# Patient Record
Sex: MALE | Race: WHITE | URBAN - METROPOLITAN AREA
[De-identification: names, ages, dates, MRNs, and addresses within clinical notes are randomized per-mention and may not be internally consistent; named-entity substitution may affect disease eponyms.]

---

## 2021-07-22 ENCOUNTER — HISTORICAL (OUTPATIENT)
Dept: ADMINISTRATIVE | Facility: HOSPITAL | Age: 76
End: 2021-07-22

## 2021-08-13 ENCOUNTER — HISTORICAL (OUTPATIENT)
Dept: ADMINISTRATIVE | Facility: HOSPITAL | Age: 76
End: 2021-08-13

## 2021-09-28 ENCOUNTER — HISTORICAL (OUTPATIENT)
Dept: LAB | Facility: HOSPITAL | Age: 76
End: 2021-09-28

## 2021-09-28 LAB
ABS NEUT (OLG): 3.68 X10(3)/MCL (ref 2.1–9.2)
ALBUMIN SERPL-MCNC: 4 GM/DL (ref 3.4–4.8)
ALBUMIN/GLOB SERPL: 1.1 RATIO (ref 1.1–2)
ALP SERPL-CCNC: 78 UNIT/L (ref 40–150)
ALT SERPL-CCNC: 13 UNIT/L (ref 0–55)
APPEARANCE, UA: ABNORMAL
AST SERPL-CCNC: 15 UNIT/L (ref 5–34)
BACTERIA SPEC CULT: ABNORMAL /HPF
BASOPHILS # BLD AUTO: 0.02 X10(3)/MCL (ref 0–0.2)
BASOPHILS NFR BLD AUTO: 0.3 % (ref 0–0.9)
BILIRUB SERPL-MCNC: 0.5 MG/DL (ref 0.2–1.2)
BILIRUB UR QL STRIP: NEGATIVE
BILIRUBIN DIRECT+TOT PNL SERPL-MCNC: 0.1 MG/DL (ref 0–0.5)
BILIRUBIN DIRECT+TOT PNL SERPL-MCNC: 0.4 MG/DL (ref 0–0.8)
BUN SERPL-MCNC: 9.8 MG/DL (ref 8.4–25.7)
CALCIUM SERPL-MCNC: 10.5 MG/DL (ref 8.8–10)
CHLORIDE SERPL-SCNC: 106 MMOL/L (ref 98–107)
CO2 SERPL-SCNC: 24 MMOL/L (ref 23–31)
COLOR UR: YELLOW
CREAT SERPL-MCNC: 1.06 MG/DL (ref 0.72–1.25)
EOSINOPHIL # BLD AUTO: 0.13 X10(3)/MCL (ref 0–0.9)
EOSINOPHIL NFR BLD AUTO: 1.7 % (ref 0–6.5)
ERYTHROCYTE [DISTWIDTH] IN BLOOD BY AUTOMATED COUNT: 13.6 % (ref 11.5–17)
EST. AVERAGE GLUCOSE BLD GHB EST-MCNC: 108.3 MG/DL
GLOBULIN SER-MCNC: 3.7 GM/DL (ref 2.4–3.5)
GLUCOSE (UA): NEGATIVE
GLUCOSE SERPL-MCNC: 88 MG/DL (ref 82–115)
HBA1C MFR BLD: 5.4 %
HCT VFR BLD AUTO: 48 % (ref 42–52)
HGB BLD-MCNC: 15.9 GM/DL (ref 14–18)
HGB UR QL STRIP: ABNORMAL
IMM GRANULOCYTES # BLD AUTO: 0.02 10*3/UL (ref 0–0.02)
IMM GRANULOCYTES NFR BLD AUTO: 0.3 % (ref 0–0.43)
KETONES UR QL STRIP: NEGATIVE
LEUKOCYTE ESTERASE UR QL STRIP: ABNORMAL
LYMPHOCYTES # BLD AUTO: 3.1 X10(3)/MCL (ref 0.6–4.6)
LYMPHOCYTES NFR BLD AUTO: 40.7 % (ref 16.2–38.3)
MCH RBC QN AUTO: 31.9 PG (ref 27–31)
MCHC RBC AUTO-ENTMCNC: 33.1 GM/DL (ref 33–36)
MCV RBC AUTO: 96.2 FL (ref 80–94)
MONOCYTES # BLD AUTO: 0.66 X10(3)/MCL (ref 0.1–1.3)
MONOCYTES NFR BLD AUTO: 8.7 % (ref 4.7–11.3)
MRSA SCREEN BY PCR: NEGATIVE
MUCOUS THREADS URNS QL MICRO: ABNORMAL /LPF
NEUTROPHILS # BLD AUTO: 3.68 X10(3)/MCL (ref 2.1–9.2)
NEUTROPHILS NFR BLD AUTO: 48.3 % (ref 49.1–73.4)
NITRITE UR QL STRIP: NEGATIVE
NRBC BLD AUTO-RTO: 0 % (ref 0–0.2)
PH UR STRIP: 6 [PH] (ref 5–7)
PLATELET # BLD AUTO: 274 X10(3)/MCL (ref 130–400)
PMV BLD AUTO: 9.8 FL (ref 7.4–10.4)
POTASSIUM SERPL-SCNC: 4.9 MMOL/L (ref 3.5–5.1)
PROT SERPL-MCNC: 7.7 GM/DL (ref 5.8–7.6)
PROT UR QL STRIP: NEGATIVE
RBC # BLD AUTO: 4.99 X10(6)/MCL (ref 4.7–6.1)
RBC #/AREA URNS HPF: ABNORMAL /HPF
SARS-COV-2 AG RESP QL IA.RAPID: NEGATIVE
SODIUM SERPL-SCNC: 139 MMOL/L (ref 136–145)
SP GR UR STRIP: 1.01 (ref 1–1.03)
SQUAMOUS EPITHELIAL, UA: ABNORMAL /LPF
UROBILINOGEN UR STRIP-ACNC: NEGATIVE
WBC # SPEC AUTO: 7.6 X10(3)/MCL (ref 4.5–11.5)
WBC #/AREA URNS HPF: ABNORMAL /HPF

## 2021-10-06 ENCOUNTER — HISTORICAL (OUTPATIENT)
Dept: ADMINISTRATIVE | Facility: HOSPITAL | Age: 76
End: 2021-10-06

## 2021-10-06 LAB
APPEARANCE, UA: CLEAR
BACTERIA SPEC CULT: ABNORMAL
BILIRUB UR QL STRIP: NEGATIVE
COLOR UR: YELLOW
GLUCOSE (UA): NEGATIVE
HGB UR QL STRIP: ABNORMAL
KETONES UR QL STRIP: NEGATIVE
LEUKOCYTE ESTERASE UR QL STRIP: ABNORMAL
NITRITE UR QL STRIP: NEGATIVE
PH UR STRIP: 5.5 [PH] (ref 5–9)
PROT UR QL STRIP: 30
RBC #/AREA URNS HPF: ABNORMAL /HPF
SARS-COV-2 AG RESP QL IA.RAPID: NEGATIVE
SP GR UR STRIP: >=1.03 (ref 1–1.03)
SQUAMOUS EPITHELIAL, UA: ABNORMAL
UROBILINOGEN UR STRIP-ACNC: 0.2
WBC #/AREA URNS HPF: ABNORMAL /HPF

## 2021-10-09 LAB
ABS NEUT (OLG): 3.41 X10(3)/MCL (ref 2.1–9.2)
BASOPHILS NFR BLD MANUAL: 0 % (ref 0–2)
BUN SERPL-MCNC: 16 MG/DL (ref 8.4–25.7)
CALCIUM SERPL-MCNC: 8.9 MG/DL (ref 8.8–10)
CHLORIDE SERPL-SCNC: 108 MMOL/L (ref 98–107)
CO2 SERPL-SCNC: 26 MMOL/L (ref 23–31)
CREAT SERPL-MCNC: 0.83 MG/DL (ref 0.73–1.18)
CREAT/UREA NIT SERPL: 19
EOSINOPHIL NFR BLD MANUAL: 2 % (ref 0–8)
ERYTHROCYTE [DISTWIDTH] IN BLOOD BY AUTOMATED COUNT: 13.6 % (ref 11.5–17)
EST. AVERAGE GLUCOSE BLD GHB EST-MCNC: 99.7 MG/DL
GLUCOSE SERPL-MCNC: 95 MG/DL (ref 82–115)
GRANULOCYTES NFR BLD MANUAL: 57 % (ref 47–80)
HBA1C MFR BLD: 5.1 %
HCT VFR BLD AUTO: 33.1 % (ref 42–52)
HGB BLD-MCNC: 10.4 GM/DL (ref 14–18)
LYMPHOCYTES NFR BLD MANUAL: 31 % (ref 13–40)
LYMPHOCYTES NFR BLD MANUAL: 4 %
MCH RBC QN AUTO: 31.4 PG (ref 27–31)
MCHC RBC AUTO-ENTMCNC: 31.4 GM/DL (ref 33–36)
MCV RBC AUTO: 100 FL (ref 80–94)
MONOCYTES NFR BLD MANUAL: 6 % (ref 2–11)
PLATELET # BLD AUTO: 250 X10(3)/MCL (ref 130–400)
PLATELET # BLD EST: ADEQUATE 10*3/UL
PMV BLD AUTO: 9.4 FL (ref 9.4–12.4)
POTASSIUM SERPL-SCNC: 4.1 MMOL/L (ref 3.5–5.1)
RBC # BLD AUTO: 3.31 X10(6)/MCL (ref 4.7–6.1)
RBC MORPH BLD: NORMAL
SODIUM SERPL-SCNC: 142 MMOL/L (ref 136–145)
WBC # SPEC AUTO: 6.6 X10(3)/MCL (ref 4.5–11.5)

## 2021-10-11 LAB
ABS NEUT (OLG): 10.17 X10(3)/MCL (ref 2.1–9.2)
ALBUMIN SERPL-MCNC: 3 GM/DL (ref 3.4–4.8)
ALBUMIN/GLOB SERPL: 0.7 RATIO (ref 1.1–2)
ALP SERPL-CCNC: 76 UNIT/L (ref 40–150)
ALT SERPL-CCNC: 16 UNIT/L (ref 0–55)
AST SERPL-CCNC: 18 UNIT/L (ref 5–34)
BASOPHILS # BLD AUTO: 0 X10(3)/MCL (ref 0–0.2)
BASOPHILS NFR BLD AUTO: 0 %
BILIRUB SERPL-MCNC: 0.7 MG/DL
BILIRUBIN DIRECT+TOT PNL SERPL-MCNC: 0.3 MG/DL (ref 0–0.5)
BILIRUBIN DIRECT+TOT PNL SERPL-MCNC: 0.4 MG/DL (ref 0–0.8)
BUN SERPL-MCNC: 15.1 MG/DL (ref 8.4–25.7)
CALCIUM SERPL-MCNC: 10.3 MG/DL (ref 8.8–10)
CHLORIDE SERPL-SCNC: 106 MMOL/L (ref 98–107)
CO2 SERPL-SCNC: 20 MMOL/L (ref 23–31)
CREAT SERPL-MCNC: 0.89 MG/DL (ref 0.73–1.18)
EOSINOPHIL # BLD AUTO: 0 X10(3)/MCL (ref 0–0.9)
EOSINOPHIL NFR BLD AUTO: 0 %
ERYTHROCYTE [DISTWIDTH] IN BLOOD BY AUTOMATED COUNT: 13.2 % (ref 11.5–17)
GLOBULIN SER-MCNC: 4.4 GM/DL (ref 2.4–3.5)
GLUCOSE SERPL-MCNC: 139 MG/DL (ref 82–115)
HCT VFR BLD AUTO: 37.2 % (ref 42–52)
HGB BLD-MCNC: 12.2 GM/DL (ref 14–18)
IMM GRANULOCYTES # BLD AUTO: 0.03 % (ref 0–0.02)
IMM GRANULOCYTES NFR BLD AUTO: 0.2 % (ref 0–0.43)
LYMPHOCYTES # BLD AUTO: 1.3 X10(3)/MCL (ref 0.6–4.6)
LYMPHOCYTES NFR BLD AUTO: 11 %
MCH RBC QN AUTO: 31.8 PG (ref 27–31)
MCHC RBC AUTO-ENTMCNC: 32.8 GM/DL (ref 33–36)
MCV RBC AUTO: 96.9 FL (ref 80–94)
MONOCYTES # BLD AUTO: 0.7 X10(3)/MCL (ref 0.1–1.3)
MONOCYTES NFR BLD AUTO: 6 %
NEUTROPHILS # BLD AUTO: 10.17 X10(3)/MCL (ref 1.4–7.9)
NEUTROPHILS NFR BLD AUTO: 83 %
PLATELET # BLD AUTO: 401 X10(3)/MCL (ref 130–400)
PMV BLD AUTO: 9 FL (ref 9.4–12.4)
POTASSIUM SERPL-SCNC: 4.4 MMOL/L (ref 3.5–5.1)
PROT SERPL-MCNC: 7.4 GM/DL (ref 5.8–7.6)
RBC # BLD AUTO: 3.84 X10(6)/MCL (ref 4.7–6.1)
SODIUM SERPL-SCNC: 138 MMOL/L (ref 136–145)
WBC # SPEC AUTO: 12.2 X10(3)/MCL (ref 4.5–11.5)

## 2021-10-12 LAB
ABS NEUT (OLG): 8.59 X10(3)/MCL (ref 2.1–9.2)
ALBUMIN SERPL-MCNC: 2.9 GM/DL (ref 3.4–4.8)
ALBUMIN/GLOB SERPL: 0.7 RATIO (ref 1.1–2)
ALP SERPL-CCNC: 72 UNIT/L (ref 40–150)
ALT SERPL-CCNC: 15 UNIT/L (ref 0–55)
AST SERPL-CCNC: 18 UNIT/L (ref 5–34)
BILIRUB SERPL-MCNC: 0.7 MG/DL
BILIRUBIN DIRECT+TOT PNL SERPL-MCNC: 0.3 MG/DL (ref 0–0.5)
BILIRUBIN DIRECT+TOT PNL SERPL-MCNC: 0.4 MG/DL (ref 0–0.8)
BUN SERPL-MCNC: 22.9 MG/DL (ref 8.4–25.7)
CALCIUM SERPL-MCNC: 9.7 MG/DL (ref 8.8–10)
CHLORIDE SERPL-SCNC: 106 MMOL/L (ref 98–107)
CO2 SERPL-SCNC: 23 MMOL/L (ref 23–31)
CREAT SERPL-MCNC: 0.96 MG/DL (ref 0.73–1.18)
ERYTHROCYTE [DISTWIDTH] IN BLOOD BY AUTOMATED COUNT: 13.7 % (ref 11.5–17)
GLOBULIN SER-MCNC: 4.2 GM/DL (ref 2.4–3.5)
GLUCOSE SERPL-MCNC: 130 MG/DL (ref 82–115)
HCT VFR BLD AUTO: 38.9 % (ref 42–52)
HGB BLD-MCNC: 12.3 GM/DL (ref 14–18)
LACTATE SERPL-SCNC: 1.9 MMOL/L (ref 0.5–2.2)
LYMPHOCYTES NFR BLD MANUAL: 10 % (ref 13–40)
MCH RBC QN AUTO: 31.4 PG (ref 27–31)
MCHC RBC AUTO-ENTMCNC: 31.6 GM/DL (ref 33–36)
MCV RBC AUTO: 99.2 FL (ref 80–94)
MONOCYTES NFR BLD MANUAL: 13 % (ref 2–11)
NEUTROPHILS NFR BLD MANUAL: 70 % (ref 47–80)
NEUTS BAND NFR BLD MANUAL: 7 % (ref 0–11)
PLATELET # BLD AUTO: 476 X10(3)/MCL (ref 130–400)
PLATELET # BLD EST: ABNORMAL 10*3/UL
PMV BLD AUTO: 8.9 FL (ref 9.4–12.4)
POTASSIUM SERPL-SCNC: 4.2 MMOL/L (ref 3.5–5.1)
PROT SERPL-MCNC: 7.1 GM/DL (ref 5.8–7.6)
RBC # BLD AUTO: 3.92 X10(6)/MCL (ref 4.7–6.1)
RBC MORPH BLD: NORMAL
SODIUM SERPL-SCNC: 141 MMOL/L (ref 136–145)
WBC # SPEC AUTO: 11.4 X10(3)/MCL (ref 4.5–11.5)

## 2021-10-14 LAB
BUN SERPL-MCNC: 20 MG/DL (ref 8.4–25.7)
CALCIUM SERPL-MCNC: 8.4 MG/DL (ref 8.8–10)
CHLORIDE SERPL-SCNC: 112 MMOL/L (ref 98–107)
CO2 SERPL-SCNC: 21 MMOL/L (ref 23–31)
CREAT SERPL-MCNC: 0.77 MG/DL (ref 0.73–1.18)
CREAT/UREA NIT SERPL: 26
GLUCOSE SERPL-MCNC: 84 MG/DL (ref 82–115)
MAGNESIUM SERPL-MCNC: 2.3 MG/DL (ref 1.6–2.6)
POTASSIUM SERPL-SCNC: 4.1 MMOL/L (ref 3.5–5.1)
SODIUM SERPL-SCNC: 140 MMOL/L (ref 136–145)

## 2021-10-16 LAB
ABS NEUT (OLG): 5.54 X10(3)/MCL (ref 2.1–9.2)
ALBUMIN SERPL-MCNC: 2.8 GM/DL (ref 3.4–4.8)
ALBUMIN/GLOB SERPL: 1 RATIO (ref 1.1–2)
ALP SERPL-CCNC: 73 UNIT/L (ref 40–150)
ALT SERPL-CCNC: 17 UNIT/L (ref 0–55)
AST SERPL-CCNC: 17 UNIT/L (ref 5–34)
BASOPHILS # BLD AUTO: 0 X10(3)/MCL (ref 0–0.2)
BASOPHILS NFR BLD AUTO: 0 %
BILIRUB SERPL-MCNC: 0.7 MG/DL
BILIRUBIN DIRECT+TOT PNL SERPL-MCNC: 0.3 MG/DL (ref 0–0.5)
BILIRUBIN DIRECT+TOT PNL SERPL-MCNC: 0.4 MG/DL (ref 0–0.8)
BUN SERPL-MCNC: 15.4 MG/DL (ref 8.4–25.7)
CALCIUM SERPL-MCNC: 8.7 MG/DL (ref 8.8–10)
CHLORIDE SERPL-SCNC: 110 MMOL/L (ref 98–107)
CO2 SERPL-SCNC: 22 MMOL/L (ref 23–31)
CREAT SERPL-MCNC: 0.81 MG/DL (ref 0.73–1.18)
EOSINOPHIL # BLD AUTO: 0.3 X10(3)/MCL (ref 0–0.9)
EOSINOPHIL NFR BLD AUTO: 3 %
ERYTHROCYTE [DISTWIDTH] IN BLOOD BY AUTOMATED COUNT: 13.7 % (ref 11.5–17)
GLOBULIN SER-MCNC: 2.8 GM/DL (ref 2.4–3.5)
GLUCOSE SERPL-MCNC: 119 MG/DL (ref 82–115)
HCT VFR BLD AUTO: 34.1 % (ref 42–52)
HGB BLD-MCNC: 10.8 GM/DL (ref 14–18)
IMM GRANULOCYTES # BLD AUTO: 0.05 % (ref 0–0.02)
IMM GRANULOCYTES NFR BLD AUTO: 0.6 % (ref 0–0.43)
LYMPHOCYTES # BLD AUTO: 1.7 X10(3)/MCL (ref 0.6–4.6)
LYMPHOCYTES NFR BLD AUTO: 21 %
MCH RBC QN AUTO: 31.4 PG (ref 27–31)
MCHC RBC AUTO-ENTMCNC: 31.7 GM/DL (ref 33–36)
MCV RBC AUTO: 99.1 FL (ref 80–94)
MONOCYTES # BLD AUTO: 0.5 X10(3)/MCL (ref 0.1–1.3)
MONOCYTES NFR BLD AUTO: 7 %
NEUTROPHILS # BLD AUTO: 5.54 X10(3)/MCL (ref 1.4–7.9)
NEUTROPHILS NFR BLD AUTO: 68 %
PLATELET # BLD AUTO: 489 X10(3)/MCL (ref 130–400)
PMV BLD AUTO: 8.9 FL (ref 9.4–12.4)
POTASSIUM SERPL-SCNC: 4.2 MMOL/L (ref 3.5–5.1)
PROT SERPL-MCNC: 5.6 GM/DL (ref 5.8–7.6)
RBC # BLD AUTO: 3.44 X10(6)/MCL (ref 4.7–6.1)
SODIUM SERPL-SCNC: 141 MMOL/L (ref 136–145)
WBC # SPEC AUTO: 8.2 X10(3)/MCL (ref 4.5–11.5)

## 2021-10-21 ENCOUNTER — HISTORICAL (OUTPATIENT)
Dept: ADMINISTRATIVE | Facility: HOSPITAL | Age: 76
End: 2021-10-21

## 2021-12-02 ENCOUNTER — HISTORICAL (OUTPATIENT)
Dept: ADMINISTRATIVE | Facility: HOSPITAL | Age: 76
End: 2021-12-02

## 2022-04-11 ENCOUNTER — HISTORICAL (OUTPATIENT)
Dept: ADMINISTRATIVE | Facility: HOSPITAL | Age: 77
End: 2022-04-11
Payer: MEDICARE

## 2022-04-28 VITALS
SYSTOLIC BLOOD PRESSURE: 107 MMHG | WEIGHT: 222.69 LBS | HEIGHT: 72 IN | BODY MASS INDEX: 30.16 KG/M2 | DIASTOLIC BLOOD PRESSURE: 71 MMHG

## 2025-01-01 ENCOUNTER — HOSPITAL ENCOUNTER (INPATIENT)
Facility: HOSPITAL | Age: 80
LOS: 38 days | DRG: 215 | End: 2025-05-06
Attending: EMERGENCY MEDICINE | Admitting: INTERNAL MEDICINE
Payer: MEDICARE

## 2025-01-01 VITALS
BODY MASS INDEX: 14.93 KG/M2 | TEMPERATURE: 98 F | OXYGEN SATURATION: 100 % | SYSTOLIC BLOOD PRESSURE: 110 MMHG | HEART RATE: 113 BPM | DIASTOLIC BLOOD PRESSURE: 60 MMHG | RESPIRATION RATE: 32 BRPM | WEIGHT: 106.63 LBS | HEIGHT: 71 IN

## 2025-01-01 DIAGNOSIS — I48.91 ATRIAL FIBRILLATION WITH RAPID VENTRICULAR RESPONSE: ICD-10-CM

## 2025-01-01 DIAGNOSIS — R09.89 ABNORMAL PULSE: ICD-10-CM

## 2025-01-01 DIAGNOSIS — I47.20 V-TACH: ICD-10-CM

## 2025-01-01 DIAGNOSIS — I46.9 CARDIAC ARREST: ICD-10-CM

## 2025-01-01 DIAGNOSIS — I82.409 DVT (DEEP VENOUS THROMBOSIS): ICD-10-CM

## 2025-01-01 DIAGNOSIS — I50.9 HEART FAILURE: ICD-10-CM

## 2025-01-01 DIAGNOSIS — I21.9 ACUTE MYOCARDIAL INFARCTION, UNSPECIFIED MI TYPE, UNSPECIFIED ARTERY: ICD-10-CM

## 2025-01-01 DIAGNOSIS — I48.91 ATRIAL FIBRILLATION WITH RVR: ICD-10-CM

## 2025-01-01 DIAGNOSIS — I21.4 NSTEMI (NON-ST ELEVATED MYOCARDIAL INFARCTION): ICD-10-CM

## 2025-01-01 DIAGNOSIS — I25.10 CAD (CORONARY ARTERY DISEASE): ICD-10-CM

## 2025-01-01 DIAGNOSIS — I21.4 NON-ST ELEVATION (NSTEMI) MYOCARDIAL INFARCTION: Primary | ICD-10-CM

## 2025-01-01 DIAGNOSIS — I21.9 MI (MYOCARDIAL INFARCTION): ICD-10-CM

## 2025-01-01 DIAGNOSIS — I46.9 CARDIAC ARREST, CAUSE UNSPECIFIED: ICD-10-CM

## 2025-01-01 DIAGNOSIS — R09.89 WEAK PULSE: ICD-10-CM

## 2025-01-01 DIAGNOSIS — I46.9 CARDIOPULMONARY ARREST: ICD-10-CM

## 2025-01-01 DIAGNOSIS — R07.9 CHEST PAIN: ICD-10-CM

## 2025-01-01 DIAGNOSIS — R57.0 CARDIOGENIC SHOCK: ICD-10-CM

## 2025-01-01 DIAGNOSIS — I25.5 ISCHEMIC CARDIOMYOPATHY: ICD-10-CM

## 2025-01-01 DIAGNOSIS — I82.409 ACUTE DEEP VEIN THROMBOSIS (DVT) OF OTHER VEIN OF LOWER EXTREMITY, UNSPECIFIED LATERALITY: ICD-10-CM

## 2025-01-01 LAB
ABO + RH BLD: NORMAL
ABORH RETYPE: NORMAL
ABS NEUT (OLG): 3.31 X10(3)/MCL (ref 2.1–9.2)
ABS NEUT (OLG): 4.81 X10(3)/MCL (ref 2.1–9.2)
ALBUMIN SERPL-MCNC: 1.7 G/DL (ref 3.4–4.8)
ALBUMIN SERPL-MCNC: 1.7 G/DL (ref 3.4–4.8)
ALBUMIN SERPL-MCNC: 1.8 G/DL (ref 3.4–4.8)
ALBUMIN SERPL-MCNC: 1.9 G/DL (ref 3.4–4.8)
ALBUMIN SERPL-MCNC: 2 G/DL (ref 3.4–4.8)
ALBUMIN SERPL-MCNC: 2.1 G/DL (ref 3.4–4.8)
ALBUMIN SERPL-MCNC: 2.2 G/DL (ref 3.4–4.8)
ALBUMIN SERPL-MCNC: 2.3 G/DL (ref 3.4–4.8)
ALBUMIN SERPL-MCNC: 2.4 G/DL (ref 3.4–4.8)
ALBUMIN SERPL-MCNC: 2.5 G/DL (ref 3.4–4.8)
ALBUMIN SERPL-MCNC: 2.6 G/DL (ref 3.4–4.8)
ALBUMIN SERPL-MCNC: 2.7 G/DL (ref 3.4–4.8)
ALBUMIN SERPL-MCNC: 2.8 G/DL (ref 3.4–4.8)
ALBUMIN SERPL-MCNC: 3 G/DL (ref 3.4–4.8)
ALBUMIN SERPL-MCNC: 3 G/DL (ref 3.4–4.8)
ALBUMIN/GLOB SERPL: 0.5 RATIO (ref 1.1–2)
ALBUMIN/GLOB SERPL: 0.5 RATIO (ref 1.1–2)
ALBUMIN/GLOB SERPL: 0.6 RATIO (ref 1.1–2)
ALBUMIN/GLOB SERPL: 0.7 RATIO (ref 1.1–2)
ALBUMIN/GLOB SERPL: 0.8 RATIO (ref 1.1–2)
ALBUMIN/GLOB SERPL: 0.9 RATIO (ref 1.1–2)
ALBUMIN/GLOB SERPL: 1 RATIO (ref 1.1–2)
ALBUMIN/GLOB SERPL: 1.1 RATIO (ref 1.1–2)
ALBUMIN/GLOB SERPL: 1.1 RATIO (ref 1.1–2)
ALBUMIN/GLOB SERPL: 1.2 RATIO (ref 1.1–2)
ALBUMIN/GLOB SERPL: 1.2 RATIO (ref 1.1–2)
ALLENS TEST BLOOD GAS (OHS): ABNORMAL
ALLENS TEST BLOOD GAS (OHS): NORMAL
ALLENS TEST BLOOD GAS (OHS): YES
ALP SERPL-CCNC: 58 UNIT/L (ref 40–150)
ALP SERPL-CCNC: 59 UNIT/L (ref 40–150)
ALP SERPL-CCNC: 62 UNIT/L (ref 40–150)
ALP SERPL-CCNC: 63 UNIT/L (ref 40–150)
ALP SERPL-CCNC: 65 UNIT/L (ref 40–150)
ALP SERPL-CCNC: 66 UNIT/L (ref 40–150)
ALP SERPL-CCNC: 66 UNIT/L (ref 40–150)
ALP SERPL-CCNC: 67 UNIT/L (ref 40–150)
ALP SERPL-CCNC: 68 UNIT/L (ref 40–150)
ALP SERPL-CCNC: 69 UNIT/L (ref 40–150)
ALP SERPL-CCNC: 70 UNIT/L (ref 40–150)
ALP SERPL-CCNC: 71 UNIT/L (ref 40–150)
ALP SERPL-CCNC: 73 UNIT/L (ref 40–150)
ALP SERPL-CCNC: 74 UNIT/L (ref 40–150)
ALP SERPL-CCNC: 76 UNIT/L (ref 40–150)
ALP SERPL-CCNC: 77 UNIT/L (ref 40–150)
ALP SERPL-CCNC: 80 UNIT/L (ref 40–150)
ALP SERPL-CCNC: 81 UNIT/L (ref 40–150)
ALP SERPL-CCNC: 82 UNIT/L (ref 40–150)
ALP SERPL-CCNC: 83 UNIT/L (ref 40–150)
ALP SERPL-CCNC: 84 UNIT/L (ref 40–150)
ALP SERPL-CCNC: 84 UNIT/L (ref 40–150)
ALP SERPL-CCNC: 85 UNIT/L (ref 40–150)
ALP SERPL-CCNC: 85 UNIT/L (ref 40–150)
ALP SERPL-CCNC: 86 UNIT/L (ref 40–150)
ALP SERPL-CCNC: 86 UNIT/L (ref 40–150)
ALP SERPL-CCNC: 87 UNIT/L (ref 40–150)
ALP SERPL-CCNC: 87 UNIT/L (ref 40–150)
ALP SERPL-CCNC: 88 UNIT/L (ref 40–150)
ALP SERPL-CCNC: 89 UNIT/L (ref 40–150)
ALP SERPL-CCNC: 89 UNIT/L (ref 40–150)
ALP SERPL-CCNC: 96 UNIT/L (ref 40–150)
ALP SERPL-CCNC: 96 UNIT/L (ref 40–150)
ALT SERPL-CCNC: 10 UNIT/L (ref 0–55)
ALT SERPL-CCNC: 10 UNIT/L (ref 0–55)
ALT SERPL-CCNC: 13 UNIT/L (ref 0–55)
ALT SERPL-CCNC: 14 UNIT/L (ref 0–55)
ALT SERPL-CCNC: 15 UNIT/L (ref 0–55)
ALT SERPL-CCNC: 16 UNIT/L (ref 0–55)
ALT SERPL-CCNC: 16 UNIT/L (ref 0–55)
ALT SERPL-CCNC: 17 UNIT/L (ref 0–55)
ALT SERPL-CCNC: 18 UNIT/L (ref 0–55)
ALT SERPL-CCNC: 19 UNIT/L (ref 0–55)
ALT SERPL-CCNC: 20 UNIT/L (ref 0–55)
ALT SERPL-CCNC: 21 UNIT/L (ref 0–55)
ALT SERPL-CCNC: 22 UNIT/L (ref 0–55)
ALT SERPL-CCNC: 22 UNIT/L (ref 0–55)
ALT SERPL-CCNC: 23 UNIT/L (ref 0–55)
ALT SERPL-CCNC: 24 UNIT/L (ref 0–55)
ALT SERPL-CCNC: 25 UNIT/L (ref 0–55)
ALT SERPL-CCNC: 27 UNIT/L (ref 0–55)
ALT SERPL-CCNC: 31 UNIT/L (ref 0–55)
ALT SERPL-CCNC: 37 UNIT/L (ref 0–55)
ALT SERPL-CCNC: 41 UNIT/L (ref 0–55)
ALT SERPL-CCNC: 42 UNIT/L (ref 0–55)
ALT SERPL-CCNC: 45 UNIT/L (ref 0–55)
ALT SERPL-CCNC: 48 UNIT/L (ref 0–55)
ALT SERPL-CCNC: 5 UNIT/L (ref 0–55)
ALT SERPL-CCNC: 54 UNIT/L (ref 0–55)
ALT SERPL-CCNC: 56 UNIT/L (ref 0–55)
ALT SERPL-CCNC: 6 UNIT/L (ref 0–55)
ALT SERPL-CCNC: 65 UNIT/L (ref 0–55)
ALT SERPL-CCNC: 65 UNIT/L (ref 0–55)
ALT SERPL-CCNC: 7 UNIT/L (ref 0–55)
ALT SERPL-CCNC: 8 UNIT/L (ref 0–55)
ALT SERPL-CCNC: <5 UNIT/L (ref 0–55)
AMMONIA PLAS-MSCNC: 28.1 UMOL/L (ref 18–72)
ANION GAP SERPL CALC-SCNC: 10 MEQ/L
ANION GAP SERPL CALC-SCNC: 11 MEQ/L
ANION GAP SERPL CALC-SCNC: 12 MEQ/L
ANION GAP SERPL CALC-SCNC: 14 MEQ/L
ANION GAP SERPL CALC-SCNC: 18 MMOL/L (ref 8–16)
ANION GAP SERPL CALC-SCNC: 3 MEQ/L
ANION GAP SERPL CALC-SCNC: 3 MEQ/L
ANION GAP SERPL CALC-SCNC: 4 MEQ/L
ANION GAP SERPL CALC-SCNC: 5 MEQ/L
ANION GAP SERPL CALC-SCNC: 6 MEQ/L
ANION GAP SERPL CALC-SCNC: 7 MEQ/L
ANION GAP SERPL CALC-SCNC: 8 MEQ/L
ANION GAP SERPL CALC-SCNC: 9 MEQ/L
ANISOCYTOSIS BLD QL SMEAR: ABNORMAL
ANISOCYTOSIS BLD QL SMEAR: ABNORMAL
APICAL FOUR CHAMBER EJECTION FRACTION: 20 %
APICAL TWO CHAMBER EJECTION FRACTION: 15 %
APTT PPP: 103.5 SECONDS (ref 23.2–33.7)
APTT PPP: 114.7 SECONDS (ref 23.2–33.7)
APTT PPP: 31.9 SECONDS (ref 23.2–33.7)
APTT PPP: 63.5 SECONDS (ref 23.2–33.7)
AST SERPL-CCNC: 103 UNIT/L (ref 11–45)
AST SERPL-CCNC: 11 UNIT/L (ref 11–45)
AST SERPL-CCNC: 12 UNIT/L (ref 11–45)
AST SERPL-CCNC: 126 UNIT/L (ref 11–45)
AST SERPL-CCNC: 13 UNIT/L (ref 11–45)
AST SERPL-CCNC: 14 UNIT/L (ref 11–45)
AST SERPL-CCNC: 15 UNIT/L (ref 11–45)
AST SERPL-CCNC: 15 UNIT/L (ref 11–45)
AST SERPL-CCNC: 16 UNIT/L (ref 11–45)
AST SERPL-CCNC: 169 UNIT/L (ref 11–45)
AST SERPL-CCNC: 17 UNIT/L (ref 11–45)
AST SERPL-CCNC: 18 UNIT/L (ref 11–45)
AST SERPL-CCNC: 19 UNIT/L (ref 11–45)
AST SERPL-CCNC: 20 UNIT/L (ref 11–45)
AST SERPL-CCNC: 21 UNIT/L (ref 11–45)
AST SERPL-CCNC: 22 UNIT/L (ref 11–45)
AST SERPL-CCNC: 222 UNIT/L (ref 11–45)
AST SERPL-CCNC: 23 UNIT/L (ref 11–45)
AST SERPL-CCNC: 24 UNIT/L (ref 11–45)
AST SERPL-CCNC: 24 UNIT/L (ref 11–45)
AST SERPL-CCNC: 245 UNIT/L (ref 11–45)
AST SERPL-CCNC: 25 UNIT/L (ref 11–45)
AST SERPL-CCNC: 26 UNIT/L (ref 11–45)
AST SERPL-CCNC: 27 UNIT/L (ref 11–45)
AST SERPL-CCNC: 27 UNIT/L (ref 11–45)
AST SERPL-CCNC: 28 UNIT/L (ref 11–45)
AST SERPL-CCNC: 31 UNIT/L (ref 11–45)
AST SERPL-CCNC: 31 UNIT/L (ref 11–45)
AST SERPL-CCNC: 34 UNIT/L (ref 11–45)
AST SERPL-CCNC: 34 UNIT/L (ref 11–45)
AST SERPL-CCNC: 37 UNIT/L (ref 11–45)
AST SERPL-CCNC: 38 UNIT/L (ref 11–45)
AST SERPL-CCNC: 39 UNIT/L (ref 11–45)
AST SERPL-CCNC: 49 UNIT/L (ref 11–45)
AST SERPL-CCNC: 49 UNIT/L (ref 11–45)
AST SERPL-CCNC: 56 UNIT/L (ref 11–45)
AST SERPL-CCNC: 65 UNIT/L (ref 11–45)
AST SERPL-CCNC: 79 UNIT/L (ref 11–45)
AST SERPL-CCNC: 8 UNIT/L (ref 11–45)
AST SERPL-CCNC: 82 UNIT/L (ref 11–45)
AST SERPL-CCNC: 9 UNIT/L (ref 11–45)
AV INDEX (PROSTH): 0.5
AV MEAN GRADIENT: 5 MMHG
AV PEAK GRADIENT: 10 MMHG
AV VALVE AREA BY VELOCITY RATIO: 1.8 CM²
AV VALVE AREA: 1.6 CM²
AV VELOCITY RATIO: 0.56
BACTERIA #/AREA URNS AUTO: ABNORMAL /HPF
BACTERIA BLD CULT: NORMAL
BACTERIA BLD CULT: NORMAL
BACTERIA UR CULT: ABNORMAL
BACTERIA UR CULT: ABNORMAL
BASE EXCESS BLD CALC-SCNC: -0.1 MMOL/L
BASE EXCESS BLD CALC-SCNC: -0.1 MMOL/L (ref -2–2)
BASE EXCESS BLD CALC-SCNC: -0.2 MMOL/L
BASE EXCESS BLD CALC-SCNC: -0.3 MMOL/L
BASE EXCESS BLD CALC-SCNC: -0.4 MMOL/L
BASE EXCESS BLD CALC-SCNC: -0.5 MMOL/L
BASE EXCESS BLD CALC-SCNC: -0.5 MMOL/L (ref -2–2)
BASE EXCESS BLD CALC-SCNC: -0.6 MMOL/L
BASE EXCESS BLD CALC-SCNC: -0.6 MMOL/L (ref -2–2)
BASE EXCESS BLD CALC-SCNC: -0.7 MMOL/L
BASE EXCESS BLD CALC-SCNC: -0.7 MMOL/L (ref -2–2)
BASE EXCESS BLD CALC-SCNC: -0.8 MMOL/L (ref -2–2)
BASE EXCESS BLD CALC-SCNC: -0.9 MMOL/L
BASE EXCESS BLD CALC-SCNC: -1 MMOL/L
BASE EXCESS BLD CALC-SCNC: -1.1 MMOL/L (ref -2–2)
BASE EXCESS BLD CALC-SCNC: -1.2 MMOL/L
BASE EXCESS BLD CALC-SCNC: -1.2 MMOL/L
BASE EXCESS BLD CALC-SCNC: -1.3 MMOL/L
BASE EXCESS BLD CALC-SCNC: -1.3 MMOL/L
BASE EXCESS BLD CALC-SCNC: -1.4 MMOL/L (ref -2–2)
BASE EXCESS BLD CALC-SCNC: -1.6 MMOL/L (ref -2–2)
BASE EXCESS BLD CALC-SCNC: -1.7 MMOL/L
BASE EXCESS BLD CALC-SCNC: -19.8 MMOL/L (ref -2–2)
BASE EXCESS BLD CALC-SCNC: -5.3 MMOL/L
BASE EXCESS BLD CALC-SCNC: 0 MMOL/L
BASE EXCESS BLD CALC-SCNC: 0.1 MMOL/L
BASE EXCESS BLD CALC-SCNC: 0.1 MMOL/L
BASE EXCESS BLD CALC-SCNC: 0.1 MMOL/L (ref -2–2)
BASE EXCESS BLD CALC-SCNC: 0.2 MMOL/L
BASE EXCESS BLD CALC-SCNC: 0.3 MMOL/L
BASE EXCESS BLD CALC-SCNC: 0.4 MMOL/L (ref -2–2)
BASE EXCESS BLD CALC-SCNC: 0.5 MMOL/L
BASE EXCESS BLD CALC-SCNC: 0.5 MMOL/L
BASE EXCESS BLD CALC-SCNC: 0.6 MMOL/L
BASE EXCESS BLD CALC-SCNC: 0.7 MMOL/L (ref -2–2)
BASE EXCESS BLD CALC-SCNC: 0.9 MMOL/L
BASE EXCESS BLD CALC-SCNC: 1 MMOL/L
BASE EXCESS BLD CALC-SCNC: 1 MMOL/L
BASE EXCESS BLD CALC-SCNC: 1.1 MMOL/L
BASE EXCESS BLD CALC-SCNC: 1.7 MMOL/L
BASE EXCESS BLD CALC-SCNC: 2 MMOL/L
BASE EXCESS BLD CALC-SCNC: 2 MMOL/L (ref -2–2)
BASE EXCESS BLD CALC-SCNC: 2.5 MMOL/L
BASE EXCESS BLD CALC-SCNC: 2.5 MMOL/L (ref -2–2)
BASE EXCESS BLD CALC-SCNC: 2.6 MMOL/L
BASE EXCESS BLD CALC-SCNC: 2.6 MMOL/L (ref -2–2)
BASE EXCESS BLD CALC-SCNC: 2.9 MMOL/L
BASE EXCESS BLD CALC-SCNC: 2.9 MMOL/L
BASE EXCESS BLD CALC-SCNC: 3.5 MMOL/L
BASE EXCESS BLD CALC-SCNC: 3.5 MMOL/L (ref -2–2)
BASE EXCESS BLD CALC-SCNC: 3.7 MMOL/L
BASE EXCESS BLD CALC-SCNC: 4.2 MMOL/L (ref -2–2)
BASE EXCESS BLD CALC-SCNC: 4.7 MMOL/L
BASE EXCESS BLD CALC-SCNC: 4.7 MMOL/L
BASOPHILS # BLD AUTO: 0.01 X10(3)/MCL
BASOPHILS # BLD AUTO: 0.02 X10(3)/MCL
BASOPHILS # BLD AUTO: 0.03 X10(3)/MCL
BASOPHILS # BLD AUTO: 0.04 X10(3)/MCL
BASOPHILS # BLD AUTO: 0.05 X10(3)/MCL
BASOPHILS # BLD AUTO: 0.06 X10(3)/MCL
BASOPHILS NFR BLD AUTO: 0.1 %
BASOPHILS NFR BLD AUTO: 0.1 %
BASOPHILS NFR BLD AUTO: 0.2 %
BASOPHILS NFR BLD AUTO: 0.3 %
BASOPHILS NFR BLD AUTO: 0.4 %
BASOPHILS NFR BLD AUTO: 0.5 %
BASOPHILS NFR BLD AUTO: 0.6 %
BASOPHILS NFR BLD AUTO: 0.7 %
BASOPHILS NFR BLD AUTO: 0.8 %
BASOPHILS NFR BLD MANUAL: 0.09 X10(3)/MCL (ref 0–0.2)
BASOPHILS NFR BLD MANUAL: 0.1 X10(3)/MCL (ref 0–0.2)
BASOPHILS NFR BLD MANUAL: 1 %
BASOPHILS NFR BLD MANUAL: 2 %
BILIRUB SERPL-MCNC: 0.3 MG/DL
BILIRUB SERPL-MCNC: 0.4 MG/DL
BILIRUB SERPL-MCNC: 0.5 MG/DL
BILIRUB SERPL-MCNC: 0.6 MG/DL
BILIRUB SERPL-MCNC: 0.7 MG/DL
BILIRUB SERPL-MCNC: <0.5 MG/DL
BILIRUB UR QL STRIP.AUTO: NEGATIVE
BLD PROD TYP BPU: NORMAL
BLOOD GAS SAMPLE TYPE (OHS): ABNORMAL
BLOOD GAS SAMPLE TYPE (OHS): NORMAL
BLOOD UNIT EXPIRATION DATE: NORMAL
BLOOD UNIT TYPE CODE: 5100
BLOOD UNIT TYPE CODE: 9500
BLOOD UNIT TYPE CODE: 9500
BNP BLD-MCNC: 379.5 PG/ML
BNP BLD-MCNC: 865 PG/ML
BSA FOR ECHO PROCEDURE: 2.26 M2
BUN SERPL-MCNC: 10 MG/DL (ref 8.4–25.7)
BUN SERPL-MCNC: 10.1 MG/DL (ref 8.4–25.7)
BUN SERPL-MCNC: 10.3 MG/DL (ref 8.4–25.7)
BUN SERPL-MCNC: 10.6 MG/DL (ref 8.4–25.7)
BUN SERPL-MCNC: 10.9 MG/DL (ref 8.4–25.7)
BUN SERPL-MCNC: 11.1 MG/DL (ref 8.4–25.7)
BUN SERPL-MCNC: 11.4 MG/DL (ref 8.4–25.7)
BUN SERPL-MCNC: 11.5 MG/DL (ref 8.4–25.7)
BUN SERPL-MCNC: 11.6 MG/DL (ref 8.4–25.7)
BUN SERPL-MCNC: 11.7 MG/DL (ref 8.4–25.7)
BUN SERPL-MCNC: 11.7 MG/DL (ref 8.4–25.7)
BUN SERPL-MCNC: 11.9 MG/DL (ref 8.4–25.7)
BUN SERPL-MCNC: 12.1 MG/DL (ref 8.4–25.7)
BUN SERPL-MCNC: 12.2 MG/DL (ref 8.4–25.7)
BUN SERPL-MCNC: 12.2 MG/DL (ref 8.4–25.7)
BUN SERPL-MCNC: 12.3 MG/DL (ref 8.4–25.7)
BUN SERPL-MCNC: 12.5 MG/DL (ref 8.4–25.7)
BUN SERPL-MCNC: 12.6 MG/DL (ref 8.4–25.7)
BUN SERPL-MCNC: 12.7 MG/DL (ref 8.4–25.7)
BUN SERPL-MCNC: 12.8 MG/DL (ref 8.4–25.7)
BUN SERPL-MCNC: 12.9 MG/DL (ref 8.4–25.7)
BUN SERPL-MCNC: 13.4 MG/DL (ref 8.4–25.7)
BUN SERPL-MCNC: 13.7 MG/DL (ref 8.4–25.7)
BUN SERPL-MCNC: 13.7 MG/DL (ref 8.4–25.7)
BUN SERPL-MCNC: 13.9 MG/DL (ref 8.4–25.7)
BUN SERPL-MCNC: 14.2 MG/DL (ref 8.4–25.7)
BUN SERPL-MCNC: 14.4 MG/DL (ref 8.4–25.7)
BUN SERPL-MCNC: 14.4 MG/DL (ref 8.4–25.7)
BUN SERPL-MCNC: 14.6 MG/DL (ref 8.4–25.7)
BUN SERPL-MCNC: 14.7 MG/DL (ref 8.4–25.7)
BUN SERPL-MCNC: 14.9 MG/DL (ref 8.4–25.7)
BUN SERPL-MCNC: 15.3 MG/DL (ref 8.4–25.7)
BUN SERPL-MCNC: 15.4 MG/DL (ref 8.4–25.7)
BUN SERPL-MCNC: 15.4 MG/DL (ref 8.4–25.7)
BUN SERPL-MCNC: 15.5 MG/DL (ref 8.4–25.7)
BUN SERPL-MCNC: 15.6 MG/DL (ref 8.4–25.7)
BUN SERPL-MCNC: 15.9 MG/DL (ref 8.4–25.7)
BUN SERPL-MCNC: 16.2 MG/DL (ref 8.4–25.7)
BUN SERPL-MCNC: 16.6 MG/DL (ref 8.4–25.7)
BUN SERPL-MCNC: 16.8 MG/DL (ref 8.4–25.7)
BUN SERPL-MCNC: 16.8 MG/DL (ref 8.4–25.7)
BUN SERPL-MCNC: 17 MG/DL (ref 6–30)
BUN SERPL-MCNC: 17 MG/DL (ref 8.4–25.7)
BUN SERPL-MCNC: 18.7 MG/DL (ref 8.4–25.7)
BUN SERPL-MCNC: 18.7 MG/DL (ref 8.4–25.7)
BUN SERPL-MCNC: 19 MG/DL (ref 8.4–25.7)
BUN SERPL-MCNC: 19.9 MG/DL (ref 8.4–25.7)
BUN SERPL-MCNC: 20.3 MG/DL (ref 8.4–25.7)
BUN SERPL-MCNC: 20.4 MG/DL (ref 8.4–25.7)
BUN SERPL-MCNC: 21.7 MG/DL (ref 8.4–25.7)
BUN SERPL-MCNC: 24.3 MG/DL (ref 8.4–25.7)
BUN SERPL-MCNC: 31.9 MG/DL (ref 8.4–25.7)
BUN SERPL-MCNC: 32 MG/DL (ref 8.4–25.7)
BUN SERPL-MCNC: 32.1 MG/DL (ref 8.4–25.7)
BUN SERPL-MCNC: 8.9 MG/DL (ref 8.4–25.7)
BUN SERPL-MCNC: 9.1 MG/DL (ref 8.4–25.7)
BUN SERPL-MCNC: 9.2 MG/DL (ref 8.4–25.7)
BUN SERPL-MCNC: 9.2 MG/DL (ref 8.4–25.7)
BUN SERPL-MCNC: 9.5 MG/DL (ref 8.4–25.7)
BUN SERPL-MCNC: 9.5 MG/DL (ref 8.4–25.7)
BUN SERPL-MCNC: 9.6 MG/DL (ref 8.4–25.7)
BUN SERPL-MCNC: 9.6 MG/DL (ref 8.4–25.7)
BUN SERPL-MCNC: 9.8 MG/DL (ref 8.4–25.7)
BURR CELLS (OLG): ABNORMAL
CA-I BLD-SCNC: 0.92 MMOL/L (ref 1.12–1.23)
CA-I BLD-SCNC: 1.01 MMOL/L (ref 1.12–1.23)
CA-I BLD-SCNC: 1.01 MMOL/L (ref 1.12–1.32)
CA-I BLD-SCNC: 1.03 MMOL/L (ref 1.12–1.23)
CA-I BLD-SCNC: 1.04 MMOL/L (ref 1.12–1.23)
CA-I BLD-SCNC: 1.04 MMOL/L (ref 1.12–1.32)
CA-I BLD-SCNC: 1.05 MMOL/L (ref 1.12–1.23)
CA-I BLD-SCNC: 1.05 MMOL/L (ref 1.12–1.23)
CA-I BLD-SCNC: 1.06 MMOL/L (ref 1.12–1.23)
CA-I BLD-SCNC: 1.07 MMOL/L (ref 1.12–1.23)
CA-I BLD-SCNC: 1.07 MMOL/L (ref 1.12–1.32)
CA-I BLD-SCNC: 1.07 MMOL/L (ref 1.12–1.32)
CA-I BLD-SCNC: 1.08 MMOL/L (ref 1.12–1.23)
CA-I BLD-SCNC: 1.08 MMOL/L (ref 1.12–1.32)
CA-I BLD-SCNC: 1.08 MMOL/L (ref 1.12–1.32)
CA-I BLD-SCNC: 1.09 MMOL/L (ref 1.12–1.23)
CA-I BLD-SCNC: 1.1 MMOL/L (ref 1.12–1.23)
CA-I BLD-SCNC: 1.1 MMOL/L (ref 1.12–1.32)
CA-I BLD-SCNC: 1.11 MMOL/L (ref 1.12–1.23)
CA-I BLD-SCNC: 1.11 MMOL/L (ref 1.12–1.32)
CA-I BLD-SCNC: 1.12 MMOL/L (ref 1.12–1.23)
CA-I BLD-SCNC: 1.12 MMOL/L (ref 1.12–1.32)
CA-I BLD-SCNC: 1.12 MMOL/L (ref 1.12–1.32)
CA-I BLD-SCNC: 1.13 MMOL/L (ref 1.12–1.23)
CA-I BLD-SCNC: 1.13 MMOL/L (ref 1.12–1.23)
CA-I BLD-SCNC: 1.14 MMOL/L (ref 1.12–1.23)
CA-I BLD-SCNC: 1.14 MMOL/L (ref 1.12–1.32)
CA-I BLD-SCNC: 1.15 MMOL/L (ref 1.12–1.23)
CA-I BLD-SCNC: 1.15 MMOL/L (ref 1.12–1.32)
CA-I BLD-SCNC: 1.15 MMOL/L (ref 1.12–1.32)
CA-I BLD-SCNC: 1.16 MMOL/L (ref 1.12–1.23)
CA-I BLD-SCNC: 1.16 MMOL/L (ref 1.12–1.32)
CA-I BLD-SCNC: 1.16 MMOL/L (ref 1.12–1.32)
CA-I BLD-SCNC: 1.17 MMOL/L (ref 1.12–1.23)
CA-I BLD-SCNC: 1.17 MMOL/L (ref 1.12–1.32)
CA-I BLD-SCNC: 1.18 MMOL/L (ref 1.12–1.23)
CA-I BLD-SCNC: 1.19 MMOL/L (ref 1.12–1.23)
CA-I BLD-SCNC: 1.19 MMOL/L (ref 1.12–1.23)
CA-I BLD-SCNC: 1.19 MMOL/L (ref 1.12–1.32)
CA-I BLD-SCNC: 1.2 MMOL/L (ref 1.12–1.23)
CA-I BLD-SCNC: 1.2 MMOL/L (ref 1.12–1.32)
CA-I BLD-SCNC: 1.21 MMOL/L (ref 1.12–1.32)
CA-I BLD-SCNC: 1.23 MMOL/L (ref 1.12–1.23)
CALCIUM SERPL-MCNC: 7.1 MG/DL (ref 8.8–10)
CALCIUM SERPL-MCNC: 7.2 MG/DL (ref 8.8–10)
CALCIUM SERPL-MCNC: 7.2 MG/DL (ref 8.8–10)
CALCIUM SERPL-MCNC: 7.3 MG/DL (ref 8.8–10)
CALCIUM SERPL-MCNC: 7.3 MG/DL (ref 8.8–10)
CALCIUM SERPL-MCNC: 7.4 MG/DL (ref 8.8–10)
CALCIUM SERPL-MCNC: 7.5 MG/DL (ref 8.8–10)
CALCIUM SERPL-MCNC: 7.6 MG/DL (ref 8.8–10)
CALCIUM SERPL-MCNC: 7.7 MG/DL (ref 8.8–10)
CALCIUM SERPL-MCNC: 7.8 MG/DL (ref 8.8–10)
CALCIUM SERPL-MCNC: 7.9 MG/DL (ref 8.8–10)
CALCIUM SERPL-MCNC: 8 MG/DL (ref 8.8–10)
CALCIUM SERPL-MCNC: 8.1 MG/DL (ref 8.8–10)
CALCIUM SERPL-MCNC: 8.2 MG/DL (ref 8.8–10)
CALCIUM SERPL-MCNC: 8.3 MG/DL (ref 8.8–10)
CALCIUM SERPL-MCNC: 8.3 MG/DL (ref 8.8–10)
CALCIUM SERPL-MCNC: 8.4 MG/DL (ref 8.8–10)
CALCIUM SERPL-MCNC: 8.4 MG/DL (ref 8.8–10)
CALCIUM SERPL-MCNC: 8.6 MG/DL (ref 8.8–10)
CALCIUM SERPL-MCNC: 8.6 MG/DL (ref 8.8–10)
CALCIUM SERPL-MCNC: 8.7 MG/DL (ref 8.8–10)
CALCIUM SERPL-MCNC: 8.7 MG/DL (ref 8.8–10)
CALCIUM SERPL-MCNC: 8.8 MG/DL (ref 8.8–10)
CHLORIDE SERPL-SCNC: 105 MMOL/L (ref 95–110)
CHLORIDE SERPL-SCNC: 105 MMOL/L (ref 98–107)
CHLORIDE SERPL-SCNC: 106 MMOL/L (ref 98–107)
CHLORIDE SERPL-SCNC: 106 MMOL/L (ref 98–107)
CHLORIDE SERPL-SCNC: 107 MMOL/L (ref 98–107)
CHLORIDE SERPL-SCNC: 108 MMOL/L (ref 98–107)
CHLORIDE SERPL-SCNC: 108 MMOL/L (ref 98–107)
CHLORIDE SERPL-SCNC: 109 MMOL/L (ref 98–107)
CHLORIDE SERPL-SCNC: 110 MMOL/L (ref 98–107)
CHLORIDE SERPL-SCNC: 111 MMOL/L (ref 98–107)
CHLORIDE SERPL-SCNC: 112 MMOL/L (ref 98–107)
CHLORIDE SERPL-SCNC: 113 MMOL/L (ref 98–107)
CHLORIDE SERPL-SCNC: 114 MMOL/L (ref 98–107)
CHLORIDE SERPL-SCNC: 115 MMOL/L (ref 98–107)
CHLORIDE SERPL-SCNC: 116 MMOL/L (ref 98–107)
CHLORIDE SERPL-SCNC: 95 MMOL/L (ref 98–107)
CHOLEST SERPL-MCNC: 98 MG/DL
CHOLEST/HDLC SERPL: 3 {RATIO} (ref 0–5)
CLARITY UR: ABNORMAL
CO2 BLDA-SCNC: 10.9 MMOL/L
CO2 BLDA-SCNC: 18.9 MMOL/L
CO2 BLDA-SCNC: 22.7 MMOL/L
CO2 BLDA-SCNC: 23 MMOL/L
CO2 BLDA-SCNC: 23.2 MMOL/L
CO2 BLDA-SCNC: 23.5 MMOL/L
CO2 BLDA-SCNC: 23.6 MMOL/L
CO2 BLDA-SCNC: 23.8 MMOL/L
CO2 BLDA-SCNC: 23.9 MMOL/L
CO2 BLDA-SCNC: 24.1 MMOL/L
CO2 BLDA-SCNC: 24.3 MMOL/L
CO2 BLDA-SCNC: 24.3 MMOL/L
CO2 BLDA-SCNC: 24.5 MMOL/L
CO2 BLDA-SCNC: 24.6 MMOL/L
CO2 BLDA-SCNC: 24.8 MMOL/L
CO2 BLDA-SCNC: 24.8 MMOL/L
CO2 BLDA-SCNC: 24.9 MMOL/L
CO2 BLDA-SCNC: 25 MMOL/L
CO2 BLDA-SCNC: 25.1 MMOL/L
CO2 BLDA-SCNC: 25.1 MMOL/L
CO2 BLDA-SCNC: 25.3 MMOL/L
CO2 BLDA-SCNC: 25.3 MMOL/L
CO2 BLDA-SCNC: 25.4 MMOL/L
CO2 BLDA-SCNC: 25.6 MMOL/L
CO2 BLDA-SCNC: 25.7 MMOL/L
CO2 BLDA-SCNC: 25.8 MMOL/L
CO2 BLDA-SCNC: 25.9 MMOL/L
CO2 BLDA-SCNC: 26 MMOL/L
CO2 BLDA-SCNC: 26 MMOL/L
CO2 BLDA-SCNC: 26.1 MMOL/L
CO2 BLDA-SCNC: 26.2 MMOL/L
CO2 BLDA-SCNC: 26.4 MMOL/L
CO2 BLDA-SCNC: 26.7 MMOL/L
CO2 BLDA-SCNC: 26.8 MMOL/L
CO2 BLDA-SCNC: 26.9 MMOL/L
CO2 BLDA-SCNC: 26.9 MMOL/L
CO2 BLDA-SCNC: 27.3 MMOL/L
CO2 BLDA-SCNC: 27.5 MMOL/L
CO2 BLDA-SCNC: 27.7 MMOL/L
CO2 BLDA-SCNC: 27.8 MMOL/L
CO2 BLDA-SCNC: 28.3 MMOL/L
CO2 BLDA-SCNC: 29.2 MMOL/L
CO2 BLDA-SCNC: 29.3 MMOL/L
CO2 BLDA-SCNC: 29.3 MMOL/L
CO2 BLDA-SCNC: 29.8 MMOL/L
CO2 SERPL-SCNC: 14 MMOL/L (ref 23–31)
CO2 SERPL-SCNC: 17 MMOL/L (ref 23–31)
CO2 SERPL-SCNC: 18 MMOL/L (ref 23–31)
CO2 SERPL-SCNC: 18 MMOL/L (ref 23–31)
CO2 SERPL-SCNC: 19 MMOL/L (ref 23–31)
CO2 SERPL-SCNC: 20 MMOL/L (ref 23–31)
CO2 SERPL-SCNC: 21 MMOL/L (ref 23–31)
CO2 SERPL-SCNC: 22 MMOL/L (ref 23–31)
CO2 SERPL-SCNC: 23 MMOL/L (ref 23–31)
CO2 SERPL-SCNC: 24 MMOL/L (ref 23–31)
COHGB MFR BLDA: 1.3 % (ref 0.5–1.5)
COHGB MFR BLDA: 1.5 % (ref 0.5–1.5)
COHGB MFR BLDA: 1.6 %
COHGB MFR BLDA: 1.6 % (ref 0.5–1.5)
COHGB MFR BLDA: 1.7 %
COHGB MFR BLDA: 1.8 %
COHGB MFR BLDA: 1.8 % (ref 0.5–1.5)
COHGB MFR BLDA: 1.9 %
COHGB MFR BLDA: 1.9 % (ref 0.5–1.5)
COHGB MFR BLDA: 2 %
COHGB MFR BLDA: 2 % (ref 0.5–1.5)
COHGB MFR BLDA: 2.1 %
COHGB MFR BLDA: 2.1 % (ref 0.5–1.5)
COHGB MFR BLDA: 2.1 % (ref 0.5–1.5)
COHGB MFR BLDA: 2.2 %
COHGB MFR BLDA: 2.3 %
COHGB MFR BLDA: 2.3 % (ref 0.5–1.5)
COHGB MFR BLDA: 2.6 % (ref 0.5–1.5)
COHGB MFR BLDA: 2.7 %
COHGB MFR BLDA: 2.9 %
COHGB MFR BLDA: 3.8 % (ref 0.5–1.5)
COHGB MFR BLDA: 4.3 % (ref 0.5–1.5)
COLOR STL: ABNORMAL
COLOR UR AUTO: COLORLESS
CONSISTENCY STL: ABNORMAL
CREAT SERPL-MCNC: 0.6 MG/DL (ref 0.72–1.25)
CREAT SERPL-MCNC: 0.6 MG/DL (ref 0.72–1.25)
CREAT SERPL-MCNC: 0.61 MG/DL (ref 0.72–1.25)
CREAT SERPL-MCNC: 0.62 MG/DL (ref 0.72–1.25)
CREAT SERPL-MCNC: 0.63 MG/DL (ref 0.72–1.25)
CREAT SERPL-MCNC: 0.64 MG/DL (ref 0.72–1.25)
CREAT SERPL-MCNC: 0.66 MG/DL (ref 0.72–1.25)
CREAT SERPL-MCNC: 0.66 MG/DL (ref 0.72–1.25)
CREAT SERPL-MCNC: 0.67 MG/DL (ref 0.72–1.25)
CREAT SERPL-MCNC: 0.68 MG/DL (ref 0.72–1.25)
CREAT SERPL-MCNC: 0.68 MG/DL (ref 0.72–1.25)
CREAT SERPL-MCNC: 0.69 MG/DL (ref 0.72–1.25)
CREAT SERPL-MCNC: 0.69 MG/DL (ref 0.72–1.25)
CREAT SERPL-MCNC: 0.7 MG/DL (ref 0.72–1.25)
CREAT SERPL-MCNC: 0.71 MG/DL (ref 0.72–1.25)
CREAT SERPL-MCNC: 0.72 MG/DL (ref 0.72–1.25)
CREAT SERPL-MCNC: 0.73 MG/DL (ref 0.72–1.25)
CREAT SERPL-MCNC: 0.73 MG/DL (ref 0.72–1.25)
CREAT SERPL-MCNC: 0.75 MG/DL (ref 0.72–1.25)
CREAT SERPL-MCNC: 0.78 MG/DL (ref 0.72–1.25)
CREAT SERPL-MCNC: 0.8 MG/DL (ref 0.72–1.25)
CREAT SERPL-MCNC: 0.81 MG/DL (ref 0.72–1.25)
CREAT SERPL-MCNC: 0.81 MG/DL (ref 0.72–1.25)
CREAT SERPL-MCNC: 0.82 MG/DL (ref 0.72–1.25)
CREAT SERPL-MCNC: 0.82 MG/DL (ref 0.72–1.25)
CREAT SERPL-MCNC: 0.83 MG/DL (ref 0.72–1.25)
CREAT SERPL-MCNC: 0.88 MG/DL (ref 0.72–1.25)
CREAT SERPL-MCNC: 0.88 MG/DL (ref 0.72–1.25)
CREAT SERPL-MCNC: 0.9 MG/DL (ref 0.72–1.25)
CREAT SERPL-MCNC: 0.92 MG/DL (ref 0.72–1.25)
CREAT SERPL-MCNC: 0.94 MG/DL (ref 0.72–1.25)
CREAT SERPL-MCNC: 0.99 MG/DL (ref 0.72–1.25)
CREAT SERPL-MCNC: 1.02 MG/DL (ref 0.72–1.25)
CREAT SERPL-MCNC: 1.06 MG/DL (ref 0.72–1.25)
CREAT SERPL-MCNC: 1.09 MG/DL (ref 0.72–1.25)
CREAT SERPL-MCNC: 1.15 MG/DL (ref 0.72–1.25)
CREAT SERPL-MCNC: 1.19 MG/DL (ref 0.72–1.25)
CREAT SERPL-MCNC: 1.25 MG/DL (ref 0.72–1.25)
CREAT SERPL-MCNC: 1.34 MG/DL (ref 0.72–1.25)
CREAT SERPL-MCNC: 1.6 MG/DL (ref 0.5–1.4)
CREAT/UREA NIT SERPL: 12
CREAT/UREA NIT SERPL: 13
CREAT/UREA NIT SERPL: 14
CREAT/UREA NIT SERPL: 15
CREAT/UREA NIT SERPL: 16
CREAT/UREA NIT SERPL: 17
CREAT/UREA NIT SERPL: 18
CREAT/UREA NIT SERPL: 19
CREAT/UREA NIT SERPL: 20
CREAT/UREA NIT SERPL: 21
CREAT/UREA NIT SERPL: 21
CREAT/UREA NIT SERPL: 22
CREAT/UREA NIT SERPL: 22
CREAT/UREA NIT SERPL: 23
CREAT/UREA NIT SERPL: 26
CREAT/UREA NIT SERPL: 27
CREAT/UREA NIT SERPL: 28
CREAT/UREA NIT SERPL: 30
CREAT/UREA NIT SERPL: 30
CREAT/UREA NIT SERPL: 31
CREAT/UREA NIT SERPL: 31
CREAT/UREA NIT SERPL: 32
CREAT/UREA NIT SERPL: 32
CREAT/UREA NIT SERPL: 39
CREAT/UREA NIT SERPL: 39
CROSSMATCH INTERPRETATION: NORMAL
DISPENSE STATUS: NORMAL
DOP CALC AO PEAK VEL: 1.6 M/S
DOP CALC AO VTI: 19 CM
DOP CALC LVOT AREA: 3.1 CM2
DOP CALC LVOT DIAMETER: 2 CM
DOP CALC LVOT PEAK VEL: 0.9 M/S
DOP CALC LVOT STROKE VOLUME: 29.8 CM3
DOP CALC MV VTI: 16 CM
DOP CALCLVOT PEAK VEL VTI: 9.5 CM
DRAWN BY BLOOD GAS (OHS): ABNORMAL
DRAWN BY BLOOD GAS (OHS): NORMAL
E WAVE DECELERATION TIME: 109 MSEC
E/A RATIO: 0.54
EOSINOPHIL # BLD AUTO: 0 X10(3)/MCL (ref 0–0.9)
EOSINOPHIL # BLD AUTO: 0.01 X10(3)/MCL (ref 0–0.9)
EOSINOPHIL # BLD AUTO: 0.02 X10(3)/MCL (ref 0–0.9)
EOSINOPHIL # BLD AUTO: 0.03 X10(3)/MCL (ref 0–0.9)
EOSINOPHIL # BLD AUTO: 0.05 X10(3)/MCL (ref 0–0.9)
EOSINOPHIL # BLD AUTO: 0.07 X10(3)/MCL (ref 0–0.9)
EOSINOPHIL # BLD AUTO: 0.09 X10(3)/MCL (ref 0–0.9)
EOSINOPHIL # BLD AUTO: 0.09 X10(3)/MCL (ref 0–0.9)
EOSINOPHIL # BLD AUTO: 0.1 X10(3)/MCL (ref 0–0.9)
EOSINOPHIL # BLD AUTO: 0.1 X10(3)/MCL (ref 0–0.9)
EOSINOPHIL # BLD AUTO: 0.11 X10(3)/MCL (ref 0–0.9)
EOSINOPHIL # BLD AUTO: 0.14 X10(3)/MCL (ref 0–0.9)
EOSINOPHIL # BLD AUTO: 0.14 X10(3)/MCL (ref 0–0.9)
EOSINOPHIL # BLD AUTO: 0.16 X10(3)/MCL (ref 0–0.9)
EOSINOPHIL # BLD AUTO: 0.16 X10(3)/MCL (ref 0–0.9)
EOSINOPHIL # BLD AUTO: 0.17 X10(3)/MCL (ref 0–0.9)
EOSINOPHIL # BLD AUTO: 0.18 X10(3)/MCL (ref 0–0.9)
EOSINOPHIL # BLD AUTO: 0.18 X10(3)/MCL (ref 0–0.9)
EOSINOPHIL # BLD AUTO: 0.19 X10(3)/MCL (ref 0–0.9)
EOSINOPHIL # BLD AUTO: 0.2 X10(3)/MCL (ref 0–0.9)
EOSINOPHIL # BLD AUTO: 0.2 X10(3)/MCL (ref 0–0.9)
EOSINOPHIL # BLD AUTO: 0.21 X10(3)/MCL (ref 0–0.9)
EOSINOPHIL # BLD AUTO: 0.21 X10(3)/MCL (ref 0–0.9)
EOSINOPHIL # BLD AUTO: 0.22 X10(3)/MCL (ref 0–0.9)
EOSINOPHIL # BLD AUTO: 0.24 X10(3)/MCL (ref 0–0.9)
EOSINOPHIL # BLD AUTO: 0.24 X10(3)/MCL (ref 0–0.9)
EOSINOPHIL # BLD AUTO: 0.26 X10(3)/MCL (ref 0–0.9)
EOSINOPHIL # BLD AUTO: 0.26 X10(3)/MCL (ref 0–0.9)
EOSINOPHIL # BLD AUTO: 0.27 X10(3)/MCL (ref 0–0.9)
EOSINOPHIL # BLD AUTO: 0.27 X10(3)/MCL (ref 0–0.9)
EOSINOPHIL # BLD AUTO: 0.29 X10(3)/MCL (ref 0–0.9)
EOSINOPHIL # BLD AUTO: 0.31 X10(3)/MCL (ref 0–0.9)
EOSINOPHIL # BLD AUTO: 0.32 X10(3)/MCL (ref 0–0.9)
EOSINOPHIL # BLD AUTO: 0.34 X10(3)/MCL (ref 0–0.9)
EOSINOPHIL # BLD AUTO: 0.36 X10(3)/MCL (ref 0–0.9)
EOSINOPHIL # BLD AUTO: 0.36 X10(3)/MCL (ref 0–0.9)
EOSINOPHIL # BLD AUTO: 0.38 X10(3)/MCL (ref 0–0.9)
EOSINOPHIL # BLD AUTO: 0.38 X10(3)/MCL (ref 0–0.9)
EOSINOPHIL # BLD AUTO: 0.39 X10(3)/MCL (ref 0–0.9)
EOSINOPHIL # BLD AUTO: 0.4 X10(3)/MCL (ref 0–0.9)
EOSINOPHIL # BLD AUTO: 0.46 X10(3)/MCL (ref 0–0.9)
EOSINOPHIL # BLD AUTO: 0.46 X10(3)/MCL (ref 0–0.9)
EOSINOPHIL NFR BLD AUTO: 0 %
EOSINOPHIL NFR BLD AUTO: 0.1 %
EOSINOPHIL NFR BLD AUTO: 0.2 %
EOSINOPHIL NFR BLD AUTO: 0.3 %
EOSINOPHIL NFR BLD AUTO: 0.4 %
EOSINOPHIL NFR BLD AUTO: 0.5 %
EOSINOPHIL NFR BLD AUTO: 0.6 %
EOSINOPHIL NFR BLD AUTO: 0.8 %
EOSINOPHIL NFR BLD AUTO: 1.1 %
EOSINOPHIL NFR BLD AUTO: 1.6 %
EOSINOPHIL NFR BLD AUTO: 1.6 %
EOSINOPHIL NFR BLD AUTO: 1.9 %
EOSINOPHIL NFR BLD AUTO: 2.3 %
EOSINOPHIL NFR BLD AUTO: 2.5 %
EOSINOPHIL NFR BLD AUTO: 2.6 %
EOSINOPHIL NFR BLD AUTO: 3 %
EOSINOPHIL NFR BLD AUTO: 3 %
EOSINOPHIL NFR BLD AUTO: 3.2 %
EOSINOPHIL NFR BLD AUTO: 3.4 %
EOSINOPHIL NFR BLD AUTO: 3.4 %
EOSINOPHIL NFR BLD AUTO: 3.6 %
EOSINOPHIL NFR BLD AUTO: 3.7 %
EOSINOPHIL NFR BLD AUTO: 3.8 %
EOSINOPHIL NFR BLD AUTO: 3.9 %
EOSINOPHIL NFR BLD AUTO: 3.9 %
EOSINOPHIL NFR BLD AUTO: 4.1 %
EOSINOPHIL NFR BLD AUTO: 4.2 %
EOSINOPHIL NFR BLD AUTO: 4.3 %
EOSINOPHIL NFR BLD AUTO: 4.5 %
EOSINOPHIL NFR BLD AUTO: 4.7 %
EOSINOPHIL NFR BLD AUTO: 5.3 %
EOSINOPHIL NFR BLD AUTO: 5.5 %
EOSINOPHIL NFR BLD AUTO: 5.9 %
EOSINOPHIL NFR BLD AUTO: 6 %
EOSINOPHIL NFR BLD AUTO: 6.4 %
EOSINOPHIL NFR BLD AUTO: 7.3 %
EOSINOPHIL NFR BLD AUTO: 7.6 %
EOSINOPHIL NFR BLD MANUAL: 0.32 X10(3)/MCL (ref 0–0.9)
EOSINOPHIL NFR BLD MANUAL: 0.52 X10(3)/MCL (ref 0–0.9)
EOSINOPHIL NFR BLD MANUAL: 5 %
EOSINOPHIL NFR BLD MANUAL: 7 %
ERYTHROCYTE [DISTWIDTH] IN BLOOD BY AUTOMATED COUNT: 15.2 % (ref 11.5–17)
ERYTHROCYTE [DISTWIDTH] IN BLOOD BY AUTOMATED COUNT: 15.3 % (ref 11.5–17)
ERYTHROCYTE [DISTWIDTH] IN BLOOD BY AUTOMATED COUNT: 15.3 % (ref 11.5–17)
ERYTHROCYTE [DISTWIDTH] IN BLOOD BY AUTOMATED COUNT: 15.4 % (ref 11.5–17)
ERYTHROCYTE [DISTWIDTH] IN BLOOD BY AUTOMATED COUNT: 15.5 % (ref 11.5–17)
ERYTHROCYTE [DISTWIDTH] IN BLOOD BY AUTOMATED COUNT: 15.5 % (ref 11.5–17)
ERYTHROCYTE [DISTWIDTH] IN BLOOD BY AUTOMATED COUNT: 15.6 % (ref 11.5–17)
ERYTHROCYTE [DISTWIDTH] IN BLOOD BY AUTOMATED COUNT: 15.7 % (ref 11.5–17)
ERYTHROCYTE [DISTWIDTH] IN BLOOD BY AUTOMATED COUNT: 15.8 % (ref 11.5–17)
ERYTHROCYTE [DISTWIDTH] IN BLOOD BY AUTOMATED COUNT: 15.9 % (ref 11.5–17)
ERYTHROCYTE [DISTWIDTH] IN BLOOD BY AUTOMATED COUNT: 16 % (ref 11.5–17)
ERYTHROCYTE [DISTWIDTH] IN BLOOD BY AUTOMATED COUNT: 16 % (ref 11.5–17)
ERYTHROCYTE [DISTWIDTH] IN BLOOD BY AUTOMATED COUNT: 16.1 % (ref 11.5–17)
ERYTHROCYTE [DISTWIDTH] IN BLOOD BY AUTOMATED COUNT: 16.1 % (ref 11.5–17)
ERYTHROCYTE [DISTWIDTH] IN BLOOD BY AUTOMATED COUNT: 16.2 % (ref 11.5–17)
ERYTHROCYTE [DISTWIDTH] IN BLOOD BY AUTOMATED COUNT: 16.2 % (ref 11.5–17)
ERYTHROCYTE [DISTWIDTH] IN BLOOD BY AUTOMATED COUNT: 16.4 % (ref 11.5–17)
ERYTHROCYTE [DISTWIDTH] IN BLOOD BY AUTOMATED COUNT: 16.5 % (ref 11.5–17)
ERYTHROCYTE [DISTWIDTH] IN BLOOD BY AUTOMATED COUNT: 16.9 % (ref 11.5–17)
ERYTHROCYTE [DISTWIDTH] IN BLOOD BY AUTOMATED COUNT: 17 % (ref 11.5–17)
ERYTHROCYTE [DISTWIDTH] IN BLOOD BY AUTOMATED COUNT: 17.1 % (ref 11.5–17)
ERYTHROCYTE [DISTWIDTH] IN BLOOD BY AUTOMATED COUNT: 17.1 % (ref 11.5–17)
ERYTHROCYTE [DISTWIDTH] IN BLOOD BY AUTOMATED COUNT: 17.2 % (ref 11.5–17)
ERYTHROCYTE [DISTWIDTH] IN BLOOD BY AUTOMATED COUNT: 17.3 % (ref 11.5–17)
ERYTHROCYTE [DISTWIDTH] IN BLOOD BY AUTOMATED COUNT: 17.3 % (ref 11.5–17)
ERYTHROCYTE [DISTWIDTH] IN BLOOD BY AUTOMATED COUNT: 17.4 % (ref 11.5–17)
ERYTHROCYTE [DISTWIDTH] IN BLOOD BY AUTOMATED COUNT: 17.5 % (ref 11.5–17)
ERYTHROCYTE [DISTWIDTH] IN BLOOD BY AUTOMATED COUNT: 17.6 % (ref 11.5–17)
ERYTHROCYTE [DISTWIDTH] IN BLOOD BY AUTOMATED COUNT: 17.7 % (ref 11.5–17)
ERYTHROCYTE [DISTWIDTH] IN BLOOD BY AUTOMATED COUNT: 17.8 % (ref 11.5–17)
ERYTHROCYTE [DISTWIDTH] IN BLOOD BY AUTOMATED COUNT: 17.9 % (ref 11.5–17)
ERYTHROCYTE [DISTWIDTH] IN BLOOD BY AUTOMATED COUNT: 18 % (ref 11.5–17)
ERYTHROCYTE [DISTWIDTH] IN BLOOD BY AUTOMATED COUNT: 18.1 % (ref 11.5–17)
ERYTHROCYTE [DISTWIDTH] IN BLOOD BY AUTOMATED COUNT: 18.1 % (ref 11.5–17)
ERYTHROCYTE [DISTWIDTH] IN BLOOD BY AUTOMATED COUNT: 18.2 % (ref 11.5–17)
ERYTHROCYTE [DISTWIDTH] IN BLOOD BY AUTOMATED COUNT: 18.4 % (ref 11.5–17)
ERYTHROCYTE [DISTWIDTH] IN BLOOD BY AUTOMATED COUNT: 18.5 % (ref 11.5–17)
ERYTHROCYTE [DISTWIDTH] IN BLOOD BY AUTOMATED COUNT: 18.6 % (ref 11.5–17)
ERYTHROCYTE [DISTWIDTH] IN BLOOD BY AUTOMATED COUNT: 18.6 % (ref 11.5–17)
ERYTHROCYTE [DISTWIDTH] IN BLOOD BY AUTOMATED COUNT: 18.7 % (ref 11.5–17)
ERYTHROCYTE [DISTWIDTH] IN BLOOD BY AUTOMATED COUNT: 18.8 % (ref 11.5–17)
ERYTHROCYTE [DISTWIDTH] IN BLOOD BY AUTOMATED COUNT: 18.8 % (ref 11.5–17)
ERYTHROCYTE [DISTWIDTH] IN BLOOD BY AUTOMATED COUNT: 18.9 % (ref 11.5–17)
ERYTHROCYTE [DISTWIDTH] IN BLOOD BY AUTOMATED COUNT: 19 % (ref 11.5–17)
ERYTHROCYTE [DISTWIDTH] IN BLOOD BY AUTOMATED COUNT: 19 % (ref 11.5–17)
ERYTHROCYTE [DISTWIDTH] IN BLOOD BY AUTOMATED COUNT: 19.1 % (ref 11.5–17)
ERYTHROCYTE [DISTWIDTH] IN BLOOD BY AUTOMATED COUNT: 19.1 % (ref 11.5–17)
FERRITIN SERPL-MCNC: 124.88 NG/ML (ref 21.81–274.66)
FERRITIN SERPL-MCNC: 843.03 NG/ML (ref 21.81–274.66)
FOLATE SERPL-MCNC: 7.8 NG/ML (ref 7–31.4)
FOLATE SERPL-MCNC: 8.5 NG/ML (ref 7–31.4)
GFR SERPLBLD CREATININE-BSD FMLA CKD-EPI: 54 ML/MIN/1.73/M2
GFR SERPLBLD CREATININE-BSD FMLA CKD-EPI: 59 ML/MIN/1.73/M2
GFR SERPLBLD CREATININE-BSD FMLA CKD-EPI: >60 ML/MIN/1.73/M2
GLOBULIN SER-MCNC: 2.2 GM/DL (ref 2.4–3.5)
GLOBULIN SER-MCNC: 2.2 GM/DL (ref 2.4–3.5)
GLOBULIN SER-MCNC: 2.3 GM/DL (ref 2.4–3.5)
GLOBULIN SER-MCNC: 2.4 GM/DL (ref 2.4–3.5)
GLOBULIN SER-MCNC: 2.5 GM/DL (ref 2.4–3.5)
GLOBULIN SER-MCNC: 2.5 GM/DL (ref 2.4–3.5)
GLOBULIN SER-MCNC: 2.6 GM/DL (ref 2.4–3.5)
GLOBULIN SER-MCNC: 2.7 GM/DL (ref 2.4–3.5)
GLOBULIN SER-MCNC: 2.8 GM/DL (ref 2.4–3.5)
GLOBULIN SER-MCNC: 2.9 GM/DL (ref 2.4–3.5)
GLOBULIN SER-MCNC: 3 GM/DL (ref 2.4–3.5)
GLOBULIN SER-MCNC: 3.1 GM/DL (ref 2.4–3.5)
GLOBULIN SER-MCNC: 3.2 GM/DL (ref 2.4–3.5)
GLOBULIN SER-MCNC: 3.3 GM/DL (ref 2.4–3.5)
GLOBULIN SER-MCNC: 3.4 GM/DL (ref 2.4–3.5)
GLOBULIN SER-MCNC: 3.5 GM/DL (ref 2.4–3.5)
GLOBULIN SER-MCNC: 3.6 GM/DL (ref 2.4–3.5)
GLOBULIN SER-MCNC: 3.6 GM/DL (ref 2.4–3.5)
GLOBULIN SER-MCNC: 4.3 GM/DL (ref 2.4–3.5)
GLUCOSE SERPL-MCNC: 100 MG/DL (ref 82–115)
GLUCOSE SERPL-MCNC: 102 MG/DL (ref 82–115)
GLUCOSE SERPL-MCNC: 102 MG/DL (ref 82–115)
GLUCOSE SERPL-MCNC: 104 MG/DL (ref 82–115)
GLUCOSE SERPL-MCNC: 108 MG/DL (ref 82–115)
GLUCOSE SERPL-MCNC: 110 MG/DL (ref 82–115)
GLUCOSE SERPL-MCNC: 110 MG/DL (ref 82–115)
GLUCOSE SERPL-MCNC: 112 MG/DL (ref 82–115)
GLUCOSE SERPL-MCNC: 116 MG/DL (ref 82–115)
GLUCOSE SERPL-MCNC: 117 MG/DL (ref 82–115)
GLUCOSE SERPL-MCNC: 118 MG/DL (ref 82–115)
GLUCOSE SERPL-MCNC: 120 MG/DL (ref 82–115)
GLUCOSE SERPL-MCNC: 121 MG/DL (ref 82–115)
GLUCOSE SERPL-MCNC: 123 MG/DL (ref 82–115)
GLUCOSE SERPL-MCNC: 123 MG/DL (ref 82–115)
GLUCOSE SERPL-MCNC: 124 MG/DL (ref 82–115)
GLUCOSE SERPL-MCNC: 125 MG/DL (ref 82–115)
GLUCOSE SERPL-MCNC: 127 MG/DL (ref 82–115)
GLUCOSE SERPL-MCNC: 128 MG/DL (ref 82–115)
GLUCOSE SERPL-MCNC: 129 MG/DL (ref 82–115)
GLUCOSE SERPL-MCNC: 129 MG/DL (ref 82–115)
GLUCOSE SERPL-MCNC: 132 MG/DL (ref 82–115)
GLUCOSE SERPL-MCNC: 134 MG/DL (ref 82–115)
GLUCOSE SERPL-MCNC: 135 MG/DL (ref 82–115)
GLUCOSE SERPL-MCNC: 143 MG/DL (ref 82–115)
GLUCOSE SERPL-MCNC: 144 MG/DL (ref 70–110)
GLUCOSE SERPL-MCNC: 144 MG/DL (ref 82–115)
GLUCOSE SERPL-MCNC: 144 MG/DL (ref 82–115)
GLUCOSE SERPL-MCNC: 146 MG/DL (ref 82–115)
GLUCOSE SERPL-MCNC: 147 MG/DL (ref 82–115)
GLUCOSE SERPL-MCNC: 73 MG/DL (ref 82–115)
GLUCOSE SERPL-MCNC: 77 MG/DL (ref 82–115)
GLUCOSE SERPL-MCNC: 78 MG/DL (ref 82–115)
GLUCOSE SERPL-MCNC: 78 MG/DL (ref 82–115)
GLUCOSE SERPL-MCNC: 79 MG/DL (ref 82–115)
GLUCOSE SERPL-MCNC: 82 MG/DL (ref 82–115)
GLUCOSE SERPL-MCNC: 82 MG/DL (ref 82–115)
GLUCOSE SERPL-MCNC: 83 MG/DL (ref 82–115)
GLUCOSE SERPL-MCNC: 83 MG/DL (ref 82–115)
GLUCOSE SERPL-MCNC: 85 MG/DL (ref 82–115)
GLUCOSE SERPL-MCNC: 86 MG/DL (ref 82–115)
GLUCOSE SERPL-MCNC: 89 MG/DL (ref 82–115)
GLUCOSE SERPL-MCNC: 90 MG/DL (ref 82–115)
GLUCOSE SERPL-MCNC: 90 MG/DL (ref 82–115)
GLUCOSE SERPL-MCNC: 91 MG/DL (ref 82–115)
GLUCOSE SERPL-MCNC: 91 MG/DL (ref 82–115)
GLUCOSE SERPL-MCNC: 92 MG/DL (ref 82–115)
GLUCOSE SERPL-MCNC: 93 MG/DL (ref 82–115)
GLUCOSE SERPL-MCNC: 94 MG/DL (ref 82–115)
GLUCOSE SERPL-MCNC: 95 MG/DL (ref 82–115)
GLUCOSE SERPL-MCNC: 96 MG/DL (ref 82–115)
GLUCOSE SERPL-MCNC: 96 MG/DL (ref 82–115)
GLUCOSE SERPL-MCNC: 97 MG/DL (ref 82–115)
GLUCOSE SERPL-MCNC: 98 MG/DL (ref 82–115)
GLUCOSE SERPL-MCNC: 98 MG/DL (ref 82–115)
GLUCOSE SERPL-MCNC: 99 MG/DL (ref 82–115)
GLUCOSE UR QL STRIP: NORMAL
GROUP & RH: NORMAL
HCO3 BLDA-SCNC: 18.1 MMOL/L
HCO3 BLDA-SCNC: 21.7 MMOL/L (ref 22–26)
HCO3 BLDA-SCNC: 22 MMOL/L (ref 22–26)
HCO3 BLDA-SCNC: 22.2 MMOL/L
HCO3 BLDA-SCNC: 22.2 MMOL/L (ref 22–26)
HCO3 BLDA-SCNC: 22.2 MMOL/L (ref 22–26)
HCO3 BLDA-SCNC: 22.6 MMOL/L (ref 22–26)
HCO3 BLDA-SCNC: 22.6 MMOL/L (ref 22–26)
HCO3 BLDA-SCNC: 22.7 MMOL/L
HCO3 BLDA-SCNC: 22.9 MMOL/L (ref 22–26)
HCO3 BLDA-SCNC: 23.1 MMOL/L
HCO3 BLDA-SCNC: 23.1 MMOL/L (ref 22–26)
HCO3 BLDA-SCNC: 23.2 MMOL/L
HCO3 BLDA-SCNC: 23.2 MMOL/L (ref 22–26)
HCO3 BLDA-SCNC: 23.4 MMOL/L (ref 22–26)
HCO3 BLDA-SCNC: 23.4 MMOL/L (ref 22–26)
HCO3 BLDA-SCNC: 23.5 MMOL/L
HCO3 BLDA-SCNC: 23.5 MMOL/L (ref 22–26)
HCO3 BLDA-SCNC: 23.6 MMOL/L
HCO3 BLDA-SCNC: 23.6 MMOL/L (ref 22–26)
HCO3 BLDA-SCNC: 23.8 MMOL/L (ref 22–26)
HCO3 BLDA-SCNC: 24 MMOL/L
HCO3 BLDA-SCNC: 24 MMOL/L (ref 22–26)
HCO3 BLDA-SCNC: 24 MMOL/L (ref 22–26)
HCO3 BLDA-SCNC: 24.1 MMOL/L
HCO3 BLDA-SCNC: 24.1 MMOL/L (ref 22–26)
HCO3 BLDA-SCNC: 24.2 MMOL/L
HCO3 BLDA-SCNC: 24.3 MMOL/L
HCO3 BLDA-SCNC: 24.3 MMOL/L (ref 22–26)
HCO3 BLDA-SCNC: 24.5 MMOL/L (ref 22–26)
HCO3 BLDA-SCNC: 24.6 MMOL/L
HCO3 BLDA-SCNC: 24.6 MMOL/L (ref 22–26)
HCO3 BLDA-SCNC: 24.6 MMOL/L (ref 22–26)
HCO3 BLDA-SCNC: 24.7 MMOL/L
HCO3 BLDA-SCNC: 24.8 MMOL/L
HCO3 BLDA-SCNC: 25 MMOL/L (ref 22–26)
HCO3 BLDA-SCNC: 25.1 MMOL/L (ref 22–26)
HCO3 BLDA-SCNC: 25.2 MMOL/L
HCO3 BLDA-SCNC: 25.4 MMOL/L
HCO3 BLDA-SCNC: 25.7 MMOL/L
HCO3 BLDA-SCNC: 25.9 MMOL/L
HCO3 BLDA-SCNC: 25.9 MMOL/L (ref 22–26)
HCO3 BLDA-SCNC: 25.9 MMOL/L (ref 22–26)
HCO3 BLDA-SCNC: 26 MMOL/L
HCO3 BLDA-SCNC: 26.2 MMOL/L
HCO3 BLDA-SCNC: 26.2 MMOL/L (ref 22–26)
HCO3 BLDA-SCNC: 26.2 MMOL/L (ref 22–26)
HCO3 BLDA-SCNC: 26.5 MMOL/L
HCO3 BLDA-SCNC: 26.5 MMOL/L (ref 22–26)
HCO3 BLDA-SCNC: 26.7 MMOL/L (ref 22–26)
HCO3 BLDA-SCNC: 27.1 MMOL/L
HCO3 BLDA-SCNC: 27.9 MMOL/L
HCO3 BLDA-SCNC: 28.1 MMOL/L
HCO3 BLDA-SCNC: 28.2 MMOL/L
HCO3 BLDA-SCNC: 28.5 MMOL/L
HCO3 BLDA-SCNC: 9.8 MMOL/L (ref 22–26)
HCT VFR BLD AUTO: 22.7 % (ref 42–52)
HCT VFR BLD AUTO: 23 % (ref 42–52)
HCT VFR BLD AUTO: 23.3 % (ref 42–52)
HCT VFR BLD AUTO: 23.8 % (ref 42–52)
HCT VFR BLD AUTO: 23.8 % (ref 42–52)
HCT VFR BLD AUTO: 24.1 % (ref 42–52)
HCT VFR BLD AUTO: 24.5 % (ref 42–52)
HCT VFR BLD AUTO: 24.7 % (ref 42–52)
HCT VFR BLD AUTO: 24.8 % (ref 42–52)
HCT VFR BLD AUTO: 25.1 % (ref 42–52)
HCT VFR BLD AUTO: 25.1 % (ref 42–52)
HCT VFR BLD AUTO: 25.2 % (ref 42–52)
HCT VFR BLD AUTO: 25.3 % (ref 42–52)
HCT VFR BLD AUTO: 25.3 % (ref 42–52)
HCT VFR BLD AUTO: 25.5 % (ref 42–52)
HCT VFR BLD AUTO: 25.6 % (ref 42–52)
HCT VFR BLD AUTO: 25.8 % (ref 42–52)
HCT VFR BLD AUTO: 25.8 % (ref 42–52)
HCT VFR BLD AUTO: 25.9 % (ref 42–52)
HCT VFR BLD AUTO: 25.9 % (ref 42–52)
HCT VFR BLD AUTO: 26.2 % (ref 42–52)
HCT VFR BLD AUTO: 26.5 % (ref 42–52)
HCT VFR BLD AUTO: 26.6 % (ref 42–52)
HCT VFR BLD AUTO: 26.8 % (ref 42–52)
HCT VFR BLD AUTO: 27.2 % (ref 42–52)
HCT VFR BLD AUTO: 27.3 % (ref 42–52)
HCT VFR BLD AUTO: 27.4 % (ref 42–52)
HCT VFR BLD AUTO: 27.5 % (ref 42–52)
HCT VFR BLD AUTO: 27.6 % (ref 42–52)
HCT VFR BLD AUTO: 27.7 % (ref 42–52)
HCT VFR BLD AUTO: 27.8 % (ref 42–52)
HCT VFR BLD AUTO: 27.8 % (ref 42–52)
HCT VFR BLD AUTO: 27.9 % (ref 42–52)
HCT VFR BLD AUTO: 28 % (ref 42–52)
HCT VFR BLD AUTO: 28.1 % (ref 42–52)
HCT VFR BLD AUTO: 28.4 % (ref 42–52)
HCT VFR BLD AUTO: 28.4 % (ref 42–52)
HCT VFR BLD AUTO: 28.5 % (ref 42–52)
HCT VFR BLD AUTO: 28.6 % (ref 42–52)
HCT VFR BLD AUTO: 28.7 % (ref 42–52)
HCT VFR BLD AUTO: 28.9 % (ref 42–52)
HCT VFR BLD AUTO: 29.1 % (ref 42–52)
HCT VFR BLD AUTO: 29.3 % (ref 42–52)
HCT VFR BLD AUTO: 29.3 % (ref 42–52)
HCT VFR BLD AUTO: 29.4 % (ref 42–52)
HCT VFR BLD AUTO: 29.4 % (ref 42–52)
HCT VFR BLD AUTO: 29.8 % (ref 42–52)
HCT VFR BLD AUTO: 30 % (ref 42–52)
HCT VFR BLD AUTO: 30.1 % (ref 42–52)
HCT VFR BLD AUTO: 30.2 % (ref 42–52)
HCT VFR BLD AUTO: 30.4 % (ref 42–52)
HCT VFR BLD AUTO: 30.5 % (ref 42–52)
HCT VFR BLD AUTO: 30.6 % (ref 42–52)
HCT VFR BLD AUTO: 31.1 % (ref 42–52)
HCT VFR BLD AUTO: 31.4 % (ref 42–52)
HCT VFR BLD AUTO: 31.6 % (ref 42–52)
HCT VFR BLD AUTO: 31.6 % (ref 42–52)
HCT VFR BLD AUTO: 31.8 % (ref 42–52)
HCT VFR BLD AUTO: 32.6 % (ref 42–52)
HCT VFR BLD AUTO: 33.2 % (ref 42–52)
HCT VFR BLD AUTO: 33.2 % (ref 42–52)
HCT VFR BLD CALC: 25 %PCV (ref 36–54)
HDLC SERPL-MCNC: 36 MG/DL (ref 35–60)
HEMOCCULT SP1 STL QL: POSITIVE
HEMOCCULT SP2 STL QL: POSITIVE
HEMOCCULT SP3 STL QL: POSITIVE
HGB BLD-MCNC: 10.2 G/DL (ref 14–18)
HGB BLD-MCNC: 10.3 G/DL (ref 14–18)
HGB BLD-MCNC: 7.2 G/DL (ref 14–18)
HGB BLD-MCNC: 7.3 G/DL (ref 14–18)
HGB BLD-MCNC: 7.4 G/DL (ref 14–18)
HGB BLD-MCNC: 7.6 G/DL (ref 14–18)
HGB BLD-MCNC: 7.8 G/DL (ref 14–18)
HGB BLD-MCNC: 7.8 G/DL (ref 14–18)
HGB BLD-MCNC: 7.9 G/DL (ref 14–18)
HGB BLD-MCNC: 8 G/DL (ref 14–18)
HGB BLD-MCNC: 8.1 G/DL (ref 14–18)
HGB BLD-MCNC: 8.2 G/DL (ref 14–18)
HGB BLD-MCNC: 8.5 G/DL (ref 14–18)
HGB BLD-MCNC: 8.6 G/DL (ref 14–18)
HGB BLD-MCNC: 8.7 G/DL (ref 14–18)
HGB BLD-MCNC: 8.7 G/DL (ref 14–18)
HGB BLD-MCNC: 8.8 G/DL (ref 14–18)
HGB BLD-MCNC: 8.9 G/DL (ref 14–18)
HGB BLD-MCNC: 9 G/DL
HGB BLD-MCNC: 9 G/DL (ref 14–18)
HGB BLD-MCNC: 9.1 G/DL (ref 14–18)
HGB BLD-MCNC: 9.2 G/DL (ref 14–18)
HGB BLD-MCNC: 9.3 G/DL (ref 14–18)
HGB BLD-MCNC: 9.4 G/DL (ref 14–18)
HGB BLD-MCNC: 9.6 G/DL (ref 14–18)
HGB BLD-MCNC: 9.6 G/DL (ref 14–18)
HGB BLD-MCNC: 9.8 G/DL (ref 14–18)
HGB BLD-MCNC: 9.8 G/DL (ref 14–18)
HGB UR QL STRIP: ABNORMAL
HR MV ECHO: 134 BPM
HYPOCHROMIA BLD QL SMEAR: ABNORMAL
IMM GRANULOCYTES # BLD AUTO: 0.02 X10(3)/MCL (ref 0–0.04)
IMM GRANULOCYTES # BLD AUTO: 0.03 X10(3)/MCL (ref 0–0.04)
IMM GRANULOCYTES # BLD AUTO: 0.04 X10(3)/MCL (ref 0–0.04)
IMM GRANULOCYTES # BLD AUTO: 0.05 X10(3)/MCL (ref 0–0.04)
IMM GRANULOCYTES # BLD AUTO: 0.06 X10(3)/MCL (ref 0–0.04)
IMM GRANULOCYTES # BLD AUTO: 0.07 X10(3)/MCL (ref 0–0.04)
IMM GRANULOCYTES # BLD AUTO: 0.09 X10(3)/MCL (ref 0–0.04)
IMM GRANULOCYTES # BLD AUTO: 0.1 X10(3)/MCL (ref 0–0.04)
IMM GRANULOCYTES # BLD AUTO: 0.11 X10(3)/MCL (ref 0–0.04)
IMM GRANULOCYTES NFR BLD AUTO: 0.3 %
IMM GRANULOCYTES NFR BLD AUTO: 0.4 %
IMM GRANULOCYTES NFR BLD AUTO: 0.5 %
IMM GRANULOCYTES NFR BLD AUTO: 0.6 %
IMM GRANULOCYTES NFR BLD AUTO: 0.7 %
IMM GRANULOCYTES NFR BLD AUTO: 0.8 %
IMM GRANULOCYTES NFR BLD AUTO: 0.9 %
IMM GRANULOCYTES NFR BLD AUTO: 1 %
IMM GRANULOCYTES NFR BLD AUTO: 1.1 %
IMM GRANULOCYTES NFR BLD AUTO: 1.2 %
IMM GRANULOCYTES NFR BLD AUTO: 1.2 %
IMM GRANULOCYTES NFR BLD AUTO: 1.3 %
INDIRECT COOMBS: NORMAL
INHALED O2 CONCENTRATION: 100 %
INHALED O2 CONCENTRATION: 30 %
INHALED O2 CONCENTRATION: 40 %
INHALED O2 CONCENTRATION: 45 %
INHALED O2 CONCENTRATION: 50 %
INR PPP: 1.2
INR PPP: 1.2
INR PPP: 1.4
INSTRUMENT WBC (OLG): 10.46 X10(3)/MCL
INSTRUMENT WBC (OLG): 4.54 X10(3)/MCL
IRON SATN MFR SERPL: 10 % (ref 20–50)
IRON SATN MFR SERPL: 35 % (ref 20–50)
IRON SERPL-MCNC: 17 UG/DL (ref 65–175)
IRON SERPL-MCNC: 59 UG/DL (ref 65–175)
ITIME (SEC) (OHS): 0.6 SEC
KETONES UR QL STRIP: NEGATIVE
LACTATE SERPL-SCNC: 0.6 MMOL/L (ref 0.5–2.2)
LACTATE SERPL-SCNC: 0.7 MMOL/L (ref 0.5–2.2)
LACTATE SERPL-SCNC: 0.8 MMOL/L (ref 0.5–2.2)
LACTATE SERPL-SCNC: 0.8 MMOL/L (ref 0.5–2.2)
LACTATE SERPL-SCNC: 0.9 MMOL/L (ref 0.5–2.2)
LACTATE SERPL-SCNC: 1 MMOL/L (ref 0.5–2.2)
LACTATE SERPL-SCNC: 1.4 MMOL/L (ref 0.5–2.2)
LACTATE SERPL-SCNC: 1.5 MMOL/L (ref 0.5–2.2)
LACTATE SERPL-SCNC: 1.5 MMOL/L (ref 0.5–2.2)
LACTATE SERPL-SCNC: 1.6 MMOL/L (ref 0.5–2.2)
LACTATE SERPL-SCNC: 1.7 MMOL/L (ref 0.5–2.2)
LACTATE SERPL-SCNC: 2 MMOL/L (ref 0.5–2.2)
LACTATE SERPL-SCNC: 3.8 MMOL/L (ref 0.5–2.2)
LACTATE SERPL-SCNC: 4.3 MMOL/L (ref 0.5–2.2)
LACTATE SERPL-SCNC: 8.2 MMOL/L (ref 0.5–2.2)
LDH SERPL-CCNC: 939 U/L (ref 125–220)
LDLC SERPL CALC-MCNC: 40 MG/DL (ref 50–140)
LEFT ATRIUM SIZE: 4.3 CM
LEFT CFA PSV: 24 CM/S
LEFT CFA PSV: 32 CM/S
LEFT DORSALIS PEDIS PSV: 3 CM/S
LEFT DORSALIS PEDIS PSV: 8 CM/S
LEFT POPLITEAL PSV: 10 CM/S
LEFT POST TIBIAL SYS PSV: 32 CM/S
LEFT POST TIBIAL SYS PSV: 9 CM/S
LEFT SUPER FEMORAL DIST SYS PSV: 20 CM/S
LEFT SUPER FEMORAL MID SYS PSV: 20 CM/S
LEFT SUPER FEMORAL MID SYS PSV: 60 CM/S
LEFT SUPER FEMORAL PROX SYS PSV: 13 CM/S
LEFT VENTRICLE DIASTOLIC VOLUME INDEX: 102.67 ML/M2
LEFT VENTRICLE DIASTOLIC VOLUME: 231 ML
LEFT VENTRICLE END DIASTOLIC VOLUME APICAL 2 CHAMBER: 219 ML
LEFT VENTRICLE END DIASTOLIC VOLUME APICAL 4 CHAMBER: 239 ML
LEFT VENTRICLE SYSTOLIC VOLUME INDEX: 84 ML/M2
LEFT VENTRICLE SYSTOLIC VOLUME: 189 ML
LEUKOCYTE ESTERASE UR QL STRIP: 250
LVOT MG: 2 MMHG
LVOT MV: 0.56 CM/S
LYMPHOCYTES # BLD AUTO: 0.36 X10(3)/MCL (ref 0.6–4.6)
LYMPHOCYTES # BLD AUTO: 0.66 X10(3)/MCL (ref 0.6–4.6)
LYMPHOCYTES # BLD AUTO: 0.66 X10(3)/MCL (ref 0.6–4.6)
LYMPHOCYTES # BLD AUTO: 0.67 X10(3)/MCL (ref 0.6–4.6)
LYMPHOCYTES # BLD AUTO: 0.74 X10(3)/MCL (ref 0.6–4.6)
LYMPHOCYTES # BLD AUTO: 0.79 X10(3)/MCL (ref 0.6–4.6)
LYMPHOCYTES # BLD AUTO: 0.95 X10(3)/MCL (ref 0.6–4.6)
LYMPHOCYTES # BLD AUTO: 0.95 X10(3)/MCL (ref 0.6–4.6)
LYMPHOCYTES # BLD AUTO: 0.98 X10(3)/MCL (ref 0.6–4.6)
LYMPHOCYTES # BLD AUTO: 1 X10(3)/MCL (ref 0.6–4.6)
LYMPHOCYTES # BLD AUTO: 1.01 X10(3)/MCL (ref 0.6–4.6)
LYMPHOCYTES # BLD AUTO: 1.02 X10(3)/MCL (ref 0.6–4.6)
LYMPHOCYTES # BLD AUTO: 1.03 X10(3)/MCL (ref 0.6–4.6)
LYMPHOCYTES # BLD AUTO: 1.04 X10(3)/MCL (ref 0.6–4.6)
LYMPHOCYTES # BLD AUTO: 1.08 X10(3)/MCL (ref 0.6–4.6)
LYMPHOCYTES # BLD AUTO: 1.08 X10(3)/MCL (ref 0.6–4.6)
LYMPHOCYTES # BLD AUTO: 1.1 X10(3)/MCL (ref 0.6–4.6)
LYMPHOCYTES # BLD AUTO: 1.14 X10(3)/MCL (ref 0.6–4.6)
LYMPHOCYTES # BLD AUTO: 1.15 X10(3)/MCL (ref 0.6–4.6)
LYMPHOCYTES # BLD AUTO: 1.16 X10(3)/MCL (ref 0.6–4.6)
LYMPHOCYTES # BLD AUTO: 1.16 X10(3)/MCL (ref 0.6–4.6)
LYMPHOCYTES # BLD AUTO: 1.18 X10(3)/MCL (ref 0.6–4.6)
LYMPHOCYTES # BLD AUTO: 1.22 X10(3)/MCL (ref 0.6–4.6)
LYMPHOCYTES # BLD AUTO: 1.31 X10(3)/MCL (ref 0.6–4.6)
LYMPHOCYTES # BLD AUTO: 1.31 X10(3)/MCL (ref 0.6–4.6)
LYMPHOCYTES # BLD AUTO: 1.34 X10(3)/MCL (ref 0.6–4.6)
LYMPHOCYTES # BLD AUTO: 1.36 X10(3)/MCL (ref 0.6–4.6)
LYMPHOCYTES # BLD AUTO: 1.37 X10(3)/MCL (ref 0.6–4.6)
LYMPHOCYTES # BLD AUTO: 1.42 X10(3)/MCL (ref 0.6–4.6)
LYMPHOCYTES # BLD AUTO: 1.47 X10(3)/MCL (ref 0.6–4.6)
LYMPHOCYTES # BLD AUTO: 1.5 X10(3)/MCL (ref 0.6–4.6)
LYMPHOCYTES # BLD AUTO: 1.52 X10(3)/MCL (ref 0.6–4.6)
LYMPHOCYTES # BLD AUTO: 1.55 X10(3)/MCL (ref 0.6–4.6)
LYMPHOCYTES # BLD AUTO: 1.62 X10(3)/MCL (ref 0.6–4.6)
LYMPHOCYTES # BLD AUTO: 1.63 X10(3)/MCL (ref 0.6–4.6)
LYMPHOCYTES # BLD AUTO: 1.65 X10(3)/MCL (ref 0.6–4.6)
LYMPHOCYTES # BLD AUTO: 1.83 X10(3)/MCL (ref 0.6–4.6)
LYMPHOCYTES # BLD AUTO: 1.88 X10(3)/MCL (ref 0.6–4.6)
LYMPHOCYTES # BLD AUTO: 1.9 X10(3)/MCL (ref 0.6–4.6)
LYMPHOCYTES # BLD AUTO: 1.92 X10(3)/MCL (ref 0.6–4.6)
LYMPHOCYTES # BLD AUTO: 1.94 X10(3)/MCL (ref 0.6–4.6)
LYMPHOCYTES # BLD AUTO: 2.23 X10(3)/MCL (ref 0.6–4.6)
LYMPHOCYTES # BLD AUTO: 2.37 X10(3)/MCL (ref 0.6–4.6)
LYMPHOCYTES # BLD AUTO: 2.66 X10(3)/MCL (ref 0.6–4.6)
LYMPHOCYTES # BLD AUTO: 2.73 X10(3)/MCL (ref 0.6–4.6)
LYMPHOCYTES # BLD AUTO: 2.81 X10(3)/MCL (ref 0.6–4.6)
LYMPHOCYTES # BLD AUTO: 3.04 X10(3)/MCL (ref 0.6–4.6)
LYMPHOCYTES # BLD AUTO: 3.35 X10(3)/MCL (ref 0.6–4.6)
LYMPHOCYTES # BLD AUTO: 3.45 X10(3)/MCL (ref 0.6–4.6)
LYMPHOCYTES # BLD AUTO: 3.55 X10(3)/MCL (ref 0.6–4.6)
LYMPHOCYTES # BLD AUTO: 3.57 X10(3)/MCL (ref 0.6–4.6)
LYMPHOCYTES # BLD AUTO: 4.14 X10(3)/MCL (ref 0.6–4.6)
LYMPHOCYTES NFR BLD AUTO: 10 %
LYMPHOCYTES NFR BLD AUTO: 12 %
LYMPHOCYTES NFR BLD AUTO: 12.9 %
LYMPHOCYTES NFR BLD AUTO: 13.7 %
LYMPHOCYTES NFR BLD AUTO: 15.5 %
LYMPHOCYTES NFR BLD AUTO: 16.2 %
LYMPHOCYTES NFR BLD AUTO: 17.1 %
LYMPHOCYTES NFR BLD AUTO: 17.2 %
LYMPHOCYTES NFR BLD AUTO: 17.5 %
LYMPHOCYTES NFR BLD AUTO: 17.6 %
LYMPHOCYTES NFR BLD AUTO: 17.6 %
LYMPHOCYTES NFR BLD AUTO: 18.1 %
LYMPHOCYTES NFR BLD AUTO: 18.7 %
LYMPHOCYTES NFR BLD AUTO: 19 %
LYMPHOCYTES NFR BLD AUTO: 19.1 %
LYMPHOCYTES NFR BLD AUTO: 19.5 %
LYMPHOCYTES NFR BLD AUTO: 19.8 %
LYMPHOCYTES NFR BLD AUTO: 20.1 %
LYMPHOCYTES NFR BLD AUTO: 20.3 %
LYMPHOCYTES NFR BLD AUTO: 20.4 %
LYMPHOCYTES NFR BLD AUTO: 20.5 %
LYMPHOCYTES NFR BLD AUTO: 20.5 %
LYMPHOCYTES NFR BLD AUTO: 20.7 %
LYMPHOCYTES NFR BLD AUTO: 20.9 %
LYMPHOCYTES NFR BLD AUTO: 21.4 %
LYMPHOCYTES NFR BLD AUTO: 21.8 %
LYMPHOCYTES NFR BLD AUTO: 22.1 %
LYMPHOCYTES NFR BLD AUTO: 22.3 %
LYMPHOCYTES NFR BLD AUTO: 22.5 %
LYMPHOCYTES NFR BLD AUTO: 22.7 %
LYMPHOCYTES NFR BLD AUTO: 22.7 %
LYMPHOCYTES NFR BLD AUTO: 23.4 %
LYMPHOCYTES NFR BLD AUTO: 24 %
LYMPHOCYTES NFR BLD AUTO: 24 %
LYMPHOCYTES NFR BLD AUTO: 24.1 %
LYMPHOCYTES NFR BLD AUTO: 24.3 %
LYMPHOCYTES NFR BLD AUTO: 24.3 %
LYMPHOCYTES NFR BLD AUTO: 24.8 %
LYMPHOCYTES NFR BLD AUTO: 24.9 %
LYMPHOCYTES NFR BLD AUTO: 25.2 %
LYMPHOCYTES NFR BLD AUTO: 25.2 %
LYMPHOCYTES NFR BLD AUTO: 25.7 %
LYMPHOCYTES NFR BLD AUTO: 25.8 %
LYMPHOCYTES NFR BLD AUTO: 26.4 %
LYMPHOCYTES NFR BLD AUTO: 26.6 %
LYMPHOCYTES NFR BLD AUTO: 26.8 %
LYMPHOCYTES NFR BLD AUTO: 26.8 %
LYMPHOCYTES NFR BLD AUTO: 3 %
LYMPHOCYTES NFR BLD AUTO: 3.5 %
LYMPHOCYTES NFR BLD AUTO: 30.3 %
LYMPHOCYTES NFR BLD AUTO: 6.7 %
LYMPHOCYTES NFR BLD AUTO: 9.5 %
LYMPHOCYTES NFR BLD MANUAL: 0.5 X10(3)/MCL (ref 0.6–4.6)
LYMPHOCYTES NFR BLD MANUAL: 11 %
LYMPHOCYTES NFR BLD MANUAL: 4.39 X10(3)/MCL (ref 0.6–4.6)
LYMPHOCYTES NFR BLD MANUAL: 42 %
MAGNESIUM SERPL-MCNC: 1.9 MG/DL (ref 1.6–2.6)
MAGNESIUM SERPL-MCNC: 2 MG/DL (ref 1.6–2.6)
MAGNESIUM SERPL-MCNC: 2.1 MG/DL (ref 1.6–2.6)
MAGNESIUM SERPL-MCNC: 2.12 MG/DL (ref 1.6–2.6)
MAGNESIUM SERPL-MCNC: 2.2 MG/DL (ref 1.6–2.6)
MAGNESIUM SERPL-MCNC: 2.24 MG/DL (ref 1.6–2.6)
MAGNESIUM SERPL-MCNC: 2.3 MG/DL (ref 1.6–2.6)
MAGNESIUM SERPL-MCNC: 2.4 MG/DL (ref 1.6–2.6)
MAGNESIUM SERPL-MCNC: 2.6 MG/DL (ref 1.6–2.6)
MAGNESIUM SERPL-MCNC: 2.7 MG/DL (ref 1.6–2.6)
MCH RBC QN AUTO: 27.8 PG (ref 27–31)
MCH RBC QN AUTO: 28 PG (ref 27–31)
MCH RBC QN AUTO: 28.1 PG (ref 27–31)
MCH RBC QN AUTO: 28.1 PG (ref 27–31)
MCH RBC QN AUTO: 28.2 PG (ref 27–31)
MCH RBC QN AUTO: 28.3 PG (ref 27–31)
MCH RBC QN AUTO: 28.4 PG (ref 27–31)
MCH RBC QN AUTO: 28.4 PG (ref 27–31)
MCH RBC QN AUTO: 28.5 PG (ref 27–31)
MCH RBC QN AUTO: 28.5 PG (ref 27–31)
MCH RBC QN AUTO: 28.6 PG (ref 27–31)
MCH RBC QN AUTO: 28.6 PG (ref 27–31)
MCH RBC QN AUTO: 28.7 PG (ref 27–31)
MCH RBC QN AUTO: 28.8 PG (ref 27–31)
MCH RBC QN AUTO: 28.9 PG (ref 27–31)
MCH RBC QN AUTO: 29 PG (ref 27–31)
MCH RBC QN AUTO: 29.1 PG (ref 27–31)
MCH RBC QN AUTO: 29.1 PG (ref 27–31)
MCH RBC QN AUTO: 29.2 PG (ref 27–31)
MCH RBC QN AUTO: 29.4 PG (ref 27–31)
MCH RBC QN AUTO: 29.5 PG (ref 27–31)
MCH RBC QN AUTO: 29.8 PG (ref 27–31)
MCH RBC QN AUTO: 29.9 PG (ref 27–31)
MCH RBC QN AUTO: 30 PG (ref 27–31)
MCH RBC QN AUTO: 30.1 PG (ref 27–31)
MCH RBC QN AUTO: 30.3 PG (ref 27–31)
MCH RBC QN AUTO: 30.4 PG (ref 27–31)
MCH RBC QN AUTO: 30.5 PG (ref 27–31)
MCH RBC QN AUTO: 30.6 PG (ref 27–31)
MCH RBC QN AUTO: 30.7 PG (ref 27–31)
MCH RBC QN AUTO: 30.7 PG (ref 27–31)
MCH RBC QN AUTO: 30.8 PG (ref 27–31)
MCH RBC QN AUTO: 30.9 PG (ref 27–31)
MCH RBC QN AUTO: 31 PG (ref 27–31)
MCH RBC QN AUTO: 31 PG (ref 27–31)
MCH RBC QN AUTO: 31.1 PG (ref 27–31)
MCH RBC QN AUTO: 31.1 PG (ref 27–31)
MCH RBC QN AUTO: 31.2 PG (ref 27–31)
MCH RBC QN AUTO: 31.3 PG (ref 27–31)
MCH RBC QN AUTO: 31.3 PG (ref 27–31)
MCH RBC QN AUTO: 31.4 PG (ref 27–31)
MCH RBC QN AUTO: 31.5 PG (ref 27–31)
MCH RBC QN AUTO: 31.9 PG (ref 27–31)
MCH RBC QN AUTO: 32.4 PG (ref 27–31)
MCHC RBC AUTO-ENTMCNC: 29.9 G/DL (ref 33–36)
MCHC RBC AUTO-ENTMCNC: 29.9 G/DL (ref 33–36)
MCHC RBC AUTO-ENTMCNC: 30 G/DL (ref 33–36)
MCHC RBC AUTO-ENTMCNC: 30.1 G/DL (ref 33–36)
MCHC RBC AUTO-ENTMCNC: 30.2 G/DL (ref 33–36)
MCHC RBC AUTO-ENTMCNC: 30.3 G/DL (ref 33–36)
MCHC RBC AUTO-ENTMCNC: 30.3 G/DL (ref 33–36)
MCHC RBC AUTO-ENTMCNC: 30.4 G/DL (ref 33–36)
MCHC RBC AUTO-ENTMCNC: 30.6 G/DL (ref 33–36)
MCHC RBC AUTO-ENTMCNC: 30.7 G/DL (ref 33–36)
MCHC RBC AUTO-ENTMCNC: 30.8 G/DL (ref 33–36)
MCHC RBC AUTO-ENTMCNC: 30.9 G/DL (ref 33–36)
MCHC RBC AUTO-ENTMCNC: 31 G/DL (ref 33–36)
MCHC RBC AUTO-ENTMCNC: 31.1 G/DL (ref 33–36)
MCHC RBC AUTO-ENTMCNC: 31.2 G/DL (ref 33–36)
MCHC RBC AUTO-ENTMCNC: 31.3 G/DL (ref 33–36)
MCHC RBC AUTO-ENTMCNC: 31.4 G/DL (ref 33–36)
MCHC RBC AUTO-ENTMCNC: 31.6 G/DL (ref 33–36)
MCHC RBC AUTO-ENTMCNC: 31.6 G/DL (ref 33–36)
MCHC RBC AUTO-ENTMCNC: 31.7 G/DL (ref 33–36)
MCHC RBC AUTO-ENTMCNC: 31.8 G/DL (ref 33–36)
MCHC RBC AUTO-ENTMCNC: 31.8 G/DL (ref 33–36)
MCHC RBC AUTO-ENTMCNC: 31.9 G/DL (ref 33–36)
MCHC RBC AUTO-ENTMCNC: 32 G/DL (ref 33–36)
MCHC RBC AUTO-ENTMCNC: 32.1 G/DL (ref 33–36)
MCHC RBC AUTO-ENTMCNC: 32.4 G/DL (ref 33–36)
MCHC RBC AUTO-ENTMCNC: 32.5 G/DL (ref 33–36)
MCHC RBC AUTO-ENTMCNC: 32.5 G/DL (ref 33–36)
MCHC RBC AUTO-ENTMCNC: 32.6 G/DL (ref 33–36)
MCHC RBC AUTO-ENTMCNC: 32.7 G/DL (ref 33–36)
MCHC RBC AUTO-ENTMCNC: 32.8 G/DL (ref 33–36)
MCHC RBC AUTO-ENTMCNC: 32.8 G/DL (ref 33–36)
MCHC RBC AUTO-ENTMCNC: 32.9 G/DL (ref 33–36)
MCHC RBC AUTO-ENTMCNC: 33.1 G/DL (ref 33–36)
MCHC RBC AUTO-ENTMCNC: 33.2 G/DL (ref 33–36)
MCHC RBC AUTO-ENTMCNC: 33.5 G/DL (ref 33–36)
MCHC RBC AUTO-ENTMCNC: 33.6 G/DL (ref 33–36)
MCHC RBC AUTO-ENTMCNC: 33.8 G/DL (ref 33–36)
MCHC RBC AUTO-ENTMCNC: 34 G/DL (ref 33–36)
MCHC RBC AUTO-ENTMCNC: 34.1 G/DL (ref 33–36)
MCHC RBC AUTO-ENTMCNC: 34.9 G/DL (ref 33–36)
MCHC RBC AUTO-ENTMCNC: 35.3 G/DL (ref 33–36)
MCV RBC AUTO: 88.1 FL (ref 80–94)
MCV RBC AUTO: 88.9 FL (ref 80–94)
MCV RBC AUTO: 89.2 FL (ref 80–94)
MCV RBC AUTO: 89.7 FL (ref 80–94)
MCV RBC AUTO: 89.9 FL (ref 80–94)
MCV RBC AUTO: 89.9 FL (ref 80–94)
MCV RBC AUTO: 90 FL (ref 80–94)
MCV RBC AUTO: 90.1 FL (ref 80–94)
MCV RBC AUTO: 90.4 FL (ref 80–94)
MCV RBC AUTO: 90.8 FL (ref 80–94)
MCV RBC AUTO: 90.9 FL (ref 80–94)
MCV RBC AUTO: 91.2 FL (ref 80–94)
MCV RBC AUTO: 91.3 FL (ref 80–94)
MCV RBC AUTO: 91.4 FL (ref 80–94)
MCV RBC AUTO: 91.6 FL (ref 80–94)
MCV RBC AUTO: 91.6 FL (ref 80–94)
MCV RBC AUTO: 91.7 FL (ref 80–94)
MCV RBC AUTO: 91.7 FL (ref 80–94)
MCV RBC AUTO: 91.9 FL (ref 80–94)
MCV RBC AUTO: 91.9 FL (ref 80–94)
MCV RBC AUTO: 92 FL (ref 80–94)
MCV RBC AUTO: 92.2 FL (ref 80–94)
MCV RBC AUTO: 92.5 FL (ref 80–94)
MCV RBC AUTO: 92.5 FL (ref 80–94)
MCV RBC AUTO: 92.6 FL (ref 80–94)
MCV RBC AUTO: 92.7 FL (ref 80–94)
MCV RBC AUTO: 92.7 FL (ref 80–94)
MCV RBC AUTO: 93 FL (ref 80–94)
MCV RBC AUTO: 93 FL (ref 80–94)
MCV RBC AUTO: 93.1 FL (ref 80–94)
MCV RBC AUTO: 93.1 FL (ref 80–94)
MCV RBC AUTO: 93.3 FL (ref 80–94)
MCV RBC AUTO: 93.5 FL (ref 80–94)
MCV RBC AUTO: 93.5 FL (ref 80–94)
MCV RBC AUTO: 93.6 FL (ref 80–94)
MCV RBC AUTO: 93.6 FL (ref 80–94)
MCV RBC AUTO: 94.1 FL (ref 80–94)
MCV RBC AUTO: 94.2 FL (ref 80–94)
MCV RBC AUTO: 94.3 FL (ref 80–94)
MCV RBC AUTO: 94.3 FL (ref 80–94)
MCV RBC AUTO: 94.6 FL (ref 80–94)
MCV RBC AUTO: 94.7 FL (ref 80–94)
MCV RBC AUTO: 94.7 FL (ref 80–94)
MCV RBC AUTO: 94.8 FL (ref 80–94)
MCV RBC AUTO: 94.9 FL (ref 80–94)
MCV RBC AUTO: 94.9 FL (ref 80–94)
MCV RBC AUTO: 95 FL (ref 80–94)
MCV RBC AUTO: 95.3 FL (ref 80–94)
MCV RBC AUTO: 95.4 FL (ref 80–94)
MCV RBC AUTO: 95.6 FL (ref 80–94)
MCV RBC AUTO: 95.8 FL (ref 80–94)
MCV RBC AUTO: 96 FL (ref 80–94)
MCV RBC AUTO: 96.1 FL (ref 80–94)
MCV RBC AUTO: 96.3 FL (ref 80–94)
MCV RBC AUTO: 98.7 FL (ref 80–94)
MECH RR (OHS): 14 B/MIN
MECH RR (OHS): 16 B/MIN
MECH RR (OHS): 20 B/MIN
METAMYELOCYTES NFR BLD MANUAL: 2 %
METHGB MFR BLDA: 0 %
METHGB MFR BLDA: 0 %
METHGB MFR BLDA: 0 % (ref 0.4–1.5)
METHGB MFR BLDA: 0 % (ref 0.4–1.5)
METHGB MFR BLDA: 0.2 %
METHGB MFR BLDA: 0.2 %
METHGB MFR BLDA: 0.2 % (ref 0.4–1.5)
METHGB MFR BLDA: 0.3 %
METHGB MFR BLDA: 0.4 %
METHGB MFR BLDA: 0.5 % (ref 0.4–1.5)
METHGB MFR BLDA: 0.6 %
METHGB MFR BLDA: 0.6 % (ref 0.4–1.5)
METHGB MFR BLDA: 0.7 %
METHGB MFR BLDA: 0.7 % (ref 0.4–1.5)
METHGB MFR BLDA: 0.8 %
METHGB MFR BLDA: 0.8 %
METHGB MFR BLDA: 0.8 % (ref 0.4–1.5)
METHGB MFR BLDA: 0.9 % (ref 0.4–1.5)
METHGB MFR BLDA: 1 %
METHGB MFR BLDA: 1 % (ref 0.4–1.5)
METHGB MFR BLDA: 1.1 %
METHGB MFR BLDA: 1.1 % (ref 0.4–1.5)
METHGB MFR BLDA: 1.2 %
METHGB MFR BLDA: 1.2 % (ref 0.4–1.5)
METHGB MFR BLDA: 1.2 % (ref 0.4–1.5)
MICROCYTES BLD QL SMEAR: ABNORMAL
MODE (OHS): ABNORMAL
MODE (OHS): AC
MONOCYTES # BLD AUTO: 0.09 X10(3)/MCL (ref 0.1–1.3)
MONOCYTES # BLD AUTO: 0.2 X10(3)/MCL (ref 0.1–1.3)
MONOCYTES # BLD AUTO: 0.34 X10(3)/MCL (ref 0.1–1.3)
MONOCYTES # BLD AUTO: 0.36 X10(3)/MCL (ref 0.1–1.3)
MONOCYTES # BLD AUTO: 0.39 X10(3)/MCL (ref 0.1–1.3)
MONOCYTES # BLD AUTO: 0.4 X10(3)/MCL (ref 0.1–1.3)
MONOCYTES # BLD AUTO: 0.4 X10(3)/MCL (ref 0.1–1.3)
MONOCYTES # BLD AUTO: 0.41 X10(3)/MCL (ref 0.1–1.3)
MONOCYTES # BLD AUTO: 0.41 X10(3)/MCL (ref 0.1–1.3)
MONOCYTES # BLD AUTO: 0.42 X10(3)/MCL (ref 0.1–1.3)
MONOCYTES # BLD AUTO: 0.42 X10(3)/MCL (ref 0.1–1.3)
MONOCYTES # BLD AUTO: 0.44 X10(3)/MCL (ref 0.1–1.3)
MONOCYTES # BLD AUTO: 0.44 X10(3)/MCL (ref 0.1–1.3)
MONOCYTES # BLD AUTO: 0.45 X10(3)/MCL (ref 0.1–1.3)
MONOCYTES # BLD AUTO: 0.45 X10(3)/MCL (ref 0.1–1.3)
MONOCYTES # BLD AUTO: 0.46 X10(3)/MCL (ref 0.1–1.3)
MONOCYTES # BLD AUTO: 0.47 X10(3)/MCL (ref 0.1–1.3)
MONOCYTES # BLD AUTO: 0.47 X10(3)/MCL (ref 0.1–1.3)
MONOCYTES # BLD AUTO: 0.48 X10(3)/MCL (ref 0.1–1.3)
MONOCYTES # BLD AUTO: 0.48 X10(3)/MCL (ref 0.1–1.3)
MONOCYTES # BLD AUTO: 0.5 X10(3)/MCL (ref 0.1–1.3)
MONOCYTES # BLD AUTO: 0.51 X10(3)/MCL (ref 0.1–1.3)
MONOCYTES # BLD AUTO: 0.51 X10(3)/MCL (ref 0.1–1.3)
MONOCYTES # BLD AUTO: 0.52 X10(3)/MCL (ref 0.1–1.3)
MONOCYTES # BLD AUTO: 0.53 X10(3)/MCL (ref 0.1–1.3)
MONOCYTES # BLD AUTO: 0.59 X10(3)/MCL (ref 0.1–1.3)
MONOCYTES # BLD AUTO: 0.59 X10(3)/MCL (ref 0.1–1.3)
MONOCYTES # BLD AUTO: 0.6 X10(3)/MCL (ref 0.1–1.3)
MONOCYTES # BLD AUTO: 0.6 X10(3)/MCL (ref 0.1–1.3)
MONOCYTES # BLD AUTO: 0.61 X10(3)/MCL (ref 0.1–1.3)
MONOCYTES # BLD AUTO: 0.65 X10(3)/MCL (ref 0.1–1.3)
MONOCYTES # BLD AUTO: 0.67 X10(3)/MCL (ref 0.1–1.3)
MONOCYTES # BLD AUTO: 0.68 X10(3)/MCL (ref 0.1–1.3)
MONOCYTES # BLD AUTO: 0.68 X10(3)/MCL (ref 0.1–1.3)
MONOCYTES # BLD AUTO: 0.7 X10(3)/MCL (ref 0.1–1.3)
MONOCYTES # BLD AUTO: 0.7 X10(3)/MCL (ref 0.1–1.3)
MONOCYTES # BLD AUTO: 0.74 X10(3)/MCL (ref 0.1–1.3)
MONOCYTES # BLD AUTO: 0.77 X10(3)/MCL (ref 0.1–1.3)
MONOCYTES # BLD AUTO: 0.8 X10(3)/MCL (ref 0.1–1.3)
MONOCYTES # BLD AUTO: 0.83 X10(3)/MCL (ref 0.1–1.3)
MONOCYTES # BLD AUTO: 0.84 X10(3)/MCL (ref 0.1–1.3)
MONOCYTES # BLD AUTO: 0.87 X10(3)/MCL (ref 0.1–1.3)
MONOCYTES # BLD AUTO: 0.89 X10(3)/MCL (ref 0.1–1.3)
MONOCYTES # BLD AUTO: 0.9 X10(3)/MCL (ref 0.1–1.3)
MONOCYTES # BLD AUTO: 0.93 X10(3)/MCL (ref 0.1–1.3)
MONOCYTES # BLD AUTO: 1.05 X10(3)/MCL (ref 0.1–1.3)
MONOCYTES # BLD AUTO: 1.13 X10(3)/MCL (ref 0.1–1.3)
MONOCYTES # BLD AUTO: 1.17 X10(3)/MCL (ref 0.1–1.3)
MONOCYTES # BLD AUTO: 1.29 X10(3)/MCL (ref 0.1–1.3)
MONOCYTES # BLD AUTO: 1.42 X10(3)/MCL (ref 0.1–1.3)
MONOCYTES # BLD AUTO: 1.7 X10(3)/MCL (ref 0.1–1.3)
MONOCYTES # BLD AUTO: 2 X10(3)/MCL (ref 0.1–1.3)
MONOCYTES NFR BLD AUTO: 0.7 %
MONOCYTES NFR BLD AUTO: 10.1 %
MONOCYTES NFR BLD AUTO: 10.3 %
MONOCYTES NFR BLD AUTO: 10.8 %
MONOCYTES NFR BLD AUTO: 11.1 %
MONOCYTES NFR BLD AUTO: 11.3 %
MONOCYTES NFR BLD AUTO: 11.6 %
MONOCYTES NFR BLD AUTO: 12.2 %
MONOCYTES NFR BLD AUTO: 2 %
MONOCYTES NFR BLD AUTO: 2.5 %
MONOCYTES NFR BLD AUTO: 5.5 %
MONOCYTES NFR BLD AUTO: 5.6 %
MONOCYTES NFR BLD AUTO: 5.7 %
MONOCYTES NFR BLD AUTO: 6.5 %
MONOCYTES NFR BLD AUTO: 6.5 %
MONOCYTES NFR BLD AUTO: 7 %
MONOCYTES NFR BLD AUTO: 7.3 %
MONOCYTES NFR BLD AUTO: 7.5 %
MONOCYTES NFR BLD AUTO: 7.5 %
MONOCYTES NFR BLD AUTO: 7.6 %
MONOCYTES NFR BLD AUTO: 7.8 %
MONOCYTES NFR BLD AUTO: 7.8 %
MONOCYTES NFR BLD AUTO: 8 %
MONOCYTES NFR BLD AUTO: 8 %
MONOCYTES NFR BLD AUTO: 8.1 %
MONOCYTES NFR BLD AUTO: 8.2 %
MONOCYTES NFR BLD AUTO: 8.4 %
MONOCYTES NFR BLD AUTO: 8.5 %
MONOCYTES NFR BLD AUTO: 8.6 %
MONOCYTES NFR BLD AUTO: 8.8 %
MONOCYTES NFR BLD AUTO: 9 %
MONOCYTES NFR BLD AUTO: 9.1 %
MONOCYTES NFR BLD AUTO: 9.2 %
MONOCYTES NFR BLD AUTO: 9.3 %
MONOCYTES NFR BLD AUTO: 9.4 %
MONOCYTES NFR BLD AUTO: 9.6 %
MONOCYTES NFR BLD AUTO: 9.7 %
MONOCYTES NFR BLD AUTO: 9.8 %
MONOCYTES NFR BLD AUTO: 9.9 %
MONOCYTES NFR BLD MANUAL: 0.23 X10(3)/MCL (ref 0.1–1.3)
MONOCYTES NFR BLD MANUAL: 0.63 X10(3)/MCL (ref 0.1–1.3)
MONOCYTES NFR BLD MANUAL: 5 %
MONOCYTES NFR BLD MANUAL: 6 %
MUCOUS THREADS URNS QL MICRO: ABNORMAL /LPF
MV MEAN GRADIENT: 9 MMHG
MV PEAK A VEL: 1.74 M/S
MV PEAK E VEL: 0.94 M/S
MV PEAK GRADIENT: 13 MMHG
MV STENOSIS PRESSURE HALF TIME: 63 MS
MV VALVE AREA BY CONTINUITY EQUATION: 1.86 CM2
MV VALVE AREA P 1/2 METHOD: 3.49 CM2
NEUTROPHILS # BLD AUTO: 10.04 X10(3)/MCL (ref 2.1–9.2)
NEUTROPHILS # BLD AUTO: 10.19 X10(3)/MCL (ref 2.1–9.2)
NEUTROPHILS # BLD AUTO: 10.95 X10(3)/MCL (ref 2.1–9.2)
NEUTROPHILS # BLD AUTO: 11.47 X10(3)/MCL (ref 2.1–9.2)
NEUTROPHILS # BLD AUTO: 12.44 X10(3)/MCL (ref 2.1–9.2)
NEUTROPHILS # BLD AUTO: 15.97 X10(3)/MCL (ref 2.1–9.2)
NEUTROPHILS # BLD AUTO: 17.68 X10(3)/MCL (ref 2.1–9.2)
NEUTROPHILS # BLD AUTO: 2.82 X10(3)/MCL (ref 2.1–9.2)
NEUTROPHILS # BLD AUTO: 2.85 X10(3)/MCL (ref 2.1–9.2)
NEUTROPHILS # BLD AUTO: 3.05 X10(3)/MCL (ref 2.1–9.2)
NEUTROPHILS # BLD AUTO: 3.08 X10(3)/MCL (ref 2.1–9.2)
NEUTROPHILS # BLD AUTO: 3.17 X10(3)/MCL (ref 2.1–9.2)
NEUTROPHILS # BLD AUTO: 3.21 X10(3)/MCL (ref 2.1–9.2)
NEUTROPHILS # BLD AUTO: 3.32 X10(3)/MCL (ref 2.1–9.2)
NEUTROPHILS # BLD AUTO: 3.39 X10(3)/MCL (ref 2.1–9.2)
NEUTROPHILS # BLD AUTO: 3.42 X10(3)/MCL (ref 2.1–9.2)
NEUTROPHILS # BLD AUTO: 3.43 X10(3)/MCL (ref 2.1–9.2)
NEUTROPHILS # BLD AUTO: 3.48 X10(3)/MCL (ref 2.1–9.2)
NEUTROPHILS # BLD AUTO: 3.5 X10(3)/MCL (ref 2.1–9.2)
NEUTROPHILS # BLD AUTO: 3.54 X10(3)/MCL (ref 2.1–9.2)
NEUTROPHILS # BLD AUTO: 3.55 X10(3)/MCL (ref 2.1–9.2)
NEUTROPHILS # BLD AUTO: 3.66 X10(3)/MCL (ref 2.1–9.2)
NEUTROPHILS # BLD AUTO: 3.69 X10(3)/MCL (ref 2.1–9.2)
NEUTROPHILS # BLD AUTO: 3.81 X10(3)/MCL (ref 2.1–9.2)
NEUTROPHILS # BLD AUTO: 3.84 X10(3)/MCL (ref 2.1–9.2)
NEUTROPHILS # BLD AUTO: 3.94 X10(3)/MCL (ref 2.1–9.2)
NEUTROPHILS # BLD AUTO: 4.01 X10(3)/MCL (ref 2.1–9.2)
NEUTROPHILS # BLD AUTO: 4.05 X10(3)/MCL (ref 2.1–9.2)
NEUTROPHILS # BLD AUTO: 4.08 X10(3)/MCL (ref 2.1–9.2)
NEUTROPHILS # BLD AUTO: 4.22 X10(3)/MCL (ref 2.1–9.2)
NEUTROPHILS # BLD AUTO: 4.26 X10(3)/MCL (ref 2.1–9.2)
NEUTROPHILS # BLD AUTO: 4.3 X10(3)/MCL (ref 2.1–9.2)
NEUTROPHILS # BLD AUTO: 4.46 X10(3)/MCL (ref 2.1–9.2)
NEUTROPHILS # BLD AUTO: 4.5 X10(3)/MCL (ref 2.1–9.2)
NEUTROPHILS # BLD AUTO: 4.74 X10(3)/MCL (ref 2.1–9.2)
NEUTROPHILS # BLD AUTO: 5.01 X10(3)/MCL (ref 2.1–9.2)
NEUTROPHILS # BLD AUTO: 5.05 X10(3)/MCL (ref 2.1–9.2)
NEUTROPHILS # BLD AUTO: 5.26 X10(3)/MCL (ref 2.1–9.2)
NEUTROPHILS # BLD AUTO: 5.35 X10(3)/MCL (ref 2.1–9.2)
NEUTROPHILS # BLD AUTO: 5.48 X10(3)/MCL (ref 2.1–9.2)
NEUTROPHILS # BLD AUTO: 5.78 X10(3)/MCL (ref 2.1–9.2)
NEUTROPHILS # BLD AUTO: 6 X10(3)/MCL (ref 2.1–9.2)
NEUTROPHILS # BLD AUTO: 6.45 X10(3)/MCL (ref 2.1–9.2)
NEUTROPHILS # BLD AUTO: 6.47 X10(3)/MCL (ref 2.1–9.2)
NEUTROPHILS # BLD AUTO: 6.72 X10(3)/MCL (ref 2.1–9.2)
NEUTROPHILS # BLD AUTO: 6.74 X10(3)/MCL (ref 2.1–9.2)
NEUTROPHILS # BLD AUTO: 6.79 X10(3)/MCL (ref 2.1–9.2)
NEUTROPHILS # BLD AUTO: 7.49 X10(3)/MCL (ref 2.1–9.2)
NEUTROPHILS # BLD AUTO: 8.04 X10(3)/MCL (ref 2.1–9.2)
NEUTROPHILS # BLD AUTO: 8.43 X10(3)/MCL (ref 2.1–9.2)
NEUTROPHILS # BLD AUTO: 8.88 X10(3)/MCL (ref 2.1–9.2)
NEUTROPHILS # BLD AUTO: 8.96 X10(3)/MCL (ref 2.1–9.2)
NEUTROPHILS NFR BLD AUTO: 53.5 %
NEUTROPHILS NFR BLD AUTO: 56.4 %
NEUTROPHILS NFR BLD AUTO: 57.7 %
NEUTROPHILS NFR BLD AUTO: 58 %
NEUTROPHILS NFR BLD AUTO: 60.6 %
NEUTROPHILS NFR BLD AUTO: 61.1 %
NEUTROPHILS NFR BLD AUTO: 61.6 %
NEUTROPHILS NFR BLD AUTO: 61.9 %
NEUTROPHILS NFR BLD AUTO: 62.2 %
NEUTROPHILS NFR BLD AUTO: 62.3 %
NEUTROPHILS NFR BLD AUTO: 62.3 %
NEUTROPHILS NFR BLD AUTO: 62.4 %
NEUTROPHILS NFR BLD AUTO: 62.5 %
NEUTROPHILS NFR BLD AUTO: 62.9 %
NEUTROPHILS NFR BLD AUTO: 62.9 %
NEUTROPHILS NFR BLD AUTO: 63.4 %
NEUTROPHILS NFR BLD AUTO: 63.5 %
NEUTROPHILS NFR BLD AUTO: 63.6 %
NEUTROPHILS NFR BLD AUTO: 63.6 %
NEUTROPHILS NFR BLD AUTO: 64.7 %
NEUTROPHILS NFR BLD AUTO: 64.8 %
NEUTROPHILS NFR BLD AUTO: 64.9 %
NEUTROPHILS NFR BLD AUTO: 65 %
NEUTROPHILS NFR BLD AUTO: 65.2 %
NEUTROPHILS NFR BLD AUTO: 65.8 %
NEUTROPHILS NFR BLD AUTO: 66.2 %
NEUTROPHILS NFR BLD AUTO: 66.3 %
NEUTROPHILS NFR BLD AUTO: 66.9 %
NEUTROPHILS NFR BLD AUTO: 67.2 %
NEUTROPHILS NFR BLD AUTO: 67.9 %
NEUTROPHILS NFR BLD AUTO: 68 %
NEUTROPHILS NFR BLD AUTO: 68.1 %
NEUTROPHILS NFR BLD AUTO: 68.4 %
NEUTROPHILS NFR BLD AUTO: 68.4 %
NEUTROPHILS NFR BLD AUTO: 68.9 %
NEUTROPHILS NFR BLD AUTO: 69.7 %
NEUTROPHILS NFR BLD AUTO: 69.8 %
NEUTROPHILS NFR BLD AUTO: 70.6 %
NEUTROPHILS NFR BLD AUTO: 71.5 %
NEUTROPHILS NFR BLD AUTO: 71.5 %
NEUTROPHILS NFR BLD AUTO: 71.6 %
NEUTROPHILS NFR BLD AUTO: 72.7 %
NEUTROPHILS NFR BLD AUTO: 76.7 %
NEUTROPHILS NFR BLD AUTO: 77.5 %
NEUTROPHILS NFR BLD AUTO: 78.4 %
NEUTROPHILS NFR BLD AUTO: 79 %
NEUTROPHILS NFR BLD AUTO: 83.8 %
NEUTROPHILS NFR BLD AUTO: 90.3 %
NEUTROPHILS NFR BLD AUTO: 93 %
NEUTROPHILS NFR BLD AUTO: 94.7 %
NEUTROPHILS NFR BLD MANUAL: 46 %
NEUTROPHILS NFR BLD MANUAL: 73 %
NITRITE UR QL STRIP: NEGATIVE
NRBC BLD AUTO-RTO: 0 %
NRBC BLD AUTO-RTO: 0.1 %
NRBC BLD AUTO-RTO: 0.1 %
NRBC BLD AUTO-RTO: 0.2 %
NRBC BLD AUTO-RTO: 0.2 %
NRBC BLD AUTO-RTO: 0.3 %
O2 HB BLOOD GAS (OHS): 47.8 %
O2 HB BLOOD GAS (OHS): 48.3 %
O2 HB BLOOD GAS (OHS): 48.4 %
O2 HB BLOOD GAS (OHS): 48.9 %
O2 HB BLOOD GAS (OHS): 49.9 %
O2 HB BLOOD GAS (OHS): 51.1 %
O2 HB BLOOD GAS (OHS): 54.7 %
O2 HB BLOOD GAS (OHS): 55.6 %
O2 HB BLOOD GAS (OHS): 56.7 %
O2 HB BLOOD GAS (OHS): 57.8 %
O2 HB BLOOD GAS (OHS): 58.5 %
O2 HB BLOOD GAS (OHS): 59.9 %
O2 HB BLOOD GAS (OHS): 60.3 %
O2 HB BLOOD GAS (OHS): 61.4 %
O2 HB BLOOD GAS (OHS): 61.5 %
O2 HB BLOOD GAS (OHS): 65.7 %
O2 HB BLOOD GAS (OHS): 71.6 %
O2 HB BLOOD GAS (OHS): 87.8 %
O2 HB BLOOD GAS (OHS): 93.8 % (ref 94–97)
O2 HB BLOOD GAS (OHS): 94.9 % (ref 94–97)
O2 HB BLOOD GAS (OHS): 95.3 % (ref 94–97)
O2 HB BLOOD GAS (OHS): 95.4 % (ref 94–97)
O2 HB BLOOD GAS (OHS): 95.5 % (ref 94–97)
O2 HB BLOOD GAS (OHS): 95.7 % (ref 94–97)
O2 HB BLOOD GAS (OHS): 96 % (ref 94–97)
O2 HB BLOOD GAS (OHS): 96 % (ref 94–97)
O2 HB BLOOD GAS (OHS): 96.1 % (ref 94–97)
O2 HB BLOOD GAS (OHS): 96.1 % (ref 94–97)
O2 HB BLOOD GAS (OHS): 96.2 % (ref 94–97)
O2 HB BLOOD GAS (OHS): 96.3 % (ref 94–97)
O2 HB BLOOD GAS (OHS): 96.4 % (ref 94–97)
O2 HB BLOOD GAS (OHS): 96.4 % (ref 94–97)
O2 HB BLOOD GAS (OHS): 96.8 % (ref 94–97)
O2 HB BLOOD GAS (OHS): 96.8 % (ref 94–97)
O2 HB BLOOD GAS (OHS): 97.1 % (ref 94–97)
O2 HB BLOOD GAS (OHS): 97.7 % (ref 94–97)
O2 HB BLOOD GAS (OHS): 98.3 % (ref 94–97)
OHS LV EJECTION FRACTION SIMPSONS BIPLANE MOD: 18 %
OHS QRS DURATION: 124 MS
OHS QRS DURATION: 128 MS
OHS QRS DURATION: 130 MS
OHS QRS DURATION: 134 MS
OHS QRS DURATION: 136 MS
OHS QRS DURATION: 138 MS
OHS QRS DURATION: 140 MS
OHS QRS DURATION: 140 MS
OHS QRS DURATION: 142 MS
OHS QRS DURATION: 160 MS
OHS QTC CALCULATION: 428 MS
OHS QTC CALCULATION: 441 MS
OHS QTC CALCULATION: 442 MS
OHS QTC CALCULATION: 454 MS
OHS QTC CALCULATION: 464 MS
OHS QTC CALCULATION: 467 MS
OHS QTC CALCULATION: 470 MS
OHS QTC CALCULATION: 476 MS
OHS QTC CALCULATION: 477 MS
OHS QTC CALCULATION: 480 MS
OHS QTC CALCULATION: 482 MS
OHS QTC CALCULATION: 485 MS
OHS QTC CALCULATION: 486 MS
OHS QTC CALCULATION: 486 MS
OHS QTC CALCULATION: 488 MS
OHS QTC CALCULATION: 492 MS
OHS QTC CALCULATION: 499 MS
OHS QTC CALCULATION: 500 MS
OHS QTC CALCULATION: 502 MS
OHS QTC CALCULATION: 541 MS
OVALOCYTES (OLG): ABNORMAL
OXYGEN DEVICE BLOOD GAS (OHS): ABNORMAL
OXYGEN DEVICE BLOOD GAS (OHS): NORMAL
OXYHGB MFR BLDA: 10 G/DL
OXYHGB MFR BLDA: 10.5 G/DL
OXYHGB MFR BLDA: 10.6 G/DL (ref 12–16)
OXYHGB MFR BLDA: 10.8 G/DL (ref 12–16)
OXYHGB MFR BLDA: 10.8 G/DL (ref 12–16)
OXYHGB MFR BLDA: 11.1 G/DL
OXYHGB MFR BLDA: 11.1 G/DL (ref 12–16)
OXYHGB MFR BLDA: 11.2 G/DL (ref 12–16)
OXYHGB MFR BLDA: 11.5 G/DL (ref 12–16)
OXYHGB MFR BLDA: 12 G/DL (ref 12–16)
OXYHGB MFR BLDA: 12.6 G/DL (ref 12–16)
OXYHGB MFR BLDA: 13.7 G/DL (ref 12–16)
OXYHGB MFR BLDA: 17.6 G/DL (ref 12–16)
OXYHGB MFR BLDA: 7.7 G/DL (ref 12–16)
OXYHGB MFR BLDA: 8.2 G/DL
OXYHGB MFR BLDA: 8.7 G/DL
OXYHGB MFR BLDA: 8.8 G/DL
OXYHGB MFR BLDA: 8.8 G/DL (ref 12–16)
OXYHGB MFR BLDA: 9 G/DL (ref 12–16)
OXYHGB MFR BLDA: 9.2 G/DL
OXYHGB MFR BLDA: 9.2 G/DL
OXYHGB MFR BLDA: 9.2 G/DL (ref 12–16)
OXYHGB MFR BLDA: 9.3 G/DL
OXYHGB MFR BLDA: 9.3 G/DL (ref 12–16)
OXYHGB MFR BLDA: 9.3 G/DL (ref 12–16)
OXYHGB MFR BLDA: 9.4 G/DL
OXYHGB MFR BLDA: 9.4 G/DL
OXYHGB MFR BLDA: 9.4 G/DL (ref 12–16)
OXYHGB MFR BLDA: 9.5 G/DL (ref 12–16)
OXYHGB MFR BLDA: 9.5 G/DL (ref 12–16)
OXYHGB MFR BLDA: 9.6 G/DL
OXYHGB MFR BLDA: 9.7 G/DL
OXYHGB MFR BLDA: 9.9 G/DL
OXYHGB MFR BLDA: 9.9 G/DL
OXYHGB MFR BLDA: <6.5 G/DL
PAW @ PEAK INSP FLOW SETTING VENT: 5 CMH20
PCO2 BLDA: 26 MMHG (ref 20–50)
PCO2 BLDA: 29 MMHG (ref 35–45)
PCO2 BLDA: 31 MMHG (ref 35–45)
PCO2 BLDA: 32 MMHG (ref 20–50)
PCO2 BLDA: 32 MMHG (ref 35–45)
PCO2 BLDA: 33 MMHG (ref 35–45)
PCO2 BLDA: 34 MMHG
PCO2 BLDA: 34 MMHG (ref 35–45)
PCO2 BLDA: 35 MMHG
PCO2 BLDA: 35 MMHG (ref 35–45)
PCO2 BLDA: 36 MMHG (ref 20–50)
PCO2 BLDA: 36 MMHG (ref 20–50)
PCO2 BLDA: 36 MMHG (ref 35–45)
PCO2 BLDA: 37 MMHG
PCO2 BLDA: 37 MMHG
PCO2 BLDA: 37 MMHG (ref 20–50)
PCO2 BLDA: 37 MMHG (ref 20–50)
PCO2 BLDA: 37 MMHG (ref 35–45)
PCO2 BLDA: 38 MMHG
PCO2 BLDA: 38 MMHG (ref 20–50)
PCO2 BLDA: 38 MMHG (ref 20–50)
PCO2 BLDA: 38 MMHG (ref 35–45)
PCO2 BLDA: 39 MMHG
PCO2 BLDA: 39 MMHG (ref 20–50)
PCO2 BLDA: 39 MMHG (ref 20–50)
PCO2 BLDA: 40 MMHG
PCO2 BLDA: 42 MMHG
PCO2 BLDA: 42 MMHG (ref 20–50)
PCO2 BLDA: 43 MMHG
PCO2 BLDA: 44 MMHG
PCO2 BLDA: 45 MMHG (ref 35–45)
PCO2 BLDA: 47 MMHG
PEEP RESPIRATORY: 10 CMH2O
PEEP RESPIRATORY: 5 CMH2O
PH BLDA: 7.03 [PH] (ref 7.35–7.45)
PH BLDA: 7.34 [PH] (ref 7.35–7.45)
PH BLDA: 7.35 [PH]
PH BLDA: 7.38 [PH]
PH BLDA: 7.38 [PH]
PH BLDA: 7.38 [PH] (ref 7.3–7.6)
PH BLDA: 7.39 [PH]
PH BLDA: 7.4 [PH]
PH BLDA: 7.4 [PH] (ref 7.3–7.6)
PH BLDA: 7.41 [PH]
PH BLDA: 7.41 [PH] (ref 7.35–7.45)
PH BLDA: 7.41 [PH] (ref 7.35–7.45)
PH BLDA: 7.41 [PH] (ref 7.3–7.6)
PH BLDA: 7.42 [PH]
PH BLDA: 7.42 [PH] (ref 7.35–7.45)
PH BLDA: 7.42 [PH] (ref 7.3–7.6)
PH BLDA: 7.42 [PH] (ref 7.3–7.6)
PH BLDA: 7.43 [PH]
PH BLDA: 7.43 [PH] (ref 7.35–7.45)
PH BLDA: 7.44 [PH]
PH BLDA: 7.44 [PH] (ref 7.35–7.45)
PH BLDA: 7.45 [PH]
PH BLDA: 7.45 [PH]
PH BLDA: 7.45 [PH] (ref 7.35–7.45)
PH BLDA: 7.45 [PH] (ref 7.3–7.6)
PH BLDA: 7.46 [PH] (ref 7.35–7.45)
PH BLDA: 7.47 [PH] (ref 7.35–7.45)
PH BLDA: 7.47 [PH] (ref 7.3–7.6)
PH BLDA: 7.48 [PH] (ref 7.35–7.45)
PH BLDA: 7.49 [PH] (ref 7.35–7.45)
PH BLDA: 7.49 [PH] (ref 7.35–7.45)
PH BLDA: 7.49 [PH] (ref 7.3–7.6)
PH BLDA: 7.5 [PH] (ref 7.35–7.45)
PH BLDA: 7.5 [PH] (ref 7.35–7.45)
PH BLDA: 7.5 [PH] (ref 7.3–7.6)
PH BLDA: 7.53 [PH] (ref 7.35–7.45)
PH BLDA: 7.54 [PH] (ref 7.35–7.45)
PH UR STRIP: 5 [PH]
PHOSPHATE SERPL-MCNC: 1.4 MG/DL (ref 2.3–4.7)
PHOSPHATE SERPL-MCNC: 1.6 MG/DL (ref 2.3–4.7)
PHOSPHATE SERPL-MCNC: 2.1 MG/DL (ref 2.3–4.7)
PHOSPHATE SERPL-MCNC: 2.2 MG/DL (ref 2.3–4.7)
PHOSPHATE SERPL-MCNC: 2.3 MG/DL (ref 2.3–4.7)
PHOSPHATE SERPL-MCNC: 2.5 MG/DL (ref 2.3–4.7)
PHOSPHATE SERPL-MCNC: 2.5 MG/DL (ref 2.3–4.7)
PHOSPHATE SERPL-MCNC: 2.6 MG/DL (ref 2.3–4.7)
PHOSPHATE SERPL-MCNC: 2.7 MG/DL (ref 2.3–4.7)
PHOSPHATE SERPL-MCNC: 2.8 MG/DL (ref 2.3–4.7)
PHOSPHATE SERPL-MCNC: 2.9 MG/DL (ref 2.3–4.7)
PHOSPHATE SERPL-MCNC: 3 MG/DL (ref 2.3–4.7)
PHOSPHATE SERPL-MCNC: 3.1 MG/DL (ref 2.3–4.7)
PHOSPHATE SERPL-MCNC: 3.2 MG/DL (ref 2.3–4.7)
PHOSPHATE SERPL-MCNC: 3.2 MG/DL (ref 2.3–4.7)
PHOSPHATE SERPL-MCNC: 3.3 MG/DL (ref 2.3–4.7)
PHOSPHATE SERPL-MCNC: 3.4 MG/DL (ref 2.3–4.7)
PHOSPHATE SERPL-MCNC: 3.8 MG/DL (ref 2.3–4.7)
PISA TR MAX VEL: 3.8 M/S
PLATELET # BLD AUTO: 101 X10(3)/MCL (ref 130–400)
PLATELET # BLD AUTO: 106 X10(3)/MCL (ref 130–400)
PLATELET # BLD AUTO: 108 X10(3)/MCL (ref 130–400)
PLATELET # BLD AUTO: 108 X10(3)/MCL (ref 130–400)
PLATELET # BLD AUTO: 111 X10(3)/MCL (ref 130–400)
PLATELET # BLD AUTO: 112 X10(3)/MCL (ref 130–400)
PLATELET # BLD AUTO: 112 X10(3)/MCL (ref 130–400)
PLATELET # BLD AUTO: 116 X10(3)/MCL (ref 130–400)
PLATELET # BLD AUTO: 118 X10(3)/MCL (ref 130–400)
PLATELET # BLD AUTO: 129 X10(3)/MCL (ref 130–400)
PLATELET # BLD AUTO: 133 X10(3)/MCL (ref 130–400)
PLATELET # BLD AUTO: 135 X10(3)/MCL (ref 130–400)
PLATELET # BLD AUTO: 143 X10(3)/MCL (ref 130–400)
PLATELET # BLD AUTO: 145 X10(3)/MCL (ref 130–400)
PLATELET # BLD AUTO: 145 X10(3)/MCL (ref 130–400)
PLATELET # BLD AUTO: 151 X10(3)/MCL (ref 130–400)
PLATELET # BLD AUTO: 152 X10(3)/MCL (ref 130–400)
PLATELET # BLD AUTO: 155 X10(3)/MCL (ref 130–400)
PLATELET # BLD AUTO: 159 X10(3)/MCL (ref 130–400)
PLATELET # BLD AUTO: 162 X10(3)/MCL (ref 130–400)
PLATELET # BLD AUTO: 162 X10(3)/MCL (ref 130–400)
PLATELET # BLD AUTO: 165 X10(3)/MCL (ref 130–400)
PLATELET # BLD AUTO: 179 X10(3)/MCL (ref 130–400)
PLATELET # BLD AUTO: 182 X10(3)/MCL (ref 130–400)
PLATELET # BLD AUTO: 185 X10(3)/MCL (ref 130–400)
PLATELET # BLD AUTO: 189 X10(3)/MCL (ref 130–400)
PLATELET # BLD AUTO: 190 X10(3)/MCL (ref 130–400)
PLATELET # BLD AUTO: 191 X10(3)/MCL (ref 130–400)
PLATELET # BLD AUTO: 192 X10(3)/MCL (ref 130–400)
PLATELET # BLD AUTO: 196 X10(3)/MCL (ref 130–400)
PLATELET # BLD AUTO: 196 X10(3)/MCL (ref 130–400)
PLATELET # BLD AUTO: 198 X10(3)/MCL (ref 130–400)
PLATELET # BLD AUTO: 198 X10(3)/MCL (ref 130–400)
PLATELET # BLD AUTO: 199 X10(3)/MCL (ref 130–400)
PLATELET # BLD AUTO: 201 X10(3)/MCL (ref 130–400)
PLATELET # BLD AUTO: 203 X10(3)/MCL (ref 130–400)
PLATELET # BLD AUTO: 203 X10(3)/MCL (ref 130–400)
PLATELET # BLD AUTO: 204 X10(3)/MCL (ref 130–400)
PLATELET # BLD AUTO: 205 X10(3)/MCL (ref 130–400)
PLATELET # BLD AUTO: 206 X10(3)/MCL (ref 130–400)
PLATELET # BLD AUTO: 206 X10(3)/MCL (ref 130–400)
PLATELET # BLD AUTO: 216 X10(3)/MCL (ref 130–400)
PLATELET # BLD AUTO: 221 X10(3)/MCL (ref 130–400)
PLATELET # BLD AUTO: 232 X10(3)/MCL (ref 130–400)
PLATELET # BLD AUTO: 240 X10(3)/MCL (ref 130–400)
PLATELET # BLD AUTO: 243 X10(3)/MCL (ref 130–400)
PLATELET # BLD AUTO: 244 X10(3)/MCL (ref 130–400)
PLATELET # BLD AUTO: 256 X10(3)/MCL (ref 130–400)
PLATELET # BLD AUTO: 274 X10(3)/MCL (ref 130–400)
PLATELET # BLD AUTO: 274 X10(3)/MCL (ref 130–400)
PLATELET # BLD AUTO: 290 X10(3)/MCL (ref 130–400)
PLATELET # BLD AUTO: 299 X10(3)/MCL (ref 130–400)
PLATELET # BLD AUTO: 327 X10(3)/MCL (ref 130–400)
PLATELET # BLD AUTO: 339 X10(3)/MCL (ref 130–400)
PLATELET # BLD AUTO: 359 X10(3)/MCL (ref 130–400)
PLATELET # BLD AUTO: 365 X10(3)/MCL (ref 130–400)
PLATELET # BLD AUTO: 85 X10(3)/MCL (ref 130–400)
PLATELET # BLD AUTO: 90 X10(3)/MCL (ref 130–400)
PLATELET # BLD AUTO: 92 X10(3)/MCL (ref 130–400)
PLATELET # BLD AUTO: 96 X10(3)/MCL (ref 130–400)
PLATELET # BLD AUTO: 97 X10(3)/MCL (ref 130–400)
PLATELET # BLD AUTO: 97 X10(3)/MCL (ref 130–400)
PLATELET # BLD AUTO: 99 X10(3)/MCL (ref 130–400)
PLATELET # BLD EST: NORMAL 10*3/UL
PLATELET # BLD EST: NORMAL 10*3/UL
PLATELETS.RETICULATED NFR BLD AUTO: 2.6 % (ref 0.9–11.2)
PLATELETS.RETICULATED NFR BLD AUTO: 2.9 % (ref 0.9–11.2)
PLATELETS.RETICULATED NFR BLD AUTO: 3.1 % (ref 0.9–11.2)
PLATELETS.RETICULATED NFR BLD AUTO: 3.4 % (ref 0.9–11.2)
PLATELETS.RETICULATED NFR BLD AUTO: 3.5 % (ref 0.9–11.2)
PMV BLD AUTO: 10 FL (ref 7.4–10.4)
PMV BLD AUTO: 10.1 FL (ref 7.4–10.4)
PMV BLD AUTO: 10.2 FL (ref 7.4–10.4)
PMV BLD AUTO: 10.3 FL (ref 7.4–10.4)
PMV BLD AUTO: 10.4 FL (ref 7.4–10.4)
PMV BLD AUTO: 10.4 FL (ref 7.4–10.4)
PMV BLD AUTO: 10.6 FL (ref 7.4–10.4)
PMV BLD AUTO: 10.7 FL (ref 7.4–10.4)
PMV BLD AUTO: 10.8 FL (ref 7.4–10.4)
PMV BLD AUTO: 10.8 FL (ref 7.4–10.4)
PMV BLD AUTO: 11 FL (ref 7.4–10.4)
PMV BLD AUTO: 9.1 FL (ref 7.4–10.4)
PMV BLD AUTO: 9.5 FL (ref 7.4–10.4)
PMV BLD AUTO: 9.7 FL (ref 7.4–10.4)
PMV BLD AUTO: 9.8 FL (ref 7.4–10.4)
PMV BLD AUTO: 9.9 FL (ref 7.4–10.4)
PO2 BLDA: 100 MMHG (ref 80–100)
PO2 BLDA: 102 MMHG (ref 80–100)
PO2 BLDA: 103 MMHG (ref 80–100)
PO2 BLDA: 109 MMHG (ref 80–100)
PO2 BLDA: 110 MMHG (ref 80–100)
PO2 BLDA: 111 MMHG (ref 80–100)
PO2 BLDA: 113 MMHG (ref 80–100)
PO2 BLDA: 116 MMHG (ref 80–100)
PO2 BLDA: 117 MMHG (ref 80–100)
PO2 BLDA: 118 MMHG (ref 80–100)
PO2 BLDA: 124 MMHG (ref 80–100)
PO2 BLDA: 125 MMHG (ref 80–100)
PO2 BLDA: 150 MMHG (ref 80–100)
PO2 BLDA: 242 MMHG (ref 80–100)
PO2 BLDA: 38 MMHG
PO2 BLDA: 39 MMHG
PO2 BLDA: 406 MMHG (ref 80–100)
PO2 BLDA: 57 MMHG
PO2 BLDA: 62 MMHG (ref 80–100)
PO2 BLDA: 68 MMHG (ref 80–100)
PO2 BLDA: 69 MMHG (ref 80–100)
PO2 BLDA: 69 MMHG (ref 80–100)
PO2 BLDA: 71 MMHG (ref 80–100)
PO2 BLDA: 72 MMHG (ref 80–100)
PO2 BLDA: 75 MMHG (ref 80–100)
PO2 BLDA: 76 MMHG (ref 80–100)
PO2 BLDA: 77 MMHG (ref 80–100)
PO2 BLDA: 79 MMHG (ref 80–100)
PO2 BLDA: 80 MMHG (ref 80–100)
PO2 BLDA: 82 MMHG (ref 80–100)
PO2 BLDA: 83 MMHG (ref 80–100)
PO2 BLDA: 85 MMHG (ref 80–100)
PO2 BLDA: 87 MMHG (ref 80–100)
PO2 BLDA: 90 MMHG (ref 80–100)
PO2 BLDA: 92 MMHG (ref 80–100)
PO2 BLDA: 93 MMHG (ref 80–100)
PO2 BLDA: 94 MMHG (ref 80–100)
PO2 BLDA: 95 MMHG (ref 80–100)
PO2 BLDA: 98 MMHG (ref 80–100)
PO2 BLDA: 98 MMHG (ref 80–100)
PO2 BLDA: <38 MMHG
POC ACTIVATED CLOTTING TIME K: 135 SEC (ref 74–137)
POC ACTIVATED CLOTTING TIME K: 152 SEC (ref 74–137)
POC ACTIVATED CLOTTING TIME K: 152 SEC (ref 74–137)
POC ACTIVATED CLOTTING TIME K: 153 SEC (ref 74–137)
POC ACTIVATED CLOTTING TIME K: 158 SEC (ref 74–137)
POC ACTIVATED CLOTTING TIME K: 164 SEC (ref 74–137)
POC ACTIVATED CLOTTING TIME K: 170 SEC (ref 74–137)
POC ACTIVATED CLOTTING TIME K: 175 SEC (ref 74–137)
POC ACTIVATED CLOTTING TIME K: 176 SEC (ref 74–137)
POC ACTIVATED CLOTTING TIME K: 181 SEC (ref 74–137)
POC ACTIVATED CLOTTING TIME K: 182 SEC (ref 74–137)
POC CARDIAC TROPONIN I: 7.18 NG/ML (ref 0–0.08)
POC IONIZED CALCIUM: 1.1 MMOL/L (ref 1.06–1.42)
POC TCO2 (MEASURED): 19 MMOL/L (ref 23–29)
POCT GLUCOSE: 104 MG/DL (ref 70–110)
POCT GLUCOSE: 104 MG/DL (ref 70–110)
POCT GLUCOSE: 107 MG/DL (ref 70–110)
POCT GLUCOSE: 108 MG/DL (ref 70–110)
POCT GLUCOSE: 109 MG/DL (ref 70–110)
POCT GLUCOSE: 110 MG/DL (ref 70–110)
POCT GLUCOSE: 114 MG/DL (ref 70–110)
POCT GLUCOSE: 116 MG/DL (ref 70–110)
POCT GLUCOSE: 119 MG/DL (ref 70–110)
POCT GLUCOSE: 122 MG/DL (ref 70–110)
POCT GLUCOSE: 123 MG/DL (ref 70–110)
POCT GLUCOSE: 124 MG/DL (ref 70–110)
POCT GLUCOSE: 125 MG/DL (ref 70–110)
POCT GLUCOSE: 127 MG/DL (ref 70–110)
POCT GLUCOSE: 129 MG/DL (ref 70–110)
POCT GLUCOSE: 130 MG/DL (ref 70–110)
POCT GLUCOSE: 135 MG/DL (ref 70–110)
POCT GLUCOSE: 136 MG/DL (ref 70–110)
POCT GLUCOSE: 138 MG/DL (ref 70–110)
POCT GLUCOSE: 169 MG/DL (ref 70–110)
POCT GLUCOSE: 170 MG/DL (ref 70–110)
POCT GLUCOSE: 89 MG/DL (ref 70–110)
POCT GLUCOSE: 90 MG/DL (ref 70–110)
POCT GLUCOSE: 91 MG/DL (ref 70–110)
POCT GLUCOSE: 92 MG/DL (ref 70–110)
POCT GLUCOSE: 94 MG/DL (ref 70–110)
POCT GLUCOSE: 96 MG/DL (ref 70–110)
POCT GLUCOSE: 97 MG/DL (ref 70–110)
POCT GLUCOSE: 99 MG/DL (ref 70–110)
POIKILOCYTOSIS BLD QL SMEAR: ABNORMAL
POIKILOCYTOSIS BLD QL SMEAR: ABNORMAL
POTASSIUM BLD-SCNC: 4.2 MMOL/L (ref 3.5–5.1)
POTASSIUM BLOOD GAS (OHS): 3.5 MMOL/L (ref 3.5–5)
POTASSIUM BLOOD GAS (OHS): 3.6 MMOL/L
POTASSIUM BLOOD GAS (OHS): 3.6 MMOL/L (ref 3.5–5)
POTASSIUM BLOOD GAS (OHS): 3.6 MMOL/L (ref 3.5–5)
POTASSIUM BLOOD GAS (OHS): 3.7 MMOL/L
POTASSIUM BLOOD GAS (OHS): 3.7 MMOL/L (ref 3.5–5)
POTASSIUM BLOOD GAS (OHS): 3.8 MMOL/L
POTASSIUM BLOOD GAS (OHS): 3.8 MMOL/L (ref 3.5–5)
POTASSIUM BLOOD GAS (OHS): 3.9 MMOL/L
POTASSIUM BLOOD GAS (OHS): 3.9 MMOL/L
POTASSIUM BLOOD GAS (OHS): 3.9 MMOL/L (ref 3.5–5)
POTASSIUM BLOOD GAS (OHS): 4 MMOL/L (ref 3.5–5)
POTASSIUM BLOOD GAS (OHS): 4.1 MMOL/L
POTASSIUM BLOOD GAS (OHS): 4.1 MMOL/L (ref 3.5–5)
POTASSIUM BLOOD GAS (OHS): 4.2 MMOL/L (ref 3.5–5)
POTASSIUM BLOOD GAS (OHS): 4.3 MMOL/L
POTASSIUM BLOOD GAS (OHS): 4.3 MMOL/L (ref 3.5–5)
POTASSIUM SERPL-SCNC: 3.4 MMOL/L (ref 3.5–5.1)
POTASSIUM SERPL-SCNC: 3.5 MMOL/L (ref 3.5–5.1)
POTASSIUM SERPL-SCNC: 3.6 MMOL/L (ref 3.5–5.1)
POTASSIUM SERPL-SCNC: 3.7 MMOL/L (ref 3.5–5.1)
POTASSIUM SERPL-SCNC: 3.8 MMOL/L (ref 3.5–5.1)
POTASSIUM SERPL-SCNC: 3.9 MMOL/L (ref 3.5–5.1)
POTASSIUM SERPL-SCNC: 4 MMOL/L (ref 3.5–5.1)
POTASSIUM SERPL-SCNC: 4.1 MMOL/L (ref 3.5–5.1)
POTASSIUM SERPL-SCNC: 4.2 MMOL/L (ref 3.5–5.1)
POTASSIUM SERPL-SCNC: 4.2 MMOL/L (ref 3.5–5.1)
POTASSIUM SERPL-SCNC: 4.3 MMOL/L (ref 3.5–5.1)
POTASSIUM SERPL-SCNC: 4.4 MMOL/L (ref 3.5–5.1)
POTASSIUM SERPL-SCNC: 4.4 MMOL/L (ref 3.5–5.1)
POTASSIUM SERPL-SCNC: 4.5 MMOL/L (ref 3.5–5.1)
POTASSIUM SERPL-SCNC: 4.7 MMOL/L (ref 3.5–5.1)
POTASSIUM SERPL-SCNC: 4.8 MMOL/L (ref 3.5–5.1)
PROT SERPL-MCNC: 4.4 GM/DL (ref 5.8–7.6)
PROT SERPL-MCNC: 4.5 GM/DL (ref 5.8–7.6)
PROT SERPL-MCNC: 4.6 GM/DL (ref 5.8–7.6)
PROT SERPL-MCNC: 4.7 GM/DL (ref 5.8–7.6)
PROT SERPL-MCNC: 4.8 GM/DL (ref 5.8–7.6)
PROT SERPL-MCNC: 4.8 GM/DL (ref 5.8–7.6)
PROT SERPL-MCNC: 4.9 GM/DL (ref 5.8–7.6)
PROT SERPL-MCNC: 5 GM/DL (ref 5.8–7.6)
PROT SERPL-MCNC: 5.1 GM/DL (ref 5.8–7.6)
PROT SERPL-MCNC: 5.2 GM/DL (ref 5.8–7.6)
PROT SERPL-MCNC: 5.2 GM/DL (ref 5.8–7.6)
PROT SERPL-MCNC: 5.4 GM/DL (ref 5.8–7.6)
PROT SERPL-MCNC: 5.4 GM/DL (ref 5.8–7.6)
PROT SERPL-MCNC: 5.5 GM/DL (ref 5.8–7.6)
PROT SERPL-MCNC: 5.6 GM/DL (ref 5.8–7.6)
PROT SERPL-MCNC: 5.7 GM/DL (ref 5.8–7.6)
PROT SERPL-MCNC: 5.8 GM/DL (ref 5.8–7.6)
PROT SERPL-MCNC: 5.9 GM/DL (ref 5.8–7.6)
PROT SERPL-MCNC: 6 GM/DL (ref 5.8–7.6)
PROT SERPL-MCNC: 6 GM/DL (ref 5.8–7.6)
PROT SERPL-MCNC: 6.1 GM/DL (ref 5.8–7.6)
PROT SERPL-MCNC: 6.1 GM/DL (ref 5.8–7.6)
PROT SERPL-MCNC: 6.2 GM/DL (ref 5.8–7.6)
PROT SERPL-MCNC: 7.3 GM/DL (ref 5.8–7.6)
PROT UR QL STRIP: NEGATIVE
PROTHROMBIN TIME: 14.6 SECONDS (ref 12.5–14.5)
PROTHROMBIN TIME: 14.9 SECONDS (ref 12.5–14.5)
PROTHROMBIN TIME: 16.9 SECONDS (ref 12.5–14.5)
PS (OHS): 10 CMH2O
RBC # BLD AUTO: 2.33 X10(6)/MCL (ref 4.7–6.1)
RBC # BLD AUTO: 2.39 X10(6)/MCL (ref 4.7–6.1)
RBC # BLD AUTO: 2.47 X10(6)/MCL (ref 4.7–6.1)
RBC # BLD AUTO: 2.54 X10(6)/MCL (ref 4.7–6.1)
RBC # BLD AUTO: 2.55 X10(6)/MCL (ref 4.7–6.1)
RBC # BLD AUTO: 2.57 X10(6)/MCL (ref 4.7–6.1)
RBC # BLD AUTO: 2.57 X10(6)/MCL (ref 4.7–6.1)
RBC # BLD AUTO: 2.59 X10(6)/MCL (ref 4.7–6.1)
RBC # BLD AUTO: 2.6 X10(6)/MCL (ref 4.7–6.1)
RBC # BLD AUTO: 2.6 X10(6)/MCL (ref 4.7–6.1)
RBC # BLD AUTO: 2.62 X10(6)/MCL (ref 4.7–6.1)
RBC # BLD AUTO: 2.65 X10(6)/MCL (ref 4.7–6.1)
RBC # BLD AUTO: 2.73 X10(6)/MCL (ref 4.7–6.1)
RBC # BLD AUTO: 2.74 X10(6)/MCL (ref 4.7–6.1)
RBC # BLD AUTO: 2.75 X10(6)/MCL (ref 4.7–6.1)
RBC # BLD AUTO: 2.76 X10(6)/MCL (ref 4.7–6.1)
RBC # BLD AUTO: 2.77 X10(6)/MCL (ref 4.7–6.1)
RBC # BLD AUTO: 2.79 X10(6)/MCL (ref 4.7–6.1)
RBC # BLD AUTO: 2.79 X10(6)/MCL (ref 4.7–6.1)
RBC # BLD AUTO: 2.8 X10(6)/MCL (ref 4.7–6.1)
RBC # BLD AUTO: 2.8 X10(6)/MCL (ref 4.7–6.1)
RBC # BLD AUTO: 2.81 X10(6)/MCL (ref 4.7–6.1)
RBC # BLD AUTO: 2.82 X10(6)/MCL (ref 4.7–6.1)
RBC # BLD AUTO: 2.85 X10(6)/MCL (ref 4.7–6.1)
RBC # BLD AUTO: 2.87 X10(6)/MCL (ref 4.7–6.1)
RBC # BLD AUTO: 2.88 X10(6)/MCL (ref 4.7–6.1)
RBC # BLD AUTO: 2.89 X10(6)/MCL (ref 4.7–6.1)
RBC # BLD AUTO: 2.93 X10(6)/MCL (ref 4.7–6.1)
RBC # BLD AUTO: 2.94 X10(6)/MCL (ref 4.7–6.1)
RBC # BLD AUTO: 2.94 X10(6)/MCL (ref 4.7–6.1)
RBC # BLD AUTO: 2.98 X10(6)/MCL (ref 4.7–6.1)
RBC # BLD AUTO: 3 X10(6)/MCL (ref 4.7–6.1)
RBC # BLD AUTO: 3 X10(6)/MCL (ref 4.7–6.1)
RBC # BLD AUTO: 3.02 X10(6)/MCL (ref 4.7–6.1)
RBC # BLD AUTO: 3.04 X10(6)/MCL (ref 4.7–6.1)
RBC # BLD AUTO: 3.06 X10(6)/MCL (ref 4.7–6.1)
RBC # BLD AUTO: 3.06 X10(6)/MCL (ref 4.7–6.1)
RBC # BLD AUTO: 3.07 X10(6)/MCL (ref 4.7–6.1)
RBC # BLD AUTO: 3.08 X10(6)/MCL (ref 4.7–6.1)
RBC # BLD AUTO: 3.09 X10(6)/MCL (ref 4.7–6.1)
RBC # BLD AUTO: 3.11 X10(6)/MCL (ref 4.7–6.1)
RBC # BLD AUTO: 3.12 X10(6)/MCL (ref 4.7–6.1)
RBC # BLD AUTO: 3.14 X10(6)/MCL (ref 4.7–6.1)
RBC # BLD AUTO: 3.15 X10(6)/MCL (ref 4.7–6.1)
RBC # BLD AUTO: 3.2 X10(6)/MCL (ref 4.7–6.1)
RBC # BLD AUTO: 3.21 X10(6)/MCL (ref 4.7–6.1)
RBC # BLD AUTO: 3.23 X10(6)/MCL (ref 4.7–6.1)
RBC # BLD AUTO: 3.24 X10(6)/MCL (ref 4.7–6.1)
RBC # BLD AUTO: 3.27 X10(6)/MCL (ref 4.7–6.1)
RBC # BLD AUTO: 3.3 X10(6)/MCL (ref 4.7–6.1)
RBC # BLD AUTO: 3.31 X10(6)/MCL (ref 4.7–6.1)
RBC # BLD AUTO: 3.33 X10(6)/MCL (ref 4.7–6.1)
RBC # BLD AUTO: 3.35 X10(6)/MCL (ref 4.7–6.1)
RBC # BLD AUTO: 3.38 X10(6)/MCL (ref 4.7–6.1)
RBC # BLD AUTO: 3.41 X10(6)/MCL (ref 4.7–6.1)
RBC # BLD AUTO: 3.5 X10(6)/MCL (ref 4.7–6.1)
RBC # BLD AUTO: 3.53 X10(6)/MCL (ref 4.7–6.1)
RBC # BLD AUTO: 3.56 X10(6)/MCL (ref 4.7–6.1)
RBC # BLD AUTO: 3.57 X10(6)/MCL (ref 4.7–6.1)
RBC #/AREA URNS AUTO: >100 /HPF
RBC MORPH BLD: ABNORMAL
RBC MORPH BLD: ABNORMAL
RIGHT CFA PSV: 56 CM/S
RIGHT DORSALIS PEDIS PSV: 28 CM/S
RIGHT DORSALIS PEDIS PSV: 3 CM/S
RIGHT POST TIBIAL SYS PSV: 52 CM/S
RIGHT POST TIBIAL SYS PSV: 9 CM/S
RIGHT SUPER FEMORAL DIST SYS PSV: 20 CM/S
RIGHT SUPER FEMORAL MID SYS PSV: 20 CM/S
RIGHT SUPER FEMORAL MID SYS PSV: 47 CM/S
RIGHT SUPER FEMORAL PROX SYS PSV: 13 CM/S
RIGHT TIB/PER TRUNK SYS PSV: 10 CM/S
SAMPLE SITE BLOOD GAS (OHS): ABNORMAL
SAMPLE SITE BLOOD GAS (OHS): NORMAL
SAMPLE: ABNORMAL
SAMPLE: NORMAL
SAO2 % BLDA: 100 %
SAO2 % BLDA: 39 %
SAO2 % BLDA: 40 %
SAO2 % BLDA: 43 %
SAO2 % BLDA: 46.9 %
SAO2 % BLDA: 47 %
SAO2 % BLDA: 49.2 %
SAO2 % BLDA: 51.1 %
SAO2 % BLDA: 51.9 %
SAO2 % BLDA: 52.7 %
SAO2 % BLDA: 53.2 %
SAO2 % BLDA: 54 %
SAO2 % BLDA: 54.3 %
SAO2 % BLDA: 55 %
SAO2 % BLDA: 55.9 %
SAO2 % BLDA: 57.4 %
SAO2 % BLDA: 59.5 %
SAO2 % BLDA: 60 %
SAO2 % BLDA: 60 %
SAO2 % BLDA: 61.1 %
SAO2 % BLDA: 62 %
SAO2 % BLDA: 62.1 %
SAO2 % BLDA: 63 %
SAO2 % BLDA: 63.6 %
SAO2 % BLDA: 64.3 %
SAO2 % BLDA: 66 %
SAO2 % BLDA: 67 %
SAO2 % BLDA: 67 %
SAO2 % BLDA: 68 %
SAO2 % BLDA: 71.3 %
SAO2 % BLDA: 74.8 %
SAO2 % BLDA: 75 %
SAO2 % BLDA: 90.6 %
SAO2 % BLDA: 93 %
SAO2 % BLDA: 94.2 %
SAO2 % BLDA: 95 %
SAO2 % BLDA: 95.7 %
SAO2 % BLDA: 95.8 %
SAO2 % BLDA: 96 %
SAO2 % BLDA: 96.3 %
SAO2 % BLDA: 96.8 %
SAO2 % BLDA: 96.8 %
SAO2 % BLDA: 97 %
SAO2 % BLDA: 97 %
SAO2 % BLDA: 97.5 %
SAO2 % BLDA: 97.7 %
SAO2 % BLDA: 97.7 %
SAO2 % BLDA: 97.9 %
SAO2 % BLDA: 98 %
SAO2 % BLDA: 98.1 %
SAO2 % BLDA: 98.2 %
SAO2 % BLDA: 98.8 %
SAO2 % BLDA: 98.9 %
SAO2 % BLDA: 99 %
SAO2 % BLDA: 99.3 %
SAO2 % BLDA: 99.9 %
SET MINUTE VOL (OHS): 14 L
SODIUM BLD-SCNC: 137 MMOL/L (ref 136–145)
SODIUM BLOOD GAS (OHS): 131 MMOL/L (ref 137–145)
SODIUM BLOOD GAS (OHS): 131 MMOL/L (ref 137–145)
SODIUM BLOOD GAS (OHS): 132 MMOL/L
SODIUM BLOOD GAS (OHS): 132 MMOL/L (ref 137–145)
SODIUM BLOOD GAS (OHS): 133 MMOL/L (ref 137–145)
SODIUM BLOOD GAS (OHS): 134 MMOL/L
SODIUM BLOOD GAS (OHS): 134 MMOL/L (ref 137–145)
SODIUM BLOOD GAS (OHS): 135 MMOL/L
SODIUM BLOOD GAS (OHS): 135 MMOL/L (ref 137–145)
SODIUM BLOOD GAS (OHS): 136 MMOL/L
SODIUM BLOOD GAS (OHS): 136 MMOL/L (ref 137–145)
SODIUM BLOOD GAS (OHS): 137 MMOL/L
SODIUM BLOOD GAS (OHS): 137 MMOL/L (ref 137–145)
SODIUM BLOOD GAS (OHS): 138 MMOL/L
SODIUM BLOOD GAS (OHS): 138 MMOL/L (ref 137–145)
SODIUM BLOOD GAS (OHS): 138 MMOL/L (ref 137–145)
SODIUM BLOOD GAS (OHS): 139 MMOL/L
SODIUM BLOOD GAS (OHS): 139 MMOL/L
SODIUM BLOOD GAS (OHS): 139 MMOL/L (ref 137–145)
SODIUM BLOOD GAS (OHS): 140 MMOL/L
SODIUM BLOOD GAS (OHS): 140 MMOL/L
SODIUM BLOOD GAS (OHS): 140 MMOL/L (ref 137–145)
SODIUM BLOOD GAS (OHS): 140 MMOL/L (ref 137–145)
SODIUM BLOOD GAS (OHS): 141 MMOL/L
SODIUM BLOOD GAS (OHS): 141 MMOL/L (ref 137–145)
SODIUM BLOOD GAS (OHS): 142 MMOL/L
SODIUM SERPL-SCNC: 123 MMOL/L (ref 136–145)
SODIUM SERPL-SCNC: 133 MMOL/L (ref 136–145)
SODIUM SERPL-SCNC: 134 MMOL/L (ref 136–145)
SODIUM SERPL-SCNC: 135 MMOL/L (ref 136–145)
SODIUM SERPL-SCNC: 135 MMOL/L (ref 136–145)
SODIUM SERPL-SCNC: 136 MMOL/L (ref 136–145)
SODIUM SERPL-SCNC: 137 MMOL/L (ref 136–145)
SODIUM SERPL-SCNC: 138 MMOL/L (ref 136–145)
SODIUM SERPL-SCNC: 139 MMOL/L (ref 136–145)
SODIUM SERPL-SCNC: 140 MMOL/L (ref 136–145)
SODIUM SERPL-SCNC: 141 MMOL/L (ref 136–145)
SODIUM SERPL-SCNC: 142 MMOL/L (ref 136–145)
SODIUM SERPL-SCNC: 143 MMOL/L (ref 136–145)
SODIUM SERPL-SCNC: 144 MMOL/L (ref 136–145)
SP GR UR STRIP.AUTO: 1 (ref 1–1.03)
SPECIMEN OUTDATE: NORMAL
SPONT VT ON VENT: 500 ML
SPONT+MECH VT ON VENT: 500 ML
SQUAMOUS #/AREA URNS LPF: ABNORMAL /HPF
STOMATOCYTES (OLG): ABNORMAL
TIBC SERPL-MCNC: 110 UG/DL (ref 60–240)
TIBC SERPL-MCNC: 148 UG/DL (ref 60–240)
TIBC SERPL-MCNC: 165 UG/DL (ref 250–450)
TIBC SERPL-MCNC: 169 UG/DL (ref 250–450)
TR MAX PG: 58 MMHG
TRANSFERRIN SERPL-MCNC: 136 MG/DL (ref 163–344)
TRANSFERRIN SERPL-MCNC: 152 MG/DL (ref 163–344)
TRICUSPID ANNULAR PLANE SYSTOLIC EXCURSION: 1.43 CM
TRICUSPID ANNULAR PLANE SYSTOLIC EXCURSION: 1.46 CM
TRICUSPID ANNULAR PLANE SYSTOLIC EXCURSION: 1.8 CM
TRICUSPID ANNULAR PLANE SYSTOLIC EXCURSION: 1.83 CM
TRIGL SERPL-MCNC: 108 MG/DL (ref 34–140)
TRIGL SERPL-MCNC: 123 MG/DL (ref 34–140)
TRIGL SERPL-MCNC: 126 MG/DL (ref 34–140)
TROPONIN I SERPL-MCNC: 0.06 NG/ML (ref 0–0.04)
TROPONIN I SERPL-MCNC: 0.14 NG/ML (ref 0–0.04)
TROPONIN I SERPL-MCNC: 0.17 NG/ML (ref 0–0.04)
TROPONIN I SERPL-MCNC: 9.44 NG/ML (ref 0–0.04)
TSH SERPL-ACNC: 1.01 UIU/ML (ref 0.35–4.94)
UNIT NUMBER: NORMAL
UROBILINOGEN UR STRIP-ACNC: NORMAL
VIT B12 SERPL-MCNC: 1066 PG/ML (ref 213–816)
VIT B12 SERPL-MCNC: 1256 PG/ML (ref 213–816)
VLDLC SERPL CALC-MCNC: 22 MG/DL
WBC # BLD AUTO: 10.25 X10(3)/MCL (ref 4.5–11.5)
WBC # BLD AUTO: 10.46 X10(3)/MCL (ref 4.5–11.5)
WBC # BLD AUTO: 10.47 X10(3)/MCL (ref 4.5–11.5)
WBC # BLD AUTO: 10.57 X10(3)/MCL (ref 4.5–11.5)
WBC # BLD AUTO: 10.73 X10(3)/MCL (ref 4.5–11.5)
WBC # BLD AUTO: 11.29 X10(3)/MCL (ref 4.5–11.5)
WBC # BLD AUTO: 12.12 X10(3)/MCL (ref 4.5–11.5)
WBC # BLD AUTO: 12.22 X10(3)/MCL (ref 4.5–11.5)
WBC # BLD AUTO: 13.43 X10(3)/MCL (ref 4.5–11.5)
WBC # BLD AUTO: 13.88 X10(3)/MCL (ref 4.5–11.5)
WBC # BLD AUTO: 14.03 X10(3)/MCL (ref 4.5–11.5)
WBC # BLD AUTO: 15.17 X10(3)/MCL (ref 4.5–11.5)
WBC # BLD AUTO: 17.25 X10(3)/MCL (ref 4.5–11.5)
WBC # BLD AUTO: 17.61 X10(3)/MCL (ref 4.5–11.5)
WBC # BLD AUTO: 19.02 X10(3)/MCL (ref 4.5–11.5)
WBC # BLD AUTO: 19.05 X10(3)/MCL (ref 4.5–11.5)
WBC # BLD AUTO: 4.54 X10(3)/MCL (ref 4.5–11.5)
WBC # BLD AUTO: 4.58 X10(3)/MCL (ref 4.5–11.5)
WBC # BLD AUTO: 4.87 X10(3)/MCL (ref 4.5–11.5)
WBC # BLD AUTO: 4.89 X10(3)/MCL (ref 4.5–11.5)
WBC # BLD AUTO: 4.89 X10(3)/MCL (ref 4.5–11.5)
WBC # BLD AUTO: 5.07 X10(3)/MCL (ref 4.5–11.5)
WBC # BLD AUTO: 5.15 X10(3)/MCL (ref 4.5–11.5)
WBC # BLD AUTO: 5.22 X10(3)/MCL (ref 4.5–11.5)
WBC # BLD AUTO: 5.31 X10(3)/MCL (ref 4.5–11.5)
WBC # BLD AUTO: 5.31 X10(3)/MCL (ref 4.5–11.5)
WBC # BLD AUTO: 5.39 X10(3)/MCL (ref 4.5–11.5)
WBC # BLD AUTO: 5.43 X10(3)/MCL (ref 4.5–11.5)
WBC # BLD AUTO: 5.45 X10(3)/MCL (ref 4.5–11.5)
WBC # BLD AUTO: 5.51 X10(3)/MCL (ref 4.5–11.5)
WBC # BLD AUTO: 5.52 X10(3)/MCL (ref 4.5–11.5)
WBC # BLD AUTO: 5.64 X10(3)/MCL (ref 4.5–11.5)
WBC # BLD AUTO: 5.7 X10(3)/MCL (ref 4.5–11.5)
WBC # BLD AUTO: 5.77 X10(3)/MCL (ref 4.5–11.5)
WBC # BLD AUTO: 5.92 X10(3)/MCL (ref 4.5–11.5)
WBC # BLD AUTO: 6.04 X10(3)/MCL (ref 4.5–11.5)
WBC # BLD AUTO: 6.06 X10(3)/MCL (ref 4.5–11.5)
WBC # BLD AUTO: 6.08 X10(3)/MCL (ref 4.5–11.5)
WBC # BLD AUTO: 6.09 X10(3)/MCL (ref 4.5–11.5)
WBC # BLD AUTO: 6.13 X10(3)/MCL (ref 4.5–11.5)
WBC # BLD AUTO: 6.16 X10(3)/MCL (ref 4.5–11.5)
WBC # BLD AUTO: 6.17 X10(3)/MCL (ref 4.5–11.5)
WBC # BLD AUTO: 6.24 X10(3)/MCL (ref 4.5–11.5)
WBC # BLD AUTO: 6.34 X10(3)/MCL (ref 4.5–11.5)
WBC # BLD AUTO: 6.46 X10(3)/MCL (ref 4.5–11.5)
WBC # BLD AUTO: 6.63 X10(3)/MCL (ref 4.5–11.5)
WBC # BLD AUTO: 6.71 X10(3)/MCL (ref 4.5–11.5)
WBC # BLD AUTO: 6.88 X10(3)/MCL (ref 4.5–11.5)
WBC # BLD AUTO: 6.92 X10(3)/MCL (ref 4.5–11.5)
WBC # BLD AUTO: 6.93 X10(3)/MCL (ref 4.5–11.5)
WBC # BLD AUTO: 7.18 X10(3)/MCL (ref 4.5–11.5)
WBC # BLD AUTO: 7.37 X10(3)/MCL (ref 4.5–11.5)
WBC # BLD AUTO: 7.37 X10(3)/MCL (ref 4.5–11.5)
WBC # BLD AUTO: 7.39 X10(3)/MCL (ref 4.5–11.5)
WBC # BLD AUTO: 7.71 X10(3)/MCL (ref 4.5–11.5)
WBC # BLD AUTO: 7.73 X10(3)/MCL (ref 4.5–11.5)
WBC # BLD AUTO: 8.28 X10(3)/MCL (ref 4.5–11.5)
WBC # BLD AUTO: 8.9 X10(3)/MCL (ref 4.5–11.5)
WBC # BLD AUTO: 9.07 X10(3)/MCL (ref 4.5–11.5)
WBC # BLD AUTO: 9.5 X10(3)/MCL (ref 4.5–11.5)
WBC # BLD AUTO: 9.55 X10(3)/MCL (ref 4.5–11.5)
WBC # BLD AUTO: 9.67 X10(3)/MCL (ref 4.5–11.5)
WBC # BLD AUTO: 9.84 X10(3)/MCL (ref 4.5–11.5)
WBC #/AREA URNS AUTO: ABNORMAL /HPF

## 2025-01-01 PROCEDURE — 94761 N-INVAS EAR/PLS OXIMETRY MLT: CPT | Mod: XB

## 2025-01-01 PROCEDURE — C1874 STENT, COATED/COV W/DEL SYS: HCPCS | Performed by: STUDENT IN AN ORGANIZED HEALTH CARE EDUCATION/TRAINING PROGRAM

## 2025-01-01 PROCEDURE — 80053 COMPREHEN METABOLIC PANEL: CPT | Performed by: INTERNAL MEDICINE

## 2025-01-01 PROCEDURE — 99285 EMERGENCY DEPT VISIT HI MDM: CPT | Mod: 25

## 2025-01-01 PROCEDURE — 84100 ASSAY OF PHOSPHORUS: CPT | Performed by: INTERNAL MEDICINE

## 2025-01-01 PROCEDURE — 83880 ASSAY OF NATRIURETIC PEPTIDE: CPT | Performed by: EMERGENCY MEDICINE

## 2025-01-01 PROCEDURE — 99024 POSTOP FOLLOW-UP VISIT: CPT | Mod: ,,, | Performed by: PHYSICIAN ASSISTANT

## 2025-01-01 PROCEDURE — 97530 THERAPEUTIC ACTIVITIES: CPT | Mod: CQ

## 2025-01-01 PROCEDURE — 63600175 PHARM REV CODE 636 W HCPCS: Performed by: NURSE PRACTITIONER

## 2025-01-01 PROCEDURE — 36415 COLL VENOUS BLD VENIPUNCTURE: CPT | Performed by: INTERNAL MEDICINE

## 2025-01-01 PROCEDURE — 25000003 PHARM REV CODE 250: Performed by: INTERNAL MEDICINE

## 2025-01-01 PROCEDURE — 36415 COLL VENOUS BLD VENIPUNCTURE: CPT

## 2025-01-01 PROCEDURE — 20000000 HC ICU ROOM

## 2025-01-01 PROCEDURE — 80053 COMPREHEN METABOLIC PANEL: CPT

## 2025-01-01 PROCEDURE — 85025 COMPLETE CBC W/AUTO DIFF WBC: CPT | Performed by: STUDENT IN AN ORGANIZED HEALTH CARE EDUCATION/TRAINING PROGRAM

## 2025-01-01 PROCEDURE — 027036Z DILATION OF CORONARY ARTERY, ONE ARTERY WITH THREE DRUG-ELUTING INTRALUMINAL DEVICES, PERCUTANEOUS APPROACH: ICD-10-PCS | Performed by: STUDENT IN AN ORGANIZED HEALTH CARE EDUCATION/TRAINING PROGRAM

## 2025-01-01 PROCEDURE — 82803 BLOOD GASES ANY COMBINATION: CPT

## 2025-01-01 PROCEDURE — 36415 COLL VENOUS BLD VENIPUNCTURE: CPT | Performed by: NURSE PRACTITIONER

## 2025-01-01 PROCEDURE — 63600175 PHARM REV CODE 636 W HCPCS

## 2025-01-01 PROCEDURE — C1753 CATH, INTRAVAS ULTRASOUND: HCPCS | Performed by: STUDENT IN AN ORGANIZED HEALTH CARE EDUCATION/TRAINING PROGRAM

## 2025-01-01 PROCEDURE — 80048 BASIC METABOLIC PNL TOTAL CA: CPT | Performed by: INTERNAL MEDICINE

## 2025-01-01 PROCEDURE — 85025 COMPLETE CBC W/AUTO DIFF WBC: CPT | Performed by: INTERNAL MEDICINE

## 2025-01-01 PROCEDURE — 99900026 HC AIRWAY MAINTENANCE (STAT)

## 2025-01-01 PROCEDURE — 85610 PROTHROMBIN TIME: CPT | Performed by: STUDENT IN AN ORGANIZED HEALTH CARE EDUCATION/TRAINING PROGRAM

## 2025-01-01 PROCEDURE — 94761 N-INVAS EAR/PLS OXIMETRY MLT: CPT

## 2025-01-01 PROCEDURE — 86920 COMPATIBILITY TEST SPIN: CPT | Performed by: EMERGENCY MEDICINE

## 2025-01-01 PROCEDURE — 63600175 PHARM REV CODE 636 W HCPCS: Performed by: STUDENT IN AN ORGANIZED HEALTH CARE EDUCATION/TRAINING PROGRAM

## 2025-01-01 PROCEDURE — 31500 INSERT EMERGENCY AIRWAY: CPT

## 2025-01-01 PROCEDURE — 99900031 HC PATIENT EDUCATION (STAT)

## 2025-01-01 PROCEDURE — 99152 MOD SED SAME PHYS/QHP 5/>YRS: CPT | Performed by: INTERNAL MEDICINE

## 2025-01-01 PROCEDURE — 25000003 PHARM REV CODE 250: Performed by: STUDENT IN AN ORGANIZED HEALTH CARE EDUCATION/TRAINING PROGRAM

## 2025-01-01 PROCEDURE — 94003 VENT MGMT INPAT SUBQ DAY: CPT

## 2025-01-01 PROCEDURE — 92978 ENDOLUMINL IVUS OCT C 1ST: CPT | Mod: RC | Performed by: STUDENT IN AN ORGANIZED HEALTH CARE EDUCATION/TRAINING PROGRAM

## 2025-01-01 PROCEDURE — 25000003 PHARM REV CODE 250

## 2025-01-01 PROCEDURE — P9016 RBC LEUKOCYTES REDUCED: HCPCS | Performed by: INTERNAL MEDICINE

## 2025-01-01 PROCEDURE — 84100 ASSAY OF PHOSPHORUS: CPT

## 2025-01-01 PROCEDURE — 21400001 HC TELEMETRY ROOM

## 2025-01-01 PROCEDURE — 85025 COMPLETE CBC W/AUTO DIFF WBC: CPT

## 2025-01-01 PROCEDURE — 02HV33Z INSERTION OF INFUSION DEVICE INTO SUPERIOR VENA CAVA, PERCUTANEOUS APPROACH: ICD-10-PCS | Performed by: EMERGENCY MEDICINE

## 2025-01-01 PROCEDURE — 63600175 PHARM REV CODE 636 W HCPCS: Performed by: INTERNAL MEDICINE

## 2025-01-01 PROCEDURE — C9600 PERC DRUG-EL COR STENT SING: HCPCS | Mod: RC | Performed by: STUDENT IN AN ORGANIZED HEALTH CARE EDUCATION/TRAINING PROGRAM

## 2025-01-01 PROCEDURE — 25000003 PHARM REV CODE 250: Performed by: NURSE PRACTITIONER

## 2025-01-01 PROCEDURE — 80053 COMPREHEN METABOLIC PANEL: CPT | Performed by: NURSE PRACTITIONER

## 2025-01-01 PROCEDURE — 27000221 HC OXYGEN, UP TO 24 HOURS

## 2025-01-01 PROCEDURE — 83735 ASSAY OF MAGNESIUM: CPT

## 2025-01-01 PROCEDURE — 94760 N-INVAS EAR/PLS OXIMETRY 1: CPT | Mod: XB

## 2025-01-01 PROCEDURE — 97530 THERAPEUTIC ACTIVITIES: CPT

## 2025-01-01 PROCEDURE — 11000001 HC ACUTE MED/SURG PRIVATE ROOM

## 2025-01-01 PROCEDURE — 99900035 HC TECH TIME PER 15 MIN (STAT)

## 2025-01-01 PROCEDURE — 82607 VITAMIN B-12: CPT | Performed by: INTERNAL MEDICINE

## 2025-01-01 PROCEDURE — 93005 ELECTROCARDIOGRAM TRACING: CPT

## 2025-01-01 PROCEDURE — 83735 ASSAY OF MAGNESIUM: CPT | Performed by: INTERNAL MEDICINE

## 2025-01-01 PROCEDURE — 27200966 HC CLOSED SUCTION SYSTEM

## 2025-01-01 PROCEDURE — 85027 COMPLETE CBC AUTOMATED: CPT | Performed by: INTERNAL MEDICINE

## 2025-01-01 PROCEDURE — 92526 ORAL FUNCTION THERAPY: CPT

## 2025-01-01 PROCEDURE — 83605 ASSAY OF LACTIC ACID: CPT | Performed by: NURSE PRACTITIONER

## 2025-01-01 PROCEDURE — 27100171 HC OXYGEN HIGH FLOW UP TO 24 HOURS

## 2025-01-01 PROCEDURE — 37799 UNLISTED PX VASCULAR SURGERY: CPT

## 2025-01-01 PROCEDURE — 82728 ASSAY OF FERRITIN: CPT | Performed by: INTERNAL MEDICINE

## 2025-01-01 PROCEDURE — 86901 BLOOD TYPING SEROLOGIC RH(D): CPT | Performed by: INTERNAL MEDICINE

## 2025-01-01 PROCEDURE — 97535 SELF CARE MNGMENT TRAINING: CPT

## 2025-01-01 PROCEDURE — 96375 TX/PRO/DX INJ NEW DRUG ADDON: CPT

## 2025-01-01 PROCEDURE — 93010 ELECTROCARDIOGRAM REPORT: CPT | Mod: ,,, | Performed by: INTERNAL MEDICINE

## 2025-01-01 PROCEDURE — 83605 ASSAY OF LACTIC ACID: CPT | Performed by: STUDENT IN AN ORGANIZED HEALTH CARE EDUCATION/TRAINING PROGRAM

## 2025-01-01 PROCEDURE — 83735 ASSAY OF MAGNESIUM: CPT | Performed by: STUDENT IN AN ORGANIZED HEALTH CARE EDUCATION/TRAINING PROGRAM

## 2025-01-01 PROCEDURE — 25500020 PHARM REV CODE 255: Performed by: INTERNAL MEDICINE

## 2025-01-01 PROCEDURE — C1887 CATHETER, GUIDING: HCPCS | Performed by: STUDENT IN AN ORGANIZED HEALTH CARE EDUCATION/TRAINING PROGRAM

## 2025-01-01 PROCEDURE — 94760 N-INVAS EAR/PLS OXIMETRY 1: CPT

## 2025-01-01 PROCEDURE — 84443 ASSAY THYROID STIM HORMONE: CPT | Performed by: INTERNAL MEDICINE

## 2025-01-01 PROCEDURE — 84100 ASSAY OF PHOSPHORUS: CPT | Performed by: STUDENT IN AN ORGANIZED HEALTH CARE EDUCATION/TRAINING PROGRAM

## 2025-01-01 PROCEDURE — 82746 ASSAY OF FOLIC ACID SERUM: CPT | Performed by: STUDENT IN AN ORGANIZED HEALTH CARE EDUCATION/TRAINING PROGRAM

## 2025-01-01 PROCEDURE — 93010 ELECTROCARDIOGRAM REPORT: CPT | Mod: ,,, | Performed by: STUDENT IN AN ORGANIZED HEALTH CARE EDUCATION/TRAINING PROGRAM

## 2025-01-01 PROCEDURE — 4A023N8 MEASUREMENT OF CARDIAC SAMPLING AND PRESSURE, BILATERAL, PERCUTANEOUS APPROACH: ICD-10-PCS | Performed by: STUDENT IN AN ORGANIZED HEALTH CARE EDUCATION/TRAINING PROGRAM

## 2025-01-01 PROCEDURE — 84484 ASSAY OF TROPONIN QUANT: CPT | Performed by: INTERNAL MEDICINE

## 2025-01-01 PROCEDURE — 83550 IRON BINDING TEST: CPT | Performed by: INTERNAL MEDICINE

## 2025-01-01 PROCEDURE — P9016 RBC LEUKOCYTES REDUCED: HCPCS | Performed by: NURSE PRACTITIONER

## 2025-01-01 PROCEDURE — 51700 IRRIGATION OF BLADDER: CPT

## 2025-01-01 PROCEDURE — 85347 COAGULATION TIME ACTIVATED: CPT | Performed by: STUDENT IN AN ORGANIZED HEALTH CARE EDUCATION/TRAINING PROGRAM

## 2025-01-01 PROCEDURE — 33990 INSJ PERQ VAD L HRT ARTERIAL: CPT | Performed by: STUDENT IN AN ORGANIZED HEALTH CARE EDUCATION/TRAINING PROGRAM

## 2025-01-01 PROCEDURE — 27801859 OPTIME CATHETER, IMPELLA: Performed by: STUDENT IN AN ORGANIZED HEALTH CARE EDUCATION/TRAINING PROGRAM

## 2025-01-01 PROCEDURE — B240ZZ3 ULTRASONOGRAPHY OF SINGLE CORONARY ARTERY, INTRAVASCULAR: ICD-10-PCS | Performed by: STUDENT IN AN ORGANIZED HEALTH CARE EDUCATION/TRAINING PROGRAM

## 2025-01-01 PROCEDURE — 25500020 PHARM REV CODE 255: Performed by: THORACIC SURGERY (CARDIOTHORACIC VASCULAR SURGERY)

## 2025-01-01 PROCEDURE — 93010 ELECTROCARDIOGRAM REPORT: CPT | Mod: 76,,, | Performed by: INTERNAL MEDICINE

## 2025-01-01 PROCEDURE — 75630 X-RAY AORTA LEG ARTERIES: CPT | Mod: 59 | Performed by: STUDENT IN AN ORGANIZED HEALTH CARE EDUCATION/TRAINING PROGRAM

## 2025-01-01 PROCEDURE — 97535 SELF CARE MNGMENT TRAINING: CPT | Mod: CO

## 2025-01-01 PROCEDURE — P9016 RBC LEUKOCYTES REDUCED: HCPCS | Performed by: EMERGENCY MEDICINE

## 2025-01-01 PROCEDURE — 85025 COMPLETE CBC W/AUTO DIFF WBC: CPT | Performed by: EMERGENCY MEDICINE

## 2025-01-01 PROCEDURE — 63600175 PHARM REV CODE 636 W HCPCS: Performed by: EMERGENCY MEDICINE

## 2025-01-01 PROCEDURE — 97168 OT RE-EVAL EST PLAN CARE: CPT

## 2025-01-01 PROCEDURE — 25000003 PHARM REV CODE 250: Performed by: EMERGENCY MEDICINE

## 2025-01-01 PROCEDURE — 85014 HEMATOCRIT: CPT | Performed by: INTERNAL MEDICINE

## 2025-01-01 PROCEDURE — 87040 BLOOD CULTURE FOR BACTERIA: CPT | Performed by: EMERGENCY MEDICINE

## 2025-01-01 PROCEDURE — C1894 INTRO/SHEATH, NON-LASER: HCPCS | Performed by: STUDENT IN AN ORGANIZED HEALTH CARE EDUCATION/TRAINING PROGRAM

## 2025-01-01 PROCEDURE — 94799 UNLISTED PULMONARY SVC/PX: CPT

## 2025-01-01 PROCEDURE — C1880 VENA CAVA FILTER: HCPCS | Performed by: INTERNAL MEDICINE

## 2025-01-01 PROCEDURE — C1725 CATH, TRANSLUMIN NON-LASER: HCPCS | Performed by: STUDENT IN AN ORGANIZED HEALTH CARE EDUCATION/TRAINING PROGRAM

## 2025-01-01 PROCEDURE — 85730 THROMBOPLASTIN TIME PARTIAL: CPT | Performed by: EMERGENCY MEDICINE

## 2025-01-01 PROCEDURE — C1769 GUIDE WIRE: HCPCS | Performed by: INTERNAL MEDICINE

## 2025-01-01 PROCEDURE — 83605 ASSAY OF LACTIC ACID: CPT

## 2025-01-01 PROCEDURE — 99232 SBSQ HOSP IP/OBS MODERATE 35: CPT | Mod: ,,,

## 2025-01-01 PROCEDURE — 93460 R&L HRT ART/VENTRICLE ANGIO: CPT | Mod: 59 | Performed by: STUDENT IN AN ORGANIZED HEALTH CARE EDUCATION/TRAINING PROGRAM

## 2025-01-01 PROCEDURE — 85025 COMPLETE CBC W/AUTO DIFF WBC: CPT | Performed by: NURSE PRACTITIONER

## 2025-01-01 PROCEDURE — 02PW3RZ REMOVAL OF SHORT-TERM EXTERNAL HEART ASSIST SYSTEM FROM THORACIC AORTA, DESCENDING, PERCUTANEOUS APPROACH: ICD-10-PCS | Performed by: STUDENT IN AN ORGANIZED HEALTH CARE EDUCATION/TRAINING PROGRAM

## 2025-01-01 PROCEDURE — 94799 UNLISTED PULMONARY SVC/PX: CPT | Mod: XB

## 2025-01-01 PROCEDURE — 83605 ASSAY OF LACTIC ACID: CPT | Performed by: EMERGENCY MEDICINE

## 2025-01-01 PROCEDURE — 84484 ASSAY OF TROPONIN QUANT: CPT

## 2025-01-01 PROCEDURE — B2111ZZ FLUOROSCOPY OF MULTIPLE CORONARY ARTERIES USING LOW OSMOLAR CONTRAST: ICD-10-PCS | Performed by: STUDENT IN AN ORGANIZED HEALTH CARE EDUCATION/TRAINING PROGRAM

## 2025-01-01 PROCEDURE — 63600175 PHARM REV CODE 636 W HCPCS: Mod: JZ,TB | Performed by: STUDENT IN AN ORGANIZED HEALTH CARE EDUCATION/TRAINING PROGRAM

## 2025-01-01 PROCEDURE — 27201423 OPTIME MED/SURG SUP & DEVICES STERILE SUPPLY: Performed by: STUDENT IN AN ORGANIZED HEALTH CARE EDUCATION/TRAINING PROGRAM

## 2025-01-01 PROCEDURE — 82272 OCCULT BLD FECES 1-3 TESTS: CPT | Performed by: INTERNAL MEDICINE

## 2025-01-01 PROCEDURE — C1769 GUIDE WIRE: HCPCS | Performed by: STUDENT IN AN ORGANIZED HEALTH CARE EDUCATION/TRAINING PROGRAM

## 2025-01-01 PROCEDURE — 36415 COLL VENOUS BLD VENIPUNCTURE: CPT | Performed by: STUDENT IN AN ORGANIZED HEALTH CARE EDUCATION/TRAINING PROGRAM

## 2025-01-01 PROCEDURE — 99222 1ST HOSP IP/OBS MODERATE 55: CPT | Mod: ,,,

## 2025-01-01 PROCEDURE — 83550 IRON BINDING TEST: CPT | Performed by: STUDENT IN AN ORGANIZED HEALTH CARE EDUCATION/TRAINING PROGRAM

## 2025-01-01 PROCEDURE — 5A1955Z RESPIRATORY VENTILATION, GREATER THAN 96 CONSECUTIVE HOURS: ICD-10-PCS | Performed by: EMERGENCY MEDICINE

## 2025-01-01 PROCEDURE — 84132 ASSAY OF SERUM POTASSIUM: CPT | Performed by: INTERNAL MEDICINE

## 2025-01-01 PROCEDURE — 99233 SBSQ HOSP IP/OBS HIGH 50: CPT | Mod: ,,,

## 2025-01-01 PROCEDURE — 02HW3RZ INSERTION OF SHORT-TERM EXTERNAL HEART ASSIST SYSTEM INTO THORACIC AORTA, DESCENDING, PERCUTANEOUS APPROACH: ICD-10-PCS | Performed by: STUDENT IN AN ORGANIZED HEALTH CARE EDUCATION/TRAINING PROGRAM

## 2025-01-01 PROCEDURE — 83880 ASSAY OF NATRIURETIC PEPTIDE: CPT | Performed by: INTERNAL MEDICINE

## 2025-01-01 PROCEDURE — 80061 LIPID PANEL: CPT | Performed by: STUDENT IN AN ORGANIZED HEALTH CARE EDUCATION/TRAINING PROGRAM

## 2025-01-01 PROCEDURE — 86920 COMPATIBILITY TEST SPIN: CPT | Performed by: INTERNAL MEDICINE

## 2025-01-01 PROCEDURE — B241ZZ3 ULTRASONOGRAPHY OF MULTIPLE CORONARY ARTERIES, INTRAVASCULAR: ICD-10-PCS | Performed by: STUDENT IN AN ORGANIZED HEALTH CARE EDUCATION/TRAINING PROGRAM

## 2025-01-01 PROCEDURE — 85610 PROTHROMBIN TIME: CPT | Performed by: EMERGENCY MEDICINE

## 2025-01-01 PROCEDURE — 37191 INS ENDOVAS VENA CAVA FILTR: CPT | Performed by: INTERNAL MEDICINE

## 2025-01-01 PROCEDURE — 86923 COMPATIBILITY TEST ELECTRIC: CPT | Performed by: NURSE PRACTITIONER

## 2025-01-01 PROCEDURE — 36600 WITHDRAWAL OF ARTERIAL BLOOD: CPT

## 2025-01-01 PROCEDURE — 33992 RMVL PERQ LEFT HEART VAD: CPT | Performed by: STUDENT IN AN ORGANIZED HEALTH CARE EDUCATION/TRAINING PROGRAM

## 2025-01-01 PROCEDURE — 83735 ASSAY OF MAGNESIUM: CPT | Performed by: NURSE PRACTITIONER

## 2025-01-01 PROCEDURE — 85018 HEMOGLOBIN: CPT

## 2025-01-01 PROCEDURE — 92610 EVALUATE SWALLOWING FUNCTION: CPT

## 2025-01-01 PROCEDURE — 83605 ASSAY OF LACTIC ACID: CPT | Performed by: INTERNAL MEDICINE

## 2025-01-01 PROCEDURE — 82728 ASSAY OF FERRITIN: CPT | Performed by: STUDENT IN AN ORGANIZED HEALTH CARE EDUCATION/TRAINING PROGRAM

## 2025-01-01 PROCEDURE — 82607 VITAMIN B-12: CPT | Performed by: STUDENT IN AN ORGANIZED HEALTH CARE EDUCATION/TRAINING PROGRAM

## 2025-01-01 PROCEDURE — 86850 RBC ANTIBODY SCREEN: CPT | Performed by: INTERNAL MEDICINE

## 2025-01-01 PROCEDURE — 85730 THROMBOPLASTIN TIME PARTIAL: CPT | Performed by: INTERNAL MEDICINE

## 2025-01-01 PROCEDURE — 92611 MOTION FLUOROSCOPY/SWALLOW: CPT

## 2025-01-01 PROCEDURE — 85018 HEMOGLOBIN: CPT | Performed by: NURSE PRACTITIONER

## 2025-01-01 PROCEDURE — 80053 COMPREHEN METABOLIC PANEL: CPT | Performed by: EMERGENCY MEDICINE

## 2025-01-01 PROCEDURE — 86923 COMPATIBILITY TEST ELECTRIC: CPT | Mod: 91 | Performed by: INTERNAL MEDICINE

## 2025-01-01 PROCEDURE — 80053 COMPREHEN METABOLIC PANEL: CPT | Performed by: STUDENT IN AN ORGANIZED HEALTH CARE EDUCATION/TRAINING PROGRAM

## 2025-01-01 PROCEDURE — 25500020 PHARM REV CODE 255: Performed by: EMERGENCY MEDICINE

## 2025-01-01 PROCEDURE — 0BH17EZ INSERTION OF ENDOTRACHEAL AIRWAY INTO TRACHEA, VIA NATURAL OR ARTIFICIAL OPENING: ICD-10-PCS | Performed by: EMERGENCY MEDICINE

## 2025-01-01 PROCEDURE — 5A0955A ASSISTANCE WITH RESPIRATORY VENTILATION, GREATER THAN 96 CONSECUTIVE HOURS, HIGH NASAL FLOW/VELOCITY: ICD-10-PCS | Performed by: EMERGENCY MEDICINE

## 2025-01-01 PROCEDURE — 87086 URINE CULTURE/COLONY COUNT: CPT | Performed by: STUDENT IN AN ORGANIZED HEALTH CARE EDUCATION/TRAINING PROGRAM

## 2025-01-01 PROCEDURE — 87086 URINE CULTURE/COLONY COUNT: CPT | Performed by: INTERNAL MEDICINE

## 2025-01-01 PROCEDURE — 86900 BLOOD TYPING SEROLOGIC ABO: CPT | Performed by: EMERGENCY MEDICINE

## 2025-01-01 PROCEDURE — 51798 US URINE CAPACITY MEASURE: CPT

## 2025-01-01 PROCEDURE — 30233N1 TRANSFUSION OF NONAUTOLOGOUS RED BLOOD CELLS INTO PERIPHERAL VEIN, PERCUTANEOUS APPROACH: ICD-10-PCS | Performed by: EMERGENCY MEDICINE

## 2025-01-01 PROCEDURE — 4A023N7 MEASUREMENT OF CARDIAC SAMPLING AND PRESSURE, LEFT HEART, PERCUTANEOUS APPROACH: ICD-10-PCS | Performed by: STUDENT IN AN ORGANIZED HEALTH CARE EDUCATION/TRAINING PROGRAM

## 2025-01-01 PROCEDURE — 96365 THER/PROPH/DIAG IV INF INIT: CPT

## 2025-01-01 PROCEDURE — 97116 GAIT TRAINING THERAPY: CPT | Mod: CQ

## 2025-01-01 PROCEDURE — 84478 ASSAY OF TRIGLYCERIDES: CPT | Performed by: INTERNAL MEDICINE

## 2025-01-01 PROCEDURE — 85610 PROTHROMBIN TIME: CPT

## 2025-01-01 PROCEDURE — B41D1ZZ FLUOROSCOPY OF AORTA AND BILATERAL LOWER EXTREMITY ARTERIES USING LOW OSMOLAR CONTRAST: ICD-10-PCS | Performed by: STUDENT IN AN ORGANIZED HEALTH CARE EDUCATION/TRAINING PROGRAM

## 2025-01-01 PROCEDURE — 82746 ASSAY OF FOLIC ACID SERUM: CPT | Performed by: INTERNAL MEDICINE

## 2025-01-01 PROCEDURE — 5A0221D ASSISTANCE WITH CARDIAC OUTPUT USING IMPELLER PUMP, CONTINUOUS: ICD-10-PCS | Performed by: STUDENT IN AN ORGANIZED HEALTH CARE EDUCATION/TRAINING PROGRAM

## 2025-01-01 PROCEDURE — 027037Z DILATION OF CORONARY ARTERY, ONE ARTERY WITH FOUR OR MORE DRUG-ELUTING INTRALUMINAL DEVICES, PERCUTANEOUS APPROACH: ICD-10-PCS | Performed by: STUDENT IN AN ORGANIZED HEALTH CARE EDUCATION/TRAINING PROGRAM

## 2025-01-01 PROCEDURE — 25500020 PHARM REV CODE 255: Performed by: STUDENT IN AN ORGANIZED HEALTH CARE EDUCATION/TRAINING PROGRAM

## 2025-01-01 PROCEDURE — C1760 CLOSURE DEV, VASC: HCPCS | Performed by: STUDENT IN AN ORGANIZED HEALTH CARE EDUCATION/TRAINING PROGRAM

## 2025-01-01 PROCEDURE — 96361 HYDRATE IV INFUSION ADD-ON: CPT

## 2025-01-01 PROCEDURE — 06H03DZ INSERTION OF INTRALUMINAL DEVICE INTO INFERIOR VENA CAVA, PERCUTANEOUS APPROACH: ICD-10-PCS | Performed by: INTERNAL MEDICINE

## 2025-01-01 PROCEDURE — 85730 THROMBOPLASTIN TIME PARTIAL: CPT

## 2025-01-01 PROCEDURE — 81001 URINALYSIS AUTO W/SCOPE: CPT | Performed by: INTERNAL MEDICINE

## 2025-01-01 PROCEDURE — 94002 VENT MGMT INPAT INIT DAY: CPT

## 2025-01-01 PROCEDURE — 83615 LACTATE (LD) (LDH) ENZYME: CPT

## 2025-01-01 PROCEDURE — C9600 PERC DRUG-EL COR STENT SING: HCPCS | Mod: LD | Performed by: STUDENT IN AN ORGANIZED HEALTH CARE EDUCATION/TRAINING PROGRAM

## 2025-01-01 PROCEDURE — 97162 PT EVAL MOD COMPLEX 30 MIN: CPT

## 2025-01-01 PROCEDURE — 97164 PT RE-EVAL EST PLAN CARE: CPT

## 2025-01-01 PROCEDURE — 97110 THERAPEUTIC EXERCISES: CPT | Mod: CQ

## 2025-01-01 PROCEDURE — 84484 ASSAY OF TROPONIN QUANT: CPT | Performed by: EMERGENCY MEDICINE

## 2025-01-01 PROCEDURE — C1751 CATH, INF, PER/CENT/MIDLINE: HCPCS | Performed by: STUDENT IN AN ORGANIZED HEALTH CARE EDUCATION/TRAINING PROGRAM

## 2025-01-01 PROCEDURE — 82140 ASSAY OF AMMONIA: CPT | Performed by: INTERNAL MEDICINE

## 2025-01-01 PROCEDURE — 96367 TX/PROPH/DG ADDL SEQ IV INF: CPT

## 2025-01-01 PROCEDURE — 97166 OT EVAL MOD COMPLEX 45 MIN: CPT

## 2025-01-01 PROCEDURE — 5A2204Z RESTORATION OF CARDIAC RHYTHM, SINGLE: ICD-10-PCS | Performed by: EMERGENCY MEDICINE

## 2025-01-01 PROCEDURE — 86923 COMPATIBILITY TEST ELECTRIC: CPT | Performed by: INTERNAL MEDICINE

## 2025-01-01 PROCEDURE — C1894 INTRO/SHEATH, NON-LASER: HCPCS | Performed by: INTERNAL MEDICINE

## 2025-01-01 DEVICE — EVEROLIMUS-ELUTING PLATINUM CHROMIUM CORONARY STENT SYSTEM
Type: IMPLANTABLE DEVICE | Site: CORONARY | Status: FUNCTIONAL
Brand: SYNERGY™ XD

## 2025-01-01 DEVICE — STENT ONYXNG30022UX ONYX 3.00X22RX
Type: IMPLANTABLE DEVICE | Site: HEART | Status: FUNCTIONAL
Brand: ONYX FRONTIER™

## 2025-01-01 DEVICE — DEVICE MANTA CLOSURE 14FR: Type: IMPLANTABLE DEVICE | Site: GROIN | Status: FUNCTIONAL

## 2025-01-01 DEVICE — DENALI®  VENA CAVA FILTER JUGULAR/SUBCLAVIAN
Type: IMPLANTABLE DEVICE | Site: ABDOMEN | Status: FUNCTIONAL
Brand: DENALI® VENA CAVA FILTER

## 2025-01-01 DEVICE — STENT ONYXNG35022UX ONYX 3.50X22RX
Type: IMPLANTABLE DEVICE | Site: HEART | Status: FUNCTIONAL
Brand: ONYX FRONTIER™

## 2025-01-01 DEVICE — STENT ONYXNG25030UX ONYX 2.50X30RX
Type: IMPLANTABLE DEVICE | Site: HEART | Status: FUNCTIONAL
Brand: ONYX FRONTIER™

## 2025-01-01 DEVICE — EVEROLIMUS-ELUTING PLATINUM CHROMIUM CORONARY STENT SYSTEM
Type: IMPLANTABLE DEVICE | Site: HEART | Status: FUNCTIONAL
Brand: SYNERGY™ XD

## 2025-01-01 RX ORDER — AMOXICILLIN 250 MG
1 CAPSULE ORAL DAILY
Status: DISCONTINUED | OUTPATIENT
Start: 2025-01-01 | End: 2025-01-01 | Stop reason: HOSPADM

## 2025-01-01 RX ORDER — NITROFURANTOIN 25; 75 MG/1; MG/1
100 CAPSULE ORAL NIGHTLY
Qty: 14 CAPSULE | Refills: 0 | OUTPATIENT
Start: 2025-01-01 | End: 2025-05-13

## 2025-01-01 RX ORDER — CLOPIDOGREL BISULFATE 300 MG/1
TABLET, FILM COATED ORAL
Status: DISCONTINUED | OUTPATIENT
Start: 2025-01-01 | End: 2025-01-01 | Stop reason: HOSPADM

## 2025-01-01 RX ORDER — ASPIRIN 81 MG/1
81 TABLET ORAL DAILY
Status: DISCONTINUED | OUTPATIENT
Start: 2025-01-01 | End: 2025-01-01

## 2025-01-01 RX ORDER — LANOLIN ALCOHOL/MO/W.PET/CERES
1 CREAM (GRAM) TOPICAL DAILY
Status: DISCONTINUED | OUTPATIENT
Start: 2025-01-01 | End: 2025-01-01 | Stop reason: HOSPADM

## 2025-01-01 RX ORDER — SUCRALFATE 1 G/1
1 TABLET ORAL
Qty: 120 TABLET | Refills: 0 | OUTPATIENT
Start: 2025-01-01 | End: 2025-05-29

## 2025-01-01 RX ORDER — MORPHINE SULFATE 4 MG/ML
2 INJECTION, SOLUTION INTRAMUSCULAR; INTRAVENOUS ONCE
Status: DISCONTINUED | OUTPATIENT
Start: 2025-01-01 | End: 2025-01-01 | Stop reason: HOSPADM

## 2025-01-01 RX ORDER — LIDOCAINE HYDROCHLORIDE 10 MG/ML
INJECTION, SOLUTION EPIDURAL; INFILTRATION; INTRACAUDAL; PERINEURAL
Status: DISCONTINUED | OUTPATIENT
Start: 2025-01-01 | End: 2025-01-01 | Stop reason: HOSPADM

## 2025-01-01 RX ORDER — HYOSCYAMINE SULFATE 0.12 MG/1
0.25 TABLET SUBLINGUAL EVERY 4 HOURS PRN
Status: DISCONTINUED | OUTPATIENT
Start: 2025-01-01 | End: 2025-01-01 | Stop reason: HOSPADM

## 2025-01-01 RX ORDER — HEPARIN SODIUM,PORCINE/D5W 25000/250
0-40 INTRAVENOUS SOLUTION INTRAVENOUS CONTINUOUS
Status: DISCONTINUED | OUTPATIENT
Start: 2025-01-01 | End: 2025-01-01

## 2025-01-01 RX ORDER — LIDOCAINE 50 MG/G
1 PATCH TOPICAL DAILY
Status: DISCONTINUED | OUTPATIENT
Start: 2025-01-01 | End: 2025-01-01 | Stop reason: HOSPADM

## 2025-01-01 RX ORDER — TIROFIBAN HYDROCHLORIDE 50 UG/ML
0.07 INJECTION INTRAVENOUS CONTINUOUS
Status: DISPENSED | OUTPATIENT
Start: 2025-01-01 | End: 2025-01-01

## 2025-01-01 RX ORDER — GLUCAGON 1 MG
1 KIT INJECTION
Status: DISCONTINUED | OUTPATIENT
Start: 2025-01-01 | End: 2025-01-01 | Stop reason: HOSPADM

## 2025-01-01 RX ORDER — SODIUM FERRIC GLUCONATE COMPLEX IN SUCROSE 12.5 MG/ML
250 INJECTION INTRAVENOUS ONCE
Status: COMPLETED | OUTPATIENT
Start: 2025-01-01 | End: 2025-01-01

## 2025-01-01 RX ORDER — CLOPIDOGREL BISULFATE 75 MG/1
300 TABLET ORAL ONCE
Status: COMPLETED | OUTPATIENT
Start: 2025-01-01 | End: 2025-01-01

## 2025-01-01 RX ORDER — POLYETHYLENE GLYCOL 3350 17 G/17G
17 POWDER, FOR SOLUTION ORAL 2 TIMES DAILY PRN
Status: DISCONTINUED | OUTPATIENT
Start: 2025-01-01 | End: 2025-01-01

## 2025-01-01 RX ORDER — CALCIUM GLUCONATE 20 MG/ML
1 INJECTION, SOLUTION INTRAVENOUS
Status: COMPLETED | OUTPATIENT
Start: 2025-01-01 | End: 2025-01-01

## 2025-01-01 RX ORDER — MORPHINE SULFATE 4 MG/ML
2 INJECTION, SOLUTION INTRAMUSCULAR; INTRAVENOUS ONCE
Status: COMPLETED | OUTPATIENT
Start: 2025-01-01 | End: 2025-01-01

## 2025-01-01 RX ORDER — CLOPIDOGREL BISULFATE 75 MG/1
75 TABLET ORAL DAILY
Status: DISCONTINUED | OUTPATIENT
Start: 2025-01-01 | End: 2025-01-01

## 2025-01-01 RX ORDER — INDOMETHACIN 25 MG/1
50 CAPSULE ORAL
Status: COMPLETED | OUTPATIENT
Start: 2025-01-01 | End: 2025-01-01

## 2025-01-01 RX ORDER — POTASSIUM CHLORIDE 29.8 MG/ML
20 INJECTION INTRAVENOUS
Status: DISCONTINUED | OUTPATIENT
Start: 2025-01-01 | End: 2025-01-01

## 2025-01-01 RX ORDER — BUMETANIDE 1 MG/1
2 TABLET ORAL DAILY
Status: DISCONTINUED | OUTPATIENT
Start: 2025-01-01 | End: 2025-01-01

## 2025-01-01 RX ORDER — METOPROLOL SUCCINATE 25 MG/1
25 TABLET, EXTENDED RELEASE ORAL DAILY
Qty: 30 TABLET | Refills: 11 | OUTPATIENT
Start: 2025-01-01

## 2025-01-01 RX ORDER — ONDANSETRON 4 MG/1
8 TABLET, ORALLY DISINTEGRATING ORAL EVERY 8 HOURS PRN
Status: DISCONTINUED | OUTPATIENT
Start: 2025-01-01 | End: 2025-01-01 | Stop reason: HOSPADM

## 2025-01-01 RX ORDER — SILODOSIN 8 MG/1
8 CAPSULE ORAL DAILY
COMMUNITY
Start: 2025-01-01

## 2025-01-01 RX ORDER — MORPHINE SULFATE 4 MG/ML
2 INJECTION, SOLUTION INTRAMUSCULAR; INTRAVENOUS
Refills: 0 | Status: DISCONTINUED | OUTPATIENT
Start: 2025-01-01 | End: 2025-01-01 | Stop reason: HOSPADM

## 2025-01-01 RX ORDER — HYDRALAZINE HYDROCHLORIDE 20 MG/ML
10 INJECTION INTRAMUSCULAR; INTRAVENOUS EVERY 4 HOURS PRN
Status: DISCONTINUED | OUTPATIENT
Start: 2025-01-01 | End: 2025-01-01 | Stop reason: HOSPADM

## 2025-01-01 RX ORDER — FAMOTIDINE 20 MG/1
20 TABLET, FILM COATED ORAL 2 TIMES DAILY
Status: DISCONTINUED | OUTPATIENT
Start: 2025-01-01 | End: 2025-01-01

## 2025-01-01 RX ORDER — PANTOPRAZOLE SODIUM 40 MG/1
40 TABLET, DELAYED RELEASE ORAL 2 TIMES DAILY
Status: DISCONTINUED | OUTPATIENT
Start: 2025-01-01 | End: 2025-01-01 | Stop reason: HOSPADM

## 2025-01-01 RX ORDER — IBUPROFEN 200 MG
24 TABLET ORAL
Status: DISCONTINUED | OUTPATIENT
Start: 2025-01-01 | End: 2025-01-01 | Stop reason: HOSPADM

## 2025-01-01 RX ORDER — DAPAGLIFLOZIN 10 MG/1
10 TABLET, FILM COATED ORAL DAILY
Qty: 30 TABLET | Refills: 11 | OUTPATIENT
Start: 2025-01-01

## 2025-01-01 RX ORDER — MAGNESIUM SULFATE HEPTAHYDRATE 40 MG/ML
2 INJECTION, SOLUTION INTRAVENOUS
Status: DISCONTINUED | OUTPATIENT
Start: 2025-01-01 | End: 2025-01-01

## 2025-01-01 RX ORDER — ASPIRIN 300 MG/1
300 SUPPOSITORY RECTAL EVERY 6 HOURS PRN
Status: DISCONTINUED | OUTPATIENT
Start: 2025-01-01 | End: 2025-01-01

## 2025-01-01 RX ORDER — FUROSEMIDE 10 MG/ML
20 INJECTION INTRAMUSCULAR; INTRAVENOUS ONCE
Status: COMPLETED | OUTPATIENT
Start: 2025-01-01 | End: 2025-01-01

## 2025-01-01 RX ORDER — FUROSEMIDE 10 MG/ML
40 INJECTION INTRAMUSCULAR; INTRAVENOUS ONCE
Status: COMPLETED | OUTPATIENT
Start: 2025-01-01 | End: 2025-01-01

## 2025-01-01 RX ORDER — SODIUM CHLORIDE 9 MG/ML
INJECTION, SOLUTION INTRAVENOUS CONTINUOUS
Status: DISCONTINUED | OUTPATIENT
Start: 2025-01-01 | End: 2025-01-01

## 2025-01-01 RX ORDER — HEPARIN SODIUM 1000 [USP'U]/ML
INJECTION, SOLUTION INTRAVENOUS; SUBCUTANEOUS
Status: DISCONTINUED | OUTPATIENT
Start: 2025-01-01 | End: 2025-01-01 | Stop reason: HOSPADM

## 2025-01-01 RX ORDER — POTASSIUM CHLORIDE 14.9 MG/ML
20 INJECTION INTRAVENOUS
Status: DISCONTINUED | OUTPATIENT
Start: 2025-01-01 | End: 2025-01-01

## 2025-01-01 RX ORDER — HYDROCODONE BITARTRATE AND ACETAMINOPHEN 500; 5 MG/1; MG/1
TABLET ORAL
Status: DISCONTINUED | OUTPATIENT
Start: 2025-01-01 | End: 2025-01-01

## 2025-01-01 RX ORDER — NOREPINEPHRINE BITARTRATE/D5W 8 MG/250ML
0-3 PLASTIC BAG, INJECTION (ML) INTRAVENOUS CONTINUOUS
Status: DISCONTINUED | OUTPATIENT
Start: 2025-01-01 | End: 2025-01-01

## 2025-01-01 RX ORDER — METOPROLOL SUCCINATE 25 MG/1
25 TABLET, EXTENDED RELEASE ORAL ONCE
Status: COMPLETED | OUTPATIENT
Start: 2025-01-01 | End: 2025-01-01

## 2025-01-01 RX ORDER — ENOXAPARIN SODIUM 100 MG/ML
30 INJECTION SUBCUTANEOUS EVERY 24 HOURS
Status: DISCONTINUED | OUTPATIENT
Start: 2025-01-01 | End: 2025-01-01

## 2025-01-01 RX ORDER — CLOPIDOGREL BISULFATE 75 MG/1
75 TABLET ORAL DAILY
COMMUNITY
Start: 2025-01-01

## 2025-01-01 RX ORDER — ATORVASTATIN CALCIUM 40 MG/1
40 TABLET, FILM COATED ORAL DAILY
Status: DISCONTINUED | OUTPATIENT
Start: 2025-01-01 | End: 2025-01-01 | Stop reason: HOSPADM

## 2025-01-01 RX ORDER — FENTANYL CITRATE 50 UG/ML
INJECTION, SOLUTION INTRAMUSCULAR; INTRAVENOUS
Status: DISPENSED
Start: 2025-01-01 | End: 2025-01-01

## 2025-01-01 RX ORDER — MAGNESIUM SULFATE HEPTAHYDRATE 40 MG/ML
2 INJECTION, SOLUTION INTRAVENOUS ONCE
Status: COMPLETED | OUTPATIENT
Start: 2025-01-01 | End: 2025-01-01

## 2025-01-01 RX ORDER — NITROFURANTOIN 25; 75 MG/1; MG/1
100 CAPSULE ORAL NIGHTLY
Status: DISCONTINUED | OUTPATIENT
Start: 2025-01-01 | End: 2025-01-01 | Stop reason: HOSPADM

## 2025-01-01 RX ORDER — BUMETANIDE 1 MG/1
1 TABLET ORAL DAILY
Status: DISCONTINUED | OUTPATIENT
Start: 2025-01-01 | End: 2025-01-01

## 2025-01-01 RX ORDER — PANTOPRAZOLE SODIUM 40 MG/10ML
40 INJECTION, POWDER, LYOPHILIZED, FOR SOLUTION INTRAVENOUS 2 TIMES DAILY
Status: DISCONTINUED | OUTPATIENT
Start: 2025-01-01 | End: 2025-01-01

## 2025-01-01 RX ORDER — PANTOPRAZOLE SODIUM 40 MG/1
40 TABLET, DELAYED RELEASE ORAL 2 TIMES DAILY
Qty: 60 TABLET | Refills: 11 | OUTPATIENT
Start: 2025-01-01

## 2025-01-01 RX ORDER — ROSUVASTATIN CALCIUM 20 MG/1
20 TABLET, COATED ORAL DAILY
COMMUNITY
Start: 2025-01-01

## 2025-01-01 RX ORDER — TIROFIBAN HYDROCHLORIDE 50 UG/ML
INJECTION INTRAVENOUS
Status: DISCONTINUED | OUTPATIENT
Start: 2025-01-01 | End: 2025-01-01

## 2025-01-01 RX ORDER — FINASTERIDE 5 MG/1
5 TABLET, FILM COATED ORAL DAILY
COMMUNITY
Start: 2025-01-01

## 2025-01-01 RX ORDER — SODIUM CHLORIDE, SODIUM LACTATE, POTASSIUM CHLORIDE, CALCIUM CHLORIDE 600; 310; 30; 20 MG/100ML; MG/100ML; MG/100ML; MG/100ML
INJECTION, SOLUTION INTRAVENOUS CONTINUOUS
Status: DISCONTINUED | OUTPATIENT
Start: 2025-01-01 | End: 2025-01-01

## 2025-01-01 RX ORDER — SODIUM CHLORIDE 0.9 % (FLUSH) 0.9 %
10 SYRINGE (ML) INJECTION
Status: DISCONTINUED | OUTPATIENT
Start: 2025-01-01 | End: 2025-01-01 | Stop reason: HOSPADM

## 2025-01-01 RX ORDER — FUROSEMIDE 10 MG/ML
20 INJECTION INTRAMUSCULAR; INTRAVENOUS ONCE
Status: DISCONTINUED | OUTPATIENT
Start: 2025-01-01 | End: 2025-01-01

## 2025-01-01 RX ORDER — SILODOSIN 4 MG/1
4 CAPSULE ORAL DAILY
Qty: 30 CAPSULE | Refills: 11 | OUTPATIENT
Start: 2025-01-01

## 2025-01-01 RX ORDER — POTASSIUM CHLORIDE 14.9 MG/ML
40 INJECTION INTRAVENOUS
Status: DISCONTINUED | OUTPATIENT
Start: 2025-01-01 | End: 2025-01-01

## 2025-01-01 RX ORDER — SODIUM BICARBONATE 650 MG/1
1300 TABLET ORAL 2 TIMES DAILY
Status: COMPLETED | OUTPATIENT
Start: 2025-01-01 | End: 2025-01-01

## 2025-01-01 RX ORDER — CEFTRIAXONE 1 G/1
1 INJECTION, POWDER, FOR SOLUTION INTRAMUSCULAR; INTRAVENOUS
Status: DISCONTINUED | OUTPATIENT
Start: 2025-01-01 | End: 2025-01-01

## 2025-01-01 RX ORDER — CEFDINIR 300 MG/1
300 CAPSULE ORAL EVERY 12 HOURS
Status: DISCONTINUED | OUTPATIENT
Start: 2025-01-01 | End: 2025-01-01

## 2025-01-01 RX ORDER — FINASTERIDE 5 MG/1
5 TABLET, FILM COATED ORAL DAILY
Qty: 30 TABLET | Refills: 0 | OUTPATIENT
Start: 2025-01-01

## 2025-01-01 RX ORDER — ASPIRIN 300 MG/1
300 SUPPOSITORY RECTAL DAILY
Status: DISCONTINUED | OUTPATIENT
Start: 2025-01-01 | End: 2025-01-01

## 2025-01-01 RX ORDER — ATROPINE SULFATE 0.1 MG/ML
INJECTION INTRAVENOUS
Status: DISPENSED
Start: 2025-01-01 | End: 2025-01-01

## 2025-01-01 RX ORDER — POTASSIUM CHLORIDE 14.9 MG/ML
60 INJECTION INTRAVENOUS
Status: DISCONTINUED | OUTPATIENT
Start: 2025-01-01 | End: 2025-01-01

## 2025-01-01 RX ORDER — ALUMINUM HYDROXIDE, MAGNESIUM HYDROXIDE, AND SIMETHICONE 1200; 120; 1200 MG/30ML; MG/30ML; MG/30ML
SUSPENSION ORAL
Status: DISCONTINUED | OUTPATIENT
Start: 2025-01-01 | End: 2025-01-01 | Stop reason: HOSPADM

## 2025-01-01 RX ORDER — HYDROCODONE BITARTRATE AND ACETAMINOPHEN 500; 5 MG/1; MG/1
TABLET ORAL
Status: DISCONTINUED | OUTPATIENT
Start: 2025-01-01 | End: 2025-01-01 | Stop reason: HOSPADM

## 2025-01-01 RX ORDER — ASPIRIN 81 MG/1
81 TABLET ORAL DAILY
Qty: 30 TABLET | Refills: 11 | OUTPATIENT
Start: 2025-01-01

## 2025-01-01 RX ORDER — BISACODYL 10 MG/1
10 SUPPOSITORY RECTAL DAILY PRN
Status: DISCONTINUED | OUTPATIENT
Start: 2025-01-01 | End: 2025-01-01 | Stop reason: HOSPADM

## 2025-01-01 RX ORDER — ENOXAPARIN SODIUM 100 MG/ML
40 INJECTION SUBCUTANEOUS EVERY 24 HOURS
Status: DISCONTINUED | OUTPATIENT
Start: 2025-01-01 | End: 2025-01-01

## 2025-01-01 RX ORDER — AMOXICILLIN 250 MG
1 CAPSULE ORAL 2 TIMES DAILY
Qty: 60 TABLET | Refills: 0 | OUTPATIENT
Start: 2025-01-01

## 2025-01-01 RX ORDER — FENTANYL CITRATE 50 UG/ML
50 INJECTION, SOLUTION INTRAMUSCULAR; INTRAVENOUS
Refills: 0 | Status: DISCONTINUED | OUTPATIENT
Start: 2025-01-01 | End: 2025-01-01 | Stop reason: HOSPADM

## 2025-01-01 RX ORDER — ROSUVASTATIN CALCIUM 20 MG/1
20 TABLET, COATED ORAL DAILY
Qty: 30 TABLET | Refills: 11 | OUTPATIENT
Start: 2025-01-01

## 2025-01-01 RX ORDER — SODIUM BICARBONATE 1 MEQ/ML
VIAL (ML) INTRAVENOUS
Status: COMPLETED | OUTPATIENT
Start: 2025-01-01 | End: 2025-01-01

## 2025-01-01 RX ORDER — SODIUM CHLORIDE 9 MG/ML
INJECTION, SOLUTION INTRAVENOUS CONTINUOUS
Status: DISCONTINUED | OUTPATIENT
Start: 2025-01-01 | End: 2025-01-01 | Stop reason: HOSPADM

## 2025-01-01 RX ORDER — PROPOFOL 10 MG/ML
INJECTION, EMULSION INTRAVENOUS
Status: COMPLETED
Start: 2025-01-01 | End: 2025-01-01

## 2025-01-01 RX ORDER — PROPOFOL 10 MG/ML
0-50 INJECTION, EMULSION INTRAVENOUS CONTINUOUS
Status: DISCONTINUED | OUTPATIENT
Start: 2025-01-01 | End: 2025-01-01

## 2025-01-01 RX ORDER — FENTANYL CITRATE 50 UG/ML
50 INJECTION, SOLUTION INTRAMUSCULAR; INTRAVENOUS
Refills: 0 | Status: DISPENSED | OUTPATIENT
Start: 2025-01-01 | End: 2025-01-01

## 2025-01-01 RX ORDER — CALCIUM GLUCONATE 20 MG/ML
3 INJECTION, SOLUTION INTRAVENOUS
Status: DISCONTINUED | OUTPATIENT
Start: 2025-01-01 | End: 2025-01-01

## 2025-01-01 RX ORDER — ENOXAPARIN SODIUM 100 MG/ML
1 INJECTION SUBCUTANEOUS EVERY 12 HOURS
Status: DISCONTINUED | OUTPATIENT
Start: 2025-01-01 | End: 2025-01-01

## 2025-01-01 RX ORDER — DAPAGLIFLOZIN 10 MG/1
10 TABLET, FILM COATED ORAL DAILY
Status: DISCONTINUED | OUTPATIENT
Start: 2025-01-01 | End: 2025-01-01 | Stop reason: HOSPADM

## 2025-01-01 RX ORDER — ASPIRIN 325 MG
TABLET ORAL
Status: DISCONTINUED | OUTPATIENT
Start: 2025-01-01 | End: 2025-01-01 | Stop reason: HOSPADM

## 2025-01-01 RX ORDER — POLYETHYLENE GLYCOL 3350 17 G/17G
17 POWDER, FOR SOLUTION ORAL 2 TIMES DAILY PRN
Status: DISCONTINUED | OUTPATIENT
Start: 2025-01-01 | End: 2025-01-01 | Stop reason: HOSPADM

## 2025-01-01 RX ORDER — FENTANYL CITRATE 50 UG/ML
INJECTION, SOLUTION INTRAMUSCULAR; INTRAVENOUS CODE/TRAUMA/SEDATION MEDICATION
Status: COMPLETED | OUTPATIENT
Start: 2025-01-01 | End: 2025-01-01

## 2025-01-01 RX ORDER — FENTANYL CITRATE-0.9 % NACL/PF 10 MCG/ML
0-250 PLASTIC BAG, INJECTION (ML) INTRAVENOUS CONTINUOUS
Refills: 0 | Status: DISCONTINUED | OUTPATIENT
Start: 2025-01-01 | End: 2025-01-01

## 2025-01-01 RX ORDER — SODIUM BICARBONATE 1 MEQ/ML
SYRINGE (ML) INTRAVENOUS CODE/TRAUMA/SEDATION MEDICATION
Status: COMPLETED | OUTPATIENT
Start: 2025-01-01 | End: 2025-01-01

## 2025-01-01 RX ORDER — BUMETANIDE 0.25 MG/ML
1 INJECTION, SOLUTION INTRAMUSCULAR; INTRAVENOUS ONCE
Status: COMPLETED | OUTPATIENT
Start: 2025-01-01 | End: 2025-01-01

## 2025-01-01 RX ORDER — CALCIUM GLUCONATE 20 MG/ML
1 INJECTION, SOLUTION INTRAVENOUS
Status: DISCONTINUED | OUTPATIENT
Start: 2025-01-01 | End: 2025-01-01

## 2025-01-01 RX ORDER — CLOPIDOGREL BISULFATE 75 MG/1
75 TABLET ORAL DAILY
Qty: 30 TABLET | Refills: 11 | OUTPATIENT
Start: 2025-01-01

## 2025-01-01 RX ORDER — MORPHINE SULFATE 4 MG/ML
2 INJECTION, SOLUTION INTRAMUSCULAR; INTRAVENOUS ONCE
Status: DISCONTINUED | OUTPATIENT
Start: 2025-01-01 | End: 2025-01-01

## 2025-01-01 RX ORDER — HYDROCODONE BITARTRATE AND ACETAMINOPHEN 5; 325 MG/1; MG/1
1 TABLET ORAL EVERY 6 HOURS PRN
Refills: 0 | Status: DISCONTINUED | OUTPATIENT
Start: 2025-01-01 | End: 2025-01-01 | Stop reason: HOSPADM

## 2025-01-01 RX ORDER — DEXMEDETOMIDINE HYDROCHLORIDE 4 UG/ML
0-1.4 INJECTION, SOLUTION INTRAVENOUS CONTINUOUS
Status: DISCONTINUED | OUTPATIENT
Start: 2025-01-01 | End: 2025-01-01

## 2025-01-01 RX ORDER — SODIUM CHLORIDE 9 MG/ML
INJECTION, SOLUTION INTRAVENOUS CONTINUOUS
Status: ACTIVE | OUTPATIENT
Start: 2025-01-01 | End: 2025-01-01

## 2025-01-01 RX ORDER — METOPROLOL SUCCINATE 25 MG/1
25 TABLET, EXTENDED RELEASE ORAL DAILY
Status: DISCONTINUED | OUTPATIENT
Start: 2025-01-01 | End: 2025-01-01 | Stop reason: HOSPADM

## 2025-01-01 RX ORDER — PROPOFOL 10 MG/ML
0-50 INJECTION, EMULSION INTRAVENOUS
Status: COMPLETED | OUTPATIENT
Start: 2025-01-01 | End: 2025-01-01

## 2025-01-01 RX ORDER — FENTANYL CITRATE 50 UG/ML
INJECTION, SOLUTION INTRAMUSCULAR; INTRAVENOUS
Status: COMPLETED
Start: 2025-01-01 | End: 2025-01-01

## 2025-01-01 RX ORDER — AMIODARONE HYDROCHLORIDE 150 MG/3ML
150 INJECTION, SOLUTION INTRAVENOUS
Status: DISCONTINUED | OUTPATIENT
Start: 2025-01-01 | End: 2025-01-01

## 2025-01-01 RX ORDER — IOPAMIDOL 755 MG/ML
INJECTION, SOLUTION INTRAVASCULAR
Status: DISCONTINUED | OUTPATIENT
Start: 2025-01-01 | End: 2025-01-01 | Stop reason: HOSPADM

## 2025-01-01 RX ORDER — ACETAMINOPHEN 325 MG/1
650 TABLET ORAL EVERY 4 HOURS PRN
Status: DISCONTINUED | OUTPATIENT
Start: 2025-01-01 | End: 2025-01-01 | Stop reason: HOSPADM

## 2025-01-01 RX ORDER — METOPROLOL SUCCINATE 50 MG/1
50 TABLET, EXTENDED RELEASE ORAL DAILY
Status: DISCONTINUED | OUTPATIENT
Start: 2025-01-01 | End: 2025-01-01

## 2025-01-01 RX ORDER — POLYETHYLENE GLYCOL 3350 17 G/17G
17 POWDER, FOR SOLUTION ORAL 2 TIMES DAILY
Status: DISCONTINUED | OUTPATIENT
Start: 2025-01-01 | End: 2025-01-01

## 2025-01-01 RX ORDER — METOPROLOL SUCCINATE 25 MG/1
25 TABLET, EXTENDED RELEASE ORAL DAILY
Status: DISCONTINUED | OUTPATIENT
Start: 2025-01-01 | End: 2025-01-01

## 2025-01-01 RX ORDER — NOREPINEPHRINE BITARTRATE/D5W 8 MG/250ML
PLASTIC BAG, INJECTION (ML) INTRAVENOUS
Status: DISPENSED
Start: 2025-01-01 | End: 2025-01-01

## 2025-01-01 RX ORDER — CALCIUM GLUCONATE 20 MG/ML
2 INJECTION, SOLUTION INTRAVENOUS
Status: DISCONTINUED | OUTPATIENT
Start: 2025-01-01 | End: 2025-01-01

## 2025-01-01 RX ORDER — NOREPINEPHRINE BITARTRATE/D5W 8 MG/250ML
0-3 PLASTIC BAG, INJECTION (ML) INTRAVENOUS CONTINUOUS
Status: DISCONTINUED | OUTPATIENT
Start: 2025-01-01 | End: 2025-01-01 | Stop reason: HOSPADM

## 2025-01-01 RX ORDER — IBUPROFEN 200 MG
16 TABLET ORAL
Status: DISCONTINUED | OUTPATIENT
Start: 2025-01-01 | End: 2025-01-01 | Stop reason: HOSPADM

## 2025-01-01 RX ORDER — EPINEPHRINE 0.1 MG/ML
INJECTION INTRAVENOUS CODE/TRAUMA/SEDATION MEDICATION
Status: COMPLETED | OUTPATIENT
Start: 2025-01-01 | End: 2025-01-01

## 2025-01-01 RX ORDER — SUCRALFATE 1 G/1
1 TABLET ORAL
Status: DISCONTINUED | OUTPATIENT
Start: 2025-01-01 | End: 2025-01-01 | Stop reason: HOSPADM

## 2025-01-01 RX ORDER — FENTANYL CITRATE 50 UG/ML
INJECTION, SOLUTION INTRAMUSCULAR; INTRAVENOUS
Status: DISCONTINUED | OUTPATIENT
Start: 2025-01-01 | End: 2025-01-01 | Stop reason: HOSPADM

## 2025-01-01 RX ORDER — PROPOFOL 10 MG/ML
INJECTION, EMULSION INTRAVENOUS
Status: DISCONTINUED | OUTPATIENT
Start: 2025-01-01 | End: 2025-01-01

## 2025-01-01 RX ORDER — FENTANYL CITRATE 50 UG/ML
50 INJECTION, SOLUTION INTRAMUSCULAR; INTRAVENOUS ONCE
Refills: 0 | Status: COMPLETED | OUTPATIENT
Start: 2025-01-01 | End: 2025-01-01

## 2025-01-01 RX ORDER — CALCIUM GLUCONATE 20 MG/ML
1 INJECTION, SOLUTION INTRAVENOUS ONCE
Status: COMPLETED | OUTPATIENT
Start: 2025-01-01 | End: 2025-01-01

## 2025-01-01 RX ORDER — MUPIROCIN 20 MG/G
OINTMENT TOPICAL 2 TIMES DAILY
Status: COMPLETED | OUTPATIENT
Start: 2025-01-01 | End: 2025-01-01

## 2025-01-01 RX ORDER — LIDOCAINE HYDROCHLORIDE 10 MG/ML
INJECTION, SOLUTION INFILTRATION; PERINEURAL
Status: DISCONTINUED | OUTPATIENT
Start: 2025-01-01 | End: 2025-01-01 | Stop reason: HOSPADM

## 2025-01-01 RX ORDER — SODIUM FERRIC GLUCONATE COMPLEX IN SUCROSE 12.5 MG/ML
250 INJECTION INTRAVENOUS DAILY
Status: COMPLETED | OUTPATIENT
Start: 2025-01-01 | End: 2025-01-01

## 2025-01-01 RX ADMIN — NOREPINEPHRINE BITARTRATE 0.05 MCG/KG/MIN: 8 INJECTION, SOLUTION INTRAVENOUS at 04:03

## 2025-01-01 RX ADMIN — SENNOSIDES AND DOCUSATE SODIUM 1 TABLET: 50; 8.6 TABLET ORAL at 09:05

## 2025-01-01 RX ADMIN — Medication: at 09:04

## 2025-01-01 RX ADMIN — FENTANYL CITRATE 50 MCG: 50 INJECTION, SOLUTION INTRAMUSCULAR; INTRAVENOUS at 07:04

## 2025-01-01 RX ADMIN — POTASSIUM CHLORIDE 20 MEQ: 14.9 INJECTION, SOLUTION INTRAVENOUS at 10:03

## 2025-01-01 RX ADMIN — SODIUM CHLORIDE 250 MG: 9 INJECTION, SOLUTION INTRAVENOUS at 07:04

## 2025-01-01 RX ADMIN — METOPROLOL SUCCINATE 25 MG: 25 TABLET, EXTENDED RELEASE ORAL at 07:04

## 2025-01-01 RX ADMIN — FAMOTIDINE 20 MG: 20 TABLET, FILM COATED ORAL at 07:04

## 2025-01-01 RX ADMIN — SUCRALFATE 1 G: 1 TABLET ORAL at 09:04

## 2025-01-01 RX ADMIN — ENOXAPARIN SODIUM 40 MG: 40 INJECTION SUBCUTANEOUS at 04:04

## 2025-01-01 RX ADMIN — FAMOTIDINE 20 MG: 20 TABLET, FILM COATED ORAL at 08:04

## 2025-01-01 RX ADMIN — NOREPINEPHRINE BITARTRATE 0.08 MCG/KG/MIN: 8 INJECTION, SOLUTION INTRAVENOUS at 05:04

## 2025-01-01 RX ADMIN — METOPROLOL SUCCINATE 50 MG: 50 TABLET, EXTENDED RELEASE ORAL at 10:04

## 2025-01-01 RX ADMIN — PANTOPRAZOLE SODIUM 40 MG: 40 INJECTION, POWDER, FOR SOLUTION INTRAVENOUS at 08:04

## 2025-01-01 RX ADMIN — PROPOFOL 30 MCG/KG/MIN: 10 INJECTION, EMULSION INTRAVENOUS at 07:03

## 2025-01-01 RX ADMIN — PROPOFOL 40 MCG/KG/MIN: 10 INJECTION, EMULSION INTRAVENOUS at 03:04

## 2025-01-01 RX ADMIN — ASPIRIN 81 MG: 81 TABLET, COATED ORAL at 08:04

## 2025-01-01 RX ADMIN — PIPERACILLIN AND TAZOBACTAM 4.5 G: 4; .5 INJECTION, POWDER, LYOPHILIZED, FOR SOLUTION INTRAVENOUS; PARENTERAL at 07:03

## 2025-01-01 RX ADMIN — CLOPIDOGREL 75 MG: 75 TABLET ORAL at 07:04

## 2025-01-01 RX ADMIN — SODIUM BICARBONATE: 84 INJECTION, SOLUTION INTRAVENOUS at 02:03

## 2025-01-01 RX ADMIN — SENNOSIDES AND DOCUSATE SODIUM 1 TABLET: 50; 8.6 TABLET ORAL at 04:04

## 2025-01-01 RX ADMIN — CLOPIDOGREL 75 MG: 75 TABLET ORAL at 08:04

## 2025-01-01 RX ADMIN — SACUBITRIL AND VALSARTAN 1 TABLET: 24; 26 TABLET, FILM COATED ORAL at 10:04

## 2025-01-01 RX ADMIN — FAMOTIDINE 20 MG: 20 TABLET, FILM COATED ORAL at 09:04

## 2025-01-01 RX ADMIN — PANTOPRAZOLE SODIUM 40 MG: 40 TABLET, DELAYED RELEASE ORAL at 09:05

## 2025-01-01 RX ADMIN — ASPIRIN 81 MG: 81 TABLET, COATED ORAL at 09:04

## 2025-01-01 RX ADMIN — SODIUM PHOSPHATE, MONOBASIC, MONOHYDRATE AND SODIUM PHOSPHATE, DIBASIC, ANHYDROUS 15 MMOL: 142; 276 INJECTION, SOLUTION INTRAVENOUS at 01:03

## 2025-01-01 RX ADMIN — Medication: at 08:04

## 2025-01-01 RX ADMIN — Medication: at 08:05

## 2025-01-01 RX ADMIN — PROPOFOL 35 MCG/KG/MIN: 10 INJECTION, EMULSION INTRAVENOUS at 04:04

## 2025-01-01 RX ADMIN — CLOPIDOGREL BISULFATE 75 MG: 75 TABLET, FILM COATED ORAL at 08:04

## 2025-01-01 RX ADMIN — HEPARIN SODIUM 18.41 UNITS/KG/HR: 10000 INJECTION, SOLUTION INTRAVENOUS at 03:04

## 2025-01-01 RX ADMIN — HEPARIN SODIUM 12 UNITS/KG/HR: 10000 INJECTION, SOLUTION INTRAVENOUS at 03:03

## 2025-01-01 RX ADMIN — HEPARIN SODIUM 18.41 UNITS/KG/HR: 10000 INJECTION, SOLUTION INTRAVENOUS at 10:04

## 2025-01-01 RX ADMIN — SUCRALFATE 1 G: 1 TABLET ORAL at 06:04

## 2025-01-01 RX ADMIN — PROPOFOL 40 MCG/KG/MIN: 10 INJECTION, EMULSION INTRAVENOUS at 12:04

## 2025-01-01 RX ADMIN — NITROFURANTOIN MONOHYDRATE/MACROCRYSTALS 100 MG: 25; 75 CAPSULE ORAL at 09:05

## 2025-01-01 RX ADMIN — NITROFURANTOIN MONOHYDRATE/MACROCRYSTALS 100 MG: 25; 75 CAPSULE ORAL at 09:04

## 2025-01-01 RX ADMIN — PROPOFOL 35 MCG/KG/MIN: 10 INJECTION, EMULSION INTRAVENOUS at 08:04

## 2025-01-01 RX ADMIN — ASPIRIN 81 MG: 81 TABLET, COATED ORAL at 10:04

## 2025-01-01 RX ADMIN — FENTANYL CITRATE 50 MCG: 50 INJECTION, SOLUTION INTRAMUSCULAR; INTRAVENOUS at 05:04

## 2025-01-01 RX ADMIN — Medication: at 10:04

## 2025-01-01 RX ADMIN — DAPAGLIFLOZIN 10 MG: 10 TABLET, FILM COATED ORAL at 10:04

## 2025-01-01 RX ADMIN — Medication 1 EACH: at 09:05

## 2025-01-01 RX ADMIN — NOREPINEPHRINE BITARTRATE 0.16 MCG/KG/MIN: 1 INJECTION, SOLUTION, CONCENTRATE INTRAVENOUS at 02:04

## 2025-01-01 RX ADMIN — BUMETANIDE 1 MG: 1 TABLET ORAL at 10:04

## 2025-01-01 RX ADMIN — LIDOCAINE 5% 1 PATCH: 700 PATCH TOPICAL at 08:04

## 2025-01-01 RX ADMIN — PROPOFOL 30 MCG/KG/MIN: 10 INJECTION, EMULSION INTRAVENOUS at 03:03

## 2025-01-01 RX ADMIN — FENTANYL CITRATE 50 MCG: 50 INJECTION, SOLUTION INTRAMUSCULAR; INTRAVENOUS at 10:04

## 2025-01-01 RX ADMIN — Medication: at 09:05

## 2025-01-01 RX ADMIN — CEFTRIAXONE SODIUM 1 G: 1 INJECTION, POWDER, FOR SOLUTION INTRAMUSCULAR; INTRAVENOUS at 02:04

## 2025-01-01 RX ADMIN — BUMETANIDE 1 MG: 0.25 INJECTION INTRAMUSCULAR; INTRAVENOUS at 09:04

## 2025-01-01 RX ADMIN — CALCIUM GLUCONATE 1 G: 20 INJECTION, SOLUTION INTRAVENOUS at 10:03

## 2025-01-01 RX ADMIN — HEPARIN SODIUM 18.8 UNITS/KG/HR: 10000 INJECTION, SOLUTION INTRAVENOUS at 09:04

## 2025-01-01 RX ADMIN — HEPARIN SODIUM 19.3 UNITS/KG/HR: 10000 INJECTION, SOLUTION INTRAVENOUS at 10:04

## 2025-01-01 RX ADMIN — BUMETANIDE 1 MG: 1 TABLET ORAL at 09:04

## 2025-01-01 RX ADMIN — LIDOCAINE 5% 1 PATCH: 700 PATCH TOPICAL at 10:05

## 2025-01-01 RX ADMIN — ENOXAPARIN SODIUM 50 MG: 100 INJECTION SUBCUTANEOUS at 09:04

## 2025-01-01 RX ADMIN — CLOPIDOGREL BISULFATE 75 MG: 75 TABLET, FILM COATED ORAL at 10:04

## 2025-01-01 RX ADMIN — POTASSIUM CHLORIDE 20 MEQ: 14.9 INJECTION, SOLUTION INTRAVENOUS at 04:04

## 2025-01-01 RX ADMIN — SENNOSIDES AND DOCUSATE SODIUM 1 TABLET: 50; 8.6 TABLET ORAL at 09:04

## 2025-01-01 RX ADMIN — POTASSIUM CHLORIDE 20 MEQ: 14.9 INJECTION, SOLUTION INTRAVENOUS at 05:03

## 2025-01-01 RX ADMIN — METOPROLOL SUCCINATE 25 MG: 25 TABLET, EXTENDED RELEASE ORAL at 09:05

## 2025-01-01 RX ADMIN — SUCRALFATE 1 G: 1 TABLET ORAL at 11:05

## 2025-01-01 RX ADMIN — HEPARIN SODIUM 18.8 UNITS/KG/HR: 10000 INJECTION, SOLUTION INTRAVENOUS at 05:04

## 2025-01-01 RX ADMIN — SODIUM CHLORIDE 250 MG: 9 INJECTION, SOLUTION INTRAVENOUS at 09:04

## 2025-01-01 RX ADMIN — PANTOPRAZOLE SODIUM 40 MG: 40 TABLET, DELAYED RELEASE ORAL at 08:05

## 2025-01-01 RX ADMIN — SUCRALFATE 1 G: 1 TABLET ORAL at 04:05

## 2025-01-01 RX ADMIN — PANTOPRAZOLE SODIUM 40 MG: 40 INJECTION, POWDER, FOR SOLUTION INTRAVENOUS at 09:04

## 2025-01-01 RX ADMIN — ATORVASTATIN CALCIUM 40 MG: 40 TABLET, FILM COATED ORAL at 07:04

## 2025-01-01 RX ADMIN — PROPOFOL 40 MCG/KG/MIN: 10 INJECTION, EMULSION INTRAVENOUS at 02:04

## 2025-01-01 RX ADMIN — Medication: at 06:04

## 2025-01-01 RX ADMIN — CALCIUM GLUCONATE 1 G: 20 INJECTION, SOLUTION INTRAVENOUS at 06:04

## 2025-01-01 RX ADMIN — LIDOCAINE 5% 1 PATCH: 700 PATCH TOPICAL at 10:04

## 2025-01-01 RX ADMIN — MUPIROCIN: 20 OINTMENT TOPICAL at 08:04

## 2025-01-01 RX ADMIN — SODIUM CHLORIDE, POTASSIUM CHLORIDE, SODIUM LACTATE AND CALCIUM CHLORIDE 250 ML: 600; 310; 30; 20 INJECTION, SOLUTION INTRAVENOUS at 12:04

## 2025-01-01 RX ADMIN — PROPOFOL 30 MCG/KG/MIN: 10 INJECTION, EMULSION INTRAVENOUS at 08:04

## 2025-01-01 RX ADMIN — MORPHINE SULFATE 2 MG: 4 INJECTION INTRAVENOUS at 04:04

## 2025-01-01 RX ADMIN — DEXMEDETOMIDINE HYDROCHLORIDE 0.8 MCG/KG/HR: 400 INJECTION INTRAVENOUS at 09:04

## 2025-01-01 RX ADMIN — MORPHINE SULFATE 2 MG: 4 INJECTION INTRAVENOUS at 03:05

## 2025-01-01 RX ADMIN — CLOPIDOGREL 75 MG: 75 TABLET ORAL at 09:03

## 2025-01-01 RX ADMIN — POTASSIUM CHLORIDE 20 MEQ: 14.9 INJECTION, SOLUTION INTRAVENOUS at 11:03

## 2025-01-01 RX ADMIN — CEFTRIAXONE SODIUM 1 G: 1 INJECTION, POWDER, FOR SOLUTION INTRAMUSCULAR; INTRAVENOUS at 01:04

## 2025-01-01 RX ADMIN — SUCRALFATE 1 G: 1 TABLET ORAL at 08:04

## 2025-01-01 RX ADMIN — METOPROLOL SUCCINATE 50 MG: 50 TABLET, EXTENDED RELEASE ORAL at 08:04

## 2025-01-01 RX ADMIN — PROPOFOL 20 MCG/KG/MIN: 10 INJECTION, EMULSION INTRAVENOUS at 01:04

## 2025-01-01 RX ADMIN — ACETAMINOPHEN 650 MG: 325 TABLET ORAL at 09:05

## 2025-01-01 RX ADMIN — MUPIROCIN: 20 OINTMENT TOPICAL at 08:03

## 2025-01-01 RX ADMIN — DEXMEDETOMIDINE HYDROCHLORIDE 0.8 MCG/KG/HR: 400 INJECTION INTRAVENOUS at 11:04

## 2025-01-01 RX ADMIN — ASPIRIN 81 MG: 81 TABLET, COATED ORAL at 07:04

## 2025-01-01 RX ADMIN — SUCRALFATE 1 G: 1 TABLET ORAL at 05:04

## 2025-01-01 RX ADMIN — PROPOFOL 35 MCG/KG/MIN: 10 INJECTION, EMULSION INTRAVENOUS at 12:04

## 2025-01-01 RX ADMIN — Medication 100 MCG/HR: at 06:03

## 2025-01-01 RX ADMIN — AMIODARONE HYDROCHLORIDE 0.5 MG/MIN: 1.8 INJECTION, SOLUTION INTRAVENOUS at 12:04

## 2025-01-01 RX ADMIN — DAPAGLIFLOZIN 10 MG: 10 TABLET, FILM COATED ORAL at 08:04

## 2025-01-01 RX ADMIN — PANTOPRAZOLE SODIUM 40 MG: 40 TABLET, DELAYED RELEASE ORAL at 09:04

## 2025-01-01 RX ADMIN — PROPOFOL 40 MCG/KG/MIN: 10 INJECTION, EMULSION INTRAVENOUS at 05:04

## 2025-01-01 RX ADMIN — SUCRALFATE 1 G: 1 TABLET ORAL at 10:05

## 2025-01-01 RX ADMIN — Medication 81 MG: at 01:05

## 2025-01-01 RX ADMIN — ATORVASTATIN CALCIUM 40 MG: 40 TABLET, FILM COATED ORAL at 10:04

## 2025-01-01 RX ADMIN — SUCRALFATE 1 G: 1 TABLET ORAL at 06:05

## 2025-01-01 RX ADMIN — SODIUM BICARBONATE 50 MEQ: 84 INJECTION, SOLUTION INTRAVENOUS at 07:03

## 2025-01-01 RX ADMIN — CLOPIDOGREL BISULFATE 75 MG: 75 TABLET, FILM COATED ORAL at 09:04

## 2025-01-01 RX ADMIN — PANTOPRAZOLE SODIUM 40 MG: 40 TABLET, DELAYED RELEASE ORAL at 08:04

## 2025-01-01 RX ADMIN — PROPOFOL 40 MCG/KG/MIN: 10 INJECTION, EMULSION INTRAVENOUS at 06:04

## 2025-01-01 RX ADMIN — Medication: at 06:05

## 2025-01-01 RX ADMIN — MORPHINE SULFATE 2 MG: 4 INJECTION INTRAVENOUS at 05:05

## 2025-01-01 RX ADMIN — CLOPIDOGREL 75 MG: 75 TABLET ORAL at 10:04

## 2025-01-01 RX ADMIN — PROPOFOL 20 MCG/KG/MIN: 10 INJECTION, EMULSION INTRAVENOUS at 06:04

## 2025-01-01 RX ADMIN — FUROSEMIDE 20 MG: 10 INJECTION, SOLUTION INTRAMUSCULAR; INTRAVENOUS at 09:04

## 2025-01-01 RX ADMIN — SUCRALFATE 1 G: 1 TABLET ORAL at 11:04

## 2025-01-01 RX ADMIN — ATORVASTATIN CALCIUM 40 MG: 40 TABLET, FILM COATED ORAL at 08:04

## 2025-01-01 RX ADMIN — PROPOFOL 20 MCG/KG/MIN: 10 INJECTION, EMULSION INTRAVENOUS at 11:04

## 2025-01-01 RX ADMIN — DAPAGLIFLOZIN 10 MG: 10 TABLET, FILM COATED ORAL at 07:04

## 2025-01-01 RX ADMIN — PROPOFOL 50 MCG/KG/MIN: 10 INJECTION, EMULSION INTRAVENOUS at 08:04

## 2025-01-01 RX ADMIN — HEPARIN SODIUM 18.41 UNITS/KG/HR: 10000 INJECTION, SOLUTION INTRAVENOUS at 12:04

## 2025-01-01 RX ADMIN — ENOXAPARIN SODIUM 30 MG: 30 INJECTION SUBCUTANEOUS at 03:04

## 2025-01-01 RX ADMIN — SODIUM CHLORIDE 1000 ML: 9 INJECTION, SOLUTION INTRAVENOUS at 04:03

## 2025-01-01 RX ADMIN — NOREPINEPHRINE BITARTRATE 0.06 MCG/KG/MIN: 8 INJECTION, SOLUTION INTRAVENOUS at 08:04

## 2025-01-01 RX ADMIN — SODIUM CHLORIDE, POTASSIUM CHLORIDE, SODIUM LACTATE AND CALCIUM CHLORIDE: 600; 310; 30; 20 INJECTION, SOLUTION INTRAVENOUS at 08:04

## 2025-01-01 RX ADMIN — CEFTRIAXONE SODIUM 1 G: 1 INJECTION, POWDER, FOR SOLUTION INTRAMUSCULAR; INTRAVENOUS at 03:04

## 2025-01-01 RX ADMIN — CLOPIDOGREL 75 MG: 75 TABLET ORAL at 06:04

## 2025-01-01 RX ADMIN — AMIODARONE HYDROCHLORIDE 0.5 MG/MIN: 1.8 INJECTION, SOLUTION INTRAVENOUS at 04:03

## 2025-01-01 RX ADMIN — HEPARIN SODIUM 18.8 UNITS/KG/HR: 10000 INJECTION, SOLUTION INTRAVENOUS at 07:04

## 2025-01-01 RX ADMIN — FAMOTIDINE 20 MG: 20 TABLET, FILM COATED ORAL at 10:04

## 2025-01-01 RX ADMIN — SODIUM BICARBONATE 650 MG TABLET 1300 MG: at 03:05

## 2025-01-01 RX ADMIN — DAPAGLIFLOZIN 10 MG: 10 TABLET, FILM COATED ORAL at 09:05

## 2025-01-01 RX ADMIN — SENNOSIDES AND DOCUSATE SODIUM 1 TABLET: 50; 8.6 TABLET ORAL at 08:04

## 2025-01-01 RX ADMIN — ATORVASTATIN CALCIUM 40 MG: 40 TABLET, FILM COATED ORAL at 09:05

## 2025-01-01 RX ADMIN — SODIUM CHLORIDE: 9 INJECTION, SOLUTION INTRAVENOUS at 07:04

## 2025-01-01 RX ADMIN — PROPOFOL 30 MCG/KG/MIN: 10 INJECTION, EMULSION INTRAVENOUS at 04:03

## 2025-01-01 RX ADMIN — HEPARIN SODIUM 12 UNITS/KG/HR: 10000 INJECTION, SOLUTION INTRAVENOUS at 06:03

## 2025-01-01 RX ADMIN — MORPHINE SULFATE 2 MG: 4 INJECTION INTRAVENOUS at 09:04

## 2025-01-01 RX ADMIN — Medication 1 EACH: at 08:05

## 2025-01-01 RX ADMIN — SODIUM CHLORIDE: 9 INJECTION, SOLUTION INTRAVENOUS at 11:03

## 2025-01-01 RX ADMIN — CALCIUM GLUCONATE 1 G: 20 INJECTION, SOLUTION INTRAVENOUS at 08:03

## 2025-01-01 RX ADMIN — METOPROLOL SUCCINATE 50 MG: 50 TABLET, EXTENDED RELEASE ORAL at 09:04

## 2025-01-01 RX ADMIN — CLOPIDOGREL 300 MG: 75 TABLET ORAL at 12:04

## 2025-01-01 RX ADMIN — PROPOFOL 30 MCG/KG/MIN: 10 INJECTION, EMULSION INTRAVENOUS at 12:04

## 2025-01-01 RX ADMIN — CALCIUM GLUCONATE 1 G: 20 INJECTION, SOLUTION INTRAVENOUS at 07:03

## 2025-01-01 RX ADMIN — PROPOFOL 30 MCG/KG/MIN: 10 INJECTION, EMULSION INTRAVENOUS at 11:04

## 2025-01-01 RX ADMIN — PERFLUTREN 1 ML: 6.52 INJECTION, SUSPENSION INTRAVENOUS at 12:04

## 2025-01-01 RX ADMIN — ENOXAPARIN SODIUM 50 MG: 100 INJECTION SUBCUTANEOUS at 08:04

## 2025-01-01 RX ADMIN — DAPAGLIFLOZIN 10 MG: 10 TABLET, FILM COATED ORAL at 09:04

## 2025-01-01 RX ADMIN — PROPOFOL 30 MCG/KG/MIN: 10 INJECTION, EMULSION INTRAVENOUS at 06:03

## 2025-01-01 RX ADMIN — IOHEXOL 100 ML: 350 INJECTION, SOLUTION INTRAVENOUS at 04:03

## 2025-01-01 RX ADMIN — ATORVASTATIN CALCIUM 40 MG: 40 TABLET, FILM COATED ORAL at 09:04

## 2025-01-01 RX ADMIN — LIDOCAINE 5% 1 PATCH: 700 PATCH TOPICAL at 08:05

## 2025-01-01 RX ADMIN — POTASSIUM CHLORIDE 20 MEQ: 14.9 INJECTION, SOLUTION INTRAVENOUS at 06:04

## 2025-01-01 RX ADMIN — SUCRALFATE 1 G: 1 TABLET ORAL at 10:04

## 2025-01-01 RX ADMIN — ACETAMINOPHEN 650 MG: 325 TABLET ORAL at 01:04

## 2025-01-01 RX ADMIN — MAGNESIUM SULFATE HEPTAHYDRATE 2 G: 40 INJECTION, SOLUTION INTRAVENOUS at 07:03

## 2025-01-01 RX ADMIN — HYOSCYAMINE SULFATE 0.25 MG: 0.12 TABLET SUBLINGUAL at 03:05

## 2025-01-01 RX ADMIN — DEXMEDETOMIDINE HYDROCHLORIDE 1 MCG/KG/HR: 400 INJECTION INTRAVENOUS at 09:04

## 2025-01-01 RX ADMIN — AMIODARONE HYDROCHLORIDE 1 MG/MIN: 1.8 INJECTION, SOLUTION INTRAVENOUS at 10:03

## 2025-01-01 RX ADMIN — MUPIROCIN: 20 OINTMENT TOPICAL at 07:03

## 2025-01-01 RX ADMIN — POLYETHYLENE GLYCOL 3350 17 G: 17 POWDER, FOR SOLUTION ORAL at 04:04

## 2025-01-01 RX ADMIN — SENNOSIDES AND DOCUSATE SODIUM 1 TABLET: 50; 8.6 TABLET ORAL at 10:04

## 2025-01-01 RX ADMIN — MORPHINE SULFATE 2 MG: 4 INJECTION INTRAVENOUS at 06:05

## 2025-01-01 RX ADMIN — CALCIUM GLUCONATE 1 G: 20 INJECTION, SOLUTION INTRAVENOUS at 05:03

## 2025-01-01 RX ADMIN — POTASSIUM CHLORIDE 20 MEQ: 14.9 INJECTION, SOLUTION INTRAVENOUS at 07:03

## 2025-01-01 RX ADMIN — ATORVASTATIN CALCIUM 40 MG: 40 TABLET, FILM COATED ORAL at 08:05

## 2025-01-01 RX ADMIN — SODIUM PHOSPHATE, MONOBASIC, MONOHYDRATE AND SODIUM PHOSPHATE, DIBASIC, ANHYDROUS 15 MMOL: 142; 276 INJECTION, SOLUTION INTRAVENOUS at 08:04

## 2025-01-01 RX ADMIN — SODIUM BICARBONATE: 84 INJECTION, SOLUTION INTRAVENOUS at 07:04

## 2025-01-01 RX ADMIN — CALCIUM GLUCONATE 1 G: 20 INJECTION, SOLUTION INTRAVENOUS at 12:04

## 2025-01-01 RX ADMIN — METOPROLOL SUCCINATE 12.5 MG: 25 TABLET, EXTENDED RELEASE ORAL at 10:04

## 2025-01-01 RX ADMIN — PROPOFOL 25 MCG/KG/MIN: 10 INJECTION, EMULSION INTRAVENOUS at 06:03

## 2025-01-01 RX ADMIN — CLOPIDOGREL BISULFATE 75 MG: 75 TABLET, FILM COATED ORAL at 07:04

## 2025-01-01 RX ADMIN — PROPOFOL 45 MCG/KG/MIN: 10 INJECTION, EMULSION INTRAVENOUS at 06:04

## 2025-01-01 RX ADMIN — FENTANYL CITRATE 100 MCG: 50 INJECTION, SOLUTION INTRAMUSCULAR; INTRAVENOUS at 05:03

## 2025-01-01 RX ADMIN — MORPHINE SULFATE 2 MG: 4 INJECTION INTRAVENOUS at 04:05

## 2025-01-01 RX ADMIN — LIDOCAINE 5% 1 PATCH: 700 PATCH TOPICAL at 09:05

## 2025-01-01 RX ADMIN — SODIUM CHLORIDE 250 MG: 0.9 INJECTION, SOLUTION INTRAVENOUS at 08:04

## 2025-01-01 RX ADMIN — PANTOPRAZOLE SODIUM 40 MG: 40 INJECTION, POWDER, FOR SOLUTION INTRAVENOUS at 07:04

## 2025-01-01 RX ADMIN — MUPIROCIN: 20 OINTMENT TOPICAL at 09:04

## 2025-01-01 RX ADMIN — HEPARIN SODIUM 18.8 UNITS/KG/HR: 10000 INJECTION, SOLUTION INTRAVENOUS at 06:04

## 2025-01-01 RX ADMIN — ACETAMINOPHEN 650 MG: 325 TABLET ORAL at 05:04

## 2025-01-01 RX ADMIN — SUCRALFATE 1 G: 1 TABLET ORAL at 04:04

## 2025-01-01 RX ADMIN — HEPARIN SODIUM 18.41 UNITS/KG/HR: 10000 INJECTION, SOLUTION INTRAVENOUS at 05:04

## 2025-01-01 RX ADMIN — PROPOFOL 35 MCG/KG/MIN: 10 INJECTION, EMULSION INTRAVENOUS at 02:04

## 2025-01-01 RX ADMIN — PROPOFOL 1000 MG: 10 INJECTION, EMULSION INTRAVENOUS at 06:03

## 2025-01-01 RX ADMIN — SODIUM CHLORIDE, POTASSIUM CHLORIDE, SODIUM LACTATE AND CALCIUM CHLORIDE: 600; 310; 30; 20 INJECTION, SOLUTION INTRAVENOUS at 07:04

## 2025-01-01 RX ADMIN — NITROFURANTOIN MONOHYDRATE/MACROCRYSTALS 100 MG: 25; 75 CAPSULE ORAL at 08:05

## 2025-01-01 RX ADMIN — PROPOFOL 30 MCG/KG/MIN: 10 INJECTION, EMULSION INTRAVENOUS at 04:04

## 2025-01-01 RX ADMIN — SUCRALFATE 1 G: 1 TABLET ORAL at 03:04

## 2025-01-01 RX ADMIN — Medication: at 01:04

## 2025-01-01 RX ADMIN — BUMETANIDE 1 MG: 1 TABLET ORAL at 08:04

## 2025-01-01 RX ADMIN — AMIODARONE HYDROCHLORIDE 0.5 MG/MIN: 1.8 INJECTION, SOLUTION INTRAVENOUS at 08:03

## 2025-01-01 RX ADMIN — SACUBITRIL AND VALSARTAN 1 TABLET: 24; 26 TABLET, FILM COATED ORAL at 09:04

## 2025-01-01 RX ADMIN — HEPARIN SODIUM 16.45 UNITS/KG/HR: 10000 INJECTION, SOLUTION INTRAVENOUS at 02:03

## 2025-01-01 RX ADMIN — HYOSCYAMINE SULFATE 0.25 MG: 0.12 TABLET SUBLINGUAL at 12:04

## 2025-01-01 RX ADMIN — LIDOCAINE 5% 1 PATCH: 700 PATCH TOPICAL at 07:04

## 2025-01-01 RX ADMIN — NOREPINEPHRINE BITARTRATE 0.7 MCG/KG/MIN: 1 INJECTION, SOLUTION, CONCENTRATE INTRAVENOUS at 09:03

## 2025-01-01 RX ADMIN — METOPROLOL SUCCINATE 25 MG: 25 TABLET, EXTENDED RELEASE ORAL at 10:04

## 2025-01-01 RX ADMIN — HEPARIN SODIUM 18.41 UNITS/KG/HR: 10000 INJECTION, SOLUTION INTRAVENOUS at 01:04

## 2025-01-01 RX ADMIN — SODIUM CHLORIDE, POTASSIUM CHLORIDE, SODIUM LACTATE AND CALCIUM CHLORIDE 250 ML: 600; 310; 30; 20 INJECTION, SOLUTION INTRAVENOUS at 11:04

## 2025-01-01 RX ADMIN — SUCRALFATE 1 G: 1 TABLET ORAL at 03:05

## 2025-01-01 RX ADMIN — Medication 81 MG: at 09:05

## 2025-01-01 RX ADMIN — HEPARIN SODIUM 19.3 UNITS/KG/HR: 10000 INJECTION, SOLUTION INTRAVENOUS at 12:04

## 2025-01-01 RX ADMIN — SACUBITRIL AND VALSARTAN 1 TABLET: 24; 26 TABLET, FILM COATED ORAL at 08:04

## 2025-01-01 RX ADMIN — PANTOPRAZOLE SODIUM 40 MG: 40 INJECTION, POWDER, FOR SOLUTION INTRAVENOUS at 10:04

## 2025-01-01 RX ADMIN — PROPOFOL 20 MCG/KG/MIN: 10 INJECTION, EMULSION INTRAVENOUS at 10:04

## 2025-01-01 RX ADMIN — SODIUM CHLORIDE, POTASSIUM CHLORIDE, SODIUM LACTATE AND CALCIUM CHLORIDE: 600; 310; 30; 20 INJECTION, SOLUTION INTRAVENOUS at 11:04

## 2025-01-01 RX ADMIN — SODIUM CHLORIDE, POTASSIUM CHLORIDE, SODIUM LACTATE AND CALCIUM CHLORIDE: 600; 310; 30; 20 INJECTION, SOLUTION INTRAVENOUS at 12:04

## 2025-01-01 RX ADMIN — PANTOPRAZOLE SODIUM 40 MG: 40 INJECTION, POWDER, FOR SOLUTION INTRAVENOUS at 05:04

## 2025-01-01 RX ADMIN — PROPOFOL 45 MCG/KG/MIN: 10 INJECTION, EMULSION INTRAVENOUS at 11:04

## 2025-01-01 RX ADMIN — PROPOFOL 40 MCG/KG/MIN: 10 INJECTION, EMULSION INTRAVENOUS at 07:04

## 2025-01-01 RX ADMIN — NOREPINEPHRINE BITARTRATE 0.08 MCG/KG/MIN: 8 INJECTION, SOLUTION INTRAVENOUS at 01:04

## 2025-01-01 RX ADMIN — SUCRALFATE 1 G: 1 TABLET ORAL at 09:05

## 2025-01-01 RX ADMIN — NOREPINEPHRINE BITARTRATE 0.46 MCG/KG/MIN: 1 INJECTION, SOLUTION, CONCENTRATE INTRAVENOUS at 08:03

## 2025-01-01 RX ADMIN — SODIUM CHLORIDE: 9 INJECTION, SOLUTION INTRAVENOUS at 05:03

## 2025-01-01 RX ADMIN — PROPOFOL 45 MCG/KG/MIN: 10 INJECTION, EMULSION INTRAVENOUS at 10:04

## 2025-01-01 RX ADMIN — ASPIRIN 81 MG: 81 TABLET, COATED ORAL at 09:03

## 2025-01-01 RX ADMIN — SODIUM BICARBONATE: 84 INJECTION, SOLUTION INTRAVENOUS at 07:03

## 2025-01-01 RX ADMIN — CLOPIDOGREL 75 MG: 75 TABLET ORAL at 07:03

## 2025-01-01 RX ADMIN — SODIUM BICARBONATE 650 MG TABLET 1300 MG: at 09:05

## 2025-01-01 RX ADMIN — SUCRALFATE 1 G: 1 TABLET ORAL at 05:05

## 2025-01-01 RX ADMIN — FENTANYL CITRATE 100 MCG: 50 INJECTION, SOLUTION INTRAMUSCULAR; INTRAVENOUS at 06:03

## 2025-01-01 RX ADMIN — ACETAMINOPHEN 650 MG: 325 TABLET ORAL at 07:04

## 2025-01-01 RX ADMIN — CLOPIDOGREL 75 MG: 75 TABLET ORAL at 09:05

## 2025-01-01 RX ADMIN — SODIUM PHOSPHATE, MONOBASIC, MONOHYDRATE AND SODIUM PHOSPHATE, DIBASIC, ANHYDROUS 30 MMOL: 142; 276 INJECTION, SOLUTION INTRAVENOUS at 05:04

## 2025-01-01 RX ADMIN — IOHEXOL 100 ML: 350 INJECTION, SOLUTION INTRAVENOUS at 10:04

## 2025-01-01 RX ADMIN — PROPOFOL 45 MCG/KG/MIN: 10 INJECTION, EMULSION INTRAVENOUS at 03:04

## 2025-01-01 RX ADMIN — NITROFURANTOIN MONOHYDRATE/MACROCRYSTALS 100 MG: 25; 75 CAPSULE ORAL at 01:04

## 2025-01-01 RX ADMIN — NOREPINEPHRINE BITARTRATE 0.04 MCG/KG/MIN: 8 INJECTION, SOLUTION INTRAVENOUS at 12:04

## 2025-01-01 RX ADMIN — PROPOFOL 35 MCG/KG/MIN: 10 INJECTION, EMULSION INTRAVENOUS at 05:04

## 2025-01-01 RX ADMIN — PROPOFOL 40 MCG/KG/MIN: 10 INJECTION, EMULSION INTRAVENOUS at 10:04

## 2025-01-01 RX ADMIN — HEPARIN SODIUM 16.45 UNITS/KG/HR: 10000 INJECTION, SOLUTION INTRAVENOUS at 07:03

## 2025-01-01 RX ADMIN — SUCRALFATE 1 G: 1 TABLET ORAL at 12:04

## 2025-01-01 RX ADMIN — CEFTRIAXONE SODIUM 1 G: 1 INJECTION, POWDER, FOR SOLUTION INTRAMUSCULAR; INTRAVENOUS at 05:04

## 2025-01-01 RX ADMIN — NOREPINEPHRINE BITARTRATE 0.52 MCG/KG/MIN: 1 INJECTION, SOLUTION, CONCENTRATE INTRAVENOUS at 06:03

## 2025-01-01 RX ADMIN — AMIODARONE HYDROCHLORIDE 1 MG/MIN: 1.8 INJECTION, SOLUTION INTRAVENOUS at 06:03

## 2025-01-01 RX ADMIN — NOREPINEPHRINE BITARTRATE 0.5 MCG/KG/MIN: 8 INJECTION, SOLUTION INTRAVENOUS at 07:03

## 2025-01-01 RX ADMIN — ACETAMINOPHEN 650 MG: 325 TABLET ORAL at 03:04

## 2025-01-01 RX ADMIN — SENNOSIDES AND DOCUSATE SODIUM 1 TABLET: 50; 8.6 TABLET ORAL at 10:05

## 2025-01-01 RX ADMIN — NOREPINEPHRINE BITARTRATE 0.1 MCG/KG/MIN: 8 INJECTION, SOLUTION INTRAVENOUS at 10:04

## 2025-01-01 RX ADMIN — SODIUM BICARBONATE 50 MEQ: 84 INJECTION INTRAVENOUS at 05:03

## 2025-01-01 RX ADMIN — DEXMEDETOMIDINE HYDROCHLORIDE 0.8 MCG/KG/HR: 400 INJECTION INTRAVENOUS at 05:04

## 2025-01-01 RX ADMIN — EPINEPHRINE 1 MG: 0.1 INJECTION INTRAVENOUS at 05:03

## 2025-01-01 RX ADMIN — LIDOCAINE 5% 1 PATCH: 700 PATCH TOPICAL at 05:04

## 2025-01-01 RX ADMIN — POTASSIUM CHLORIDE 20 MEQ: 400 INJECTION, SOLUTION INTRAVENOUS at 12:03

## 2025-01-01 RX ADMIN — SODIUM CHLORIDE, POTASSIUM CHLORIDE, SODIUM LACTATE AND CALCIUM CHLORIDE: 600; 310; 30; 20 INJECTION, SOLUTION INTRAVENOUS at 05:04

## 2025-01-01 RX ADMIN — PROPOFOL 35 MCG/KG/MIN: 10 INJECTION, EMULSION INTRAVENOUS at 10:04

## 2025-01-01 RX ADMIN — DAPAGLIFLOZIN 10 MG: 10 TABLET, FILM COATED ORAL at 08:05

## 2025-01-01 RX ADMIN — ASPIRIN 81 MG: 81 TABLET, COATED ORAL at 07:03

## 2025-01-01 RX ADMIN — HYDROCODONE BITARTRATE AND ACETAMINOPHEN 1 TABLET: 5; 325 TABLET ORAL at 12:04

## 2025-01-01 RX ADMIN — HYDROCODONE BITARTRATE AND ACETAMINOPHEN 1 TABLET: 5; 325 TABLET ORAL at 10:04

## 2025-01-01 RX ADMIN — METOPROLOL SUCCINATE 25 MG: 25 TABLET, EXTENDED RELEASE ORAL at 08:04

## 2025-01-01 RX ADMIN — BUMETANIDE 1 MG: 0.25 INJECTION INTRAMUSCULAR; INTRAVENOUS at 08:04

## 2025-01-01 RX ADMIN — NOREPINEPHRINE BITARTRATE 0.12 MCG/KG/MIN: 8 INJECTION, SOLUTION INTRAVENOUS at 01:04

## 2025-01-01 RX ADMIN — ENOXAPARIN SODIUM 40 MG: 40 INJECTION SUBCUTANEOUS at 08:04

## 2025-01-01 RX ADMIN — PROPOFOL 25 MCG/KG/MIN: 10 INJECTION, EMULSION INTRAVENOUS at 08:03

## 2025-01-01 RX ADMIN — ASPIRIN 81 MG: 81 TABLET, COATED ORAL at 06:04

## 2025-01-01 RX ADMIN — PROPOFOL 30 MCG/KG/MIN: 10 INJECTION, EMULSION INTRAVENOUS at 08:03

## 2025-01-01 RX ADMIN — NOREPINEPHRINE BITARTRATE 0.02 MCG/KG/MIN: 8 INJECTION, SOLUTION INTRAVENOUS at 03:04

## 2025-01-01 RX ADMIN — Medication 81 MG: at 08:05

## 2025-01-01 RX ADMIN — CALCIUM GLUCONATE 1 G: 20 INJECTION, SOLUTION INTRAVENOUS at 09:03

## 2025-01-01 RX ADMIN — DEXMEDETOMIDINE HYDROCHLORIDE 0.02 MCG/KG/HR: 400 INJECTION INTRAVENOUS at 10:04

## 2025-01-01 RX ADMIN — ENOXAPARIN SODIUM 50 MG: 100 INJECTION SUBCUTANEOUS at 11:04

## 2025-01-01 RX ADMIN — PANTOPRAZOLE SODIUM 40 MG: 40 TABLET, DELAYED RELEASE ORAL at 07:04

## 2025-01-01 RX ADMIN — METOPROLOL SUCCINATE 12.5 MG: 25 TABLET, EXTENDED RELEASE ORAL at 08:04

## 2025-01-01 RX ADMIN — AMIODARONE HYDROCHLORIDE 0.5 MG/MIN: 1.8 INJECTION, SOLUTION INTRAVENOUS at 10:03

## 2025-01-01 RX ADMIN — IOHEXOL 100 ML: 350 INJECTION, SOLUTION INTRAVENOUS at 08:04

## 2025-01-01 RX ADMIN — SODIUM CHLORIDE, POTASSIUM CHLORIDE, SODIUM LACTATE AND CALCIUM CHLORIDE: 600; 310; 30; 20 INJECTION, SOLUTION INTRAVENOUS at 03:04

## 2025-01-01 RX ADMIN — ATORVASTATIN CALCIUM 40 MG: 40 TABLET, FILM COATED ORAL at 03:04

## 2025-01-01 RX ADMIN — SODIUM CHLORIDE, POTASSIUM CHLORIDE, SODIUM LACTATE AND CALCIUM CHLORIDE: 600; 310; 30; 20 INJECTION, SOLUTION INTRAVENOUS at 04:04

## 2025-01-01 RX ADMIN — PERFLUTREN 1 ML: 6.52 INJECTION, SUSPENSION INTRAVENOUS at 01:04

## 2025-01-01 RX ADMIN — HEPARIN SODIUM 16.45 UNITS/KG/HR: 10000 INJECTION, SOLUTION INTRAVENOUS at 11:03

## 2025-01-01 RX ADMIN — HEPARIN SODIUM 18.41 UNITS/KG/HR: 10000 INJECTION, SOLUTION INTRAVENOUS at 02:04

## 2025-01-01 RX ADMIN — AMIODARONE HYDROCHLORIDE 150 MG: 1.5 INJECTION, SOLUTION INTRAVENOUS at 06:03

## 2025-01-01 RX ADMIN — PROPOFOL 40 MCG/KG/MIN: 10 INJECTION, EMULSION INTRAVENOUS at 08:04

## 2025-01-01 RX ADMIN — SODIUM CHLORIDE 500 ML: 9 INJECTION, SOLUTION INTRAVENOUS at 04:03

## 2025-01-01 RX ADMIN — POTASSIUM CHLORIDE 20 MEQ: 14.9 INJECTION, SOLUTION INTRAVENOUS at 06:03

## 2025-01-01 RX ADMIN — Medication 1 EACH: at 08:04

## 2025-01-01 RX ADMIN — PROPOFOL 30 MCG/KG/MIN: 10 INJECTION, EMULSION INTRAVENOUS at 12:03

## 2025-01-01 RX ADMIN — PROPOFOL 50 MCG/KG/MIN: 10 INJECTION, EMULSION INTRAVENOUS at 04:04

## 2025-01-01 RX ADMIN — NOREPINEPHRINE BITARTRATE 0.02 MCG/KG/MIN: 8 INJECTION, SOLUTION INTRAVENOUS at 04:04

## 2025-01-01 RX ADMIN — NITROFURANTOIN MONOHYDRATE/MACROCRYSTALS 100 MG: 25; 75 CAPSULE ORAL at 08:04

## 2025-01-01 RX ADMIN — CLOPIDOGREL 75 MG: 75 TABLET ORAL at 08:05

## 2025-01-01 RX ADMIN — METOPROLOL SUCCINATE 25 MG: 25 TABLET, EXTENDED RELEASE ORAL at 01:04

## 2025-01-01 RX ADMIN — PROPOFOL 30 MCG/KG/MIN: 10 INJECTION, EMULSION INTRAVENOUS at 02:03

## 2025-01-01 RX ADMIN — ACETAMINOPHEN 650 MG: 325 TABLET ORAL at 08:04

## 2025-01-01 RX ADMIN — POTASSIUM BICARBONATE 25 MEQ: 978 TABLET, EFFERVESCENT ORAL at 08:04

## 2025-01-01 RX ADMIN — NOREPINEPHRINE BITARTRATE 0.26 MCG/KG/MIN: 1 INJECTION, SOLUTION, CONCENTRATE INTRAVENOUS at 11:03

## 2025-01-01 RX ADMIN — FUROSEMIDE 40 MG: 10 INJECTION, SOLUTION INTRAVENOUS at 12:04

## 2025-01-01 RX ADMIN — SENNOSIDES AND DOCUSATE SODIUM 1 TABLET: 50; 8.6 TABLET ORAL at 07:04

## 2025-01-01 RX ADMIN — PROPOFOL 50 MCG/KG/MIN: 10 INJECTION, EMULSION INTRAVENOUS at 11:04

## 2025-01-01 RX ADMIN — ENOXAPARIN SODIUM 40 MG: 40 INJECTION SUBCUTANEOUS at 03:04

## 2025-01-01 RX ADMIN — HYDROCODONE BITARTRATE AND ACETAMINOPHEN 1 TABLET: 5; 325 TABLET ORAL at 10:05

## 2025-01-01 RX ADMIN — SODIUM BICARBONATE: 84 INJECTION, SOLUTION INTRAVENOUS at 08:04

## 2025-01-01 RX ADMIN — PROPOFOL 30 MCG/KG/MIN: 10 INJECTION, EMULSION INTRAVENOUS at 05:03

## 2025-01-01 RX ADMIN — SODIUM CHLORIDE: 9 INJECTION, SOLUTION INTRAVENOUS at 03:04

## 2025-01-01 RX ADMIN — PROPOFOL 25 MCG/KG/MIN: 10 INJECTION, EMULSION INTRAVENOUS at 05:04

## 2025-03-29 NOTE — Clinical Note
The catheter was inserted into the right common femoral artery. Angio performed of LFA post Manta deployment.

## 2025-03-29 NOTE — Clinical Note
The catheter was inserted into the and was inserted over the wire into the proximal   right coronary artery. IVUS performed then catheter removed

## 2025-03-29 NOTE — Clinical Note
The sheath was exchanged in the right femoral artery. Current 6F sheath changed out for new 6F sheath

## 2025-03-29 NOTE — CONSULTS
Inpatient consult to Cardiology  Consult performed by: Hasmukh Sandoval ANP  Consult ordered by: Lizz Stubbs MD  Reason for consult: Cardiac Arrest        [facility]    Cardiology  Consult Note    Patient Name: Jon Conley  MRN: [id number]  Admission Date: 3/29/2025  Hospital Length of Stay: 0 days  Code Status: No Order   Attending Provider: Davi Harp Jr., MD,*   Consulting Provider: NORM Sierra  Primary Care Physician: No primary care provider on file.  Principal Problem:<principal problem not specified>    Patient information was obtained from past medical records and ER records.     Subjective:     Chief Complaint/Reason for Consult: Cardiac Arrest     HPI: Mr. Conley is a 80 y/o male who is known to [facility], Dr. Wang. The patient presented to [facility] on 3.29.25 with c/o SOB. He initially presented to [facility] in [place] with SOB. He was found to have Tachycardia, Hypotension, Anemia (7.4/24.6), Troponin 9.443, .0, BUN/Crea 16.2/1.34, Lactic Acid Level 4.3, Na 133, WBC 14.03, H&H 7.3/23.0, WBC 14.03, , PT/INR 14.9/1.2. He was admitted to , however, he sustained a witnessed PEA Arrest in the ER and has ROSC per ER MD. [facility] was consulted for NSETMI and Arrest.     PMH: BPH/Urinary Obstruction, CAD/Nonobstructive, PAD, R JACY/Mild, HTN, Obesity, GERD, Tobacco Use   PSH: TURP, Angiogram, Bilateral Knee Replacements   Family History: Denies Family History of Heart Disease  Social History: Denies Illicit Drug, ETOH and Tobacco Use     Previous Cardiac Diagnostics:   Carotid US 11.4.24:  The study quality is average.   1-39% stenosis in the proximal right internal carotid artery based on Bluth Criteria.   Antegrade right vertebral artery flow.   Antegrade left vertebral artery flow    ECHO 1.27.22:  The study quality is average.   The left ventricle is normal in size. Global left ventricular systolic function is moderately  decreased. The left ventricular ejection fraction is 40-45%. Mild concentric left ventricular hypertrophy is present.  The left ventricle diastolic function is impaired (Grade I) with normal left atrial pressure.  Mild (1+) mitral regurgitation.  The pulmonary artery appears normal.     [facility] 9.26.17:  LM: Normal  LAD: Prox 30% Stenosis  RI: Normal  LCX: Normal  RCA: Distal RCA 50%    Review of patient's allergies indicates:  No Known Allergies  No current facility-administered medications on file prior to encounter.     No current outpatient medications on file prior to encounter.     Review of Systems   Unable to perform ROS: Intubated     Objective:     Vital Signs (Most Recent):  Temp: 97.2 °F (36.2 °C) (03/29/25 0404)  Pulse: 108 (03/29/25 0804)  Resp: 17 (03/29/25 0804)  BP: 107/69 (03/29/25 0804)  SpO2: (!) 90 % (03/29/25 0804) Vital Signs (24h Range):  Temp:  [97.2 °F (36.2 °C)] 97.2 °F (36.2 °C)  Pulse:  [108-170] 108  Resp:  [6-83] 17  SpO2:  [66 %-100 %] 90 %  BP: ()/(44-95) 107/69  Arterial Line BP: (100-144)/(53-73) 121/64   Weight: 102.1 kg (225 lb)  Body mass index is 31.38 kg/m².  SpO2: (!) 90 %       Intake/Output Summary (Last 24 hours) at 3/29/2025 0838  Last data filed at 3/29/2025 0640  Gross per 24 hour   Intake 30.79 ml   Output --   Net 30.79 ml     Lines/Drains/Airways       Central Venous Catheter Line  Duration             Tunneled Central Line - Triple Lumen 03/29/25 0555 Internal Jugular Right <1 day              Drain  Duration                  Urethral Catheter -- days              Airway  Duration                  Airway - Non-Surgical 03/29/25 0541 <1 day              Arterial Line  Duration             Arterial Line 03/29/25 0620 Right Radial <1 day              Peripheral Intravenous Line  Duration                  Peripheral IV - Single Lumen 03/29/25 0455 20 G Right Forearm <1 day         Peripheral IV - Single Lumen 03/29/25 0530 18 G Right Antecubital <1 day          Peripheral IV - Single Lumen 03/29/25 0600 20 G Left Hand <1 day         Peripheral IV - Single Lumen 03/29/25 20 G Left Forearm <1 day                  Significant Labs:   Chemistries:   Recent Labs   Lab 03/29/25 0446   *   K 4.1      CO2 19*   BUN 16.2   CREATININE 1.34*   CALCIUM 8.0*   BILITOT 0.3   ALKPHOS 58   ALT 15   AST 82*   GLUCOSE 144*   TROPONINI 9.443*        CBC/Anemia Labs: Coags:    Recent Labs   Lab 03/29/25 0446 03/29/25 0456   WBC 14.03*  --    HGB 7.3*  --    HCT 23.0* 25*     --    MCV 98.7*  --    RDW 15.2  --     Recent Labs   Lab 03/29/25 0446   INR 1.2   APTT 63.5*        Significant Imaging:  Imaging Results              X-Ray Chest AP Portable (Final result)  Result time 03/29/25 08:20:10      Final result by Melecio Cerna MD (03/29/25 08:20:10)                   Impression:      Lungs vascular congestive changes.      Electronically signed by: Melecio Cerna  Date:    03/29/2025  Time:    08:20               Narrative:    EXAMINATION:  XR CHEST AP PORTABLE    CLINICAL HISTORY:  Cardiac arrest, cause unspecified    TECHNIQUE:  One view    COMPARISON:  September 28, 2021.    FINDINGS:  Cardiopericardial silhouette enlarged appearance is similar.  Lungs are remarkable for mild to moderate vascular congestive changes.  Left basilar atelectasis.  No consolidation or pneumothorax.  Optimal intubation and the nasogastric tube traverses into the stomach.  Right IJ central line terminates about the cavoatrial junction.                                       CTA Chest Non-Coronary (PE Studies) (Final result)  Result time 03/29/25 08:27:40      Final result by Juany Thompson MD (03/29/25 08:27:40)                   Impression:      1. Cardiomegaly with septal thickening, bronchial wall thickening and ground-glass densities suggesting interstitial edema  2. No evidence of pulmonary embolus  3. The preliminary and final reports are concordant.      Electronically signed  by: Juany Thompson  Date:    03/29/2025  Time:    08:27               Narrative:    EXAMINATION:  CTA CHEST NON CORONARY (PE STUDIES)    CLINICAL HISTORY:  Pulmonary embolism (PE) suspected, unknown D-dimer;   shortness of breath, chest pain    TECHNIQUE:  Helically acquired images with axial, sagittal and coronal reformations were obtained from the thoracic inlet to the lung bases followingthe IV administration of contrast.  CTA timed for evaluation of the pulmonary arteries.  MIP images were performed.    Automated tube current modulation, weight-based exposure dosing, and/or iterative reconstruction technique utilized to reach lowest reasonably achievable exposure rate.    DLP: 708 mGy*cm    COMPARISON:  Chest radiograph 09/28/2021    FINDINGS:  BASE OF NECK: No significant abnormality.    AORTA: Aortic atherosclerosis.    PULMONARY VASCULATURE: Pulmonary arteries enhance normally.  No evidence of pulmonary embolus.    HEART: Left ventricle appears dilated.  There are coronary artery calcifications.    ISIDRO/MEDIASTINUM: No enlarged lymph nodes by size criteria.    AIRWAYS: Expiratory phase imaging with buckling of the posterior wall of the trachea and mainstem bronchi.  Bibasilar bronchial wall thickening.    LUNGS/PLEURA: Bilateral septal thickening and ground-glass opacities.  Trace pleural fluid.    UPPER ABDOMEN: No abnormality of the partially imaged upper abdomen.    THORACIC SOFT TISSUES: Unremarkable.    BONES: No acute fracture. No suspicious lytic or sclerotic lesions.                        Preliminary result by Bridger Marcum MD (03/29/25 04:59:57)                   Impression:    1. No filling defects are seen in the pulmonary arteries to suggest pulmonary embolus.  2. There is scattered hazy opacity in the mid and upper lung as well as the bilateral lung bases with suggestion of some interlobular septal thickening. Some of this may reflect motion artifact however possibility of interstitial  edema versus a possibly atypical infectious process are also considerations. Correlate with clinical and laboratory findings as regards additional evaluation and follow-up.  3. Details and other findings as discussed above.               Narrative:    START OF REPORT:  Technique: CT Scan of the chest was performed with intravenous contrast with direct axial images as well as sagittal and coronal reconstruction images pulmonary embolus protocol.    Dosage Information: Automated Exposure Control was utilized 707.62 mGy.cm.    Comparison: None.    Clinical History: Sob.chest pain.    Findings:  Soft Tissues: Unremarkable.  Neck: The visualized soft tissues of the neck appear unremarkable.  Mediastinum: The mediastinal structures are within normal limits.  Heart: The heart size is within normal limits. Mild coronary artery calcification is seen.  Aorta: No aortic dissection or aneurysm is seen. Mild aortic calcification is seen in the thoracic aorta.  Pulmonary Arteries: Unremarkable. No filling defects are seen in the pulmonary arteries to suggest pulmonary embolus.  Lungs: There is scattered hazy opacity in the mid and upper lung as well as the bilateral lung bases with suggestion of some interlobular septal thickening.  Pleura: No effusions or pneumothorax are identified. Bilateral posterior costal pleural thickening is noted.  Bony Structures:  Spine: Mild spondylolytic changes are seen in the thoracic spine.  Ribs: The bilateral ribs appear unremarkable.  Abdomen: The visualized upper abdominal organs appear unremarkable.                                      EKG:       Telemetry: ST    Physical Exam  Constitutional:       General: He is not in acute distress.     Appearance: Normal appearance. He is obese. He is ill-appearing.      Comments: Vented/Sedated   HENT:      Head: Normocephalic.      Mouth/Throat:      Mouth: Mucous membranes are dry.   Cardiovascular:      Rate and Rhythm: Regular rhythm. Tachycardia  present.      Pulses: Normal pulses.      Heart sounds: Murmur heard.   Pulmonary:      Effort: Pulmonary effort is normal. No respiratory distress.      Comments: Ventilator Associated Breath Sounds  Vent Mode: A/C  Oxygen Concentration (%):  [100] 100  Resp Rate Total:  [20 br/min] 20 br/min  Vt Set:  [500 mL] 500 mL  PEEP/CPAP:  [10 cmH20] 10 cmH20  Mean Airway Pressure:  [16 cmH20] 16 cmH20  Abdominal:      Palpations: Abdomen is soft.   Skin:     General: Skin is warm.   Neurological:      Comments: Vented/Sedated       Home Medications:   Medications Ordered Prior to Encounter[1]  Current Schedule Inpatient Medications:   fentaNYL        magnesium sulfate 2 g IVPB  2 g Intravenous Once    [START ON 3/30/2025] mupirocin   Nasal BID     Continuous Infusions:   fentanyl  0-250 mcg/hr Intravenous Continuous 20 mL/hr at 03/29/25 0640 200 mcg/hr at 03/29/25 0640    heparin (porcine) in D5W  0-40 Units/kg/hr Intravenous Continuous 12.3 mL/hr at 03/29/25 0640 12 Units/kg/hr at 03/29/25 0640    NORepinephrine bitartrate-D5W  0-3 mcg/kg/min Intravenous Continuous 95.7 mL/hr at 03/29/25 0720 0.5 mcg/kg/min at 03/29/25 0720     Assessment:   Undifferentiated Shock: Hypovolemic vs Septic vs Cardiogenic Shock  NSTEMI    - Trponin > 9  Cardiopulmonary Arrest    - Initial Rhythm PEA - Witnessed in ER Arrest/ROSC  Acute Hypoxemic Respiratory Failure requiring Intubation/Ventilation   ST  Hypotension requiring Pressors    - Hx of HTN  BPH s/p TURP   Lactic Acidosis   Leukocytosis   Anemia   Recent BPH Diagnosis s/p TURP  CAD/Nonobstructive (2017)  JACY/R Sided   Obesity  GERD  Tobacco Use  No Hx of GIB     Plan:   ECHO Now   Heparin Drip per Protocol for Tx of NSTEMI   Monitor H&H given Severe Anemia (No Overt signs of Bleeding)  Keep NPO  Schedule/Consent for LHC/RHC with Possible PTCA +/- Stenting Today  Risk, Benefits and Alternatives Reviewed and Discussed with the PT and their Family and they wish to proceed with above  Procedure.   Trend Lactic Acid Levels  Labs and EKG in AM: CBC, CMP and Mg    Thank you for your consult.     Hasmukh Sandoval, NORM  Cardiology  [facility]        [1]   No current facility-administered medications on file prior to encounter.     No current outpatient medications on file prior to encounter.

## 2025-03-29 NOTE — Clinical Note
The catheter was inserted into the and was inserted over the wire into the left ventricle. Hemodynamics were performed.  Pigtail

## 2025-03-29 NOTE — Clinical Note
The catheter insertion attempt was made into the and was inserted over the wire into the ostium   left main. Hemodynamics were performed.  The catheter was unable to engage the area.. JL4

## 2025-03-29 NOTE — Clinical Note
The catheter was inserted into the and was inserted over the wire into the proximal   right coronary artery.  IVUS performed

## 2025-03-29 NOTE — Clinical Note
The catheter was inserted into the and was inserted over the wire into the ostium   right coronary artery. Hemodynamics were performed.  An angiography was performed of the right coronary arteries. Multiple views were taken. The angiography was performed via power injection.   HENNY

## 2025-03-29 NOTE — Clinical Note
Diagnosis: Cardiogenic shock [785.51.ICD-9-CM]   Future Attending Provider: NIKHIL RIVERO JR. [[id number]]   Admit to which facility:: [facility] [[id number]]   Reason for IP Medical Treatment  (Clinical interventions that can only be accomplished in the IP setting? ) :: ICU   Plans for Post-Acute care--if anticipated (pick the single best option):: A. No post acute care anticipated at this time

## 2025-03-29 NOTE — H&P
[facility] - [facility] North ICU  Pulmonary Critical Care Note    Patient Name: Jon Conley  MRN: [id number]  Admission Date: 3/29/2025  Hospital Length of Stay: 0 days  Code Status: No Order  Attending Provider: Davi Harp Jr., MD,*  Primary Care Provider: No primary care provider on file.     Subjective:     HPI:   79-year-old male with a past medical history of CAD, hypertension, hyperlipidemia, peripheral artery disease, GERD who presents to the ICU following left heart catheterization with Impella placement for NSTEMI.  Patient was transferred from an outside facility for Cardiology Services.  Noted to have reported to the ED at outside facility short of breath, at about 1:00 a.m..  Labs were significant for anemia 7.4-24.6, elevated troponin initially at 9, BNP of 865, and an ANDI.  Also noted to have a lactic acidosis initially 4.3.  Had witnessed PEA arrest in the ED with ROSC.  [facility] was consulted for NSTEMI and cardiac arrest, patient was taken to the cath lab shortly after.    Per op report, noted to have 100% occlusion of the proximal RCA and the left circumflex.  The left circumflex noted to be , could not be transversed.  RCA, also possibly , 3 stents placed with PATTY grade 3 flow 0% residual stenosis.  Impella CP device placed left femoral.  LAD mid noted to be 70-80% stenosed, possible high-risk PCI in the near future.  Left main noted to have mild diffuse disease.  Patient arrived in the ICU, vascular study being performed to left lower extremity showed very dampened perfusion.  Right sheath in place.  Right IJ central line in place.  Patient was intubated, on propofol and fentanyl.  Comfortable appearing.  Noted to be on 0.06 of Levophed.  Amiodarone drip was running, given infusion during catheterization.  Aggrastat drip hung, to be hung for 4-6 hours following catheterization.  Recommendation was to also initiate a heparin drip.    Hospital Course/Significant  events:  Admitted to the ICU on 03/29/2025  Left heart catheterization with Impella placement on 03/29/2025    24 Hour Interval History:  N/a    No past medical history on file.    No past surgical history on file.    Social History[1]        No current outpatient medications    Review of patient's allergies indicates:  No Known Allergies     Current Inpatient Medications   [START ON 3/30/2025] aspirin  81 mg Oral Daily    [START ON 3/30/2025] clopidogreL  75 mg Oral Daily    fentaNYL        [START ON 3/30/2025] mupirocin   Nasal BID       Current Intravenous Infusions   amiodarone    Continuous PRN 33.3 mL/hr at 03/29/25 1109 1 mg/min at 03/29/25 1109    fentanyl  0-250 mcg/hr Intravenous Continuous 10 mL/hr at 03/29/25 1021 100 mcg/hr at 03/29/25 1021    NORepinephrine bitartrate-D5W  0-3 mcg/kg/min Intravenous Continuous        NORepinephrine bitartrate-D5W  0-3 mcg/kg/min Intravenous Continuous 114.9 mL/hr at 03/29/25 1136 0.6 mcg/kg/min at 03/29/25 1136    propofoL    Continuous PRN 18.4 mL/hr at 03/29/25 1026 30 mcg/kg/min at 03/29/25 1026    sodium bicarbonate 150 mEq in D5W 1,000 mL infusion   Intravenous Continuous        sodium bicarbonate 25 mEq in D5W 1,000 mL Purge Solution for Impella   Impella Device Continuous        tirofiban-0.9% sodium chloride 12.5 mg/250ml    Continuous PRN 18.4 mL/hr at 03/29/25 1002 0.15 mcg/kg/min at 03/29/25 1002    tirofiban-0.9% sodium chloride 12.5 mg/250ml  0.075 mcg/kg/min Intravenous Continuous             Review of Systems   Unable to perform ROS: Intubated          Objective:       Intake/Output Summary (Last 24 hours) at 3/29/2025 1331  Last data filed at 3/29/2025 0640  Gross per 24 hour   Intake 30.79 ml   Output --   Net 30.79 ml         Vital Signs (Most Recent):  Temp: 97.2 °F (36.2 °C) (03/29/25 0404)  Pulse: 98 (03/29/25 0844)  Resp: 20 (03/29/25 0844)  BP: 102/72 (03/29/25 0844)  SpO2: 99 % (03/29/25 1210)  Body mass index is 31.38 kg/m².  Weight: 102.1 kg  (225 lb) Vital Signs (24h Range):  Temp:  [97.2 °F (36.2 °C)] 97.2 °F (36.2 °C)  Pulse:  [] 98  Resp:  [6-83] 20  SpO2:  [66 %-100 %] 99 %  BP: ()/(44-95) 102/72  Arterial Line BP: (100-144)/(53-73) 114/64     Physical Exam  Constitutional:       Appearance: He is obese.      Interventions: He is sedated and intubated.      Comments: Intubated and sedated   HENT:      Head: Normocephalic and atraumatic.      Nose: Nose normal.      Mouth/Throat:      Mouth: Mucous membranes are moist.      Pharynx: Oropharynx is clear.   Eyes:      Comments: Pinpoint pupils bilaterally, unreactive   Cardiovascular:      Rate and Rhythm: Normal rate. Rhythm irregular.      Pulses: Decreased pulses.      Arteriovenous access: Right arteriovenous access is present.     Comments: Left extremity is cold, DP and popliteal and femoral pulses not palpable  Right extremity is cool to touch, DP, popliteal, and femoral 1+  Impella in place to the left lower extremity  Sheath in place to right lower extremity  Pulmonary:      Effort: Pulmonary effort is normal. No respiratory distress. He is intubated.      Breath sounds: Normal breath sounds. No wheezing.   Abdominal:      General: Bowel sounds are normal. There is no distension.      Palpations: Abdomen is soft.      Tenderness: There is no abdominal tenderness. There is no guarding.   Musculoskeletal:      Right lower le+ Pitting Edema present.      Left lower le+ Pitting Edema present.   Skin:     General: Skin is cool and dry.      Capillary Refill: Capillary refill takes 2 to 3 seconds.      Coloration: Skin is pale.      Comments: Bruising to the bilateral upper extremities  Cool and dry   Neurological:      Mental Status: He is unresponsive.      Comments: Neurologic exam is limited secondary to intubated and sedated status           Lines/Drains/Airways       Central Venous Catheter Line  Duration             Tunneled Central Line - Triple Lumen 25 6194  Internal Jugular Right <1 day              Drain  Duration                  Urethral Catheter -- days              Airway  Duration                  Airway - Non-Surgical 03/29/25 0541 <1 day              Arterial Line  Duration             Arterial Line 03/29/25 0620 Right Radial <1 day              Peripheral Intravenous Line  Duration                  Peripheral IV - Single Lumen 03/29/25 0455 20 G Right Forearm <1 day         Peripheral IV - Single Lumen 03/29/25 0530 18 G Right Antecubital <1 day         Peripheral IV - Single Lumen 03/29/25 0600 20 G Left Hand <1 day         Peripheral IV - Single Lumen 03/29/25 20 G Left Forearm <1 day         Sheath 03/29/25 0913 Right <1 day         Sheath 03/29/25 0927 Left <1 day         Sheath 03/29/25 1120 Right <1 day                    Significant Labs:    Lab Results   Component Value Date    WBC 14.03 (H) 03/29/2025    HGB 7.3 (L) 03/29/2025    HCT 25 (L) 03/29/2025    MCV 98.7 (H) 03/29/2025     03/29/2025           BMP  Lab Results   Component Value Date     (L) 03/29/2025    K 4.1 03/29/2025    CO2 19 (L) 03/29/2025    BUN 16.2 03/29/2025    CREATININE 1.34 (H) 03/29/2025    CALCIUM 8.0 (L) 03/29/2025    AGAP 7.0 03/29/2025    EGFRNONAA >60 10/16/2021         ABG  Recent Labs   Lab 03/29/25  0703   PH 7.030*   PO2 406.0*   PCO2 37.0   HCO3 9.8*   POCBASEDEF -19.80*       Mechanical Ventilation Support:  Vent Mode: A/C (03/29/25 1210)  Ventilator Initiated: Yes (03/29/25 0603)  Set Rate: 20 BPM (03/29/25 1210)  Vt Set: 500 mL (03/29/25 1210)  PEEP/CPAP: 10 cmH20 (03/29/25 1210)  Oxygen Concentration (%): 100 (03/29/25 1210)  Peak Airway Pressure: 29 cmH20 (03/29/25 1210)  Total Ve: 10.2 L/m (03/29/25 1210)      Significant Imaging:  I have reviewed the pertinent imaging within the past 24 hours.        Assessment/Plan:     Assessment  NSTEMI with PEA arrest status post high-risk PCI with Impella in place to LLE  HFrEF, EF 15-20%  Cardiogenic shock,  lactic acidosis  Severe anemia  Left lower limb hypoperfusion  Severe mitral and tricuspid regurgitation  Intubated and on mechanical ventilation  Urinary obstruction status post TURP with triple-lumen contreras in place  PAD      Plan  -continue close monitoring in the ICU, wean vent per ARDS net protocol keeping SpO2 above 92%  -extubate when appropriate, continue sedation for RASS of 0--1  -continue Levophed to keep MAP above 65  -heparin drip low intensity initiated per Interventional Cardiology recommendations  -repeat H&H pending, will transfuse PRBC to keep hemoglobin above 7  -cardiology is following, appreciate assistance--will likely need GDMT and close follow up  -continuing Aggrastat drip 4-6 hours post catheterization  -will complete amiodarone infusion protocol  -aspirin and clopidogrel initiated by Interventional Radiology, we will continue  -bicarb drip hung due to severe metabolic acidosis with pH of 7.03, we will repeat every 8 hours  -Impella is in place, we will closely monitor  -due to severity of cardiogenic shock, possibility of starting dobutamine?    DVT Prophylaxis:  Heparin drip, low intensity  GI Prophylaxis:     32 minutes of critical care was time spent personally by me on the following activities: development of treatment plan with patient or surrogate and bedside caregivers, discussions with consultants, evaluation of patient's response to treatment, examination of patient, ordering and performing treatments and interventions, ordering and review of laboratory studies, ordering and review of radiographic studies, pulse oximetry, re-evaluation of patient's condition.  This critical care time did not overlap with that of any other provider or involve time for any procedures.     Rey Almodovar MD  Pulmonary Critical Care Medicine  [facility] - [facility] ICU  DOS: 03/29/2025           [1]   Social History  Socioeconomic History    Marital status: Single     Social Drivers of  Health     Financial Resource Strain: High Risk (6/26/2024)    Received from [facility] SDOH Screening     In the past year, have you been unable to get any of the following when you really needed them? choose all that apply.: Clothing     In the past year, have you been unable to get any of the following when you really needed them? choose all that apply.: Internet

## 2025-03-29 NOTE — Clinical Note
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 If he is really concerned about these lesions-even though they will eventually go away on their own, he can see a dermatologist to see if they would try treating with cryotherapy. Referral can be placed if he desires. The sheath was inserted into the right femoral artery.

## 2025-03-29 NOTE — PLAN OF CARE
Problem: Infection  Goal: Absence of Infection Signs and Symptoms  Outcome: Progressing     Problem: Adult Inpatient Plan of Care  Goal: Plan of Care Review  Outcome: Progressing  Goal: Patient-Specific Goal (Individualized)  Outcome: Progressing  Goal: Absence of Hospital-Acquired Illness or Injury  Outcome: Progressing  Goal: Optimal Comfort and Wellbeing  Outcome: Progressing  Goal: Readiness for Transition of Care  Outcome: Progressing     Problem: Mechanical Ventilation Invasive  Goal: Effective Communication  Outcome: Progressing  Goal: Optimal Device Function  Outcome: Progressing  Goal: Mechanical Ventilation Liberation  Outcome: Progressing  Goal: Optimal Nutrition Delivery  Outcome: Progressing  Goal: Absence of Device-Related Skin and Tissue Injury  Outcome: Progressing  Goal: Absence of Ventilator-Induced Lung Injury  Outcome: Progressing     Problem: Artificial Airway  Goal: Effective Communication  Outcome: Progressing  Goal: Optimal Device Function  Outcome: Progressing  Goal: Absence of Device-Related Skin or Tissue Injury  Outcome: Progressing     Problem: Delirium  Goal: Optimal Coping  Outcome: Progressing  Goal: Improved Behavioral Control  Outcome: Progressing  Goal: Improved Attention and Thought Clarity  Outcome: Progressing  Goal: Improved Sleep  Outcome: Progressing     Problem: Skin Injury Risk Increased  Goal: Skin Health and Integrity  Outcome: Progressing     Problem: Fall Injury Risk  Goal: Absence of Fall and Fall-Related Injury  Outcome: Progressing

## 2025-03-29 NOTE — ED NOTES
Initial [facility] referral placed due to clinical triggers, referral number: [id number].

## 2025-03-29 NOTE — ED PROVIDER NOTES
Encounter Date: 3/29/2025       History     Chief Complaint   Patient presents with    transfer     Pt transfer from [facility] with report of SOB and NSTEMI.     79-year-old male presents to the emergency department as [place] with complaint of chest pain overnight with acute onset shortness of breath at 1:30 a.m. this morning.  Underwent TURP in February of this year, postoperative course complicated by bleeding.  Subsequently underwent cystoscopy with irrigation, Ortiz catheter in place, no further hematuria noted.  Recent admitted at another facility for chest pain, no interventions performed during the hospitalization.    Presented tonight and outside ED with tachycardia, hypotension, significant anemia with hemoglobin 7.5, hematocrit 24.6, elevated high sensitivity troponin of 13,821, leukocytosis 13.4, .    Patient given 500 mL normal saline bolus, heparin 5000 units IV, transferred here for higher level of care.  Reports still with severe shortness of breath on arrival.  Denies active chest pain.    The history is provided by the patient, medical records and the EMS personnel.     Review of patient's allergies indicates:  No Known Allergies  No past medical history on file.  No past surgical history on file.  No family history on file.  Social History[1]  Review of Systems   Constitutional:  Positive for diaphoresis. Negative for fever.   Respiratory:  Positive for shortness of breath.    Cardiovascular:  Positive for chest pain.   Gastrointestinal:  Negative for abdominal pain and vomiting.       Physical Exam     Initial Vitals [03/29/25 0404]   BP Pulse Resp Temp SpO2   (!) 96/50 (!) 120 20 97.2 °F (36.2 °C) 97 %      MAP       --         Physical Exam    Nursing note and vitals reviewed.  Constitutional: He appears well-developed and well-nourished. He appears distressed.   HENT:   Head: Normocephalic and atraumatic. Mouth/Throat: Oropharynx  is clear and moist.   Eyes: No scleral icterus.   Neck: Neck supple. No tracheal deviation present. JVD present.   Normal range of motion.  Cardiovascular:  An irregularly irregular rhythm present.   Tachycardia present.         Pulmonary/Chest: No stridor. He is in respiratory distress.   Abdominal: Abdomen is soft. He exhibits no distension. There is no abdominal tenderness.   Musculoskeletal:      Cervical back: Normal range of motion and neck supple.     Neurological: He is alert and oriented to person, place, and time. GCS score is 15.   Skin: Skin is warm. There is pallor.         ED Course   Critical Care    Date/Time: 3/29/2025 4:23 AM    Performed by: Lizz Stubbs MD  Authorized by: Lizz Stubbs MD  Direct patient critical care time: 65 minutes  Additional history critical care time: 5 minutes  Ordering / reviewing critical care time: 6 minutes  Documentation critical care time: 4 minutes  Consulting other physicians critical care time: 7 minutes  Consult with family critical care time: 10 minutes  Total critical care time (exclusive of procedural time) : 97 minutes  Critical care time was exclusive of separately billable procedures and treating other patients.  Critical care was necessary to treat or prevent imminent or life-threatening deterioration of the following conditions: circulatory failure and respiratory failure.  Critical care was time spent personally by me on the following activities: development of treatment plan with patient or surrogate, discussions with consultants, gastric intubation, interpretation of cardiac output measurements, examination of patient, evaluation of patient's response to treatment, obtaining history from patient or surrogate, ordering and performing treatments and interventions, ordering and review of laboratory studies, ordering and review of radiographic studies, pulse oximetry, re-evaluation of patient's condition, review of old charts, vascular access  procedures and ventilator management.      Intubation    Date/Time: 3/29/2025 5:41 AM  Location procedure was performed: [facility] EMERGENCY DEPARTMENT    Performed by: Lizz Stubbs MD  Authorized by: Lizz Stubbs MD  Consent Done: Emergent Situation  Indications: respiratory failure  Intubation method: direct  Patient status: unconscious  Preoxygenation: BVM  Paralytic: none  Laryngoscope size: Mac 4  Tube size: 8.0 mm  Tube type: cuffed  Number of attempts: 1  Cricoid pressure: no  Cords visualized: yes  Post-procedure assessment: chest rise  Breath sounds: rales/crackles  Cuff inflated: no  ETT to lip: 24 cm  Tube secured with: ETT mccray  Chest x-ray interpreted by me.  Chest x-ray findings: endotracheal tube in appropriate position  Patient tolerance: Patient tolerated the procedure well with no immediate complications  Complications: No  Specimens: No  Implants: No      Central Line    Date/Time: 3/29/2025 5:55 AM    Performed by: Lizz Stubbs MD  Authorized by: Lizz Stubbs MD    Location procedure was performed:  [facility] EMERGENCY DEPARTMENT  Consent Done ?:  Emergent Situation  Time out complete?: Verified correct patient, procedure, equipment, staff, and site/side    Indications:  Med administration and vascular access  Preparation:  Skin prepped with ChloraPrep  Skin prep agent dried: Skin prep agent completely dried prior to procedure    Sterile barriers: All five maximal sterile barriers used - gloves, gown, cap, mask and large sterile sheet    Hand hygiene: Hand hygiene performed immediately prior to central venous catheter insertion    Location:  Right internal jugular  Catheter type:  Triple lumen  Catheter size:  7 Fr  Inserted Catheter Length (cm):  15  Ultrasound guidance: Yes    Vessel Caliber:  Large   patent  Comprressibility:  Normal  Needle advanced into vessel with real time ultrasound guidance.    Guidewire confirmed in vessel.    Steril sheath on probe.    Sterile gel  used.  Manometry: No    Number of attempts:  1  Securement:  Line sutured, sterile dressing applied and blood return through all ports  Complications: No    Specimens: No    Implants: No    Guidewire: guidewire removed intact, verified with nurse    XRay:  Placement verified by x-ray, no pneumothorax on x-ray, tip termination and successful placement  Adverse Events:  NoneTermination Site: right atrium  Cardioversion    Date/Time: 3/29/2025 6:04 AM  Location procedure was performed: [facility] EMERGENCY DEPARTMENT    Performed by: Lizz Stubbs MD  Authorized by: Lizz Stubbs MD    Patient sedated: no  Cardioversion basis: emergent  Pre-procedure rhythm: atrial fibrillation  Patient position: patient was placed in a supine position  Chest area: chest area exposed  Electrodes: pads  Electrodes placed: anterior-posterior  Number of attempts: 2  Attempt 1 mode: synchronous  Attempt 1 shock (in Joules): 100  Attempt 1 outcome: no change in rhythm  Attempt 2 mode: synchronous  Attempt 2 shock (in Joules): 150  Attempt 2 outcome: no change in rhythm  Post-procedure rhythm: atrial fibrillation  Complications: no complications  Patient tolerance: Patient tolerated the procedure well with no immediate complications  Complications: No  Specimens: No  Implants: No      Arterial Line    Date/Time: 3/29/2025 6:20 AM  Location procedure was performed: [facility] EMERGENCY DEPARTMENT    Performed by: Lizz Stubbs MD  Authorized by: Lizz Stubbs MD  Consent Done: Emergent Situation  Preparation: Patient was prepped and draped in the usual sterile fashion.  Indications: multiple ABGs and hemodynamic monitoring  Location: right radial  Arik's test normal: yes  Needle gauge: 20  Seldinger technique: Seldinger technique used  Number of attempts: 1  Complications: No  Specimens: No  Implants: No  Post-procedure: line sutured and dressing applied  Post-procedure CMS: normal  Patient tolerance: Patient tolerated the procedure well  with no immediate complications      ED US Guided Misc Procedure    Date/Time: 3/30/2025 6:24 AM    Performed by: Lizz Stubbs MD  Authorized by: Lizz Stubbs MD    Procedure:  Ultrasound-guided peripheral venous cannulation  Indication:  Failed or difficult IV access   Right   Antecubital  Procedure:  Dynamic ultrasound guidance used, Candidate vein examined with linear probe - confirmed collapsibility, lack of pulsatility, and proper anatomic location. and Using aseptic technique, IV catheter inserted with flash of blood noted, flow of venous blood confirmed.  Catheter gauge:  18   Flushes easily and without pain. Patient tolerated procedure well.  Complications:  None  Charge?:  Yes  ED US Echo    Date/Time: 3/29/2025 5:35 AM    Performed by: Lizz Stubbs MD  Authorized by: Lizz Stubbs MD    Indication:  Chest pain, Hypotension, Dyspnea and Shock  Identified Structures:     The pericardial sac, myocardium, and 4 chambers were identified with the following echocardiographic windows:  Subxiphoid and IVC  Findings:     Pericardial Effusion:  Absent    Left Ventricle Ejection Fraction:  Severely reduced (<35%)  IVC:     Diameter:  > 2cm    Collapsibility:  < 50%    Impression:  Diminished LV function    Charge?:  Yes    Labs Reviewed   COMPREHENSIVE METABOLIC PANEL - Abnormal       Result Value    Sodium 133 (*)     Potassium 4.1      Chloride 107      CO2 19 (*)     Glucose 144 (*)     Blood Urea Nitrogen 16.2      Creatinine 1.34 (*)     Calcium 8.0 (*)     Protein Total 5.7 (*)     Albumin 3.0 (*)     Globulin 2.7      Albumin/Globulin Ratio 1.1      Bilirubin Total 0.3      ALP 58      ALT 15      AST 82 (*)     eGFR 54      Anion Gap 7.0      BUN/Creatinine Ratio 12     PROTIME-INR - Abnormal    PT 14.9 (*)     INR 1.2      Narrative:     Protimes are used to monitor anticoagulant agents such as warfarin. PT INR values are based on the current patient normal mean and the LARRY value for the  specific instrument reagent used.  **Routine theraputic target values for the INR are 2.0-3.0**   APTT - Abnormal    PTT 63.5 (*)    LACTIC ACID, PLASMA - Abnormal    Lactic Acid Level 4.3 (*)    CBC WITH DIFFERENTIAL - Abnormal    WBC 14.03 (*)     RBC 2.33 (*)     Hgb 7.3 (*)     Hct 23.0 (*)     MCV 98.7 (*)     MCH 31.3 (*)     MCHC 31.7 (*)     RDW 15.2      Platelet 203      MPV 10.3      Neut % 71.5      Lymph % 19.5      Mono % 8.1      Eos % 0.4      Basophil % 0.1      Imm Grans % 0.4      Neut # 10.04 (*)     Lymph # 2.73      Mono # 1.13      Eos # 0.05      Baso # 0.02      Imm Gran # 0.06 (*)     NRBC% 0.0     B-TYPE NATRIURETIC PEPTIDE - Abnormal    Natriuretic Peptide 865.0 (*)    TROPONIN ISTAT - Abnormal    POC Cardiac Troponin I 7.18 (*)     Sample VENOUS     TROPONIN I - Abnormal    Troponin-I 9.443 (*)    BLOOD GAS - Abnormal    Sample Type Arterial Blood      Sample site Arterial Line      Drawn by KW RRT      pH, Blood gas 7.030 (*)     pCO2, Blood gas 37.0      pO2, Blood gas 406.0 (*)     Sodium, Blood Gas 134 (*)     Potassium, Blood Gas 4.2      Calcium Level Ionized 1.04 (*)     TOC2, Blood gas 10.9      Base Excess, Blood gas -19.80 (*)     sO2, Blood gas 99.9      HCO3, Blood gas 9.8 (*)     THb, Blood gas 9.0 (*)     O2 Hb, Blood Gas 98.3 (*)     CO Hgb 1.5      Met Hgb 0.6      Allens Test N/A      MODE AC      Oxygen Device, Blood gas Ventilator      FIO2, Blood gas 100      Mech Vt 500      Mech RR 20      PEEP 10.0     ISTAT CHEM8 - Abnormal    POC Glucose 144 (*)     POC BUN 17      POC Creatinine 1.6 (*)     POC Sodium 137      POC Potassium 4.2      POC Chloride 105      POC TCO2 (MEASURED) 19 (*)     POC Anion Gap 18 (*)     POC Ionized Calcium 1.10      POC Hematocrit 25 (*)     POC HEMOGLOBIN 9      Sample VENOUS     BLOOD CULTURE OLG   BLOOD CULTURE OLG   CBC W/ AUTO DIFFERENTIAL    Narrative:     The following orders were created for panel order CBC auto  differential.  Procedure                               Abnormality         Status                     ---------                               -----------         ------                     CBC with Differential[[id number]]       Abnormal            Final result                 Please view results for these tests on the individual orders.   LACTIC ACID, PLASMA   TYPE & SCREEN    Group & Rh O POS      Indirect Koko GEL NEG      Specimen Outdate 04/01/2025 23:59     ABORH RETYPE    ABORH Retype O POS     PREPARE RBC SOFT    UNIT NUMBER [id number]      UNIT ABO/RH O POS      DISPENSE STATUS Issued      Unit Expiration [id number]      Product Code F3484M01      Unit Blood Type Code 5100      CROSSMATCH INTERPRETATION Compatible      UNIT NUMBER [id number]      UNIT ABO/RH O POS      DISPENSE STATUS Issued      Unit Expiration [id number]      Product Code L3879T91      Unit Blood Type Code 5100      CROSSMATCH INTERPRETATION Compatible       EKG Readings: (Independently Interpreted)   Rhythm: Atrial Fibrillation. Heart Rate: 121. Other Findings: Prolonged QT Interval. Clinical Impression: Sinus Tachycardia, Atrial Fibrillation and Atrial Fibrillation with RVR   03/29/2025 @ 0412       Imaging Results              X-Ray Chest AP Portable (In process)                      CTA Chest Non-Coronary (PE Studies) (Preliminary result)  Result time 03/29/25 04:59:57      Preliminary result by Bridger Marcum MD (03/29/25 04:59:57)                   Narrative:    START OF REPORT:  Technique: CT Scan of the chest was performed with intravenous contrast with direct axial images as well as sagittal and coronal reconstruction images pulmonary embolus protocol.    Dosage Information: Automated Exposure Control was utilized 707.62 mGy.cm.    Comparison: None.    Clinical History: Sob.chest pain.    Findings:  Soft Tissues: Unremarkable.  Neck: The visualized soft tissues of the neck appear unremarkable.  Mediastinum: The  mediastinal structures are within normal limits.  Heart: The heart size is within normal limits. Mild coronary artery calcification is seen.  Aorta: No aortic dissection or aneurysm is seen. Mild aortic calcification is seen in the thoracic aorta.  Pulmonary Arteries: Unremarkable. No filling defects are seen in the pulmonary arteries to suggest pulmonary embolus.  Lungs: There is scattered hazy opacity in the mid and upper lung as well as the bilateral lung bases with suggestion of some interlobular septal thickening.  Pleura: No effusions or pneumothorax are identified. Bilateral posterior costal pleural thickening is noted.  Bony Structures:  Spine: Mild spondylolytic changes are seen in the thoracic spine.  Ribs: The bilateral ribs appear unremarkable.  Abdomen: The visualized upper abdominal organs appear unremarkable.      Impression:  1. No filling defects are seen in the pulmonary arteries to suggest pulmonary embolus.  2. There is scattered hazy opacity in the mid and upper lung as well as the bilateral lung bases with suggestion of some interlobular septal thickening. Some of this may reflect motion artifact however possibility of interstitial edema versus a possibly atypical infectious process are also considerations. Correlate with clinical and laboratory findings as regards additional evaluation and follow-up.  3. Details and other findings as discussed above.                                         Medications   0.9%  NaCl infusion (for blood administration) (has no administration in time range)   NORepinephrine 8 mg in dextrose 5% 250 mL infusion (0.8 mcg/kg/min × 102.1 kg Intravenous Rate/Dose Change 3/29/25 0650)   heparin 25,000 units in dextrose 5% 250 mL (100 units/mL) infusion LOW INTENSITY nomogram - LAF (12 Units/kg/hr × 102.1 kg Intravenous Verify Only 3/29/25 0640)   heparin 25,000 units in dextrose 5% (100 units/ml) IV bolus from bag LOW INTENSITY nomogram - LAF (has no administration in  time range)   heparin 25,000 units in dextrose 5% (100 units/ml) IV bolus from bag LOW INTENSITY nomogram - LAF (has no administration in time range)   fentaNYL (SUBLIMAZE) 50 mcg/mL injection (  Canceled Entry 3/29/25 0600)   fentaNYL 2500 mcg in 0.9% sodium chloride 250 mL infusion premix (200 mcg/hr Intravenous Rate/Dose Change 3/29/25 0640)   piperacillin-tazobactam (ZOSYN) 4.5 g in D5W 100 mL IVPB (MB+) (has no administration in time range)   propofol (DIPRIVAN) 10 mg/mL infusion (has no administration in time range)   mupirocin 2 % ointment (has no administration in time range)   sodium chloride 0.9% bolus 1,000 mL 1,000 mL (0 mLs Intravenous Stopped 3/29/25 0530)   iohexoL (OMNIPAQUE 350) injection 100 mL (100 mLs Intravenous Given 3/29/25 0437)   amiodarone in dextrose 150 mg/100 mL (1.5 mg/mL) loading dose (150 mg  New Bag 3/29/25 0609)   EPINEPHrine 0.1 mg/mL injection (1 mg Intravenous Given 3/29/25 0542)   sodium bicarbonate 8.4 % (1 mEq/mL) injection (50 mEq Intravenous Given 3/29/25 0544)   fentaNYL injection (100 mcg Intravenous Given 3/29/25 0613)   amiodarone 360 mg/200 mL (1.8 mg/mL) infusion (0 mg/min Intravenous Stopped 3/29/25 0638)   propofoL (DIPRIVAN) 10 mg/mL infusion (1,000 mg  New Bag 3/29/25 0626)     Medical Decision Making  Problems Addressed:  Acute myocardial infarction, unspecified MI type, unspecified artery: acute illness or injury that poses a threat to life or bodily functions  Atrial fibrillation with rapid ventricular response: acute illness or injury that poses a threat to life or bodily functions  Cardiogenic shock: acute illness or injury that poses a threat to life or bodily functions  Cardiopulmonary arrest: acute illness or injury that poses a threat to life or bodily functions    Amount and/or Complexity of Data Reviewed  Labs: ordered.  Radiology: ordered.    Risk  Prescription drug management.  Decision regarding hospitalization.      ED assessment:    Mr. Conley  presented from an outside hospital w/ CP, SOB, dx NSTEMI in setting of recent uroolgic procedure complicated by post procedural bleeding. Arrives tachycardic, tachypneic, hypoxic.   Severe anemia noted on outpatient labs though stable from recent admission, no active bleeding at this time. Transfusion ordered.   Given concern for hemodynamica instability, NSTEMI w/ hypoxia and recent surgical procedure, consideration given to possible PE. CTA w/o acute findings.   Concern initially that tachycardia was compensatory so hesitant to attempt aggressive rate/rhythm control. Attempted volume resuscitation/transfusion w/ levophed support w/ some improvement.   Discussed with cardiology who requested further hemodynamic stabilization/resuscitation ahead of attempting cath lab evaluation.   Bedside echo performed demonstrated severely depressed EF w/ plethoric IVC w/o respiratory variability. Shortly thereafter, the patient experienced increased respiratory distress then became bradycardic and developed PEA arrest. CPR initiated along with additional ACLS measures. Intubated successfully. ROSC achieved after approximately 6 minutes. At this point, decision made to attempt cardioversion to see if this would improve hemodynamic state - sync cardioversion x 2 w/o improvement, started on amiodarone bolus and gtt.   ABG w/ severe acidosis. Additional bicarb given and started on bicarb gtt.   Case discussed with ICU attending who accepted for admission.   Discussed again with cardiology who agreed to proceed to cath lab for angiography +/- PCI +/- impella.   Clinical course and tenuous/critical state discussed with patient's daughters at bedside.     Differential diagnosis (including but not limited to):   Cardiogenic shock, NSTEMI, AF RVR, shock, symptomatic anemia, ANDI, electrolyte derangements, metabolic acidosis, pulmonary embolus, pericardial effusion, valvular heart disease    My independent radiology interpretation:   CXR:  ETT in adequate position, cardiac decompensation    Point of care US (independently performed and interpreted):   Echo: severely depressed EF, volume overload      Amount and/or Complexity of Data Reviewed  Independent historian: EMS   Summary of history: transfer from outside hospital for NSTEMI, anemia, tachycardic, hypotensive in transport  External data reviewed: transfer records, prior labs, prior EKGs, and prior imaging  Summary of data reviewed:   Transfer from outside hospital with Diagnosis NSTEMI.  Prior to transfer, received 5000 units heparin, 500 mL bolus of normal saline   Sodium 141, potassium 4.3, chloride 105, CO2 22, BUN 16, creatinine 1.36, glucose 118   Calcium 8.7   CPK 7 O2   CK-   High sensitivity troponin 13,821   D-dimer 0.52   WBC 13.4, hemoglobin 7.5, hematocrit 24.6, platelets 219     Risk and benefits of testing: discussed   Labs: ordered and reviewed  Radiology: ordered and independent interpretation performed (see above or ED course)  ECG/medicine tests: ordered and independent interpretation performed (see above or ED course)  Discussion of management or test interpretation with external provider(s): discussed with Interventional Cardiology, critical care medicine consultant   Summary of discussion: as above    Risk  Prescription drug management   Parenteral controlled substances   Decision regarding hospitalization  Emergency major surgery   Shared decision making     Critical Care   minutes     I, Lizz Stubbs MD personally performed the history, PE, MDM, and procedures as documented above and agree with the scribe's documentation.              ED Course as of 03/30/25 1047   Sat Mar 29, 2025   9909 Paged interventional cardiology.  [KL]   9751 Discussed with interventional cardiologist, Dr. Lorne Coon, advised appears more c/w undifferentiated shock at this time and recommends more aggressive medical management of shock, once hemodynamically stable, will consider  cath lab investigation [KS]   0657 Discussed with cardiology NP, they will evaluate as soon as MD use in the facility. [KS]   0735 Discussed with cardiology again, will plan to go to cath lab now. [KS]      ED Course User Index  [KL] Alicia Khan  [KS] Lizz Stubbs MD                                 Clinical Impression:  Final diagnoses:  [R57.0] Cardiogenic shock  [I46.9] Cardiopulmonary arrest (Primary)  [I48.91] Atrial fibrillation with rapid ventricular response  [I21.9] Acute myocardial infarction, unspecified MI type, unspecified artery          ED Disposition Condition    Admit Critical                    [1]         Lizz Stubbs MD  03/31/25 1142

## 2025-03-29 NOTE — Clinical Note
The catheter was inserted into the and was inserted over the wire into the ostium   right coronary artery. Hemodynamics were performed.

## 2025-03-29 NOTE — Clinical Note
The groin was prepped. The site was prepped with ChloraPrep and Betadine. The site was clipped. The patient was draped.

## 2025-03-29 NOTE — BRIEF OP NOTE
[facility] - Cath Lab Services  Brief Operative Note    SUMMARY     Surgery Date: 3/29/2025     Surgeons and Role:     * Lonre Coon Jr., MD - Primary    Assisting Surgeon: None    Pre-op Diagnosis:  NSTEMI (non-ST elevated myocardial infarction) [I21.4]    Post-op Diagnosis:  Post-Op Diagnosis Codes:     * NSTEMI (non-ST elevated myocardial infarction) [I21.4]    Procedure(s) (LRB):  CATHETERIZATION, HEART, BOTH LEFT AND RIGHT (N/A)    Anesthesia: RN IV Sedation    Implants:  Implant Name Type Inv. Item Serial No.  Lot No. LRB No. Used Action   STENT SYNERGY XD [id number] - [id number] stent coronary TAD - Balloon Exp STENT SYNERGY XD 2.09N91BN  [employer] [id number] Right 1 Implanted   STENT SYNERGY XD 3.0X48MM - [id number] stent coronary TAD - Balloon Exp STENT SYNERGY XD 3.0X48MM  [employer] [id number] Right 1 Implanted   STENT SYNERGY XD 4.0X20MM - [id number] stent coronary TAD - Balloon Exp STENT SYNERGY XD 4.0X20MM  [employer] [id number] Right 1 Implanted       Operative Findings: cardiogenic shock and Impella CP insertion; moderate LAD disease; 100% prox LCX occlusion, possibly , unable to cross; 100% prox RCA occlusion, possibly , able to cross and perform PCI      Estimated Blood Loss: * No values recorded between 3/29/2025 12:00 AM and 3/29/2025 12:04 PM *    Estimated Blood Loss has been documented.         Specimens:   Specimen (24h ago, onward)      None          * No specimens in log *    [id number]

## 2025-03-29 NOTE — Clinical Note
A dressing was applied to the right internal jugular vein puncture site. Covered with gauze and tegaderm

## 2025-03-29 NOTE — Clinical Note
The catheter was inserted into the and was inserted over the wire into the aorta. Hemodynamics were performed.  An angiography was performed of the aorta. The angiography was performed via power injection.   Pigtail. Abd AO with bilat iliacs

## 2025-03-29 NOTE — Clinical Note
Pt arrived to cath lab on propofol and fentanyl drips, will titrate sedation as needed per MD order.

## 2025-03-29 NOTE — Clinical Note
The catheter was inserted into the right atrium. Hemodynamics were performed.  [place] advanced for RHC

## 2025-03-29 NOTE — Clinical Note
The catheter was inserted into the left femoral Artery. An angiography was performed of the left femoral Artery. Post Manta closure device deployment

## 2025-03-30 NOTE — PROGRESS NOTES
[facility] [address]  Pulmonary/Critical Care  Progress Note  3/30/2025    Patient Name: Jon Conley  MRN: [id number]  Admission Date: 3/29/2025  Code Status: No Order      Subjective:     HPI:  The patient is a 79-year-old male who has a history of coronary artery disease, hypertension, hyperlipidemia, peripheral arterial disease, and GE reflux disease.  He also has a history of recent TURP in [place], complicated by postoperative by gross hematuria requiring placement of a three-way irrigation bladder catheter last week. This was left in place at time of discharge.  He presented to [facility] ER early a.m. of 03/29/2025 with shortness of breath, noted tachycardia/hypotensive, and marked elevation of high sensitivity troponin at 13,821. He was transferred to [facility] ER, suffering PEA arrest requiring intubation and short CPR with ROSC.  LHC was performed, noting multivessel coronary artery disease with 100% occluded proximal left circumflex, unable to cross with wire.  Mid RCA also noted 100% occluded, treated with PTCA/TAD.  He was noted to have elevated right-sided filling pressures with PCW 25 mm Hg.  Impella catheter was placed, and was left in place postprocedure.  He was admitted to ICU for ongoing medical care.    Hospital Course:  Intubation/mechanical ventilation 03/29/2025  Impella placement 03/29/2025    24hr Interval History:  T-max 100.1° oral over the previous 24 hours.  Intake 4005 cc, output 490 cc over the previous 24 hours.  Urine output blood-tinged with no clots, averaging around 25 cc/hour.  Blood cultures no growth at 12:00 p.m..  He remains sinus rhythm, amiodarone 0.5 milligram/minute infusion.  Levophed currently 0.5 mcg/kg per minute.  He had problems with frequent PVCs that were symptomatic yesterday on attempts at discontinuation.  He is fully anticoagulated on unfractionated heparin infusion.  Impella currently P-7.  He is sedated on propofol  20 mcg/kg per minute and fentanyl infusion was discontinued last p.m..    AC 20/500 cc/peep +10/40%    Scheduled Medications:   aspirin  81 mg Oral Daily    clopidogreL  75 mg Oral Daily    heparin (PORCINE)  39.2 Units/kg Intravenous Once    mupirocin   Nasal BID     PRN Medications:    Current Facility-Administered Medications:     0.9%  NaCl infusion (for blood administration), , Intravenous, Q24H PRN    acetaminophen, 650 mg, Oral, Q4H PRN    calcium gluconate IVPB, 1 g, Intravenous, PRN    calcium gluconate IVPB, 2 g, Intravenous, PRN    calcium gluconate IVPB, 3 g, Intravenous, PRN    heparin (PORCINE), 39.2 Units/kg, Intravenous, PRN    heparin (PORCINE), 30 Units/kg, Intravenous, PRN    magnesium sulfate 2 g IVPB, 2 g, Intravenous, PRN    magnesium sulfate 2 g IVPB, 2 g, Intravenous, PRN    ondansetron, 8 mg, Oral, Q8H PRN    potassium chloride in water, 20 mEq, Intravenous, PRN **AND** potassium chloride in water, 40 mEq, Intravenous, PRN **AND** potassium chloride in water, 60 mEq, Intravenous, PRN    sodium phosphate 15 mmol in D5W 250 mL IVPB, 15 mmol, Intravenous, PRN    sodium phosphate 20.1 mmol in D5W 250 mL IVPB, 20.1 mmol, Intravenous, PRN    sodium phosphate 30 mmol in D5W 250 mL IVPB, 30 mmol, Intravenous, PRN    tirofiban-0.9% sodium chloride 12.5 mg/250ml, , , Continuous PRN  Continuous Infusions:   amiodarone in dextrose 5%  0.5 mg/min Intravenous Continuous 16.7 mL/hr at 03/30/25 0605 0.5 mg/min at 03/30/25 0605    fentanyl  0-250 mcg/hr Intravenous Continuous   Stopped at 03/29/25 2030    heparin (porcine) in D5W  0-40 Units/kg/hr Intravenous Continuous 16.8 mL/hr at 03/30/25 0605 16.4545 Units/kg/hr at 03/30/25 0605    NORepinephrine bitartrate-D5W  0-3 mcg/kg/min Intravenous Continuous 24.9 mL/hr at 03/30/25 0626 0.52 mcg/kg/min at 03/30/25 0626    propofoL  0-50 mcg/kg/min Intravenous Continuous 18.4 mL/hr at 03/30/25 0605 30 mcg/kg/min at 03/30/25 0605    sodium bicarbonate 25 mEq in D5W  1,000 mL Purge Solution for Impella   Impella Device Continuous   New Bag at 03/29/25 1400    tirofiban-0.9% sodium chloride 12.5 mg/250ml    Continuous PRN   Stopped at 03/29/25 1845       No past medical history on file.    No past surgical history on file.    Objective:     Input/output:    Intake/Output Summary (Last 24 hours) at 3/30/2025 0714  Last data filed at 3/30/2025 0605  Gross per 24 hour   Intake 4704.67 ml   Output 490 ml   Net 4214.67 ml       Vital Signs (Most Recent):  Temp: 100.1 °F (37.8 °C) (03/30/25 0400)  Pulse: 81 (03/30/25 0710)  Resp: 20 (03/30/25 0710)  BP: 103/81 (03/30/25 0702)  SpO2: 100 % (03/30/25 0710)  Body mass index is 32.62 kg/m².  Weight: 106.1 kg (233 lb 14.5 oz) Vital Signs (24h Range):  Temp:  [98.7 °F (37.1 °C)-100.1 °F (37.8 °C)] 100.1 °F (37.8 °C)  Pulse:  [] 81  Resp:  [14-23] 20  SpO2:  [90 %-100 %] 100 %  BP: (100-135)/(68-94) 103/81  Arterial Line BP: ()/() 110/74     Physical Exam  Constitutional:       Comments: Somnolent and arouses, appears comfortable on mechanical ventilatory support.   Eyes:      Conjunctiva/sclera: Conjunctivae normal.      Pupils: Pupils are equal, round, and reactive to light.   Cardiovascular:      Rate and Rhythm: Normal rate and regular rhythm.      Heart sounds: No murmur heard.  Pulmonary:      Breath sounds: Rhonchi (Few bilateral coarse scattered) present. No wheezing or rales.   Abdominal:      General: Bowel sounds are normal. There is no distension.      Palpations: Abdomen is soft.      Tenderness: There is no abdominal tenderness.   Musculoskeletal:      Right lower leg: No edema.      Left lower leg: No edema.   Skin:     Comments: Left foot cool, right foot warm, Doppler pulses obtained   Neurological:      Comments: He is somnolent and arouses, follows commands.  Pupils equal round at 3 mm and reactive bilateral.  He opposes gravity with upper extremities, moves both lower extremities to command.          Lines/Drains/Airways       Central Venous Catheter Line  Duration             Tunneled Central Line - Triple Lumen 03/29/25 0555 Internal Jugular Right 1 day    Pulmonary Artery Catheter Assessment  03/29/25 1300 Femoral Vein Right <1 day              Drain  Duration                  Urethral Catheter -- days         NG/OG Tube 03/29/25 1200 18 Fr. Center mouth <1 day              Airway  Duration                  Airway - Non-Surgical 03/29/25 0541 1 day              Line  Duration                  VAD 03/29/25 1 day              Peripheral Intravenous Line  Duration                  Peripheral IV - Single Lumen 03/29/25 0530 18 G Right Antecubital 1 day         Peripheral IV - Single Lumen 03/29/25 0600 20 G Left Hand 1 day         Peripheral IV - Single Lumen 03/29/25 20 G Left Forearm 1 day         Sheath 03/29/25 0913 Right <1 day         Sheath 03/29/25 0927 Left <1 day                    Vent:  Vent Mode: A/C (03/30/25 0509)  Ventilator Initiated: Yes (03/29/25 0603)  Set Rate: 20 BPM (03/30/25 0509)  Vt Set: 500 mL (03/30/25 0509)  PEEP/CPAP: 10 cmH20 (03/30/25 0509)  Oxygen Concentration (%): 40 (03/30/25 0509)  Peak Airway Pressure: 23 cmH20 (03/30/25 0509)  Total Ve: 10.4 L/m (03/30/25 0509)  F/VT Ratio<105 (RSBI): (!) 38.83 (03/30/25 0509)    ABGs:  Lab Results   Component Value Date    PH 7.470 (H) 03/30/2025    PO2 113.0 (H) 03/30/2025    PCO2 36.0 03/30/2025         Significant Labs:    Lab Results   Component Value Date    WBC 17.61 (H) 03/30/2025    HGB 9.1 (L) 03/30/2025    HCT 27.4 (L) 03/30/2025    MCV 91.3 03/30/2025     03/30/2025         Recent Labs   Lab 03/29/25  1415 03/29/25  1743 03/29/25  2051 03/30/25  0056 03/30/25  0345   NA  --    < > 137  --  123*   K  --    < > 4.2  --  3.4*   CL  --    < > 105  --  95*   CO2  --    < > 22*  --  23   BUN  --    < > 15.9  --  14.9   CREATININE  --    < > 1.25  --  1.15   CALCIUM  --    < > 7.7*  --  7.5*   MG  --    < > 2.60  --   --     PHOS  --    < > 3.8  --  3.3   TRIG  --   --   --  108  --    AST  --    < > 245*  --  222*   ALT  --    < > 65*  --  65*   ALKPHOS  --    < > 68  --  69   ALBUMIN  --    < > 2.8*  --  2.8*   INR 1.2  --   --   --   --     < > = values in this interval not displayed.     Imaging:   Chest x-ray (03/30/2025, my reading of images): Endotracheal tube tip is located mid trachea.  There is mild prominence of bilateral pulmonary vascular markings, right greater than left, significantly improved in comparison to prior day's film.    Assessment:     NSTEMI with cardiogenic shock  LHC with PTCA/TAD x3 RCA (03/29/2025), PCWP 25 mm Hg  EF 15% by TTE  Impella placement (03/29/2025), currently P-7  Ischemic cardiomyopathy with severe decreased LVSF, EF 15%  Cardiogenic pulmonary edema with acute respiratory failure, intubated 03/29/2025.  This appears markedly improved over the past 24 hours.  Severe wide anion gap metabolic acidosis of primary lactic acid etiology, resolved   Elevated serum transaminases, most consistent with shock/hypoperfusion liver secondary to cardiogenic shock  Anemia, 2 units PRBCs given 03/29/2025    Plan:     Repeat BMP now in face of significant drop in serum sodium (question possibility of lab draw error)  Decrease peep and mandatory ventilatory settings as tolerated  Wean Levophed as tolerated by MAP >65mmHg  Impella management as per cardiology service  Continue full anticoagulation with unfractionated heparin infusion  Decrease sedation as tolerated       35 minutes of critical care was time spent personally by me on the following activities: development of treatment plan with patient or surrogate and bedside caregivers, discussions with consultants, evaluation of patient's response to treatment, examination of patient, ordering and performing treatments and interventions, ordering and review of laboratory studies, ordering and review of radiographic studies, pulse oximetry, re-evaluation of  patient's condition.  This patient demonstrates a high probability for further clinical decompensation due to ongoing critical illness.  Critical care time did not overlap with that of any other provider or involve time for any procedures.       Maty Harp MD, [facility]  Pulmonary/Critical Care

## 2025-03-30 NOTE — PROGRESS NOTES
[facility]    Cardiology  Progress Note    Patient Name: Jon Conley  MRN: [id number]  Admission Date: 3/29/2025  Hospital Length of Stay: 1 days  Code Status: No Order   Attending Provider: Davi Harp Jr., MD,*   Consulting Provider: NORM Sierra  Primary Care Physician: No primary care provider on file.  Principal Problem:<principal problem not specified>    Patient information was obtained from past medical records and ER records.     Subjective:     Chief Complaint/Reason for Consult: Cardiac Arrest     HPI: Mr. Conley is a 78 y/o male who is known to [facility], Dr. Wang. The patient presented to [facility] on 3.29.25 with c/o SOB. He initially presented to [facility] in [place] with SOB. He was found to have Tachycardia, Hypotension, Anemia (7.4/24.6), Troponin 9.443, .0, BUN/Crea 16.2/1.34, Lactic Acid Level 4.3, Na 133, WBC 14.03, H&H 7.3/23.0, WBC 14.03, , PT/INR 14.9/1.2. He was admitted to , however, he sustained a witnessed PEA Arrest in the ER and has ROSC per ER MD. [facility] was consulted for NSETMI and Arrest.     3.30.25: NAD. Impella R Groin. CVP 7, P7. Levophed 0.32mcg/kg/min, Heparin Drip per Protocol, Amiodarone 0.5mg/min    PMH: BPH/Urinary Obstruction, CAD/Nonobstructive, PAD, R JACY/Mild, HTN, Obesity, GERD, Tobacco Use   PSH: TURP, Angiogram, Bilateral Knee Replacements   Family History: Denies Family History of Heart Disease  Social History: Denies Illicit Drug, ETOH and Tobacco Use     Previous Cardiac Diagnostics:   LHC/RHC 3.29.25:  Findings:  - Successful placement of Impella CP device from left femoral approach.  - There is severe multi-vessel coronary artery disease.  - Left Main has mild diffuse disease.  - Mid Left Anterior Descending has serial lesions, up to 70-80% in severity.   - Prox Left Circumflex is 100% occluded. Unable to cross lesion despite wire escalation and microcatheter support.  - Mid Right  Coronary Artery is 100% occluded. The lesion was successfully treated with overlapping SYNERGY XD 4.0X20MM, SYNERGY XD 3.0X48MM, and SYNERGY XD 2.38H47UP drug-eluting stents. Post-dilatation was performed using a SYNERGY XD 4.0X20MM angioplasty balloon inflated to 12 gretel. Following intervention there was 0% residual stenosis and PATTY grade 3 flow in the distal vessel.  - Intravascular Ultrasound (IVUS) was performed prior to intervention to further characterize the lesion, as well as post-stent deployment to ensure optimal stent apposition and expansion.  - Aorto-iliac angiogram revealed moderate disease of R ileofemoral system.  - At the conclusion of the case, contrast injection was performed through the Impella sheath side port, showing adequate antegrade flow down the ipsilateral common femoral artery/SFA/profunda artery.  - RHC with elevated R sided filling pressures. RA 17 mmHg, RV 45/15 mmHg, PA 42/29 mmHg (mean 34 mmHg), PCWP 25 mmHg.  - Transpulmonary gradient is 9 mmHg.  - Cardiac Output/Index at 6.6/3.0, as calculated by Gregory equation on Levophed infusion and Impella CP support.  - PVR is 1.4 Woods units  - SVR is 550 dyne-sec*cm^-5  - Pulmonary Artery Pulsatility Index (Rafa) is 0.8  - Cardiac Power Output () is 0.91   Assessment/Plan:  - Patient is a 79 y.o. male with a history of recent TURP, complicated by significant post-op bleeding, presents to OSH ED with dyspnea. Transferred to [facility] with worsening shock despite vasopressors. TTE showing EF 25%, severe MR, severe TR. Troponin elevated. Brought to cath lab. Underwent Impella placement/LHC/RHC as outlined above. RCA lesion treated. Unable to cross LCx lesion. 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 At this point, would continue Impella support and aggressive medical mgmt. Has non-culprit lesions that should be treated at a later date as well. Can consider CTS consult, especially given severe valvular disease.  - Patient was given a loading dose  of clopidogrel 600 mg PO in the cath lab  - Continue DAPT (aspirin + clopidogrel) for a minimum of 12 months and aspirin indefinitely thereafter  - High intensity statin  - Continue Aggrastat for 4 hours post-procedure  - Arterial sheath remains place; plan is to remove when activated clotting time (ACT) less than 180 seconds. Bedrest for 4 hours after hemostasis is achieved.  - Obtain transthoracic echocardiogram  - Continuous bedrest while Impella in place  - Therapeutic heparin administration per protocol  - Antibiotics per protocol  - Remaining mgmt per primary team/cardiology    ECHO 3.29.25:  Left Ventricle: The left ventricle is dilated. Normal wall thickness. Severe global hypokinesis present. There is severely reduced systolic function with a visually estimated ejection fraction of 15 - 20%. Grade I diastolic dysfunction.  Right Ventricle: Right ventricular enlargement. Systolic function is normal. TAPSE is 1.83 cm.  Left Atrium: dilated  Mitral Valve: Mildly thickened leaflets. There is moderate regurgitation.  Tricuspid Valve: There is moderate regurgitation.  Pulmonary Artery: There is moderate pulmonary hypertension.  IVC/SVC: Patient is ventilated, cannot use IVC diameter to estimate right atrial pressure.    Carotid US 11.4.24:  The study quality is average.   1-39% stenosis in the proximal right internal carotid artery based on Bluth Criteria.   Antegrade right vertebral artery flow.   Antegrade left vertebral artery flow    ECHO 1.27.22:  The study quality is average.   The left ventricle is normal in size. Global left ventricular systolic function is moderately decreased. The left ventricular ejection fraction is 40-45%. Mild concentric left ventricular hypertrophy is present.  The left ventricle diastolic function is impaired (Grade I) with normal left atrial pressure.  Mild (1+) mitral regurgitation.  The pulmonary artery appears normal.     [facility] 9.26.17:  LM: Normal  LAD: Prox 30% Stenosis  RI:  Normal  LCX: Normal  RCA: Distal RCA 50%    Review of patient's allergies indicates:  No Known Allergies  No current facility-administered medications on file prior to encounter.     No current outpatient medications on file prior to encounter.     Review of Systems   Unable to perform ROS: Intubated     Objective:     Vital Signs (Most Recent):  Temp: 99.9 °F (37.7 °C) (03/30/25 1130)  Pulse: 74 (03/30/25 1200)  Resp: 20 (03/30/25 1200)  BP: 107/79 (03/30/25 1200)  SpO2: 100 % (03/30/25 1200) Vital Signs (24h Range):  Temp:  [98.7 °F (37.1 °C)-100.1 °F (37.8 °C)] 99.9 °F (37.7 °C)  Pulse:  [64-96] 74  Resp:  [20-23] 20  SpO2:  [96 %-100 %] 100 %  BP: (100-135)/(77-94) 107/79  Arterial Line BP: ()/() 103/70   Weight: 106.1 kg (233 lb 14.5 oz)  Body mass index is 32.62 kg/m².  SpO2: 100 %       Intake/Output Summary (Last 24 hours) at 3/30/2025 1238  Last data filed at 3/30/2025 1204  Gross per 24 hour   Intake 5439.5 ml   Output 720 ml   Net 4719.5 ml     Lines/Drains/Airways       Central Venous Catheter Line  Duration             Tunneled Central Line - Triple Lumen 03/29/25 0555 Internal Jugular Right 1 day    Pulmonary Artery Catheter Assessment  03/29/25 1300 Femoral Vein Right <1 day              Drain  Duration                  Urethral Catheter -- days         NG/OG Tube 03/29/25 1200 18 Fr. Center mouth 1 day              Airway  Duration                  Airway - Non-Surgical 03/29/25 0541 1 day              Line  Duration                  VAD 03/29/25 1 day              Peripheral Intravenous Line  Duration                  Peripheral IV - Single Lumen 03/29/25 0530 18 G Right Antecubital 1 day         Peripheral IV - Single Lumen 03/29/25 0600 20 G Left Hand 1 day         Peripheral IV - Single Lumen 03/29/25 20 G Left Forearm 1 day         Sheath 03/29/25 0913 Right 1 day         Sheath 03/29/25 0927 Left 1 day                  Significant Labs:   Chemistries:   Recent Labs   Lab  03/29/25 0446 03/29/25  1743 03/29/25 2051 03/30/25  0345 03/30/25  0737 03/30/25  1141   *  --  137 123* 137 135*   K 4.1 3.5 4.2 3.4* 4.3 3.8     --  105 95* 105 105   CO2 19*  --  22* 23 24 24   BUN 16.2  --  15.9 14.9 15.5 14.7   CREATININE 1.34*  --  1.25 1.15 1.19 1.02   CALCIUM 8.0*  --  7.7* 7.5* 8.1* 7.8*   BILITOT 0.3  --  0.3 0.4  --  0.4   ALKPHOS 58  --  68 69  --  68   ALT 15  --  65* 65*  --  56*   AST 82*  --  245* 222*  --  169*   GLUCOSE 144*  --  144* 147* 143* 135*   MG  --  2.70* 2.60  --   --  2.30   PHOS  --   --  3.8 3.3  --  2.7   TROPONINI 9.443*  --   --   --   --   --         CBC/Anemia Labs: Coags:    Recent Labs   Lab 03/29/25 2051 03/30/25 0345 03/30/25  1141   WBC 17.25* 17.61* 15.17*   HGB 8.9* 9.1* 8.6*   HCT 25.5* 27.4* 25.3*    198 162   MCV 91.4 91.3 91.7   RDW 15.3 15.3 15.5    Recent Labs   Lab 03/29/25 0446 03/29/25  1415   INR 1.2 1.2   APTT 63.5* 103.5*        EKG:       Telemetry: SR    Physical Exam  Constitutional:       General: He is not in acute distress.     Appearance: Normal appearance. He is obese. He is ill-appearing.      Comments: Vented/Sedated   HENT:      Head: Normocephalic.      Mouth/Throat:      Mouth: Mucous membranes are dry.   Cardiovascular:      Rate and Rhythm: Regular rhythm. Tachycardia present.      Pulses: Normal pulses.      Heart sounds: Murmur heard.   Pulmonary:      Effort: Pulmonary effort is normal. No respiratory distress.      Comments: Ventilator Associated Breath Sounds  Vent Mode: A/C  Oxygen Concentration (%):  [100] 100  Resp Rate Total:  [20 br/min] 20 br/min  Vt Set:  [500 mL] 500 mL  PEEP/CPAP:  [10 cmH20] 10 cmH20  Mean Airway Pressure:  [16 cmH20] 16 cmH20  Abdominal:      Palpations: Abdomen is soft.   Skin:     General: Skin is warm.      Comments: R Groin Impella CP, Soft/Flat, Non-Tender, No Sign of Bleed/Infection. + Monophasic Dopplerable Pedal Pulses    Neurological:      Comments: Vented/Sedated        Home Medications:   Medications Ordered Prior to Encounter[1]  Current Schedule Inpatient Medications:   aspirin  81 mg Oral Daily    clopidogreL  75 mg Oral Daily    heparin (PORCINE)  39.2 Units/kg Intravenous Once    mupirocin   Nasal BID     Continuous Infusions:   0.9% NaCl   Intravenous Continuous 100 mL/hr at 03/30/25 1156 Rate Change at 03/30/25 1156    amiodarone in dextrose 5%  0.5 mg/min Intravenous Continuous 16.7 mL/hr at 03/30/25 1204 0.5 mg/min at 03/30/25 1204    fentanyl  0-250 mcg/hr Intravenous Continuous   Stopped at 03/29/25 2030    heparin (porcine) in D5W  0-40 Units/kg/hr Intravenous Continuous 16.8 mL/hr at 03/30/25 1204 16.45 Units/kg/hr at 03/30/25 1204    NORepinephrine bitartrate-D5W  0-3 mcg/kg/min Intravenous Continuous 15.3 mL/hr at 03/30/25 1206 0.32 mcg/kg/min at 03/30/25 1206    propofoL  0-50 mcg/kg/min Intravenous Continuous 18.4 mL/hr at 03/30/25 1204 30 mcg/kg/min at 03/30/25 1204    sodium bicarbonate 25 mEq in D5W 1,000 mL Purge Solution for Impella   Impella Device Continuous   New Bag at 03/29/25 1400    tirofiban-0.9% sodium chloride 12.5 mg/250ml    Continuous PRN   Stopped at 03/29/25 1845     Assessment:   Cardiogenic Shock  MVCAD    - s/p [facility] (3.29.25) -  Left Main has mild diffuse disease. Mid Left Anterior Descending has serial lesions, up to 70-80% in severity. Prox Left Circumflex is 100% occluded. Unable to cross lesion despite wire escalation and microcatheter support. Mid Right Coronary Artery is 100% occluded. The lesion was successfully treated with overlapping SYNERGY XD 4.0X20MM, SYNERGY XD 3.0X48MM, and SYNERGY XD 2.40W13EZ drug-eluting stents. Post-dilatation was performed using a SYNERGY XD 4.0X20MM angioplasty balloon inflated to 12 gretel. Following intervention there was 0% residual stenosis and PATTY grade 3 flow in the distal vessel.  NSTEMI Type I (Non-Anterior Wall MI)    - Trponin > 9  Cardiopulmonary Arrest    - Initial Rhythm PEA - Witnessed  in ER Arrest/ROSC  ICMO/EF 15-20%     - ECHO (3.29.25) - LVEF 15-20%, Grade I DD   Acute Hypoxemic Respiratory Failure requiring Intubation/Ventilation   ST- Now SR   Hypotension requiring Pressors    - Hx of HTN  BPH s/p TURP   Lactic Acidosis   Leukocytosis - Improving   Anemia   Recent BPH Diagnosis s/p TURP  CAD/Nonobstructive (2017)  JACY/R Sided   PAD/Nonobstructive   Transaminitis   Obesity  GERD  Tobacco Use  No Hx of GIB     Plan:   ECHO Reviewed   Heparin Drip per Protocol for Tx of NSTEMI   Ok for Gentle Hydration  Wean Pressors for MAP > 65mmHg   Consider Dobutamine once Intravascular Volume Improved   Monitor H&H given Severe Anemia (No Overt signs of Bleeding)  Trend Lactic Acid Levels  AM VBG/ABG  Will Continue to Follow   Labs and EKG in AM: CBC, CMP and Mg    Hasmukh Sandoval, ANP  Cardiology  [facility]        [1]   No current facility-administered medications on file prior to encounter.     No current outpatient medications on file prior to encounter.

## 2025-03-31 NOTE — PLAN OF CARE
03/31/25 1031   Discharge Assessment   Assessment Type Discharge Planning Assessment   Confirmed/corrected address, phone number and insurance Yes   Confirmed Demographics Correct on Facesheet   Source of Information family   When was your last doctors appointment? 12/17/24   Communicated RENE with patient/caregiver Date not available/Unable to determine   Reason For Admission Pneumocephalus, ICH   People in Home child(devyn), adult;spouse   Facility Arrived From: [facility]   Do you expect to return to your current living situation? Yes   Do you have help at home or someone to help you manage your care at home? Yes   Who are your caregiver(s) and their phone number(s)? wifeAmee   Prior to hospitilization cognitive status: Alert/Oriented   Current cognitive status: Not Oriented to Place;Not Oriented to Time;Not Oriented to Person   Walking or Climbing Stairs Difficulty no   Dressing/Bathing Difficulty no   Home Accessibility wheelchair accessible   Equipment Currently Used at Home none   Patient currently being followed by outpatient case management? No   Do you currently have service(s) that help you manage your care at home? No   Do you have prescription coverage? No   Who is going to help you get home at discharge? wife   How do you get to doctors appointments? car, drives self   Are you on dialysis? No   Do you take coumadin? No   Discharge Plan A Home with family;Home   Discharge Plan B Other  (TBd)   DME Needed Upon Discharge  other (see comments)  (TBD)   Discharge Plan discussed with: Spouse/sig other   Name(s) and Number(s) Amee mejia   Transition of Care Barriers Unisured   OTHER   Name(s) of People in Home wifeAmee and 2 adult children     Spoke to wife at bedside. Patient not appropriate for questions.  He works on golf carts. No insurance.  Was independent.  Wife able to work from home to help him when discharge.  Wife asking about medicaid application. Notified Argelia Bryan via  voicemail that wife is in room and needs medicaid application.

## 2025-03-31 NOTE — PROGRESS NOTES
[facility] [facility]  Pulmonary/Critical Care  Progress Note  3/31/2025    Patient Name: Jon Conley  MRN: [id number]  Admission Date: 3/29/2025  Code Status: No Order      Subjective:     HPI:  The patient is a 79-year-old male who has a history of coronary artery disease, hypertension, hyperlipidemia, peripheral arterial disease, and GE reflux disease.  He also has a history of recent TURP in [place], complicated by postoperative by gross hematuria requiring placement of a three-way irrigation bladder catheter last week. This was left in place at time of discharge.  He presented to [facility] ER early a.m. of 03/29/2025 with shortness of breath, noted tachycardia/hypotensive, and marked elevation of high sensitivity troponin at 13,821. He was transferred to [facility] ER, suffering PEA arrest requiring intubation and short CPR with ROSC.  LHC was performed, noting multivessel coronary artery disease with 100% occluded proximal left circumflex, unable to cross with wire.  Mid RCA also noted 100% occluded, treated with PTCA/TAD.  He was noted to have elevated right-sided filling pressures with PCW 25 mm Hg.  Impella catheter was placed, and was left in place postprocedure.  He was admitted to ICU for ongoing medical care.    Hospital Course:  Intubation/mechanical ventilation 03/29/2025  Impella placement 03/29/2025    24hr Interval History:  T-max 100.3° oral over the previous 24 hours.  Intake 4355 cc, output 1680 cc over the previous 24 hours.  Blood cultures 03/29/2025 no growth.  He remains on Impella support, currently P-7.  Levophed 0.34 mcg/kg per minute.  Amiodarone 0.5 mcg per minute infusion.  He remains sinus rhythm.  Significant amount of bleeding noted over the past 24 hours around the Impella groin site requiring multiple dressing changes.  He is sedated on propofol at 30 mcg/kg per minute, no additional PRN sedation.  He remains on unfractionated heparin  infusion.    AC 16/500 cc/peep +5/40%    Scheduled Medications:   aspirin  81 mg Oral Daily    clopidogreL  75 mg Oral Daily    heparin (PORCINE)  39.2 Units/kg Intravenous Once    mupirocin   Nasal BID     PRN Medications:    Current Facility-Administered Medications:     0.9%  NaCl infusion (for blood administration), , Intravenous, Q24H PRN    0.9%  NaCl infusion (for blood administration), , Intravenous, Q24H PRN    acetaminophen, 650 mg, Oral, Q4H PRN    calcium gluconate IVPB, 1 g, Intravenous, PRN    calcium gluconate IVPB, 2 g, Intravenous, PRN    calcium gluconate IVPB, 3 g, Intravenous, PRN    heparin (PORCINE), 39.2 Units/kg, Intravenous, PRN    heparin (PORCINE), 30 Units/kg, Intravenous, PRN    magnesium sulfate 2 g IVPB, 2 g, Intravenous, PRN    magnesium sulfate 2 g IVPB, 2 g, Intravenous, PRN    ondansetron, 8 mg, Oral, Q8H PRN    potassium chloride in water, 20 mEq, Intravenous, PRN **AND** potassium chloride in water, 40 mEq, Intravenous, PRN **AND** potassium chloride in water, 60 mEq, Intravenous, PRN    sodium phosphate 15 mmol in D5W 250 mL IVPB, 15 mmol, Intravenous, PRN    sodium phosphate 20.1 mmol in D5W 250 mL IVPB, 20.1 mmol, Intravenous, PRN    sodium phosphate 30 mmol in D5W 250 mL IVPB, 30 mmol, Intravenous, PRN    tirofiban-0.9% sodium chloride 12.5 mg/250ml, , , Continuous PRN  Continuous Infusions:   0.9% NaCl   Intravenous Continuous 100 mL/hr at 03/31/25 0507 New Bag at 03/31/25 0507    amiodarone in dextrose 5%  0.5 mg/min Intravenous Continuous 16.7 mL/hr at 03/31/25 0613 0.5 mg/min at 03/31/25 0613    fentanyl  0-250 mcg/hr Intravenous Continuous   Stopped at 03/29/25 2030    heparin (porcine) in D5W  0-40 Units/kg/hr Intravenous Continuous 16.8 mL/hr at 03/31/25 0613 16.45 Units/kg/hr at 03/31/25 0613    NORepinephrine bitartrate-D5W  0-3 mcg/kg/min Intravenous Continuous 16.3 mL/hr at 03/31/25 0613 0.34 mcg/kg/min at 03/31/25 0613    propofoL  0-50 mcg/kg/min Intravenous  Continuous 18.4 mL/hr at 03/31/25 0613 30 mcg/kg/min at 03/31/25 0613    sodium bicarbonate 25 mEq in D5W 1,000 mL Purge Solution for Impella   Impella Device Continuous   New Bag at 03/29/25 1400    tirofiban-0.9% sodium chloride 12.5 mg/250ml    Continuous PRN   Stopped at 03/29/25 1845       No past medical history on file.    No past surgical history on file.    Objective:     Input/output:    Intake/Output Summary (Last 24 hours) at 3/31/2025 0649  Last data filed at 3/31/2025 0613  Gross per 24 hour   Intake 4580.36 ml   Output 1705 ml   Net 2875.36 ml       Vital Signs (Most Recent):  Temp: 98.1 °F (36.7 °C) (03/31/25 0400)  Pulse: 82 (03/31/25 0552)  Resp: 20 (03/31/25 0552)  BP: 103/76 (03/31/25 0546)  SpO2: 96 % (03/31/25 0552)  Body mass index is 32.62 kg/m².  Weight: 106.1 kg (233 lb 14.5 oz) Vital Signs (24h Range):  Temp:  [98.1 °F (36.7 °C)-100.3 °F (37.9 °C)] 98.1 °F (36.7 °C)  Pulse:  [64-89] 82  Resp:  [16-26] 20  SpO2:  [92 %-100 %] 96 %  BP: ()/(69-87) 103/76  Arterial Line BP: ()/() 99/66     Physical Exam  Constitutional:       Comments: Somnolent and arouses, appears comfortable on mechanical ventilatory support.   Eyes:      Conjunctiva/sclera: Conjunctivae normal.      Pupils: Pupils are equal, round, and reactive to light.   Cardiovascular:      Rate and Rhythm: Normal rate and regular rhythm.      Heart sounds: No murmur heard.  Pulmonary:      Breath sounds: Rhonchi (Few bilateral coarse scattered) present. No wheezing or rales.   Abdominal:      General: Bowel sounds are normal. There is no distension.      Palpations: Abdomen is soft.      Tenderness: There is no abdominal tenderness.   Musculoskeletal:      Right lower leg: No edema.      Left lower leg: No edema.   Skin:     Comments: Left foot cool, right foot warm, Doppler pulses obtained   Neurological:      Comments: He is somnolent and arouses, follows commands.  Pupils equal round at 3 mm and reactive  bilateral.  He opposes gravity with upper extremities, moves both lower extremities to command.         Lines/Drains/Airways       Central Venous Catheter Line  Duration             Tunneled Central Line - Triple Lumen 03/29/25 0555 Internal Jugular Right 2 days    Pulmonary Artery Catheter Assessment  03/29/25 1300 Femoral Vein Right 1 day              Drain  Duration                  Urethral Catheter -- days         NG/OG Tube 03/29/25 1200 18 Fr. Center mouth 1 day              Airway  Duration                  Airway - Non-Surgical 03/29/25 0541 2 days              Line  Duration                  VAD 03/29/25 2 days              Peripheral Intravenous Line  Duration                  Peripheral IV - Single Lumen 03/29/25 0530 18 G Right Antecubital 2 days         Peripheral IV - Single Lumen 03/29/25 0600 20 G Left Hand 2 days         Peripheral IV - Single Lumen 03/29/25 20 G Left Forearm 2 days         Sheath 03/29/25 0913 Right 1 day         Sheath 03/29/25 0927 Left 1 day                    Vent:  Vent Mode: A/C (03/31/25 0449)  Ventilator Initiated: Yes (03/29/25 0603)  Set Rate: 16 BPM (03/31/25 0449)  Vt Set: 500 mL (03/31/25 0449)  PEEP/CPAP: 5 cmH20 (03/31/25 0449)  Oxygen Concentration (%): 30 (03/31/25 0449)  Peak Airway Pressure: 30 cmH20 (03/31/25 0449)  Total Ve: 8.5 L/m (03/31/25 0449)  F/VT Ratio<105 (RSBI): (!) 38.83 (03/30/25 0509)    ABGs:  Lab Results   Component Value Date    PH 7.490 (H) 03/31/2025    PO2 69.0 (L) 03/31/2025    PCO2 34.0 (L) 03/31/2025         Significant Labs:    Lab Results   Component Value Date    WBC 13.88 (H) 03/31/2025    HGB 8.5 (L) 03/31/2025    HCT 25.9 (L) 03/31/2025    MCV 93.5 03/31/2025     03/31/2025         Recent Labs   Lab 03/31/25  0313   *   K 4.0      CO2 22*   BUN 12.7   CREATININE 0.94   CALCIUM 7.2*   MG 2.30   PHOS 2.6   *   ALT 48   ALKPHOS 69   ALBUMIN 2.3*     Imaging:   Chest x-ray (03/31/2025, my reading of images):   Endotracheal tube is located mid tracheal.  Some elevation of the left hemidiaphragm with borderline overall inspiratory effort.  Some ground-glass attenuation right hemithorax with some accentuation of the vascular markings, possibly representing a posterior layering pleural effusion.    Assessment:     NSTEMI with cardiogenic shock, ongoing  LHC with PTCA/TAD x3 RCA (03/29/2025), PCWP 25 mm Hg  EF 15% by TTE  Impella placement (03/29/2025), currently P-7  Ischemic cardiomyopathy with severe decreased LVSF, EF 15%  Cardiogenic pulmonary edema with acute respiratory failure, intubated 03/29/2025.  This appears markedly improved over the past 24 hours.  Severe wide anion gap metabolic acidosis of primary lactic acid etiology, resolved   Elevated serum transaminases, most consistent with shock/hypoperfusion liver secondary to cardiogenic shock.  Improving daily      Plan:     Impella and cardiogenic shock management as per cardiology service.  Continue to decrease mechanical ventilatory support as tolerated.  Continue full anticoagulation with unfractionated heparin infusion  Limit sedation as tolerated       35 minutes of critical care was time spent personally by me on the following activities: development of treatment plan with patient or surrogate and bedside caregivers, discussions with consultants, evaluation of patient's response to treatment, examination of patient, ordering and performing treatments and interventions, ordering and review of laboratory studies, ordering and review of radiographic studies, pulse oximetry, re-evaluation of patient's condition.  This patient demonstrates a high probability for further clinical decompensation due to ongoing critical illness.  Critical care time did not overlap with that of any other provider or involve time for any procedures.       Maty Harp MD, [facility]  Pulmonary/Critical Care

## 2025-03-31 NOTE — PROGRESS NOTES
Inpatient Nutrition Assessment    Admit Date: 3/29/2025   Total duration of encounter: 2 days   Patient Age: 79 y.o.    Nutrition Recommendation/Prescription     Start tube feeding when appropriate.  Tube feeding recommendation:     Peptamen Intense VHP goal rate 40 ml/hr + 4 packets ProSource TF20 daily to provide  1120 kcal/d  (75% est needs, 107% with meds)  154 g protein/d (99% est needs)  672 ml free water/d (32% est needs)  (calculations based on estimated 20 hr/d run time)     Start @ 20ml/hr, increase as tolerated 20ml/hr q4hr until goal rate reached.      If no IV fluids running, can give 125ml q 2hr water flushes. Total water provided: 1922ml (91% est needs.)     Communication of Recommendations: reviewed with nurse    Nutrition Assessment     Malnutrition Assessment/Nutrition-Focused Physical Exam       Malnutrition Level: other (see comments) (Does not meet criteria) (03/31/25 1259)  Energy Intake (Malnutrition): other (see comments) (Unable to assess) (03/31/25 1259)  Weight Loss (Malnutrition): other (see comments) (Unable to assess) (03/31/25 1259)                                                  A minimum of two characteristics is recommended for diagnosis of either severe or non-severe malnutrition.    Chart Review    Reason Seen: continuous nutrition monitoring    Malnutrition Screening Tool Results   Have you recently lost weight without trying?: No  Have you been eating poorly because of a decreased appetite?: No   MST Score: 0   Diagnosis:  NSTEMI with cardiogenic shock, ongoing  Ischemic cardiomyopathy with severe decreased LVSF, EF 15%  Cardiogenic pulmonary edema with acute respiratory failure  Severe wide anion gap metabolic acidosis of primary lactic acid etiology, resolved   Elevated serum transaminases    Relevant Medical History: CAD, HTN, HLD, PAD, GERD    Scheduled Medications:  aspirin, 81 mg, Daily  clopidogreL, 75 mg, Daily  heparin (PORCINE), 39.2 Units/kg, Once  mupirocin, ,  BID    Continuous Infusions:  0.9% NaCl, Last Rate: 100 mL/hr at 03/31/25 1005  amiodarone in dextrose 5%, Last Rate: 0.5 mg/min (03/31/25 1105)  fentanyl, Last Rate: Stopped (03/29/25 2030)  heparin (porcine) in D5W, Last Rate: 16.45 Units/kg/hr (03/31/25 1105)  NORepinephrine bitartrate-D5W, Last Rate: 0.2 mcg/kg/min (03/31/25 1105)  propofoL, Last Rate: 30 mcg/kg/min (03/31/25 1202)  sodium bicarbonate 25 mEq in D5W 1,000 mL Purge Solution for Impella  tirofiban-0.9% sodium chloride 12.5 mg/250ml, Last Rate: Stopped (03/29/25 1845)    PRN Medications:  0.9%  NaCl infusion (for blood administration), , Q24H PRN  0.9%  NaCl infusion (for blood administration), , Q24H PRN  0.9%  NaCl infusion (for blood administration), , Q24H PRN  0.9%  NaCl infusion (for blood administration), , Q24H PRN  acetaminophen, 650 mg, Q4H PRN  calcium gluconate IVPB, 1 g, PRN  calcium gluconate IVPB, 2 g, PRN  calcium gluconate IVPB, 3 g, PRN  heparin (PORCINE), 39.2 Units/kg, PRN  heparin (PORCINE), 30 Units/kg, PRN  magnesium sulfate 2 g IVPB, 2 g, PRN  magnesium sulfate 2 g IVPB, 2 g, PRN  ondansetron, 8 mg, Q8H PRN  potassium chloride in water, 20 mEq, PRN   And  potassium chloride in water, 40 mEq, PRN   And  potassium chloride in water, 60 mEq, PRN  sodium phosphate 15 mmol in D5W 250 mL IVPB, 15 mmol, PRN  sodium phosphate 20.1 mmol in D5W 250 mL IVPB, 20.1 mmol, PRN  sodium phosphate 30 mmol in D5W 250 mL IVPB, 30 mmol, PRN  tirofiban-0.9% sodium chloride 12.5 mg/250ml, , Continuous PRN    Calorie Containing IV Medications: Diprivan @ 18 ml/hr (provides 475 kcal/d)    Recent Labs   Lab 03/29/25  0446 03/29/25  0456 03/29/25  1415 03/29/25  1743 03/29/25  2051 03/30/25  0056 03/30/25  0345 03/30/25  0737 03/30/25  1141 03/30/25  1639 03/30/25  2108 03/31/25  0313 03/31/25  1117   *  --   --   --  137  --  123* 137 135*  --  136 134* 137   K 4.1  --   --  3.5 4.2  --  3.4* 4.3 3.8  --  3.9 4.0 3.9   CALCIUM 8.0*  --   --   --   7.7*  --  7.5* 8.1* 7.8*  --  7.4* 7.2* 7.8*   PHOS  --   --   --   --  3.8  --  3.3  --  2.7  --  2.7 2.6 2.6   MG  --   --   --  2.70* 2.60  --   --   --  2.30  --  2.20 2.30 2.10     --   --   --  105  --  95* 105 105  --  106 106 110*   CO2 19*  --   --   --  22*  --  23 24 24  --  22* 22* 23   BUN 16.2  --   --   --  15.9  --  14.9 15.5 14.7  --  14.6 12.7 12.7   CREATININE 1.34*  --   --   --  1.25  --  1.15 1.19 1.02  --  0.99 0.94 0.88   EGFRNORACEVR 54  --   --   --  59  --  >60 >60 >60  --  >60 >60 >60   GLUCOSE 144*  --   --   --  144*  --  147* 143* 135*  --  127* 123* 124*   BILITOT 0.3  --   --   --  0.3  --  0.4  --  0.4  --  0.4 0.5 0.4   ALKPHOS 58  --   --   --  68  --  69  --  68  --  69 69 65   ALT 15  --   --   --  65*  --  65*  --  56*  --  54 48 45   AST 82*  --   --   --  245*  --  222*  --  169*  --  126* 103* 79*   ALBUMIN 3.0*  --   --   --  2.8*  --  2.8*  --  2.6*  --  2.4* 2.3* 2.2*   TRIG  --   --   --   --   --  108  --   --   --   --   --   --   --    WBC 14.03*  --  19.05*  --  17.25*  --  17.61*  --  15.17*  --  13.43* 13.88* 12.22*   HGB 7.3*  --  8.9*  --  8.9*  --  9.1*  --  8.6* 8.2* 9.0* 8.5* 8.8*   HCT 23.0*   < > 25.2*  --  25.5*  --  27.4*  --  25.3* 24.7* 26.6* 25.9* 25.8*    < > = values in this interval not displayed.     Nutrition Orders:  No diet orders on file      Appetite/Oral Intake: not applicable/not applicable  Factors Affecting Nutritional Intake: on mechanical ventilation  Social Needs Impacting Access to Food: unable to assess at this time; will attempt on follow-up  Food/[employer]/Cultural Preferences: unable to obtain  Food Allergies: no known food allergies  Last Bowel Movement: 03/28/25  Wound(s):  Incision documentation noted     Comments    3/31/25: Discussed with RN. Will provide tube feeding recommendations for when appropriate to start tube feeding. Receiving kcal from meds.  Currently with Impella in place, HOB flat. May need to place in  "reverse Trendelenburg to start TF.     Anthropometrics    Height: 5' 10.98" (180.3 cm), Height Method: Stated  Last Weight: 106.1 kg (233 lb 14.5 oz) (03/29/25 1738), Weight Method: Bed Scale  BMI (Calculated): 32.6  BMI Classification: obese grade I (BMI 30-34.9)        Ideal Body Weight (IBW), Male: 171.88 lb     % Ideal Body Weight, Male (lb): 130.81 %                          Usual Weight Provided By: unable to obtain usual weight    Wt Readings from Last 5 Encounters:   03/29/25 106.1 kg (233 lb 14.5 oz)   12/02/21 101 kg (222 lb 10.6 oz)     Weight Change(s) Since Admission:   Wt Readings from Last 1 Encounters:   03/29/25 1738 106.1 kg (233 lb 14.5 oz)   03/29/25 0404 102.1 kg (225 lb)   Admit Weight: 102.1 kg (225 lb) (03/29/25 0404), Weight Method: Stated    Estimated Needs    Weight Used For Calorie Calculations: 106.1 kg (233 lb 14.5 oz)  Energy Calorie Requirements (kcal): 1167-1485kcal (11-14kcal/kg IBW)  Energy Need Method: Kcal/kg  Weight Used For Protein Calculations: 78.2 kg (172 lb 5.7 oz) (IBW)  Protein Requirements: 156gm (2g/kg IBW)  Fluid Requirements (mL): 2122ml (20ml/kg)  CHO Requirement: 165gm (45% est kcal needs)     Enteral Nutrition     Patient not receiving enteral nutrition at this time.    Parenteral Nutrition     Patient not receiving parenteral nutrition support at this time.    Evaluation of Received Nutrient Intake    Calories: not meeting estimated needs  Protein: not meeting estimated needs    Patient Education     Not applicable.    Nutrition Diagnosis     PES: Inadequate oral intake related to acute illness as evidenced by intubation since admit. (new)     PES:            Nutrition Interventions     Intervention(s): modified composition of enteral nutrition, modified rate of enteral nutrition, and collaboration with other providers  Intervention(s):      Goal: Meet greater than 80% of nutritional needs by follow-up. (new)  Goal: Tolerate enteral feeding at goal rate by " follow-up. (new)    Nutrition Goals & Monitoring     Dietitian will monitor: energy intake and enteral nutrition intake  Discharge planning: too early to determine; pending clinical course  Nutrition Risk/Follow-Up: high (follow-up in 1-4 days)   Please consult if re-assessment needed sooner.

## 2025-03-31 NOTE — PROGRESS NOTES
[facility]    Cardiology  Progress Note    Patient Name: Jon Conley  MRN: [id number]  Admission Date: 3/29/2025  Hospital Length of Stay: 2 days  Code Status: No Order   Attending Provider: Davi Harp Jr., MD,*   Consulting Provider: NORM Sierra  Primary Care Physician: Patrick Reece Jr., MD  Principal Problem:<principal problem not specified>    Patient information was obtained from past medical records and ER records.     Subjective:     Chief Complaint/Reason for Consult: Cardiac Arrest     HPI: Mr. Conley is a 78 y/o male who is known to [facility], Dr. Wang. The patient presented to [facility] on 3.29.25 with c/o SOB. He initially presented to [facility] in [place] with SOB. He was found to have Tachycardia, Hypotension, Anemia (7.4/24.6), Troponin 9.443, .0, BUN/Crea 16.2/1.34, Lactic Acid Level 4.3, Na 133, WBC 14.03, H&H 7.3/23.0, WBC 14.03, , PT/INR 14.9/1.2. He was admitted to , however, he sustained a witnessed PEA Arrest in the ER and has ROSC per ER MD. [facility] was consulted for NSETMI and Arrest.     3.30.25: NAD. Impella L Groin. CVP 7, P7. Levophed 0.32mcg/kg/min, Heparin Drip per Protocol, Amiodarone 0.5mg/min  3.31.25: NAD. L Groin Impella P7, Amiodarone 0.5mg/min, Heparin Drip per Protocol, Levophed 0.18mcg/kg/min, H&H 8.8/25.8, AST/ALT 79/45    PMH: BPH/Urinary Obstruction, CAD/Nonobstructive, PAD, R JACY/Mild, HTN, Obesity, GERD, Tobacco Use   PSH: TURP, Angiogram, Bilateral Knee Replacements   Family History: Denies Family History of Heart Disease  Social History: Denies Illicit Drug, ETOH and Tobacco Use     Previous Cardiac Diagnostics:   LHC/RHC 3.29.25:  Findings:  - Successful placement of Impella CP device from left femoral approach.  - There is severe multi-vessel coronary artery disease.  - Left Main has mild diffuse disease.  - Mid Left Anterior Descending has serial lesions, up to 70-80% in severity.   -  Prox Left Circumflex is 100% occluded. Unable to cross lesion despite wire escalation and microcatheter support.  - Mid Right Coronary Artery is 100% occluded. The lesion was successfully treated with overlapping SYNERGY XD 4.0X20MM, SYNERGY XD 3.0X48MM, and SYNERGY XD 2.80W79WP drug-eluting stents. Post-dilatation was performed using a SYNERGY XD 4.0X20MM angioplasty balloon inflated to 12 gretel. Following intervention there was 0% residual stenosis and PATTY grade 3 flow in the distal vessel.  - Intravascular Ultrasound (IVUS) was performed prior to intervention to further characterize the lesion, as well as post-stent deployment to ensure optimal stent apposition and expansion.  - Aorto-iliac angiogram revealed moderate disease of R ileofemoral system.  - At the conclusion of the case, contrast injection was performed through the Impella sheath side port, showing adequate antegrade flow down the ipsilateral common femoral artery/SFA/profunda artery.  - RHC with elevated R sided filling pressures. RA 17 mmHg, RV 45/15 mmHg, PA 42/29 mmHg (mean 34 mmHg), PCWP 25 mmHg.  - Transpulmonary gradient is 9 mmHg.  - Cardiac Output/Index at 6.6/3.0, as calculated by Gregory equation on Levophed infusion and Impella CP support.  - PVR is 1.4 Woods units  - SVR is 550 dyne-sec*cm^-5  - Pulmonary Artery Pulsatility Index (Rafa) is 0.8  - Cardiac Power Output () is 0.91   Assessment/Plan:  - Patient is a 79 y.o. male with a history of recent TURP, complicated by significant post-op bleeding, presents to [facility] ED with dyspnea. Transferred to [facility] with worsening shock despite vasopressors. TTE showing EF 25%, severe MR, severe TR. Troponin elevated. Brought to cath lab. Underwent Impella placement/LHC/RHC as outlined above. RCA lesion treated. Unable to cross LCx lesion. 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 At this point, would continue Impella support and aggressive medical mgmt. Has non-culprit lesions that should be treated at  a later date as well. Can consider CTS consult, especially given severe valvular disease.  - Patient was given a loading dose of clopidogrel 600 mg PO in the cath lab  - Continue DAPT (aspirin + clopidogrel) for a minimum of 12 months and aspirin indefinitely thereafter  - High intensity statin  - Continue Aggrastat for 4 hours post-procedure  - Arterial sheath remains place; plan is to remove when activated clotting time (ACT) less than 180 seconds. Bedrest for 4 hours after hemostasis is achieved.  - Obtain transthoracic echocardiogram  - Continuous bedrest while Impella in place  - Therapeutic heparin administration per protocol  - Antibiotics per protocol  - Remaining mgmt per primary team/cardiology    ECHO 3.29.25:  Left Ventricle: The left ventricle is dilated. Normal wall thickness. Severe global hypokinesis present. There is severely reduced systolic function with a visually estimated ejection fraction of 15 - 20%. Grade I diastolic dysfunction.  Right Ventricle: Right ventricular enlargement. Systolic function is normal. TAPSE is 1.83 cm.  Left Atrium: dilated  Mitral Valve: Mildly thickened leaflets. There is moderate regurgitation.  Tricuspid Valve: There is moderate regurgitation.  Pulmonary Artery: There is moderate pulmonary hypertension.  IVC/SVC: Patient is ventilated, cannot use IVC diameter to estimate right atrial pressure.    Carotid US 11.4.24:  The study quality is average.   1-39% stenosis in the proximal right internal carotid artery based on Bluth Criteria.   Antegrade right vertebral artery flow.   Antegrade left vertebral artery flow    ECHO 1.27.22:  The study quality is average.   The left ventricle is normal in size. Global left ventricular systolic function is moderately decreased. The left ventricular ejection fraction is 40-45%. Mild concentric left ventricular hypertrophy is present.  The left ventricle diastolic function is impaired (Grade I) with normal left atrial  pressure.  Mild (1+) mitral regurgitation.  The pulmonary artery appears normal.     [facility] 9.26.17:  LM: Normal  LAD: Prox 30% Stenosis  RI: Normal  LCX: Normal  RCA: Distal RCA 50%    Review of patient's allergies indicates:  No Known Allergies  No current facility-administered medications on file prior to encounter.     No current outpatient medications on file prior to encounter.     Review of Systems   Unable to perform ROS: Intubated     Objective:     Vital Signs (Most Recent):  Temp: 99.5 °F (37.5 °C) (03/31/25 1340)  Pulse: 80 (03/31/25 1500)  Resp: (!) 25 (03/31/25 1500)  BP: 96/68 (03/31/25 1500)  SpO2: 98 % (03/31/25 1500) Vital Signs (24h Range):  Temp:  [98.1 °F (36.7 °C)-100.3 °F (37.9 °C)] 99.5 °F (37.5 °C)  Pulse:  [63-88] 80  Resp:  [16-26] 25  SpO2:  [92 %-100 %] 98 %  BP: ()/(68-81) 96/68  Arterial Line BP: ()/() 98/66   Weight: 106.1 kg (233 lb 14.5 oz)  Body mass index is 32.64 kg/m².  SpO2: 98 %       Intake/Output Summary (Last 24 hours) at 3/31/2025 1530  Last data filed at 3/31/2025 1500  Gross per 24 hour   Intake 5921.34 ml   Output 2475 ml   Net 3446.34 ml     Lines/Drains/Airways       Central Venous Catheter Line  Duration             Pulmonary Artery Catheter Assessment  03/29/25 1300 Femoral Vein Right 2 days    Tunneled Central Line - Triple Lumen 03/29/25 0555 Internal Jugular Right 2 days              Drain  Duration                  Urethral Catheter -- days         NG/OG Tube 03/29/25 1200 18 Fr. Center mouth 2 days              Airway  Duration                  Airway - Non-Surgical 03/29/25 0541 2 days              Line  Duration                  VAD 03/29/25 2 days              Peripheral Intravenous Line  Duration                  Peripheral IV - Single Lumen 03/29/25 0530 18 G Right Antecubital 2 days         Peripheral IV - Single Lumen 03/29/25 0600 20 G Left Hand 2 days         Peripheral IV - Single Lumen 03/29/25 20 G Left Forearm 2 days         Sheath  03/29/25 0913 Right 2 days         Sheath 03/29/25 0927 Left 2 days                  Significant Labs:   Chemistries:   Recent Labs   Lab 03/29/25  0446 03/29/25  1743 03/30/25  0737 03/30/25  1141 03/30/25 2108 03/31/25 0313 03/31/25  1117   *   < > 137 135* 136 134* 137   K 4.1   < > 4.3 3.8 3.9 4.0 3.9      < > 105 105 106 106 110*   CO2 19*   < > 24 24 22* 22* 23   BUN 16.2   < > 15.5 14.7 14.6 12.7 12.7   CREATININE 1.34*   < > 1.19 1.02 0.99 0.94 0.88   CALCIUM 8.0*   < > 8.1* 7.8* 7.4* 7.2* 7.8*   BILITOT 0.3   < >  --  0.4 0.4 0.5 0.4   ALKPHOS 58   < >  --  68 69 69 65   ALT 15   < >  --  56* 54 48 45   AST 82*   < >  --  169* 126* 103* 79*   GLUCOSE 144*   < > 143* 135* 127* 123* 124*   MG  --    < >  --  2.30 2.20 2.30 2.10   PHOS  --    < >  --  2.7 2.7 2.6 2.6   TROPONINI 9.443*  --   --   --   --   --   --     < > = values in this interval not displayed.        CBC/Anemia Labs: Coags:    Recent Labs   Lab 03/30/25 2108 03/31/25 0313 03/31/25  1117   WBC 13.43* 13.88* 12.22*   HGB 9.0* 8.5* 8.8*   HCT 26.6* 25.9* 25.8*    133 111*   MCV 92.7 93.5 92.5   RDW 15.4 15.5 15.2    Recent Labs   Lab 03/29/25  0446 03/29/25  1415   INR 1.2 1.2   APTT 63.5* 103.5*        Telemetry: SR    Physical Exam  Constitutional:       General: He is not in acute distress.     Appearance: Normal appearance. He is obese. He is ill-appearing.      Comments: Vented/Sedated   HENT:      Head: Normocephalic.      Mouth/Throat:      Mouth: Mucous membranes are dry.   Cardiovascular:      Rate and Rhythm: Regular rhythm. Tachycardia present.      Pulses: Normal pulses.      Heart sounds: Murmur heard.   Pulmonary:      Effort: Pulmonary effort is normal. No respiratory distress.      Comments: Ventilator Associated Breath Sounds  Vent Mode: A/C  Oxygen Concentration (%):  [30-40] 30  Resp Rate Total:  [16 br/min-20 br/min] 18 br/min  Vt Set:  [500 mL] 500 mL  PEEP/CPAP:  [5 cmH20] 5 cmH20  Mean Airway  Pressure:  [9 guM10-97 cmH20] 9 cmH20  Abdominal:      Palpations: Abdomen is soft.   Skin:     General: Skin is warm.      Comments: L Groin Impella CP, Soft/Flat, Non-Tender, No Sign of Bleed/Infection. + Monophasic Dopplerable Pedal Pulses    Neurological:      Comments: Vented/Sedated       Home Medications:   Medications Ordered Prior to Encounter[1]  Current Schedule Inpatient Medications:   aspirin  81 mg Oral Daily    clopidogreL  75 mg Oral Daily    heparin (PORCINE)  39.2 Units/kg Intravenous Once    mupirocin   Nasal BID     Continuous Infusions:   0.9% NaCl   Intravenous Continuous 100 mL/hr at 03/31/25 1005 Restarted at 03/31/25 1005    amiodarone in dextrose 5%  0.5 mg/min Intravenous Continuous 16.7 mL/hr at 03/31/25 1443 0.5 mg/min at 03/31/25 1443    fentanyl  0-250 mcg/hr Intravenous Continuous   Stopped at 03/29/25 2030    heparin (porcine) in D5W  0-40 Units/kg/hr Intravenous Continuous 17.3 mL/hr at 03/31/25 1502 16.944 Units/kg/hr at 03/31/25 1502    NORepinephrine bitartrate-D5W  0-3 mcg/kg/min Intravenous Continuous 8.6 mL/hr at 03/31/25 1443 0.18 mcg/kg/min at 03/31/25 1443    propofoL  0-50 mcg/kg/min Intravenous Continuous 18.4 mL/hr at 03/31/25 1443 30 mcg/kg/min at 03/31/25 1443    sodium bicarbonate 25 mEq in D5W 1,000 mL Purge Solution for Impella   Impella Device Continuous   New Bag at 03/29/25 1400    tirofiban-0.9% sodium chloride 12.5 mg/250ml    Continuous PRN   Stopped at 03/29/25 1845     Mechanical Circulatory Support (MCS) Flow Sheet  Cardiogenic Shock/HF Management  03/31/2025    PUMP METRICS  Value/Parameter TIME: 0900   Site Observation/Distal Pulses See Physical    cm See Nursing Documentation    Impella [x]CP w/Smart Assist   []5.5 w/Smart Assist   []RP w/Smart Assist   P-Level P-7   Ao (MAP) 82/60; MAP 68   LV 77/-12   Motor Current (Mean) 770/640   Purge Pressures/Flow 359/14.6   Average Flows 3.2 - 3.8/2.7     HEMODYNAMICS  Value/Parameter TIME: 0900   AO (120/80)  88/60   MAP (> 60) 68   CO (PA Catheter)/GARRY 4.9   Native CO ([place] CO-Impella)    CI (> 2.2) 2.1   Cardiac Power Output (Greater than or Equal to 0.8) 0.73   SVR (800-1200) 995   HR () 79   LVEF   See ECHO     RV ASSESSMENT  Value/Parameter TIME: 0900   Rafa (> 1.5) 3   PA (25/10) 36/15   CVP (8-12) 7   PVR (120-250)    TAPSI (cm) (1.8)      LABS  Value/Parameter TIME: 0900   Lactate Level (< 2.0) 1    SvO2 (65-75%) 49.2%   Hgb/Hct (12-17/36-50) 8.8/25.8      SpO2 (%) 95%   pH (7.35-7.45) 7.49   Creatinine (0.6-1.2) 0.88   ALT (7-56) 45   ACT (160-180) 164   LDH    Urine Output (> 30mL/HR) > 30mL/Clear Yellow     DRIPS (GTTS)  Value/Parameter TIME: 0900   Vasopressors  []Vasopressin  []Epinephrine  [x]Levophed  []NeoSynephrine   Inotropes  []Dobutamine  []Primacor/Milrinone   []Dopamine   Anticoagulants [x]Heparin  []Argatroban   Sedation []Fentanyl  []Precedex  [x]Propofol  []Nimbex     Assessment:   Cardiogenic Shock requiring Mechanical and Chemical Support    - Impella/Pressors   MVCAD    - s/p LHC (3.29.25) -  Left Main has mild diffuse disease. Mid Left Anterior Descending has serial lesions, up to 70-80% in severity. Prox Left Circumflex is 100% occluded. Unable to cross lesion despite wire escalation and microcatheter support. Mid Right Coronary Artery is 100% occluded. The lesion was successfully treated with overlapping SYNERGY XD 4.0X20MM, SYNERGY XD 3.0X48MM, and SYNERGY XD 2.23V58AH drug-eluting stents. Post-dilatation was performed using a SYNERGY XD 4.0X20MM angioplasty balloon inflated to 12 gretel. Following intervention there was 0% residual stenosis and PATTY grade 3 flow in the distal vessel.  NSTEMI Type I (Non-Anterior Wall MI)    - Trponin > 9  Cardiopulmonary Arrest    - Initial Rhythm PEA - Witnessed in ER Arrest/ROSC  ICMO/EF 15-20%     - ECHO (3.29.25) - LVEF 15-20%, Grade I DD   Acute Hypoxemic Respiratory Failure requiring Intubation/Ventilation   ST- Now SR   Hypotension  requiring Pressors    - Hx of HTN  BPH s/p TURP   Lactic Acidosis   Leukocytosis - Improving   Anemia requiring Transfusions  Recent BPH Diagnosis s/p TURP  CAD/Nonobstructive (2017)  JACY/R Sided   PAD/Nonobstructive   Transaminitis   Obesity  GERD  Tobacco Use  No Hx of GIB     Plan:   See Mechanical Flow Sheet  Heparin Drip per Protocol for Tx of NSTEMI   Type and Screen and Transfuse 1 Unit PRBCS  Wean Pressors for MAP > 65mmHg   Trend Lactic Acid Levels  AM VBG/ABG  Will Continue to Follow   Labs and EKG in AM: CBC, CMP and Mg    Hasmukh Sandoval, ANP  Cardiology  [facility]        [1]   No current facility-administered medications on file prior to encounter.     No current outpatient medications on file prior to encounter.

## 2025-03-31 NOTE — PLAN OF CARE
25 1217   Discharge Assessment   Assessment Type Discharge Planning Assessment   Confirmed/corrected address, phone number and insurance Yes   Confirmed Demographics Correct on Facesheet   Source of Information family   Communicated RENE with patient/caregiver Date not available/Unable to determine   Reason For Admission Cardiac Arrest   People in Home alone   Facility Arrived From: [facility]   Do you expect to return to your current living situation? Other (see comments)  (TBD)   Do you have help at home or someone to help you manage your care at home? Yes   Who are your caregiver(s) and their phone number(s)? daughter   Prior to hospitilization cognitive status: Alert/Oriented   Current cognitive status: Coma/Sedated/Intubated   Walking or Climbing Stairs Difficulty no   Dressing/Bathing Difficulty no   Equipment Currently Used at Home none   Patient currently being followed by outpatient case management? No   Do you currently have service(s) that help you manage your care at home? No   Do you take prescription medications? Yes   Do you have prescription coverage? Yes   Coverage [employer]   Do you have any problems affording any of your prescribed medications? No   Is the patient taking medications as prescribed? yes   Who is going to help you get home at discharge? family   How do you get to doctors appointments? car, drives self   Are you on dialysis? No   Do you take coumadin? No   Discharge Plan A Other  (TBD, intubated)   Discharge Plan B Other  (TBD, intubated)   DME Needed Upon Discharge  other (see comments)  (TBD)   Discharge Plan discussed with: Adult children   Name(s) and Number(s) Kimmy blair   Transition of Care Barriers None   OTHER   Name(s) of People in Home alone     Spoke to johnnie Oneal at bedside. Patient intubated.  Daughter states he lives alone and was independent prior to hospitalization.  Wife . 2 living children.  No HCPOA or Living Will.

## 2025-04-01 NOTE — PROGRESS NOTES
[facility] [address] ICU  Pulmonary/Critical Care  Progress Note  4/1/2025    Patient Name: Jon Conley  MRN: [id number]  Admission Date: 3/29/2025  Code Status: No Order      Subjective:     HPI:  The patient is a 79-year-old male who has a history of coronary artery disease, hypertension, hyperlipidemia, peripheral arterial disease, and GE reflux disease.  He also has a history of recent TURP in [place], complicated by postoperative by gross hematuria requiring placement of a three-way irrigation bladder catheter last week. This was left in place at time of discharge.  He presented to [facility] ER early a.m. of 03/29/2025 with shortness of breath, noted tachycardia/hypotensive, and marked elevation of high sensitivity troponin at 13,821. He was transferred to [facility] ER, suffering PEA arrest requiring intubation and short CPR with ROSC.  LHC was performed, noting multivessel coronary artery disease with 100% occluded proximal left circumflex, unable to cross with wire.  Mid RCA also noted 100% occluded, treated with PTCA/TAD.  He was noted to have elevated right-sided filling pressures with PCW 25 mm Hg.  Impella catheter was placed, and was left in place postprocedure.  He was admitted to ICU for ongoing medical care.    Hospital Course:  Intubation/mechanical ventilation 03/29/2025  Impella placement 03/29/2025    24hr Interval History:  T-max 100.0° oral over the previous 24 hours.  Intake 5295 cc, output 2690 cc over the previous 24 hours.  Blood cultures 03/29/2025 no growth.  He remains on Impella support, continues P-7.  He remains sinus rhythm/sinus bradycardia.  Amiodarone 0.5 microgram/minute infusion.  Levophed 0.16 mcg/kg per minute.  He remains sedated on propofol, currently 30 mcg/kg per minute, necessary due to ventilatory discordance and agitation on decreasing.  He continues on full anticoagulation with unfractionated heparin infusion.  Scant nonpurulent  secretions per endotracheal tube.  [facility], cardiology transfused 2 units of PRBCs yesterday.    AC 16/500 cc/peep +5/40%    Scheduled Medications:   aspirin  81 mg Oral Daily    clopidogreL  75 mg Oral Daily    heparin (PORCINE)  39.2 Units/kg Intravenous Once    mupirocin   Nasal BID     PRN Medications:    Current Facility-Administered Medications:     0.9%  NaCl infusion (for blood administration), , Intravenous, Q24H PRN    0.9%  NaCl infusion (for blood administration), , Intravenous, Q24H PRN    0.9%  NaCl infusion (for blood administration), , Intravenous, Q24H PRN    0.9%  NaCl infusion (for blood administration), , Intravenous, Q24H PRN    acetaminophen, 650 mg, Oral, Q4H PRN    calcium gluconate IVPB, 1 g, Intravenous, PRN    calcium gluconate IVPB, 2 g, Intravenous, PRN    calcium gluconate IVPB, 3 g, Intravenous, PRN    heparin (PORCINE), 39.2 Units/kg, Intravenous, PRN    heparin (PORCINE), 30 Units/kg, Intravenous, PRN    magnesium sulfate 2 g IVPB, 2 g, Intravenous, PRN    magnesium sulfate 2 g IVPB, 2 g, Intravenous, PRN    ondansetron, 8 mg, Oral, Q8H PRN    potassium chloride in water, 20 mEq, Intravenous, PRN **AND** potassium chloride in water, 40 mEq, Intravenous, PRN **AND** potassium chloride in water, 60 mEq, Intravenous, PRN    sodium phosphate 15 mmol in D5W 250 mL IVPB, 15 mmol, Intravenous, PRN    sodium phosphate 20.1 mmol in D5W 250 mL IVPB, 20.1 mmol, Intravenous, PRN    sodium phosphate 30 mmol in D5W 250 mL IVPB, 30 mmol, Intravenous, PRN    tirofiban-0.9% sodium chloride 12.5 mg/250ml, , , Continuous PRN  Continuous Infusions:   0.9% NaCl   Intravenous Continuous 100 mL/hr at 04/01/25 0306 New Bag at 04/01/25 0306    amiodarone in dextrose 5%  0.5 mg/min Intravenous Continuous 16.7 mL/hr at 04/01/25 0548 0.5 mg/min at 04/01/25 0548    fentanyl  0-250 mcg/hr Intravenous Continuous   Stopped at 03/29/25 2030    heparin (porcine) in  D5W  0-40 Units/kg/hr Intravenous Continuous 17.8 mL/hr at 04/01/25 0548 17.4339 Units/kg/hr at 04/01/25 0548    NORepinephrine bitartrate-D5W  0-3 mcg/kg/min Intravenous Continuous 7.7 mL/hr at 04/01/25 0548 0.16 mcg/kg/min at 04/01/25 0548    propofoL  0-50 mcg/kg/min Intravenous Continuous 18.4 mL/hr at 04/01/25 0548 30 mcg/kg/min at 04/01/25 0548    sodium bicarbonate 25 mEq in D5W 1,000 mL Purge Solution for Impella   Impella Device Continuous   New Bag at 03/29/25 1400    tirofiban-0.9% sodium chloride 12.5 mg/250ml    Continuous PRN   Stopped at 03/29/25 1845       No past medical history on file.    Past Surgical History:   Procedure Laterality Date    CATHETERIZATION OF BOTH LEFT AND RIGHT HEART N/A 3/29/2025    Procedure: CATHETERIZATION, HEART, BOTH LEFT AND RIGHT;  Surgeon: Lorne Coon Jr., MD;  Location: [facility] CATH LAB;  Service: Cardiology;  Laterality: N/A;       Objective:     Input/output:    Intake/Output Summary (Last 24 hours) at 4/1/2025 0641  Last data filed at 4/1/2025 0600  Gross per 24 hour   Intake 5293.99 ml   Output 2690 ml   Net 2603.99 ml       Vital Signs (Most Recent):  Temp: 99.3 °F (37.4 °C) (04/01/25 0400)  Pulse: 70 (04/01/25 0617)  Resp: 18 (04/01/25 0617)  BP: 106/78 (04/01/25 0615)  SpO2: 99 % (04/01/25 0617)  Body mass index is 32.64 kg/m².  Weight: 106.1 kg (233 lb 14.5 oz) Vital Signs (24h Range):  Temp:  [99.1 °F (37.3 °C)-100 °F (37.8 °C)] 99.3 °F (37.4 °C)  Pulse:  [63-84] 70  Resp:  [16-28] 18  SpO2:  [94 %-100 %] 99 %  BP: ()/(68-83) 106/78  Arterial Line BP: ()/() 104/70     Physical Exam  Constitutional:       Comments: Sedated on propofol infusion, intubated on mechanical ventilation.   Eyes:      Conjunctiva/sclera: Conjunctivae normal.      Pupils: Pupils are equal, round, and reactive to light.   Cardiovascular:      Rate and Rhythm: Normal rate and regular rhythm.      Heart sounds: No murmur heard.  Pulmonary:      Breath sounds: Rhonchi  (Few bilateral coarse scattered) present. No wheezing or rales.   Abdominal:      General: Bowel sounds are normal. There is no distension.      Palpations: Abdomen is soft.      Tenderness: There is no abdominal tenderness.   Musculoskeletal:      Right lower leg: No edema.      Left lower leg: No edema.   Skin:     Comments: Left foot cool, right foot warm, Doppler pulses obtained   Neurological:      Comments: Neurologic exam clouded secondary to sedation with propofol.  He is very lethargic, arouses and follows a few simple commands.  Pupils equal round at 3 mm and reactive bilateral.           Lines/Drains/Airways       Central Venous Catheter Line  Duration             Tunneled Central Line - Triple Lumen 03/29/25 0555 Internal Jugular Right 3 days    Pulmonary Artery Catheter Assessment  03/29/25 1300 Femoral Vein Right 2 days              Drain  Duration                  Urethral Catheter -- days         NG/OG Tube 03/29/25 1200 18 Fr. Center mouth 2 days              Airway  Duration                  Airway - Non-Surgical 03/29/25 0541 3 days              Line  Duration                  VAD 03/29/25 3 days              Peripheral Intravenous Line  Duration                  Peripheral IV - Single Lumen 03/29/25 0530 18 G Right Antecubital 3 days         Peripheral IV - Single Lumen 03/29/25 0600 20 G Left Hand 3 days         Peripheral IV - Single Lumen 03/29/25 20 G Left Forearm 3 days         Sheath 03/29/25 0913 Right 2 days         Sheath 03/29/25 0927 Left 2 days                    Vent:  Vent Mode: A/C (04/01/25 0614)  Ventilator Initiated: Yes (03/29/25 0603)  Set Rate: 16 BPM (04/01/25 0614)  Vt Set: 500 mL (04/01/25 0614)  PEEP/CPAP: 5 cmH20 (04/01/25 0614)  Oxygen Concentration (%): 30 (04/01/25 0614)  Peak Airway Pressure: 29 cmH20 (04/01/25 0614)  Total Ve: 8.7 L/m (04/01/25 0614)  F/VT Ratio<105 (RSBI): (!) 38.02 (03/31/25 1259)    ABGs:  Lab Results   Component Value Date    PH 7.460 (H)  04/01/2025    PO2 76.0 (L) 04/01/2025    PCO2 33.0 (L) 04/01/2025         Significant Labs:    Lab Results   Component Value Date    WBC 10.47 04/01/2025    HGB 9.4 (L) 04/01/2025    HCT 28.6 (L) 04/01/2025    MCV 92.6 04/01/2025     (L) 04/01/2025         Recent Labs   Lab 04/01/25  0417      K 4.1   *   CO2 22*   BUN 12.7   CREATININE 0.81   CALCIUM 7.3*   MG 2.10   PHOS 2.9   TRIG 123   AST 56*   ALT 41   ALKPHOS 67   ALBUMIN 2.2*     Imaging:   Chest x-ray (04/01/2025, my reading of images):  ET tip good placement.  Borderline inspiratory effort with prominent bilateral vascular markings and some vascular crowding of the bases.  Unable to interpret left base due to prominent LV structures with silhouetting.    Assessment:     NSTEMI with cardiogenic shock, ongoing  LHC with PTCA/TAD x3 RCA (03/29/2025), PCWP 25 mm Hg  EF 15% by TTE  Impella placement (03/29/2025), currently P-7  Ischemic cardiomyopathy with severe decreased LVSF, EF 15%  Cardiogenic pulmonary edema with acute respiratory failure, intubated 03/29/2025.  Good oxygenation/ventilation on current ventilatory settings.  Severe wide anion gap metabolic acidosis of primary lactic acid etiology, resolved   Elevated serum transaminases, most consistent with shock/hypoperfusion liver secondary to cardiogenic shock, continues with daily improvement.  Mild developing thrombocytopenia, likely related to Impella      Plan:     Impella and cardiogenic shock management as per cardiology service.  Awaiting Cardiology decision concerning management of remaining coronary stenoses.    Continue attempts at decreasing sedation as tolerated.  Continue full anticoagulation with unfractionated heparin infusion  Discontinue amiodarone infusion         35 minutes of critical care was time spent personally by me on the following activities: development of treatment plan with patient or surrogate and bedside caregivers, discussions with consultants,  evaluation of patient's response to treatment, examination of patient, ordering and performing treatments and interventions, ordering and review of laboratory studies, ordering and review of radiographic studies, pulse oximetry, re-evaluation of patient's condition.  This patient demonstrates a high probability for further clinical decompensation due to ongoing critical illness.  Critical care time did not overlap with that of any other provider or involve time for any procedures.       Maty Harp MD, [facility]  Pulmonary/Critical Care

## 2025-04-01 NOTE — PROGRESS NOTES
[facility]    Cardiology  Progress Note    Patient Name: Jon Conley  MRN: [id number]  Admission Date: 3/29/2025  Hospital Length of Stay: 3 days  Code Status: No Order   Attending Provider: Davi Harp Jr., MD,*   Consulting Provider: Juliocesar Hale [facility]  Primary Care Physician: Patrick Reece Jr., MD  Principal Problem:<principal problem not specified>    Patient information was obtained from past medical records and ER records.     Subjective:     Chief Complaint/Reason for Consult: Cardiac Arrest     HPI: Mr. Conley is a 80 y/o male who is known to [facility], Dr. Wang. The patient presented to [facility] on 3.29.25 with c/o SOB. He initially presented to [facility] in [place] with SOB. He was found to have Tachycardia, Hypotension, Anemia (7.4/24.6), Troponin 9.443, .0, BUN/Crea 16.2/1.34, Lactic Acid Level 4.3, Na 133, WBC 14.03, H&H 7.3/23.0, WBC 14.03, , PT/INR 14.9/1.2. He was admitted to , however, he sustained a witnessed PEA Arrest in the ER and has ROSC per ER MD. [facility] was consulted for NSETMI and Arrest.     3.30.25: NAD. Impella L Groin. CVP 7, P7. Levophed 0.32mcg/kg/min, Heparin Drip per Protocol, Amiodarone 0.5mg/min  3.31.25: NAD. L Groin Impella P7, Amiodarone 0.5mg/min, Heparin Drip per Protocol, Levophed 0.18mcg/kg/min, H&H 8.8/25.8, AST/ALT 79/45  4.1.25: NAD noted. L groin Impella in place, remains at P7. Remains on levophed. LFTs continue to improve.    PMH: BPH/Urinary Obstruction, CAD/Nonobstructive, PAD, R JACY/Mild, HTN, Obesity, GERD, Tobacco Use   PSH: TURP, Angiogram, Bilateral Knee Replacements   Family History: Denies Family History of Heart Disease  Social History: Denies Illicit Drug, ETOH and Tobacco Use     Previous Cardiac Diagnostics:   [facility] 3.29.25:  Findings:  - Successful placement of Impella CP device from left femoral approach.  - There is severe multi-vessel coronary artery disease.  - Left  Main has mild diffuse disease.  - Mid Left Anterior Descending has serial lesions, up to 70-80% in severity.   - Prox Left Circumflex is 100% occluded. Unable to cross lesion despite wire escalation and microcatheter support.  - Mid Right Coronary Artery is 100% occluded. The lesion was successfully treated with overlapping SYNERGY XD 4.0X20MM, SYNERGY XD 3.0X48MM, and SYNERGY XD 2.49T26EI drug-eluting stents. Post-dilatation was performed using a SYNERGY XD 4.0X20MM angioplasty balloon inflated to 12 gretel. Following intervention there was 0% residual stenosis and PATTY grade 3 flow in the distal vessel.  - Intravascular Ultrasound (IVUS) was performed prior to intervention to further characterize the lesion, as well as post-stent deployment to ensure optimal stent apposition and expansion.  - Aorto-iliac angiogram revealed moderate disease of R ileofemoral system.  - At the conclusion of the case, contrast injection was performed through the Impella sheath side port, showing adequate antegrade flow down the ipsilateral common femoral artery/SFA/profunda artery.  - RHC with elevated R sided filling pressures. RA 17 mmHg, RV 45/15 mmHg, PA 42/29 mmHg (mean 34 mmHg), PCWP 25 mmHg.  - Transpulmonary gradient is 9 mmHg.  - Cardiac Output/Index at 6.6/3.0, as calculated by Gregory equation on Levophed infusion and Impella CP support.  - PVR is 1.4 Woods units  - SVR is 550 dyne-sec*cm^-5  - Pulmonary Artery Pulsatility Index (Rafa) is 0.8  - Cardiac Power Output () is 0.91   Assessment/Plan:  - Patient is a 79 y.o. male with a history of recent TURP, complicated by significant post-op bleeding, presents to OSH ED with dyspnea. Transferred to [facility] with worsening shock despite vasopressors. TTE showing EF 25%, severe MR, severe TR. Troponin elevated. Brought to cath lab. Underwent Impella placement/LHC/RHC as outlined above. RCA lesion treated. Unable to cross LCx lesion. 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 At this  point, would continue Impella support and aggressive medical mgmt. Has non-culprit lesions that should be treated at a later date as well. Can consider CTS consult, especially given severe valvular disease.  - Patient was given a loading dose of clopidogrel 600 mg PO in the cath lab  - Continue DAPT (aspirin + clopidogrel) for a minimum of 12 months and aspirin indefinitely thereafter  - High intensity statin  - Continue Aggrastat for 4 hours post-procedure  - Arterial sheath remains place; plan is to remove when activated clotting time (ACT) less than 180 seconds. Bedrest for 4 hours after hemostasis is achieved.  - Obtain transthoracic echocardiogram  - Continuous bedrest while Impella in place  - Therapeutic heparin administration per protocol  - Antibiotics per protocol  - Remaining mgmt per primary team/cardiology    ECHO 3.29.25:  Left Ventricle: The left ventricle is dilated. Normal wall thickness. Severe global hypokinesis present. There is severely reduced systolic function with a visually estimated ejection fraction of 15 - 20%. Grade I diastolic dysfunction.  Right Ventricle: Right ventricular enlargement. Systolic function is normal. TAPSE is 1.83 cm.  Left Atrium: dilated  Mitral Valve: Mildly thickened leaflets. There is moderate regurgitation.  Tricuspid Valve: There is moderate regurgitation.  Pulmonary Artery: There is moderate pulmonary hypertension.  IVC/SVC: Patient is ventilated, cannot use IVC diameter to estimate right atrial pressure.    Carotid US 11.4.24:  The study quality is average.   1-39% stenosis in the proximal right internal carotid artery based on Bluth Criteria.   Antegrade right vertebral artery flow.   Antegrade left vertebral artery flow    ECHO 1.27.22:  The study quality is average.   The left ventricle is normal in size. Global left ventricular systolic function is moderately decreased. The left ventricular ejection fraction is 40-45%. Mild concentric left ventricular  hypertrophy is present.  The left ventricle diastolic function is impaired (Grade I) with normal left atrial pressure.  Mild (1+) mitral regurgitation.  The pulmonary artery appears normal.     [facility] 9.26.17:  LM: Normal  LAD: Prox 30% Stenosis  RI: Normal  LCX: Normal  RCA: Distal RCA 50%    Review of patient's allergies indicates:  No Known Allergies  No current facility-administered medications on file prior to encounter.     No current outpatient medications on file prior to encounter.     Review of Systems   Unable to perform ROS: Intubated     Objective:     Vital Signs (Most Recent):  Temp: 98.7 °F (37.1 °C) (04/01/25 0800)  Pulse: 71 (04/01/25 0900)  Resp: 18 (04/01/25 0900)  BP: 113/79 (04/01/25 0900)  SpO2: 100 % (04/01/25 0900) Vital Signs (24h Range):  Temp:  [98.7 °F (37.1 °C)-99.5 °F (37.5 °C)] 98.7 °F (37.1 °C)  Pulse:  [64-83] 71  Resp:  [16-28] 18  SpO2:  [94 %-100 %] 100 %  BP: ()/(68-85) 113/79  Arterial Line BP: ()/() 111/74   Weight: 106.1 kg (233 lb 14.5 oz)  Body mass index is 32.64 kg/m².  SpO2: 100 %       Intake/Output Summary (Last 24 hours) at 4/1/2025 0915  Last data filed at 4/1/2025 0900  Gross per 24 hour   Intake 4318.26 ml   Output 2760 ml   Net 1558.26 ml     Lines/Drains/Airways       Central Venous Catheter Line  Duration             Tunneled Central Line - Triple Lumen 03/29/25 0555 Internal Jugular Right 3 days    Pulmonary Artery Catheter Assessment  03/29/25 1300 Femoral Vein Right 2 days              Drain  Duration                  Urethral Catheter -- days         NG/OG Tube 03/29/25 1200 18 Fr. Center mouth 2 days              Airway  Duration                  Airway - Non-Surgical 03/29/25 0541 3 days              Line  Duration                  VAD 03/29/25 3 days              Peripheral Intravenous Line  Duration                  Peripheral IV - Single Lumen 03/29/25 0530 18 G Right Antecubital 3 days         Peripheral IV - Single Lumen 03/29/25 0600  20 G Left Hand 3 days         Peripheral IV - Single Lumen 03/29/25 20 G Left Forearm 3 days         Sheath 03/29/25 0913 Right 3 days         Sheath 03/29/25 0927 Left 2 days                  Significant Labs:   Chemistries:   Recent Labs   Lab 03/29/25  0446 03/29/25  1743 03/30/25  2108 03/31/25  0313 03/31/25  1117 03/31/25 1957 04/01/25 0417   *   < > 136 134* 137 136 138   K 4.1   < > 3.9 4.0 3.9 4.0 4.1      < > 106 106 110* 109* 111*   CO2 19*   < > 22* 22* 23 22* 22*   BUN 16.2   < > 14.6 12.7 12.7 11.7 12.7   CREATININE 1.34*   < > 0.99 0.94 0.88 0.90 0.81   CALCIUM 8.0*   < > 7.4* 7.2* 7.8* 7.4* 7.3*   BILITOT 0.3   < > 0.4 0.5 0.4 0.4 0.4   ALKPHOS 58   < > 69 69 65 65 67   ALT 15   < > 54 48 45 42 41   AST 82*   < > 126* 103* 79* 65* 56*   GLUCOSE 144*   < > 127* 123* 124* 110 116*   MG  --    < > 2.20 2.30 2.10 2.10 2.10   PHOS  --    < > 2.7 2.6 2.6 2.9 2.9   TROPONINI 9.443*  --   --   --   --   --   --     < > = values in this interval not displayed.        CBC/Anemia Labs: Coags:    Recent Labs   Lab 03/31/25  1117 03/31/25  1658 03/31/25 1957 04/01/25  0417   WBC 12.22*  --  10.57 10.47   HGB 8.8* 9.6* 9.3* 9.4*   HCT 25.8* 27.6* 28.4* 28.6*   *  --  112* 106*   MCV 92.5  --  91.9 92.6   RDW 15.2  --  16.2 16.4    Recent Labs   Lab 03/29/25 0446 03/29/25  1415   INR 1.2 1.2   APTT 63.5* 103.5*        Telemetry: SR    Physical Exam  Constitutional:       General: He is not in acute distress.     Appearance: Normal appearance. He is obese. He is ill-appearing.      Comments: Vented/Sedated   HENT:      Head: Normocephalic.      Mouth/Throat:      Mouth: Mucous membranes are dry.   Cardiovascular:      Rate and Rhythm: Regular rhythm. Tachycardia present.      Pulses: Normal pulses.      Heart sounds: Murmur heard.   Pulmonary:      Effort: Pulmonary effort is normal. No respiratory distress.      Comments: Ventilator Associated Breath Sounds  Vent Mode: A/C  Oxygen  Concentration (%):  [30-40] 30  Resp Rate Total:  [16 br/min-20 br/min] 18 br/min  Vt Set:  [500 mL] 500 mL  PEEP/CPAP:  [5 cmH20] 5 cmH20  Mean Airway Pressure:  [9 zwZ40-85 cmH20] 9 cmH20  Abdominal:      Palpations: Abdomen is soft.   Skin:     General: Skin is warm.      Comments: L Groin Impella CP, Soft/Flat, Non-Tender, No Sign of Bleed/Infection. + Monophasic Dopplerable Pedal Pulses    Neurological:      Comments: Vented/Sedated       Home Medications:   Medications Ordered Prior to Encounter[1]  Current Schedule Inpatient Medications:   aspirin  81 mg Oral Daily    clopidogreL  75 mg Oral Daily    heparin (PORCINE)  39.2 Units/kg Intravenous Once    mupirocin   Nasal BID     Continuous Infusions:   fentanyl  0-250 mcg/hr Intravenous Continuous   Stopped at 03/29/25 2030    heparin (porcine) in D5W  0-40 Units/kg/hr Intravenous Continuous 17.8 mL/hr at 04/01/25 0800 17.4339 Units/kg/hr at 04/01/25 0800    NORepinephrine bitartrate-D5W  0-3 mcg/kg/min Intravenous Continuous 7.7 mL/hr at 04/01/25 0800 0.16 mcg/kg/min at 04/01/25 0800    propofoL  0-50 mcg/kg/min Intravenous Continuous 12.3 mL/hr at 04/01/25 0800 20 mcg/kg/min at 04/01/25 0800    sodium bicarbonate 25 mEq in D5W 1,000 mL Purge Solution for Impella   Impella Device Continuous   New Bag at 04/01/25 0824    tirofiban-0.9% sodium chloride 12.5 mg/250ml    Continuous PRN   Stopped at 03/29/25 1845     Mechanical Circulatory Support (MCS) Flow Sheet  Cardiogenic Shock/HF Management  04/01/2025    PUMP METRICS  Value/Parameter TIME: 0900   Site Observation/Distal Pulses See Physical    cm See Nursing Documentation    Impella [x]CP w/Smart Assist   []5.5 w/Smart Assist   []RP w/Smart Assist   P-Level P-7   Ao (MAP) 82/60; MAP 68   LV 77/-12   Motor Current (Mean) 770/640   Purge Pressures/Flow 359/14.6   Average Flows 3.2 - 3.8/2.7     HEMODYNAMICS  Value/Parameter TIME: 0900   AO (120/80) 88/60   MAP (> 60) 68   CO (PA Catheter)/GARRY 4.9   Native CO  (Eva CO-Impella)    CI (> 2.2) 2.1   Cardiac Power Output (Greater than or Equal to 0.8) 0.73   SVR (800-1200) 995   HR () 79   LVEF   See ECHO     RV ASSESSMENT  Value/Parameter TIME: 0900   Rafa (> 1.5) 3   PA (25/10) 36/15   CVP (8-12) 7   PVR (120-250)    TAPSI (cm) (1.8)      LABS  Value/Parameter TIME: 0900   Lactate Level (< 2.0) 1    SvO2 (65-75%) 49.2%   Hgb/Hct (12-17/36-50) 8.8/25.8      SpO2 (%) 95%   pH (7.35-7.45) 7.49   Creatinine (0.6-1.2) 0.88   ALT (7-56) 45   ACT (160-180) 164   LDH    Urine Output (> 30mL/HR) > 30mL/Clear Yellow     DRIPS (GTTS)  Value/Parameter TIME: 0900   Vasopressors  []Vasopressin  []Epinephrine  [x]Levophed  []NeoSynephrine   Inotropes  []Dobutamine  []Primacor/Milrinone   []Dopamine   Anticoagulants [x]Heparin  []Argatroban   Sedation []Fentanyl  []Precedex  [x]Propofol  []Nimbex     Assessment:   Cardiogenic Shock requiring Mechanical and Chemical Support    - Impella/Pressors   MVCAD    - s/p LHC (3.29.25) -  Left Main has mild diffuse disease. Mid Left Anterior Descending has serial lesions, up to 70-80% in severity. Prox Left Circumflex is 100% occluded. Unable to cross lesion despite wire escalation and microcatheter support. Mid Right Coronary Artery is 100% occluded. The lesion was successfully treated with overlapping SYNERGY XD 4.0X20MM, SYNERGY XD 3.0X48MM, and SYNERGY XD 2.65O67BP drug-eluting stents. Post-dilatation was performed using a SYNERGY XD 4.0X20MM angioplasty balloon inflated to 12 gretel. Following intervention there was 0% residual stenosis and PATTY grade 3 flow in the distal vessel.  NSTEMI Type I (Non-Anterior Wall MI)    - Trponin > 9  Cardiopulmonary Arrest    - Initial Rhythm PEA - Witnessed in ER Arrest/ROSC  ICMO/EF 15-20%     - ECHO (3.29.25) - LVEF 15-20%, Grade I DD   Acute Hypoxemic Respiratory Failure requiring Intubation/Ventilation   ST- Now SR   Hypotension requiring Pressors    - Hx of HTN  BPH s/p TURP   Lactic  Acidosis   Leukocytosis - Improving   Anemia requiring Transfusions  Recent BPH Diagnosis s/p TURP  CAD/Nonobstructive (2017)  JACY/R Sided   PAD/Nonobstructive   Transaminitis   Obesity  GERD  Tobacco Use  No Hx of GIB     Plan:   See Mechanical Flow Sheet  Heparin Drip per Protocol for Tx of NSTEMI   CT surgery consult to evaluate patient for CABG and valve  Wean Pressors for MAP > 65mmHg   Trend Lactic Acid Levels  AM VBG/ABG  Will Continue to Follow   Labs and EKG in AM: CBC, CMP and Mg    DEANDRA Ralph-BC  Cardiology  [facility]     I agree with the findings of the complexity of problems addressed and take responsibility for the plan's risks and complications. I approved the plan documented by Juliocesar Hale NP.            [1]   No current facility-administered medications on file prior to encounter.     No current outpatient medications on file prior to encounter.

## 2025-04-01 NOTE — CONSULTS
[facility]                       [address] [place] [address]    PATIENT NAME:       YRN COOK  YOB: 1945  CSN:                [id number]   MRN:                [id number]  ADMIT DATE:         03/29/2025 04:08:00  PHYSICIAN:          Trenton Gilmore MD                            CONSULTATION    DATE OF CONSULT:  04/01/2025 00:00:00    REASON FOR CONSULTATION:  Evaluate for coronary artery disease.    HISTORY OF PRESENT ILLNESS:  The patient is a 79-year-old male with history of   coronary artery disease, hypertension, and hyperlipidemia.  He presented to the   hospital with cardiogenic shock.  Impella was placed and intervention on the   right coronary artery was performed.  He still has LAD and circumflex disease.    His EF was severely depressed with ejection fraction of less than 30%.  I was   consulted for possible intervention.    PAST MEDICAL HISTORY:  Very positive for coronary artery disease, hypertension,   hyperlipidemia, GERD, peripheral vascular disease.    MEDICATIONS:  Per MAR, he received 600 mg of Plavix.  He is currently on daily   Plavix.    ALLERGIES:  NKDA.     FAMILY HISTORY:  Negative for heart disease.    SOCIAL HISTORY:  He does not smoke.  No illicit drug use.    REVIEW OF SYSTEMS:  Unable to obtain.    PHYSICAL EXAMINATION:  GENERAL:  He is intubated, sedated.  He is on epinephrine and he has an Impella   at P7.  NECK:  Trachea midline.  No carotid bruits.  LUNGS:  Good air entry bilaterally.  HEART:  Normal S1, S2.    ABDOMEN:  Soft.  EXTREMITY:  With an Impella.  NEUROLOGIC:  Unable to assess.    Previous cardiac diagnostics; carotids with 139% stenosis on the right side.    Cardiac cath reviewed with LAD, circumflex, and right coronary artery disease.    EF is less than 30%.    ASSESSMENT AND PLAN:  Severe triple-vessel coronary artery disease, status post   intervention  for right coronary artery and placement of an Impella.  The patient   received a dose of Plavix 600 mg.  Recommend stopping the Plavix today if okay   with the primary Cardiology team.  Recommend weaning off the Impella to P2.    Extubation if possible.  Plan to do surgery towards the end of the week.  I will   discuss the surgery with the family once I see them.    Thank you for the consult.        ______________________________  MD LYNN Alberts/ANDRÉS  DD:  04/01/2025  Time:  10:31AM  DT:  04/01/2025  Time:  10:52AM  Job #:  [id number]      CONSULTATION

## 2025-04-02 NOTE — PROGRESS NOTES
CT SURGERY PROGRESS NOTE  Jon Conley  79 y.o.  1945    Patients Procedure: Procedure(s) (LRB):  CATHETERIZATION, HEART, BOTH LEFT AND RIGHT (N/A)    Subjective  Interval History:   HISTORY OF PRESENT ILLNESS:  The patient is a 79-year-old male with history of   coronary artery disease, hypertension, and hyperlipidemia.  He presented to the   hospital with cardiogenic shock.  Impella was placed and intervention on the   right coronary artery was performed.  He still has LAD and circumflex disease.    His EF was severely depressed with ejection fraction of less than 30%.  I was   consulted for possible intervention.     PAST MEDICAL HISTORY:  Very positive for coronary artery disease, hypertension,   hyperlipidemia, GERD, peripheral vascular disease.     MEDICATIONS:  Per MAR, he received 600 mg of Plavix.  He is currently on daily   Plavix.     ALLERGIES:  NKDA.      FAMILY HISTORY:  Negative for heart disease.     SOCIAL HISTORY:  He does not smoke.  No illicit drug use.    ROS    Medication List  Infusions   fentanyl  0-250 mcg/hr Intravenous Continuous   Stopped at 03/29/25 2030    heparin (porcine) in D5W  0-40 Units/kg/hr Intravenous Continuous 18.8 mL/hr at 04/02/25 0629 18.413 Units/kg/hr at 04/02/25 0629    lactated ringers   Intravenous Continuous 75 mL/hr at 04/02/25 0629 Rate Verify at 04/02/25 0629    NORepinephrine bitartrate-D5W  0-3 mcg/kg/min Intravenous Continuous 2.9 mL/hr at 04/02/25 0629 0.06 mcg/kg/min at 04/02/25 0629    propofoL  0-50 mcg/kg/min Intravenous Continuous 12.3 mL/hr at 04/02/25 0629 20 mcg/kg/min at 04/02/25 0629    sodium bicarbonate 25 mEq in D5W 1,000 mL Purge Solution for Impella   Impella Device Continuous   New Bag at 04/01/25 0824    tirofiban-0.9% sodium chloride 12.5 mg/250ml    Continuous PRN   Stopped at 03/29/25 1845     Scheduled   aspirin  81 mg Oral Daily    famotidine  20 mg Per NG tube BID    heparin (PORCINE)  39.2 Units/kg Intravenous Once     mupirocin   Nasal BID       Objective:  Recent Vitals:  Temp:  [98.3 °F (36.8 °C)-99.5 °F (37.5 °C)] 99.2 °F (37.3 °C)  Pulse:  [67-84] 79  Resp:  [16-23] 18  SpO2:  [97 %-100 %] 99 %  BP: ()/(64-87) 125/83  Arterial Line BP: ()/() 122/66    Physical Exam     I/O last 24 hrs:  Intake/Output - Last 3 Shifts         03/31 0700  04/01 0659 04/01 0700  04/02 0659 04/02 0700  04/03 0659    P.O.  60     I.V. (mL/kg) 3885.4 (36.6) 1143.7 (10.8)     Blood 770      NG/GT 0      IV Piggyback 638.6 249.2     Total Intake(mL/kg) 5294 (49.9) 1453 (13.7)     Urine (mL/kg/hr) 2590 (1) 2275 (0.9)     Drains 100      Stool 0      Total Output 2690 2275     Net +2604 -822            Stool Occurrence 0 x              Labs  BMP:   Recent Labs   Lab 04/02/25  0434      K 4.0   *   CO2 22*   BUN 13.9   CREATININE 0.71*   CALCIUM 7.4*   MG 2.10     CBC:   Recent Labs   Lab 04/02/25  1155   WBC 9.67   RBC 2.88*   HGB 9.0*   HCT 26.8*   PLT 85*   MCV 93.1   MCH 31.3*   MCHC 33.6     CMP:   Recent Labs   Lab 04/02/25  0434   CALCIUM 7.4*   ALBUMIN 2.1*      K 4.0   CO2 22*   *   BUN 13.9   CREATININE 0.71*   ALKPHOS 66   ALT 27   AST 31   BILITOT 0.4         Imaging:   CXR: X-Ray Chest 1 View  Result Date: 4/2/2025  Some improvement of the pulmonary vascular congestion and cardiac decompensation. Support catheters remain in place no other interval change Electronically signed by: Mt Beal Date:    04/02/2025 Time:    04:54    ECHO: I have reviewed all results within the past 24 hours and my personal findings are:            ASSESSMENT/PLAN:    Eval for CABG in progress  Hopefully can DC Impella next 24 or so and eval the MV  Possible sx on Friday Dr Gilmore     Case and plan of care discussed with MD Lorne [place] PA-C

## 2025-04-02 NOTE — PROGRESS NOTES
Inpatient Nutrition Assessment    Admit Date: 3/29/2025   Total duration of encounter: 4 days   Patient Age: 79 y.o.    Nutrition Recommendation/Prescription     Start tube feeding when appropriate.  Tube feeding recommendation:     Peptamen Intense VHP goal rate 40 ml/hr + 4 packets ProSource TF20 daily to provide  1120 kcal/d  (75% est needs, 100% with meds)  154 g protein/d (99% est needs)  672 ml free water/d (32% est needs)  (calculations based on estimated 20 hr/d run time)     Start @ 20ml/hr, increase as tolerated 20ml/hr q4hr until goal rate reached.      If no IV fluids running, can give 125ml q 2hr water flushes. Total water provided: 1922ml (91% est needs.)     Communication of Recommendations: reviewed with nurse    Nutrition Assessment     Malnutrition Assessment/Nutrition-Focused Physical Exam       Malnutrition Level: other (see comments) (Does not meet criteria) (03/31/25 1259)  Energy Intake (Malnutrition): other (see comments) (Unable to assess) (03/31/25 1259)  Weight Loss (Malnutrition): other (see comments) (Unable to assess) (03/31/25 1259)                                                  A minimum of two characteristics is recommended for diagnosis of either severe or non-severe malnutrition.    Chart Review    Reason Seen: continuous nutrition monitoring    Malnutrition Screening Tool Results   Have you recently lost weight without trying?: No  Have you been eating poorly because of a decreased appetite?: No   MST Score: 0   Diagnosis:  NSTEMI with cardiogenic shock, ongoing  Ischemic cardiomyopathy with severe decreased LVSF, EF 15%  Cardiogenic pulmonary edema with acute respiratory failure  Severe wide anion gap metabolic acidosis of primary lactic acid etiology, resolved   Elevated serum transaminases    Relevant Medical History: CAD, HTN, HLD, PAD, GERD    Scheduled Medications:  aspirin, 81 mg, Daily  famotidine, 20 mg, BID  heparin (PORCINE), 39.2 Units/kg, Once  mupirocin, ,  BID    Continuous Infusions:  fentanyl, Last Rate: Stopped (03/29/25 2030)  heparin (porcine) in D5W, Last Rate: 18.413 Units/kg/hr (04/02/25 0629)  lactated ringers, Last Rate: 75 mL/hr at 04/02/25 0629  NORepinephrine bitartrate-D5W, Last Rate: 0.06 mcg/kg/min (04/02/25 0629)  propofoL, Last Rate: 20 mcg/kg/min (04/02/25 0629)  sodium bicarbonate 25 mEq in D5W 1,000 mL Purge Solution for Impella  tirofiban-0.9% sodium chloride 12.5 mg/250ml, Last Rate: Stopped (03/29/25 1845)    PRN Medications:  0.9%  NaCl infusion (for blood administration), , Q24H PRN  0.9%  NaCl infusion (for blood administration), , Q24H PRN  0.9%  NaCl infusion (for blood administration), , Q24H PRN  0.9%  NaCl infusion (for blood administration), , Q24H PRN  acetaminophen, 650 mg, Q4H PRN  calcium gluconate IVPB, 1 g, PRN  calcium gluconate IVPB, 2 g, PRN  calcium gluconate IVPB, 3 g, PRN  heparin (PORCINE), 39.2 Units/kg, PRN  heparin (PORCINE), 30 Units/kg, PRN  magnesium sulfate 2 g IVPB, 2 g, PRN  magnesium sulfate 2 g IVPB, 2 g, PRN  ondansetron, 8 mg, Q8H PRN  potassium chloride in water, 20 mEq, PRN   And  potassium chloride in water, 40 mEq, PRN   And  potassium chloride in water, 60 mEq, PRN  sodium phosphate 15 mmol in D5W 250 mL IVPB, 15 mmol, PRN  sodium phosphate 20.1 mmol in D5W 250 mL IVPB, 20.1 mmol, PRN  sodium phosphate 30 mmol in D5W 250 mL IVPB, 30 mmol, PRN  tirofiban-0.9% sodium chloride 12.5 mg/250ml, , Continuous PRN    Calorie Containing IV Medications: Diprivan @ 12 ml/hr (provides 315 kcal/d)    Recent Labs   Lab 03/30/25  0056 03/30/25  0345 03/31/25  0313 03/31/25  1117 03/31/25  1658 03/31/25 1957 04/01/25  0417 04/01/25  1211 04/01/25  1950 04/02/25  0434   NA  --    < > 134* 137  --  136 138 139 139 137   K  --    < > 4.0 3.9  --  4.0 4.1 4.4 4.1 4.0   CALCIUM  --    < > 7.2* 7.8*  --  7.4* 7.3* 7.2* 7.5* 7.4*   PHOS  --    < > 2.6 2.6  --  2.9 2.9 3.2 2.8 2.7   MG  --    < > 2.30 2.10  --  2.10 2.10 2.20  "2.20 2.10   CL  --    < > 106 110*  --  109* 111* 114* 112* 112*   CO2  --    < > 22* 23  --  22* 22* 20* 21* 22*   BUN  --    < > 12.7 12.7  --  11.7 12.7 11.9 12.6 13.9   CREATININE  --    < > 0.94 0.88  --  0.90 0.81 0.78 0.78 0.71*   EGFRNORACEVR  --    < > >60 >60  --  >60 >60 >60 >60 >60   GLUCOSE  --    < > 123* 124*  --  110 116* 121* 104 104   BILITOT  --    < > 0.5 0.4  --  0.4 0.4 0.5 0.4 0.4   ALKPHOS  --    < > 69 65  --  65 67 70 65 66   ALT  --    < > 48 45  --  42 41 37 31 27   AST  --    < > 103* 79*  --  65* 56* 49* 38 31   ALBUMIN  --    < > 2.3* 2.2*  --  2.1* 2.2* 2.2* 2.1* 2.1*   TRIG 108  --   --   --   --   --  123  --   --   --    WBC  --    < > 13.88* 12.22*  --  10.57 10.47 10.25 9.55 10.73   HGB  --    < > 8.5* 8.8* 9.6* 9.3* 9.4* 9.2* 9.0* 8.9*   HCT  --    < > 25.9* 25.8* 27.6* 28.4* 28.6* 29.3* 27.2* 26.6*    < > = values in this interval not displayed.     Nutrition Orders:  No diet orders on file      Appetite/Oral Intake: not applicable/not applicable  Factors Affecting Nutritional Intake: on mechanical ventilation  Social Needs Impacting Access to Food: unable to assess at this time; will attempt on follow-up  Food/Lutheran/Cultural Preferences: unable to obtain  Food Allergies: no known food allergies  Last Bowel Movement: 03/28/25  Wound(s):  Incision documentation noted     Comments    3/31/25: Discussed with RN. Will provide tube feeding recommendations for when appropriate to start tube feeding. Receiving kcal from meds.  Currently with Impella in place, HOB flat. May need to place in reverse Trendelenburg to start TF.     4/1/25: Possible plans for starting trickle feeds today. Still receiving kcal from meds.     4/2/25: No TF yet. Still receiving kcal from meds.     Anthropometrics    Height: 5' 10.98" (180.3 cm), Height Method: Stated  Last Weight: 106.1 kg (233 lb 14.5 oz) (03/29/25 1738), Weight Method: Bed Scale  BMI (Calculated): 32.6  BMI Classification: obese grade I " (BMI 30-34.9)        Ideal Body Weight (IBW), Male: 171.88 lb     % Ideal Body Weight, Male (lb): 130.81 %                          Usual Weight Provided By: unable to obtain usual weight    Wt Readings from Last 5 Encounters:   03/29/25 106.1 kg (233 lb 14.5 oz)   12/02/21 101 kg (222 lb 10.6 oz)     Weight Change(s) Since Admission:   Wt Readings from Last 1 Encounters:   03/29/25 1738 106.1 kg (233 lb 14.5 oz)   03/29/25 0404 102.1 kg (225 lb)   Admit Weight: 102.1 kg (225 lb) (03/29/25 0404), Weight Method: Stated    Estimated Needs    Weight Used For Calorie Calculations: 106.1 kg (233 lb 14.5 oz)  Energy Calorie Requirements (kcal): 1167-1485kcal (11-14kcal/kg IBW)  Energy Need Method: Kcal/kg  Weight Used For Protein Calculations: 78.2 kg (172 lb 5.7 oz) (IBW)  Protein Requirements: 156gm (2g/kg IBW)  Fluid Requirements (mL): 2122ml (20ml/kg)  CHO Requirement: 165gm (45% est kcal needs)     Enteral Nutrition     Patient not receiving enteral nutrition at this time.    Parenteral Nutrition     Patient not receiving parenteral nutrition support at this time.    Evaluation of Received Nutrient Intake    Calories: not meeting estimated needs  Protein: not meeting estimated needs    Patient Education     Not applicable.    Nutrition Diagnosis     PES: Inadequate oral intake related to acute illness as evidenced by intubation since admit. (active)     PES:            Nutrition Interventions     Intervention(s): modified composition of enteral nutrition, modified rate of enteral nutrition, and collaboration with other providers  Intervention(s):      Goal: Meet greater than 80% of nutritional needs by follow-up. (goal progressing)  Goal: Tolerate enteral feeding at goal rate by follow-up. (goal progressing)    Nutrition Goals & Monitoring     Dietitian will monitor: energy intake and enteral nutrition intake  Discharge planning: too early to determine; pending clinical course  Nutrition Risk/Follow-Up: high  (follow-up in 1-4 days)   Please consult if re-assessment needed sooner.

## 2025-04-02 NOTE — PROGRESS NOTES
[facility]  Pulmonary/Critical Care  Progress Note  4/2/2025    Patient Name: Jon Conley  MRN: [id number]  Admission Date: 3/29/2025  Code Status: No Order      Subjective:     HPI:  The patient is a 79-year-old male who has a history of coronary artery disease, hypertension, hyperlipidemia, peripheral arterial disease, and GE reflux disease.  He also has a history of recent TURP in [place], complicated by postoperative by gross hematuria requiring placement of a three-way irrigation bladder catheter last week. This was left in place at time of discharge.  He presented to [facility] ER early a.m. of 03/29/2025 with shortness of breath, noted tachycardia/hypotensive, and marked elevation of high sensitivity troponin at 13,821. He was transferred to [facility] ER, suffering PEA arrest requiring intubation and short CPR with ROSC.  LHC was performed, noting multivessel coronary artery disease with 100% occluded proximal left circumflex, unable to cross with wire.  Mid RCA also noted 100% occluded, treated with PTCA/TAD.  He was noted to have elevated right-sided filling pressures with PCW 25 mm Hg.  Impella catheter was placed, and was left in place postprocedure.  He was admitted to ICU for ongoing medical care.    Hospital Course:  Intubation/mechanical ventilation 03/29/2025  Impella placement 03/29/2025    24hr Interval History:  He is afebrile.  Intake 1455 cc, output 2275 cc over the previous 24 hours.  Blood cultures 03/29/2025 no growth.  He remains on Impella support, continues P-6.  He remains sinus rhythm. Levophed 0.04 mcg/kg per minute.  He remains sedated on propofol, currently 25 mcg/kg per minute, necessary due to ventilatory discordance and agitation on decreasing.  He continues on full anticoagulation with unfractionated heparin infusion.  Scant nonpurulent secretions per endotracheal tube.  No further blood product transfusions past 24 hours.  CV  surgery has evaluated patient, and plans proceeding to CABG possibly later this week if able to wean Impella.  He currently denies pain or shortness of breath.    AC 16/500 cc/peep +5/40%    Scheduled Medications:   aspirin  81 mg Oral Daily    heparin (PORCINE)  39.2 Units/kg Intravenous Once    mupirocin   Nasal BID     PRN Medications:    Current Facility-Administered Medications:     0.9%  NaCl infusion (for blood administration), , Intravenous, Q24H PRN    0.9%  NaCl infusion (for blood administration), , Intravenous, Q24H PRN    0.9%  NaCl infusion (for blood administration), , Intravenous, Q24H PRN    0.9%  NaCl infusion (for blood administration), , Intravenous, Q24H PRN    acetaminophen, 650 mg, Oral, Q4H PRN    calcium gluconate IVPB, 1 g, Intravenous, PRN    calcium gluconate IVPB, 2 g, Intravenous, PRN    calcium gluconate IVPB, 3 g, Intravenous, PRN    heparin (PORCINE), 39.2 Units/kg, Intravenous, PRN    heparin (PORCINE), 30 Units/kg, Intravenous, PRN    magnesium sulfate 2 g IVPB, 2 g, Intravenous, PRN    magnesium sulfate 2 g IVPB, 2 g, Intravenous, PRN    ondansetron, 8 mg, Oral, Q8H PRN    potassium chloride in water, 20 mEq, Intravenous, PRN **AND** potassium chloride in water, 40 mEq, Intravenous, PRN **AND** potassium chloride in water, 60 mEq, Intravenous, PRN    sodium phosphate 15 mmol in D5W 250 mL IVPB, 15 mmol, Intravenous, PRN    sodium phosphate 20.1 mmol in D5W 250 mL IVPB, 20.1 mmol, Intravenous, PRN    sodium phosphate 30 mmol in D5W 250 mL IVPB, 30 mmol, Intravenous, PRN    tirofiban-0.9% sodium chloride 12.5 mg/250ml, , , Continuous PRN  Continuous Infusions:   fentanyl  0-250 mcg/hr Intravenous Continuous   Stopped at 03/29/25 2030    heparin (porcine) in D5W  0-40 Units/kg/hr Intravenous Continuous 18.8 mL/hr at 04/02/25 0629 18.413 Units/kg/hr at 04/02/25 0629    lactated ringers   Intravenous Continuous 75 mL/hr at 04/02/25 0629 Rate Verify at 04/02/25 0629    NORepinephrine  bitartrate-D5W  0-3 mcg/kg/min Intravenous Continuous 2.9 mL/hr at 04/02/25 0629 0.06 mcg/kg/min at 04/02/25 0629    propofoL  0-50 mcg/kg/min Intravenous Continuous 12.3 mL/hr at 04/02/25 0629 20 mcg/kg/min at 04/02/25 0629    sodium bicarbonate 25 mEq in D5W 1,000 mL Purge Solution for Impella   Impella Device Continuous   New Bag at 04/01/25 0824    tirofiban-0.9% sodium chloride 12.5 mg/250ml    Continuous PRN   Stopped at 03/29/25 1845       No past medical history on file.    Past Surgical History:   Procedure Laterality Date    CATHETERIZATION OF BOTH LEFT AND RIGHT HEART N/A 3/29/2025    Procedure: CATHETERIZATION, HEART, BOTH LEFT AND RIGHT;  Surgeon: Lorne Coon Jr., MD;  Location: [facility] CATH LAB;  Service: Cardiology;  Laterality: N/A;       Objective:     Input/output:    Intake/Output Summary (Last 24 hours) at 4/2/2025 0706  Last data filed at 4/2/2025 0629  Gross per 24 hour   Intake 1452.97 ml   Output 2275 ml   Net -822.03 ml       Vital Signs (Most Recent):  Temp: 98.3 °F (36.8 °C) (04/02/25 0400)  Pulse: 75 (04/02/25 0630)  Resp: 17 (04/02/25 0630)  BP: 111/68 (04/02/25 0615)  SpO2: 99 % (04/02/25 0630)  Body mass index is 32.64 kg/m².  Weight: 106.1 kg (233 lb 14.5 oz) Vital Signs (24h Range):  Temp:  [98.3 °F (36.8 °C)-99.5 °F (37.5 °C)] 98.3 °F (36.8 °C)  Pulse:  [67-79] 75  Resp:  [16-22] 17  SpO2:  [97 %-100 %] 99 %  BP: ()/(66-85) 111/68  Arterial Line BP: ()/() 115/62     Physical Exam  Constitutional:       Comments: Sedated on propofol infusion, intubated on mechanical ventilation.   Eyes:      Conjunctiva/sclera: Conjunctivae normal.      Pupils: Pupils are equal, round, and reactive to light.   Cardiovascular:      Rate and Rhythm: Normal rate and regular rhythm.      Heart sounds: No murmur heard.  Pulmonary:      Breath sounds: Rhonchi (Few bilateral coarse scattered) present. No wheezing or rales.   Abdominal:      General: Bowel sounds are normal. There is no  distension.      Palpations: Abdomen is soft.      Tenderness: There is no abdominal tenderness.   Musculoskeletal:      Right lower leg: No edema.      Left lower leg: No edema.   Skin:     Comments: Left foot mildly cool, right foot warm, Doppler pulses obtained   Neurological:      Comments: Neurologic exam clouded secondary to sedation with propofol.  He is somnolent and arouses.  He nods yes/no to questions asked.  Pupils equal round at 3 mm and reactive bilateral.  He minimally moves upper and lower extremities bilateral to verbal command.           Lines/Drains/Airways       Central Venous Catheter Line  Duration             Tunneled Central Line - Triple Lumen 03/29/25 0555 Internal Jugular Right 4 days    Pulmonary Artery Catheter Assessment  03/29/25 1300 Femoral Vein Right 3 days              Drain  Duration                  Urethral Catheter -- days         NG/OG Tube 03/29/25 1200 18 Fr. Center mouth 3 days              Airway  Duration                  Airway - Non-Surgical 03/29/25 0541 4 days              Line  Duration                  VAD 03/29/25 4 days              Peripheral Intravenous Line  Duration                  Peripheral IV - Single Lumen 03/29/25 0530 18 G Right Antecubital 4 days         Peripheral IV - Single Lumen 03/29/25 0600 20 G Left Hand 4 days         Peripheral IV - Single Lumen 03/29/25 20 G Left Forearm 4 days         Sheath 03/29/25 0913 Right 3 days         Sheath 03/29/25 0927 Left 3 days                    Vent:  Vent Mode: A/C (04/02/25 0615)  Ventilator Initiated: Yes (03/29/25 0603)  Set Rate: 16 BPM (04/02/25 0615)  Vt Set: 500 mL (04/02/25 0615)  PEEP/CPAP: 5 cmH20 (04/02/25 0615)  Oxygen Concentration (%): 40 (04/02/25 0615)  Peak Airway Pressure: 30 cmH20 (04/02/25 0615)  Total Ve: 8.9 L/m (04/02/25 0615)  F/VT Ratio<105 (RSBI): (!) 43.82 (04/02/25 0615)    ABGs:  Lab Results   Component Value Date    PH 7.450 04/02/2025    PH 7.420 04/02/2025    PO2 118.0 (H)  04/02/2025    PO2 39.0 04/02/2025    PCO2 33.0 (L) 04/02/2025    PCO2 37.0 04/02/2025         Significant Labs:    Lab Results   Component Value Date    WBC 10.73 04/02/2025    HGB 8.9 (L) 04/02/2025    HCT 26.6 (L) 04/02/2025    MCV 93.3 04/02/2025    PLT 97 (L) 04/02/2025         Recent Labs   Lab 04/02/25  0434      K 4.0   *   CO2 22*   BUN 13.9   CREATININE 0.71*   CALCIUM 7.4*   MG 2.10   PHOS 2.7   AST 31   ALT 27   ALKPHOS 66   ALBUMIN 2.1*     Imaging:   Chest x-ray (04/02/2025, my reading of images):  Endotracheal tube tip located mid tracheal.  Prominent bilateral pulmonary vascular markings, decreased penetration of left base due to LV structures.    Assessment:     NSTEMI with cardiogenic shock, ongoing  LHC with PTCA/TAD x3 RCA (03/29/2025), PCWP 25 mm Hg  EF 15% by TTE  Impella placement (03/29/2025), currently P-6  Ischemic cardiomyopathy with severe decreased LVSF, EF 15%, moderate MR  Acute respiratory failure secondary to above, initially with cardiogenic pulmonary edema, currently with good oxygenation and ventilation.  Severe wide anion gap metabolic acidosis of primary lactic acid etiology, resolved   Mild/mod thrombocytopenia, likely related to Impella      Plan:     Impella and cardiogenic shock management as per cardiology service.  They have begin to wean Impella as tolerated.  CV surgery plans proceeding to CABG later this week if able to adequately wean Impella.    Continue attempts at decreasing sedation as tolerated.  Continue full anticoagulation with unfractionated heparin infusion.         35 minutes of critical care was time spent personally by me on the following activities: development of treatment plan with patient or surrogate and bedside caregivers, discussions with consultants, evaluation of patient's response to treatment, examination of patient, ordering and performing treatments and interventions, ordering and review of laboratory studies, ordering and review of  radiographic studies, pulse oximetry, re-evaluation of patient's condition.  This patient demonstrates a high probability for further clinical decompensation due to ongoing critical illness.  Critical care time did not overlap with that of any other provider or involve time for any procedures.       Maty Harp MD, [facility]  Pulmonary/Critical Care

## 2025-04-02 NOTE — PROGRESS NOTES
[facility]    Cardiology  Progress Note    Patient Name: Jon Conley  MRN: [id number]  Admission Date: 3/29/2025  Hospital Length of Stay: 4 days  Code Status: No Order   Attending Provider: Davi Harp Jr., MD,*   Consulting Provider: NORM Sierra  Primary Care Physician: Patrick Reece Jr., MD  Principal Problem:<principal problem not specified>    Patient information was obtained from past medical records and ER records.     Subjective:     Chief Complaint/Reason for Consult: Cardiac Arrest     HPI: Mr. Conley is a 78 y/o male who is known to [facility], Dr. Wang. The patient presented to [facility] on 3.29.25 with c/o SOB. He initially presented to [facility] in [place] with SOB. He was found to have Tachycardia, Hypotension, Anemia (7.4/24.6), Troponin 9.443, .0, BUN/Crea 16.2/1.34, Lactic Acid Level 4.3, Na 133, WBC 14.03, H&H 7.3/23.0, WBC 14.03, , PT/INR 14.9/1.2. He was admitted to , however, he sustained a witnessed PEA Arrest in the ER and has ROSC per ER MD. [facility] was consulted for NSETMI and Arrest.     3.30.25: NAD. Impella L Groin. CVP 7, P7. Levophed 0.32mcg/kg/min, Heparin Drip per Protocol, Amiodarone 0.5mg/min  3.31.25: NAD. L Groin Impella P7, Amiodarone 0.5mg/min, Heparin Drip per Protocol, Levophed 0.18mcg/kg/min, H&H 8.8/25.8, AST/ALT 79/45  4.1.25: NAD noted. L groin Impella in place, remains at P7. Remains on levophed. LFTs continue to improve.  4.2.25: NAD. L Groin Impella Device P6. Heparin Infusion per Protocol. Remains on 0.06mcg/kg/min.     PMH: BPH/Urinary Obstruction, CAD/Nonobstructive, PAD, R JACY/Mild, HTN, Obesity, GERD, Tobacco Use   PSH: TURP, Angiogram, Bilateral Knee Replacements   Family History: Denies Family History of Heart Disease  Social History: Denies Illicit Drug, ETOH and Tobacco Use     Previous Cardiac Diagnostics:   C/RHC 3.29.25:  Findings:  - Successful placement of Impella CP device  from left femoral approach.  - There is severe multi-vessel coronary artery disease.  - Left Main has mild diffuse disease.  - Mid Left Anterior Descending has serial lesions, up to 70-80% in severity.   - Prox Left Circumflex is 100% occluded. Unable to cross lesion despite wire escalation and microcatheter support.  - Mid Right Coronary Artery is 100% occluded. The lesion was successfully treated with overlapping SYNERGY XD 4.0X20MM, SYNERGY XD 3.0X48MM, and SYNERGY XD 2.64O90DM drug-eluting stents. Post-dilatation was performed using a SYNERGY XD 4.0X20MM angioplasty balloon inflated to 12 gretel. Following intervention there was 0% residual stenosis and PATTY grade 3 flow in the distal vessel.  - Intravascular Ultrasound (IVUS) was performed prior to intervention to further characterize the lesion, as well as post-stent deployment to ensure optimal stent apposition and expansion.  - Aorto-iliac angiogram revealed moderate disease of R ileofemoral system.  - At the conclusion of the case, contrast injection was performed through the Impella sheath side port, showing adequate antegrade flow down the ipsilateral common femoral artery/SFA/profunda artery.  - RHC with elevated R sided filling pressures. RA 17 mmHg, RV 45/15 mmHg, PA 42/29 mmHg (mean 34 mmHg), PCWP 25 mmHg.  - Transpulmonary gradient is 9 mmHg.  - Cardiac Output/Index at 6.6/3.0, as calculated by Gregory equation on Levophed infusion and Impella CP support.  - PVR is 1.4 Woods units  - SVR is 550 dyne-sec*cm^-5  - Pulmonary Artery Pulsatility Index (Rafa) is 0.8  - Cardiac Power Output () is 0.91   Assessment/Plan:  - Patient is a 79 y.o. male with a history of recent TURP, complicated by significant post-op bleeding, presents to OSH ED with dyspnea. Transferred to [facility] with worsening shock despite vasopressors. TTE showing EF 25%, severe MR, severe TR. Troponin elevated. Brought to cath lab. Underwent Impella placement/LHC/RHC as outlined above. RCA  lesion treated. Unable to cross LCx lesion. 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 At this point, would continue Impella support and aggressive medical mgmt. Has non-culprit lesions that should be treated at a later date as well. Can consider CTS consult, especially given severe valvular disease.  - Patient was given a loading dose of clopidogrel 600 mg PO in the cath lab  - Continue DAPT (aspirin + clopidogrel) for a minimum of 12 months and aspirin indefinitely thereafter  - High intensity statin  - Continue Aggrastat for 4 hours post-procedure  - Arterial sheath remains place; plan is to remove when activated clotting time (ACT) less than 180 seconds. Bedrest for 4 hours after hemostasis is achieved.  - Obtain transthoracic echocardiogram  - Continuous bedrest while Impella in place  - Therapeutic heparin administration per protocol  - Antibiotics per protocol  - Remaining mgmt per primary team/cardiology    ECHO 3.29.25:  Left Ventricle: The left ventricle is dilated. Normal wall thickness. Severe global hypokinesis present. There is severely reduced systolic function with a visually estimated ejection fraction of 15 - 20%. Grade I diastolic dysfunction.  Right Ventricle: Right ventricular enlargement. Systolic function is normal. TAPSE is 1.83 cm.  Left Atrium: dilated  Mitral Valve: Mildly thickened leaflets. There is moderate regurgitation.  Tricuspid Valve: There is moderate regurgitation.  Pulmonary Artery: There is moderate pulmonary hypertension.  IVC/SVC: Patient is ventilated, cannot use IVC diameter to estimate right atrial pressure.    Carotid US 11.4.24:  The study quality is average.   1-39% stenosis in the proximal right internal carotid artery based on Bluth Criteria.   Antegrade right vertebral artery flow.   Antegrade left vertebral artery flow    ECHO 1.27.22:  The study quality is average.   The left ventricle is normal in size. Global left ventricular systolic function is moderately  decreased. The left ventricular ejection fraction is 40-45%. Mild concentric left ventricular hypertrophy is present.  The left ventricle diastolic function is impaired (Grade I) with normal left atrial pressure.  Mild (1+) mitral regurgitation.  The pulmonary artery appears normal.     [facility] 9.26.17:  LM: Normal  LAD: Prox 30% Stenosis  RI: Normal  LCX: Normal  RCA: Distal RCA 50%    Review of patient's allergies indicates:  No Known Allergies  No current facility-administered medications on file prior to encounter.     No current outpatient medications on file prior to encounter.     Review of Systems   Unable to perform ROS: Intubated     Objective:     Vital Signs (Most Recent):  Temp: 99.2 °F (37.3 °C) (04/02/25 1200)  Pulse: 84 (04/02/25 1300)  Resp: 20 (04/02/25 1300)  BP: 128/79 (04/02/25 1300)  SpO2: 98 % (04/02/25 1300) Vital Signs (24h Range):  Temp:  [98.3 °F (36.8 °C)-99.5 °F (37.5 °C)] 99.2 °F (37.3 °C)  Pulse:  [67-84] 84  Resp:  [16-23] 20  SpO2:  [97 %-100 %] 98 %  BP: ()/(64-87) 128/79  Arterial Line BP: ()/() 122/63   Weight: 106.1 kg (233 lb 14.5 oz)  Body mass index is 32.64 kg/m².  SpO2: 98 %       Intake/Output Summary (Last 24 hours) at 4/2/2025 1422  Last data filed at 4/2/2025 0629  Gross per 24 hour   Intake 951.03 ml   Output 1425 ml   Net -473.97 ml     Lines/Drains/Airways       Central Venous Catheter Line  Duration             Pulmonary Artery Catheter Assessment  03/29/25 1300 Femoral Vein Right 4 days    Tunneled Central Line - Triple Lumen 03/29/25 0555 Internal Jugular Right 4 days              Drain  Duration                  Urethral Catheter -- days         NG/OG Tube 03/29/25 1200 18 Fr. Center mouth 4 days              Airway  Duration                  Airway - Non-Surgical 03/29/25 0541 4 days              Line  Duration                  VAD 03/29/25 4 days              Peripheral Intravenous Line  Duration                  Peripheral IV - Single Lumen  03/29/25 0530 18 G Right Antecubital 4 days         Peripheral IV - Single Lumen 03/29/25 0600 20 G Left Hand 4 days         Peripheral IV - Single Lumen 03/29/25 20 G Left Forearm 4 days         Sheath 03/29/25 0913 Right 4 days         Sheath 03/29/25 0927 Left 4 days                  Significant Labs:   Chemistries:   Recent Labs   Lab 03/29/25  0446 03/29/25  1743 04/01/25  0417 04/01/25  1211 04/01/25  1950 04/02/25  0434 04/02/25  1155   *   < > 138 139 139 137 137   K 4.1   < > 4.1 4.4 4.1 4.0 4.3      < > 111* 114* 112* 112* 113*   CO2 19*   < > 22* 20* 21* 22* 21*   BUN 16.2   < > 12.7 11.9 12.6 13.9 13.7   CREATININE 1.34*   < > 0.81 0.78 0.78 0.71* 0.71*   CALCIUM 8.0*   < > 7.3* 7.2* 7.5* 7.4* 7.3*   BILITOT 0.3   < > 0.4 0.5 0.4 0.4 0.4   ALKPHOS 58   < > 67 70 65 66 67   ALT 15   < > 41 37 31 27 25   AST 82*   < > 56* 49* 38 31 28   GLUCOSE 144*   < > 116* 121* 104 104 104   MG  --    < > 2.10 2.20 2.20 2.10 2.10   PHOS  --    < > 2.9 3.2 2.8 2.7 2.9   TROPONINI 9.443*  --   --   --   --   --   --     < > = values in this interval not displayed.        CBC/Anemia Labs: Coags:    Recent Labs   Lab 04/01/25  1950 04/02/25  0434 04/02/25  1155   WBC 9.55 10.73 9.67   HGB 9.0* 8.9* 9.0*   HCT 27.2* 26.6* 26.8*   PLT 92* 97* 85*   MCV 92.5 93.3 93.1   RDW 16.4 16.4 16.2    Recent Labs   Lab 03/29/25  0446 03/29/25  1415   INR 1.2 1.2   APTT 63.5* 103.5*        Telemetry: SR    Physical Exam  Constitutional:       General: He is not in acute distress.     Appearance: Normal appearance. He is obese. He is ill-appearing.      Comments: Vented/Sedated   HENT:      Head: Normocephalic.      Mouth/Throat:      Mouth: Mucous membranes are dry.   Cardiovascular:      Rate and Rhythm: Normal rate and regular rhythm.      Pulses: Normal pulses.      Heart sounds: Murmur heard.   Pulmonary:      Effort: Pulmonary effort is normal. No respiratory distress.      Comments: Ventilator Associated Breath  Sounds  Vent Mode: A/C  Oxygen Concentration (%):  [40] 40  Resp Rate Total:  [16 br/min-22 br/min] 20 br/min  Vt Set:  [500 mL] 500 mL  PEEP/CPAP:  [5 cmH20] 5 cmH20  Mean Airway Pressure:  [9 vmW31-06 cmH20] 9 cmH20  Abdominal:      Palpations: Abdomen is soft.   Skin:     General: Skin is warm.      Comments: L Groin Impella CP, Soft/Flat, Non-Tender, No Sign of Bleed/Infection. + Monophasic Dopplerable Pedal Pulses    Neurological:      Comments: Vented/Sedated       Home Medications:   Medications Ordered Prior to Encounter[1]  Current Schedule Inpatient Medications:   aspirin  81 mg Oral Daily    famotidine  20 mg Per NG tube BID    heparin (PORCINE)  39.2 Units/kg Intravenous Once    mupirocin   Nasal BID     Continuous Infusions:   fentanyl  0-250 mcg/hr Intravenous Continuous   Stopped at 03/29/25 2030    heparin (porcine) in D5W  0-40 Units/kg/hr Intravenous Continuous 18.8 mL/hr at 04/02/25 1050 18.413 Units/kg/hr at 04/02/25 1050    lactated ringers   Intravenous Continuous 75 mL/hr at 04/02/25 0629 Rate Verify at 04/02/25 0629    NORepinephrine bitartrate-D5W  0-3 mcg/kg/min Intravenous Continuous 2.9 mL/hr at 04/02/25 0629 0.06 mcg/kg/min at 04/02/25 0629    propofoL  0-50 mcg/kg/min Intravenous Continuous 12.3 mL/hr at 04/02/25 0629 20 mcg/kg/min at 04/02/25 0629    sodium bicarbonate 25 mEq in D5W 1,000 mL Purge Solution for Impella   Impella Device Continuous   New Bag at 04/01/25 0824    tirofiban-0.9% sodium chloride 12.5 mg/250ml    Continuous PRN   Stopped at 03/29/25 1845     Mechanical Circulatory Support (MCS) Flow Sheet  Cardiogenic Shock/HF Management  04/02/2025    PUMP METRICS  Value/Parameter TIME: 0900   Site Observation/Distal Pulses See Physical    cm See Nursing Documentation    Impella [x]CP w/Smart Assist   []5.5 w/Smart Assist   []RP w/Smart Assist   P-Level P-6   Ao (MAP) 115/65;    /-11   Motor Current (Mean) 657/502   Purge Pressures/Flow 430/-14.3   Average Flows  32.9     HEMODYNAMICS  Value/Parameter TIME: 0900   AO (120/80) 118/65   MAP (> 60) 82   CO (PA Catheter)/GARRY 8.6   Native CO ([place] CO-Impella)    CI (> 2.2) 3.8   Cardiac Power Output (Greater than or Equal to 0.8)    SVR (800-1200)    HR () 75   LVEF   See ECHO     RV ASSESSMENT  Value/Parameter TIME: 0900   Rafa (> 1.5) 3.8   PA (25/10) 38/15   CVP (8-12) 6   PVR (120-250)    TAPSI (cm) (1.8)      LABS  Value/Parameter TIME: 0900   Lactate Level (< 2.0) 0.7    SvO2 (65-75%) 74.8%   Hgb/Hct (12-17/36-50) 8.9/26.6   PLT 97   SpO2 (%) 98.8%   pH (7.35-7.45) 7.45   Creatinine (0.6-1.2) 0.71   ALT (7-56) 27   ACT (160-180) 164   LDH    Urine Output (> 30mL/HR) > 30mL/Clear Yellow     DRIPS (GTTS)  Value/Parameter TIME: 0900   Vasopressors  []Vasopressin  []Epinephrine  [x]Levophed  []NeoSynephrine   Inotropes  []Dobutamine  []Primacor/Milrinone   []Dopamine   Anticoagulants [x]Heparin  []Argatroban   Sedation []Fentanyl  []Precedex  [x]Propofol  []Nimbex     Assessment:   Cardiogenic Shock requiring Mechanical and Chemical Support    - Impella/Pressors   MVCAD    - s/p [facility] (3.29.25) -  Left Main has mild diffuse disease. Mid Left Anterior Descending has serial lesions, up to 70-80% in severity. Prox Left Circumflex is 100% occluded. Unable to cross lesion despite wire escalation and microcatheter support. Mid Right Coronary Artery is 100% occluded. The lesion was successfully treated with overlapping SYNERGY XD 4.0X20MM, SYNERGY XD 3.0X48MM, and SYNERGY XD 2.90S74JI drug-eluting stents. Post-dilatation was performed using a SYNERGY XD 4.0X20MM angioplasty balloon inflated to 12 gretel. Following intervention there was 0% residual stenosis and PATTY grade 3 flow in the distal vessel.  NSTEMI Type I (Non-Anterior Wall MI)    - Trponin > 9  Cardiopulmonary Arrest    - Initial Rhythm PEA - Witnessed in ER Arrest/ROSC  ICMO/EF 15-20%     - ECHO (3.29.25) - LVEF 15-20%, Grade I DD   Acute Hypoxemic Respiratory  Failure requiring Intubation/Ventilation   ST- Now SR   Hypotension requiring Pressors    - Hx of HTN  BPH s/p TURP   Lactic Acidosis   Leukocytosis - Resolved   Anemia requiring Transfusions - Stable   Recent BPH Diagnosis s/p TURP  CAD/Nonobstructive (2017)  JACY/R Sided   PAD/Nonobstructive   Transaminitis - Resolved   Obesity  GERD  Tobacco Use  No Hx of GIB     Plan:   See Mechanical Flow Sheet  Heparin Drip per Protocol for Tx of NSTEMI   CT surgery Consult to Evaluate patient for CABG and Valve - Pending Possibly Friday (Would Like Impella Explant Prior to Surgical Intervention)  Wean Pressors for MAP > 65mmHg   Trend Lactic Acid Levels - Normal   AM VBG/ABG  Will Continue to Follow   Labs in AM: CBC, CMP and Mg    Hasmukh Sandoval, NORM  Cardiology  [facility]     Physician addendum:   Attempt to wean impella  Patient's cardiac care is performed as a split-shared visit with KAUSHIK d/t complicated medical management as detailed in A/P and associated high acuity requiring physician expertise. I obtained and performed relevant components of history/exam. Medical decision-making is formulated by me. It is a pleasure to care for the patient.    Álvaro Herring MD  Cardiology          [1]   No current facility-administered medications on file prior to encounter.     No current outpatient medications on file prior to encounter.

## 2025-04-03 NOTE — PROGRESS NOTES
[address]  Pulmonary/Critical Care  Progress Note  4/3/2025    Patient Name: Jon Conley  MRN: [id number]  Admission Date: 3/29/2025  Code Status: No Order      Subjective:     HPI:  The patient is a 79-year-old male who has a history of coronary artery disease, hypertension, hyperlipidemia, peripheral arterial disease, and GE reflux disease.  He also has a history of recent TURP in [place], complicated by postoperative by gross hematuria requiring placement of a three-way irrigation bladder catheter last week. This was left in place at time of discharge.  He presented to [facility] ER early a.m. of 03/29/2025 with shortness of breath, noted tachycardia/hypotensive, and marked elevation of high sensitivity troponin at 13,821. He was transferred to [facility] ER, suffering PEA arrest requiring intubation and short CPR with ROSC.  LHC was performed, noting multivessel coronary artery disease with 100% occluded proximal left circumflex, unable to cross with wire.  Mid RCA also noted 100% occluded, treated with PTCA/TAD.  He was noted to have elevated right-sided filling pressures with PCW 25 mm Hg.  Impella catheter was placed, and was left in place postprocedure.  He was admitted to ICU for ongoing medical care.    Hospital Course:  Intubation/mechanical ventilation 03/29/2025  Impella placement 03/29/2025    24hr Interval History:  He is afebrile.  Intake 2091 cc, output 2460 cc over the previous 24 hours.  Blood cultures 03/29/2025 no growth.  He remains on Impella support, continues P-4.  Patient is off of Levophed at this time.  He remains sedated on propofol, currently 20 mcg/kg per minute.  He continues on full anticoagulation with unfractionated heparin infusion.   No further blood product transfusions past 24 hours.  Planning for CABG possibly Friday if able to wean off of Impella.        Scheduled Medications:   aspirin  81 mg Oral Daily    famotidine  20 mg  Per NG tube BID    heparin (PORCINE)  39.2 Units/kg Intravenous Once    mupirocin   Nasal BID     PRN Medications:    Current Facility-Administered Medications:     0.9%  NaCl infusion (for blood administration), , Intravenous, Q24H PRN    0.9%  NaCl infusion (for blood administration), , Intravenous, Q24H PRN    0.9%  NaCl infusion (for blood administration), , Intravenous, Q24H PRN    0.9%  NaCl infusion (for blood administration), , Intravenous, Q24H PRN    acetaminophen, 650 mg, Oral, Q4H PRN    calcium gluconate IVPB, 1 g, Intravenous, PRN    calcium gluconate IVPB, 2 g, Intravenous, PRN    calcium gluconate IVPB, 3 g, Intravenous, PRN    heparin (PORCINE), 39.2 Units/kg, Intravenous, PRN    heparin (PORCINE), 30 Units/kg, Intravenous, PRN    magnesium sulfate 2 g IVPB, 2 g, Intravenous, PRN    magnesium sulfate 2 g IVPB, 2 g, Intravenous, PRN    ondansetron, 8 mg, Oral, Q8H PRN    potassium chloride in water, 20 mEq, Intravenous, PRN **AND** potassium chloride in water, 40 mEq, Intravenous, PRN **AND** potassium chloride in water, 60 mEq, Intravenous, PRN    sodium phosphate 15 mmol in D5W 250 mL IVPB, 15 mmol, Intravenous, PRN    sodium phosphate 20.1 mmol in D5W 250 mL IVPB, 20.1 mmol, Intravenous, PRN    sodium phosphate 30 mmol in D5W 250 mL IVPB, 30 mmol, Intravenous, PRN    tirofiban-0.9% sodium chloride 12.5 mg/250ml, , , Continuous PRN  Continuous Infusions:   fentanyl  0-250 mcg/hr Intravenous Continuous   Stopped at 03/29/25 2030    heparin (porcine) in D5W  0-40 Units/kg/hr Intravenous Continuous 18.8 mL/hr at 04/03/25 0636 18.413 Units/kg/hr at 04/03/25 0636    lactated ringers   Intravenous Continuous 75 mL/hr at 04/03/25 0636 Rate Verify at 04/03/25 0636    NORepinephrine bitartrate-D5W  0-3 mcg/kg/min Intravenous Continuous   Stopped at 04/03/25 0116    propofoL  0-50 mcg/kg/min Intravenous Continuous 12.3 mL/hr at 04/03/25 0636 20 mcg/kg/min at 04/03/25 0636    sodium bicarbonate 25 mEq in D5W  1,000 mL Purge Solution for Impella   Impella Device Continuous   New Bag at 04/01/25 0824    tirofiban-0.9% sodium chloride 12.5 mg/250ml    Continuous PRN   Stopped at 03/29/25 1845       No past medical history on file.    Past Surgical History:   Procedure Laterality Date    CATHETERIZATION OF BOTH LEFT AND RIGHT HEART N/A 3/29/2025    Procedure: CATHETERIZATION, HEART, BOTH LEFT AND RIGHT;  Surgeon: Lorne Coon Jr., MD;  Location: [facility] CATH LAB;  Service: Cardiology;  Laterality: N/A;       Objective:     Input/output:    Intake/Output Summary (Last 24 hours) at 4/3/2025 0720  Last data filed at 4/3/2025 0636  Gross per 24 hour   Intake 2091.14 ml   Output 2460 ml   Net -368.86 ml       Vital Signs (Most Recent):  Temp: 99.1 °F (37.3 °C) (04/03/25 0400)  Pulse: 69 (04/03/25 0630)  Resp: 16 (04/03/25 0630)  BP: 97/71 (04/03/25 0630)  SpO2: 96 % (04/03/25 0630)  Body mass index is 32.64 kg/m².  Weight: 106.1 kg (233 lb 14.5 oz) Vital Signs (24h Range):  Temp:  [98.7 °F (37.1 °C)-99.2 °F (37.3 °C)] 99.1 °F (37.3 °C)  Pulse:  [69-98] 69  Resp:  [10-26] 16  SpO2:  [94 %-100 %] 96 %  BP: ()/(55-87) 97/71  Arterial Line BP: ()/() 102/51     Physical Exam  Constitutional:       Comments: Sedated on propofol infusion, intubated on mechanical ventilation.   Eyes:      Conjunctiva/sclera: Conjunctivae normal.      Pupils: Pupils are equal, round, and reactive to light.   Cardiovascular:      Rate and Rhythm: Normal rate and regular rhythm.      Heart sounds: No murmur heard.  Pulmonary:      Breath sounds: Rhonchi (Few bilateral coarse scattered) present. No wheezing or rales.   Abdominal:      General: Bowel sounds are normal. There is no distension.      Palpations: Abdomen is soft.      Tenderness: There is no abdominal tenderness.   Musculoskeletal:      Right lower leg: No edema.      Left lower leg: No edema.   Skin:     Comments: Left foot mildly cool, right foot warm, Doppler pulses  obtained   Neurological:      Comments: Neurologic exam clouded secondary to sedation with propofol.  He is somnolent and arouses.  He nods yes/no to questions asked.  Pupils equal round at 3 mm and reactive bilateral.  He is able to follow commands when asking him to wiggle his toes on exam.           Lines/Drains/Airways       Central Venous Catheter Line  Duration             Tunneled Central Line - Triple Lumen 03/29/25 0555 Internal Jugular Right 5 days    Pulmonary Artery Catheter Assessment  03/29/25 1300 Femoral Vein Right 4 days              Drain  Duration                  Urethral Catheter -- days         NG/OG Tube 03/29/25 1200 18 Fr. Center mouth 4 days              Airway  Duration                  Airway - Non-Surgical 03/29/25 0541 5 days              Line  Duration                  VAD 03/29/25 5 days              Peripheral Intravenous Line  Duration                  Peripheral IV - Single Lumen 03/29/25 0530 18 G Right Antecubital 5 days         Peripheral IV - Single Lumen 03/29/25 0600 20 G Left Hand 5 days         Peripheral IV - Single Lumen 03/29/25 20 G Left Forearm 5 days         Sheath 03/29/25 0913 Right 4 days         Sheath 03/29/25 0927 Left 4 days                    Vent:  Vent Mode: A/C (04/03/25 0506)  Ventilator Initiated: Yes (03/29/25 0603)  Set Rate: 16 BPM (04/03/25 0506)  Vt Set: 500 mL (04/03/25 0506)  PEEP/CPAP: 5 cmH20 (04/03/25 0506)  Oxygen Concentration (%): 40 (04/03/25 0630)  Peak Airway Pressure: 26 cmH20 (04/03/25 0506)  Total Ve: 8.5 L/m (04/03/25 0506)  F/VT Ratio<105 (RSBI): (!) 26.82 (04/03/25 0506)    ABGs:  Lab Results   Component Value Date    PH 7.450 04/03/2025    PH 7.410 04/03/2025    PO2 95.0 04/03/2025    PO2 <38.0 04/03/2025    PCO2 32.0 (L) 04/03/2025    PCO2 37.0 04/03/2025         Significant Labs:    Lab Results   Component Value Date    WBC 7.73 04/03/2025    HGB 8.2 (L) 04/03/2025    HCT 26.2 (L) 04/03/2025    MCV 96.0 (H) 04/03/2025      (L) 04/03/2025         Recent Labs   Lab 04/03/25  0346      K 4.1   *   CO2 21*   BUN 13.4   CREATININE 0.71*   CALCIUM 7.4*   MG 2.20   PHOS 2.5   AST 18   ALT 18   ALKPHOS 65   ALBUMIN 2.0*     Imaging:   Chest x-ray (04/02/2025, my reading of images):  Endotracheal tube tip located mid tracheal.  Prominent bilateral pulmonary vascular markings, decreased penetration of left base due to LV structures.    Assessment:     NSTEMI with cardiogenic shock, ongoing  LHC with PTCA/TAD x3 RCA (03/29/2025), PCWP 25 mm Hg  EF 15% by TTE  Impella placement (03/29/2025), currently P-6  Ischemic cardiomyopathy with severe decreased LVSF, EF 15%, moderate MR  Acute respiratory failure secondary to above, initially with cardiogenic pulmonary edema, currently with good oxygenation and ventilation.  Severe wide anion gap metabolic acidosis of primary lactic acid etiology, resolved   Mild/mod thrombocytopenia, likely related to Impella      Plan:     Impella and cardiogenic shock management as per cardiology service.  Currently at P4.  Attempting to wean Impella with the plans for CABG procedure Friday    Continue attempts at decreasing sedation as tolerated.  Currently at 20 of propofol  Continue full anticoagulation with unfractionated heparin infusion.         35 minutes of critical care was time spent personally by me on the following activities: development of treatment plan with patient or surrogate and bedside caregivers, discussions with consultants, evaluation of patient's response to treatment, examination of patient, ordering and performing treatments and interventions, ordering and review of laboratory studies, ordering and review of radiographic studies, pulse oximetry, re-evaluation of patient's condition.  This patient demonstrates a high probability for further clinical decompensation due to ongoing critical illness.  Critical care time did not overlap with that of any other provider or involve time for  any procedures.       Kristian Grimm, DO  Pulmonary/Critical Care

## 2025-04-03 NOTE — PROGRESS NOTES
Inpatient Nutrition Assessment    Admit Date: 3/29/2025   Total duration of encounter: 5 days   Patient Age: 79 y.o.    Nutrition Recommendation/Prescription     If unable to extubate, start tube feeding when appropriate.  Tube feeding recommendation:     Peptamen Intense VHP goal rate 40 ml/hr + 4 packets ProSource TF20 daily to provide  1120 kcal/d  (75% est needs, 100% with meds)  154 g protein/d (99% est needs)  672 ml free water/d (32% est needs)  (calculations based on estimated 20 hr/d run time)     Start @ 20ml/hr, increase as tolerated 20ml/hr q4hr until goal rate reached.      If no IV fluids running, can give 125ml q 2hr water flushes. Total water provided: 1922ml (91% est needs.)     Communication of Recommendations: reviewed with nurse    Nutrition Assessment     Malnutrition Assessment/Nutrition-Focused Physical Exam       Malnutrition Level: other (see comments) (Does not meet criteria) (03/31/25 1259)  Energy Intake (Malnutrition): other (see comments) (Unable to assess) (03/31/25 1259)  Weight Loss (Malnutrition): other (see comments) (Unable to assess) (03/31/25 1259)                                                  A minimum of two characteristics is recommended for diagnosis of either severe or non-severe malnutrition.    Chart Review    Reason Seen: continuous nutrition monitoring    Malnutrition Screening Tool Results   Have you recently lost weight without trying?: No  Have you been eating poorly because of a decreased appetite?: No   MST Score: 0   Diagnosis:  NSTEMI with cardiogenic shock, ongoing  Ischemic cardiomyopathy with severe decreased LVSF, EF 15%  Cardiogenic pulmonary edema with acute respiratory failure  Severe wide anion gap metabolic acidosis of primary lactic acid etiology, resolved   Elevated serum transaminases    Relevant Medical History: CAD, HTN, HLD, PAD, GERD    Scheduled Medications:  aspirin, 81 mg, Daily  famotidine, 20 mg, BID  heparin (PORCINE), 39.2 Units/kg,  Once  mupirocin, , BID    Continuous Infusions:  fentanyl, Last Rate: Stopped (03/29/25 2030)  heparin (porcine) in D5W, Last Rate: 18.413 Units/kg/hr (04/03/25 0636)  lactated ringers, Last Rate: 75 mL/hr at 04/03/25 0743  NORepinephrine bitartrate-D5W, Last Rate: Stopped (04/03/25 0116)  propofoL, Last Rate: 30 mcg/kg/min (04/03/25 0756)  sodium bicarbonate 25 mEq in D5W 1,000 mL Purge Solution for Impella  tirofiban-0.9% sodium chloride 12.5 mg/250ml, Last Rate: Stopped (03/29/25 1845)    PRN Medications:  0.9%  NaCl infusion (for blood administration), , Q24H PRN  0.9%  NaCl infusion (for blood administration), , Q24H PRN  0.9%  NaCl infusion (for blood administration), , Q24H PRN  0.9%  NaCl infusion (for blood administration), , Q24H PRN  acetaminophen, 650 mg, Q4H PRN  calcium gluconate IVPB, 1 g, PRN  calcium gluconate IVPB, 2 g, PRN  calcium gluconate IVPB, 3 g, PRN  heparin (PORCINE), 39.2 Units/kg, PRN  heparin (PORCINE), 30 Units/kg, PRN  magnesium sulfate 2 g IVPB, 2 g, PRN  magnesium sulfate 2 g IVPB, 2 g, PRN  ondansetron, 8 mg, Q8H PRN  potassium chloride in water, 20 mEq, PRN   And  potassium chloride in water, 40 mEq, PRN   And  potassium chloride in water, 60 mEq, PRN  sodium phosphate 15 mmol in D5W 250 mL IVPB, 15 mmol, PRN  sodium phosphate 20.1 mmol in D5W 250 mL IVPB, 20.1 mmol, PRN  sodium phosphate 30 mmol in D5W 250 mL IVPB, 30 mmol, PRN  tirofiban-0.9% sodium chloride 12.5 mg/250ml, , Continuous PRN    Calorie Containing IV Medications: Diprivan @ 18 ml/hr (provides 475 kcal/d)    Recent Labs   Lab 03/30/25  0056 03/30/25  0345 04/01/25  0417 04/01/25  1211 04/01/25  1950 04/02/25  0434 04/02/25  1155 04/02/25 2127 04/02/25 2128 04/03/25  0346   NA  --    < > 138 139 139 137 137 139  --  138   K  --    < > 4.1 4.4 4.1 4.0 4.3 4.2  --  4.1   CALCIUM  --    < > 7.3* 7.2* 7.5* 7.4* 7.3* 7.6*  --  7.4*   PHOS  --    < > 2.9 3.2 2.8 2.7 2.9 2.8  --  2.5   MG  --    < > 2.10 2.20 2.20 2.10  "2.10 2.20  --  2.20   CL  --    < > 111* 114* 112* 112* 113* 113*  --  113*   CO2  --    < > 22* 20* 21* 22* 21* 21*  --  21*   BUN  --    < > 12.7 11.9 12.6 13.9 13.7 13.7  --  13.4   CREATININE  --    < > 0.81 0.78 0.78 0.71* 0.71* 0.75  --  0.71*   EGFRNORACEVR  --    < > >60 >60 >60 >60 >60 >60  --  >60   GLUCOSE  --    < > 116* 121* 104 104 104 94  --  91   BILITOT  --    < > 0.4 0.5 0.4 0.4 0.4 0.4  --  0.3   ALKPHOS  --    < > 67 70 65 66 67 67  --  65   ALT  --    < > 41 37 31 27 25 22  --  18   AST  --    < > 56* 49* 38 31 28 20  --  18   ALBUMIN  --    < > 2.2* 2.2* 2.1* 2.1* 2.2* 2.1*  --  2.0*   TRIG 108  --  123  --   --   --   --   --   --   --    WBC  --    < > 10.47 10.25 9.55 10.73 9.67  --  9.50 7.73   HGB  --    < > 9.4* 9.2* 9.0* 8.9* 9.0*  --  8.7* 8.2*   HCT  --    < > 28.6* 29.3* 27.2* 26.6* 26.8*  --  26.6* 26.2*    < > = values in this interval not displayed.     Nutrition Orders:  No diet orders on file      Appetite/Oral Intake: not applicable/not applicable  Factors Affecting Nutritional Intake: on mechanical ventilation  Social Needs Impacting Access to Food: unable to assess at this time; will attempt on follow-up  Food/Confucianism/Cultural Preferences: unable to obtain  Food Allergies: no known food allergies  Last Bowel Movement: 03/28/25  Wound(s):  Incision documentation noted     Comments    3/31/25: Discussed with RN. Will provide tube feeding recommendations for when appropriate to start tube feeding. Receiving kcal from meds.  Currently with Impella in place, HOB flat. May need to place in reverse Trendelenburg to start TF.     4/1/25: Possible plans for starting trickle feeds today. Still receiving kcal from meds.     4/2/25: No TF yet. Still receiving kcal from meds.     4/3/25: No TF plans at this time. Plans for CABG tomorrow with plans for extubation per protocol post-op.     Anthropometrics    Height: 5' 10.98" (180.3 cm), Height Method: Stated  Last Weight: 106.1 kg (233 lb " 14.5 oz) (03/29/25 1738), Weight Method: Bed Scale  BMI (Calculated): 32.6  BMI Classification: obese grade I (BMI 30-34.9)        Ideal Body Weight (IBW), Male: 171.88 lb     % Ideal Body Weight, Male (lb): 130.81 %                          Usual Weight Provided By: unable to obtain usual weight    Wt Readings from Last 5 Encounters:   03/29/25 106.1 kg (233 lb 14.5 oz)   12/02/21 101 kg (222 lb 10.6 oz)     Weight Change(s) Since Admission:   Wt Readings from Last 1 Encounters:   03/29/25 1738 106.1 kg (233 lb 14.5 oz)   03/29/25 0404 102.1 kg (225 lb)   Admit Weight: 102.1 kg (225 lb) (03/29/25 0404), Weight Method: Stated    Estimated Needs    Weight Used For Calorie Calculations: 106.1 kg (233 lb 14.5 oz)  Energy Calorie Requirements (kcal): 1167-1485kcal (11-14kcal/kg IBW)  Energy Need Method: Kcal/kg  Weight Used For Protein Calculations: 78.2 kg (172 lb 5.7 oz) (IBW)  Protein Requirements: 156gm (2g/kg IBW)  Fluid Requirements (mL): 2122ml (20ml/kg)  CHO Requirement: 165gm (45% est kcal needs)     Enteral Nutrition     Patient not receiving enteral nutrition at this time.    Parenteral Nutrition     Patient not receiving parenteral nutrition support at this time.    Evaluation of Received Nutrient Intake    Calories: not meeting estimated needs  Protein: not meeting estimated needs    Patient Education     Not applicable.    Nutrition Diagnosis     PES: Inadequate oral intake related to acute illness as evidenced by intubation since admit. (active)     PES:            Nutrition Interventions     Intervention(s): modified composition of enteral nutrition, modified rate of enteral nutrition, and collaboration with other providers  Intervention(s):      Goal: Meet greater than 80% of nutritional needs by follow-up. (goal progressing)  Goal: Tolerate enteral feeding at goal rate by follow-up. (goal progressing)    Nutrition Goals & Monitoring     Dietitian will monitor: energy intake and enteral nutrition  intake  Discharge planning: too early to determine; pending clinical course  Nutrition Risk/Follow-Up: high (follow-up in 1-4 days)   Please consult if re-assessment needed sooner.

## 2025-04-03 NOTE — PROGRESS NOTES
[facility]    Cardiology  Progress Note    Patient Name: Jon Conley  MRN: [id number]  Admission Date: 3/29/2025  Hospital Length of Stay: 5 days  Code Status: No Order   Attending Provider: Davi Harp Jr., MD,*   Consulting Provider: NORM Sierra  Primary Care Physician: Patrick Reece Jr., MD  Principal Problem:<principal problem not specified>    Patient information was obtained from past medical records and ER records.     Subjective:     Chief Complaint/Reason for Consult: Cardiac Arrest     HPI: Mr. Conley is a 80 y/o male who is known to [facility], Dr. Wang. The patient presented to [facility] on 3.29.25 with c/o SOB. He initially presented to [facility] in [place] with SOB. He was found to have Tachycardia, Hypotension, Anemia (7.4/24.6), Troponin 9.443, .0, BUN/Crea 16.2/1.34, Lactic Acid Level 4.3, Na 133, WBC 14.03, H&H 7.3/23.0, WBC 14.03, , PT/INR 14.9/1.2. He was admitted to , however, he sustained a witnessed PEA Arrest in the ER and has ROSC per ER MD. [facility] was consulted for NSETMI and Arrest.     3.30.25: NAD. Impella L Groin. CVP 7, P7. Levophed 0.32mcg/kg/min, Heparin Drip per Protocol, Amiodarone 0.5mg/min  3.31.25: NAD. L Groin Impella P7, Amiodarone 0.5mg/min, Heparin Drip per Protocol, Levophed 0.18mcg/kg/min, H&H 8.8/25.8, AST/ALT 79/45  4.1.25: NAD noted. L groin Impella in place, remains at P7. Remains on levophed. LFTs continue to improve.  4.2.25: NAD. L Groin Impella Device P6. Heparin Infusion per Protocol. Remains on 0.06mcg/kg/min.   4.3.25: NAD. Vented/Sedated. L Groin Impella Device. P3, Heparin Infusion per Protocol. Off Pressors. Attempting Wean of Impella. H&H 8.2/26.2,     PMH: BPH/Urinary Obstruction, CAD/Nonobstructive, PAD, R JACY/Mild, HTN, Obesity, GERD, Tobacco Use   PSH: TURP, Angiogram, Bilateral Knee Replacements   Family History: Denies Family History of Heart Disease  Social History:  Denies Illicit Drug, ETOH and Tobacco Use     Previous Cardiac Diagnostics:   C/RHC 3.29.25:  Findings:  - Successful placement of Impella CP device from left femoral approach.  - There is severe multi-vessel coronary artery disease.  - Left Main has mild diffuse disease.  - Mid Left Anterior Descending has serial lesions, up to 70-80% in severity.   - Prox Left Circumflex is 100% occluded. Unable to cross lesion despite wire escalation and microcatheter support.  - Mid Right Coronary Artery is 100% occluded. The lesion was successfully treated with overlapping SYNERGY XD 4.0X20MM, SYNERGY XD 3.0X48MM, and SYNERGY XD 2.59X72SM drug-eluting stents. Post-dilatation was performed using a SYNERGY XD 4.0X20MM angioplasty balloon inflated to 12 gretel. Following intervention there was 0% residual stenosis and PATTY grade 3 flow in the distal vessel.  - Intravascular Ultrasound (IVUS) was performed prior to intervention to further characterize the lesion, as well as post-stent deployment to ensure optimal stent apposition and expansion.  - Aorto-iliac angiogram revealed moderate disease of R ileofemoral system.  - At the conclusion of the case, contrast injection was performed through the Impella sheath side port, showing adequate antegrade flow down the ipsilateral common femoral artery/SFA/profunda artery.  - RHC with elevated R sided filling pressures. RA 17 mmHg, RV 45/15 mmHg, PA 42/29 mmHg (mean 34 mmHg), PCWP 25 mmHg.  - Transpulmonary gradient is 9 mmHg.  - Cardiac Output/Index at 6.6/3.0, as calculated by Gregory equation on Levophed infusion and Impella CP support.  - PVR is 1.4 Woods units  - SVR is 550 dyne-sec*cm^-5  - Pulmonary Artery Pulsatility Index (Rafa) is 0.8  - Cardiac Power Output () is 0.91   Assessment/Plan:  - Patient is a 79 y.o. male with a history of recent TURP, complicated by significant post-op bleeding, presents to [facility] ED with dyspnea. Transferred to [facility] with worsening shock despite  vasopressors. TTE showing EF 25%, severe MR, severe TR. Troponin elevated. Brought to cath lab. Underwent Impella placement/LHC/RHC as outlined above. RCA lesion treated. Unable to cross LCx lesion. 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 At this point, would continue Impella support and aggressive medical mgmt. Has non-culprit lesions that should be treated at a later date as well. Can consider CTS consult, especially given severe valvular disease.  - Patient was given a loading dose of clopidogrel 600 mg PO in the cath lab  - Continue DAPT (aspirin + clopidogrel) for a minimum of 12 months and aspirin indefinitely thereafter  - High intensity statin  - Continue Aggrastat for 4 hours post-procedure  - Arterial sheath remains place; plan is to remove when activated clotting time (ACT) less than 180 seconds. Bedrest for 4 hours after hemostasis is achieved.  - Obtain transthoracic echocardiogram  - Continuous bedrest while Impella in place  - Therapeutic heparin administration per protocol  - Antibiotics per protocol  - Remaining mgmt per primary team/cardiology    ECHO 3.29.25:  Left Ventricle: The left ventricle is dilated. Normal wall thickness. Severe global hypokinesis present. There is severely reduced systolic function with a visually estimated ejection fraction of 15 - 20%. Grade I diastolic dysfunction.  Right Ventricle: Right ventricular enlargement. Systolic function is normal. TAPSE is 1.83 cm.  Left Atrium: dilated  Mitral Valve: Mildly thickened leaflets. There is moderate regurgitation.  Tricuspid Valve: There is moderate regurgitation.  Pulmonary Artery: There is moderate pulmonary hypertension.  IVC/SVC: Patient is ventilated, cannot use IVC diameter to estimate right atrial pressure.    Carotid US 11.4.24:  The study quality is average.   1-39% stenosis in the proximal right internal carotid artery based on Bluth Criteria.   Antegrade right vertebral artery flow.   Antegrade left vertebral artery  flow    ECHO 1.27.22:  The study quality is average.   The left ventricle is normal in size. Global left ventricular systolic function is moderately decreased. The left ventricular ejection fraction is 40-45%. Mild concentric left ventricular hypertrophy is present.  The left ventricle diastolic function is impaired (Grade I) with normal left atrial pressure.  Mild (1+) mitral regurgitation.  The pulmonary artery appears normal.     [facility] 9.26.17:  LM: Normal  LAD: Prox 30% Stenosis  RI: Normal  LCX: Normal  RCA: Distal RCA 50%    Review of patient's allergies indicates:  No Known Allergies  No current facility-administered medications on file prior to encounter.     No current outpatient medications on file prior to encounter.     Review of Systems   Unable to perform ROS: Intubated     Objective:     Vital Signs (Most Recent):  Temp: 98.7 °F (37.1 °C) (04/03/25 0800)  Pulse: 72 (04/03/25 0915)  Resp: 18 (04/03/25 0915)  BP: 108/68 (04/03/25 0915)  SpO2: 98 % (04/03/25 0915) Vital Signs (24h Range):  Temp:  [98.7 °F (37.1 °C)-99.2 °F (37.3 °C)] 98.7 °F (37.1 °C)  Pulse:  [65-98] 72  Resp:  [10-26] 18  SpO2:  [94 %-99 %] 98 %  BP: ()/(55-87) 108/68  Arterial Line BP: ()/() 102/51   Weight: 106.1 kg (233 lb 14.5 oz)  Body mass index is 32.64 kg/m².  SpO2: 98 %       Intake/Output Summary (Last 24 hours) at 4/3/2025 1057  Last data filed at 4/3/2025 0900  Gross per 24 hour   Intake 2191.14 ml   Output 2440 ml   Net -248.86 ml     Lines/Drains/Airways       Central Venous Catheter Line  Duration             Tunneled Central Line - Triple Lumen 03/29/25 0555 Internal Jugular Right 5 days    Pulmonary Artery Catheter Assessment  03/29/25 1300 Femoral Vein Right 4 days              Drain  Duration                  Urethral Catheter -- days         NG/OG Tube 03/29/25 1200 18 Fr. Center mouth 4 days              Airway  Duration                  Airway - Non-Surgical 03/29/25 0541 5 days              Line   Duration                  VAD 03/29/25 5 days              Peripheral Intravenous Line  Duration                  Peripheral IV - Single Lumen 03/29/25 0530 18 G Right Antecubital 5 days         Peripheral IV - Single Lumen 03/29/25 0600 20 G Left Hand 5 days         Peripheral IV - Single Lumen 03/29/25 20 G Left Forearm 5 days         Sheath 03/29/25 0913 Right 5 days         Sheath 03/29/25 0927 Left 5 days                  Significant Labs:   Chemistries:   Recent Labs   Lab 03/29/25  0446 03/29/25  1743 04/01/25  1950 04/02/25  0434 04/02/25  1155 04/02/25 2127 04/03/25  0346   *   < > 139 137 137 139 138   K 4.1   < > 4.1 4.0 4.3 4.2 4.1      < > 112* 112* 113* 113* 113*   CO2 19*   < > 21* 22* 21* 21* 21*   BUN 16.2   < > 12.6 13.9 13.7 13.7 13.4   CREATININE 1.34*   < > 0.78 0.71* 0.71* 0.75 0.71*   CALCIUM 8.0*   < > 7.5* 7.4* 7.3* 7.6* 7.4*   BILITOT 0.3   < > 0.4 0.4 0.4 0.4 0.3   ALKPHOS 58   < > 65 66 67 67 65   ALT 15   < > 31 27 25 22 18   AST 82*   < > 38 31 28 20 18   GLUCOSE 144*   < > 104 104 104 94 91   MG  --    < > 2.20 2.10 2.10 2.20 2.20   PHOS  --    < > 2.8 2.7 2.9 2.8 2.5   TROPONINI 9.443*  --   --   --   --   --   --     < > = values in this interval not displayed.        CBC/Anemia Labs: Coags:    Recent Labs   Lab 04/02/25  1155 04/02/25 2128 04/03/25  0346   WBC 9.67 9.50 7.73   HGB 9.0* 8.7* 8.2*   HCT 26.8* 26.6* 26.2*   PLT 85* 97* 101*   MCV 93.1 95.0* 96.0*   RDW 16.2 16.1 16.1    Recent Labs   Lab 03/29/25  0446 03/29/25  1415   INR 1.2 1.2   APTT 63.5* 103.5*        Telemetry: SR    Physical Exam  Constitutional:       General: He is not in acute distress.     Appearance: Normal appearance. He is obese. He is ill-appearing.      Comments: Vented/Sedated   HENT:      Head: Normocephalic.      Mouth/Throat:      Mouth: Mucous membranes are moist.   Cardiovascular:      Rate and Rhythm: Normal rate and regular rhythm.      Pulses: Normal pulses.      Heart sounds:  Murmur heard.   Pulmonary:      Effort: Pulmonary effort is normal. No respiratory distress.      Comments: Ventilator Associated Breath Sounds  Vent Mode: A/C  Oxygen Concentration (%):  [40] 40  Resp Rate Total:  [16 br/min-20 br/min] 17 br/min  Vt Set:  [500 mL] 500 mL  PEEP/CPAP:  [5 cmH20] 5 cmH20  Mean Airway Pressure:  [8 yiC70-47 cmH20] 8 cmH20  Abdominal:      Palpations: Abdomen is soft.   Skin:     General: Skin is warm.      Comments: L Groin Impella CP, Soft/Flat, Non-Tender, No Sign of Bleed/Infection. + Monophasic Dopplerable Pedal Pulses    Neurological:      Comments: Vented/Sedated       Home Medications:   Medications Ordered Prior to Encounter[1]  Current Schedule Inpatient Medications:   aspirin  81 mg Oral Daily    famotidine  20 mg Per NG tube BID    heparin (PORCINE)  39.2 Units/kg Intravenous Once    mupirocin   Nasal BID     Continuous Infusions:   fentanyl  0-250 mcg/hr Intravenous Continuous   Stopped at 03/29/25 2030    heparin (porcine) in D5W  0-40 Units/kg/hr Intravenous Continuous 18.8 mL/hr at 04/03/25 0636 18.413 Units/kg/hr at 04/03/25 0636    lactated ringers   Intravenous Continuous 75 mL/hr at 04/03/25 0743 New Bag at 04/03/25 0743    NORepinephrine bitartrate-D5W  0-3 mcg/kg/min Intravenous Continuous   Stopped at 04/03/25 0116    propofoL  0-50 mcg/kg/min Intravenous Continuous 12.3 mL/hr at 04/03/25 0636 20 mcg/kg/min at 04/03/25 0636    sodium bicarbonate 25 mEq in D5W 1,000 mL Purge Solution for Impella   Impella Device Continuous   New Bag at 04/01/25 0824    tirofiban-0.9% sodium chloride 12.5 mg/250ml    Continuous PRN   Stopped at 03/29/25 1845     Mechanical Circulatory Support (MCS) Flow Sheet  Cardiogenic Shock/HF Management  04/03/2025    PUMP METRICS  Value/Parameter TIME: 0900   Site Observation/Distal Pulses See Physical    cm See Nursing Documentation    Impella [x]CP w/Smart Assist   []5.5 w/Smart Assist   []RP w/Smart Assist   P-Level P-3   Ao (MAP) MAP 65    LV    Motor Current (Mean) 390   Purge Pressures/Flow 408/14.2   Average Flows 2.2     HEMODYNAMICS  Value/Parameter TIME: 0900   AO (120/80) 97/49   MAP (> 60) 64   CO (PA Catheter)/GARRY 5.5   Native CO (Mead CO-Impella) 3.3   CI (> 2.2) 3.0   Cardiac Power Output (Greater than or Equal to 0.8) 0.8   SVR (800-1200) 603   HR () 70   LVEF   See ECHO      RV ASSESSMENT  Value/Parameter TIME: 0900   Rafa (> 1.5) 2   PA (25/10) 29/12   CVP (8-12) 8   PVR (120-250)    TAPSI (cm) (1.8)      LABS  Value/Parameter TIME: 0900   Lactate Level (< 2.0) 0.7    SvO2 (65-75%) 64.3%   Hgb/Hct (12-17/36-50) 8.2/26.2      SpO2 (%) 98%   pH (7.35-7.45) 7.41   Creatinine (0.6-1.2) 0.71   ALT (7-56) 18   ACT (160-180) 165   LDH    Urine Output (> 30mL/HR) > 60mL/Pink     DRIPS (GTTS)  Value/Parameter TIME: 0900   Vasopressors  []Vasopressin  []Epinephrine  [x]Levophed  []NeoSynephrine   Inotropes  []Dobutamine  []Primacor/Milrinone   []Dopamine   Anticoagulants [x]Heparin  []Argatroban   Sedation []Fentanyl  []Precedex  [x]Propofol  []Nimbex     Assessment:   Cardiogenic Shock requiring Mechanical and Chemical Support    - Impella/Pressors   MVCAD    - s/p [facility] (3.29.25) -  Left Main has mild diffuse disease. Mid Left Anterior Descending has serial lesions, up to 70-80% in severity. Prox Left Circumflex is 100% occluded. Unable to cross lesion despite wire escalation and microcatheter support. Mid Right Coronary Artery is 100% occluded. The lesion was successfully treated with overlapping SYNERGY XD 4.0X20MM, SYNERGY XD 3.0X48MM, and SYNERGY XD 2.01K69YO drug-eluting stents. Post-dilatation was performed using a SYNERGY XD 4.0X20MM angioplasty balloon inflated to 12 gretel. Following intervention there was 0% residual stenosis and PATTY grade 3 flow in the distal vessel.  NSTEMI Type I (Non-Anterior Wall MI)    - Trponin > 9  Cardiopulmonary Arrest    - Initial Rhythm PEA - Witnessed in ER Arrest/ROSC  ICMO/EF 15-20%      - ECHO (3.29.25) - LVEF 15-20%, Grade I DD   Acute Hypoxemic Respiratory Failure requiring Intubation/Ventilation   ST- Now SR   Hypotension requiring Pressors    - Hx of HTN  BPH s/p TURP   Lactic Acidosis   Leukocytosis - Resolved   Anemia requiring Transfusions - Stable   Recent BPH Diagnosis s/p TURP  CAD/Nonobstructive (2017)  JACY/R Sided   PAD/Nonobstructive   Transaminitis - Resolved   Obesity  GERD  Tobacco Use  No Hx of GIB     Plan:   See Mechanical Flow Sheet  Heparin Drip per Protocol for Tx of NSTEMI   CT surgery Consult to Evaluate patient for CABG and Valve - Pending Possibly Friday (Would Like Impella Explant Prior to Surgical Intervention) - Attempting to Wean  Wean Pressors for MAP > 65mmHg   Trend Lactic Acid Levels - Normal   AM VBG/ABG  Will Continue to Follow   Labs in AM: CBC, CMP and Mg    NORM Sierra  Cardiology  [facility]     I agree with the findings of the complexity of problems addressed and take responsibility for the plan's risks and complications. I approved the plan documented by Hasmukh Sandoval NP.            [1]   No current facility-administered medications on file prior to encounter.     No current outpatient medications on file prior to encounter.

## 2025-04-03 NOTE — PLAN OF CARE
Problem: Infection  Goal: Absence of Infection Signs and Symptoms  Outcome: Progressing     Problem: Adult Inpatient Plan of Care  Goal: Plan of Care Review  Outcome: Progressing  Goal: Absence of Hospital-Acquired Illness or Injury  Outcome: Progressing  Goal: Optimal Comfort and Wellbeing  Outcome: Progressing  Goal: Readiness for Transition of Care  Outcome: Progressing     Problem: Mechanical Ventilation Invasive  Goal: Effective Communication  Outcome: Progressing  Goal: Optimal Device Function  Outcome: Progressing  Goal: Mechanical Ventilation Liberation  Outcome: Progressing  Goal: Optimal Nutrition Delivery  Outcome: Progressing  Goal: Absence of Device-Related Skin and Tissue Injury  Outcome: Progressing  Goal: Absence of Ventilator-Induced Lung Injury  Outcome: Progressing     Problem: Artificial Airway  Goal: Effective Communication  Outcome: Progressing  Goal: Optimal Device Function  Outcome: Progressing  Goal: Absence of Device-Related Skin or Tissue Injury  Outcome: Progressing     Problem: Delirium  Goal: Optimal Coping  Outcome: Progressing  Goal: Improved Behavioral Control  Outcome: Progressing  Goal: Improved Attention and Thought Clarity  Outcome: Progressing  Goal: Improved Sleep  Outcome: Progressing     Problem: Skin Injury Risk Increased  Goal: Skin Health and Integrity  Outcome: Progressing     Problem: Fall Injury Risk  Goal: Absence of Fall and Fall-Related Injury  Outcome: Progressing

## 2025-04-04 NOTE — PROGRESS NOTES
Pulmonary & Critical Care Medicine   Progress Note      Presenting History/HPI:    The patient is a 79-year-old male who has a history of coronary artery disease, hypertension, hyperlipidemia, peripheral arterial disease, and GE reflux disease.  He also has a history of recent TURP in [place], complicated by postoperative by gross hematuria requiring placement of a three-way irrigation bladder catheter last week. This was left in place at time of discharge.  He presented to [facility] ER early a.m. of 03/29/2025 with shortness of breath, noted tachycardia/hypotensive, and marked elevation of high sensitivity troponin at 13,821. He was transferred to [facility] ER, suffering PEA arrest requiring intubation and short CPR with ROSC.  LHC was performed, noting multivessel coronary artery disease with 100% occluded proximal left circumflex, unable to cross with wire.  Mid RCA also noted 100% occluded, treated with PTCA/TAD.  He was noted to have elevated right-sided filling pressures with PCW 25 mm Hg.  Impella catheter was placed, and was left in place postprocedure.  He was admitted to ICU for ongoing medical care.     Intubation/mechanical ventilation 03/29/2025  Impella placement 03/29/2025      Interval History:  -no major issues or changes overnight   -urine output of 3.3 L over the past 24 hours   -patient remains on mechanical ventilatory support still on Impella set to P-6   -sedated on propofol currently on heparin drip   -I discussed the case with Cardiology and they have discussed with CV surgery that the patient is not an adequate surgical candidate for CABG due to multiple comorbidities      Scheduled Medications:    aspirin  81 mg Oral Daily    famotidine  20 mg Per NG tube BID    heparin (PORCINE)  39.2 Units/kg Intravenous Once       PRN Medications:     Current Facility-Administered Medications:     0.9%  NaCl infusion (for blood administration), , Intravenous, Q24H PRN    0.9%  NaCl  infusion (for blood administration), , Intravenous, Q24H PRN    0.9%  NaCl infusion (for blood administration), , Intravenous, Q24H PRN    0.9%  NaCl infusion (for blood administration), , Intravenous, Q24H PRN    acetaminophen, 650 mg, Oral, Q4H PRN    calcium gluconate IVPB, 1 g, Intravenous, PRN    calcium gluconate IVPB, 2 g, Intravenous, PRN    calcium gluconate IVPB, 3 g, Intravenous, PRN    dextrose 50%, 12.5 g, Intravenous, PRN    dextrose 50%, 25 g, Intravenous, PRN    glucagon (human recombinant), 1 mg, Intramuscular, PRN    glucose, 16 g, Oral, PRN    glucose, 24 g, Oral, PRN    heparin (PORCINE), 39.2 Units/kg, Intravenous, PRN    heparin (PORCINE), 30 Units/kg, Intravenous, PRN    magnesium sulfate 2 g IVPB, 2 g, Intravenous, PRN    magnesium sulfate 2 g IVPB, 2 g, Intravenous, PRN    ondansetron, 8 mg, Oral, Q8H PRN    potassium chloride in water, 20 mEq, Intravenous, PRN **AND** potassium chloride in water, 40 mEq, Intravenous, PRN **AND** potassium chloride in water, 60 mEq, Intravenous, PRN    sodium phosphate 15 mmol in D5W 250 mL IVPB, 15 mmol, Intravenous, PRN    sodium phosphate 20.1 mmol in D5W 250 mL IVPB, 20.1 mmol, Intravenous, PRN    sodium phosphate 30 mmol in D5W 250 mL IVPB, 30 mmol, Intravenous, PRN    tirofiban-0.9% sodium chloride 12.5 mg/250ml, , , Continuous PRN      Infusions:     fentanyl  0-250 mcg/hr Intravenous Continuous   Stopped at 03/29/25 2030    heparin (porcine) in D5W  0-40 Units/kg/hr Intravenous Continuous 18.8 mL/hr at 04/04/25 0620 18.413 Units/kg/hr at 04/04/25 0620    lactated ringers   Intravenous Continuous 75 mL/hr at 04/04/25 1213 Rate Change at 04/04/25 1213    NORepinephrine bitartrate-D5W  0-3 mcg/kg/min Intravenous Continuous 3.8 mL/hr at 04/04/25 1601 0.02 mcg/kg/min at 04/04/25 1601    propofoL  0-50 mcg/kg/min Intravenous Continuous 24.5 mL/hr at 04/04/25 1556 40 mcg/kg/min at 04/04/25 1556    sodium bicarbonate 25 mEq in D5W 1,000 mL Purge Solution for  Impella   Impella Device Continuous   New Bag at 04/01/25 0824    tirofiban-0.9% sodium chloride 12.5 mg/250ml    Continuous PRN   Stopped at 03/29/25 1845         Fluid Balance:     Intake/Output Summary (Last 24 hours) at 4/4/2025 1633  Last data filed at 4/4/2025 1600  Gross per 24 hour   Intake 2642.8 ml   Output 2825 ml   Net -182.2 ml         Vital Signs:   Vitals:    04/04/25 1445   BP: 94/62   Pulse: 74   Resp: 18   Temp:          Physical Exam  Vitals and nursing note reviewed.   Constitutional:       General: He is not in acute distress.     Appearance: Normal appearance. He is toxic-appearing.   HENT:      Head: Normocephalic and atraumatic.      Right Ear: External ear normal.      Left Ear: External ear normal.      Nose: Nose normal.      Mouth/Throat:      Pharynx: No posterior oropharyngeal erythema.      Comments: Unable to visualize posterior oropharynx secondary to endotracheal tube  Eyes:      General: No scleral icterus.     Conjunctiva/sclera: Conjunctivae normal.      Pupils: Pupils are equal, round, and reactive to light.   Neck:      Vascular: No carotid bruit.   Cardiovascular:      Rate and Rhythm: Normal rate and regular rhythm.      Pulses: Normal pulses.      Heart sounds: Normal heart sounds. No murmur heard.     No friction rub. No gallop.   Pulmonary:      Effort: Pulmonary effort is normal. No respiratory distress.      Breath sounds: Rhonchi present. No wheezing or rales.      Comments: Intubated, on mechanical ventilation  Abdominal:      General: Abdomen is flat. Bowel sounds are normal. There is no distension.      Palpations: Abdomen is soft.      Tenderness: There is no abdominal tenderness. There is no guarding or rebound.   Musculoskeletal:         General: No swelling or deformity.      Cervical back: Neck supple. No rigidity or tenderness.   Skin:     General: Skin is warm and dry.      Capillary Refill: Capillary refill takes less than 2 seconds.      Findings: No  erythema or rash.   Neurological:      Comments: Unable to fully assess neurologic status and orientation secondary to patient being intubated, sedated and nonverbal   Psychiatric:      Comments: Unable to fully assess as patient is intubated and nonverbal           Ventilator Settings  Vent Mode: A/C (04/04/25 1315)  Ventilator Initiated: Yes (03/29/25 0603)  Set Rate: 16 BPM (04/04/25 1315)  Vt Set: 500 mL (04/04/25 1315)  PEEP/CPAP: 5 cmH20 (04/04/25 1315)  Oxygen Concentration (%): 40 (04/04/25 1400)  Peak Airway Pressure: 33 cmH20 (04/04/25 1315)  Total Ve: 8.2 L/m (04/04/25 1315)  F/VT Ratio<105 (RSBI): (!) 31.01 (04/04/25 1315)      Laboratory Studies:   Recent Labs   Lab 04/04/25  0854   PH 7.390  7.430   PCO2 39.0  34.0*   PO2 <38.0  94.0   HCO3 23.6  22.6     Recent Labs   Lab 04/04/25  1144   WBC 6.93   RBC 2.60*   HGB 8.1*   HCT 24.8*   *   MCV 95.4*   MCH 31.2*   MCHC 32.7*     Recent Labs   Lab 04/04/25  1144   GLUCOSE 92      K 4.0   *   CO2 19*   BUN 12.8   CREATININE 0.70*   CALCIUM 7.1*   MG 2.30         Microbiology Data:   Microbiology Results (last 7 days)       Procedure Component Value Units Date/Time    Blood Culture #1 **CANNOT BE ORDERED STAT** [[id number]]  (Normal) Collected: 03/29/25 0446    Order Status: Completed Specimen: Blood from Arm, Left Updated: 04/03/25 1101     Blood Culture No Growth at 5 days    Blood Culture #2 **CANNOT BE ORDERED STAT** [[id number]]  (Normal) Collected: 03/29/25 0454    Order Status: Completed Specimen: Blood from Arm, Right Updated: 04/03/25 1101     Blood Culture No Growth at 5 days              Imaging:   X-Ray Chest 1 View  Narrative: EXAMINATION:  XR CHEST 1 VIEW    CLINICAL HISTORY:  respiratory failure;    TECHNIQUE:  Frontal view(s) of the chest.    COMPARISON:  Radiography 04/03/2025    FINDINGS:  Similar positioning of the endotracheal tube, right IJ catheter, pulmonary arterial catheter and LVAD.  Enteric tube extends  below the diaphragm.  Small bilateral pleural effusions with bilateral lower lung atelectasis.  Possible mild pulmonary edema.  No pneumothorax appreciated.  Stable cardiac silhouette.  Impression: Small pleural effusions with possible mild pulmonary edema.    Electronically signed by: Martín Gan  Date:    04/04/2025  Time:    06:02          Assessment and Plan    Assessment:  NSTEMI with cardiogenic shock, ongoing  LHC with PTCA/TAD x3 RCA (03/29/2025), PCWP 25 mm Hg  EF 15% by TTE  Impella placement (03/29/2025), currently P-6  Ischemic cardiomyopathy with severe decreased LVSF, EF 15%, moderate MR  Acute respiratory failure secondary to above, initially with cardiogenic pulmonary edema, currently with good oxygenation and ventilation.  Severe wide anion gap metabolic acidosis of primary lactic acid etiology, resolved   Mild/mod thrombocytopenia, likely related to Impella        Plan:  -titrate mechanical ventilation for ARDS net protocol   -supplement oxygen to maintain saturation 94-96%   -currently off all vasopressors and only requiring mechanical support via Impella   -Impella set to P-6 with no plans to wean at this time as Cardiology and CV surgery have evaluated the patient and determine that he is a poor surgical candidate for CABG  -the patient's daughters were at bedside and we had a lengthy discussion about overall goals of care they decided to transition to a DNR status and wish to discuss goals of care further they understand that there is no surgical option at this point in time and the patient is dependent on mechanical ventilation and an Impella to maintain his current status  -we discussed goals specifically focusing on comfort care and withdrawal of care and I explained this in depth and they understand and they wish to discuss further with more family members, will change to DNR status and will continue current level of care with no plans to escalate at this time      Overall prognosis is  very poor    DVT prophylaxis with heparin drip   GI prophylaxis with Protonix   Keep head of bed elevated greater than 30° if possible for VAP prophylaxis      The patient remains at high risk of decompensation and death and will remain in ICU level care    44 min of critical care time was spent reviewing the patient's chart including medications, radiographs, labs, pertinent cultures and pathology data, other consultant notes/recomendations as well as titration of vasopressors, adjustment of mechanical ventilatory or NIPPV support, as well as discussion of goals of care with nursing staff, respiratory therapy at the bedside and with family at the bedside/via phone.      Bharat Pena MD  4/4/2025  Pulmonology/Critical Care

## 2025-04-04 NOTE — PROGRESS NOTES
[facility]    Cardiology  Progress Note    Patient Name: Jon Conley  MRN: [id number]  Admission Date: 3/29/2025  Hospital Length of Stay: 6 days  Code Status: DNR   Attending Provider: Davi Harp Jr., MD,*   Consulting Provider: NORM Sierra  Primary Care Physician: Patrick Reece Jr., MD  Principal Problem:<principal problem not specified>    Patient information was obtained from past medical records and ER records.     Subjective:     Chief Complaint/Reason for Consult: Cardiac Arrest     HPI: Mr. Conley is a 80 y/o male who is known to [facility], Dr. Wang. The patient presented to [facility] on 3.29.25 with c/o SOB. He initially presented to [facility] in [place] with SOB. He was found to have Tachycardia, Hypotension, Anemia (7.4/24.6), Troponin 9.443, .0, BUN/Crea 16.2/1.34, Lactic Acid Level 4.3, Na 133, WBC 14.03, H&H 7.3/23.0, WBC 14.03, , PT/INR 14.9/1.2. He was admitted to , however, he sustained a witnessed PEA Arrest in the ER and has ROSC per ER MD. [facility] was consulted for NSETMI and Arrest.     3.30.25: NAD. Impella L Groin. CVP 7, P7. Levophed 0.32mcg/kg/min, Heparin Drip per Protocol, Amiodarone 0.5mg/min  3.31.25: NAD. L Groin Impella P7, Amiodarone 0.5mg/min, Heparin Drip per Protocol, Levophed 0.18mcg/kg/min, H&H 8.8/25.8, AST/ALT 79/45  4.1.25: NAD noted. L groin Impella in place, remains at P7. Remains on levophed. LFTs continue to improve.  4.2.25: NAD. L Groin Impella Device P6. Heparin Infusion per Protocol. Remains on 0.06mcg/kg/min.   4.3.25: NAD. Vented/Sedated. L Groin Impella Device. P3, Heparin Infusion per Protocol. Off Pressors. Attempting Wean of Impella. H&H 8.2/26.2,   4.4.25: NAD. Vented/Sedated. Heparin Drip per Protocol. H&H 8.1/95.4,     PMH: BPH/Urinary Obstruction, CAD/Nonobstructive, PAD, R JACY/Mild, HTN, Obesity, GERD, Tobacco Use   PSH: TURP, Angiogram, Bilateral Knee Replacements    Family History: Denies Family History of Heart Disease  Social History: Denies Illicit Drug, ETOH and Tobacco Use     Previous Cardiac Diagnostics:   ECHO 4.3.25:  Limited study to check impella placement.  Left Ventricle: There is severely reduced systolic function with a visually estimated ejection fraction of 15 - 20%.  Impella distance from aortic valve annulus appears appropriate.    ECHO 4.1.25:  Limited study to check impella placement.  Left Ventricle: There is severely reduced systolic function with a visually estimated ejection fraction of 15 - 20%.  Impella distance from aortic valve annulus appears appropriate.    ECHO 3.30.25:  Left Ventricle: There is severely reduced systolic function with a visually estimated ejection fraction of less than 30%.  There appears to be at least moderate to severe mitral regurgitation.   Impella is positioned with inflow cannula 3.8 cm from aortic valve annulus.    [facility] 3.29.25:  Findings:  - Successful placement of Impella CP device from left femoral approach.  - There is severe multi-vessel coronary artery disease.  - Left Main has mild diffuse disease.  - Mid Left Anterior Descending has serial lesions, up to 70-80% in severity.   - Prox Left Circumflex is 100% occluded. Unable to cross lesion despite wire escalation and microcatheter support.  - Mid Right Coronary Artery is 100% occluded. The lesion was successfully treated with overlapping SYNERGY XD 4.0X20MM, SYNERGY XD 3.0X48MM, and SYNERGY XD 2.17V77UM drug-eluting stents. Post-dilatation was performed using a SYNERGY XD 4.0X20MM angioplasty balloon inflated to 12 gretel. Following intervention there was 0% residual stenosis and PATTY grade 3 flow in the distal vessel.  - Intravascular Ultrasound (IVUS) was performed prior to intervention to further characterize the lesion, as well as post-stent deployment to ensure optimal stent apposition and expansion.  - Aorto-iliac angiogram revealed moderate disease of R  ileofemoral system.  - At the conclusion of the case, contrast injection was performed through the Impella sheath side port, showing adequate antegrade flow down the ipsilateral common femoral artery/SFA/profunda artery.  - RHC with elevated R sided filling pressures. RA 17 mmHg, RV 45/15 mmHg, PA 42/29 mmHg (mean 34 mmHg), PCWP 25 mmHg.  - Transpulmonary gradient is 9 mmHg.  - Cardiac Output/Index at 6.6/3.0, as calculated by Gregory equation on Levophed infusion and Impella CP support.  - PVR is 1.4 Woods units  - SVR is 550 dyne-sec*cm^-5  - Pulmonary Artery Pulsatility Index (Rafa) is 0.8  - Cardiac Power Output () is 0.91   Assessment/Plan:  - Patient is a 79 y.o. male with a history of recent TURP, complicated by significant post-op bleeding, presents to OSH ED with dyspnea. Transferred to [facility] with worsening shock despite vasopressors. TTE showing EF 25%, severe MR, severe TR. Troponin elevated. Brought to cath lab. Underwent Impella placement/LHC/RHC as outlined above. RCA lesion treated. Unable to cross LCx lesion. 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 At this point, would continue Impella support and aggressive medical mgmt. Has non-culprit lesions that should be treated at a later date as well. Can consider CTS consult, especially given severe valvular disease.  - Patient was given a loading dose of clopidogrel 600 mg PO in the cath lab  - Continue DAPT (aspirin + clopidogrel) for a minimum of 12 months and aspirin indefinitely thereafter  - High intensity statin  - Continue Aggrastat for 4 hours post-procedure  - Arterial sheath remains place; plan is to remove when activated clotting time (ACT) less than 180 seconds. Bedrest for 4 hours after hemostasis is achieved.  - Obtain transthoracic echocardiogram  - Continuous bedrest while Impella in place  - Therapeutic heparin administration per protocol  - Antibiotics per protocol  - Remaining mgmt per primary team/cardiology    ECHO 3.29.25:  Left  Ventricle: The left ventricle is dilated. Normal wall thickness. Severe global hypokinesis present. There is severely reduced systolic function with a visually estimated ejection fraction of 15 - 20%. Grade I diastolic dysfunction.  Right Ventricle: Right ventricular enlargement. Systolic function is normal. TAPSE is 1.83 cm.  Left Atrium: dilated  Mitral Valve: Mildly thickened leaflets. There is moderate regurgitation.  Tricuspid Valve: There is moderate regurgitation.  Pulmonary Artery: There is moderate pulmonary hypertension.  IVC/SVC: Patient is ventilated, cannot use IVC diameter to estimate right atrial pressure.    Carotid US 11.4.24:  The study quality is average.   1-39% stenosis in the proximal right internal carotid artery based on Bluth Criteria.   Antegrade right vertebral artery flow.   Antegrade left vertebral artery flow    ECHO 1.27.22:  The study quality is average.   The left ventricle is normal in size. Global left ventricular systolic function is moderately decreased. The left ventricular ejection fraction is 40-45%. Mild concentric left ventricular hypertrophy is present.  The left ventricle diastolic function is impaired (Grade I) with normal left atrial pressure.  Mild (1+) mitral regurgitation.  The pulmonary artery appears normal.     [facility] 9.26.17:  LM: Normal  LAD: Prox 30% Stenosis  RI: Normal  LCX: Normal  RCA: Distal RCA 50%    Review of patient's allergies indicates:  No Known Allergies  No current facility-administered medications on file prior to encounter.     No current outpatient medications on file prior to encounter.     Review of Systems   Unable to perform ROS: Intubated     Objective:     Vital Signs (Most Recent):  Temp: 99.9 °F (37.7 °C) (04/04/25 1200)  Pulse: 75 (04/04/25 1345)  Resp: 18 (04/04/25 1345)  BP: (!) 89/61 (04/04/25 1345)  SpO2: 98 % (04/04/25 1345) Vital Signs (24h Range):  Temp:  [98.2 °F (36.8 °C)-99.9 °F (37.7 °C)] 99.9 °F (37.7 °C)  Pulse:  [56-83]  75  Resp:  [0-27] 18  SpO2:  [95 %-100 %] 98 %  BP: ()/(55-78) 89/61  Arterial Line BP: ()/(50-73) 119/58   Weight: 106.1 kg (233 lb 14.5 oz)  Body mass index is 32.64 kg/m².  SpO2: 98 %       Intake/Output Summary (Last 24 hours) at 4/4/2025 1429  Last data filed at 4/4/2025 1200  Gross per 24 hour   Intake 2323.91 ml   Output 2725 ml   Net -401.09 ml     Lines/Drains/Airways       Central Venous Catheter Line  Duration             Pulmonary Artery Catheter Assessment  03/29/25 1300 Femoral Vein Right 6 days    Tunneled Central Line - Triple Lumen 03/29/25 0555 Internal Jugular Right 6 days              Drain  Duration                  Urethral Catheter -- days         NG/OG Tube 03/29/25 1200 18 Fr. Center mouth 6 days              Airway  Duration                  Airway - Non-Surgical 03/29/25 0541 6 days              Line  Duration                  VAD 03/29/25 6 days              Peripheral Intravenous Line  Duration                  Peripheral IV - Single Lumen 03/29/25 0530 18 G Right Antecubital 6 days         Peripheral IV - Single Lumen 03/29/25 0600 20 G Left Hand 6 days         Peripheral IV - Single Lumen 03/29/25 20 G Left Forearm 6 days         Sheath 03/29/25 0913 Right 6 days         Sheath 03/29/25 0927 Left 6 days                  Significant Labs:   Chemistries:   Recent Labs   Lab 03/29/25  0446 03/29/25  1743 04/03/25  0346 04/03/25  1200 04/03/25  2021 04/04/25  0251 04/04/25  1144   *   < > 138 138 139 139 139   K 4.1   < > 4.1 4.0 3.9 3.9 4.0      < > 113* 112* 112* 112* 112*   CO2 19*   < > 21* 21* 22* 22* 19*   BUN 16.2   < > 13.4 14.2 12.2 12.2 12.8   CREATININE 1.34*   < > 0.71* 0.73 0.70* 0.68* 0.70*   CALCIUM 8.0*   < > 7.4* 7.6* 7.6* 7.7* 7.1*   BILITOT 0.3   < > 0.3 0.3 0.3 0.3 0.4   ALKPHOS 58   < > 65 62 62 63 67   ALT 15   < > 18 17 17 17 17   AST 82*   < > 18 16 20 24 27   GLUCOSE 144*   < > 91 93 94 90 92   MG  --    < > 2.20 2.20 2.30 2.20 2.30   PHOS   --    < > 2.5 2.8 2.5 2.6 2.7   TROPONINI 9.443*  --   --   --   --   --   --     < > = values in this interval not displayed.        CBC/Anemia Labs: Coags:    Recent Labs   Lab 04/03/25 2021 04/04/25  0251 04/04/25  1144   WBC 6.88 7.37 6.93   HGB 7.9* 8.2* 8.1*   HCT 24.7* 25.1* 24.8*   PLT 96* 108* 108*   MCV 95.0* 94.7* 95.4*   RDW 16.0 15.8 15.9    Recent Labs   Lab 03/29/25  0446 03/29/25  1415   INR 1.2 1.2   APTT 63.5* 103.5*        Telemetry: SR    Physical Exam  Constitutional:       General: He is not in acute distress.     Appearance: Normal appearance. He is obese. He is ill-appearing.      Comments: Vented/Sedated   HENT:      Head: Normocephalic.      Mouth/Throat:      Mouth: Mucous membranes are moist.   Cardiovascular:      Rate and Rhythm: Normal rate and regular rhythm.      Pulses: Normal pulses.      Heart sounds: Murmur heard.   Pulmonary:      Effort: Pulmonary effort is normal. No respiratory distress.      Comments: Ventilator Associated Breath Sounds  Vent Mode: A/C  Oxygen Concentration (%):  [40] 40  Resp Rate Total:  [16 br/min-18 br/min] 16 br/min  Vt Set:  [500 mL] 500 mL  PEEP/CPAP:  [5 cmH20] 5 cmH20  Mean Airway Pressure:  [9 cvX74-89 cmH20] 10 cmH20  Abdominal:      Palpations: Abdomen is soft.   Skin:     General: Skin is warm.      Comments: L Groin Impella CP, Soft/Flat, No Sign of Bleed/Infection. + Monophasic Dopplerable Pedal Pulses    Neurological:      Comments: Vented/Sedated       Home Medications:   Medications Ordered Prior to Encounter[1]  Current Schedule Inpatient Medications:   aspirin  81 mg Oral Daily    famotidine  20 mg Per NG tube BID    heparin (PORCINE)  39.2 Units/kg Intravenous Once     Continuous Infusions:   fentanyl  0-250 mcg/hr Intravenous Continuous   Stopped at 03/29/25 2030    heparin (porcine) in D5W  0-40 Units/kg/hr Intravenous Continuous 18.8 mL/hr at 04/04/25 0620 18.413 Units/kg/hr at 04/04/25 0620    lactated ringers   Intravenous  Continuous 75 mL/hr at 04/04/25 1132 New Bag at 04/04/25 1132    NORepinephrine bitartrate-D5W  0-3 mcg/kg/min Intravenous Continuous        propofoL  0-50 mcg/kg/min Intravenous Continuous 30.6 mL/hr at 04/04/25 1130 50 mcg/kg/min at 04/04/25 1130    sodium bicarbonate 25 mEq in D5W 1,000 mL Purge Solution for Impella   Impella Device Continuous   New Bag at 04/01/25 0824    tirofiban-0.9% sodium chloride 12.5 mg/250ml    Continuous PRN   Stopped at 03/29/25 1845     Assessment:   Cardiogenic Shock requiring Mechanical and Chemical Support    - PT has NOT Tolerated Impella Wean x 2 in the Last few Days (4.4.25)    - Impella/Pressors   MVCAD    - s/p LHC (3.29.25) -  Left Main has mild diffuse disease. Mid Left Anterior Descending has serial lesions, up to 70-80% in severity. Prox Left Circumflex is 100% occluded. Unable to cross lesion despite wire escalation and microcatheter support. Mid Right Coronary Artery is 100% occluded. The lesion was successfully treated with overlapping SYNERGY XD 4.0X20MM, SYNERGY XD 3.0X48MM, and SYNERGY XD 2.22H48OE drug-eluting stents. Post-dilatation was performed using a SYNERGY XD 4.0X20MM angioplasty balloon inflated to 12 gretel. Following intervention there was 0% residual stenosis and PATTY grade 3 flow in the distal vessel.  NSTEMI Type I (Non-Anterior Wall MI)    - Trponin > 9  Cardiopulmonary Arrest    - Initial Rhythm PEA - Witnessed in ER Arrest/ROSC  ICMO/EF 15-20%     - ECHO (3.29.25) - LVEF 15-20%, Grade I DD   Acute Hypoxemic Respiratory Failure requiring Intubation/Ventilation   ST- Now SR   Hypotension requiring Pressors    - Hx of HTN  BPH s/p TURP   Lactic Acidosis - Resolved   Leukocytosis - Resolved   Anemia requiring Transfusions - Stable   Recent BPH Diagnosis s/p TURP  CAD/Nonobstructive (2017)  JACY/R Sided   PAD/Nonobstructive   Transaminitis - Resolved   Obesity  GERD  Tobacco Use  No Hx of GIB     Plan:   Heparin Drip per Protocol for Tx of NSTEMI/Impella  Thrombosis Prophylaxis   Long Discussion with Family/CV Surgery - No Plans for CABG  Recommend Palliative Care Consult for Possible Withdrawal of Care vs Hospice   Wean Pressors for MAP > 65mmHg   AM VBG/ABG  Prognosis Poor/GRIM  Will Continue to Follow   Labs in AM: CBC, CMP and Mg    Hasmukh Sandoval, ANP  Cardiology  [facility]     I agree with the findings of the complexity of problems addressed and take responsibility for the plan's risks and complications. I approved the plan documented by Hasmukh Sandoval NP.            [1]   No current facility-administered medications on file prior to encounter.     No current outpatient medications on file prior to encounter.

## 2025-04-04 NOTE — PROGRESS NOTES
CT SURGERY PROGRESS NOTE  Jon Conley  79 y.o.  1945    Patients Procedure: Procedure(s) (LRB):  CATHETERIZATION, HEART, BOTH LEFT AND RIGHT (N/A)    Subjective  Interval History:   HISTORY OF PRESENT ILLNESS:  The patient is a 79-year-old male with history of   coronary artery disease, hypertension, and hyperlipidemia.  He presented to the   hospital with cardiogenic shock.  Impella was placed and intervention on the   right coronary artery was performed.  He still has LAD and circumflex disease.    His EF was severely depressed with ejection fraction of less than 30%.  I was   consulted for possible intervention.     PAST MEDICAL HISTORY:  Very positive for coronary artery disease, hypertension,   hyperlipidemia, GERD, peripheral vascular disease.     MEDICATIONS:  Per MAR, he received 600 mg of Plavix.  He is currently on daily   Plavix.     ALLERGIES:  NKDA.      FAMILY HISTORY:  Negative for heart disease.     SOCIAL HISTORY:  He does not smoke.  No illicit drug use.    ROS    Medication List  Infusions   fentanyl  0-250 mcg/hr Intravenous Continuous   Stopped at 03/29/25 2030    heparin (porcine) in D5W  0-40 Units/kg/hr Intravenous Continuous 18.8 mL/hr at 04/04/25 0620 18.413 Units/kg/hr at 04/04/25 0620    lactated ringers   Intravenous Continuous 75 mL/hr at 04/04/25 0620 Rate Verify at 04/04/25 0620    NORepinephrine bitartrate-D5W  0-3 mcg/kg/min Intravenous Continuous   Stopped at 04/03/25 0116    propofoL  0-50 mcg/kg/min Intravenous Continuous 12.7 mL/hr at 04/04/25 0620 20 mcg/kg/min at 04/04/25 0620    sodium bicarbonate 25 mEq in D5W 1,000 mL Purge Solution for Impella   Impella Device Continuous   New Bag at 04/01/25 0824    tirofiban-0.9% sodium chloride 12.5 mg/250ml    Continuous PRN   Stopped at 03/29/25 1845     Scheduled   aspirin  81 mg Oral Daily    famotidine  20 mg Per NG tube BID    heparin (PORCINE)  39.2 Units/kg Intravenous Once       Objective:  Recent Vitals:  Temp:   [98.2 °F (36.8 °C)-98.7 °F (37.1 °C)] 98.7 °F (37.1 °C)  Pulse:  [56-83] 77  Resp:  [0-23] 16  SpO2:  [95 %-100 %] 99 %  BP: ()/(51-78) 98/57  Arterial Line BP: ()/(34-73) 119/58    Physical Exam     I/O last 24 hrs:  Intake/Output - Last 3 Shifts         04/02 0700  04/03 0659 04/03 0700  04/04 0659 04/04 0700  04/05 0659    P.O.       I.V. (mL/kg) 2041.1 (19.2) 2606.4 (24.6)     NG/GT  160     IV Piggyback 50 1900     Total Intake(mL/kg) 2091.1 (19.7) 4666.4 (44)     Urine (mL/kg/hr) 2535 (1) 3290 (1.3)     Stool  0     Total Output 2535 3290     Net -443.9 +1376.4            Stool Occurrence  0 x             Labs  BMP:   Recent Labs   Lab 04/04/25  0251      K 3.9   *   CO2 22*   BUN 12.2   CREATININE 0.68*   CALCIUM 7.7*   MG 2.20     CBC:   Recent Labs   Lab 04/04/25  0251   WBC 7.37   RBC 2.65*   HGB 8.2*   HCT 25.1*   *   MCV 94.7*   MCH 30.9   MCHC 32.7*     CMP:   Recent Labs   Lab 04/04/25  0251   CALCIUM 7.7*   ALBUMIN 2.0*      K 3.9   CO2 22*   *   BUN 12.2   CREATININE 0.68*   ALKPHOS 63   ALT 17   AST 24   BILITOT 0.3         Imaging:   CXR: X-Ray Chest 1 View  Result Date: 4/2/2025  Some improvement of the pulmonary vascular congestion and cardiac decompensation. Support catheters remain in place no other interval change Electronically signed by: Mt Beal Date:    04/02/2025 Time:    04:54    ECHO: I have reviewed all results within the past 24 hours and my personal findings are:            ASSESSMENT/PLAN:    Increased pressors and overall worsening situation yesterday   Situation discussed with family per Dr Gilmore   CABG for today delayed  Ongoing eval in progress    Case and plan of care discussed with MD Lorne Walker, MARIO

## 2025-04-05 NOTE — PROGRESS NOTES
CT SURGERY PROGRESS NOTE  Jon Conley  79 y.o.  1945    Patients Procedure: Procedure(s) (LRB):  CATHETERIZATION, HEART, BOTH LEFT AND RIGHT (N/A)    Subjective  Interval History:   HISTORY OF PRESENT ILLNESS:  The patient is a 79-year-old male with history of   coronary artery disease, hypertension, and hyperlipidemia.  He presented to the   hospital with cardiogenic shock.  Impella was placed and intervention on the   right coronary artery was performed.  He still has LAD and circumflex disease.    His EF was severely depressed with ejection fraction of less than 30%.  I was   consulted for possible intervention.     PAST MEDICAL HISTORY:  Very positive for coronary artery disease, hypertension,   hyperlipidemia, GERD, peripheral vascular disease.     MEDICATIONS:  Per MAR, he received 600 mg of Plavix.  He is currently on daily   Plavix.     ALLERGIES:  NKDA.      FAMILY HISTORY:  Negative for heart disease.     SOCIAL HISTORY:  He does not smoke.  No illicit drug use.    ROS    Medication List  Infusions   fentanyl  0-250 mcg/hr Intravenous Continuous   Stopped at 03/29/25 2030    heparin (porcine) in D5W  0-40 Units/kg/hr Intravenous Continuous 18.8 mL/hr at 04/04/25 1738 18.41 Units/kg/hr at 04/04/25 1738    lactated ringers   Intravenous Continuous 75 mL/hr at 04/04/25 1213 Rate Change at 04/04/25 1213    NORepinephrine bitartrate-D5W  0-3 mcg/kg/min Intravenous Continuous 3.8 mL/hr at 04/04/25 1601 0.02 mcg/kg/min at 04/04/25 1601    propofoL  0-50 mcg/kg/min Intravenous Continuous 24.5 mL/hr at 04/05/25 0639 40 mcg/kg/min at 04/05/25 0639    sodium bicarbonate 25 mEq in D5W 1,000 mL Purge Solution for Impella   Impella Device Continuous   New Bag at 04/01/25 0824    tirofiban-0.9% sodium chloride 12.5 mg/250ml    Continuous PRN   Stopped at 03/29/25 1845     Scheduled   aspirin  81 mg Oral Daily    famotidine  20 mg Per NG tube BID    heparin (PORCINE)  39.2 Units/kg Intravenous Once        Objective:  Recent Vitals:  Temp:  [98.6 °F (37 °C)-99.9 °F (37.7 °C)] 98.6 °F (37 °C)  Pulse:  [69-83] 73  Resp:  [9-29] 23  SpO2:  [96 %-100 %] 99 %  BP: ()/(52-74) 100/66  Arterial Line BP: ()/() 123/63    Physical Exam     I/O last 24 hrs:  Intake/Output - Last 3 Shifts         04/03 0700  04/04 0659 04/04 0700 04/05 0659 04/05 0700  04/06 0659    I.V. (mL/kg) 2606.4 (24.6) 1230 (11.6)     NG/ 100     IV Piggyback 1900      Total Intake(mL/kg) 4666.4 (44) 1330 (12.5)     Urine (mL/kg/hr) 3290 (1.3) 1075 (0.4)     Stool 0      Total Output 3290 1075     Net +1376.4 +255            Stool Occurrence 0 x              Labs  BMP:   Recent Labs   Lab 04/05/25  0320      K 3.8   *   CO2 19*   BUN 11.6   CREATININE 0.72   CALCIUM 7.7*   MG 2.20     CBC:   Recent Labs   Lab 04/05/25  0320   WBC 4.87   RBC 2.57*   HGB 7.8*   HCT 24.5*   *   MCV 95.3*   MCH 30.4   MCHC 31.8*     CMP:   Recent Labs   Lab 04/05/25  0320   CALCIUM 7.7*   ALBUMIN 2.0*      K 3.8   CO2 19*   *   BUN 11.6   CREATININE 0.72   ALKPHOS 65   ALT 15   AST 23   BILITOT 0.3         Imaging:   CXR: X-Ray Chest 1 View  Result Date: 4/2/2025  Some improvement of the pulmonary vascular congestion and cardiac decompensation. Support catheters remain in place no other interval change Electronically signed by: Mt Beal Date:    04/02/2025 Time:    04:54    ECHO: I have reviewed all results within the past 24 hours and my personal findings are:            ASSESSMENT/PLAN:    Increased pressors and overall worsening situation yesterday   Situation discussed with family and Dr Gurdin/ [facility] per Dr Gilmore   CABG yesterday cancelled and no plans for surgery at this time   If situation improves will re-eval     Case and plan of care discussed with MD Lorne Walker PA-C

## 2025-04-05 NOTE — PROGRESS NOTES
[facility]    Cardiology  Progress Note    Patient Name: Jon Conley  MRN: [id number]  Admission Date: 3/29/2025  Hospital Length of Stay: 7 days  Code Status: DNR   Attending Provider: Davi Harp Jr., MD,*   Consulting Provider: NORM Sierra  Primary Care Physician: Patrick Reece Jr., MD  Principal Problem:<principal problem not specified>    Patient information was obtained from past medical records and ER records.     Subjective:     Chief Complaint/Reason for Consult: Cardiac Arrest     HPI: Mr. Conley is a 80 y/o male who is known to [facility], Dr. Wang. The patient presented to [facility] on 3.29.25 with c/o SOB. He initially presented to [facility] in [place] with SOB. He was found to have Tachycardia, Hypotension, Anemia (7.4/24.6), Troponin 9.443, .0, BUN/Crea 16.2/1.34, Lactic Acid Level 4.3, Na 133, WBC 14.03, H&H 7.3/23.0, WBC 14.03, , PT/INR 14.9/1.2. He was admitted to , however, he sustained a witnessed PEA Arrest in the ER and has ROSC per ER MD. [facility] was consulted for NSETMI and Arrest.     3.30.25: NAD. Impella L Groin. CVP 7, P7. Levophed 0.32mcg/kg/min, Heparin Drip per Protocol, Amiodarone 0.5mg/min  3.31.25: NAD. L Groin Impella P7, Amiodarone 0.5mg/min, Heparin Drip per Protocol, Levophed 0.18mcg/kg/min, H&H 8.8/25.8, AST/ALT 79/45  4.1.25: NAD noted. L groin Impella in place, remains at P7. Remains on levophed. LFTs continue to improve.  4.2.25: NAD. L Groin Impella Device P6. Heparin Infusion per Protocol. Remains on 0.06mcg/kg/min.   4.3.25: NAD. Vented/Sedated. L Groin Impella Device. P3, Heparin Infusion per Protocol. Off Pressors. Attempting Wean of Impella. H&H 8.2/26.2,   4.4.25: NAD. Vented/Sedated. Heparin Drip per Protocol. H&H 8.1/95.4,   4.5.25: NAD. Vented/Sedated. Heparin Drip per Protocol. Levophed 0.04mcg/kg/min. H&H 7.9/23.8,  Impella P6    PMH: BPH/Urinary Obstruction,  CAD/Nonobstructive, PAD, R JACY/Mild, HTN, Obesity, GERD, Tobacco Use   PSH: TURP, Angiogram, Bilateral Knee Replacements   Family History: Denies Family History of Heart Disease  Social History: Denies Illicit Drug, ETOH and Tobacco Use     Previous Cardiac Diagnostics:   ECHO 4.3.25:  Limited study to check impella placement.  Left Ventricle: There is severely reduced systolic function with a visually estimated ejection fraction of 15 - 20%.  Impella distance from aortic valve annulus appears appropriate.    ECHO 4.1.25:  Limited study to check impella placement.  Left Ventricle: There is severely reduced systolic function with a visually estimated ejection fraction of 15 - 20%.  Impella distance from aortic valve annulus appears appropriate.    ECHO 3.30.25:  Left Ventricle: There is severely reduced systolic function with a visually estimated ejection fraction of less than 30%.  There appears to be at least moderate to severe mitral regurgitation.   Impella is positioned with inflow cannula 3.8 cm from aortic valve annulus.    [facility] 3.29.25:  Findings:  - Successful placement of Impella CP device from left femoral approach.  - There is severe multi-vessel coronary artery disease.  - Left Main has mild diffuse disease.  - Mid Left Anterior Descending has serial lesions, up to 70-80% in severity.   - Prox Left Circumflex is 100% occluded. Unable to cross lesion despite wire escalation and microcatheter support.  - Mid Right Coronary Artery is 100% occluded. The lesion was successfully treated with overlapping SYNERGY XD 4.0X20MM, SYNERGY XD 3.0X48MM, and SYNERGY XD 2.34H46XO drug-eluting stents. Post-dilatation was performed using a SYNERGY XD 4.0X20MM angioplasty balloon inflated to 12 gretel. Following intervention there was 0% residual stenosis and PATTY grade 3 flow in the distal vessel.  - Intravascular Ultrasound (IVUS) was performed prior to intervention to further characterize the lesion, as well as  post-stent deployment to ensure optimal stent apposition and expansion.  - Aorto-iliac angiogram revealed moderate disease of R ileofemoral system.  - At the conclusion of the case, contrast injection was performed through the Impella sheath side port, showing adequate antegrade flow down the ipsilateral common femoral artery/SFA/profunda artery.  - RHC with elevated R sided filling pressures. RA 17 mmHg, RV 45/15 mmHg, PA 42/29 mmHg (mean 34 mmHg), PCWP 25 mmHg.  - Transpulmonary gradient is 9 mmHg.  - Cardiac Output/Index at 6.6/3.0, as calculated by Gregory equation on Levophed infusion and Impella CP support.  - PVR is 1.4 Woods units  - SVR is 550 dyne-sec*cm^-5  - Pulmonary Artery Pulsatility Index (Rafa) is 0.8  - Cardiac Power Output () is 0.91   Assessment/Plan:  - Patient is a 79 y.o. male with a history of recent TURP, complicated by significant post-op bleeding, presents to OSH ED with dyspnea. Transferred to [facility] with worsening shock despite vasopressors. TTE showing EF 25%, severe MR, severe TR. Troponin elevated. Brought to cath lab. Underwent Impella placement/LHC/RHC as outlined above. RCA lesion treated. Unable to cross LCx lesion. 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 At this point, would continue Impella support and aggressive medical mgmt. Has non-culprit lesions that should be treated at a later date as well. Can consider CTS consult, especially given severe valvular disease.  - Patient was given a loading dose of clopidogrel 600 mg PO in the cath lab  - Continue DAPT (aspirin + clopidogrel) for a minimum of 12 months and aspirin indefinitely thereafter  - High intensity statin  - Continue Aggrastat for 4 hours post-procedure  - Arterial sheath remains place; plan is to remove when activated clotting time (ACT) less than 180 seconds. Bedrest for 4 hours after hemostasis is achieved.  - Obtain transthoracic echocardiogram  - Continuous bedrest while Impella in place  - Therapeutic heparin  administration per protocol  - Antibiotics per protocol  - Remaining mgmt per primary team/cardiology    ECHO 3.29.25:  Left Ventricle: The left ventricle is dilated. Normal wall thickness. Severe global hypokinesis present. There is severely reduced systolic function with a visually estimated ejection fraction of 15 - 20%. Grade I diastolic dysfunction.  Right Ventricle: Right ventricular enlargement. Systolic function is normal. TAPSE is 1.83 cm.  Left Atrium: dilated  Mitral Valve: Mildly thickened leaflets. There is moderate regurgitation.  Tricuspid Valve: There is moderate regurgitation.  Pulmonary Artery: There is moderate pulmonary hypertension.  IVC/SVC: Patient is ventilated, cannot use IVC diameter to estimate right atrial pressure.    Carotid US 11.4.24:  The study quality is average.   1-39% stenosis in the proximal right internal carotid artery based on Bluth Criteria.   Antegrade right vertebral artery flow.   Antegrade left vertebral artery flow    ECHO 1.27.22:  The study quality is average.   The left ventricle is normal in size. Global left ventricular systolic function is moderately decreased. The left ventricular ejection fraction is 40-45%. Mild concentric left ventricular hypertrophy is present.  The left ventricle diastolic function is impaired (Grade I) with normal left atrial pressure.  Mild (1+) mitral regurgitation.  The pulmonary artery appears normal.     [facility] 9.26.17:  LM: Normal  LAD: Prox 30% Stenosis  RI: Normal  LCX: Normal  RCA: Distal RCA 50%    Review of patient's allergies indicates:  No Known Allergies  No current facility-administered medications on file prior to encounter.     No current outpatient medications on file prior to encounter.     Review of Systems   Unable to perform ROS: Intubated     Objective:     Vital Signs (Most Recent):  Temp: 98.8 °F (37.1 °C) (04/05/25 1200)  Pulse: 66 (04/05/25 1400)  Resp: 16 (04/05/25 1400)  BP: 111/69 (04/05/25 1346)  SpO2: 100 %  (04/05/25 1400) Vital Signs (24h Range):  Temp:  [98.6 °F (37 °C)-99.3 °F (37.4 °C)] 98.8 °F (37.1 °C)  Pulse:  [63-80] 66  Resp:  [9-29] 16  SpO2:  [98 %-100 %] 100 %  BP: ()/(52-73) 111/69  Arterial Line BP: ()/() 146/68   Weight: 106.1 kg (233 lb 14.5 oz)  Body mass index is 32.64 kg/m².  SpO2: 100 %       Intake/Output Summary (Last 24 hours) at 4/5/2025 1443  Last data filed at 4/5/2025 1414  Gross per 24 hour   Intake 716.03 ml   Output 1570 ml   Net -853.97 ml     Lines/Drains/Airways       Central Venous Catheter Line  Duration             Pulmonary Artery Catheter Assessment  03/29/25 1300 Femoral Vein Right 7 days    Tunneled Central Line - Triple Lumen 03/29/25 0555 Internal Jugular Right 7 days              Drain  Duration                  Urethral Catheter -- days         NG/OG Tube 03/29/25 1200 18 Fr. Center mouth 7 days              Airway  Duration                  Airway - Non-Surgical 03/29/25 0541 7 days              Line  Duration                  VAD 03/29/25 7 days              Peripheral Intravenous Line  Duration                  Peripheral IV - Single Lumen 03/29/25 0530 18 G Right Antecubital 7 days         Peripheral IV - Single Lumen 03/29/25 0600 20 G Left Hand 7 days         Peripheral IV - Single Lumen 03/29/25 20 G Left Forearm 7 days         Sheath 03/29/25 0913 Right 7 days         Sheath 03/29/25 0927 Left 7 days                  Significant Labs:   Chemistries:   Recent Labs   Lab 04/04/25  0251 04/04/25  1144 04/04/25  2034 04/05/25  0320 04/05/25  1222    139 137 137 138   K 3.9 4.0 3.9 3.8 3.9   * 112* 112* 110* 113*   CO2 22* 19* 19* 19* 20*   BUN 12.2 12.8 12.1 11.6 10.6   CREATININE 0.68* 0.70* 0.75 0.72 0.70*   CALCIUM 7.7* 7.1* 7.7* 7.7* 7.8*   BILITOT 0.3 0.4 0.3 0.3 0.3   ALKPHOS 63 67 65 65 68   ALT 17 17 15 15 19   AST 24 27 24 23 28   GLUCOSE 90 92 94 97 118*   MG 2.20 2.30 2.20 2.20 2.10   PHOS 2.6 2.7 2.8 2.8 3.0        CBC/Anemia  "Labs: Coags:    Recent Labs   Lab 04/04/25 2034 04/05/25  0320 04/05/25  1223   WBC 6.17 4.87 5.43   HGB 8.0* 7.8* 7.9*   HCT 24.7* 24.5* 23.8*   * 112* 118*   MCV 96.1* 95.3* 93.3   RDW 15.9 15.9 15.8    No results for input(s): "PT", "INR", "APTT" in the last 168 hours.       Telemetry: SR    Physical Exam  Constitutional:       General: He is not in acute distress.     Appearance: Normal appearance. He is obese. He is ill-appearing.      Comments: Vented/Sedated   HENT:      Head: Normocephalic.      Mouth/Throat:      Mouth: Mucous membranes are moist.   Cardiovascular:      Rate and Rhythm: Normal rate and regular rhythm.      Pulses: Normal pulses.      Heart sounds: Murmur heard.   Pulmonary:      Effort: Pulmonary effort is normal. No respiratory distress.      Comments: Ventilator Associated Breath Sounds  Vent Mode: VOLUME A/C  Oxygen Concentration (%):  [40] 40  Resp Rate Total:  [16 br/min-17 br/min] 16 br/min  Vt Set:  [500 mL] 500 mL  PEEP/CPAP:  [5 cmH20] 5 cmH20  Mean Airway Pressure:  [9 llW22-53 cmH20] 19 cmH20  Abdominal:      Palpations: Abdomen is soft.   Skin:     General: Skin is warm and dry.      Comments: L Groin Impella CP, Soft/Flat, No Sign of Bleed/Infection. + Monophasic Dopplerable Pedal Pulses    Neurological:      Comments: Vented/Sedated       Home Medications:   Medications Ordered Prior to Encounter[1]  Current Schedule Inpatient Medications:   aspirin  81 mg Oral Daily    famotidine  20 mg Per NG tube BID    heparin (PORCINE)  39.2 Units/kg Intravenous Once     Continuous Infusions:   fentanyl  0-250 mcg/hr Intravenous Continuous   Stopped at 03/29/25 2030    heparin (porcine) in D5W  0-40 Units/kg/hr Intravenous Continuous 18.8 mL/hr at 04/05/25 1413 18.41 Units/kg/hr at 04/05/25 1413    lactated ringers   Intravenous Continuous 75 mL/hr at 04/04/25 1213 Rate Change at 04/04/25 1213    NORepinephrine bitartrate-D5W  0-3 mcg/kg/min Intravenous Continuous 3.8 mL/hr at " 04/04/25 1601 0.02 mcg/kg/min at 04/04/25 1601    propofoL  0-50 mcg/kg/min Intravenous Continuous 24.5 mL/hr at 04/05/25 1414 40 mcg/kg/min at 04/05/25 1414    sodium bicarbonate 25 mEq in D5W 1,000 mL Purge Solution for Impella   Impella Device Continuous   New Bag at 04/01/25 0824    tirofiban-0.9% sodium chloride 12.5 mg/250ml    Continuous PRN   Stopped at 03/29/25 1845     Assessment:   Cardiogenic Shock requiring Mechanical and Chemical Support    - PT has NOT Tolerated Impella Wean x 2 in the Last few Days (4.4.25)    - Impella/Pressors   MVCAD    - s/p LHC (3.29.25) -  Left Main has mild diffuse disease. Mid Left Anterior Descending has serial lesions, up to 70-80% in severity. Prox Left Circumflex is 100% occluded. Unable to cross lesion despite wire escalation and microcatheter support. Mid Right Coronary Artery is 100% occluded. The lesion was successfully treated with overlapping SYNERGY XD 4.0X20MM, SYNERGY XD 3.0X48MM, and SYNERGY XD 2.16Z66NO drug-eluting stents. Post-dilatation was performed using a SYNERGY XD 4.0X20MM angioplasty balloon inflated to 12 gretel. Following intervention there was 0% residual stenosis and PATTY grade 3 flow in the distal vessel.  NSTEMI Type I (Non-Anterior Wall MI)    - Trponin > 9  Cardiopulmonary Arrest    - Initial Rhythm PEA - Witnessed in ER Arrest/ROSC  ICMO/EF 15-20%     - ECHO (3.29.25) - LVEF 15-20%, Grade I DD   Acute Hypoxemic Respiratory Failure requiring Intubation/Ventilation   ST- Now SR   Hypotension requiring Pressors    - Hx of HTN  BPH s/p TURP   Lactic Acidosis - Resolved   Leukocytosis - Resolved   Anemia requiring Transfusions - Stable   Recent BPH Diagnosis s/p TURP  CAD/Nonobstructive (2017)  JACY/R Sided   PAD/Nonobstructive   Transaminitis - Resolved   Obesity  GERD  Tobacco Use  No Hx of GIB     Plan:   Heparin Drip per Protocol for Tx of NSTEMI/Impella Thrombosis Prophylaxis   Wean Pressors for MAP > 65mmHg   Prognosis Poor/GRIM  Plans for  Withdrawal of Care Today per Family Timing  Agree with Hospice Care and we will keep the PT in our Thoughts and Prayers.  Will Continue to Follow     NORM Sierra  Cardiology  [facility]     I agree with the findings of the complexity of problems addressed and take responsibility for the plan's risks and complications. I approved the plan documented by Hasmukh Sandoval NP.            [1]   No current facility-administered medications on file prior to encounter.     No current outpatient medications on file prior to encounter.

## 2025-04-05 NOTE — PROGRESS NOTES
Pulmonary & Critical Care Medicine   Progress Note      Presenting History/HPI:    The patient is a 79-year-old male who has a history of coronary artery disease, hypertension, hyperlipidemia, peripheral arterial disease, and GE reflux disease.  He also has a history of recent TURP in [place], complicated by postoperative by gross hematuria requiring placement of a three-way irrigation bladder catheter last week. This was left in place at time of discharge.  He presented to [facility] ER early a.m. of 03/29/2025 with shortness of breath, noted tachycardia/hypotensive, and marked elevation of high sensitivity troponin at 13,821. He was transferred to [facility] ER, suffering PEA arrest requiring intubation and short CPR with ROSC.  LHC was performed, noting multivessel coronary artery disease with 100% occluded proximal left circumflex, unable to cross with wire.  Mid RCA also noted 100% occluded, treated with PTCA/TAD.  He was noted to have elevated right-sided filling pressures with PCW 25 mm Hg.  Impella catheter was placed, and was left in place postprocedure.  He was admitted to ICU for ongoing medical care.     Intubation/mechanical ventilation 03/29/2025  Impella placement 03/29/2025      Interval History:  -no major issues or changes overnight   -urine output of 1 L over the last 24 hours  -patient remains on mechanical ventilatory support still on Impella set to P-6   -sedated on propofol currently on heparin drip ; on 0.04 of Levophed today  -Discussion with Cardiology and CV surgery yesterday who stated that the patient is not an adequate surgical candidate for CABG due to multiple comorbidities      Scheduled Medications:    aspirin  81 mg Oral Daily    famotidine  20 mg Per NG tube BID    heparin (PORCINE)  39.2 Units/kg Intravenous Once       PRN Medications:     Current Facility-Administered Medications:     0.9%  NaCl infusion (for blood administration), , Intravenous, Q24H PRN     0.9%  NaCl infusion (for blood administration), , Intravenous, Q24H PRN    0.9%  NaCl infusion (for blood administration), , Intravenous, Q24H PRN    0.9%  NaCl infusion (for blood administration), , Intravenous, Q24H PRN    acetaminophen, 650 mg, Oral, Q4H PRN    calcium gluconate IVPB, 1 g, Intravenous, PRN    calcium gluconate IVPB, 2 g, Intravenous, PRN    calcium gluconate IVPB, 3 g, Intravenous, PRN    dextrose 50%, 12.5 g, Intravenous, PRN    dextrose 50%, 25 g, Intravenous, PRN    glucagon (human recombinant), 1 mg, Intramuscular, PRN    glucose, 16 g, Oral, PRN    glucose, 24 g, Oral, PRN    heparin (PORCINE), 39.2 Units/kg, Intravenous, PRN    heparin (PORCINE), 30 Units/kg, Intravenous, PRN    magnesium sulfate 2 g IVPB, 2 g, Intravenous, PRN    magnesium sulfate 2 g IVPB, 2 g, Intravenous, PRN    ondansetron, 8 mg, Oral, Q8H PRN    potassium chloride in water, 20 mEq, Intravenous, PRN **AND** potassium chloride in water, 40 mEq, Intravenous, PRN **AND** potassium chloride in water, 60 mEq, Intravenous, PRN    sodium phosphate 15 mmol in D5W 250 mL IVPB, 15 mmol, Intravenous, PRN    sodium phosphate 20.1 mmol in D5W 250 mL IVPB, 20.1 mmol, Intravenous, PRN    sodium phosphate 30 mmol in D5W 250 mL IVPB, 30 mmol, Intravenous, PRN    tirofiban-0.9% sodium chloride 12.5 mg/250ml, , , Continuous PRN      Infusions:     fentanyl  0-250 mcg/hr Intravenous Continuous   Stopped at 03/29/25 2030    heparin (porcine) in D5W  0-40 Units/kg/hr Intravenous Continuous 18.8 mL/hr at 04/04/25 1738 18.41 Units/kg/hr at 04/04/25 1738    lactated ringers   Intravenous Continuous 75 mL/hr at 04/04/25 1213 Rate Change at 04/04/25 1213    NORepinephrine bitartrate-D5W  0-3 mcg/kg/min Intravenous Continuous 3.8 mL/hr at 04/04/25 1601 0.02 mcg/kg/min at 04/04/25 1601    propofoL  0-50 mcg/kg/min Intravenous Continuous 24.5 mL/hr at 04/05/25 0639 40 mcg/kg/min at 04/05/25 0639    sodium bicarbonate 25 mEq in D5W 1,000 mL Purge  Solution for Impella   Impella Device Continuous   New Bag at 04/01/25 0824    tirofiban-0.9% sodium chloride 12.5 mg/250ml    Continuous PRN   Stopped at 03/29/25 1845         Fluid Balance:     Intake/Output Summary (Last 24 hours) at 4/5/2025 0657  Last data filed at 4/4/2025 1900  Gross per 24 hour   Intake 1330.03 ml   Output 1075 ml   Net 255.03 ml         Vital Signs:   Vitals:    04/05/25 0600   BP: 100/66   Pulse: 73   Resp: (!) 23   Temp:          Physical Exam  Vitals and nursing note reviewed.   Constitutional:       General: He is not in acute distress.     Appearance: Normal appearance. He is toxic-appearing.   HENT:      Head: Normocephalic and atraumatic.      Right Ear: External ear normal.      Left Ear: External ear normal.      Nose: Nose normal.      Mouth/Throat:      Pharynx: No posterior oropharyngeal erythema.      Comments: Unable to visualize posterior oropharynx secondary to endotracheal tube  Eyes:      General: No scleral icterus.     Conjunctiva/sclera: Conjunctivae normal.      Pupils: Pupils are equal, round, and reactive to light.   Neck:      Vascular: No carotid bruit.   Cardiovascular:      Rate and Rhythm: Normal rate and regular rhythm.      Pulses: Normal pulses.      Heart sounds: Normal heart sounds. No murmur heard.     No friction rub. No gallop.   Pulmonary:      Effort: Pulmonary effort is normal. No respiratory distress.      Breath sounds: Rhonchi present. No wheezing or rales.      Comments: Intubated, on mechanical ventilation  Abdominal:      General: Abdomen is flat. Bowel sounds are normal. There is no distension.      Palpations: Abdomen is soft.      Tenderness: There is no abdominal tenderness. There is no guarding or rebound.   Musculoskeletal:         General: No swelling or deformity.      Cervical back: Neck supple. No rigidity or tenderness.   Skin:     General: Skin is warm and dry.      Capillary Refill: Capillary refill takes less than 2 seconds.       Findings: No erythema or rash.   Neurological:      Comments: Unable to fully assess neurologic status and orientation secondary to patient being intubated, sedated and nonverbal   Psychiatric:      Comments: Unable to fully assess as patient is intubated and nonverbal           Ventilator Settings  Vent Mode: A/C (04/05/25 0535)  Ventilator Initiated: Yes (03/29/25 0603)  Set Rate: 16 BPM (04/05/25 0535)  Vt Set: 500 mL (04/05/25 0535)  PEEP/CPAP: 5 cmH20 (04/05/25 0535)  Oxygen Concentration (%): 40 (04/05/25 0535)  Peak Airway Pressure: 27 cmH20 (04/05/25 0535)  Total Ve: 9 L/m (04/05/25 0535)  F/VT Ratio<105 (RSBI): (!) 47.88 (04/04/25 1645)      Laboratory Studies:   Recent Labs   Lab 04/05/25  0112   PH 7.440   PCO2 34.0   PO2 <38.0   HCO3 23.1     Recent Labs   Lab 04/05/25  0320   WBC 4.87   RBC 2.57*   HGB 7.8*   HCT 24.5*   *   MCV 95.3*   MCH 30.4   MCHC 31.8*     Recent Labs   Lab 04/05/25  0320   GLUCOSE 97      K 3.8   *   CO2 19*   BUN 11.6   CREATININE 0.72   CALCIUM 7.7*   MG 2.20         Microbiology Data:   Microbiology Results (last 7 days)       Procedure Component Value Units Date/Time    Blood Culture #1 **CANNOT BE ORDERED STAT** [[id number]]  (Normal) Collected: 03/29/25 0446    Order Status: Completed Specimen: Blood from Arm, Left Updated: 04/03/25 1101     Blood Culture No Growth at 5 days    Blood Culture #2 **CANNOT BE ORDERED STAT** [[id number]]  (Normal) Collected: 03/29/25 0454    Order Status: Completed Specimen: Blood from Arm, Right Updated: 04/03/25 1101     Blood Culture No Growth at 5 days              Imaging:   X-Ray Chest 1 View  Narrative: EXAMINATION:  XR CHEST 1 VIEW    CLINICAL HISTORY:  respiratory failure;    TECHNIQUE:  Frontal view(s) of the chest.    COMPARISON:  Radiography 04/03/2025    FINDINGS:  Similar positioning of the endotracheal tube, right IJ catheter, pulmonary arterial catheter and LVAD.  Enteric tube extends below the diaphragm.   Small bilateral pleural effusions with bilateral lower lung atelectasis.  Possible mild pulmonary edema.  No pneumothorax appreciated.  Stable cardiac silhouette.  Impression: Small pleural effusions with possible mild pulmonary edema.    Electronically signed by: Martín Gan  Date:    04/04/2025  Time:    06:02          Assessment and Plan    Assessment:  NSTEMI with cardiogenic shock, ongoing  LHC with PTCA/TAD x3 RCA (03/29/2025), PCWP 25 mm Hg  EF 15% by TTE  Impella placement (03/29/2025), currently P-6  Ischemic cardiomyopathy with severe decreased LVSF, EF 15%, moderate MR  Acute respiratory failure secondary to above, initially with cardiogenic pulmonary edema, currently with good oxygenation and ventilation.  Severe wide anion gap metabolic acidosis of primary lactic acid etiology, resolved   Mild/mod thrombocytopenia, likely related to Impella        Plan:  -titrate mechanical ventilation for ARDS net protocol   -supplement oxygen to maintain saturation 94-96%   -currently on 0.04 of Levophed to keep map greater than 65  -Impella set to P-6 with no plans to wean at this time as Cardiology and CV surgery have evaluated the patient and determine that he is a poor surgical candidate for CABG  -the patient's daughters were at bedside and had a lengthy discussion about overall goals of care they decided to transition to a DNR status and wish to discuss goals of care further they understand that there is no surgical option at this point in time and the patient is dependent on mechanical ventilation and an Impella to maintain his current status  -discussed goals specifically focusing on comfort care and withdrawal of care, explained this in depth and they understand and they wish to discuss further with more family members.  -patient is now DNR status per family's wishes  and will continue current level of care with no plans to escalate at this time      Overall prognosis is very poor    DVT prophylaxis with  heparin drip   GI prophylaxis with Protonix   Keep head of bed elevated greater than 30° if possible for VAP prophylaxis      The patient remains at high risk of decompensation and death and will remain in ICU level care    44 min of critical care time was spent reviewing the patient's chart including medications, radiographs, labs, pertinent cultures and pathology data, other consultant notes/recomendations as well as titration of vasopressors, adjustment of mechanical ventilatory or NIPPV support, as well as discussion of goals of care with nursing staff, respiratory therapy at the bedside and with family at the bedside/via phone.      Kristian Grimm DO  4/5/2025  [employer] IM PGY 3

## 2025-04-05 NOTE — CONSULTS
RN Consult  04\04 I anointed Pt and spent time supporting the family.  I will visit daily.  Fr. MIRNA Whitley

## 2025-04-06 NOTE — PROGRESS NOTES
Pulmonary & Critical Care Medicine   Progress Note      Presenting History/HPI:    The patient is a 79-year-old male who has a history of coronary artery disease, hypertension, hyperlipidemia, peripheral arterial disease, and GE reflux disease.  He also has a history of recent TURP in [place], complicated by postoperative by gross hematuria requiring placement of a three-way irrigation bladder catheter last week. This was left in place at time of discharge.  He presented to [facility] ER early a.m. of 03/29/2025 with shortness of breath, noted tachycardia/hypotensive, and marked elevation of high sensitivity troponin at 13,821. He was transferred to [facility] ER, suffering PEA arrest requiring intubation and short CPR with ROSC.  LHC was performed, noting multivessel coronary artery disease with 100% occluded proximal left circumflex, unable to cross with wire.  Mid RCA also noted 100% occluded, treated with PTCA/TAD.  He was noted to have elevated right-sided filling pressures with PCW 25 mm Hg.  Impella catheter was placed, and was left in place postprocedure.  He was admitted to ICU for ongoing medical care.     Intubation/mechanical ventilation 03/29/2025  Impella placement 03/29/2025      Interval History:  -no major issues or changes overnight   -urine output of 1.6 L over the past 24 hours   -patient remains on mechanical ventilatory support still on Impella set to P-4 since 4/5  -sedated on propofol currently on heparin drip   -I discussed the case with Cardiology and they have discussed with CV surgery that the patient is not an adequate surgical candidate for CABG due to multiple comorbidities  -I discussed again this morning with Dr. Herring and he planned on contacting [facility] to evaluate the patient for possible transfer for evaluation for high-risk cardiovascular surgery  -hemoglobin down to 7.4 this morning, platelets down to 129      Scheduled Medications:    aspirin   81 mg Oral Daily    famotidine  20 mg Per NG tube BID    heparin (PORCINE)  39.2 Units/kg Intravenous Once       PRN Medications:     Current Facility-Administered Medications:     0.9%  NaCl infusion (for blood administration), , Intravenous, Q24H PRN    0.9%  NaCl infusion (for blood administration), , Intravenous, Q24H PRN    0.9%  NaCl infusion (for blood administration), , Intravenous, Q24H PRN    0.9%  NaCl infusion (for blood administration), , Intravenous, Q24H PRN    0.9%  NaCl infusion (for blood administration), , Intravenous, Q24H PRN    acetaminophen, 650 mg, Oral, Q4H PRN    calcium gluconate IVPB, 1 g, Intravenous, PRN    calcium gluconate IVPB, 2 g, Intravenous, PRN    calcium gluconate IVPB, 3 g, Intravenous, PRN    dextrose 50%, 12.5 g, Intravenous, PRN    dextrose 50%, 25 g, Intravenous, PRN    glucagon (human recombinant), 1 mg, Intramuscular, PRN    glucose, 16 g, Oral, PRN    glucose, 24 g, Oral, PRN    heparin (PORCINE), 39.2 Units/kg, Intravenous, PRN    heparin (PORCINE), 30 Units/kg, Intravenous, PRN    magnesium sulfate 2 g IVPB, 2 g, Intravenous, PRN    magnesium sulfate 2 g IVPB, 2 g, Intravenous, PRN    ondansetron, 8 mg, Oral, Q8H PRN    potassium chloride in water, 20 mEq, Intravenous, PRN **AND** potassium chloride in water, 40 mEq, Intravenous, PRN **AND** potassium chloride in water, 60 mEq, Intravenous, PRN    sodium phosphate 15 mmol in D5W 250 mL IVPB, 15 mmol, Intravenous, PRN    sodium phosphate 20.1 mmol in D5W 250 mL IVPB, 20.1 mmol, Intravenous, PRN    sodium phosphate 30 mmol in D5W 250 mL IVPB, 30 mmol, Intravenous, PRN    tirofiban-0.9% sodium chloride 12.5 mg/250ml, , , Continuous PRN      Infusions:     fentanyl  0-250 mcg/hr Intravenous Continuous   Stopped at 03/29/25 2030    heparin (porcine) in D5W  0-40 Units/kg/hr Intravenous Continuous 18.8 mL/hr at 04/06/25 0052 18.413 Units/kg/hr at 04/06/25 0052    lactated ringers   Intravenous Continuous 75 mL/hr at  04/06/25 0603 Rate Verify at 04/06/25 0603    NORepinephrine bitartrate-D5W  0-3 mcg/kg/min Intravenous Continuous   Stopped at 04/06/25 0300    propofoL  0-50 mcg/kg/min Intravenous Continuous 24.5 mL/hr at 04/06/25 1008 40 mcg/kg/min at 04/06/25 1008    sodium bicarbonate 25 mEq in D5W 1,000 mL Purge Solution for Impella   Impella Device Continuous   New Bag at 04/01/25 0824    tirofiban-0.9% sodium chloride 12.5 mg/250ml    Continuous PRN   Stopped at 03/29/25 1845         Fluid Balance:     Intake/Output Summary (Last 24 hours) at 4/6/2025 1315  Last data filed at 4/6/2025 1200  Gross per 24 hour   Intake 3739.56 ml   Output 1275 ml   Net 2464.56 ml         Vital Signs:   Vitals:    04/06/25 1230   BP: 96/61   Pulse: 65   Resp: 20   Temp:          Physical Exam  Vitals and nursing note reviewed.   Constitutional:       General: He is not in acute distress.     Appearance: Normal appearance. He is not toxic-appearing.   HENT:      Head: Normocephalic and atraumatic.      Right Ear: External ear normal.      Left Ear: External ear normal.      Nose: Nose normal.      Mouth/Throat:      Pharynx: No posterior oropharyngeal erythema.      Comments: Unable to visualize posterior oropharynx secondary to endotracheal tube  Eyes:      General: No scleral icterus.     Conjunctiva/sclera: Conjunctivae normal.      Pupils: Pupils are equal, round, and reactive to light.   Neck:      Vascular: No carotid bruit.   Cardiovascular:      Rate and Rhythm: Normal rate and regular rhythm.      Pulses: Normal pulses.      Heart sounds: Normal heart sounds. No murmur heard.     No friction rub. No gallop.   Pulmonary:      Effort: Pulmonary effort is normal. No respiratory distress.      Breath sounds: Rhonchi present. No wheezing or rales.      Comments: Intubated, on mechanical ventilation  Abdominal:      General: Abdomen is flat. Bowel sounds are normal. There is no distension.      Palpations: Abdomen is soft.      Tenderness:  There is no abdominal tenderness. There is no guarding or rebound.   Musculoskeletal:         General: No swelling or deformity.      Cervical back: Neck supple. No rigidity or tenderness.   Skin:     General: Skin is warm and dry.      Capillary Refill: Capillary refill takes less than 2 seconds.      Findings: No erythema or rash.   Neurological:      Comments: Unable to fully assess neurologic status and orientation secondary to patient being intubated, sedated and nonverbal   Psychiatric:      Comments: Unable to fully assess as patient is intubated and nonverbal           Ventilator Settings  Vent Mode: A/C (04/06/25 1042)  Ventilator Initiated: Yes (03/29/25 0603)  Set Rate: 14 BPM (04/06/25 1042)  Vt Set: 500 mL (04/06/25 1042)  PEEP/CPAP: 5 cmH20 (04/06/25 1042)  Oxygen Concentration (%): 40 (04/06/25 1042)  Peak Airway Pressure: 37 cmH20 (04/06/25 1042)  Total Ve: 7.2 L/m (04/06/25 1042)  F/VT Ratio<105 (RSBI): (!) 27.34 (04/06/25 1042)      Laboratory Studies:   Recent Labs   Lab 04/06/25  1227   PH 7.450  7.440   PCO2 26.0  36.0   PO2 <38.0  85.0   HCO3 18.1  24.5     Recent Labs   Lab 04/06/25  1151   WBC 4.54   RBC 2.39*   HGB 7.4*   HCT 22.7*   *   MCV 95.0*   MCH 31.0   MCHC 32.6*     Recent Labs   Lab 04/06/25  1151   GLUCOSE 96      K 3.9   *   CO2 21*   BUN 10.3   CREATININE 0.64*   CALCIUM 7.7*   MG 2.20         Microbiology Data:   Microbiology Results (last 7 days)       Procedure Component Value Units Date/Time    Blood Culture #1 **CANNOT BE ORDERED STAT** [[id number]]  (Normal) Collected: 03/29/25 0446    Order Status: Completed Specimen: Blood from Arm, Left Updated: 04/03/25 1101     Blood Culture No Growth at 5 days    Blood Culture #2 **CANNOT BE ORDERED STAT** [[id number]]  (Normal) Collected: 03/29/25 0454    Order Status: Completed Specimen: Blood from Arm, Right Updated: 04/03/25 1101     Blood Culture No Growth at 5 days              Imaging:   X-Ray Chest 1  View  Narrative: EXAMINATION:  XR CHEST 1 VIEW    CLINICAL HISTORY:  respiratory failure;    TECHNIQUE:  Frontal view(s) of the chest.    COMPARISON:  Radiography 04/03/2025    FINDINGS:  Similar positioning of the endotracheal tube, right IJ catheter, pulmonary arterial catheter and LVAD.  Enteric tube extends below the diaphragm.  Small bilateral pleural effusions with bilateral lower lung atelectasis.  Possible mild pulmonary edema.  No pneumothorax appreciated.  Stable cardiac silhouette.  Impression: Small pleural effusions with possible mild pulmonary edema.    Electronically signed by: Martín Gan  Date:    04/04/2025  Time:    06:02        2D echo findings as below      Left Ventricle: The left ventricle is dilated. Normal wall thickness. Severe global hypokinesis present. There is severely reduced systolic function with a visually estimated ejection fraction of 15 - 20%. Grade I diastolic dysfunction.    Right Ventricle: Right ventricular enlargement. Systolic function is normal. TAPSE is 1.83 cm.    Left Atrium: dilated    Mitral Valve: Mildly thickened leaflets. There is moderate regurgitation.    Tricuspid Valve: There is moderate regurgitation.    Pulmonary Artery: There is moderate pulmonary hypertension.    IVC/SVC: Patient is ventilated, cannot use IVC diameter to estimate right atrial pressure.        Assessment and Plan    Assessment:  NSTEMI with cardiogenic shock, ongoing  LHC with PTCA/TAD x3 RCA (03/29/2025), PCWP 25 mm Hg  EF 15-20% by TTE with associated right ventricular enlargement dilated left atrium, moderate mitral regurgitation moderate tricuspid regurgitation and moderate pulmonary hypertension  Impella placement (03/29/2025), currently P-6  Ischemic cardiomyopathy with severe decreased LVSF  Acute respiratory failure secondary to above, initially with cardiogenic pulmonary edema, currently with good oxygenation and ventilation.  Severe wide anion gap metabolic acidosis of primary  lactic acid etiology, resolved   Mild/mod thrombocytopenia, likely related to Impella  Anemia  Thrombocytopenia        Plan:  -titrate mechanical ventilation for ARDS net protocol   -supplement oxygen to maintain saturation 94-96%   -currently off all vasopressors and only requiring mechanical support via Impella   -Impella set to P-4 with no plans to wean at this time as Cardiology and CV surgery have evaluated the patient and determine that he is a poor surgical candidate for CABG - after discussion with Cardiology today they contacted [facility] to evaluate the patient for transfer for consideration for high-risk cardiovascular surgery and they denied transfer of the patient due to comorbidities and specifically significant multivalvular disease with coronary disease  -will need to discuss goals of care further with the family as the patient is now locked in at P-4 on Impella with any attempt to wean resulting in hypotension  -will transfuse 2 units packed red blood cells in the setting of severe coronary and cardiac disease  -Continue to monitor platelet count at this time and if there is profound decrease will need to determine if this is heparin induced thrombocytopenia versus complications from Impella    Overall prognosis is very poor    DVT prophylaxis with heparin drip   GI prophylaxis with Protonix   Keep head of bed elevated greater than 30° if possible for VAP prophylaxis      The patient remains at high risk of decompensation and death and will remain in ICU level care    34 min of critical care time was spent reviewing the patient's chart including medications, radiographs, labs, pertinent cultures and pathology data, other consultant notes/recomendations as well as titration of vasopressors, adjustment of mechanical ventilatory or NIPPV support, as well as discussion of goals of care with nursing staff, respiratory therapy at the bedside and with family at the bedside/via phone.      Bharat  VINAY Pena MD  4/6/2025  Pulmonology/Critical Care

## 2025-04-06 NOTE — PROGRESS NOTES
[facility]    Cardiology  Progress Note    Patient Name: Jon Conley  MRN: [id number]  Admission Date: 3/29/2025  Hospital Length of Stay: 8 days  Code Status: DNR   Attending Provider: Davi Harp Jr., MD,*   Consulting Provider: NORM Sierra  Primary Care Physician: Patrick Reece Jr., MD  Principal Problem:<principal problem not specified>    Patient information was obtained from past medical records and ER records.     Subjective:     Chief Complaint/Reason for Consult: Cardiac Arrest     HPI: Mr. Conley is a 80 y/o male who is known to [facility], Dr. Wang. The patient presented to [facility] on 3.29.25 with c/o SOB. He initially presented to [facility] in [place] with SOB. He was found to have Tachycardia, Hypotension, Anemia (7.4/24.6), Troponin 9.443, .0, BUN/Crea 16.2/1.34, Lactic Acid Level 4.3, Na 133, WBC 14.03, H&H 7.3/23.0, WBC 14.03, , PT/INR 14.9/1.2. He was admitted to , however, he sustained a witnessed PEA Arrest in the ER and has ROSC per ER MD. [facility] was consulted for NSETMI and Arrest.     3.30.25: NAD. Impella L Groin. CVP 7, P7. Levophed 0.32mcg/kg/min, Heparin Drip per Protocol, Amiodarone 0.5mg/min  3.31.25: NAD. L Groin Impella P7, Amiodarone 0.5mg/min, Heparin Drip per Protocol, Levophed 0.18mcg/kg/min, H&H 8.8/25.8, AST/ALT 79/45  4.1.25: NAD noted. L groin Impella in place, remains at P7. Remains on levophed. LFTs continue to improve.  4.2.25: NAD. L Groin Impella Device P6. Heparin Infusion per Protocol. Remains on 0.06mcg/kg/min.   4.3.25: NAD. Vented/Sedated. L Groin Impella Device. P3, Heparin Infusion per Protocol. Off Pressors. Attempting Wean of Impella. H&H 8.2/26.2,   4.4.25: NAD. Vented/Sedated. Heparin Drip per Protocol. H&H 8.1/95.4,   4.5.25: NAD. Vented/Sedated. Heparin Drip per Protocol. Levophed 0.04mcg/kg/min. H&H 7.9/23.8,  Impella P6  4.6.25: NAD. Vented/Sedated. P3.  Heparin Drip per Protocol, Off Pressors. CV Surgery to Reevaluate for CABG. H&H 7.4/22.7,     PMH: BPH/Urinary Obstruction, CAD/Nonobstructive, PAD, R JACY/Mild, HTN, Obesity, GERD, Tobacco Use   PSH: TURP, Angiogram, Bilateral Knee Replacements   Family History: Denies Family History of Heart Disease  Social History: Denies Illicit Drug, ETOH and Tobacco Use     Previous Cardiac Diagnostics:   ECHO 4.3.25:  Limited study to check impella placement.  Left Ventricle: There is severely reduced systolic function with a visually estimated ejection fraction of 15 - 20%.  Impella distance from aortic valve annulus appears appropriate.    ECHO 4.1.25:  Limited study to check impella placement.  Left Ventricle: There is severely reduced systolic function with a visually estimated ejection fraction of 15 - 20%.  Impella distance from aortic valve annulus appears appropriate.    ECHO 3.30.25:  Left Ventricle: There is severely reduced systolic function with a visually estimated ejection fraction of less than 30%.  There appears to be at least moderate to severe mitral regurgitation.   Impella is positioned with inflow cannula 3.8 cm from aortic valve annulus.    [facility] 3.29.25:  Findings:  - Successful placement of Impella CP device from left femoral approach.  - There is severe multi-vessel coronary artery disease.  - Left Main has mild diffuse disease.  - Mid Left Anterior Descending has serial lesions, up to 70-80% in severity.   - Prox Left Circumflex is 100% occluded. Unable to cross lesion despite wire escalation and microcatheter support.  - Mid Right Coronary Artery is 100% occluded. The lesion was successfully treated with overlapping SYNERGY XD 4.0X20MM, SYNERGY XD 3.0X48MM, and SYNERGY XD 2.48S02WG drug-eluting stents. Post-dilatation was performed using a SYNERGY XD 4.0X20MM angioplasty balloon inflated to 12 gretel. Following intervention there was 0% residual stenosis and PATTY grade 3 flow in the distal  vessel.  - Intravascular Ultrasound (IVUS) was performed prior to intervention to further characterize the lesion, as well as post-stent deployment to ensure optimal stent apposition and expansion.  - Aorto-iliac angiogram revealed moderate disease of R ileofemoral system.  - At the conclusion of the case, contrast injection was performed through the Impella sheath side port, showing adequate antegrade flow down the ipsilateral common femoral artery/SFA/profunda artery.  - RHC with elevated R sided filling pressures. RA 17 mmHg, RV 45/15 mmHg, PA 42/29 mmHg (mean 34 mmHg), PCWP 25 mmHg.  - Transpulmonary gradient is 9 mmHg.  - Cardiac Output/Index at 6.6/3.0, as calculated by Gregory equation on Levophed infusion and Impella CP support.  - PVR is 1.4 Woods units  - SVR is 550 dyne-sec*cm^-5  - Pulmonary Artery Pulsatility Index (Rafa) is 0.8  - Cardiac Power Output () is 0.91   Assessment/Plan:  - Patient is a 79 y.o. male with a history of recent TURP, complicated by significant post-op bleeding, presents to OSH ED with dyspnea. Transferred to [facility] with worsening shock despite vasopressors. TTE showing EF 25%, severe MR, severe TR. Troponin elevated. Brought to cath lab. Underwent Impella placement/LHC/RHC as outlined above. RCA lesion treated. Unable to cross LCx lesion. 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 At this point, would continue Impella support and aggressive medical mgmt. Has non-culprit lesions that should be treated at a later date as well. Can consider CTS consult, especially given severe valvular disease.  - Patient was given a loading dose of clopidogrel 600 mg PO in the cath lab  - Continue DAPT (aspirin + clopidogrel) for a minimum of 12 months and aspirin indefinitely thereafter  - High intensity statin  - Continue Aggrastat for 4 hours post-procedure  - Arterial sheath remains place; plan is to remove when activated clotting time (ACT) less than 180 seconds. Bedrest for 4 hours after  hemostasis is achieved.  - Obtain transthoracic echocardiogram  - Continuous bedrest while Impella in place  - Therapeutic heparin administration per protocol  - Antibiotics per protocol  - Remaining mgmt per primary team/cardiology    ECHO 3.29.25:  Left Ventricle: The left ventricle is dilated. Normal wall thickness. Severe global hypokinesis present. There is severely reduced systolic function with a visually estimated ejection fraction of 15 - 20%. Grade I diastolic dysfunction.  Right Ventricle: Right ventricular enlargement. Systolic function is normal. TAPSE is 1.83 cm.  Left Atrium: dilated  Mitral Valve: Mildly thickened leaflets. There is moderate regurgitation.  Tricuspid Valve: There is moderate regurgitation.  Pulmonary Artery: There is moderate pulmonary hypertension.  IVC/SVC: Patient is ventilated, cannot use IVC diameter to estimate right atrial pressure.    Carotid US 11.4.24:  The study quality is average.   1-39% stenosis in the proximal right internal carotid artery based on Bluth Criteria.   Antegrade right vertebral artery flow.   Antegrade left vertebral artery flow    ECHO 1.27.22:  The study quality is average.   The left ventricle is normal in size. Global left ventricular systolic function is moderately decreased. The left ventricular ejection fraction is 40-45%. Mild concentric left ventricular hypertrophy is present.  The left ventricle diastolic function is impaired (Grade I) with normal left atrial pressure.  Mild (1+) mitral regurgitation.  The pulmonary artery appears normal.     [facility] 9.26.17:  LM: Normal  LAD: Prox 30% Stenosis  RI: Normal  LCX: Normal  RCA: Distal RCA 50%    Review of patient's allergies indicates:  No Known Allergies  No current facility-administered medications on file prior to encounter.     No current outpatient medications on file prior to encounter.     Review of Systems   Unable to perform ROS: Intubated     Objective:     Vital Signs (Most Recent):  Temp:  97.9 °F (36.6 °C) (04/06/25 1200)  Pulse: 67 (04/06/25 1430)  Resp: (!) 23 (04/06/25 1430)  BP: 105/62 (04/06/25 1430)  SpO2: 98 % (04/06/25 1430) Vital Signs (24h Range):  Temp:  [97.7 °F (36.5 °C)-98.6 °F (37 °C)] 97.9 °F (36.6 °C)  Pulse:  [58-94] 67  Resp:  [12-32] 23  SpO2:  [94 %-100 %] 98 %  BP: ()/(55-70) 105/62  Arterial Line BP: (0-137)/(-) 122/64   Weight: 106.1 kg (233 lb 14.5 oz)  Body mass index is 32.64 kg/m².  SpO2: 98 %       Intake/Output Summary (Last 24 hours) at 4/6/2025 1452  Last data filed at 4/6/2025 1400  Gross per 24 hour   Intake 3739.56 ml   Output 1220 ml   Net 2519.56 ml     Lines/Drains/Airways       Central Venous Catheter Line  Duration             Pulmonary Artery Catheter Assessment  03/29/25 1300 Femoral Vein Right 8 days    Tunneled Central Line - Triple Lumen 03/29/25 0555 Internal Jugular Right 8 days              Drain  Duration                  Urethral Catheter -- days         NG/OG Tube 03/29/25 1200 18 Fr. Center mouth 8 days              Airway  Duration                  Airway - Non-Surgical 03/29/25 0541 8 days              Line  Duration                  VAD 03/29/25 8 days              Peripheral Intravenous Line  Duration                  Peripheral IV - Single Lumen 03/29/25 0530 18 G Right Antecubital 8 days         Peripheral IV - Single Lumen 03/29/25 0600 20 G Left Hand 8 days         Peripheral IV - Single Lumen 03/29/25 20 G Left Forearm 8 days         Sheath 03/29/25 0913 Right 8 days         Sheath 03/29/25 0927 Left 8 days                  Significant Labs:   Chemistries:   Recent Labs   Lab 04/05/25  0320 04/05/25  1222 04/05/25  1959 04/06/25  0223 04/06/25  1151    138 139 139 138   K 3.8 3.9 3.9 3.8 3.9   * 113* 113* 112* 112*   CO2 19* 20* 21* 20* 21*   BUN 11.6 10.6 10.1 9.6 10.3   CREATININE 0.72 0.70* 0.70* 0.67* 0.64*   CALCIUM 7.7* 7.8* 7.4* 7.7* 7.7*   BILITOT 0.3 0.3 0.3 0.3 0.3   ALKPHOS 65 68 65 66 62   ALT 15 19 21 23  20   AST 23 28 31 34 26   GLUCOSE 97 118* 108 104 96   MG 2.20 2.10 2.10 2.20 2.20   PHOS 2.8 3.0 3.0 2.7 3.1        CBC/Anemia Labs: Coags:    Recent Labs   Lab 04/05/25 1959 04/06/25  0223 04/06/25  1151   WBC 4.58 5.07 4.54  4.54   HGB 7.6* 7.8* 7.4*   HCT 23.3* 24.1* 22.7*    145 129*   MCV 94.3* 94.9* 95.0*   RDW 15.6 15.7 15.8    Recent Labs   Lab 04/05/25 1959   APTT 114.7*          Telemetry: SR    Physical Exam  Constitutional:       General: He is not in acute distress.     Appearance: Normal appearance. He is ill-appearing.      Comments: Vented/Sedated   HENT:      Head: Normocephalic.      Mouth/Throat:      Mouth: Mucous membranes are moist.   Cardiovascular:      Rate and Rhythm: Normal rate and regular rhythm.      Pulses: Normal pulses.      Heart sounds: Murmur heard.   Pulmonary:      Effort: Pulmonary effort is normal. No respiratory distress.      Comments: Ventilator Associated Breath Sounds  Vent Mode: A/C  Oxygen Concentration (%):  [30-40] 40  Resp Rate Total:  [14 br/min-16 br/min] 15 br/min  Vt Set:  [500 mL] 500 mL  PEEP/CPAP:  [5 cmH20] 5 cmH20  Mean Airway Pressure:  [9 cmH20-10 cmH20] 10 cmH20  Abdominal:      Palpations: Abdomen is soft.   Skin:     General: Skin is warm and dry.      Comments: L Groin Impella CP, Soft/Flat, No Sign of Bleed/Infection. + Monophasic Dopplerable Pedal Pulses    Neurological:      Comments: Vented/Sedated       Home Medications:   Medications Ordered Prior to Encounter[1]  Current Schedule Inpatient Medications:   aspirin  81 mg Oral Daily    famotidine  20 mg Per NG tube BID    heparin (PORCINE)  39.2 Units/kg Intravenous Once     Continuous Infusions:   fentanyl  0-250 mcg/hr Intravenous Continuous   Stopped at 03/29/25 2030    heparin (porcine) in D5W  0-40 Units/kg/hr Intravenous Continuous 18.8 mL/hr at 04/06/25 0052 18.413 Units/kg/hr at 04/06/25 0052    lactated ringers   Intravenous Continuous 75 mL/hr at 04/06/25 0603 Rate Verify at  04/06/25 0603    NORepinephrine bitartrate-D5W  0-3 mcg/kg/min Intravenous Continuous   Stopped at 04/06/25 0300    propofoL  0-50 mcg/kg/min Intravenous Continuous 24.5 mL/hr at 04/06/25 1008 40 mcg/kg/min at 04/06/25 1008    sodium bicarbonate 25 mEq in D5W 1,000 mL Purge Solution for Impella   Impella Device Continuous   New Bag at 04/01/25 0824    tirofiban-0.9% sodium chloride 12.5 mg/250ml    Continuous PRN   Stopped at 03/29/25 1845     Mechanical Circulatory Support (MCS) Flow Sheet  Cardiogenic Shock/HF Management  04/06/2025    PUMP METRICS  Value/Parameter TIME: 0900   Site Observation/Distal Pulses L Groin See Physical Exam   cm See Nursing Documentation    Impella [x]CP w/Smart Assist   []5.5 w/Smart Assist   []RP w/Smart Assist   P-Level P-3   Ao (MAP) 86/45; MAP 59   LV /   Motor Current (Mean) 452/336   Purge Pressures/Flow 13.9/510   Average Flows 2.3     HEMODYNAMICS  Value/Parameter TIME: 0900   AO (120/80) 100/54   MAP (> 60) 68   CO (PA Catheter)/GARRY 5.2   Native CO ([place] CO-Impella)    CI (> 2.2)    Cardiac Power Output (Greater than or Equal to 0.8)    SVR (800-1200)    HR () 62   LVEF  %     RV ASSESSMENT  Value/Parameter TIME: 0900   Rafa (> 1.5) 1.28   PA (25/10) 36/18   CVP (8-12) 14   PVR (120-250)    TAPSI (cm) (1.8) cm     LABS  Value/Parameter TIME: 0900   Lactate Level (< 2.0)    SvO2 (65-75%) 47%   Hgb/Hct (12-17/36-50) 7.8/24.1      SpO2 (%) 93%   pH (7.35-7.45) 7.46   Creatinine (0.6-1.2) 0.67   ALT (7-56) 23   ACT (160-180) 170   LDH    Urine Output (> 30mL/HR) > 30mL/HR      DRIPS (GTTS)  Value/Parameter TIME: 0900   Vasopressors  []Vasopressin  []Epinephrine  []Levophed  []NeoSynephrine   Inotropes  []Dobutamine  []Primacor/Milrinone   []Dopamine   Anticoagulants [x]Heparin  []Argatroban   Sedation []Fentanyl  []Precedex  [x]Propofol  []Nimbex     Assessment:   Cardiogenic Shock requiring Mechanical and Chemical Support    - PT has NOT Tolerated Impella Wean  x 2 in the Last few Days (4.4.25)    - Impella/Pressors   MVCAD    - s/p LHC (3.29.25) -  Left Main has mild diffuse disease. Mid Left Anterior Descending has serial lesions, up to 70-80% in severity. Prox Left Circumflex is 100% occluded. Unable to cross lesion despite wire escalation and microcatheter support. Mid Right Coronary Artery is 100% occluded. The lesion was successfully treated with overlapping SYNERGY XD 4.0X20MM, SYNERGY XD 3.0X48MM, and SYNERGY XD 2.58T65BJ drug-eluting stents. Post-dilatation was performed using a SYNERGY XD 4.0X20MM angioplasty balloon inflated to 12 gretel. Following intervention there was 0% residual stenosis and PATTY grade 3 flow in the distal vessel.  NSTEMI Type I (Non-Anterior Wall MI)    - Trponin > 9  Cardiopulmonary Arrest    - Initial Rhythm PEA - Witnessed in ER Arrest/ROSC  ICMO/EF 15-20%     - ECHO (3.29.25) - LVEF 15-20%, Grade I DD   Acute Hypoxemic Respiratory Failure requiring Intubation/Ventilation   ST- Now SR   Hypotension requiring Pressors - Resolved     - Hx of HTN  BPH s/p TURP   Lactic Acidosis - Resolved   Leukocytosis - Resolved   Anemia requiring Transfusions - Stable   Recent BPH Diagnosis s/p TURP  CAD/Nonobstructive (2017)  JACY/R Sided   PAD/Nonobstructive   Transaminitis - Resolved   Obesity  GERD  Tobacco Use  No Hx of GIB     Plan:   Heparin Drip per Protocol for Tx of NSTEMI/Impella Thrombosis Prophylaxis   Wean Pressors for MAP > 65mmHg   Groin Precautions/Impella Site Monitoring   Prognosis Poor/GRIM  CV Surgery to Evaluate for CABG  Long Discussion with [facility]/HF and CV Surgery Team about Transfer for Higher Level of Care. PT Turned down and deemed too High Risk.  Will Continue to Follow     NORM Sierra  Cardiology  [facility]     I agree with the findings of the complexity of problems addressed and take responsibility for the plan's risks and complications. I approved the plan documented by Hasmukh  ILSA Sandoval.            [1]   No current facility-administered medications on file prior to encounter.     No current outpatient medications on file prior to encounter.

## 2025-04-06 NOTE — PROGRESS NOTES
Some improvement in hemodynamics with Impella at P4.  Levophed weaned off.  Would recommend weaning sedation and continuing to support patient.  Dr. Gilmore returned to the evaluate in consideration of surgery.

## 2025-04-07 NOTE — PROGRESS NOTES
[facility]    Cardiology  Progress Note    Patient Name: Jon Conley  MRN: [id number]  Admission Date: 3/29/2025  Hospital Length of Stay: 9 days  Code Status: DNR   Attending Provider: Davi Harp Jr., MD,*   Consulting Provider: NORM Sierra  Primary Care Physician: Patrick Reece Jr., MD  Principal Problem:<principal problem not specified>    Patient information was obtained from past medical records and ER records.     Subjective:     Chief Complaint/Reason for Consult: Cardiac Arrest     HPI: Mr. Conley is a 78 y/o male who is known to [facility], Dr. Wang. The patient presented to [facility] on 3.29.25 with c/o SOB. He initially presented to [facility] in [place] with SOB. He was found to have Tachycardia, Hypotension, Anemia (7.4/24.6), Troponin 9.443, .0, BUN/Crea 16.2/1.34, Lactic Acid Level 4.3, Na 133, WBC 14.03, H&H 7.3/23.0, WBC 14.03, , PT/INR 14.9/1.2. He was admitted to , however, he sustained a witnessed PEA Arrest in the ER and has ROSC per ER MD. [facility] was consulted for NSETMI and Arrest.     3.30.25: NAD. Impella L Groin. CVP 7, P7. Levophed 0.32mcg/kg/min, Heparin Drip per Protocol, Amiodarone 0.5mg/min  3.31.25: NAD. L Groin Impella P7, Amiodarone 0.5mg/min, Heparin Drip per Protocol, Levophed 0.18mcg/kg/min, H&H 8.8/25.8, AST/ALT 79/45  4.1.25: NAD noted. L groin Impella in place, remains at P7. Remains on levophed. LFTs continue to improve.  4.2.25: NAD. L Groin Impella Device P6. Heparin Infusion per Protocol. Remains on 0.06mcg/kg/min.   4.3.25: NAD. Vented/Sedated. L Groin Impella Device. P3, Heparin Infusion per Protocol. Off Pressors. Attempting Wean of Impella. H&H 8.2/26.2,   4.4.25: NAD. Vented/Sedated. Heparin Drip per Protocol. H&H 8.1/95.4,   4.5.25: NAD. Vented/Sedated. Heparin Drip per Protocol. Levophed 0.04mcg/kg/min. H&H 7.9/23.8,  Impella P6  4.6.25: NAD. Vented/Sedated. P3.  Heparin Drip per Protocol, Off Pressors. CV Surgery to Reevaluate for CABG. H&H 7.4/22.7,   4.7.25: NAD. Vented/Sedated. H&H 9.2/27.7, , K 3.9, BUN/Crea 9.2/0.61, Lactic Acid 0.6. Impella L Groin P4    PMH: BPH/Urinary Obstruction, CAD/Nonobstructive, PAD, R JACY/Mild, HTN, Obesity, GERD, Tobacco Use   PSH: TURP, Angiogram, Bilateral Knee Replacements   Family History: Denies Family History of Heart Disease  Social History: Denies Illicit Drug, ETOH and Tobacco Use     Previous Cardiac Diagnostics:   ECHO 4.3.25:  Limited study to check impella placement.  Left Ventricle: There is severely reduced systolic function with a visually estimated ejection fraction of 15 - 20%.  Impella distance from aortic valve annulus appears appropriate.    ECHO 4.1.25:  Limited study to check impella placement.  Left Ventricle: There is severely reduced systolic function with a visually estimated ejection fraction of 15 - 20%.  Impella distance from aortic valve annulus appears appropriate.    ECHO 3.30.25:  Left Ventricle: There is severely reduced systolic function with a visually estimated ejection fraction of less than 30%.  There appears to be at least moderate to severe mitral regurgitation.   Impella is positioned with inflow cannula 3.8 cm from aortic valve annulus.    [facility] 3.29.25:  Findings:  - Successful placement of Impella CP device from left femoral approach.  - There is severe multi-vessel coronary artery disease.  - Left Main has mild diffuse disease.  - Mid Left Anterior Descending has serial lesions, up to 70-80% in severity.   - Prox Left Circumflex is 100% occluded. Unable to cross lesion despite wire escalation and microcatheter support.  - Mid Right Coronary Artery is 100% occluded. The lesion was successfully treated with overlapping SYNERGY XD 4.0X20MM, SYNERGY XD 3.0X48MM, and SYNERGY XD 2.12E29GD drug-eluting stents. Post-dilatation was performed using a SYNERGY XD 4.0X20MM angioplasty balloon  inflated to 12 gretel. Following intervention there was 0% residual stenosis and PATTY grade 3 flow in the distal vessel.  - Intravascular Ultrasound (IVUS) was performed prior to intervention to further characterize the lesion, as well as post-stent deployment to ensure optimal stent apposition and expansion.  - Aorto-iliac angiogram revealed moderate disease of R ileofemoral system.  - At the conclusion of the case, contrast injection was performed through the Impella sheath side port, showing adequate antegrade flow down the ipsilateral common femoral artery/SFA/profunda artery.  - RHC with elevated R sided filling pressures. RA 17 mmHg, RV 45/15 mmHg, PA 42/29 mmHg (mean 34 mmHg), PCWP 25 mmHg.  - Transpulmonary gradient is 9 mmHg.  - Cardiac Output/Index at 6.6/3.0, as calculated by Gregory equation on Levophed infusion and Impella CP support.  - PVR is 1.4 Woods units  - SVR is 550 dyne-sec*cm^-5  - Pulmonary Artery Pulsatility Index (Rafa) is 0.8  - Cardiac Power Output () is 0.91   Assessment/Plan:  - Patient is a 79 y.o. male with a history of recent TURP, complicated by significant post-op bleeding, presents to OSH ED with dyspnea. Transferred to [facility] with worsening shock despite vasopressors. TTE showing EF 25%, severe MR, severe TR. Troponin elevated. Brought to cath lab. Underwent Impella placement/LHC/RHC as outlined above. RCA lesion treated. Unable to cross LCx lesion. 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 At this point, would continue Impella support and aggressive medical mgmt. Has non-culprit lesions that should be treated at a later date as well. Can consider CTS consult, especially given severe valvular disease.  - Patient was given a loading dose of clopidogrel 600 mg PO in the cath lab  - Continue DAPT (aspirin + clopidogrel) for a minimum of 12 months and aspirin indefinitely thereafter  - High intensity statin  - Continue Aggrastat for 4 hours post-procedure  - Arterial sheath remains place;  plan is to remove when activated clotting time (ACT) less than 180 seconds. Bedrest for 4 hours after hemostasis is achieved.  - Obtain transthoracic echocardiogram  - Continuous bedrest while Impella in place  - Therapeutic heparin administration per protocol  - Antibiotics per protocol  - Remaining mgmt per primary team/cardiology    ECHO 3.29.25:  Left Ventricle: The left ventricle is dilated. Normal wall thickness. Severe global hypokinesis present. There is severely reduced systolic function with a visually estimated ejection fraction of 15 - 20%. Grade I diastolic dysfunction.  Right Ventricle: Right ventricular enlargement. Systolic function is normal. TAPSE is 1.83 cm.  Left Atrium: dilated  Mitral Valve: Mildly thickened leaflets. There is moderate regurgitation.  Tricuspid Valve: There is moderate regurgitation.  Pulmonary Artery: There is moderate pulmonary hypertension.  IVC/SVC: Patient is ventilated, cannot use IVC diameter to estimate right atrial pressure.    Carotid US 11.4.24:  The study quality is average.   1-39% stenosis in the proximal right internal carotid artery based on Bluth Criteria.   Antegrade right vertebral artery flow.   Antegrade left vertebral artery flow    ECHO 1.27.22:  The study quality is average.   The left ventricle is normal in size. Global left ventricular systolic function is moderately decreased. The left ventricular ejection fraction is 40-45%. Mild concentric left ventricular hypertrophy is present.  The left ventricle diastolic function is impaired (Grade I) with normal left atrial pressure.  Mild (1+) mitral regurgitation.  The pulmonary artery appears normal.     [facility] 9.26.17:  LM: Normal  LAD: Prox 30% Stenosis  RI: Normal  LCX: Normal  RCA: Distal RCA 50%    Review of patient's allergies indicates:  No Known Allergies  No current facility-administered medications on file prior to encounter.     No current outpatient medications on file prior to encounter.      Review of Systems   Unable to perform ROS: Intubated     Objective:     Vital Signs (Most Recent):  Temp: 97.8 °F (36.6 °C) (04/07/25 1200)  Pulse: 66 (04/07/25 1400)  Resp: (!) 6 (04/07/25 1400)  BP: (!) 104/57 (04/07/25 1400)  SpO2: 96 % (04/07/25 1400) Vital Signs (24h Range):  Temp:  [97.7 °F (36.5 °C)-98.6 °F (37 °C)] 97.8 °F (36.6 °C)  Pulse:  [55-75] 66  Resp:  [0-26] 6  SpO2:  [94 %-99 %] 96 %  BP: ()/(53-73) 104/57  Arterial Line BP: ()/(54-73) 120/57   Weight: 106.1 kg (233 lb 14.5 oz)  Body mass index is 32.64 kg/m².  SpO2: 96 %       Intake/Output Summary (Last 24 hours) at 4/7/2025 1539  Last data filed at 4/7/2025 1300  Gross per 24 hour   Intake 3101.02 ml   Output 2975 ml   Net 126.02 ml     Lines/Drains/Airways       Central Venous Catheter Line  Duration             Pulmonary Artery Catheter Assessment  03/29/25 1300 Femoral Vein Right 9 days    Tunneled Central Line - Triple Lumen 03/29/25 0555 Internal Jugular Right 9 days              Drain  Duration                  Urethral Catheter -- days         NG/OG Tube 03/29/25 1200 18 Fr. Center mouth 9 days              Airway  Duration                  Airway - Non-Surgical 03/29/25 0541 9 days              Line  Duration                  VAD 03/29/25 9 days              Peripheral Intravenous Line  Duration                  Peripheral IV - Single Lumen 03/29/25 0530 18 G Right Antecubital 9 days         Peripheral IV - Single Lumen 03/29/25 0600 20 G Left Hand 9 days         Peripheral IV - Single Lumen 03/29/25 20 G Left Forearm 9 days         Sheath 03/29/25 0913 Right 9 days         Sheath 03/29/25 0927 Left 9 days                  Significant Labs:   Chemistries:   Recent Labs   Lab 04/06/25  0223 04/06/25  1151 04/06/25  2240 04/07/25  0305 04/07/25  1144    138 139 139 141   K 3.8 3.9 3.9 3.8 3.9   * 112* 114* 112* 114*   CO2 20* 21* 21* 20* 22*   BUN 9.6 10.3 9.6 10.100 9.2   CREATININE 0.67* 0.64* 0.63* 0.62*  0.61*   CALCIUM 7.7* 7.7* 7.7* 7.6* 7.9*   BILITOT 0.3 0.3 0.3 0.3 0.3   ALKPHOS 66 62 59 65 65   ALT 23 20 19 19 18   AST 34 26 22 22 20   GLUCOSE 104 96 89 90 94   MG 2.20 2.20 2.10 2.12 2.10   PHOS 2.7 3.1 3.0 2.9 2.9        CBC/Anemia Labs: Coags:    Recent Labs   Lab 04/06/25  2240 04/07/25  0305 04/07/25  1144   WBC 5.15 6.06 5.77   HGB 8.5* 9.0* 9.2*   HCT 27.4* 27.4* 27.7*    143 152   MCV 90.1 88.1 89.9   RDW 18.6* 18.9* 19.1*    Recent Labs   Lab 04/05/25  1959   APTT 114.7*          Telemetry: SR    Physical Exam  Constitutional:       General: He is not in acute distress.     Appearance: Normal appearance. He is ill-appearing.      Comments: Vented/Sedated   HENT:      Head: Normocephalic.      Mouth/Throat:      Mouth: Mucous membranes are moist.   Cardiovascular:      Rate and Rhythm: Normal rate and regular rhythm.      Pulses: Normal pulses.      Heart sounds: Murmur heard.   Pulmonary:      Effort: Pulmonary effort is normal. No respiratory distress.      Comments: Ventilator Associated Breath Sounds  Vent Mode: A/C  Oxygen Concentration (%):  [40-45] 40  Resp Rate Total:  [14 br/min-17 br/min] 16 br/min  Vt Set:  [500 mL] 500 mL  PEEP/CPAP:  [5 cmH20] 5 cmH20  Mean Airway Pressure:  [9 cmH20-10 cmH20] 9 cmH20  Abdominal:      Palpations: Abdomen is soft.   Skin:     General: Skin is warm.      Comments: L Groin Impella CP, Soft/Flat, No Sign of Bleed/Infection. + Monophasic Dopplerable Pedal Pulses    Neurological:      Comments: Vented/Sedated       Home Medications:   Medications Ordered Prior to Encounter[1]  Current Schedule Inpatient Medications:   aspirin  81 mg Oral Daily    famotidine  20 mg Per NG tube BID    heparin (PORCINE)  39.2 Units/kg Intravenous Once     Continuous Infusions:   fentanyl  0-250 mcg/hr Intravenous Continuous   Stopped at 03/29/25 2030    heparin (porcine) in D5W  0-40 Units/kg/hr Intravenous Continuous 19.2 mL/hr at 04/07/25 0530 18.8 Units/kg/hr at 04/07/25  0530    lactated ringers   Intravenous Continuous 75 mL/hr at 04/07/25 0605 Rate Verify at 04/07/25 0605    NORepinephrine bitartrate-D5W  0-3 mcg/kg/min Intravenous Continuous   Stopped at 04/07/25 0538    propofoL  0-50 mcg/kg/min Intravenous Continuous 21.4 mL/hr at 04/07/25 1224 35 mcg/kg/min at 04/07/25 1224    sodium bicarbonate 25 mEq in D5W 1,000 mL Purge Solution for Impella   Impella Device Continuous   New Bag at 04/01/25 0824    tirofiban-0.9% sodium chloride 12.5 mg/250ml    Continuous PRN   Stopped at 03/29/25 1845     Mechanical Circulatory Support (MCS) Flow Sheet  Cardiogenic Shock/HF Management  04/07/2025    PUMP METRICS  Value/Parameter TIME: 0900   Site Observation/Distal Pulses L Groin See Physical Exam   cm See Nursing Documentation    Impella [x]CP w/Smart Assist   []5.5 w/Smart Assist   []RP w/Smart Assist   P-Level P-2   Ao (MAP) 135/44; MAP 74   LV -   Motor Current (Mean) 400/255   Purge Pressures/Flow 382/13.7   Average Flows 1.9     HEMODYNAMICS  Value/Parameter TIME: 0900   AO (120/80) 121/56   MAP (> 60) 79   CO (PA Catheter)/GARRY 6.2   Native CO ([place] CO-Impella)    CI (> 2.2) 2.7   Cardiac Power Output (Greater than or Equal to 0.8) 1.08   SVR (800-1200) 877   HR () 65   LVEF  %     RV ASSESSMENT  Value/Parameter TIME: 0900   Rafa (> 1.5) 2   PA (25/10) 40/18   CVP (8-12) 11   PVR (120-250)    TAPSI (cm) (1.8) cm     LABS  Value/Parameter TIME: 0900   Lactate Level (< 2.0) 0.7   SvO2 (65-75%) 62%   Hgb/Hct (12-17/36-50) 9/27.4      SpO2 (%) 96%   pH (7.35-7.45) 7.44   Creatinine (0.6-1.2) 0.62   ALT (7-56) 19   ACT (160-180) 170   LDH    Urine Output (> 30mL/HR) > 100mL/HR      DRIPS (GTTS)  Value/Parameter TIME: 0900   Vasopressors  []Vasopressin  []Epinephrine  []Levophed  []NeoSynephrine   Inotropes  []Dobutamine  []Primacor/Milrinone   []Dopamine   Anticoagulants [x]Heparin  []Argatroban   Sedation []Fentanyl  []Precedex  [x]Propofol  []Nimbex     Assessment:    Cardiogenic Shock requiring Mechanical and Chemical Support - Now just Mechanical    - PT has NOT Tolerated Impella Wean x 2 in the Last few Days (4.4.25)    - Impella/Pressors   MVCAD    - s/p LHC (3.29.25) -  Left Main has mild diffuse disease. Mid Left Anterior Descending has serial lesions, up to 70-80% in severity. Prox Left Circumflex is 100% occluded. Unable to cross lesion despite wire escalation and microcatheter support. Mid Right Coronary Artery is 100% occluded. The lesion was successfully treated with overlapping SYNERGY XD 4.0X20MM, SYNERGY XD 3.0X48MM, and SYNERGY XD 2.77N97SL drug-eluting stents. Post-dilatation was performed using a SYNERGY XD 4.0X20MM angioplasty balloon inflated to 12 gretel. Following intervention there was 0% residual stenosis and PATTY grade 3 flow in the distal vessel.  NSTEMI Type I (Non-Anterior Wall MI)    - Trponin > 9  Cardiopulmonary Arrest    - Initial Rhythm PEA - Witnessed in ER Arrest/ROSC  ICMO/EF 15-20%     - ECHO (3.29.25) - LVEF 15-20%, Grade I DD   Acute Hypoxemic Respiratory Failure requiring Intubation/Ventilation   ST- Now SR   Hypotension requiring Pressors - Resolved     - Hx of HTN  BPH s/p TURP   Lactic Acidosis - Resolved   Leukocytosis - Resolved   Anemia requiring Transfusions - Stable   Recent BPH Diagnosis s/p TURP  CAD/Nonobstructive (2017)  JACY/R Sided   PAD/Nonobstructive   Transaminitis - Resolved   Obesity  GERD  Tobacco Use  No Hx of GIB     Plan:   Heparin Drip per Protocol for Tx of NSTEMI/Impella Thrombosis Prophylaxis   Groin Precautions/Impella Site Monitoring   CV Surgery reevaluated PT, Remains too High Risk for CABG/Valve  Decrease Impella to P2 and Check VBG/ABG and Lactic Acid - Possibly to Explant in AM (4.8.25) - If PT Tolerates Wean will Attempt to Explant in AM by Increasing Impella to P4 overngiht and Place on P2 around 4 AM with VBG/ABG and Lactic Acid with AM Labs   Will have Interventional Cardiology Review Films to Determine if  PT can have LAD Fixed prior to Explant/Same time PT goes down for Explant after Intervention.  Plavix 300mg OG x 1 (Recent PCI and turned down for CABG/Valve)  Plavix 75mg PO Daily Starting Tomorrow  Labs and EKG in AM: CBC, CMP and Mg    Hasmukh Sandoval, NORM  Cardiology  [facility]     I agree with the findings of the complexity of problems addressed and take responsibility for the plan's risks and complications. I approved the plan documented by Hasmukh Sandoval NP.            [1]   No current facility-administered medications on file prior to encounter.     No current outpatient medications on file prior to encounter.

## 2025-04-07 NOTE — PROGRESS NOTES
Pulmonary & Critical Care Medicine   Progress Note      Presenting History/HPI:    The patient is a 79-year-old male who has a history of coronary artery disease, hypertension, hyperlipidemia, peripheral arterial disease, and GE reflux disease.  He also has a history of recent TURP in [place], complicated by postoperative by gross hematuria requiring placement of a three-way irrigation bladder catheter last week. This was left in place at time of discharge.  He presented to [facility] ER early a.m. of 03/29/2025 with shortness of breath, noted tachycardia/hypotensive, and marked elevation of high sensitivity troponin at 13,821. He was transferred to [facility] ER, suffering PEA arrest requiring intubation and short CPR with ROSC.  LHC was performed, noting multivessel coronary artery disease with 100% occluded proximal left circumflex, unable to cross with wire.  Mid RCA also noted 100% occluded, treated with PTCA/TAD.  He was noted to have elevated right-sided filling pressures with PCW 25 mm Hg.  Impella catheter was placed, and was left in place postprocedure.  He was admitted to ICU for ongoing medical care.     Intubation/mechanical ventilation 03/29/2025  Impella placement 03/29/2025      Interval History:  -no major issues or changes overnight   -urine output of 2.7 L over the past 24 hours   -s/p 2U PRBC with Hgb up to 9.0  -patient remains on mechanical ventilatory support still on Impella now down to P-2  -sedated on propofol currently on heparin drip   -I discussed the case with Cardiology and they have discussed with CV surgery that the patient is not an adequate surgical candidate for CABG due to multiple comorbidities  -attempt to transfer to [facility] for consideration for high-risk surgery was denied      Scheduled Medications:    aspirin  81 mg Oral Daily    famotidine  20 mg Per NG tube BID    heparin (PORCINE)  39.2 Units/kg Intravenous Once       PRN Medications:      Current Facility-Administered Medications:     0.9%  NaCl infusion (for blood administration), , Intravenous, Q24H PRN    0.9%  NaCl infusion (for blood administration), , Intravenous, Q24H PRN    0.9%  NaCl infusion (for blood administration), , Intravenous, Q24H PRN    0.9%  NaCl infusion (for blood administration), , Intravenous, Q24H PRN    0.9%  NaCl infusion (for blood administration), , Intravenous, Q24H PRN    acetaminophen, 650 mg, Oral, Q4H PRN    calcium gluconate IVPB, 1 g, Intravenous, PRN    calcium gluconate IVPB, 2 g, Intravenous, PRN    calcium gluconate IVPB, 3 g, Intravenous, PRN    dextrose 50%, 12.5 g, Intravenous, PRN    dextrose 50%, 25 g, Intravenous, PRN    glucagon (human recombinant), 1 mg, Intramuscular, PRN    glucose, 16 g, Oral, PRN    glucose, 24 g, Oral, PRN    heparin (PORCINE), 39.2 Units/kg, Intravenous, PRN    heparin (PORCINE), 30 Units/kg, Intravenous, PRN    magnesium sulfate 2 g IVPB, 2 g, Intravenous, PRN    magnesium sulfate 2 g IVPB, 2 g, Intravenous, PRN    ondansetron, 8 mg, Oral, Q8H PRN    potassium chloride in water, 20 mEq, Intravenous, PRN **AND** potassium chloride in water, 40 mEq, Intravenous, PRN **AND** potassium chloride in water, 60 mEq, Intravenous, PRN    sodium phosphate 15 mmol in D5W 250 mL IVPB, 15 mmol, Intravenous, PRN    sodium phosphate 20.1 mmol in D5W 250 mL IVPB, 20.1 mmol, Intravenous, PRN    sodium phosphate 30 mmol in D5W 250 mL IVPB, 30 mmol, Intravenous, PRN    tirofiban-0.9% sodium chloride 12.5 mg/250ml, , , Continuous PRN      Infusions:     fentanyl  0-250 mcg/hr Intravenous Continuous   Stopped at 03/29/25 2030    heparin (porcine) in D5W  0-40 Units/kg/hr Intravenous Continuous 19.2 mL/hr at 04/07/25 0530 18.8 Units/kg/hr at 04/07/25 0530    lactated ringers   Intravenous Continuous 75 mL/hr at 04/07/25 0605 Rate Verify at 04/07/25 0605    NORepinephrine bitartrate-D5W  0-3 mcg/kg/min Intravenous Continuous   Stopped at 04/07/25  0538    propofoL  0-50 mcg/kg/min Intravenous Continuous 25.5 mL/hr at 04/07/25 0605 40 mcg/kg/min at 04/07/25 0605    sodium bicarbonate 25 mEq in D5W 1,000 mL Purge Solution for Impella   Impella Device Continuous   New Bag at 04/01/25 0824    tirofiban-0.9% sodium chloride 12.5 mg/250ml    Continuous PRN   Stopped at 03/29/25 1845         Fluid Balance:     Intake/Output Summary (Last 24 hours) at 4/7/2025 0955  Last data filed at 4/7/2025 0605  Gross per 24 hour   Intake 3101.02 ml   Output 2590 ml   Net 511.02 ml         Vital Signs:   Vitals:    04/07/25 0845   BP: 104/69   Pulse: 64   Resp: 10   Temp:          Physical Exam  Vitals and nursing note reviewed.   Constitutional:       General: He is not in acute distress.     Appearance: Normal appearance. He is not toxic-appearing.   HENT:      Head: Normocephalic and atraumatic.      Right Ear: External ear normal.      Left Ear: External ear normal.      Nose: Nose normal.      Mouth/Throat:      Pharynx: No posterior oropharyngeal erythema.      Comments: Unable to visualize posterior oropharynx secondary to endotracheal tube  Eyes:      General: No scleral icterus.     Conjunctiva/sclera: Conjunctivae normal.      Pupils: Pupils are equal, round, and reactive to light.   Neck:      Vascular: No carotid bruit.   Cardiovascular:      Rate and Rhythm: Normal rate and regular rhythm.      Pulses: Normal pulses.      Heart sounds: Normal heart sounds. No murmur heard.     No friction rub. No gallop.   Pulmonary:      Effort: Pulmonary effort is normal. No respiratory distress.      Breath sounds: Rhonchi present. No wheezing or rales.      Comments: Intubated, on mechanical ventilation  Abdominal:      General: Abdomen is flat. Bowel sounds are normal. There is no distension.      Palpations: Abdomen is soft.      Tenderness: There is no abdominal tenderness. There is no guarding or rebound.   Musculoskeletal:         General: No swelling or deformity.       Cervical back: Neck supple. No rigidity or tenderness.   Skin:     General: Skin is warm and dry.      Capillary Refill: Capillary refill takes less than 2 seconds.      Findings: No erythema or rash.   Neurological:      Comments: Unable to fully assess neurologic status and orientation secondary to patient being intubated, sedated and nonverbal, asymmetric pupils   Psychiatric:      Comments: Unable to fully assess as patient is intubated and nonverbal           Ventilator Settings  Vent Mode: A/C (04/07/25 0510)  Ventilator Initiated: Yes (03/29/25 0603)  Set Rate: 14 BPM (04/07/25 0510)  Vt Set: 500 mL (04/07/25 0510)  PEEP/CPAP: 5 cmH20 (04/07/25 0510)  Oxygen Concentration (%): 40 (04/07/25 0800)  Peak Airway Pressure: 32 cmH20 (04/07/25 0510)  Total Ve: 8.6 L/m (04/07/25 0510)  F/VT Ratio<105 (RSBI): (!) 27.27 (04/07/25 0259)      Laboratory Studies:   Recent Labs   Lab 04/07/25  0406   PH 7.430  7.440   PCO2 38.0  35.0   PO2 <38.0  79.0*   HCO3 25.2  23.8     Recent Labs   Lab 04/07/25  0305   WBC 6.06   RBC 3.11*   HGB 9.0*   HCT 27.4*      MCV 88.1   MCH 28.9   MCHC 32.8*     Recent Labs   Lab 04/07/25  0305   GLUCOSE 90      K 3.8   *   CO2 20*   BUN 10.100   CREATININE 0.62*   CALCIUM 7.6*   MG 2.12         Microbiology Data:   Microbiology Results (last 7 days)       Procedure Component Value Units Date/Time    Blood Culture #1 **CANNOT BE ORDERED STAT** [[id number]]  (Normal) Collected: 03/29/25 0446    Order Status: Completed Specimen: Blood from Arm, Left Updated: 04/03/25 1101     Blood Culture No Growth at 5 days    Blood Culture #2 **CANNOT BE ORDERED STAT** [[id number]]  (Normal) Collected: 03/29/25 0454    Order Status: Completed Specimen: Blood from Arm, Right Updated: 04/03/25 1101     Blood Culture No Growth at 5 days              Imaging:   X-Ray Chest 1 View  Narrative: EXAMINATION:  XR CHEST 1 VIEW    CLINICAL HISTORY:  respiratory failure;    TECHNIQUE:  Frontal  view(s) of the chest.    COMPARISON:  Radiography 04/03/2025    FINDINGS:  Similar positioning of the endotracheal tube, right IJ catheter, pulmonary arterial catheter and LVAD.  Enteric tube extends below the diaphragm.  Small bilateral pleural effusions with bilateral lower lung atelectasis.  Possible mild pulmonary edema.  No pneumothorax appreciated.  Stable cardiac silhouette.  Impression: Small pleural effusions with possible mild pulmonary edema.    Electronically signed by: Martín Gan  Date:    04/04/2025  Time:    06:02        2D echo findings as below      Left Ventricle: The left ventricle is dilated. Normal wall thickness. Severe global hypokinesis present. There is severely reduced systolic function with a visually estimated ejection fraction of 15 - 20%. Grade I diastolic dysfunction.    Right Ventricle: Right ventricular enlargement. Systolic function is normal. TAPSE is 1.83 cm.    Left Atrium: dilated    Mitral Valve: Mildly thickened leaflets. There is moderate regurgitation.    Tricuspid Valve: There is moderate regurgitation.    Pulmonary Artery: There is moderate pulmonary hypertension.    IVC/SVC: Patient is ventilated, cannot use IVC diameter to estimate right atrial pressure.        Assessment and Plan    Assessment:  NSTEMI with cardiogenic shock, ongoing  LHC with PTCA/TAD x3 RCA (03/29/2025), PCWP 25 mm Hg  EF 15-20% by TTE with associated right ventricular enlargement dilated left atrium, moderate mitral regurgitation moderate tricuspid regurgitation and moderate pulmonary hypertension  Impella placement (03/29/2025), currently P-6  Ischemic cardiomyopathy with severe decreased LVSF  Acute respiratory failure secondary to above, initially with cardiogenic pulmonary edema, currently with good oxygenation and ventilation.  Severe wide anion gap metabolic acidosis of primary lactic acid etiology, resolved   Mild/mod thrombocytopenia, likely related to  Impella  Anemia  Thrombocytopenia        Plan:  -titrate mechanical ventilation for ARDS net protocol   -supplement oxygen to maintain saturation 94-96%   -currently off all vasopressors and only requiring mechanical support via Impella   -Impella set to P-2 this morning, Cardiology and CV surgery have evaluated the patient and determined that he is a poor surgical candidate for CABG - after discussion with Cardiology on 4/6 they contacted [facility] to evaluate the patient for transfer for consideration for high-risk cardiovascular surgery and they denied transfer of the patient due to comorbidities and specifically significant multivalvular disease with coronary disease  -I discussed the goals of care in depth with the patient's daughters at bedside specifically about palliative extubation and withdrawal of Impella and about timing, they are still discussing amongst themselves as as to when they want to do this but they did admit that they did not wish to continue any longer than necessary due to lack of improvement  -status post 2 units packed red blood cells on 04/06 with hemoglobin up to 9 with improvement in hemodynamic season balance allowing for a decrease in the Impella to P-2  -Continue to monitor platelet count at this time and if there is profound decrease will need to determine if this is heparin induced thrombocytopenia versus complications from Impella    Overall prognosis is very poor    DVT prophylaxis with heparin drip   GI prophylaxis with Protonix   Keep head of bed elevated greater than 30° if possible for VAP prophylaxis      The patient remains at high risk of decompensation and death and will remain in ICU level care    32 min of critical care time was spent reviewing the patient's chart including medications, radiographs, labs, pertinent cultures and pathology data, other consultant notes/recomendations as well as titration of vasopressors, adjustment of mechanical ventilatory or  NIPPV support, as well as discussion of goals of care with nursing staff, respiratory therapy at the bedside and with family at the bedside/via phone.      Bharat Pena MD  4/7/2025  Pulmonology/Critical Care

## 2025-04-07 NOTE — PROGRESS NOTES
Inpatient Nutrition Assessment    Admit Date: 3/29/2025   Total duration of encounter: 9 days   Patient Age: 79 y.o.    Nutrition Recommendation/Prescription     Tube feeding recommendation:     Peptamen Intense VHP goal rate 40 ml/hr + 4 packets ProSource TF20 daily to provide  1120 kcal/d  (75% est needs, 100% with meds)  154 g protein/d (99% est needs)  672 ml free water/d (32% est needs)  (calculations based on estimated 20 hr/d run time)     Start @ 20ml/hr, increase as tolerated 20ml/hr q4hr until goal rate reached.      If no IV fluids running, can give 125ml q 2hr water flushes. Total water provided: 1922ml (91% est needs.)     Communication of Recommendations: reviewed with nurse    Nutrition Assessment     Malnutrition Assessment/Nutrition-Focused Physical Exam       Malnutrition Level: other (see comments) (Does not meet criteria) (03/31/25 1259)  Energy Intake (Malnutrition): other (see comments) (Unable to assess) (03/31/25 1259)  Weight Loss (Malnutrition): other (see comments) (Unable to assess) (03/31/25 1259)                                                  A minimum of two characteristics is recommended for diagnosis of either severe or non-severe malnutrition.    Chart Review    Reason Seen: continuous nutrition monitoring    Malnutrition Screening Tool Results   Have you recently lost weight without trying?: No  Have you been eating poorly because of a decreased appetite?: No   MST Score: 0   Diagnosis:  NSTEMI with cardiogenic shock, ongoing  Ischemic cardiomyopathy with severe decreased LVSF, EF 15%  Cardiogenic pulmonary edema with acute respiratory failure  Severe wide anion gap metabolic acidosis of primary lactic acid etiology, resolved   Elevated serum transaminases    Relevant Medical History: CAD, HTN, HLD, PAD, GERD    Scheduled Medications:  aspirin, 81 mg, Daily  famotidine, 20 mg, BID  heparin (PORCINE), 39.2 Units/kg, Once    Continuous Infusions:  fentanyl, Last Rate: Stopped  (03/29/25 2030)  heparin (porcine) in D5W, Last Rate: 18.8 Units/kg/hr (04/07/25 0530)  lactated ringers, Last Rate: 75 mL/hr at 04/07/25 0605  NORepinephrine bitartrate-D5W, Last Rate: Stopped (04/07/25 0538)  propofoL, Last Rate: 35 mcg/kg/min (04/07/25 1224)  sodium bicarbonate 25 mEq in D5W 1,000 mL Purge Solution for Impella  tirofiban-0.9% sodium chloride 12.5 mg/250ml, Last Rate: Stopped (03/29/25 1845)    PRN Medications:  0.9%  NaCl infusion (for blood administration), , Q24H PRN  0.9%  NaCl infusion (for blood administration), , Q24H PRN  0.9%  NaCl infusion (for blood administration), , Q24H PRN  0.9%  NaCl infusion (for blood administration), , Q24H PRN  0.9%  NaCl infusion (for blood administration), , Q24H PRN  acetaminophen, 650 mg, Q4H PRN  calcium gluconate IVPB, 1 g, PRN  calcium gluconate IVPB, 2 g, PRN  calcium gluconate IVPB, 3 g, PRN  dextrose 50%, 12.5 g, PRN  dextrose 50%, 25 g, PRN  glucagon (human recombinant), 1 mg, PRN  glucose, 16 g, PRN  glucose, 24 g, PRN  heparin (PORCINE), 39.2 Units/kg, PRN  heparin (PORCINE), 30 Units/kg, PRN  magnesium sulfate 2 g IVPB, 2 g, PRN  magnesium sulfate 2 g IVPB, 2 g, PRN  ondansetron, 8 mg, Q8H PRN  potassium chloride in water, 20 mEq, PRN   And  potassium chloride in water, 40 mEq, PRN   And  potassium chloride in water, 60 mEq, PRN  sodium phosphate 15 mmol in D5W 250 mL IVPB, 15 mmol, PRN  sodium phosphate 20.1 mmol in D5W 250 mL IVPB, 20.1 mmol, PRN  sodium phosphate 30 mmol in D5W 250 mL IVPB, 30 mmol, PRN  tirofiban-0.9% sodium chloride 12.5 mg/250ml, , Continuous PRN    Calorie Containing IV Medications: Diprivan @ 18 ml/hr (provides 475 kcal/d)    Recent Labs   Lab 04/01/25  0417 04/01/25  1211 04/04/25  0251 04/04/25  1144 04/05/25  1222 04/05/25  1223 04/05/25  1959 04/06/25  0223 04/06/25  1151 04/06/25  2240 04/07/25  0305 04/07/25  1144      < > 139   < > 138  --  139 139 138 139 139 141   K 4.1   < > 3.9   < > 3.9  --  3.9 3.8 3.9  3.9 3.8 3.9   CALCIUM 7.3*   < > 7.7*   < > 7.8*  --  7.4* 7.7* 7.7* 7.7* 7.6* 7.9*   PHOS 2.9   < > 2.6   < > 3.0  --  3.0 2.7 3.1 3.0 2.9 2.9   MG 2.10   < > 2.20   < > 2.10  --  2.10 2.20 2.20 2.10 2.12 2.10   *   < > 112*   < > 113*  --  113* 112* 112* 114* 112* 114*   CO2 22*   < > 22*   < > 20*  --  21* 20* 21* 21* 20* 22*   BUN 12.7   < > 12.2   < > 10.6  --  10.1 9.6 10.3 9.6 10.100 9.2   CREATININE 0.81   < > 0.68*   < > 0.70*  --  0.70* 0.67* 0.64* 0.63* 0.62* 0.61*   EGFRNORACEVR >60   < > >60   < > >60  --  >60 >60 >60 >60 >60 >60   GLUCOSE 116*   < > 90   < > 118*  --  108 104 96 89 90 94   BILITOT 0.4   < > 0.3   < > 0.3  --  0.3 0.3 0.3 0.3 0.3 0.3   ALKPHOS 67   < > 63   < > 68  --  65 66 62 59 65 65   ALT 41   < > 17   < > 19  --  21 23 20 19 19 18   AST 56*   < > 24   < > 28  --  31 34 26 22 22 20   ALBUMIN 2.2*   < > 2.0*   < > 2.0*  --  1.9* 1.9* 1.8* 1.8* 1.9* 1.8*   TRIG 123  --  126  --   --   --   --   --   --   --   --   --    WBC 10.47   < > 7.37   < >  --  5.43 4.58 5.07 4.54  4.54 5.15 6.06 5.77   HGB 9.4*   < > 8.2*   < >  --  7.9* 7.6* 7.8* 7.4* 8.5* 9.0* 9.2*   HCT 28.6*   < > 25.1*   < >  --  23.8* 23.3* 24.1* 22.7* 27.4* 27.4* 27.7*    < > = values in this interval not displayed.     Nutrition Orders:  No diet orders on file      Appetite/Oral Intake: not applicable/not applicable  Factors Affecting Nutritional Intake: on mechanical ventilation  Social Needs Impacting Access to Food: unable to assess at this time; will attempt on follow-up  Food/P[employer]/Cultural Preferences: unable to obtain  Food Allergies: no known food allergies  Last Bowel Movement: 03/28/25  Wound(s):  Incision documentation noted     Comments    3/31/25: Discussed with RN. Will provide tube feeding recommendations for when appropriate to start tube feeding. Receiving kcal from meds.  Currently with Impella in place, HOB flat. May need to place in reverse Trendelenburg to start TF.     4/1/25: Possible  "plans for starting trickle feeds today. Still receiving kcal from meds.     4/2/25: No TF yet. Still receiving kcal from meds.     4/3/25: No TF plans at this time. Plans for CABG tomorrow with plans for extubation per protocol post-op.     4/7/25: No CABG done. Possible plans for removal of Impella today. TF to start post procedure per RN.     Anthropometrics    Height: 5' 10.98" (180.3 cm), Height Method: Stated  Last Weight: 106.1 kg (233 lb 14.5 oz) (03/29/25 1738), Weight Method: Bed Scale  BMI (Calculated): 32.6  BMI Classification: obese grade I (BMI 30-34.9)        Ideal Body Weight (IBW), Male: 171.88 lb     % Ideal Body Weight, Male (lb): 130.81 %                          Usual Weight Provided By: unable to obtain usual weight    Wt Readings from Last 5 Encounters:   03/29/25 106.1 kg (233 lb 14.5 oz)   12/02/21 101 kg (222 lb 10.6 oz)     Weight Change(s) Since Admission:   Wt Readings from Last 1 Encounters:   03/29/25 1738 106.1 kg (233 lb 14.5 oz)   03/29/25 0404 102.1 kg (225 lb)   Admit Weight: 102.1 kg (225 lb) (03/29/25 0404), Weight Method: Stated    Estimated Needs    Weight Used For Calorie Calculations: 106.1 kg (233 lb 14.5 oz)  Energy Calorie Requirements (kcal): 1167-1485kcal (11-14kcal/kg IBW)  Energy Need Method: Kcal/kg  Weight Used For Protein Calculations: 78.2 kg (172 lb 5.7 oz) (IBW)  Protein Requirements: 156gm (2g/kg IBW)  Fluid Requirements (mL): 2122ml (20ml/kg)  CHO Requirement: 165gm (45% est kcal needs)     Enteral Nutrition     Patient not receiving enteral nutrition at this time.    Parenteral Nutrition     Patient not receiving parenteral nutrition support at this time.    Evaluation of Received Nutrient Intake    Calories: not meeting estimated needs  Protein: not meeting estimated needs    Patient Education     Not applicable.    Nutrition Diagnosis     PES: Inadequate oral intake related to acute illness as evidenced by intubation since admit. (active)     PES:        "     Nutrition Interventions     Intervention(s): modified composition of enteral nutrition, modified rate of enteral nutrition, and collaboration with other providers  Intervention(s):      Goal: Meet greater than 80% of nutritional needs by follow-up. (goal progressing)  Goal: Tolerate enteral feeding at goal rate by follow-up. (goal progressing)    Nutrition Goals & Monitoring     Dietitian will monitor: energy intake and enteral nutrition intake  Discharge planning: too early to determine; pending clinical course  Nutrition Risk/Follow-Up: high (follow-up in 1-4 days)   Please consult if re-assessment needed sooner.

## 2025-04-08 NOTE — PROGRESS NOTES
[facility]    Cardiology  Progress Note    Patient Name: oJn Conley  MRN: [id number]  Admission Date: 3/29/2025  Hospital Length of Stay: 10 days  Code Status: DNR   Attending Provider: Davi Harp Jr., MD,*   Consulting Provider: Juliocesar Hale [facility]  Primary Care Physician: Patrick Reece Jr., MD  Principal Problem:<principal problem not specified>    Patient information was obtained from past medical records and ER records.     Subjective:     Chief Complaint/Reason for Consult: Cardiac Arrest     HPI: Mr. Conley is a 80 y/o male who is known to [facility], Dr. Wang. The patient presented to [facility] on 3.29.25 with c/o SOB. He initially presented to [facility] in [place] with SOB. He was found to have Tachycardia, Hypotension, Anemia (7.4/24.6), Troponin 9.443, .0, BUN/Crea 16.2/1.34, Lactic Acid Level 4.3, Na 133, WBC 14.03, H&H 7.3/23.0, WBC 14.03, , PT/INR 14.9/1.2. He was admitted to , however, he sustained a witnessed PEA Arrest in the ER and has ROSC per ER MD. [facility] was consulted for NSETMI and Arrest.     3.30.25: NAD. Impella L Groin. CVP 7, P7. Levophed 0.32mcg/kg/min, Heparin Drip per Protocol, Amiodarone 0.5mg/min  3.31.25: NAD. L Groin Impella P7, Amiodarone 0.5mg/min, Heparin Drip per Protocol, Levophed 0.18mcg/kg/min, H&H 8.8/25.8, AST/ALT 79/45  4.1.25: NAD noted. L groin Impella in place, remains at P7. Remains on levophed. LFTs continue to improve.  4.2.25: NAD. L Groin Impella Device P6. Heparin Infusion per Protocol. Remains on 0.06mcg/kg/min.   4.3.25: NAD. Vented/Sedated. L Groin Impella Device. P3, Heparin Infusion per Protocol. Off Pressors. Attempting Wean of Impella. H&H 8.2/26.2,   4.4.25: NAD. Vented/Sedated. Heparin Drip per Protocol. H&H 8.1/95.4,   4.5.25: NAD. Vented/Sedated. Heparin Drip per Protocol. Levophed 0.04mcg/kg/min. H&H 7.9/23.8,  Impella P6  4.6.25: NAD.  Vented/Sedated. P3. Heparin Drip per Protocol, Off Pressors. CV Surgery to Reevaluate for CABG. H&H 7.4/22.7,   4.7.25: NAD. Vented/Sedated. H&H 9.2/27.7, , K 3.9, BUN/Crea 9.2/0.61, Lactic Acid 0.6. Impella L Groin P4  4.8.25:NAD noted. Remains vented/sedated. Consent obtained for [facility], will plan for Thursday.    PMH: BPH/Urinary Obstruction, CAD/Nonobstructive, PAD, R JACY/Mild, HTN, Obesity, GERD, Tobacco Use   PSH: TURP, Angiogram, Bilateral Knee Replacements   Family History: Denies Family History of Heart Disease  Social History: Denies Illicit Drug, ETOH and Tobacco Use     Previous Cardiac Diagnostics:   ECHO 4.3.25:  Limited study to check impella placement.  Left Ventricle: There is severely reduced systolic function with a visually estimated ejection fraction of 15 - 20%.  Impella distance from aortic valve annulus appears appropriate.    ECHO 4.1.25:  Limited study to check impella placement.  Left Ventricle: There is severely reduced systolic function with a visually estimated ejection fraction of 15 - 20%.  Impella distance from aortic valve annulus appears appropriate.    ECHO 3.30.25:  Left Ventricle: There is severely reduced systolic function with a visually estimated ejection fraction of less than 30%.  There appears to be at least moderate to severe mitral regurgitation.   Impella is positioned with inflow cannula 3.8 cm from aortic valve annulus.    [facility] 3.29.25:  Findings:  - Successful placement of Impella CP device from left femoral approach.  - There is severe multi-vessel coronary artery disease.  - Left Main has mild diffuse disease.  - Mid Left Anterior Descending has serial lesions, up to 70-80% in severity.   - Prox Left Circumflex is 100% occluded. Unable to cross lesion despite wire escalation and microcatheter support.  - Mid Right Coronary Artery is 100% occluded. The lesion was successfully treated with overlapping SYNERGY XD 4.0X20MM, SYNERGY XD 3.0X48MM, and  SYNERGY XD 2.00V99FS drug-eluting stents. Post-dilatation was performed using a SYNERGY XD 4.0X20MM angioplasty balloon inflated to 12 gretel. Following intervention there was 0% residual stenosis and PATTY grade 3 flow in the distal vessel.  - Intravascular Ultrasound (IVUS) was performed prior to intervention to further characterize the lesion, as well as post-stent deployment to ensure optimal stent apposition and expansion.  - Aorto-iliac angiogram revealed moderate disease of R ileofemoral system.  - At the conclusion of the case, contrast injection was performed through the Impella sheath side port, showing adequate antegrade flow down the ipsilateral common femoral artery/SFA/profunda artery.  - RHC with elevated R sided filling pressures. RA 17 mmHg, RV 45/15 mmHg, PA 42/29 mmHg (mean 34 mmHg), PCWP 25 mmHg.  - Transpulmonary gradient is 9 mmHg.  - Cardiac Output/Index at 6.6/3.0, as calculated by Gregory equation on Levophed infusion and Impella CP support.  - PVR is 1.4 Woods units  - SVR is 550 dyne-sec*cm^-5  - Pulmonary Artery Pulsatility Index (Rafa) is 0.8  - Cardiac Power Output () is 0.91   Assessment/Plan:  - Patient is a 79 y.o. male with a history of recent TURP, complicated by significant post-op bleeding, presents to OSH ED with dyspnea. Transferred to [facility] with worsening shock despite vasopressors. TTE showing EF 25%, severe MR, severe TR. Troponin elevated. Brought to cath lab. Underwent Impella placement/LHC/RHC as outlined above. RCA lesion treated. Unable to cross LCx lesion. 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 At this point, would continue Impella support and aggressive medical mgmt. Has non-culprit lesions that should be treated at a later date as well. Can consider CTS consult, especially given severe valvular disease.  - Patient was given a loading dose of clopidogrel 600 mg PO in the cath lab  - Continue DAPT (aspirin + clopidogrel) for a minimum of 12 months and aspirin  indefinitely thereafter  - High intensity statin  - Continue Aggrastat for 4 hours post-procedure  - Arterial sheath remains place; plan is to remove when activated clotting time (ACT) less than 180 seconds. Bedrest for 4 hours after hemostasis is achieved.  - Obtain transthoracic echocardiogram  - Continuous bedrest while Impella in place  - Therapeutic heparin administration per protocol  - Antibiotics per protocol  - Remaining mgmt per primary team/cardiology    ECHO 3.29.25:  Left Ventricle: The left ventricle is dilated. Normal wall thickness. Severe global hypokinesis present. There is severely reduced systolic function with a visually estimated ejection fraction of 15 - 20%. Grade I diastolic dysfunction.  Right Ventricle: Right ventricular enlargement. Systolic function is normal. TAPSE is 1.83 cm.  Left Atrium: dilated  Mitral Valve: Mildly thickened leaflets. There is moderate regurgitation.  Tricuspid Valve: There is moderate regurgitation.  Pulmonary Artery: There is moderate pulmonary hypertension.  IVC/SVC: Patient is ventilated, cannot use IVC diameter to estimate right atrial pressure.    Carotid US 11.4.24:  The study quality is average.   1-39% stenosis in the proximal right internal carotid artery based on Bluth Criteria.   Antegrade right vertebral artery flow.   Antegrade left vertebral artery flow    ECHO 1.27.22:  The study quality is average.   The left ventricle is normal in size. Global left ventricular systolic function is moderately decreased. The left ventricular ejection fraction is 40-45%. Mild concentric left ventricular hypertrophy is present.  The left ventricle diastolic function is impaired (Grade I) with normal left atrial pressure.  Mild (1+) mitral regurgitation.  The pulmonary artery appears normal.     [facility] 9.26.17:  LM: Normal  LAD: Prox 30% Stenosis  RI: Normal  LCX: Normal  RCA: Distal RCA 50%    Review of patient's allergies indicates:  No Known Allergies  No current  facility-administered medications on file prior to encounter.     No current outpatient medications on file prior to encounter.     Review of Systems   Unable to perform ROS: Intubated     Objective:     Vital Signs (Most Recent):  Temp: 98.6 °F (37 °C) (04/08/25 0400)  Pulse: 69 (04/08/25 0730)  Resp: 19 (04/08/25 0600)  BP: 121/70 (04/08/25 0600)  SpO2: 97 % (04/08/25 0600) Vital Signs (24h Range):  Temp:  [98.2 °F (36.8 °C)-98.6 °F (37 °C)] 98.6 °F (37 °C)  Pulse:  [60-71] 69  Resp:  [10-23] 19  SpO2:  [94 %-98 %] 97 %  BP: ()/(52-70) 121/70  Arterial Line BP: (101-127)/(48-65) 123/60   Weight: 106.1 kg (233 lb 14.5 oz)  Body mass index is 32.64 kg/m².  SpO2: 97 %       Intake/Output Summary (Last 24 hours) at 4/8/2025 1203  Last data filed at 4/8/2025 0604  Gross per 24 hour   Intake 2392.62 ml   Output 1660 ml   Net 732.62 ml     Lines/Drains/Airways       Central Venous Catheter Line  Duration             Tunneled Central Line - Triple Lumen 03/29/25 0555 Internal Jugular Right 10 days    Pulmonary Artery Catheter Assessment  03/29/25 1300 Femoral Vein Right 9 days              Drain  Duration                  Urethral Catheter -- days         NG/OG Tube 03/29/25 1200 18 Fr. Center mouth 10 days              Airway  Duration                  Airway - Non-Surgical 03/29/25 0541 10 days              Line  Duration                  VAD 03/29/25 10 days              Peripheral Intravenous Line  Duration                  Peripheral IV - Single Lumen 03/29/25 0530 18 G Right Antecubital 10 days         Peripheral IV - Single Lumen 03/29/25 0600 20 G Left Hand 10 days         Peripheral IV - Single Lumen 03/29/25 20 G Left Forearm 10 days         Sheath 03/29/25 0913 Right 10 days         Sheath 03/29/25 0927 Left 10 days                  Significant Labs:   Chemistries:   Recent Labs   Lab 04/06/25  2240 04/07/25  0305 04/07/25  1144 04/07/25  2107 04/08/25  0328    139 141 141 141   K 3.9 3.8 3.9 3.8  3.7   * 112* 114* 115* 114*   CO2 21* 20* 22* 20* 21*   BUN 9.6 10.100 9.2 9.2 8.9   CREATININE 0.63* 0.62* 0.61* 0.62* 0.62*   CALCIUM 7.7* 7.6* 7.9* 7.9* 7.9*   BILITOT 0.3 0.3 0.3 0.3 0.3   ALKPHOS 59 65 65 67 67   ALT 19 19 18 16 17   AST 22 22 20 18 19   GLUCOSE 89 90 94 91 89   MG 2.10 2.12 2.10 2.10 2.10   PHOS 3.0 2.9 2.9 3.0 2.8        CBC/Anemia Labs: Coags:    Recent Labs   Lab 04/07/25  2107 04/08/25  0328 04/08/25  0433   WBC 4.89 5.51 5.39   HGB 9.2* 9.1* 8.9*   HCT 28.9* 28.0* 27.5*    162 155   MCV 90.0 89.2 89.9   RDW 19.0* 18.8* 19.0*    Recent Labs   Lab 04/05/25 1959   APTT 114.7*          Telemetry: SR    Physical Exam  Constitutional:       General: He is not in acute distress.     Appearance: Normal appearance. He is ill-appearing.      Comments: Vented/Sedated   HENT:      Head: Normocephalic.      Mouth/Throat:      Mouth: Mucous membranes are moist.   Cardiovascular:      Rate and Rhythm: Normal rate and regular rhythm.      Pulses: Normal pulses.      Heart sounds: Murmur heard.   Pulmonary:      Effort: Pulmonary effort is normal. No respiratory distress.      Comments: Ventilator Associated Breath Sounds  Vent Mode: A/C  Oxygen Concentration (%):  [40-45] 40  Resp Rate Total:  [14 br/min-17 br/min] 16 br/min  Vt Set:  [500 mL] 500 mL  PEEP/CPAP:  [5 cmH20] 5 cmH20  Mean Airway Pressure:  [9 cmH20-10 cmH20] 9 cmH20  Abdominal:      Palpations: Abdomen is soft.   Skin:     General: Skin is warm.      Comments: L Groin Impella CP, Soft/Flat, No Sign of Bleed/Infection. + Monophasic Dopplerable Pedal Pulses    Neurological:      Comments: Vented/Sedated       Home Medications:   Medications Ordered Prior to Encounter[1]  Current Schedule Inpatient Medications:   aspirin  81 mg Oral Daily    clopidogreL  75 mg Oral Daily    famotidine  20 mg Per NG tube BID    heparin (PORCINE)  39.2 Units/kg Intravenous Once     Continuous Infusions:   fentanyl  0-250 mcg/hr Intravenous  Continuous   Stopped at 03/29/25 2030    heparin (porcine) in D5W  0-40 Units/kg/hr Intravenous Continuous 19.2 mL/hr at 04/08/25 0622 18.8 Units/kg/hr at 04/08/25 0622    lactated ringers   Intravenous Continuous 75 mL/hr at 04/08/25 0604 Rate Verify at 04/08/25 0604    NORepinephrine bitartrate-D5W  0-3 mcg/kg/min Intravenous Continuous   Stopped at 04/07/25 0538    propofoL  0-50 mcg/kg/min Intravenous Continuous 27.6 mL/hr at 04/08/25 1006 45 mcg/kg/min at 04/08/25 1006    sodium bicarbonate 25 mEq in D5W 1,000 mL Purge Solution for Impella   Impella Device Continuous   New Bag at 04/01/25 0824    tirofiban-0.9% sodium chloride 12.5 mg/250ml    Continuous PRN   Stopped at 03/29/25 1845     Mechanical Circulatory Support (MCS) Flow Sheet  Cardiogenic Shock/HF Management  04/08/2025    PUMP METRICS  Value/Parameter TIME: 0900   Site Observation/Distal Pulses L Groin See Physical Exam   cm See Nursing Documentation    Impella [x]CP w/Smart Assist   []5.5 w/Smart Assist   []RP w/Smart Assist   P-Level P-2   Ao (MAP) 135/44; MAP 74   LV -   Motor Current (Mean) 400/255   Purge Pressures/Flow 382/13.7   Average Flows 1.9     HEMODYNAMICS  Value/Parameter TIME: 0900   AO (120/80) 121/56   MAP (> 60) 79   CO (PA Catheter)/GARRY 6.2   Native CO ([place] CO-Impella)    CI (> 2.2) 2.7   Cardiac Power Output (Greater than or Equal to 0.8) 1.08   SVR (800-1200) 877   HR () 65   LVEF  %     RV ASSESSMENT  Value/Parameter TIME: 0900   Rafa (> 1.5) 2   PA (25/10) 40/18   CVP (8-12) 11   PVR (120-250)    TAPSI (cm) (1.8) cm     LABS  Value/Parameter TIME: 0900   Lactate Level (< 2.0) 0.7   SvO2 (65-75%) 62%   Hgb/Hct (12-17/36-50) 9/27.4      SpO2 (%) 96%   pH (7.35-7.45) 7.44   Creatinine (0.6-1.2) 0.62   ALT (7-56) 19   ACT (160-180) 170   LDH    Urine Output (> 30mL/HR) > 100mL/HR      DRIPS (GTTS)  Value/Parameter TIME: 0900   Vasopressors  []Vasopressin  []Epinephrine  []Levophed  []NeoSynephrine   Inotropes   []Dobutamine  []Primacor/Milrinone   []Dopamine   Anticoagulants [x]Heparin  []Argatroban   Sedation []Fentanyl  []Precedex  [x]Propofol  []Nimbex     Assessment:   Cardiogenic Shock requiring Mechanical and Chemical Support - Now just Mechanical    - PT has NOT Tolerated Impella Wean x 2 in the Last few Days (4.4.25)    - Impella/Pressors   MVCAD    - s/p LHC (3.29.25) -  Left Main has mild diffuse disease. Mid Left Anterior Descending has serial lesions, up to 70-80% in severity. Prox Left Circumflex is 100% occluded. Unable to cross lesion despite wire escalation and microcatheter support. Mid Right Coronary Artery is 100% occluded. The lesion was successfully treated with overlapping SYNERGY XD 4.0X20MM, SYNERGY XD 3.0X48MM, and SYNERGY XD 2.93G64JR drug-eluting stents. Post-dilatation was performed using a SYNERGY XD 4.0X20MM angioplasty balloon inflated to 12 gretel. Following intervention there was 0% residual stenosis and PATTY grade 3 flow in the distal vessel.  NSTEMI Type I (Non-Anterior Wall MI)    - Trponin > 9  Cardiopulmonary Arrest    - Initial Rhythm PEA - Witnessed in ER Arrest/ROSC  ICMO/EF 15-20%     - ECHO (3.29.25) - LVEF 15-20%, Grade I DD   Acute Hypoxemic Respiratory Failure requiring Intubation/Ventilation   ST- Now SR   Hypotension requiring Pressors - Resolved     - Hx of HTN  BPH s/p TURP   Lactic Acidosis - Resolved   Leukocytosis - Resolved   Anemia requiring Transfusions - Stable   Recent BPH Diagnosis s/p TURP  CAD/Nonobstructive (2017)  JACY/R Sided   PAD/Nonobstructive   Transaminitis - Resolved   Obesity  GERD  Tobacco Use  No Hx of GIB     Plan:   Heparin Drip per Protocol for Tx of NSTEMI/Impella Thrombosis Prophylaxis   Groin Precautions/Impella Site Monitoring   CV Surgery reevaluated PT, Remains too High Risk for CABG/Valve  Continue Impella at P4 until after [facility]  Will have Interventional Cardiology Review Films to Determine if PT can have LAD Fixed prior to Explant/Same time PT  goes down for Explant after Intervention. Will plan for LHC on Thursday.   NPO p MN on Wednesday  Consent obtained and on chart.  Plavix 75mg PO Daily Starting Tomorrow  Labs and EKG in AM: CBC, CMP and Mg    DEANDRA Ralph-BC  Cardiology  [facility]          [1]   No current facility-administered medications on file prior to encounter.     No current outpatient medications on file prior to encounter.

## 2025-04-08 NOTE — PROGRESS NOTES
Inpatient Nutrition Assessment    Admit Date: 3/29/2025   Total duration of encounter: 10 days   Patient Age: 79 y.o.    Nutrition Recommendation/Prescription     Tube feeding recommendation:     Peptamen Intense VHP goal rate 40 ml/hr + 4 packets ProSource TF20 daily to provide  1120 kcal/d  (75% est needs, 100% with meds)  154 g protein/d (99% est needs)  672 ml free water/d (32% est needs)  (calculations based on estimated 20 hr/d run time)     Start @ 20ml/hr, increase as tolerated 20ml/hr q4hr until goal rate reached.      Flushes per MD due to Impella; otherwise, if no IV fluids running, can give 125ml q 2hr water flushes. Total water provided: 1922ml (91% est needs.)     Communication of Recommendations: reviewed with nurse    Nutrition Assessment     Malnutrition Assessment/Nutrition-Focused Physical Exam       Malnutrition Level: other (see comments) (Does not meet criteria) (03/31/25 1259)  Energy Intake (Malnutrition): other (see comments) (Unable to assess) (03/31/25 1259)  Weight Loss (Malnutrition): other (see comments) (Unable to assess) (03/31/25 1259)                                                  A minimum of two characteristics is recommended for diagnosis of either severe or non-severe malnutrition.    Chart Review    Reason Seen: continuous nutrition monitoring    Malnutrition Screening Tool Results   Have you recently lost weight without trying?: No  Have you been eating poorly because of a decreased appetite?: No   MST Score: 0   Diagnosis:  NSTEMI with cardiogenic shock, ongoing  Ischemic cardiomyopathy with severe decreased LVSF, EF 15%  Cardiogenic pulmonary edema with acute respiratory failure  Severe wide anion gap metabolic acidosis of primary lactic acid etiology, resolved   Elevated serum transaminases    Relevant Medical History: CAD, HTN, HLD, PAD, GERD    Scheduled Medications:  aspirin, 81 mg, Daily  clopidogreL, 75 mg, Daily  famotidine, 20 mg, BID  heparin (PORCINE), 39.2  Units/kg, Once    Continuous Infusions:  fentanyl, Last Rate: Stopped (03/29/25 2030)  heparin (porcine) in D5W, Last Rate: 18.8 Units/kg/hr (04/08/25 0622)  lactated ringers, Last Rate: 75 mL/hr at 04/08/25 0604  NORepinephrine bitartrate-D5W, Last Rate: Stopped (04/07/25 0538)  propofoL, Last Rate: 45 mcg/kg/min (04/08/25 1006)  sodium bicarbonate 25 mEq in D5W 1,000 mL Purge Solution for Impella  tirofiban-0.9% sodium chloride 12.5 mg/250ml, Last Rate: Stopped (03/29/25 1845)    PRN Medications:  0.9%  NaCl infusion (for blood administration), , Q24H PRN  0.9%  NaCl infusion (for blood administration), , Q24H PRN  0.9%  NaCl infusion (for blood administration), , Q24H PRN  0.9%  NaCl infusion (for blood administration), , Q24H PRN  0.9%  NaCl infusion (for blood administration), , Q24H PRN  acetaminophen, 650 mg, Q4H PRN  calcium gluconate IVPB, 1 g, PRN  calcium gluconate IVPB, 2 g, PRN  calcium gluconate IVPB, 3 g, PRN  dextrose 50%, 12.5 g, PRN  dextrose 50%, 25 g, PRN  glucagon (human recombinant), 1 mg, PRN  glucose, 16 g, PRN  glucose, 24 g, PRN  heparin (PORCINE), 39.2 Units/kg, PRN  heparin (PORCINE), 30 Units/kg, PRN  magnesium sulfate 2 g IVPB, 2 g, PRN  magnesium sulfate 2 g IVPB, 2 g, PRN  ondansetron, 8 mg, Q8H PRN  potassium chloride in water, 20 mEq, PRN   And  potassium chloride in water, 40 mEq, PRN   And  potassium chloride in water, 60 mEq, PRN  sodium phosphate 15 mmol in D5W 250 mL IVPB, 15 mmol, PRN  sodium phosphate 20.1 mmol in D5W 250 mL IVPB, 20.1 mmol, PRN  sodium phosphate 30 mmol in D5W 250 mL IVPB, 30 mmol, PRN  tirofiban-0.9% sodium chloride 12.5 mg/250ml, , Continuous PRN    Calorie Containing IV Medications: Diprivan @ 18 ml/hr (provides 475 kcal/d)    Recent Labs   Lab 04/04/25  0251 04/04/25  1144 04/06/25  0223 04/06/25  1151 04/06/25  2240 04/07/25  0305 04/07/25  1144 04/07/25  2107 04/08/25  0328 04/08/25  0433      < > 139 138 139 139 141 141 141  --    K 3.9   < >  3.8 3.9 3.9 3.8 3.9 3.8 3.7  --    CALCIUM 7.7*   < > 7.7* 7.7* 7.7* 7.6* 7.9* 7.9* 7.9*  --    PHOS 2.6   < > 2.7 3.1 3.0 2.9 2.9 3.0 2.8  --    MG 2.20   < > 2.20 2.20 2.10 2.12 2.10 2.10 2.10  --    *   < > 112* 112* 114* 112* 114* 115* 114*  --    CO2 22*   < > 20* 21* 21* 20* 22* 20* 21*  --    BUN 12.2   < > 9.6 10.3 9.6 10.100 9.2 9.2 8.9  --    CREATININE 0.68*   < > 0.67* 0.64* 0.63* 0.62* 0.61* 0.62* 0.62*  --    EGFRNORACEVR >60   < > >60 >60 >60 >60 >60 >60 >60  --    GLUCOSE 90   < > 104 96 89 90 94 91 89  --    BILITOT 0.3   < > 0.3 0.3 0.3 0.3 0.3 0.3 0.3  --    ALKPHOS 63   < > 66 62 59 65 65 67 67  --    ALT 17   < > 23 20 19 19 18 16 17  --    AST 24   < > 34 26 22 22 20 18 19  --    ALBUMIN 2.0*   < > 1.9* 1.8* 1.8* 1.9* 1.8* 1.8* 1.8*  --    TRIG 126  --   --   --   --   --   --   --   --   --    WBC 7.37   < > 5.07 4.54  4.54 5.15 6.06 5.77 4.89 5.51 5.39   HGB 8.2*   < > 7.8* 7.4* 8.5* 9.0* 9.2* 9.2* 9.1* 8.9*   HCT 25.1*   < > 24.1* 22.7* 27.4* 27.4* 27.7* 28.9* 28.0* 27.5*    < > = values in this interval not displayed.     Nutrition Orders:  Diet NPO      Appetite/Oral Intake: not applicable/not applicable  Factors Affecting Nutritional Intake: on mechanical ventilation  Social Needs Impacting Access to Food: unable to assess at this time; will attempt on follow-up  Food/[place]/Cultural Preferences: unable to obtain  Food Allergies: no known food allergies  Last Bowel Movement: 03/28/25  Wound(s):  Incision documentation noted     Comments    3/31/25: Discussed with RN. Will provide tube feeding recommendations for when appropriate to start tube feeding. Receiving kcal from meds.  Currently with Impella in place, HOB flat. May need to place in reverse Trendelenburg to start TF.     4/1/25: Possible plans for starting trickle feeds today. Still receiving kcal from meds.     4/2/25: No TF yet. Still receiving kcal from meds.     4/3/25: No TF plans at this time. Plans for CABG  "tomorrow with plans for extubation per protocol post-op.     4/7/25: No CABG done. Possible plans for removal of Impella today. TF to start post procedure per RN.     4/8/25: Plans for removal tomorrow. TF to start today.     Anthropometrics    Height: 5' 10.98" (180.3 cm), Height Method: Stated  Last Weight: 106.1 kg (233 lb 14.5 oz) (03/29/25 1738), Weight Method: Bed Scale  BMI (Calculated): 32.6  BMI Classification: obese grade I (BMI 30-34.9)        Ideal Body Weight (IBW), Male: 171.88 lb     % Ideal Body Weight, Male (lb): 130.81 %                          Usual Weight Provided By: unable to obtain usual weight    Wt Readings from Last 5 Encounters:   03/29/25 106.1 kg (233 lb 14.5 oz)   12/02/21 101 kg (222 lb 10.6 oz)     Weight Change(s) Since Admission:   Wt Readings from Last 1 Encounters:   03/29/25 1738 106.1 kg (233 lb 14.5 oz)   03/29/25 0404 102.1 kg (225 lb)   Admit Weight: 102.1 kg (225 lb) (03/29/25 0404), Weight Method: Stated    Estimated Needs    Weight Used For Calorie Calculations: 106.1 kg (233 lb 14.5 oz)  Energy Calorie Requirements (kcal): 1167-1485kcal (11-14kcal/kg IBW)  Energy Need Method: Kcal/kg  Weight Used For Protein Calculations: 78.2 kg (172 lb 5.7 oz) (IBW)  Protein Requirements: 156gm (2g/kg IBW)  Fluid Requirements (mL): 2122ml (20ml/kg)  CHO Requirement: 165gm (45% est kcal needs)     Enteral Nutrition     Patient not receiving enteral nutrition at this time.    Parenteral Nutrition     Patient not receiving parenteral nutrition support at this time.    Evaluation of Received Nutrient Intake    Calories: not meeting estimated needs  Protein: not meeting estimated needs    Patient Education     Not applicable.    Nutrition Diagnosis     PES: Inadequate oral intake related to acute illness as evidenced by intubation since admit. (active)     PES:            Nutrition Interventions     Intervention(s): modified composition of enteral nutrition, modified rate of enteral " nutrition, and collaboration with other providers  Intervention(s):      Goal: Meet greater than 80% of nutritional needs by follow-up. (goal progressing)  Goal: Tolerate enteral feeding at goal rate by follow-up. (goal progressing)    Nutrition Goals & Monitoring     Dietitian will monitor: energy intake and enteral nutrition intake  Discharge planning: too early to determine; pending clinical course  Nutrition Risk/Follow-Up: high (follow-up in 1-4 days)   Please consult if re-assessment needed sooner.

## 2025-04-08 NOTE — PROGRESS NOTES
[facility] - [facility] ICU  Pulmonary Critical Care Note    Patient Name: Jon Conley  MRN: [id number]  Admission Date: 3/29/2025  Hospital Length of Stay: 10 days  Code Status: DNR  Attending Provider: Davi Harp Jr., MD,*  Primary Care Provider: Patrick Reece Jr., MD     Subjective:     HPI:   Jon Conley is a 79 y.o. male with PMH coronary artery disease, hypertension, hyperlipidemia, peripheral arterial disease, and GE reflux disease. He also has a history of recent TURP in [place], complicated by postoperative by gross hematuria requiring placement of a three-way irrigation bladder catheter last week. This was left in place at time of discharge. He presented to [facility] ER early a.m. of 03/29/2025 with shortness of breath, noted tachycardia/hypotensive, and marked elevation of high sensitivity troponin at 13,821. He was transferred to [facility] ER, suffering PEA arrest requiring intubation and short CPR with ROSC. LHC was performed, noting multivessel coronary artery disease with 100% occluded proximal left circumflex, unable to cross with wire. Mid RCA also noted 100% occluded, treated with PTCA/TAD. He was noted to have elevated right-sided filling pressures with PCW 25 mm Hg. Impella catheter was placed, and was left in place postprocedure. He was admitted to ICU for ongoing medical care.     Hospital Course/Significant events:  - Intubation/mechanical ventilation 03/29/2025  - Impella placement 03/29/2025    24 Hour Interval History:  - No major issues or acute changes overnight   - Urine output of 2.6 L over the past 24 hours   - Remains on mechanical ventilatory support still on Impella now down to P-2  - Sedated on propofol currently on heparin drip     No past medical history on file.    Past Surgical History:   Procedure Laterality Date    CATHETERIZATION OF BOTH LEFT AND RIGHT HEART N/A 3/29/2025    Procedure: CATHETERIZATION, HEART, BOTH LEFT AND  RIGHT;  Surgeon: Lorne Coon Jr., MD;  Location: [facility] CATH LAB;  Service: Cardiology;  Laterality: N/A;     Social History  Socioeconomic History    Marital status: Single     No current outpatient medications  Current Inpatient Medications   aspirin  81 mg Oral Daily    clopidogreL  75 mg Oral Daily    famotidine  20 mg Per NG tube BID    heparin (PORCINE)  39.2 Units/kg Intravenous Once     Current Intravenous Infusions   fentanyl  0-250 mcg/hr Intravenous Continuous   Stopped at 03/29/25 2030    heparin (porcine) in D5W  0-40 Units/kg/hr Intravenous Continuous 19.2 mL/hr at 04/08/25 0622 18.8 Units/kg/hr at 04/08/25 0622    lactated ringers   Intravenous Continuous 75 mL/hr at 04/08/25 0604 Rate Verify at 04/08/25 0604    NORepinephrine bitartrate-D5W  0-3 mcg/kg/min Intravenous Continuous   Stopped at 04/07/25 0538    propofoL  0-50 mcg/kg/min Intravenous Continuous 27.6 mL/hr at 04/08/25 1006 45 mcg/kg/min at 04/08/25 1006    sodium bicarbonate 25 mEq in D5W 1,000 mL Purge Solution for Impella   Impella Device Continuous   New Bag at 04/01/25 0824    tirofiban-0.9% sodium chloride 12.5 mg/250ml    Continuous PRN   Stopped at 03/29/25 1845     Objective:     Intake/Output Summary (Last 24 hours) at 4/8/2025 1045  Last data filed at 4/8/2025 0604  Gross per 24 hour   Intake 2392.62 ml   Output 2035 ml   Net 357.62 ml     Vital Signs (Most Recent):  Temp: 98.6 °F (37 °C) (04/08/25 0400)  Pulse: 69 (04/08/25 0730)  Resp: 19 (04/08/25 0600)  BP: 121/70 (04/08/25 0600)  SpO2: 97 % (04/08/25 0600)  Body mass index is 32.64 kg/m².  Weight: 106.1 kg (233 lb 14.5 oz) Vital Signs (24h Range):  Temp:  [97.8 °F (36.6 °C)-98.6 °F (37 °C)] 98.6 °F (37 °C)  Pulse:  [59-71] 69  Resp:  [10-23] 19  SpO2:  [94 %-98 %] 97 %  BP: ()/(52-70) 121/70  Arterial Line BP: (101-260)/() 123/60     Physical Exam  Vitals and nursing note reviewed.   Constitutional:       General: He is not in acute distress.     Appearance:  Normal appearance. He is not toxic-appearing.   HENT:      Head: Normocephalic and atraumatic.      Right Ear: External ear normal.      Left Ear: External ear normal.      Nose: Nose normal.      Mouth/Throat:      Pharynx: No posterior oropharyngeal erythema.      Comments: Unable to visualize posterior oropharynx secondary to endotracheal tube  Eyes:      General: No scleral icterus.     Conjunctiva/sclera: Conjunctivae normal.      Pupils: Pupils are equal, round, and reactive to light.   Neck:      Vascular: No carotid bruit.   Cardiovascular:      Rate and Rhythm: Normal rate and regular rhythm.      Pulses: Normal pulses.      Heart sounds: Normal heart sounds. No murmur heard.     No friction rub. No gallop.   Pulmonary:      Effort: Pulmonary effort is normal. No respiratory distress.      Breath sounds: Rhonchi present. No wheezing or rales.      Comments: Intubated, on mechanical ventilation  Abdominal:      General: Abdomen is flat. Bowel sounds are normal. There is no distension.      Palpations: Abdomen is soft.      Tenderness: There is no abdominal tenderness. There is no guarding or rebound.   Musculoskeletal:         General: No swelling or deformity.      Cervical back: Neck supple. No rigidity or tenderness.   Skin:     General: Skin is warm and dry.      Capillary Refill: Capillary refill takes less than 2 seconds.      Findings: No erythema or rash.   Neurological:      Comments: Unable to fully assess neurologic status and orientation secondary to patient being intubated, sedated and nonverbal, asymmetric pupils   Psychiatric:      Comments: Unable to fully assess as patient is intubated and nonverbal       Lines/Drains/Airways       Central Venous Catheter Line  Duration             Tunneled Central Line - Triple Lumen 03/29/25 0555 Internal Jugular Right 10 days    Pulmonary Artery Catheter Assessment  03/29/25 1300 Femoral Vein Right 9 days              Drain  Duration                   Urethral Catheter -- days         NG/OG Tube 03/29/25 1200 18 Fr. Center mouth 9 days              Airway  Duration                  Airway - Non-Surgical 03/29/25 0541 10 days              Line  Duration                  VAD 03/29/25 10 days              Peripheral Intravenous Line  Duration                  Peripheral IV - Single Lumen 03/29/25 0530 18 G Right Antecubital 10 days         Peripheral IV - Single Lumen 03/29/25 0600 20 G Left Hand 10 days         Peripheral IV - Single Lumen 03/29/25 20 G Left Forearm 10 days         Sheath 03/29/25 0913 Right 10 days         Sheath 03/29/25 0927 Left 10 days                  Significant Labs:  Lab Results   Component Value Date    WBC 5.39 04/08/2025    HGB 8.9 (L) 04/08/2025    HCT 27.5 (L) 04/08/2025    MCV 89.9 04/08/2025     04/08/2025       BMP  Lab Results   Component Value Date     04/08/2025    K 3.7 04/08/2025    CO2 21 (L) 04/08/2025    BUN 8.9 04/08/2025    CREATININE 0.62 (L) 04/08/2025    CALCIUM 7.9 (L) 04/08/2025    AGAP 6.0 04/08/2025    EGFRNONAA >60 10/16/2021     ABG  Recent Labs   Lab 04/08/25 0531   PH 7.390  7.450   PO2 <38.0  75.0*   PCO2 40.0  34.0*   HCO3 24.2  23.6     Mechanical Ventilation Support:  Vent Mode: A/C (04/08/25 0537)  Ventilator Initiated: Yes (03/29/25 0603)  Set Rate: 14 BPM (04/08/25 0537)  Vt Set: 500 mL (04/08/25 0537)  PEEP/CPAP: 5 cmH20 (04/08/25 0537)  Oxygen Concentration (%): 50 (04/08/25 0537)  Peak Airway Pressure: 39 cmH20 (04/08/25 0537)  Total Ve: 8.1 L/m (04/08/25 0537)  F/VT Ratio<105 (RSBI): (!) 30.86 (04/08/25 0320)    Imaging Results              X-Ray Chest AP Portable (Final result)  Result time 03/29/25 08:20:10      Final result by Melecio Cerna MD (03/29/25 08:20:10)                   Impression:      Lungs vascular congestive changes.      Electronically signed by: Melecio Cerna  Date:    03/29/2025  Time:    08:20               Narrative:    EXAMINATION:  XR CHEST AP  PORTABLE    CLINICAL HISTORY:  Cardiac arrest, cause unspecified    TECHNIQUE:  One view    COMPARISON:  September 28, 2021.    FINDINGS:  Cardiopericardial silhouette enlarged appearance is similar.  Lungs are remarkable for mild to moderate vascular congestive changes.  Left basilar atelectasis.  No consolidation or pneumothorax.  Optimal intubation and the nasogastric tube traverses into the stomach.  Right IJ central line terminates about the cavoatrial junction.                                       CTA Chest Non-Coronary (PE Studies) (Final result)  Result time 03/29/25 08:27:40      Final result by Juany Thompson MD (03/29/25 08:27:40)                   Impression:      1. Cardiomegaly with septal thickening, bronchial wall thickening and ground-glass densities suggesting interstitial edema  2. No evidence of pulmonary embolus  3. The preliminary and final reports are concordant.      Electronically signed by: Juany Thompson  Date:    03/29/2025  Time:    08:27               Narrative:    EXAMINATION:  CTA CHEST NON CORONARY (PE STUDIES)    CLINICAL HISTORY:  Pulmonary embolism (PE) suspected, unknown D-dimer;   shortness of breath, chest pain    TECHNIQUE:  Helically acquired images with axial, sagittal and coronal reformations were obtained from the thoracic inlet to the lung bases followingthe IV administration of contrast.  CTA timed for evaluation of the pulmonary arteries.  MIP images were performed.    Automated tube current modulation, weight-based exposure dosing, and/or iterative reconstruction technique utilized to reach lowest reasonably achievable exposure rate.    DLP: 708 mGy*cm    COMPARISON:  Chest radiograph 09/28/2021    FINDINGS:  BASE OF NECK: No significant abnormality.    AORTA: Aortic atherosclerosis.    PULMONARY VASCULATURE: Pulmonary arteries enhance normally.  No evidence of pulmonary embolus.    HEART: Left ventricle appears dilated.  There are coronary artery  calcifications.    ISIDRO/MEDIASTINUM: No enlarged lymph nodes by size criteria.    AIRWAYS: Expiratory phase imaging with buckling of the posterior wall of the trachea and mainstem bronchi.  Bibasilar bronchial wall thickening.    LUNGS/PLEURA: Bilateral septal thickening and ground-glass opacities.  Trace pleural fluid.    UPPER ABDOMEN: No abnormality of the partially imaged upper abdomen.    THORACIC SOFT TISSUES: Unremarkable.    BONES: No acute fracture. No suspicious lytic or sclerotic lesions.                        Preliminary result by Bridger Marcum MD (03/29/25 04:59:57)                   Impression:    1. No filling defects are seen in the pulmonary arteries to suggest pulmonary embolus.  2. There is scattered hazy opacity in the mid and upper lung as well as the bilateral lung bases with suggestion of some interlobular septal thickening. Some of this may reflect motion artifact however possibility of interstitial edema versus a possibly atypical infectious process are also considerations. Correlate with clinical and laboratory findings as regards additional evaluation and follow-up.  3. Details and other findings as discussed above.               Narrative:    START OF REPORT:  Technique: CT Scan of the chest was performed with intravenous contrast with direct axial images as well as sagittal and coronal reconstruction images pulmonary embolus protocol.    Dosage Information: Automated Exposure Control was utilized 707.62 mGy.cm.    Comparison: None.    Clinical History: Sob.chest pain.    Findings:  Soft Tissues: Unremarkable.  Neck: The visualized soft tissues of the neck appear unremarkable.  Mediastinum: The mediastinal structures are within normal limits.  Heart: The heart size is within normal limits. Mild coronary artery calcification is seen.  Aorta: No aortic dissection or aneurysm is seen. Mild aortic calcification is seen in the thoracic aorta.  Pulmonary Arteries: Unremarkable. No filling  defects are seen in the pulmonary arteries to suggest pulmonary embolus.  Lungs: There is scattered hazy opacity in the mid and upper lung as well as the bilateral lung bases with suggestion of some interlobular septal thickening.  Pleura: No effusions or pneumothorax are identified. Bilateral posterior costal pleural thickening is noted.  Bony Structures:  Spine: Mild spondylolytic changes are seen in the thoracic spine.  Ribs: The bilateral ribs appear unremarkable.  Abdomen: The visualized upper abdominal organs appear unremarkable.                                      Assessment/Plan:     Assessment  NSTEMI with cardiogenic shock, ongoing  LHC with PTCA/TAD x3 RCA (03/29/2025), PCWP 25 mm Hg  EF 15-20% by TTE with associated right ventricular enlargement dilated left atrium, moderate mitral regurgitation moderate tricuspid regurgitation and moderate pulmonary hypertension  Impella placement (03/29/2025), currently P-6  Ischemic cardiomyopathy with severe decreased LVSF  Acute respiratory failure secondary to above, initially with cardiogenic pulmonary edema, currently with good oxygenation and ventilation.  Severe wide anion gap metabolic acidosis of primary lactic acid etiology, resolved   Mild/mod thrombocytopenia, likely related to Impella  Anemia  Thrombocytopenia    Plan  - Titrate mechanical ventilation for ARDS net protocol   - Supplement oxygen to maintain saturation 94-96%   - Requiring no vasopressor support, only requiring mechanical support via Impella   - Impella set to P2. Per cardiology, possible explant in AM, 4/8/25  - S/P 2 units packed red blood cells on 04/06 with appropriate response of Hgb of 9 following. Hgb stable at 8.9 on 4/8/25   - Continue to monitor platelet count at this time and if there is profound decrease will need to determine if this is heparin induced thrombocytopenia versus complications from Impella    DVT Prophylaxis: Heparin drip  GI Prophylaxis: Protonix      32 minutes  of critical care was time spent personally by me on the following activities: development of treatment plan with patient or surrogate and bedside caregivers, discussions with consultants, evaluation of patient's response to treatment, examination of patient, ordering and performing treatments and interventions, ordering and review of laboratory studies, ordering and review of radiographic studies, pulse oximetry, re-evaluation of patient's condition.  This critical care time did not overlap with that of any other provider or involve time for any procedures.     Pili Jacobs DO, PGY-I  Pulmonary Critical Care Medicine  [facility] - [facility] ICU

## 2025-04-09 NOTE — PROGRESS NOTES
[facility] - [facility] ICU  Pulmonary Critical Care Note    Patient Name: Jon Conley  MRN: [id number]  Admission Date: 3/29/2025  Hospital Length of Stay: 11 days  Code Status: DNR  Attending Provider: Davi Harp Jr., MD,*  Primary Care Provider: Patrick Reece Jr., MD     Subjective:     HPI:   Jon Conley is a 79 y.o. male with PMH coronary artery disease, hypertension, hyperlipidemia, peripheral arterial disease, and GE reflux disease. He also has a history of recent TURP in [place], complicated by postoperative by gross hematuria requiring placement of a three-way irrigation bladder catheter last week. This was left in place at time of discharge. He presented to [facility] ER early a.m. of 03/29/2025 with shortness of breath, noted tachycardia/hypotensive, and marked elevation of high sensitivity troponin at 13,821. He was transferred to [facility] ER, suffering PEA arrest requiring intubation and short CPR with ROSC. LHC was performed, noting multivessel coronary artery disease with 100% occluded proximal left circumflex, unable to cross with wire. Mid RCA also noted 100% occluded, treated with PTCA/TAD. He was noted to have elevated right-sided filling pressures with PCW 25 mm Hg. Impella catheter was placed, and was left in place postprocedure. He was admitted to ICU for ongoing medical care.     Hospital Course/Significant events:  - Intubation/mechanical ventilation 03/29/2025  - Impella placement 03/29/2025    24 Hour Interval History:  - No major issues or acute changes overnight   - hemoglobin remained steady at 9.3, platelets 199, bicarb 41, chloride 114.  - Remains on mechanical ventilatory support.  Continue Impella P-4 until [facility], cardiology planning [facility] tomorrow NPO midnight.  - Sedated on propofol currently on heparin drip     No past medical history on file.    Past Surgical History:   Procedure Laterality Date    CATHETERIZATION OF BOTH LEFT AND  RIGHT HEART N/A 3/29/2025    Procedure: CATHETERIZATION, HEART, BOTH LEFT AND RIGHT;  Surgeon: Lorne Coon Jr., MD;  Location: [facility] CATH LAB;  Service: Cardiology;  Laterality: N/A;     Social History  Socioeconomic History    Marital status: Single     No current outpatient medications  Current Inpatient Medications   aspirin  81 mg Oral Daily    clopidogreL  75 mg Oral Daily    famotidine  20 mg Per NG tube BID    heparin (PORCINE)  39.2 Units/kg Intravenous Once     Current Intravenous Infusions   fentanyl  0-250 mcg/hr Intravenous Continuous   Stopped at 03/29/25 2030    heparin (porcine) in D5W  0-40 Units/kg/hr Intravenous Continuous 19.2 mL/hr at 04/09/25 0747 18.8 Units/kg/hr at 04/09/25 0747    lactated ringers   Intravenous Continuous 75 mL/hr at 04/09/25 0919 Rate Verify at 04/09/25 0919    NORepinephrine bitartrate-D5W  0-3 mcg/kg/min Intravenous Continuous   Stopped at 04/07/25 0538    propofoL  0-50 mcg/kg/min Intravenous Continuous 24.5 mL/hr at 04/09/25 0919 40 mcg/kg/min at 04/09/25 0919    sodium bicarbonate 25 mEq in D5W 1,000 mL Purge Solution for Impella   Impella Device Continuous   New Bag at 04/01/25 0824    tirofiban-0.9% sodium chloride 12.5 mg/250ml    Continuous PRN   Stopped at 03/29/25 1845     Objective:     Intake/Output Summary (Last 24 hours) at 4/9/2025 1014  Last data filed at 4/9/2025 0919  Gross per 24 hour   Intake 2604.85 ml   Output 3055 ml   Net -450.15 ml     Vital Signs (Most Recent):  Temp: 98.7 °F (37.1 °C) (04/09/25 0800)  Pulse: 68 (04/09/25 0900)  Resp: (!) 22 (04/09/25 0900)  BP: 112/66 (04/09/25 0900)  SpO2: 97 % (04/09/25 0900)  Body mass index is 32.64 kg/m².  Weight: 106.1 kg (233 lb 14.5 oz) Vital Signs (24h Range):  Temp:  [98.2 °F (36.8 °C)-98.7 °F (37.1 °C)] 98.7 °F (37.1 °C)  Pulse:  [62-76] 68  Resp:  [2-24] 22  SpO2:  [96 %-100 %] 97 %  BP: ()/(55-69) 112/66  Arterial Line BP: (0-202)/(0-189) 115/50     Physical Exam  Vitals and nursing note  reviewed.   Constitutional:       General: He is not in acute distress.     Appearance: Normal appearance. He is not toxic-appearing.   HENT:      Head: Normocephalic and atraumatic.      Right Ear: External ear normal.      Left Ear: External ear normal.      Nose: Nose normal.      Mouth/Throat:      Pharynx: No posterior oropharyngeal erythema.      Comments: Unable to visualize posterior oropharynx secondary to endotracheal tube  Eyes:      General: No scleral icterus.     Conjunctiva/sclera: Conjunctivae normal.      Pupils: Pupils are equal, round, and reactive to light.   Neck:      Vascular: No carotid bruit.   Cardiovascular:      Rate and Rhythm: Normal rate and regular rhythm.      Pulses: Normal pulses.      Heart sounds: Normal heart sounds. No murmur heard.     No friction rub. No gallop.   Pulmonary:      Effort: Pulmonary effort is normal. No respiratory distress.      Breath sounds: Rhonchi present. No wheezing or rales.      Comments: Intubated, on mechanical ventilation  Abdominal:      General: Abdomen is flat. Bowel sounds are normal. There is no distension.      Palpations: Abdomen is soft.      Tenderness: There is no abdominal tenderness. There is no guarding or rebound.   Musculoskeletal:         General: No swelling or deformity.      Cervical back: Neck supple. No rigidity or tenderness.   Skin:     General: Skin is warm and dry.      Capillary Refill: Capillary refill takes less than 2 seconds.      Findings: No erythema or rash.   Neurological:      Comments: Unable to fully assess neurologic status and orientation secondary to patient being intubated, sedated and nonverbal, asymmetric pupils   Psychiatric:      Comments: Unable to fully assess as patient is intubated and nonverbal       Lines/Drains/Airways       Central Venous Catheter Line  Duration             Tunneled Central Line - Triple Lumen 03/29/25 0555 Internal Jugular Right 11 days    Pulmonary Artery Catheter Assessment   03/29/25 1300 Femoral Vein Right 10 days              Drain  Duration                  Urethral Catheter -- days         NG/OG Tube 03/29/25 1200 18 Fr. Center mouth 10 days              Airway  Duration                  Airway - Non-Surgical 03/29/25 0541 11 days              Line  Duration                  VAD 03/29/25 11 days              Peripheral Intravenous Line  Duration                  Peripheral IV - Single Lumen 03/29/25 0530 18 G Right Antecubital 11 days         Peripheral IV - Single Lumen 03/29/25 0600 20 G Left Hand 11 days         Peripheral IV - Single Lumen 03/29/25 20 G Left Forearm 11 days         Sheath 03/29/25 0913 Right 11 days         Sheath 03/29/25 0927 Left 11 days                  Significant Labs:  Lab Results   Component Value Date    WBC 5.64 04/09/2025    HGB 9.4 (L) 04/09/2025    HCT 30.5 (L) 04/09/2025    MCV 91.6 04/09/2025     04/09/2025       BMP  Lab Results   Component Value Date     04/09/2025    K 3.8 04/09/2025    CO2 21 (L) 04/09/2025    BUN 10.0 04/09/2025    CREATININE 0.61 (L) 04/09/2025    CALCIUM 8.0 (L) 04/09/2025    AGAP 6.0 04/09/2025    EGFRNONAA >60 10/16/2021     ABG  Recent Labs   Lab 04/09/25 0718   PH 7.430  7.410   PO2 87.0  <38.0   PCO2 35.0  39.0   HCO3 23.2  24.7     Mechanical Ventilation Support:  Vent Mode: A/C (04/09/25 0815)  Ventilator Initiated: Yes (03/29/25 0603)  Set Rate: 14 BPM (04/09/25 0815)  Vt Set: 500 mL (04/09/25 0815)  PEEP/CPAP: 5 cmH20 (04/09/25 0815)  Oxygen Concentration (%): 50 (04/09/25 0900)  Peak Airway Pressure: 34 cmH20 (04/09/25 0815)  Total Ve: 8.9 L/m (04/09/25 0815)  F/VT Ratio<105 (RSBI): (!) 34.48 (04/09/25 0815)    Imaging Results              X-Ray Chest AP Portable (Final result)  Result time 03/29/25 08:20:10      Final result by Melecio Cerna MD (03/29/25 08:20:10)                   Impression:      Lungs vascular congestive changes.      Electronically signed by: Melecio  Cerna  Date:    03/29/2025  Time:    08:20               Narrative:    EXAMINATION:  XR CHEST AP PORTABLE    CLINICAL HISTORY:  Cardiac arrest, cause unspecified    TECHNIQUE:  One view    COMPARISON:  September 28, 2021.    FINDINGS:  Cardiopericardial silhouette enlarged appearance is similar.  Lungs are remarkable for mild to moderate vascular congestive changes.  Left basilar atelectasis.  No consolidation or pneumothorax.  Optimal intubation and the nasogastric tube traverses into the stomach.  Right IJ central line terminates about the cavoatrial junction.                                       CTA Chest Non-Coronary (PE Studies) (Final result)  Result time 03/29/25 08:27:40      Final result by Juany Thompson MD (03/29/25 08:27:40)                   Impression:      1. Cardiomegaly with septal thickening, bronchial wall thickening and ground-glass densities suggesting interstitial edema  2. No evidence of pulmonary embolus  3. The preliminary and final reports are concordant.      Electronically signed by: Juany Thompson  Date:    03/29/2025  Time:    08:27               Narrative:    EXAMINATION:  CTA CHEST NON CORONARY (PE STUDIES)    CLINICAL HISTORY:  Pulmonary embolism (PE) suspected, unknown D-dimer;   shortness of breath, chest pain    TECHNIQUE:  Helically acquired images with axial, sagittal and coronal reformations were obtained from the thoracic inlet to the lung bases followingthe IV administration of contrast.  CTA timed for evaluation of the pulmonary arteries.  MIP images were performed.    Automated tube current modulation, weight-based exposure dosing, and/or iterative reconstruction technique utilized to reach lowest reasonably achievable exposure rate.    DLP: 708 mGy*cm    COMPARISON:  Chest radiograph 09/28/2021    FINDINGS:  BASE OF NECK: No significant abnormality.    AORTA: Aortic atherosclerosis.    PULMONARY VASCULATURE: Pulmonary arteries enhance normally.  No evidence of  pulmonary embolus.    HEART: Left ventricle appears dilated.  There are coronary artery calcifications.    ISIDRO/MEDIASTINUM: No enlarged lymph nodes by size criteria.    AIRWAYS: Expiratory phase imaging with buckling of the posterior wall of the trachea and mainstem bronchi.  Bibasilar bronchial wall thickening.    LUNGS/PLEURA: Bilateral septal thickening and ground-glass opacities.  Trace pleural fluid.    UPPER ABDOMEN: No abnormality of the partially imaged upper abdomen.    THORACIC SOFT TISSUES: Unremarkable.    BONES: No acute fracture. No suspicious lytic or sclerotic lesions.                        Preliminary result by Bridger Marcum MD (03/29/25 04:59:57)                   Impression:    1. No filling defects are seen in the pulmonary arteries to suggest pulmonary embolus.  2. There is scattered hazy opacity in the mid and upper lung as well as the bilateral lung bases with suggestion of some interlobular septal thickening. Some of this may reflect motion artifact however possibility of interstitial edema versus a possibly atypical infectious process are also considerations. Correlate with clinical and laboratory findings as regards additional evaluation and follow-up.  3. Details and other findings as discussed above.               Narrative:    START OF REPORT:  Technique: CT Scan of the chest was performed with intravenous contrast with direct axial images as well as sagittal and coronal reconstruction images pulmonary embolus protocol.    Dosage Information: Automated Exposure Control was utilized 707.62 mGy.cm.    Comparison: None.    Clinical History: Sob.chest pain.    Findings:  Soft Tissues: Unremarkable.  Neck: The visualized soft tissues of the neck appear unremarkable.  Mediastinum: The mediastinal structures are within normal limits.  Heart: The heart size is within normal limits. Mild coronary artery calcification is seen.  Aorta: No aortic dissection or aneurysm is seen. Mild aortic  calcification is seen in the thoracic aorta.  Pulmonary Arteries: Unremarkable. No filling defects are seen in the pulmonary arteries to suggest pulmonary embolus.  Lungs: There is scattered hazy opacity in the mid and upper lung as well as the bilateral lung bases with suggestion of some interlobular septal thickening.  Pleura: No effusions or pneumothorax are identified. Bilateral posterior costal pleural thickening is noted.  Bony Structures:  Spine: Mild spondylolytic changes are seen in the thoracic spine.  Ribs: The bilateral ribs appear unremarkable.  Abdomen: The visualized upper abdominal organs appear unremarkable.                                      Assessment/Plan:     Assessment  NSTEMI with cardiogenic shock, ongoing  LHC with PTCA/TAD x3 RCA (03/29/2025), PCWP 25 mm Hg  EF 15-20% by TTE with associated right ventricular enlargement dilated left atrium, moderate mitral regurgitation moderate tricuspid regurgitation and moderate pulmonary hypertension  Impella placement (03/29/2025), currently P-6  Ischemic cardiomyopathy with severe decreased LVSF  Acute respiratory failure secondary to above, initially with cardiogenic pulmonary edema, currently with good oxygenation and ventilation.  Severe wide anion gap metabolic acidosis of primary lactic acid etiology, resolved   Mild/mod thrombocytopenia, likely related to Impella  Anemia  Thrombocytopenia    Plan  - Titrate mechanical ventilation for ARDS net protocol   - Supplement oxygen to maintain saturation 94-96%   - Requiring no vasopressor support, only requiring mechanical support via Impella   - continue Impella at  P4 until after LHC, per Cardiology  - planning LHC tomorrow, NPO midnight.  Will need to determine if patient can have LAD fixed prior to explant/same time patient goes down for explant after intervention.  - maintain hemoglobin 9 -10  - Continue to monitor platelet count at this time and if there is profound decrease will need to  determine if this is heparin induced thrombocytopenia versus complications from Impella    DVT Prophylaxis: Heparin drip  GI Prophylaxis: Protonix      32 minutes of critical care was time spent personally by me on the following activities: development of treatment plan with patient or surrogate and bedside caregivers, discussions with consultants, evaluation of patient's response to treatment, examination of patient, ordering and performing treatments and interventions, ordering and review of laboratory studies, ordering and review of radiographic studies, pulse oximetry, re-evaluation of patient's condition.  This critical care time did not overlap with that of any other provider or involve time for any procedures.     Ivelisse Waggoner MD  Pulmonary Critical Care Medicine  [facility] - [facility] ICU

## 2025-04-09 NOTE — PROGRESS NOTES
[facility]    Cardiology  Progress Note    Patient Name: Jon Conley  MRN: [id number]  Admission Date: 3/29/2025  Hospital Length of Stay: 11 days  Code Status: DNR   Attending Provider: Davi Harp Jr., MD,*   Consulting Provider: NORM Sierra  Primary Care Physician: Patrick Reece Jr., MD  Principal Problem:<principal problem not specified>    Patient information was obtained from past medical records and ER records.     Subjective:     Chief Complaint/Reason for Consult: Cardiac Arrest     HPI: Mr. Conley is a 78 y/o male who is known to [facility], Dr. Wang. The patient presented to [facility] on 3.29.25 with c/o SOB. He initially presented to [facility] in [place] with SOB. He was found to have Tachycardia, Hypotension, Anemia (7.4/24.6), Troponin 9.443, .0, BUN/Crea 16.2/1.34, Lactic Acid Level 4.3, Na 133, WBC 14.03, H&H 7.3/23.0, WBC 14.03, , PT/INR 14.9/1.2. He was admitted to , however, he sustained a witnessed PEA Arrest in the ER and has ROSC per ER MD. [facility] was consulted for NSETMI and Arrest.     3.30.25: NAD. Impella L Groin. CVP 7, P7. Levophed 0.32mcg/kg/min, Heparin Drip per Protocol, Amiodarone 0.5mg/min  3.31.25: NAD. L Groin Impella P7, Amiodarone 0.5mg/min, Heparin Drip per Protocol, Levophed 0.18mcg/kg/min, H&H 8.8/25.8, AST/ALT 79/45  4.1.25: NAD noted. L groin Impella in place, remains at P7. Remains on levophed. LFTs continue to improve.  4.2.25: NAD. L Groin Impella Device P6. Heparin Infusion per Protocol. Remains on 0.06mcg/kg/min.   4.3.25: NAD. Vented/Sedated. L Groin Impella Device. P3, Heparin Infusion per Protocol. Off Pressors. Attempting Wean of Impella. H&H 8.2/26.2,   4.4.25: NAD. Vented/Sedated. Heparin Drip per Protocol. H&H 8.1/95.4,   4.5.25: NAD. Vented/Sedated. Heparin Drip per Protocol. Levophed 0.04mcg/kg/min. H&H 7.9/23.8,  Impella P6  4.6.25: NAD. Vented/Sedated. P3.  Heparin Drip per Protocol, Off Pressors. CV Surgery to Reevaluate for CABG. H&H 7.4/22.7,   4.7.25: NAD. Vented/Sedated. H&H 9.2/27.7, , K 3.9, BUN/Crea 9.2/0.61, Lactic Acid 0.6. Impella L Groin P4  4.8.25:NAD noted. Remains vented/sedated. Consent obtained for [facility], will plan for Thursday.  4.9.25: NAD. Remains Vented/Sedated. Plans for [facility] with PCI on Thursday. Impella P4. Remains Off Pressors.     PMH: BPH/Urinary Obstruction, CAD/Nonobstructive, PAD, R JACY/Mild, HTN, Obesity, GERD, Tobacco Use   PSH: TURP, Angiogram, Bilateral Knee Replacements   Family History: Denies Family History of Heart Disease  Social History: Denies Illicit Drug, ETOH and Tobacco Use     Previous Cardiac Diagnostics:   ECHO Limited 4.8.25:  Impella check  Aortic annulus to tip distance is 4.3 cm.  LV systolic function is 15%.    ECHO 4.3.25:  Limited study to check impella placement.  Left Ventricle: There is severely reduced systolic function with a visually estimated ejection fraction of 15 - 20%.  Impella distance from aortic valve annulus appears appropriate.    ECHO 4.1.25:  Limited study to check impella placement.  Left Ventricle: There is severely reduced systolic function with a visually estimated ejection fraction of 15 - 20%.  Impella distance from aortic valve annulus appears appropriate.    ECHO 3.30.25:  Left Ventricle: There is severely reduced systolic function with a visually estimated ejection fraction of less than 30%.  There appears to be at least moderate to severe mitral regurgitation.   Impella is positioned with inflow cannula 3.8 cm from aortic valve annulus.    [facility]:  Findings:  - Successful placement of Impella CP device from left femoral approach.  - There is severe multi-vessel coronary artery disease.  - Left Main has mild diffuse disease.  - Mid Left Anterior Descending has serial lesions, up to 70-80% in severity.   - Prox Left Circumflex is 100% occluded. Unable to cross lesion  despite wire escalation and microcatheter support.  - Mid Right Coronary Artery is 100% occluded. The lesion was successfully treated with overlapping SYNERGY XD 4.0X20MM, SYNERGY XD 3.0X48MM, and SYNERGY XD 2.54N14IQ drug-eluting stents. Post-dilatation was performed using a SYNERGY XD 4.0X20MM angioplasty balloon inflated to 12 gretel. Following intervention there was 0% residual stenosis and PATTY grade 3 flow in the distal vessel.  - Intravascular Ultrasound (IVUS) was performed prior to intervention to further characterize the lesion, as well as post-stent deployment to ensure optimal stent apposition and expansion.  - Aorto-iliac angiogram revealed moderate disease of R ileofemoral system.  - At the conclusion of the case, contrast injection was performed through the Impella sheath side port, showing adequate antegrade flow down the ipsilateral common femoral artery/SFA/profunda artery.  - RHC with elevated R sided filling pressures. RA 17 mmHg, RV 45/15 mmHg, PA 42/29 mmHg (mean 34 mmHg), PCWP 25 mmHg.  - Transpulmonary gradient is 9 mmHg.  - Cardiac Output/Index at 6.6/3.0, as calculated by Gregory equation on Levophed infusion and Impella CP support.  - PVR is 1.4 Woods units  - SVR is 550 dyne-sec*cm^-5  - Pulmonary Artery Pulsatility Index (Rafa) is 0.8  - Cardiac Power Output () is 0.91   Assessment/Plan:  - Patient is a 79 y.o. male with a history of recent TURP, complicated by significant post-op bleeding, presents to [facility] ED with dyspnea. Transferred to [facility] with worsening shock despite vasopressors. TTE showing EF 25%, severe MR, severe TR. Troponin elevated. Brought to cath lab. Underwent Impella placement/LHC/RHC as outlined above. RCA lesion treated. Unable to cross LCx lesion. 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 At this point, would continue Impella support and aggressive medical mgmt. Has non-culprit lesions that should be treated at a later date as well. Can consider CTS consult, especially  given severe valvular disease.  - Patient was given a loading dose of clopidogrel 600 mg PO in the cath lab  - Continue DAPT (aspirin + clopidogrel) for a minimum of 12 months and aspirin indefinitely thereafter  - High intensity statin  - Continue Aggrastat for 4 hours post-procedure  - Arterial sheath remains place; plan is to remove when activated clotting time (ACT) less than 180 seconds. Bedrest for 4 hours after hemostasis is achieved.  - Obtain transthoracic echocardiogram  - Continuous bedrest while Impella in place  - Therapeutic heparin administration per protocol  - Antibiotics per protocol  - Remaining mgmt per primary team/cardiology    ECHO 3.29.25:  Left Ventricle: The left ventricle is dilated. Normal wall thickness. Severe global hypokinesis present. There is severely reduced systolic function with a visually estimated ejection fraction of 15 - 20%. Grade I diastolic dysfunction.  Right Ventricle: Right ventricular enlargement. Systolic function is normal. TAPSE is 1.83 cm.  Left Atrium: dilated  Mitral Valve: Mildly thickened leaflets. There is moderate regurgitation.  Tricuspid Valve: There is moderate regurgitation.  Pulmonary Artery: There is moderate pulmonary hypertension.  IVC/SVC: Patient is ventilated, cannot use IVC diameter to estimate right atrial pressure.    Carotid US 11.4.24:  The study quality is average.   1-39% stenosis in the proximal right internal carotid artery based on Bluth Criteria.   Antegrade right vertebral artery flow.   Antegrade left vertebral artery flow    ECHO 1.27.22:  The study quality is average.   The left ventricle is normal in size. Global left ventricular systolic function is moderately decreased. The left ventricular ejection fraction is 40-45%. Mild concentric left ventricular hypertrophy is present.  The left ventricle diastolic function is impaired (Grade I) with normal left atrial pressure.  Mild (1+) mitral regurgitation.  The pulmonary artery appears  normal.     [facility] 9.26.17:  LM: Normal  LAD: Prox 30% Stenosis  RI: Normal  LCX: Normal  RCA: Distal RCA 50%    Review of patient's allergies indicates:  No Known Allergies  No current facility-administered medications on file prior to encounter.     No current outpatient medications on file prior to encounter.     Review of Systems   Unable to perform ROS: Intubated     Objective:     Vital Signs (Most Recent):  Temp: 98.1 °F (36.7 °C) (04/09/25 1200)  Pulse: 65 (04/09/25 1400)  Resp: 15 (04/09/25 1400)  BP: 101/61 (04/09/25 1400)  SpO2: 99 % (04/09/25 1400) Vital Signs (24h Range):  Temp:  [98.1 °F (36.7 °C)-98.7 °F (37.1 °C)] 98.1 °F (36.7 °C)  Pulse:  [62-76] 65  Resp:  [15-24] 15  SpO2:  [96 %-100 %] 99 %  BP: ()/(57-71) 101/61  Arterial Line BP: (0-202)/(0-189) 116/56   Weight: 106.1 kg (233 lb 14.5 oz)  Body mass index is 32.64 kg/m².  SpO2: 99 %       Intake/Output Summary (Last 24 hours) at 4/9/2025 1430  Last data filed at 4/9/2025 1413  Gross per 24 hour   Intake 3603.79 ml   Output 3065 ml   Net 538.79 ml     Lines/Drains/Airways       Central Venous Catheter Line  Duration             Pulmonary Artery Catheter Assessment  03/29/25 1300 Femoral Vein Right 11 days    Tunneled Central Line - Triple Lumen 03/29/25 0555 Internal Jugular Right 11 days              Drain  Duration                  Urethral Catheter -- days         NG/OG Tube 03/29/25 1200 18 Fr. Center mouth 11 days              Airway  Duration                  Airway - Non-Surgical 03/29/25 0541 11 days              Line  Duration                  VAD 03/29/25 11 days              Peripheral Intravenous Line  Duration                  Peripheral IV - Single Lumen 03/29/25 0530 18 G Right Antecubital 11 days         Peripheral IV - Single Lumen 03/29/25 0600 20 G Left Hand 11 days         Peripheral IV - Single Lumen 03/29/25 20 G Left Forearm 11 days         Sheath 03/29/25 0913 Right 11 days         Sheath 03/29/25 0927 Left 11 days                   Significant Labs:   Chemistries:   Recent Labs   Lab 04/08/25 0328 04/08/25  1232 04/08/25 2003 04/09/25 0325 04/09/25  1215    138 143 141 141   K 3.7 3.7 3.9 3.8 4.1   * 114* 116* 114* 115*   CO2 21* 20* 21* 21* 20*   BUN 8.9 10.1 9.5 10.0 11.4   CREATININE 0.62* 0.61* 0.63* 0.61* 0.60*   CALCIUM 7.9* 7.7* 7.6* 8.0* 7.5*   BILITOT 0.3 0.3 0.3 0.3 0.3   ALKPHOS 67 68 74 73 77   ALT 17 17 18 18 20   AST 19 21 20 20 21   GLUCOSE 89 95 95 102 124*   MG 2.10 2.00 2.10 2.10 2.00   PHOS 2.8 3.1 3.1 3.0 2.9        CBC/Anemia Labs: Coags:    Recent Labs   Lab 04/08/25 2003 04/09/25 0325 04/09/25  1215   WBC 5.22 5.64 7.37   HGB 9.2* 9.4* 9.4*   HCT 29.8* 30.5* 30.2*    199 190   MCV 92.0 91.6 91.2   RDW 18.6* 18.4* 18.2*    Recent Labs   Lab 04/05/25 1959   APTT 114.7*          Telemetry: SR    Physical Exam  Constitutional:       General: He is not in acute distress.     Appearance: Normal appearance. He is ill-appearing.      Comments: Vented/Sedated   HENT:      Head: Normocephalic.      Mouth/Throat:      Mouth: Mucous membranes are moist.   Cardiovascular:      Rate and Rhythm: Normal rate and regular rhythm.      Pulses: Normal pulses.      Heart sounds: Murmur heard.   Pulmonary:      Effort: Pulmonary effort is normal. No respiratory distress.      Comments: Ventilator Associated Breath Sounds  Vent Mode: A/C  Oxygen Concentration (%):  [50] 50  Resp Rate Total:  [15 br/min-17 br/min] 16 br/min  Vt Set:  [500 mL] 500 mL  PEEP/CPAP:  [5 cmH20] 5 cmH20  Mean Airway Pressure:  [9 bjV49-94 cmH20] 10 cmH20  Abdominal:      Palpations: Abdomen is soft.   Skin:     General: Skin is warm.      Comments: L Groin Impella CP, Soft/Flat, No Sign of Bleed/Infection. + Monophasic Dopplerable Pedal Pulses    Neurological:      Comments: Vented/Sedated       Home Medications:   Medications Ordered Prior to Encounter[1]  Current Schedule Inpatient Medications:   aspirin  81 mg Oral Daily     clopidogreL  75 mg Oral Daily    famotidine  20 mg Per NG tube BID    heparin (PORCINE)  39.2 Units/kg Intravenous Once    lactated ringers  500 mL Intravenous Once     Continuous Infusions:   fentanyl  0-250 mcg/hr Intravenous Continuous   Stopped at 03/29/25 2030    heparin (porcine) in D5W  0-40 Units/kg/hr Intravenous Continuous 19.2 mL/hr at 04/09/25 1413 18.8 Units/kg/hr at 04/09/25 1413    lactated ringers   Intravenous Continuous 75 mL/hr at 04/09/25 1234 New Bag at 04/09/25 1234    NORepinephrine bitartrate-D5W  0-3 mcg/kg/min Intravenous Continuous   Stopped at 04/07/25 0538    propofoL  0-50 mcg/kg/min Intravenous Continuous 24.5 mL/hr at 04/09/25 1413 40 mcg/kg/min at 04/09/25 1413    sodium bicarbonate 25 mEq in D5W 1,000 mL Purge Solution for Impella   Impella Device Continuous 13.5 mL/hr at 04/09/25 1413 Rate Verify at 04/09/25 1413    tirofiban-0.9% sodium chloride 12.5 mg/250ml    Continuous PRN   Stopped at 03/29/25 1845     Mechanical Circulatory Support (MCS) Flow Sheet  Cardiogenic Shock/HF Management  04/09/2025    PUMP METRICS  Value/Parameter TIME: 0900   Site Observation/Distal Pulses L Groin See Physical Exam   cm See Nursing Documentation    Impella [x]CP w/Smart Assist   []5.5 w/Smart Assist   []RP w/Smart Assist   P-Level P-4   Ao (MAP) 112/52; (74)   LV 98/-12   Motor Current (Mean) 524/363   Purge Pressures/Flow 360/13.9   Average Flows 2.4     HEMODYNAMICS  Value/Parameter TIME: 0900   AO (120/80) 118/53   MAP (> 60) 74   CO (PA Catheter)/GARRY 5.4   Native CO ([place] CO-Impella)    CI (> 2.2) 2.4   Cardiac Power Output (Greater than or Equal to 0.8)    SVR (800-1200)    HR () 69   LVEF  15%     RV ASSESSMENT  Value/Parameter TIME: 0900   Rafa (> 1.5)    PA (25/10) 430/10   CVP (8-12) 3   PVR (120-250)    TAPSI (cm) (1.8) cm     LABS  Value/Parameter TIME: 0900   Lactate Level (< 2.0)    SvO2 (65-75%) 60%   Hgb/Hct (12-17/36-50) 9.4/30.5      SpO2 (%) 97%   pH  (7.35-7.45) 7.43   Creatinine (0.6-1.2) 0.61   ALT (7-56) 18   ACT (160-180) 181   LDH    Urine Output (> 30mL/HR) > 100mL/HR      DRIPS (GTTS)  Value/Parameter TIME: 0900   Vasopressors  []Vasopressin  []Epinephrine  []Levophed  []NeoSynephrine   Inotropes  []Dobutamine  []Primacor/Milrinone   []Dopamine   Anticoagulants [x]Heparin  []Argatroban   Sedation []Fentanyl  []Precedex  [x]Propofol  []Nimbex     Assessment:   Cardiogenic Shock requiring Mechanical and Chemical Support - Now just Mechanical    - PT has NOT Tolerated Impella Wean x 2 in the Last few Days (4.4.25)    - Impella/Pressors   MVCAD    - s/p LHC (3.29.25) -  Left Main has mild diffuse disease. Mid Left Anterior Descending has serial lesions, up to 70-80% in severity. Prox Left Circumflex is 100% occluded. Unable to cross lesion despite wire escalation and microcatheter support. Mid Right Coronary Artery is 100% occluded. The lesion was successfully treated with overlapping SYNERGY XD 4.0X20MM, SYNERGY XD 3.0X48MM, and SYNERGY XD 2.26P74LY drug-eluting stents. Post-dilatation was performed using a SYNERGY XD 4.0X20MM angioplasty balloon inflated to 12 gretel. Following intervention there was 0% residual stenosis and PATTY grade 3 flow in the distal vessel.  NSTEMI Type I (Non-Anterior Wall MI)    - Trponin > 9  Cardiopulmonary Arrest    - Initial Rhythm PEA - Witnessed in ER Arrest/ROSC  ICMO/EF 15%     - ECHO (4.8.25) - LVEF 15%    - ECHO (3.29.25) - LVEF 15-20%, Grade I DD   Acute Hypoxemic Respiratory Failure requiring Intubation/Ventilation   ST - Now SR   Hypotension requiring Pressors - Resolved     - Hx of HTN  BPH s/p TURP   Lactic Acidosis - Resolved   Leukocytosis - Resolved   Anemia requiring Transfusions - Stable   Recent BPH Diagnosis s/p TURP  CAD/Nonobstructive (2017)  JACY/R Sided   PAD/Nonobstructive   Transaminitis - Resolved   Obesity  GERD  Tobacco Use  No Hx of GIB     Plan:   Heparin Drip per Protocol for Tx of NSTEMI/Impella  Thrombosis Prophylaxis   Groin Precautions/Impella Site Monitoring   CV Surgery reevaluated PT, Remains too High Risk for CABG/Valve  Continue Impella at P4 until after LHC  Keep NPO after MN  Schedule/Consent for LHC with PCI of LAD  Risk, Benefits and Alternatives Reviewed and Discussed with the PT and their Family and they wish to proceed with above Procedure. (Consents on Chart)  Continue ASA and Plavix   Start Atorvastatin 40mg PO Qday   Labs and EKG in AM: CBC, CMP and Mg    NORM Sierra  Cardiology  [facility]          [1]   No current facility-administered medications on file prior to encounter.     No current outpatient medications on file prior to encounter.

## 2025-04-10 NOTE — PROGRESS NOTES
Inpatient Nutrition Assessment    Admit Date: 3/29/2025   Total duration of encounter: 12 days   Patient Age: 79 y.o.    Nutrition Recommendation/Prescription     Restart TF when appropriate.  Tube feeding recommendation:     Peptamen Intense VHP goal rate 40 ml/hr + 4 packets ProSource TF20 daily to provide  1120 kcal/d  (75% est needs, 100% with meds)  154 g protein/d (99% est needs)  672 ml free water/d (32% est needs)  (calculations based on estimated 20 hr/d run time)     Start @ 20ml/hr, increase as tolerated 20ml/hr q4hr until goal rate reached.      Flushes per MD due to Impella; otherwise, if no IV fluids running, can give 125ml q 2hr water flushes. Total water provided: 1922ml (91% est needs.)     Communication of Recommendations: reviewed with nurse    Nutrition Assessment     Malnutrition Assessment/Nutrition-Focused Physical Exam       Malnutrition Level: other (see comments) (Does not meet criteria) (03/31/25 1259)  Energy Intake (Malnutrition): other (see comments) (Unable to assess) (03/31/25 1259)  Weight Loss (Malnutrition): other (see comments) (Unable to assess) (03/31/25 1259)                                                  A minimum of two characteristics is recommended for diagnosis of either severe or non-severe malnutrition.    Chart Review    Reason Seen: continuous nutrition monitoring    Malnutrition Screening Tool Results   Have you recently lost weight without trying?: No  Have you been eating poorly because of a decreased appetite?: No   MST Score: 0   Diagnosis:  NSTEMI with cardiogenic shock, ongoing  Ischemic cardiomyopathy with severe decreased LVSF, EF 15%  Cardiogenic pulmonary edema with acute respiratory failure  Severe wide anion gap metabolic acidosis of primary lactic acid etiology, resolved   Elevated serum transaminases    Relevant Medical History: CAD, HTN, HLD, PAD, GERD    Scheduled Medications:  aspirin, 81 mg, Daily  atorvastatin, 40 mg, Daily  clopidogreL, 75 mg,  Daily  famotidine, 20 mg, BID  heparin (PORCINE), 39.2 Units/kg, Once    Continuous Infusions:  heparin (porcine) in D5W, Last Rate: 19.3 Units/kg/hr (04/10/25 1024)  lactated ringers, Last Rate: 75 mL/hr at 04/10/25 0715  NORepinephrine bitartrate-D5W, Last Rate: 0.1 mcg/kg/min (04/10/25 1028)  propofoL, Last Rate: 35 mcg/kg/min (04/10/25 0831)  sodium bicarbonate 25 mEq in D5W 1,000 mL Purge Solution for Impella, Last Rate: 13.5 mL/hr at 04/10/25 0715  tirofiban-0.9% sodium chloride 12.5 mg/250ml, Last Rate: Stopped (03/29/25 1845)    PRN Medications:  0.9%  NaCl infusion (for blood administration), , Q24H PRN  0.9%  NaCl infusion (for blood administration), , Q24H PRN  0.9%  NaCl infusion (for blood administration), , Q24H PRN  0.9%  NaCl infusion (for blood administration), , Q24H PRN  0.9%  NaCl infusion (for blood administration), , Q24H PRN  acetaminophen, 650 mg, Q4H PRN  calcium gluconate IVPB, 1 g, PRN  calcium gluconate IVPB, 2 g, PRN  calcium gluconate IVPB, 3 g, PRN  dextrose 50%, 12.5 g, PRN  dextrose 50%, 25 g, PRN  fentaNYL, 50 mcg, Q1H PRN  glucagon (human recombinant), 1 mg, PRN  glucose, 16 g, PRN  glucose, 24 g, PRN  heparin (PORCINE), 39.2 Units/kg, PRN  heparin (PORCINE), 30 Units/kg, PRN  magnesium sulfate 2 g IVPB, 2 g, PRN  magnesium sulfate 2 g IVPB, 2 g, PRN  ondansetron, 8 mg, Q8H PRN  potassium chloride in water, 20 mEq, PRN   And  potassium chloride in water, 40 mEq, PRN   And  potassium chloride in water, 60 mEq, PRN  sodium phosphate 15 mmol in D5W 250 mL IVPB, 15 mmol, PRN  sodium phosphate 20.1 mmol in D5W 250 mL IVPB, 20.1 mmol, PRN  sodium phosphate 30 mmol in D5W 250 mL IVPB, 30 mmol, PRN  tirofiban-0.9% sodium chloride 12.5 mg/250ml, , Continuous PRN    Calorie Containing IV Medications: Diprivan @ 18 ml/hr (provides 475 kcal/d)    Recent Labs   Lab 04/04/25  0251 04/04/25  1144 04/08/25  0328 04/08/25  0433 04/08/25  1232 04/08/25  2003 04/09/25  0325 04/09/25  1215  04/09/25  1955 04/10/25  0243      < > 141  --  138 143 141 141 139 138   K 3.9   < > 3.7  --  3.7 3.9 3.8 4.1 4.0 3.9   CALCIUM 7.7*   < > 7.9*  --  7.7* 7.6* 8.0* 7.5* 7.6* 8.4*   PHOS 2.6   < > 2.8  --  3.1 3.1 3.0 2.9 2.8 2.3   MG 2.20   < > 2.10  --  2.00 2.10 2.10 2.00 2.00 2.00   *   < > 114*  --  114* 116* 114* 115* 113* 112*   CO2 22*   < > 21*  --  20* 21* 21* 20* 19* 20*   BUN 12.2   < > 8.9  --  10.1 9.5 10.0 11.4 11.1 11.5   CREATININE 0.68*   < > 0.62*  --  0.61* 0.63* 0.61* 0.60* 0.60* 0.67*   EGFRNORACEVR >60   < > >60  --  >60 >60 >60 >60 >60 >60   GLUCOSE 90   < > 89  --  95 95 102 124* 98 129*   BILITOT 0.3   < > 0.3  --  0.3 0.3 0.3 0.3 0.4 0.5   ALKPHOS 63   < > 67  --  68 74 73 77 81 96   ALT 17   < > 17  --  17 18 18 20 18 19   AST 24   < > 19  --  21 20 20 21 19 19   ALBUMIN 2.0*   < > 1.8*  --  1.8* 1.9* 1.8* 1.9* 2.0* 2.0*   TRIG 126  --   --   --   --   --   --   --   --   --    WBC 7.37   < > 5.51 5.39 5.31 5.22 5.64 7.37 12.12* 19.02*   HGB 8.2*   < > 9.1* 8.9* 9.1* 9.2* 9.4* 9.4* 10.2* 10.2*   HCT 25.1*   < > 28.0* 27.5* 28.1* 29.8* 30.5* 30.2* 31.8* 31.4*    < > = values in this interval not displayed.     Nutrition Orders:  Diet NPO  Tube Feedings/Formulas 40; Peptamen Intense VHP; OG; 50; Every 4 hours,Tube Feedings/Formulas Other (see comments); OG; ProSource TF20    Appetite/Oral Intake: not applicable/not applicable  Factors Affecting Nutritional Intake: on mechanical ventilation  Social Needs Impacting Access to Food: unable to assess at this time; will attempt on follow-up  Food/Shinto/Cultural Preferences: unable to obtain  Food Allergies: no known food allergies  Last Bowel Movement: 03/28/25  Wound(s):  Incision documentation noted     Comments    3/31/25: Discussed with RN. Will provide tube feeding recommendations for when appropriate to start tube feeding. Receiving kcal from meds.  Currently with Impella in place, HOB flat. May need to place in reverse  "Trendelenburg to start TF.     4/1/25: Possible plans for starting trickle feeds today. Still receiving kcal from meds.     4/2/25: No TF yet. Still receiving kcal from meds.     4/3/25: No TF plans at this time. Plans for CABG tomorrow with plans for extubation per protocol post-op.     4/7/25: No CABG done. Possible plans for removal of Impella today. TF to start post procedure per RN.     4/8/25: Plans for removal tomorrow. TF to start today.     4/10/25: Plans for procedure today. TF on hold. Previously tolerated per RN.     Anthropometrics    Height: 5' 10.98" (180.3 cm), Height Method: Stated  Last Weight: 106.1 kg (233 lb 14.5 oz) (03/29/25 1738), Weight Method: Bed Scale  BMI (Calculated): 32.6  BMI Classification: obese grade I (BMI 30-34.9)        Ideal Body Weight (IBW), Male: 171.88 lb     % Ideal Body Weight, Male (lb): 130.81 %                          Usual Weight Provided By: unable to obtain usual weight    Wt Readings from Last 5 Encounters:   03/29/25 106.1 kg (233 lb 14.5 oz)   12/02/21 101 kg (222 lb 10.6 oz)     Weight Change(s) Since Admission:   Wt Readings from Last 1 Encounters:   03/29/25 1738 106.1 kg (233 lb 14.5 oz)   03/29/25 0404 102.1 kg (225 lb)   Admit Weight: 102.1 kg (225 lb) (03/29/25 0404), Weight Method: Stated    Estimated Needs    Weight Used For Calorie Calculations: 106.1 kg (233 lb 14.5 oz)  Energy Calorie Requirements (kcal): 1167-1485kcal (11-14kcal/kg IBW)  Energy Need Method: Kcal/kg  Weight Used For Protein Calculations: 78.2 kg (172 lb 5.7 oz) (IBW)  Protein Requirements: 156gm (2g/kg IBW)  Fluid Requirements (mL): 2122ml (20ml/kg)  CHO Requirement: 165gm (45% est kcal needs)     Enteral Nutrition     Patient not receiving enteral nutrition at this time.    Parenteral Nutrition     Patient not receiving parenteral nutrition support at this time.    Evaluation of Received Nutrient Intake    Calories: not meeting estimated needs  Protein: not meeting estimated " needs    Patient Education     Not applicable.    Nutrition Diagnosis     PES: Inadequate oral intake related to acute illness as evidenced by intubation since admit. (active)     PES:            Nutrition Interventions     Intervention(s): modified composition of enteral nutrition, modified rate of enteral nutrition, and collaboration with other providers  Intervention(s):      Goal: Meet greater than 80% of nutritional needs by follow-up. (goal progressing)  Goal: Tolerate enteral feeding at goal rate by follow-up. (goal progressing)    Nutrition Goals & Monitoring     Dietitian will monitor: energy intake and enteral nutrition intake  Discharge planning: too early to determine; pending clinical course  Nutrition Risk/Follow-Up: high (follow-up in 1-4 days)   Please consult if re-assessment needed sooner.

## 2025-04-10 NOTE — PROGRESS NOTES
[facility]    Cardiology  Progress Note    Patient Name: Jon Conley  MRN: [id number]  Admission Date: 3/29/2025  Hospital Length of Stay: 12 days  Code Status: DNR   Attending Provider: Davi Harp Jr., MD,*   Consulting Provider: NORM Sierra  Primary Care Physician: Patrick Reece Jr., MD  Principal Problem:<principal problem not specified>    Patient information was obtained from past medical records and ER records.     Subjective:     Chief Complaint/Reason for Consult: Cardiac Arrest     HPI: Mr. Conley is a 78 y/o male who is known to [facility], Dr. Wang. The patient presented to [facility] on 3.29.25 with c/o SOB. He initially presented to [facility] in [place] with SOB. He was found to have Tachycardia, Hypotension, Anemia (7.4/24.6), Troponin 9.443, .0, BUN/Crea 16.2/1.34, Lactic Acid Level 4.3, Na 133, WBC 14.03, H&H 7.3/23.0, WBC 14.03, , PT/INR 14.9/1.2. He was admitted to , however, he sustained a witnessed PEA Arrest in the ER and has ROSC per ER MD. [facility] was consulted for NSETMI and Arrest.     3.30.25: NAD. Impella L Groin. CVP 7, P7. Levophed 0.32mcg/kg/min, Heparin Drip per Protocol, Amiodarone 0.5mg/min  3.31.25: NAD. L Groin Impella P7, Amiodarone 0.5mg/min, Heparin Drip per Protocol, Levophed 0.18mcg/kg/min, H&H 8.8/25.8, AST/ALT 79/45  4.1.25: NAD noted. L groin Impella in place, remains at P7. Remains on levophed. LFTs continue to improve.  4.2.25: NAD. L Groin Impella Device P6. Heparin Infusion per Protocol. Remains on 0.06mcg/kg/min.   4.3.25: NAD. Vented/Sedated. L Groin Impella Device. P3, Heparin Infusion per Protocol. Off Pressors. Attempting Wean of Impella. H&H 8.2/26.2,   4.4.25: NAD. Vented/Sedated. Heparin Drip per Protocol. H&H 8.1/95.4,   4.5.25: NAD. Vented/Sedated. Heparin Drip per Protocol. Levophed 0.04mcg/kg/min. H&H 7.9/23.8,  Impella P6  4.6.25: NAD. Vented/Sedated. P3.  Heparin Drip per Protocol, Off Pressors. CV Surgery to Reevaluate for CABG. H&H 7.4/22.7,   4.7.25: NAD. Vented/Sedated. H&H 9.2/27.7, , K 3.9, BUN/Crea 9.2/0.61, Lactic Acid 0.6. Impella L Groin P4  4.8.25:NAD noted. Remains vented/sedated. Consent obtained for LHC, will plan for Thursday.  4.9.25: NAD. Remains Vented/Sedated. Plans for LHC with PCI on Thursday. Impella P4. Remains Off Pressors.   4.10.25: NAD. Remains Vented/Sedated. NPO for Planned LHC with PCI/Impella Assisted. P4 Impella. Levophed 0.1mcg/kg/min.     PMH: BPH/Urinary Obstruction, CAD/Nonobstructive, PAD, R JACY/Mild, HTN, Obesity, GERD, Tobacco Use   PSH: TURP, Angiogram, Bilateral Knee Replacements   Family History: Denies Family History of Heart Disease  Social History: Denies Illicit Drug, ETOH and Tobacco Use     Previous Cardiac Diagnostics:   ECHO Limited 4.8.25:  Impella check  Aortic annulus to tip distance is 4.3 cm.  LV systolic function is 15%.    ECHO 4.3.25:  Limited study to check impella placement.  Left Ventricle: There is severely reduced systolic function with a visually estimated ejection fraction of 15 - 20%.  Impella distance from aortic valve annulus appears appropriate.    ECHO 4.1.25:  Limited study to check impella placement.  Left Ventricle: There is severely reduced systolic function with a visually estimated ejection fraction of 15 - 20%.  Impella distance from aortic valve annulus appears appropriate.    ECHO 3.30.25:  Left Ventricle: There is severely reduced systolic function with a visually estimated ejection fraction of less than 30%.  There appears to be at least moderate to severe mitral regurgitation.   Impella is positioned with inflow cannula 3.8 cm from aortic valve annulus.    LHC/RHC 3.29.25:  Findings:  - Successful placement of Impella CP device from left femoral approach.  - There is severe multi-vessel coronary artery disease.  - Left Main has mild diffuse disease.  - Mid Left Anterior  Descending has serial lesions, up to 70-80% in severity.   - Prox Left Circumflex is 100% occluded. Unable to cross lesion despite wire escalation and microcatheter support.  - Mid Right Coronary Artery is 100% occluded. The lesion was successfully treated with overlapping SYNERGY XD 4.0X20MM, SYNERGY XD 3.0X48MM, and SYNERGY XD 2.82H34SH drug-eluting stents. Post-dilatation was performed using a SYNERGY XD 4.0X20MM angioplasty balloon inflated to 12 gretel. Following intervention there was 0% residual stenosis and PATTY grade 3 flow in the distal vessel.  - Intravascular Ultrasound (IVUS) was performed prior to intervention to further characterize the lesion, as well as post-stent deployment to ensure optimal stent apposition and expansion.  - Aorto-iliac angiogram revealed moderate disease of R ileofemoral system.  - At the conclusion of the case, contrast injection was performed through the Impella sheath side port, showing adequate antegrade flow down the ipsilateral common femoral artery/SFA/profunda artery.  - RHC with elevated R sided filling pressures. RA 17 mmHg, RV 45/15 mmHg, PA 42/29 mmHg (mean 34 mmHg), PCWP 25 mmHg.  - Transpulmonary gradient is 9 mmHg.  - Cardiac Output/Index at 6.6/3.0, as calculated by Gregory equation on Levophed infusion and Impella CP support.  - PVR is 1.4 Woods units  - SVR is 550 dyne-sec*cm^-5  - Pulmonary Artery Pulsatility Index (Rafa) is 0.8  - Cardiac Power Output () is 0.91   Assessment/Plan:  - Patient is a 79 y.o. male with a history of recent TURP, complicated by significant post-op bleeding, presents to OSH ED with dyspnea. Transferred to [facility] with worsening shock despite vasopressors. TTE showing EF 25%, severe MR, severe TR. Troponin elevated. Brought to cath lab. Underwent Impella placement/LHC/RHC as outlined above. RCA lesion treated. Unable to cross LCx lesion. 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 At this point, would continue Impella support and aggressive  medical mgmt. Has non-culprit lesions that should be treated at a later date as well. Can consider CTS consult, especially given severe valvular disease.  - Patient was given a loading dose of clopidogrel 600 mg PO in the cath lab  - Continue DAPT (aspirin + clopidogrel) for a minimum of 12 months and aspirin indefinitely thereafter  - High intensity statin  - Continue Aggrastat for 4 hours post-procedure  - Arterial sheath remains place; plan is to remove when activated clotting time (ACT) less than 180 seconds. Bedrest for 4 hours after hemostasis is achieved.  - Obtain transthoracic echocardiogram  - Continuous bedrest while Impella in place  - Therapeutic heparin administration per protocol  - Antibiotics per protocol  - Remaining mgmt per primary team/cardiology    ECHO 3.29.25:  Left Ventricle: The left ventricle is dilated. Normal wall thickness. Severe global hypokinesis present. There is severely reduced systolic function with a visually estimated ejection fraction of 15 - 20%. Grade I diastolic dysfunction.  Right Ventricle: Right ventricular enlargement. Systolic function is normal. TAPSE is 1.83 cm.  Left Atrium: dilated  Mitral Valve: Mildly thickened leaflets. There is moderate regurgitation.  Tricuspid Valve: There is moderate regurgitation.  Pulmonary Artery: There is moderate pulmonary hypertension.  IVC/SVC: Patient is ventilated, cannot use IVC diameter to estimate right atrial pressure.    Carotid US 11.4.24:  The study quality is average.   1-39% stenosis in the proximal right internal carotid artery based on Bluth Criteria.   Antegrade right vertebral artery flow.   Antegrade left vertebral artery flow    ECHO 1.27.22:  The study quality is average.   The left ventricle is normal in size. Global left ventricular systolic function is moderately decreased. The left ventricular ejection fraction is 40-45%. Mild concentric left ventricular hypertrophy is present.  The left ventricle diastolic  function is impaired (Grade I) with normal left atrial pressure.  Mild (1+) mitral regurgitation.  The pulmonary artery appears normal.     [facility] 9.26.17:  LM: Normal  LAD: Prox 30% Stenosis  RI: Normal  LCX: Normal  RCA: Distal RCA 50%    Review of patient's allergies indicates:  No Known Allergies  No current facility-administered medications on file prior to encounter.     No current outpatient medications on file prior to encounter.     Review of Systems   Unable to perform ROS: Intubated     Objective:     Vital Signs (Most Recent):  Temp: 100.2 °F (37.9 °C) (04/10/25 1200)  Pulse: 77 (04/10/25 1530)  Resp: 16 (04/10/25 1530)  BP: 108/62 (04/10/25 1530)  SpO2: 98 % (04/10/25 1530) Vital Signs (24h Range):  Temp:  [98 °F (36.7 °C)-100.2 °F (37.9 °C)] 100.2 °F (37.9 °C)  Pulse:  [] 77  Resp:  [0-26] 16  SpO2:  [96 %-100 %] 98 %  BP: ()/() 108/62  Arterial Line BP: ()/(45-91) 114/53   Weight: 106.1 kg (233 lb 14.5 oz)  Body mass index is 32.64 kg/m².  SpO2: 98 %       Intake/Output Summary (Last 24 hours) at 4/10/2025 1629  Last data filed at 4/10/2025 1500  Gross per 24 hour   Intake 3094.14 ml   Output 1965 ml   Net 1129.14 ml     Lines/Drains/Airways       Central Venous Catheter Line  Duration             Pulmonary Artery Catheter Assessment  03/29/25 1300 Femoral Vein Right 12 days    Tunneled Central Line - Triple Lumen 03/29/25 0555 Internal Jugular Right 12 days              Drain  Duration                  Urethral Catheter -- days         NG/OG Tube 03/29/25 1200 18 Fr. Center mouth 12 days              Airway  Duration                  Airway - Non-Surgical 03/29/25 0541 12 days              Line  Duration                  VAD 03/29/25 12 days              Peripheral Intravenous Line  Duration                  Peripheral IV - Single Lumen 03/29/25 0530 18 G Right Antecubital 12 days         Sheath 03/29/25 0913 Right 12 days         Sheath 03/29/25 0927 Left 12 days                   Significant Labs:   Chemistries:   Recent Labs   Lab 04/09/25  0325 04/09/25  1215 04/09/25  1955 04/10/25  0243 04/10/25  1235    141 139 138 135*   K 3.8 4.1 4.0 3.9 3.8   * 115* 113* 112* 110*   CO2 21* 20* 19* 20* 20*   BUN 10.0 11.4 11.1 11.5 9.8   CREATININE 0.61* 0.60* 0.60* 0.67* 0.66*   CALCIUM 8.0* 7.5* 7.6* 8.4* 7.8*   BILITOT 0.3 0.3 0.4 0.5 0.6   ALKPHOS 73 77 81 96 89   ALT 18 20 18 19 18   AST 20 21 19 19 22   GLUCOSE 102 124* 98 129* 124*   MG 2.10 2.00 2.00 2.00 1.90   PHOS 3.0 2.9 2.8 2.3 2.6        CBC/Anemia Labs: Coags:    Recent Labs   Lab 04/09/25  1955 04/10/25  0243 04/10/25  1235   WBC 12.12* 19.02* 9.84   HGB 10.2* 10.2* 9.4*   HCT 31.8* 31.4* 30.0*    198 205   MCV 90.1 89.7 90.9   RDW 18.0* 18.0* 18.1*    Recent Labs   Lab 04/05/25 1959   APTT 114.7*          Telemetry: SR    Physical Exam  Constitutional:       General: He is not in acute distress.     Appearance: Normal appearance. He is not ill-appearing.      Comments: Vented/Sedated   HENT:      Head: Normocephalic.      Mouth/Throat:      Mouth: Mucous membranes are moist.   Cardiovascular:      Rate and Rhythm: Normal rate and regular rhythm.      Pulses: Normal pulses.      Heart sounds: Murmur heard.   Pulmonary:      Effort: Pulmonary effort is normal. No respiratory distress.      Comments: Ventilator Associated Breath Sounds  Vent Mode: A/C  Oxygen Concentration (%):  [1-50] 1  Resp Rate Total:  [17 br/min-22 br/min] 18 br/min  Vt Set:  [500 mL] 500 mL  PEEP/CPAP:  [5 cmH20] 5 cmH20  Mean Airway Pressure:  [11 xvZ95-12 cmH20] 11 cmH20  Abdominal:      Palpations: Abdomen is soft.   Skin:     General: Skin is warm and dry.      Comments: L Groin Impella CP, Soft/Flat, No Sign of Bleed/Infection. + Monophasic Dopplerable Pedal Pulses    Neurological:      Comments: Vented/Sedated       Home Medications:   Medications Ordered Prior to Encounter[1]  Current Schedule Inpatient Medications:   aspirin  81 mg  Oral Daily    atorvastatin  40 mg Oral Daily    clopidogreL  75 mg Oral Daily    famotidine  20 mg Per NG tube BID    heparin (PORCINE)  39.2 Units/kg Intravenous Once     Continuous Infusions:   heparin (porcine) in D5W  0-40 Units/kg/hr Intravenous Continuous 19.7 mL/hr at 04/10/25 1024 19.3 Units/kg/hr at 04/10/25 1024    lactated ringers   Intravenous Continuous 75 mL/hr at 04/10/25 0715 Rate Verify at 04/10/25 0715    NORepinephrine bitartrate-D5W  0-3 mcg/kg/min Intravenous Continuous 19.1 mL/hr at 04/10/25 1028 0.1 mcg/kg/min at 04/10/25 1028    propofoL  0-50 mcg/kg/min Intravenous Continuous 21.4 mL/hr at 04/10/25 1414 35 mcg/kg/min at 04/10/25 1414    sodium bicarbonate 25 mEq in D5W 1,000 mL Purge Solution for Impella   Impella Device Continuous 13.5 mL/hr at 04/10/25 0715 Rate Verify at 04/10/25 0715    tirofiban-0.9% sodium chloride 12.5 mg/250ml    Continuous PRN   Stopped at 03/29/25 1845     Mechanical Circulatory Support (MCS) Flow Sheet  Cardiogenic Shock/HF Management  04/10/2025    PUMP METRICS  Value/Parameter TIME: 0900   Site Observation/Distal Pulses L Groin See Physical Exam   cm See Nursing Documentation    Impella [x]CP w/Smart Assist   []5.5 w/Smart Assist   []RP w/Smart Assist   P-Level P-4   Ao (MAP) 125/57   /-9   Motor Current (Mean) 533/354   Purge Pressures/Flow 13.8   Average Flows 384     HEMODYNAMICS  Value/Parameter TIME: 0900   AO (120/80) 128/57   MAP (> 60) 81   CO (PA Catheter)/GARRY 5.9   Native CO ([place] CO-Impella)    CI (> 2.2) 2.6   Cardiac Power Output (Greater than or Equal to 0.8) 1.1   SVR (800-1200) 1003   HR () 95   LVEF  15%     RV ASSESSMENT  Value/Parameter TIME: 0900   Rafa (> 1.5) 3.8   PA (25/10) 44/17   CVP (8-12) 7   PVR (120-250) 0   TAPSI (cm) (1.8) cm     LABS  Value/Parameter TIME: 0900   Lactate Level (< 2.0) 0.8   SvO2 (65-75%) 68.7%   Hgb/Hct (12-17/36-50) 10.2/31.4      SpO2 (%) 99%   pH (7.35-7.45) 7.41   Creatinine  (0.6-1.2) 0.67   ALT (7-56) 19   ACT (160-180) 150   LDH    Urine Output (> 30mL/HR) > 30-40mL/HR      DRIPS (GTTS)  Value/Parameter TIME: 0900   Vasopressors  []Vasopressin  []Epinephrine  [x]Levophed  []NeoSynephrine   Inotropes  []Dobutamine  []Primacor/Milrinone   []Dopamine   Anticoagulants [x]Heparin  []Argatroban   Sedation []Fentanyl  []Precedex  [x]Propofol  []Nimbex     Assessment:   Cardiogenic Shock requiring Mechanical and Chemical Support - Now just Mechanical    - PT has NOT Tolerated Impella Wean x 2 in the Last few Days (4.4.25)    - Impella/Pressors   MVCAD    - s/p LHC (3.29.25) -  Left Main has mild diffuse disease. Mid Left Anterior Descending has serial lesions, up to 70-80% in severity. Prox Left Circumflex is 100% occluded. Unable to cross lesion despite wire escalation and microcatheter support. Mid Right Coronary Artery is 100% occluded. The lesion was successfully treated with overlapping SYNERGY XD 4.0X20MM, SYNERGY XD 3.0X48MM, and SYNERGY XD 2.85L57KM drug-eluting stents. Post-dilatation was performed using a SYNERGY XD 4.0X20MM angioplasty balloon inflated to 12 gretel. Following intervention there was 0% residual stenosis and PATTY grade 3 flow in the distal vessel.  NSTEMI Type I (Non-Anterior Wall MI)    - Trponin > 9  Cardiopulmonary Arrest    - Initial Rhythm PEA - Witnessed in ER Arrest/ROSC  ICMO/EF 15%     - ECHO (4.8.25) - LVEF 15%    - ECHO (3.29.25) - LVEF 15-20%, Grade I DD   Acute Hypoxemic Respiratory Failure requiring Intubation/Ventilation   ST - Now SR   Hypotension requiring Pressors - Resolved     - Hx of HTN  BPH s/p TURP   Lactic Acidosis - Resolved   Leukocytosis - Resolved   Anemia requiring Transfusions - Stable   Recent BPH Diagnosis s/p TURP  CAD/Nonobstructive (2017)  JACY/R Sided   PAD/Nonobstructive   Transaminitis - Resolved   Obesity  GERD  Tobacco Use  No Hx of GIB     Plan:   Heparin Drip per Protocol for Tx of NSTEMI/Impella Thrombosis Prophylaxis   Groin  Precautions/Impella Site Monitoring   Continue Impella at P4 until after LHC  Keep NPO  Schedule/Consent for LHC with PCI of LAD Today (4.10.25)  Risk, Benefits and Alternatives Reviewed and Discussed with the PT and their Family and they wish to proceed with above Procedure. (Consents on Chart)  Continue ASA, Statin and Plavix   Wean Pressors for MAP > 65mmHg  Will Continue to Follow  Labs and EKG in AM: CBC, CMP and Mg    NORM Sierra  Cardiology  [facility]          [1]   No current facility-administered medications on file prior to encounter.     No current outpatient medications on file prior to encounter.

## 2025-04-10 NOTE — PLAN OF CARE
Problem: Mechanical Ventilation Invasive  Goal: Effective Communication  Outcome: Not Progressing  Goal: Optimal Device Function  Outcome: Not Progressing  Goal: Absence of Device-Related Skin and Tissue Injury  Outcome: Progressing  Goal: Absence of Ventilator-Induced Lung Injury  Outcome: Progressing

## 2025-04-10 NOTE — BRIEF OP NOTE
[facility]  Brief Operative Note    SUMMARY     Surgery Date: 4/10/2025     Surgeons and Role:     * Lorne Coon Jr., MD - Primary    Assisting Surgeon: None    Pre-op Diagnosis:  Cardiogenic shock [R57.0]  CAD (coronary artery disease) [I25.10]    Post-op Diagnosis:  Post-Op Diagnosis Codes:     * Cardiogenic shock [R57.0]     * CAD (coronary artery disease) [I25.10]    Procedure(s) (LRB):  Left heart cath (N/A)    Anesthesia: RN IV Sedation    Implants:  Implant Name Type Inv. Item Serial No.  Lot No. LRB No. Used Action   STENT SYNERGY XD 2.5X24MM - [id number] stent coronary TAD - Balloon Exp STENT SYNERGY XD 2.5X24MM  [employer] [id number] N/A 1 Implanted   STENT FRONTIER BERNADINE 2.20D89YX - [id number] stent coronary TAD - Balloon Exp STENT FRONTIER BERNADINE 2.44I44ED  [employer] [id number] N/A 1 Implanted   STENT FRONTIER BERNADINE 3.86N59XV - [id number] stent coronary TAD - Balloon Exp STENT FRONTIER BERNADINE 3.49K25YR  [employer] [facility] [id number] N/A 1 Implanted   STENT FRONTIER BERNADINE 3.85E51NG - [id number] stent coronary TAD - Balloon Exp STENT FRONTIER BERNADINE 3.87D04BM  [employer] [facility] [id number] N/A 1 Implanted       Operative Findings: successful Impella protected PCI with TAD x 4; successful Impella removal    Estimated Blood Loss: * No values recorded between 4/10/2025 12:00 AM and 4/10/2025  5:42 PM *    Estimated Blood Loss has been documented.         Specimens:   Specimen (24h ago, onward)      None          * No specimens in log *    [id number]

## 2025-04-10 NOTE — PROGRESS NOTES
Pulmonary & Critical Care Medicine   Progress Note      Presenting History/HPI:    The patient is a 79-year-old male who has a history of coronary artery disease, hypertension, hyperlipidemia, peripheral arterial disease, and GE reflux disease.  He also has a history of recent TURP in [place], complicated by postoperative by gross hematuria requiring placement of a three-way irrigation bladder catheter last week. This was left in place at time of discharge.  He presented to [facility] ER early a.m. of 03/29/2025 with shortness of breath, noted tachycardia/hypotensive, and marked elevation of high sensitivity troponin at 13,821. He was transferred to [facility] ER, suffering PEA arrest requiring intubation and short CPR with ROSC.  LHC was performed, noting multivessel coronary artery disease with 100% occluded proximal left circumflex, unable to cross with wire.  Mid RCA also noted 100% occluded, treated with PTCA/TAD.  He was noted to have elevated right-sided filling pressures with PCW 25 mm Hg.  Impella catheter was placed, and was left in place postprocedure.  He was admitted to ICU for ongoing medical care.     Intubation/mechanical ventilation 03/29/2025  Impella placement 03/29/2025    Attempts to transfer to [facility] for high-risk surgery was denied    Status post 2 units packed red blood cells on 4/6    Interval History:  -no major issues or changes overnight   -remains on Impella at P-4   -plans to go to the cath lab today for PCI?  -he is now requiring norepinephrine at 0.08 mcg/kg per minute  -remains afebrile white blood cell count is now up to 19.02 from 12.1  -hemoglobin holding steady at 10.2    Scheduled Medications:    aspirin  81 mg Oral Daily    atorvastatin  40 mg Oral Daily    clopidogreL  75 mg Oral Daily    famotidine  20 mg Per NG tube BID    heparin (PORCINE)  39.2 Units/kg Intravenous Once       PRN Medications:     Current Facility-Administered Medications:      0.9%  NaCl infusion (for blood administration), , Intravenous, Q24H PRN    0.9%  NaCl infusion (for blood administration), , Intravenous, Q24H PRN    0.9%  NaCl infusion (for blood administration), , Intravenous, Q24H PRN    0.9%  NaCl infusion (for blood administration), , Intravenous, Q24H PRN    0.9%  NaCl infusion (for blood administration), , Intravenous, Q24H PRN    acetaminophen, 650 mg, Oral, Q4H PRN    calcium gluconate IVPB, 1 g, Intravenous, PRN    calcium gluconate IVPB, 2 g, Intravenous, PRN    calcium gluconate IVPB, 3 g, Intravenous, PRN    dextrose 50%, 12.5 g, Intravenous, PRN    dextrose 50%, 25 g, Intravenous, PRN    [] fentaNYL, 50 mcg, Intravenous, Q15 Min PRN **FOLLOWED BY** fentaNYL, 50 mcg, Intravenous, Q1H PRN    glucagon (human recombinant), 1 mg, Intramuscular, PRN    glucose, 16 g, Oral, PRN    glucose, 24 g, Oral, PRN    heparin (PORCINE), 39.2 Units/kg, Intravenous, PRN    heparin (PORCINE), 30 Units/kg, Intravenous, PRN    magnesium sulfate 2 g IVPB, 2 g, Intravenous, PRN    magnesium sulfate 2 g IVPB, 2 g, Intravenous, PRN    ondansetron, 8 mg, Oral, Q8H PRN    potassium chloride in water, 20 mEq, Intravenous, PRN **AND** potassium chloride in water, 40 mEq, Intravenous, PRN **AND** potassium chloride in water, 60 mEq, Intravenous, PRN    sodium phosphate 15 mmol in D5W 250 mL IVPB, 15 mmol, Intravenous, PRN    sodium phosphate 20.1 mmol in D5W 250 mL IVPB, 20.1 mmol, Intravenous, PRN    sodium phosphate 30 mmol in D5W 250 mL IVPB, 30 mmol, Intravenous, PRN    tirofiban-0.9% sodium chloride 12.5 mg/250ml, , , Continuous PRN      Infusions:     heparin (porcine) in D5W  0-40 Units/kg/hr Intravenous Continuous 19.7 mL/hr at 04/10/25 0715 19.295 Units/kg/hr at 04/10/25 0715    lactated ringers   Intravenous Continuous 75 mL/hr at 04/10/25 0715 Rate Verify at 04/10/25 0715    NORepinephrine bitartrate-D5W  0-3 mcg/kg/min Intravenous Continuous 15.3 mL/hr at 04/10/25 0715 0.08  mcg/kg/min at 04/10/25 0715    propofoL  0-50 mcg/kg/min Intravenous Continuous 21.4 mL/hr at 04/10/25 0831 35 mcg/kg/min at 04/10/25 0831    sodium bicarbonate 25 mEq in D5W 1,000 mL Purge Solution for Impella   Impella Device Continuous 13.5 mL/hr at 04/10/25 0715 Rate Verify at 04/10/25 0715    tirofiban-0.9% sodium chloride 12.5 mg/250ml    Continuous PRN   Stopped at 03/29/25 1845         Fluid Balance:     Intake/Output Summary (Last 24 hours) at 4/10/2025 0930  Last data filed at 4/10/2025 0715  Gross per 24 hour   Intake 4297.15 ml   Output 1670 ml   Net 2627.15 ml         Vital Signs:   Vitals:    04/10/25 0915   BP: 116/67   Pulse: 94   Resp: (!) 21   Temp:          Physical Exam  Vitals and nursing note reviewed.   Constitutional:       General: He is not in acute distress.     Appearance: Normal appearance. He is not toxic-appearing.   HENT:      Head: Normocephalic and atraumatic.      Right Ear: External ear normal.      Left Ear: External ear normal.      Nose: Nose normal.      Mouth/Throat:      Pharynx: No posterior oropharyngeal erythema.      Comments: Unable to visualize posterior oropharynx secondary to endotracheal tube  Eyes:      General: No scleral icterus.     Conjunctiva/sclera: Conjunctivae normal.      Pupils: Pupils are equal, round, and reactive to light.   Neck:      Vascular: No carotid bruit.   Cardiovascular:      Rate and Rhythm: Normal rate and regular rhythm.      Pulses: Normal pulses.      Heart sounds: Normal heart sounds. No murmur heard.     No friction rub. No gallop.   Pulmonary:      Effort: Pulmonary effort is normal. No respiratory distress.      Breath sounds: Rhonchi present. No wheezing or rales.      Comments: Intubated, on mechanical ventilation  Abdominal:      General: Abdomen is flat. Bowel sounds are normal. There is no distension.      Palpations: Abdomen is soft.      Tenderness: There is no abdominal tenderness. There is no guarding or rebound.    Musculoskeletal:         General: No swelling or deformity.      Cervical back: Neck supple. No rigidity or tenderness.   Skin:     General: Skin is warm and dry.      Capillary Refill: Capillary refill takes less than 2 seconds.      Findings: No erythema or rash.   Neurological:      Comments: Unable to fully assess neurologic status and orientation secondary to patient being intubated, sedated and nonverbal, asymmetric pupils   Psychiatric:      Comments: Unable to fully assess as patient is intubated and nonverbal           Ventilator Settings  Vent Mode: A/C (04/10/25 0513)  Ventilator Initiated: Yes (03/29/25 0603)  Set Rate: 14 BPM (04/10/25 0513)  Vt Set: 500 mL (04/10/25 0513)  PEEP/CPAP: 5 cmH20 (04/10/25 0513)  Oxygen Concentration (%): 50 (04/10/25 0513)  Peak Airway Pressure: 36 cmH20 (04/10/25 0513)  Total Ve: 8.5 L/m (04/10/25 0513)  F/VT Ratio<105 (RSBI): (!) 41.67 (04/10/25 0300)      Laboratory Studies:   Recent Labs   Lab 04/10/25  0821   PH 7.430  7.500*   PCO2 35.0  29.0*   PO2 <38.0  111.0*   HCO3 23.2  22.6     Recent Labs   Lab 04/10/25  0243   WBC 19.02*   RBC 3.50*   HGB 10.2*   HCT 31.4*      MCV 89.7   MCH 29.1   MCHC 32.5*     Recent Labs   Lab 04/10/25  0243   GLUCOSE 129*      K 3.9   *   CO2 20*   BUN 11.5   CREATININE 0.67*   CALCIUM 8.4*   MG 2.00         Microbiology Data:   Microbiology Results (last 7 days)       Procedure Component Value Units Date/Time    Blood Culture #1 **CANNOT BE ORDERED STAT** [[id number]]  (Normal) Collected: 03/29/25 0446    Order Status: Completed Specimen: Blood from Arm, Left Updated: 04/03/25 1101     Blood Culture No Growth at 5 days    Blood Culture #2 **CANNOT BE ORDERED STAT** [[id number]]  (Normal) Collected: 03/29/25 0454    Order Status: Completed Specimen: Blood from Arm, Right Updated: 04/03/25 1101     Blood Culture No Growth at 5 days              Imaging:   Echo Saline Bubble? No; Ultrasound enhancing contrast?  No    Impella check    Aortic annulus to tip distance is 4.3 cm.    LV systolic function is 15%.        2D echo findings as below      Left Ventricle: The left ventricle is dilated. Normal wall thickness. Severe global hypokinesis present. There is severely reduced systolic function with a visually estimated ejection fraction of 15 - 20%. Grade I diastolic dysfunction.    Right Ventricle: Right ventricular enlargement. Systolic function is normal. TAPSE is 1.83 cm.    Left Atrium: dilated    Mitral Valve: Mildly thickened leaflets. There is moderate regurgitation.    Tricuspid Valve: There is moderate regurgitation.    Pulmonary Artery: There is moderate pulmonary hypertension.    IVC/SVC: Patient is ventilated, cannot use IVC diameter to estimate right atrial pressure.        Assessment and Plan    Assessment:  NSTEMI with cardiogenic shock, ongoing  LHC with PTCA/TAD x3 RCA (03/29/2025), PCWP 25 mm Hg  EF 15-20% by TTE with associated right ventricular enlargement dilated left atrium, moderate mitral regurgitation moderate tricuspid regurgitation and moderate pulmonary hypertension  Impella placement (03/29/2025), currently P-6  Ischemic cardiomyopathy with severe decreased LVSF  Acute respiratory failure secondary to above, initially with cardiogenic pulmonary edema, currently with good oxygenation and ventilation.  Severe wide anion gap metabolic acidosis of primary lactic acid etiology, resolved   Mild/mod thrombocytopenia, likely related to Impella  Anemia  Thrombocytopenia        Plan:  -titrate mechanical ventilation for ARDS net protocol   -supplement oxygen to maintain saturation 94-96%   -plans to go to the cath lab today for high-risk PCI   -titrate vasopressors to maintain map 65 source of need for vasopressors unclear as he has been off of hemodynamic support with vasopressors for many days now, hemoglobin is up without further transfusion from 4/6 and maybe secondary to intravascular volume depletion,  bolus as appropriate, urine output remains adequate  -Impella set to P-4 continue until after PCI management per Cardiology  -I discussed the goals of care in depth with the patient's daughters at bedside specifically about palliative extubation and withdrawal of Impella and about timing, they are still discussing amongst themselves as as to when they want to do this but they did admit that they did not wish to continue any longer than necessary due to lack of improvement  -status post 2 units packed red blood cells on 04/06 with hemoglobin up to 9 with improvement in hemodynamics-transfuse to maintain hemoglobin 9-10  -thrombocytopenia resolved    Overall prognosis is very poor    DVT prophylaxis with heparin drip   GI prophylaxis with Protonix   Keep head of bed elevated greater than 30° if possible for VAP prophylaxis      The patient remains at high risk of decompensation and death and will remain in ICU level care    34 min of critical care time was spent reviewing the patient's chart including medications, radiographs, labs, pertinent cultures and pathology data, other consultant notes/recomendations as well as titration of vasopressors, adjustment of mechanical ventilatory or NIPPV support, as well as discussion of goals of care with nursing staff, respiratory therapy at the bedside and with family at the bedside/via phone.      Bharat Pena MD  4/10/2025  Pulmonology/Critical Care

## 2025-04-11 NOTE — PLAN OF CARE
Problem: Infection  Goal: Absence of Infection Signs and Symptoms  Outcome: Progressing     Problem: Adult Inpatient Plan of Care  Goal: Plan of Care Review  Outcome: Progressing  Goal: Patient-Specific Goal (Individualized)  Outcome: Progressing  Goal: Absence of Hospital-Acquired Illness or Injury  Outcome: Progressing  Goal: Optimal Comfort and Wellbeing  Outcome: Progressing  Goal: Readiness for Transition of Care  Outcome: Progressing     Problem: Mechanical Ventilation Invasive  Goal: Effective Communication  Outcome: Progressing  Goal: Optimal Device Function  Outcome: Progressing  Goal: Mechanical Ventilation Liberation  Outcome: Progressing  Goal: Optimal Nutrition Delivery  Outcome: Progressing  Goal: Absence of Device-Related Skin and Tissue Injury  Outcome: Progressing  Goal: Absence of Ventilator-Induced Lung Injury  Outcome: Progressing     Problem: Artificial Airway  Goal: Effective Communication  Outcome: Progressing  Goal: Optimal Device Function  Outcome: Progressing  Goal: Absence of Device-Related Skin or Tissue Injury  Outcome: Progressing     Problem: Delirium  Goal: Optimal Coping  Outcome: Progressing  Goal: Improved Behavioral Control  Outcome: Progressing  Goal: Improved Attention and Thought Clarity  Outcome: Progressing  Goal: Improved Sleep  Outcome: Progressing     Problem: Skin Injury Risk Increased  Goal: Skin Health and Integrity  Outcome: Progressing     Problem: Fall Injury Risk  Goal: Absence of Fall and Fall-Related Injury  Outcome: Progressing     Problem: Wound  Goal: Optimal Coping  Outcome: Progressing  Goal: Optimal Functional Ability  Outcome: Progressing  Goal: Absence of Infection Signs and Symptoms  Outcome: Progressing  Goal: Improved Oral Intake  Outcome: Progressing  Goal: Optimal Pain Control and Function  Outcome: Progressing  Goal: Skin Health and Integrity  Outcome: Progressing  Goal: Optimal Wound Healing  Outcome: Progressing

## 2025-04-11 NOTE — PROGRESS NOTES
Pulmonary & Critical Care Medicine   Progress Note      Presenting History/HPI:    The patient is a 79-year-old male who has a history of coronary artery disease, hypertension, hyperlipidemia, peripheral arterial disease, and GE reflux disease.  He also has a history of recent TURP in [place], complicated by postoperative by gross hematuria requiring placement of a three-way irrigation bladder catheter last week. This was left in place at time of discharge.  He presented to [facility] ER early a.m. of 03/29/2025 with shortness of breath, noted tachycardia/hypotensive, and marked elevation of high sensitivity troponin at 13,821. He was transferred to [facility] ER, suffering PEA arrest requiring intubation and short CPR with ROSC.  LHC was performed, noting multivessel coronary artery disease with 100% occluded proximal left circumflex, unable to cross with wire.  Mid RCA also noted 100% occluded, treated with PTCA/TAD.  He was noted to have elevated right-sided filling pressures with PCW 25 mm Hg.  Impella catheter was placed, and was left in place postprocedure.  He was admitted to ICU for ongoing medical care.     Intubation/mechanical ventilation 03/29/2025  Impella placement 03/29/2025    Attempts  had done to transfer to [facility] for high-risk surgery was denied    Status post 2 units packed red blood cells on 4/6    Patient underwent high-risk surgery PCI on 04/10/2025 and had 4 stents in LAD.  Impella was removed    Interval History:  Patient did not tolerate sedation vacation yesterday.  Switched from propofol to Precedex...  Currently on 0.8 of Precedex and 0.08 of Levophed.  He does not follow commands but is tracking which is new for him.  Continuing tube feeds.      Scheduled Medications:    aspirin  81 mg Oral Daily    atorvastatin  40 mg Oral Daily    atropine        clopidogreL  75 mg Oral Daily    famotidine  20 mg Per NG tube BID    heparin (PORCINE)  39.2 Units/kg  Intravenous Once       PRN Medications:     Current Facility-Administered Medications:     0.9%  NaCl infusion (for blood administration), , Intravenous, Q24H PRN    0.9%  NaCl infusion (for blood administration), , Intravenous, Q24H PRN    0.9%  NaCl infusion (for blood administration), , Intravenous, Q24H PRN    0.9%  NaCl infusion (for blood administration), , Intravenous, Q24H PRN    0.9%  NaCl infusion (for blood administration), , Intravenous, Q24H PRN    acetaminophen, 650 mg, Oral, Q4H PRN    atropine, , ,     calcium gluconate IVPB, 1 g, Intravenous, PRN    calcium gluconate IVPB, 2 g, Intravenous, PRN    calcium gluconate IVPB, 3 g, Intravenous, PRN    dextrose 50%, 12.5 g, Intravenous, PRN    dextrose 50%, 25 g, Intravenous, PRN    [] fentaNYL, 50 mcg, Intravenous, Q15 Min PRN **FOLLOWED BY** fentaNYL, 50 mcg, Intravenous, Q1H PRN    glucagon (human recombinant), 1 mg, Intramuscular, PRN    glucose, 16 g, Oral, PRN    glucose, 24 g, Oral, PRN    heparin (PORCINE), 39.2 Units/kg, Intravenous, PRN    heparin (PORCINE), 30 Units/kg, Intravenous, PRN    hydrALAZINE, 10 mg, Intravenous, Q4H PRN    magnesium sulfate 2 g IVPB, 2 g, Intravenous, PRN    magnesium sulfate 2 g IVPB, 2 g, Intravenous, PRN    ondansetron, 8 mg, Oral, Q8H PRN    potassium chloride in water, 20 mEq, Intravenous, PRN **AND** potassium chloride in water, 40 mEq, Intravenous, PRN **AND** potassium chloride in water, 60 mEq, Intravenous, PRN    sodium phosphate 15 mmol in D5W 250 mL IVPB, 15 mmol, Intravenous, PRN    sodium phosphate 20.1 mmol in D5W 250 mL IVPB, 20.1 mmol, Intravenous, PRN    sodium phosphate 30 mmol in D5W 250 mL IVPB, 30 mmol, Intravenous, PRN    tirofiban-0.9% sodium chloride 12.5 mg/250ml, , , Continuous PRN      Infusions:     heparin (porcine) in D5W  0-40 Units/kg/hr Intravenous Continuous 19.7 mL/hr at 04/10/25 1024 19.3 Units/kg/hr at 04/10/25 1024    lactated ringers   Intravenous Continuous 75 mL/hr at  04/10/25 0715 Rate Verify at 04/10/25 0715    NORepinephrine bitartrate-D5W  0-3 mcg/kg/min Intravenous Continuous 23 mL/hr at 04/11/25 0418 0.12 mcg/kg/min at 04/11/25 0418    propofoL  0-50 mcg/kg/min Intravenous Continuous 18.4 mL/hr at 04/11/25 0440 30 mcg/kg/min at 04/11/25 0440    sodium bicarbonate 25 mEq in D5W 1,000 mL Purge Solution for Impella   Impella Device Continuous 13.5 mL/hr at 04/11/25 0252 Rate Verify at 04/11/25 0252    tirofiban-0.9% sodium chloride 12.5 mg/250ml    Continuous PRN   Stopped at 03/29/25 1845         Fluid Balance:     Intake/Output Summary (Last 24 hours) at 4/11/2025 0640  Last data filed at 4/11/2025 0454  Gross per 24 hour   Intake 4826.46 ml   Output 3100 ml   Net 1726.46 ml         Vital Signs:   Vitals:    04/11/25 0545   BP: (!) 115/54   Pulse: 93   Resp: (!) 21   Temp:          Physical Exam  Vitals and nursing note reviewed.   Constitutional:       General: He is not in acute distress.     Appearance: Normal appearance. He is not toxic-appearing.   HENT:      Head: Normocephalic and atraumatic.      Right Ear: External ear normal.      Left Ear: External ear normal.      Nose: Nose normal.      Mouth/Throat:      Pharynx: No posterior oropharyngeal erythema.      Comments: Unable to visualize posterior oropharynx secondary to endotracheal tube  Eyes:      General: No scleral icterus.     Conjunctiva/sclera: Conjunctivae normal.      Pupils: Pupils are equal, round, and reactive to light.   Neck:      Vascular: No carotid bruit.   Cardiovascular:      Rate and Rhythm: Normal rate and regular rhythm.      Pulses: Normal pulses.      Heart sounds: Normal heart sounds. No murmur heard.     No friction rub. No gallop.   Pulmonary:      Effort: Pulmonary effort is normal. No respiratory distress.      Breath sounds: Rhonchi present. No wheezing or rales.      Comments: Intubated, on mechanical ventilation  Abdominal:      General: Abdomen is flat. Bowel sounds are normal.  There is no distension.      Palpations: Abdomen is soft.      Tenderness: There is no abdominal tenderness. There is no guarding or rebound.   Musculoskeletal:         General: No swelling or deformity.      Cervical back: Neck supple. No rigidity or tenderness.   Skin:     General: Skin is warm and dry.      Capillary Refill: Capillary refill takes less than 2 seconds.      Findings: No erythema or rash.   Neurological:      Comments: Intubated and sedated, does not follow commands, patient tracks eyes   Psychiatric:      Comments: Unable to fully assess as patient is intubated and nonverbal           Ventilator Settings  Vent Mode: A/C (04/11/25 0542)  Ventilator Initiated: Yes (03/29/25 0603)  Set Rate: 14 BPM (04/11/25 0542)  Vt Set: 500 mL (04/11/25 0542)  PEEP/CPAP: 5 cmH20 (04/11/25 0542)  Oxygen Concentration (%): 40 (04/11/25 0542)  Peak Airway Pressure: 45 cmH20 (04/11/25 0542)  Total Ve: 9.5 L/m (04/11/25 0542)  F/VT Ratio<105 (RSBI): (!) 48.5 (04/10/25 0815)      Laboratory Studies:   Recent Labs   Lab 04/11/25 0548   PH 7.340*   PCO2 45.0   PO2 110.0*   HCO3 24.3     Recent Labs   Lab 04/11/25 0326   WBC 5.70   RBC 3.20*   HGB 9.2*   HCT 30.1*      MCV 94.1*   MCH 28.8   MCHC 30.6*     Recent Labs   Lab 04/11/25 0326   GLUCOSE 120*      K 4.5   *   CO2 20*   BUN 15.3   CREATININE 0.69*   CALCIUM 7.4*   MG 2.10         Microbiology Data:   Microbiology Results (last 7 days)       ** No results found for the last 168 hours. **              Imaging:   CV Ultrasound doppler arterial legs bilat  Limited study due to Impella and sheath placements and leg braces. Only   the common femoral, mid superficial femoral, posterior tibial and dorsalis   pedis arteries are evaluated.     The right lower extremity arterial system is patent with no evidence of   focal stenosis or occlusion.  The left lower extremity arterial system is patent with no evidence of   focal stenosis or occlusion;  however dampened waveforms and severely low   velocity is  demonstrated in the dorsalis pedis artery.   Cardiac catheterization    Successful Impella-protected PCI of LAD as outline below.    The procedure log was documented by No documenter listed and verified by   Lorne Coon Jr, MD.    Date: 4/10/2025  Time: 5:46 PM    Procedure:  Left heart catheterization with coronary angiography  Impella-protected percutaneous coronary intervention  Successful Impella CP removal     Preoperative diagnosis:  Cardiomyopathy    Postoperative diagnosis:  Successful PCI     Access:  Right common femoral artery    Estimated blood loss: 10 cc  Complications: None     Summary:  Consent obtained. Risks and benefits discussed with the patient. The   patient was brought to the cath lab in a fasting state. The patient was   prepped and draped in usual sterile fashion. A preprocedure time-out was   performed.     The existing 6 Citizen [place] R femoral sheath was exchanged for a new 6 [place]   sheath for the intervention.  A 6 [place] EBU 3.5 catheter was used for the   intervention.  The LAD was wired with a runthrough wire. The large first   diagonal was wired with a Prowater wire. Balloon angioplasty was performed   on the LAD using an EMERGE MR 15 X 2.50MM device inflated to nominal   pressure. IVUS was performed to assess the vessel for PCI. Using this   measurement, the mid LAD lesion was treated with a SYNERGY XD 2.5X28MM   drug-eluting stent. The proximal vessel was then treated with an NC EMERGE   MR 3.5X20MM balloon inflated to nominal pressure. A FRONTIER BERNADINE   2.48C72IO drug-eluting stent was used to treat the mid LAD, just proximal   to the previously placed stent, in an overlapping fashion. IVUS was again   performed to assess stent expansion and measure the proximal vessel for   PCI. Using these measurements, the proximal LAD was treated using   overlapping FRONTIER BERNADINE 3.07C72VR and FRONTIER BERNADINE 3.68T05GX   drug-eluting  stents. The Prowater wire was removed from D1 without any   issue. IVUS was again performed to assess stent expansion. Using these   measurements, post-dilatation was performed using the following balloons:   NC EMERGE MR 2.5X20MM, NC EMERGE MR 3X30MM, and NC EMERGE MR 3.5X20MM;   each inflated to high pressure.     Following this intervention, there was 0% residual stenosis, PATTY grade 3   flow in the distal vessel, and PATTY grade 3 flow in the first diagonal   branch.    At the end the procedure, all wires and catheters were removed. The   Impella was removed under fluoroscopic guidance. The vessel was closed   using a MANTA 14 [place]uv[place] device, with successful hemostasis. A 6 [place]uv[place]   Pigtail was placed in the abdominal aorta.     Findings:  - Successful Impella-protected PCI of LAD.  - Prox to Mid Left Anterior Descending has severe diffuse disease, up to   80% in severity. The lesion was successfully treated with overlapping   FRONTIER BERNADINE 3.77Q92FG, FRONTIER BERNADINE 3.78U10IQ, FRONTIER BERNADINE 2.08I33XA,   and SYNERGY XD 2.5X28MM drug-eluting stents. Post-dilatation was performed   using an NC EMERGE MR 3.5X20MM angioplasty balloon inflated to 20 gretel.   Following intervention there was 0% residual stenosis and PATTY grade 3   flow in the distal vessel.  - Intravascular Ultrasound (IVUS) was performed prior to intervention to   further characterize the lesion, as well as post-stent deployment to   ensure optimal stent apposition and expansion.  - Ostial First Diagonal Branch has a 70% stenosis. At the conclusion of   the case, there was PATTY grade 3 flow in the distal vessel.  - Successful Impella CP removal and closure of L femoral arterial access   site using a 14 [place] MANTA device.     Assessment/Plan:  - Patient is a 79 y.o. male with a history of CAD presents in cardiogenic   shock. Successful PCI of RCA last week. Impella CP placed at that time.   Now s/p successful LAD intervention and Impella CP removal.  -  Continue DAPT (aspirin + clopidogrel) for a minimum of 12 months and   aspirin indefinitely thereafter  - High intensity statin  - Arterial sheath remains place; plan is to remove when activated clotting   time (ACT) less than 180 seconds. Bedrest for 4 hours after hemostasis is   achieved.    Lorne Coon MD  Interventional Cardiology/Structural Heart Disease  [facility]        2D echo findings as below      Left Ventricle: The left ventricle is dilated. Normal wall thickness. Severe global hypokinesis present. There is severely reduced systolic function with a visually estimated ejection fraction of 15 - 20%. Grade I diastolic dysfunction.    Right Ventricle: Right ventricular enlargement. Systolic function is normal. TAPSE is 1.83 cm.    Left Atrium: dilated    Mitral Valve: Mildly thickened leaflets. There is moderate regurgitation.    Tricuspid Valve: There is moderate regurgitation.    Pulmonary Artery: There is moderate pulmonary hypertension.    IVC/SVC: Patient is ventilated, cannot use IVC diameter to estimate right atrial pressure.        Assessment and Plan    Assessment:  NSTEMI with cardiogenic shock, ongoing  LHC with PTCA/TAD x3 RCA (03/29/2025), PCWP 25 mm Hg  EF 15-20% by TTE with associated right ventricular enlargement dilated left atrium, moderate mitral regurgitation moderate tricuspid regurgitation and moderate pulmonary hypertension  Status post high-risk PCI withPTCA/TAD X4 LAD on 4/10/25  Impella placement (03/29/2025),-removed on 04/10/2025  Ischemic cardiomyopathy with severe decreased LVSF  Acute respiratory failure secondary to above, initially with cardiogenic pulmonary edema, currently with good oxygenation and ventilation.  Severe wide anion gap metabolic acidosis of primary lactic acid etiology, resolved   Mild/mod thrombocytopenia, likely related to Impella  Anemia  Thrombocytopenia        Plan:  -titrate mechanical ventilation for ARDS net protocol    -supplement oxygen to maintain saturation 94-96%   -titrate vasopressors to maintain map 65   -was unable to tolerate sedation vacation yesterday; switch from propofol to Precedex yesterday  -status post 2 units packed red blood cells on 04/06 with hemoglobin up to 9 with improvement in hemodynamics-transfuse to maintain hemoglobin 9-10  -thrombocytopenia resolved  -cardiology, CT surgery on board; appreciate their assistance    DVT prophylaxis : scd  GI prophylaxis with Protonix   Keep head of bed elevated greater than 30° if possible for VAP prophylaxis      The patient remains at high risk of decompensation and death and will remain in ICU level care    34 min of critical care time was spent reviewing the patient's chart including medications, radiographs, labs, pertinent cultures and pathology data, other consultant notes/recomendations as well as titration of vasopressors, adjustment of mechanical ventilatory or NIPPV support, as well as discussion of goals of care with nursing staff, respiratory therapy at the bedside and with family at the bedside/via phone.      Kristian Grimm DO  4/11/2025  Pulmonology/Critical Care

## 2025-04-11 NOTE — PROGRESS NOTES
[facility]    Cardiology  Progress Note    Patient Name: Jon Conley  MRN: [id number]  Admission Date: 3/29/2025  Hospital Length of Stay: 13 days  Code Status: DNR   Attending Provider: Davi Harp Jr., MD,*   Consulting Provider: NORM Sierra  Primary Care Physician: Patrick Reece Jr., MD  Principal Problem:<principal problem not specified>    Patient information was obtained from past medical records and ER records.     Subjective:     Chief Complaint/Reason for Consult: Cardiac Arrest     HPI: Mr. Conley is a 78 y/o male who is known to [facility], Dr. Wang. The patient presented to [facility] on 3.29.25 with c/o SOB. He initially presented to [facility] in [place] with SOB. He was found to have Tachycardia, Hypotension, Anemia (7.4/24.6), Troponin 9.443, .0, BUN/Crea 16.2/1.34, Lactic Acid Level 4.3, Na 133, WBC 14.03, H&H 7.3/23.0, WBC 14.03, , PT/INR 14.9/1.2. He was admitted to , however, he sustained a witnessed PEA Arrest in the ER and has ROSC per ER MD. [facility] was consulted for NSETMI and Arrest.     3.30.25: NAD. Impella L Groin. CVP 7, P7. Levophed 0.32mcg/kg/min, Heparin Drip per Protocol, Amiodarone 0.5mg/min  3.31.25: NAD. L Groin Impella P7, Amiodarone 0.5mg/min, Heparin Drip per Protocol, Levophed 0.18mcg/kg/min, H&H 8.8/25.8, AST/ALT 79/45  4.1.25: NAD noted. L groin Impella in place, remains at P7. Remains on levophed. LFTs continue to improve.  4.2.25: NAD. L Groin Impella Device P6. Heparin Infusion per Protocol. Remains on 0.06mcg/kg/min.   4.3.25: NAD. Vented/Sedated. L Groin Impella Device. P3, Heparin Infusion per Protocol. Off Pressors. Attempting Wean of Impella. H&H 8.2/26.2,   4.4.25: NAD. Vented/Sedated. Heparin Drip per Protocol. H&H 8.1/95.4,   4.5.25: NAD. Vented/Sedated. Heparin Drip per Protocol. Levophed 0.04mcg/kg/min. H&H 7.9/23.8,  Impella P6  4.6.25: NAD. Vented/Sedated. P3.  Heparin Drip per Protocol, Off Pressors. CV Surgery to Reevaluate for CABG. H&H 7.4/22.7,   4.7.25: NAD. Vented/Sedated. H&H 9.2/27.7, , K 3.9, BUN/Crea 9.2/0.61, Lactic Acid 0.6. Impella L Groin P4  4.8.25:NAD noted. Remains vented/sedated. Consent obtained for LHC, will plan for Thursday.  4.9.25: NAD. Remains Vented/Sedated. Plans for LHC with PCI on Thursday. Impella P4. Remains Off Pressors.   4.10.25: NAD. Remains Vented/Sedated. NPO for Planned LHC with PCI/Impella Assisted. P4 Impella. Levophed 0.1mcg/kg/min.   4.11.25: NAD. Vented/Sedated. Levophed 0.12mcg/kg/min. S/P PCI of LAD and Impella Explant.     PMH: BPH/Urinary Obstruction, CAD/Nonobstructive, PAD, R JACY/Mild, HTN, Obesity, GERD, Tobacco Use   PSH: TURP, Angiogram, Bilateral Knee Replacements   Family History: Denies Family History of Heart Disease  Social History: Denies Illicit Drug, ETOH and Tobacco Use     Previous Cardiac Diagnostics:   [facility] (4.10.25):  Findings:  - Successful Impella-protected PCI of LAD.  - Prox to Mid Left Anterior Descending has severe diffuse disease, up to 80% in severity. The lesion was successfully treated with overlapping FRONTIER BERNADINE 3.64S63VX, FRONTIER BERNADINE 3.83O43AD, FRONTIER BERNADINE 2.17U60VD, and SYNERGY XD 2.5X28MM drug-eluting stents. Post-dilatation was performed using an NC EMERGE MR 3.5X20MM angioplasty balloon inflated to 20 gretel. Following intervention there was 0% residual stenosis and PATTY grade 3 flow in the distal vessel.  - Intravascular Ultrasound (IVUS) was performed prior to intervention to further characterize the lesion, as well as post-stent deployment to ensure optimal stent apposition and expansion.  - Ostial First Diagonal Branch has a 70% stenosis. At the conclusion of the case, there was PATTY grade 3 flow in the distal vessel.  - Successful Impella CP removal and closure of L femoral arterial access site using a 14 Welsh MANTA device.  Assessment/Plan:  - Patient is a 79 y.o.  male with a history of CAD presents in cardiogenic shock. Successful PCI of RCA last week. Impella CP placed at that time. Now s/p successful LAD intervention and Impella CP removal.  - Continue DAPT (aspirin + clopidogrel) for a minimum of 12 months and aspirin indefinitely thereafter  - High intensity statin  - Arterial sheath remains place; plan is to remove when activated clotting time (ACT) less than 180 seconds. Bedrest for 4 hours after hemostasis is achieved.    ECHO Limited 4.8.25:  Impella check  Aortic annulus to tip distance is 4.3 cm.  LV systolic function is 15%.    ECHO 4.3.25:  Limited study to check impella placement.  Left Ventricle: There is severely reduced systolic function with a visually estimated ejection fraction of 15 - 20%.  Impella distance from aortic valve annulus appears appropriate.    ECHO 4.1.25:  Limited study to check impella placement.  Left Ventricle: There is severely reduced systolic function with a visually estimated ejection fraction of 15 - 20%.  Impella distance from aortic valve annulus appears appropriate.    ECHO 3.30.25:  Left Ventricle: There is severely reduced systolic function with a visually estimated ejection fraction of less than 30%.  There appears to be at least moderate to severe mitral regurgitation.   Impella is positioned with inflow cannula 3.8 cm from aortic valve annulus.    [facility] 3.29.25:  Findings:  - Successful placement of Impella CP device from left femoral approach.  - There is severe multi-vessel coronary artery disease.  - Left Main has mild diffuse disease.  - Mid Left Anterior Descending has serial lesions, up to 70-80% in severity.   - Prox Left Circumflex is 100% occluded. Unable to cross lesion despite wire escalation and microcatheter support.  - Mid Right Coronary Artery is 100% occluded. The lesion was successfully treated with overlapping SYNERGY XD 4.0X20MM, SYNERGY XD 3.0X48MM, and SYNERGY XD 2.55I81HC drug-eluting stents.  Post-dilatation was performed using a SYNERGY XD 4.0X20MM angioplasty balloon inflated to 12 gretel. Following intervention there was 0% residual stenosis and PATTY grade 3 flow in the distal vessel.  - Intravascular Ultrasound (IVUS) was performed prior to intervention to further characterize the lesion, as well as post-stent deployment to ensure optimal stent apposition and expansion.  - Aorto-iliac angiogram revealed moderate disease of R ileofemoral system.  - At the conclusion of the case, contrast injection was performed through the Impella sheath side port, showing adequate antegrade flow down the ipsilateral common femoral artery/SFA/profunda artery.  - RHC with elevated R sided filling pressures. RA 17 mmHg, RV 45/15 mmHg, PA 42/29 mmHg (mean 34 mmHg), PCWP 25 mmHg.  - Transpulmonary gradient is 9 mmHg.  - Cardiac Output/Index at 6.6/3.0, as calculated by Gregory equation on Levophed infusion and Impella CP support.  - PVR is 1.4 Woods units  - SVR is 550 dyne-sec*cm^-5  - Pulmonary Artery Pulsatility Index (Rafa) is 0.8  - Cardiac Power Output () is 0.91   Assessment/Plan:  - Patient is a 79 y.o. male with a history of recent TURP, complicated by significant post-op bleeding, presents to OSH ED with dyspnea. Transferred to [facility] with worsening shock despite vasopressors. TTE showing EF 25%, severe MR, severe TR. Troponin elevated. Brought to cath lab. Underwent Impella placement/LHC/RHC as outlined above. RCA lesion treated. Unable to cross LCx lesion. 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 At this point, would continue Impella support and aggressive medical mgmt. Has non-culprit lesions that should be treated at a later date as well. Can consider CTS consult, especially given severe valvular disease.  - Patient was given a loading dose of clopidogrel 600 mg PO in the cath lab  - Continue DAPT (aspirin + clopidogrel) for a minimum of 12 months and aspirin indefinitely thereafter  - High intensity statin  -  Continue Aggrastat for 4 hours post-procedure  - Arterial sheath remains place; plan is to remove when activated clotting time (ACT) less than 180 seconds. Bedrest for 4 hours after hemostasis is achieved.  - Obtain transthoracic echocardiogram  - Continuous bedrest while Impella in place  - Therapeutic heparin administration per protocol  - Antibiotics per protocol  - Remaining mgmt per primary team/cardiology    ECHO 3.29.25:  Left Ventricle: The left ventricle is dilated. Normal wall thickness. Severe global hypokinesis present. There is severely reduced systolic function with a visually estimated ejection fraction of 15 - 20%. Grade I diastolic dysfunction.  Right Ventricle: Right ventricular enlargement. Systolic function is normal. TAPSE is 1.83 cm.  Left Atrium: dilated  Mitral Valve: Mildly thickened leaflets. There is moderate regurgitation.  Tricuspid Valve: There is moderate regurgitation.  Pulmonary Artery: There is moderate pulmonary hypertension.  IVC/SVC: Patient is ventilated, cannot use IVC diameter to estimate right atrial pressure.    Carotid US 11.4.24:  The study quality is average.   1-39% stenosis in the proximal right internal carotid artery based on Bluth Criteria.   Antegrade right vertebral artery flow.   Antegrade left vertebral artery flow    ECHO 1.27.22:  The study quality is average.   The left ventricle is normal in size. Global left ventricular systolic function is moderately decreased. The left ventricular ejection fraction is 40-45%. Mild concentric left ventricular hypertrophy is present.  The left ventricle diastolic function is impaired (Grade I) with normal left atrial pressure.  Mild (1+) mitral regurgitation.  The pulmonary artery appears normal.     [facility] 9.26.17:  LM: Normal  LAD: Prox 30% Stenosis  RI: Normal  LCX: Normal  RCA: Distal RCA 50%    Review of patient's allergies indicates:  No Known Allergies  No current facility-administered medications on file prior to  encounter.     No current outpatient medications on file prior to encounter.     Review of Systems   Unable to perform ROS: Intubated     Objective:     Vital Signs (Most Recent):  Temp: 98.2 °F (36.8 °C) (04/11/25 0400)  Pulse: 93 (04/11/25 1100)  Resp: 19 (04/11/25 1100)  BP: 121/69 (04/11/25 1100)  SpO2: 99 % (04/11/25 1100) Vital Signs (24h Range):  Temp:  [98.2 °F (36.8 °C)-100.2 °F (37.9 °C)] 98.2 °F (36.8 °C)  Pulse:  [] 93  Resp:  [14-35] 19  SpO2:  [96 %-100 %] 99 %  BP: ()/(50-73) 121/69  Arterial Line BP: (102-120)/(50-57) 115/53   Weight: 106.1 kg (233 lb 14.5 oz)  Body mass index is 32.64 kg/m².  SpO2: 99 %       Intake/Output Summary (Last 24 hours) at 4/11/2025 1127  Last data filed at 4/11/2025 1002  Gross per 24 hour   Intake 3512.13 ml   Output 3075 ml   Net 437.13 ml     Lines/Drains/Airways       Central Venous Catheter Line  Duration             Tunneled Central Line - Triple Lumen 03/29/25 0555 Internal Jugular Right 13 days              Drain  Duration                  Urethral Catheter -- days         NG/OG Tube 03/29/25 1200 18 Fr. Center mouth 12 days              Airway  Duration                  Airway - Non-Surgical 03/29/25 0541 13 days                  Significant Labs:   Chemistries:   Recent Labs   Lab 04/09/25  1955 04/10/25  0243 04/10/25  1235 04/10/25  2043 04/11/25  0326    138 135* 138 140   K 4.0 3.9 3.8 4.1 4.5   * 112* 110* 112* 115*   CO2 19* 20* 20* 19* 20*   BUN 11.1 11.5 9.8 10.9 15.3   CREATININE 0.60* 0.67* 0.66* 0.72 0.69*   CALCIUM 7.6* 8.4* 7.8* 7.7* 7.4*   BILITOT 0.4 0.5 0.6 0.6 0.4   ALKPHOS 81 96 89 84 85   ALT 18 19 18 20 21   AST 19 19 22 26 37   GLUCOSE 98 129* 124* 117* 120*   MG 2.00 2.00 1.90 2.10 2.10   PHOS 2.8 2.3 2.6 2.9 3.2        CBC/Anemia Labs: Coags:    Recent Labs   Lab 04/10/25  1235 04/10/25  2043 04/11/25  0326   WBC 9.84 6.46 5.70   HGB 9.4* 9.3* 9.2*   HCT 30.0* 29.4* 30.1*    179 201   MCV 90.9 93.3 94.1*    RDW 18.1* 18.2* 18.1*    Recent Labs   Lab 04/05/25 1959   APTT 114.7*          Telemetry: SR    Physical Exam  Constitutional:       General: He is not in acute distress.     Appearance: Normal appearance.      Comments: Vented/Sedated   HENT:      Head: Normocephalic.      Mouth/Throat:      Mouth: Mucous membranes are moist.   Cardiovascular:      Rate and Rhythm: Normal rate and regular rhythm.      Pulses: Normal pulses.      Heart sounds: Murmur heard.   Pulmonary:      Effort: Pulmonary effort is normal. No respiratory distress.      Comments: Ventilator Associated Breath Sounds  Vent Mode: A/C  Oxygen Concentration (%):  [1-40] 40  Resp Rate Total:  [15 br/min-26 br/min] 15 br/min  Vt Set:  [500 mL] 500 mL  PEEP/CPAP:  [5 cmH20] 5 cmH20  Mean Airway Pressure:  [10 svH93-72 cmH20] 13 cmH20  Abdominal:      Palpations: Abdomen is soft.   Skin:     General: Skin is warm.      Comments: L Groin Soft/Flat, No Sign of Bleed/Infection. + Monophasic Dopplerable Pedal Pulses    Neurological:      Comments: Vented/Sedated       Home Medications:   Medications Ordered Prior to Encounter[1]  Current Schedule Inpatient Medications:   aspirin  81 mg Oral Daily    atorvastatin  40 mg Oral Daily    clopidogreL  75 mg Oral Daily    famotidine  20 mg Per NG tube BID    heparin (PORCINE)  39.2 Units/kg Intravenous Once     Continuous Infusions:   dexmedeTOMIDine (Precedex) infusion (titrating)  0-1.4 mcg/kg/hr Intravenous Continuous 0.53 mL/hr at 04/11/25 1059 0.02 mcg/kg/hr at 04/11/25 1059    heparin (porcine) in D5W  0-40 Units/kg/hr Intravenous Continuous   Stopped at 04/10/25 1538    lactated ringers   Intravenous Continuous   Stopped at 04/10/25 1020    NORepinephrine bitartrate-D5W  0-3 mcg/kg/min Intravenous Continuous 23 mL/hr at 04/11/25 0656 0.12 mcg/kg/min at 04/11/25 0656    propofoL  0-50 mcg/kg/min Intravenous Continuous 18.4 mL/hr at 04/11/25 0817 30 mcg/kg/min at 04/11/25 0817    sodium bicarbonate 25 mEq  in D5W 1,000 mL Purge Solution for Impella   Impella Device Continuous 13.5 mL/hr at 04/11/25 0656 Rate Verify at 04/11/25 0656    tirofiban-0.9% sodium chloride 12.5 mg/250ml    Continuous PRN   Stopped at 03/29/25 1845     Assessment:   Cardiogenic Shock requiring Mechanical and Chemical Support - Now just Chemical    - Impella/Pressors - Impella Explanted on 4.10.25 post High Risk LAD PCI/Impella Assisted  MVCAD    - s/p LHC (4.10.25) - PTCA/TAD of the Prox to Mid LAD and Impella Explant     - s/p C (3.29.25) -  Left Main has mild diffuse disease. Mid Left Anterior Descending has serial lesions, up to 70-80% in severity. Prox Left Circumflex is 100% occluded. Unable to cross lesion despite wire escalation and microcatheter support. Mid Right Coronary Artery is 100% occluded. The lesion was successfully treated with overlapping SYNERGY XD 4.0X20MM, SYNERGY XD 3.0X48MM, and SYNERGY XD 2.00O38SJ drug-eluting stents. Post-dilatation was performed using a SYNERGY XD 4.0X20MM angioplasty balloon inflated to 12 gretel. Following intervention there was 0% residual stenosis and PATTY grade 3 flow in the distal vessel.  NSTEMI Type I (Non-Anterior Wall MI)    - Trponin > 9  Cardiopulmonary Arrest    - Initial Rhythm PEA - Witnessed in ER Arrest/ROSC  ICMO/EF 15%     - ECHO (4.8.25) - LVEF 15%    - ECHO (3.29.25) - LVEF 15-20%, Grade I DD   Acute Hypoxemic Respiratory Failure requiring Intubation/Ventilation   ST - Now SR   Hypotension requiring Pressors - Resolved     - Hx of HTN  BPH s/p TURP   Lactic Acidosis - Resolved   Leukocytosis - Resolved   Anemia requiring Transfusions - Stable   Recent BPH Diagnosis s/p TURP  CAD/Nonobstructive (2017)  JACY/R Sided   PAD/Nonobstructive   Transaminitis - Resolved   Obesity  GERD  Tobacco Use  No Hx of GIB     Plan:   Groin Precautions   No Need for Heparin - Impella Remvoed  Continue ASA, Statin and Plavix   Wean Pressors for MAP > 65mmHg  Vent per Primary Team   Will Continue to  Follow  Labs and EKG in AM: CBC, CMP and Mg    Hasmukh Sandoval, NORM  Cardiology  [facility]          [1]   No current facility-administered medications on file prior to encounter.     No current outpatient medications on file prior to encounter.

## 2025-04-11 NOTE — CONSULTS
Subjective:      Patient ID: Jon Conley is a 79 y.o. male.    Chief Complaint: transfer (Pt transfer from [facility] with report of SOB and NSTEMI.)    HPI  Review of Systems   Objective:     Physical Exam   Assessment:     1. Non-ST elevation (NSTEMI) myocardial infarction    2. Cardiogenic shock    3. Cardiac arrest    4. Cardiac arrest    5. Cardiopulmonary arrest    6. Atrial fibrillation with rapid ventricular response    7. Acute myocardial infarction, unspecified MI type, unspecified artery    8. NSTEMI (non-ST elevated myocardial infarction)    9. CAD (coronary artery disease)    10. Abnormal pulse    11. Cardiac arrest, cause unspecified    12. Atrial fibrillation with RVR    13. Heart failure    14. MI (myocardial infarction)    15. Weak pulse    16. Chest pain           Wound 04/10/25 1800 Shearing Right Buttocks (Active)   04/10/25 1800 Buttocks   Present on Original Admission: N   Primary Wound Type: Shearing   Side: Right   Orientation:    Wound Approximate Age at First Assessment (Weeks):    Wound Number:    Is this injury device related?: No   Incision Type:    Closure Method:    Wound Description (Comments):    Type:    Additional Comments:    Ankle-Brachial Index:    Pulses:    Removal Indication and Assessment:    Wound Outcome:    Wound Image   04/11/25 1100   Dressing Appearance Open to air 04/11/25 1100   Drainage Amount None 04/11/25 1100   Appearance Pink;Dry 04/11/25 1100   Tissue loss description Partial thickness 04/11/25 0800   Red (%), Wound Tissue Color 100 % 04/11/25 1100   Periwound Area Dry 04/11/25 1100   Care Cleansed with:;Wound cleanser;Applied:;Skin Barrier 04/11/25 1100   Dressing Gauze 04/11/25 1100            Wound 04/10/25 1800 Skin Tear Right lower Arm (Active)   04/10/25 1800 Arm   Present on Original Admission: N   Primary Wound Type: Skin tear   Side: Right   Orientation: lower   Wound Approximate Age at First Assessment (Weeks):    Wound Number:    Is  this injury device related?: No   Incision Type:    Closure Method:    Wound Description (Comments):    Type:    Additional Comments:    Ankle-Brachial Index:    Pulses:    Removal Indication and Assessment:    Wound Outcome:    Drainage Amount Scant 04/11/25 0800   Drainage Characteristics/Odor Serous 04/11/25 0800   Appearance Pink;Red;Slough;Blistered;Moist 04/11/25 0800   Tissue loss description Full thickness 04/11/25 0800   Wound Edges Undefined 04/11/25 0800            Wound 04/11/25 1000 Pressure Injury Left Calf (Active)   04/11/25 1000 Calf   Present on Original Admission: N   Primary Wound Type: Pressure inj   Side: Left   Orientation:    Wound Approximate Age at First Assessment (Weeks):    Wound Number:    Is this injury device related?:    Incision Type:    Closure Method:    Wound Description (Comments):    Type:    Additional Comments:    Ankle-Brachial Index:    Pulses:    Removal Indication and Assessment:    Wound Outcome:    Pressure Injury Stage 1 04/11/25 1100   Drainage Amount None 04/11/25 1100   Appearance Red;Dry 04/11/25 1100   Red (%), Wound Tissue Color 100 % 04/11/25 1100       Plan:          Wocn consult-right buttock  80 y/o male in with C-P arrest and NSTEMI.  Ventilated and sedated.   Pt status till questable.   With nursing Phuong and Doug able to turn briefly  to assess buttocks.  Orders placed.    Macerated right butt that is dry and scaly as well as an area of concern to left posterior calf.    Will recheck early next week when more stable.  He is on an ICU total care bed and being turned as allowed.  Wound care will continue to follow.

## 2025-04-12 NOTE — PLAN OF CARE
Problem: Infection  Goal: Absence of Infection Signs and Symptoms  Outcome: Progressing     Problem: Adult Inpatient Plan of Care  Goal: Plan of Care Review  Outcome: Progressing  Goal: Patient-Specific Goal (Individualized)  Outcome: Progressing  Goal: Absence of Hospital-Acquired Illness or Injury  Outcome: Progressing  Goal: Optimal Comfort and Wellbeing  Outcome: Progressing  Goal: Readiness for Transition of Care  Outcome: Progressing     Problem: Mechanical Ventilation Invasive  Goal: Effective Communication  Outcome: Met  Goal: Optimal Device Function  Outcome: Met  Goal: Mechanical Ventilation Liberation  Outcome: Met  Goal: Optimal Nutrition Delivery  Outcome: Met  Goal: Absence of Device-Related Skin and Tissue Injury  Outcome: Met  Goal: Absence of Ventilator-Induced Lung Injury  Outcome: Met     Problem: Artificial  Problem: Delirium  Goal: Optimal Coping  Outcome: Progressing  Goal: Improved Behavioral Control  Outcome: Progressing  Goal: Improved Attention and Thought Clarity  Outcome: Progressing  Goal: Improved Sleep  Outcome: Progressing     Problem: Skin Injury Risk Increased  Goal: Skin Health and Integrity  Outcome: Progressing     Problem: Fall Injury Risk  Goal: Absence of Fall and Fall-Related Injury  Outcome: Progressing     Problem: Wound  Goal: Optimal Coping  Outcome: Progressing  Goal: Optimal Functional Ability  Outcome: Progressing  Goal: Absence of Infection Signs and Symptoms  Outcome: Progressing  Goal: Improved Oral Intake  Outcome: Progressing  Goal: Optimal Pain Control and Function  Outcome: Progressing  Goal: Skin Health and Integrity  Outcome: Progressing  Goal: Optimal Wound Healing  Outcome: Progressing    Airway  Goal: Effective Communication  Outcome: Progressing  Goal: Optimal Device Function  Outcome: Progressing  Goal: Absence of Device-Related Skin or Tissue Injury  Outcome: Progressing

## 2025-04-12 NOTE — PROGRESS NOTES
Pulmonary & Critical Care Medicine   Progress Note      Presenting History/HPI:    The patient is a 79-year-old male who has a history of coronary artery disease, hypertension, hyperlipidemia, peripheral arterial disease, and GE reflux disease.  He also has a history of recent TURP in [place], complicated by postoperative by gross hematuria requiring placement of a three-way irrigation bladder catheter last week. This was left in place at time of discharge.  He presented to [facility] ER early a.m. of 03/29/2025 with shortness of breath, noted tachycardia/hypotensive, and marked elevation of high sensitivity troponin at 13,821. He was transferred to [facility] ER, suffering PEA arrest requiring intubation and short CPR with ROSC.  LHC was performed, noting multivessel coronary artery disease with 100% occluded proximal left circumflex, unable to cross with wire.  Mid RCA also noted 100% occluded, treated with PTCA/TAD.  He was noted to have elevated right-sided filling pressures with PCW 25 mm Hg.  Impella catheter was placed, and was left in place postprocedure.  He was admitted to ICU for ongoing medical care.     Intubation/mechanical ventilation 03/29/2025  Impella placement 03/29/2025    Attempts  had done to transfer to [facility] for high-risk surgery was denied    Status post 2 units packed red blood cells on 4/6    Patient underwent high-risk surgery PCI on 04/10/2025 and had 4 stents in LAD.  Impella was removed    Interval History:  Patient did not tolerate sedation vacation yesterday.  Switched from propofol to Precedex. Currently on 0.8 of Precedex and 0.08 of Levophed.  He does not follow commands but is tracking which is new for him.  Continuing tube feeds.      Scheduled Medications:    aspirin  81 mg Oral Daily    atorvastatin  40 mg Oral Daily    clopidogreL  75 mg Oral Daily    famotidine  20 mg Per NG tube BID    zinc oxide-cod liver oil   Topical (Top) BID       PRN  Medications:     Current Facility-Administered Medications:     0.9%  NaCl infusion (for blood administration), , Intravenous, Q24H PRN    0.9%  NaCl infusion (for blood administration), , Intravenous, Q24H PRN    0.9%  NaCl infusion (for blood administration), , Intravenous, Q24H PRN    0.9%  NaCl infusion (for blood administration), , Intravenous, Q24H PRN    0.9%  NaCl infusion (for blood administration), , Intravenous, Q24H PRN    acetaminophen, 650 mg, Oral, Q4H PRN    calcium gluconate IVPB, 1 g, Intravenous, PRN    calcium gluconate IVPB, 2 g, Intravenous, PRN    calcium gluconate IVPB, 3 g, Intravenous, PRN    dextrose 50%, 12.5 g, Intravenous, PRN    dextrose 50%, 25 g, Intravenous, PRN    [] fentaNYL, 50 mcg, Intravenous, Q15 Min PRN **FOLLOWED BY** fentaNYL, 50 mcg, Intravenous, Q1H PRN    glucagon (human recombinant), 1 mg, Intramuscular, PRN    glucose, 16 g, Oral, PRN    glucose, 24 g, Oral, PRN    hydrALAZINE, 10 mg, Intravenous, Q4H PRN    magnesium sulfate 2 g IVPB, 2 g, Intravenous, PRN    magnesium sulfate 2 g IVPB, 2 g, Intravenous, PRN    ondansetron, 8 mg, Oral, Q8H PRN    potassium chloride in water, 20 mEq, Intravenous, PRN **AND** potassium chloride in water, 40 mEq, Intravenous, PRN **AND** potassium chloride in water, 60 mEq, Intravenous, PRN    sodium phosphate 15 mmol in D5W 250 mL IVPB, 15 mmol, Intravenous, PRN    sodium phosphate 20.1 mmol in D5W 250 mL IVPB, 20.1 mmol, Intravenous, PRN    sodium phosphate 30 mmol in D5W 250 mL IVPB, 30 mmol, Intravenous, PRN    tirofiban-0.9% sodium chloride 12.5 mg/250ml, , , Continuous PRN      Infusions:     dexmedeTOMIDine (Precedex) infusion (titrating)  0-1.4 mcg/kg/hr Intravenous Continuous 21.22 mL/hr at 25 0538 0.8 mcg/kg/hr at 25 0538    lactated ringers   Intravenous Continuous   Stopped at 04/10/25 1020    NORepinephrine bitartrate-D5W  0-3 mcg/kg/min Intravenous Continuous 15.3 mL/hr at 25 0539 0.08 mcg/kg/min at  04/12/25 0539    propofoL  0-50 mcg/kg/min Intravenous Continuous   Stopped at 04/11/25 1126    sodium bicarbonate 25 mEq in D5W 1,000 mL Purge Solution for Impella   Impella Device Continuous 13.5 mL/hr at 04/11/25 1000 Rate Verify at 04/11/25 1000    tirofiban-0.9% sodium chloride 12.5 mg/250ml    Continuous PRN   Stopped at 03/29/25 1845         Fluid Balance:     Intake/Output Summary (Last 24 hours) at 4/12/2025 0743  Last data filed at 4/12/2025 0513  Gross per 24 hour   Intake 2005.01 ml   Output 3050 ml   Net -1044.99 ml         Vital Signs:   Vitals:    04/12/25 0500   BP: 126/64   Pulse: 66   Resp: 18   Temp:          Physical Exam  Vitals and nursing note reviewed.   Constitutional:       General: He is not in acute distress.     Appearance: Normal appearance. He is not toxic-appearing.   HENT:      Head: Normocephalic and atraumatic.      Right Ear: External ear normal.      Left Ear: External ear normal.      Nose: Nose normal.      Mouth/Throat:      Pharynx: No posterior oropharyngeal erythema.      Comments: Unable to visualize posterior oropharynx secondary to endotracheal tube  Eyes:      General: No scleral icterus.     Conjunctiva/sclera: Conjunctivae normal.      Pupils: Pupils are equal, round, and reactive to light.   Neck:      Vascular: No carotid bruit.   Cardiovascular:      Rate and Rhythm: Normal rate and regular rhythm.      Pulses: Normal pulses.      Heart sounds: Normal heart sounds. No murmur heard.     No friction rub. No gallop.   Pulmonary:      Effort: Pulmonary effort is normal. No respiratory distress.      Breath sounds: Rhonchi present. No wheezing or rales.      Comments: Intubated, on mechanical ventilation  Abdominal:      General: Abdomen is flat. Bowel sounds are normal. There is no distension.      Palpations: Abdomen is soft.      Tenderness: There is no abdominal tenderness. There is no guarding or rebound.   Musculoskeletal:         General: No swelling or  "deformity.      Cervical back: Neck supple. No rigidity or tenderness.   Skin:     General: Skin is warm and dry.      Capillary Refill: Capillary refill takes less than 2 seconds.      Findings: No erythema or rash.   Neurological:      Comments: Intubated and sedated, does not follow commands, patient tracks eyes   Psychiatric:      Comments: Unable to fully assess as patient is intubated and nonverbal           Ventilator Settings  Vent Mode: A/C (04/12/25 0606)  Ventilator Initiated: Yes (03/29/25 0603)  Set Rate: 14 BPM (04/12/25 0606)  Vt Set: 500 mL (04/12/25 0606)  PEEP/CPAP: 5 cmH20 (04/12/25 0606)  Oxygen Concentration (%): 40 (04/12/25 0606)  Peak Airway Pressure: 18 cmH20 (04/12/25 0606)  Total Ve: 9.4 L/m (04/12/25 0606)  F/VT Ratio<105 (RSBI): (!) 47.93 (04/11/25 1707)      Laboratory Studies:   No results for input(s): "PH", "PCO2", "PO2", "HCO3", "POCSATURATED", "BE" in the last 24 hours.    No results for input(s): "WBC", "RBC", "HGB", "HCT", "PLT", "MCV", "MCH", "MCHC" in the last 24 hours.    Recent Labs   Lab 04/11/25  2148   GLUCOSE 129*      K 4.7   *   CO2 22*   BUN 21.7   CREATININE 0.70*   CALCIUM 7.6*   MG 2.00         Microbiology Data:   Microbiology Results (last 7 days)       ** No results found for the last 168 hours. **              Imaging:   Cardiac catheterization    Successful Impella-protected PCI of LAD as outline below.    The procedure log was documented by No documenter listed and verified by   Lorne Coon Jr, MD.    Date: 4/10/2025  Time: 5:46 PM    Procedure:  Left heart catheterization with coronary angiography  Impella-protected percutaneous coronary intervention  Successful Impella CP removal     Preoperative diagnosis:  Cardiomyopathy    Postoperative diagnosis:  Successful PCI     Access:  Right common femoral artery    Estimated blood loss: 10 cc  Complications: None     Summary:  Consent obtained. Risks and benefits discussed with the patient. The "   patient was brought to the cath lab in a fasting state. The patient was   prepped and draped in usual sterile fashion. A preprocedure time-out was   performed.     The existing 6 [place] R femoral sheath was exchanged for a new 6 [place]   sheath for the intervention.  A 6 [place] EBU 3.5 catheter was used for the   intervention.  The LAD was wired with a runthrough wire. The large first   diagonal was wired with a Prowater wire. Balloon angioplasty was performed   on the LAD using an EMERGE MR 15 X 2.50MM device inflated to nominal   pressure. IVUS was performed to assess the vessel for PCI. Using this   measurement, the mid LAD lesion was treated with a SYNERGY XD 2.5X28MM   drug-eluting stent. The proximal vessel was then treated with an NC EMERGE   MR 3.5X20MM balloon inflated to nominal pressure. A FRONTIER BERNADINE   2.90P35AE drug-eluting stent was used to treat the mid LAD, just proximal   to the previously placed stent, in an overlapping fashion. IVUS was again   performed to assess stent expansion and measure the proximal vessel for   PCI. Using these measurements, the proximal LAD was treated using   overlapping FRONTIER BERNADINE 3.14A10UR and FRONTIER BERNADINE 3.99R79EE   drug-eluting stents. The Prowater wire was removed from D1 without any   issue. IVUS was again performed to assess stent expansion. Using these   measurements, post-dilatation was performed using the following balloons:   NC EMERGE MR 2.5X20MM, NC EMERGE MR 3X30MM, and NC EMERGE MR 3.5X20MM;   each inflated to high pressure.     Following this intervention, there was 0% residual stenosis, PATTY grade 3   flow in the distal vessel, and PATTY grade 3 flow in the first diagonal   branch.    At the end the procedure, all wires and catheters were removed. The   Impella was removed under fluoroscopic guidance. The vessel was closed   using a MANTA 14 [place]er device, with successful hemostasis. A 6 [place]er   Pigtail was placed in the abdominal aorta.      Findings:  - Successful Impella-protected PCI of LAD.  - Prox to Mid Left Anterior Descending has severe diffuse disease, up to   80% in severity. The lesion was successfully treated with overlapping   FRONTIER BERNADINE 3.19Q38XK, FRONTIER BERNADINE 3.67J34XP, FRONTIER BERNADINE 2.27R86VX,   and SYNERGY XD 2.5X28MM drug-eluting stents. Post-dilatation was performed   using an NC EMERGE MR 3.5X20MM angioplasty balloon inflated to 20 gretel.   Following intervention there was 0% residual stenosis and PATTY grade 3   flow in the distal vessel.  - Intravascular Ultrasound (IVUS) was performed prior to intervention to   further characterize the lesion, as well as post-stent deployment to   ensure optimal stent apposition and expansion.  - Ostial First Diagonal Branch has a 70% stenosis. At the conclusion of   the case, there was PATTY grade 3 flow in the distal vessel.  - Successful Impella CP removal and closure of L femoral arterial access   site using a 14 [place]ian MANTA device.     Assessment/Plan:  - Patient is a 79 y.o. male with a history of CAD presents in cardiogenic   shock. Successful PCI of RCA last week. Impella CP placed at that time.   Now s/p successful LAD intervention and Impella CP removal.  - Continue DAPT (aspirin + clopidogrel) for a minimum of 12 months and   aspirin indefinitely thereafter  - High intensity statin  - Arterial sheath remains place; plan is to remove when activated clotting   time (ACT) less than 180 seconds. Bedrest for 4 hours after hemostasis is   achieved.    Lorne Coon MD  Interventional Cardiology/Structural Heart Disease  [facility] [place] [facility]        2D echo findings as below      Left Ventricle: The left ventricle is dilated. Normal wall thickness. Severe global hypokinesis present. There is severely reduced systolic function with a visually estimated ejection fraction of 15 - 20%. Grade I diastolic dysfunction.    Right Ventricle: Right ventricular enlargement. Systolic  function is normal. TAPSE is 1.83 cm.    Left Atrium: dilated    Mitral Valve: Mildly thickened leaflets. There is moderate regurgitation.    Tricuspid Valve: There is moderate regurgitation.    Pulmonary Artery: There is moderate pulmonary hypertension.    IVC/SVC: Patient is ventilated, cannot use IVC diameter to estimate right atrial pressure.        Assessment and Plan    Assessment:  NSTEMI with cardiogenic shock, ongoing  LHC with PTCA/TAD x3 RCA (03/29/2025), PCWP 25 mm Hg  EF 15-20% by TTE with associated right ventricular enlargement dilated left atrium, moderate mitral regurgitation moderate tricuspid regurgitation and moderate pulmonary hypertension  Status post high-risk PCI withPTCA/TAD X4 LAD on 4/10/25  Impella placement (03/29/2025),-removed on 04/10/2025  Ischemic cardiomyopathy with severe decreased LVSF  Acute respiratory failure secondary to above, initially with cardiogenic pulmonary edema, currently with good oxygenation and ventilation.  Severe wide anion gap metabolic acidosis of primary lactic acid etiology, resolved   Mild/mod thrombocytopenia, likely related to Impella  Anemia  Thrombocytopenia        Plan:  -titrate mechanical ventilation for ARDS net protocol   -supplement oxygen to maintain saturation 94-96%   -titrate vasopressors to maintain map 65   -was unable to tolerate sedation vacation yesterday; switch from propofol to Precedex yesterday  -status post 2 units packed red blood cells on 04/06 with hemoglobin up to 9 with improvement in hemodynamics-transfuse to maintain hemoglobin 9-10  -thrombocytopenia resolved  -cardiology, CT surgery on board; appreciate their assistance    DVT prophylaxis : scd  GI prophylaxis with Protonix   Keep head of bed elevated greater than 30° if possible for VAP prophylaxis      The patient remains at high risk of decompensation and death and will remain in ICU level care    34 min of critical care time was spent reviewing the patient's chart  including medications, radiographs, labs, pertinent cultures and pathology data, other consultant notes/recomendations as well as titration of vasopressors, adjustment of mechanical ventilatory or NIPPV support, as well as discussion of goals of care with nursing staff, respiratory therapy at the bedside and with family at the bedside/via phone.      Kristian Grimm DO  4/12/2025  Pulmonology/Critical Care

## 2025-04-12 NOTE — PROGRESS NOTES
[facility]    Cardiology  Progress Note    Patient Name: Jon Conley  MRN: [id number]  Admission Date: 3/29/2025  Hospital Length of Stay: 14 days  Code Status: DNR   Attending Provider: Davi Harp Jr., MD,*   Consulting Provider: NORM Sierra  Primary Care Physician: Patrick Reece Jr., MD  Principal Problem:<principal problem not specified>    Patient information was obtained from past medical records and ER records.     Subjective:     Chief Complaint/Reason for Consult: Cardiac Arrest     HPI: Mr. Conley is a 80 y/o male who is known to [facility], Dr. Wang. The patient presented to [facility] on 3.29.25 with c/o SOB. He initially presented to [facility] in [place] with SOB. He was found to have Tachycardia, Hypotension, Anemia (7.4/24.6), Troponin 9.443, .0, BUN/Crea 16.2/1.34, Lactic Acid Level 4.3, Na 133, WBC 14.03, H&H 7.3/23.0, WBC 14.03, , PT/INR 14.9/1.2. He was admitted to , however, he sustained a witnessed PEA Arrest in the ER and has ROSC per ER MD. [facility] was consulted for NSETMI and Arrest.     3.30.25: NAD. Impella L Groin. CVP 7, P7. Levophed 0.32mcg/kg/min, Heparin Drip per Protocol, Amiodarone 0.5mg/min  3.31.25: NAD. L Groin Impella P7, Amiodarone 0.5mg/min, Heparin Drip per Protocol, Levophed 0.18mcg/kg/min, H&H 8.8/25.8, AST/ALT 79/45  4.1.25: NAD noted. L groin Impella in place, remains at P7. Remains on levophed. LFTs continue to improve.  4.2.25: NAD. L Groin Impella Device P6. Heparin Infusion per Protocol. Remains on 0.06mcg/kg/min.   4.3.25: NAD. Vented/Sedated. L Groin Impella Device. P3, Heparin Infusion per Protocol. Off Pressors. Attempting Wean of Impella. H&H 8.2/26.2,   4.4.25: NAD. Vented/Sedated. Heparin Drip per Protocol. H&H 8.1/95.4,   4.5.25: NAD. Vented/Sedated. Heparin Drip per Protocol. Levophed 0.04mcg/kg/min. H&H 7.9/23.8,  Impella P6  4.6.25: NAD. Vented/Sedated. P3.  Heparin Drip per Protocol, Off Pressors. CV Surgery to Reevaluate for CABG. H&H 7.4/22.7,   4.7.25: NAD. Vented/Sedated. H&H 9.2/27.7, , K 3.9, BUN/Crea 9.2/0.61, Lactic Acid 0.6. Impella L Groin P4  4.8.25:NAD noted. Remains vented/sedated. Consent obtained for LHC, will plan for Thursday.  4.9.25: NAD. Remains Vented/Sedated. Plans for LHC with PCI on Thursday. Impella P4. Remains Off Pressors.   4.10.25: NAD. Remains Vented/Sedated. NPO for Planned LHC with PCI/Impella Assisted. P4 Impella. Levophed 0.1mcg/kg/min.   4.11.25: NAD. Vented/Sedated. Levophed 0.12mcg/kg/min. S/P PCI of LAD and Impella Explant.   4.12.25: NAD. Vented/Sedated. Levophed 0.08mcg/kg/min.     PMH: BPH/Urinary Obstruction, CAD/Nonobstructive, PAD, R JACY/Mild, HTN, Obesity, GERD, Tobacco Use   PSH: TURP, Angiogram, Bilateral Knee Replacements   Family History: Denies Family History of Heart Disease  Social History: Denies Illicit Drug, ETOH and Tobacco Use     Previous Cardiac Diagnostics:   [facility] (4.10.25):  Findings:  - Successful Impella-protected PCI of LAD.  - Prox to Mid Left Anterior Descending has severe diffuse disease, up to 80% in severity. The lesion was successfully treated with overlapping FRONTIER BERNADINE 3.86T11NL, FRONTIER BERNADINE 3.03G54MV, FRONTIER BERNADINE 2.81C55OS, and SYNERGY XD 2.5X28MM drug-eluting stents. Post-dilatation was performed using an NC EMERGE MR 3.5X20MM angioplasty balloon inflated to 20 gretel. Following intervention there was 0% residual stenosis and PATTY grade 3 flow in the distal vessel.  - Intravascular Ultrasound (IVUS) was performed prior to intervention to further characterize the lesion, as well as post-stent deployment to ensure optimal stent apposition and expansion.  - Ostial First Diagonal Branch has a 70% stenosis. At the conclusion of the case, there was PATTY grade 3 flow in the distal vessel.  - Successful Impella CP removal and closure of L femoral arterial access site using a 14 [place]erratian  MANTA device.  Assessment/Plan:  - Patient is a 79 y.o. male with a history of CAD presents in cardiogenic shock. Successful PCI of RCA last week. Impella CP placed at that time. Now s/p successful LAD intervention and Impella CP removal.  - Continue DAPT (aspirin + clopidogrel) for a minimum of 12 months and aspirin indefinitely thereafter  - High intensity statin  - Arterial sheath remains place; plan is to remove when activated clotting time (ACT) less than 180 seconds. Bedrest for 4 hours after hemostasis is achieved.    ECHO Limited 4.8.25:  Impella check  Aortic annulus to tip distance is 4.3 cm.  LV systolic function is 15%.    ECHO 4.3.25:  Limited study to check impella placement.  Left Ventricle: There is severely reduced systolic function with a visually estimated ejection fraction of 15 - 20%.  Impella distance from aortic valve annulus appears appropriate.    ECHO 4.1.25:  Limited study to check impella placement.  Left Ventricle: There is severely reduced systolic function with a visually estimated ejection fraction of 15 - 20%.  Impella distance from aortic valve annulus appears appropriate.    ECHO 3.30.25:  Left Ventricle: There is severely reduced systolic function with a visually estimated ejection fraction of less than 30%.  There appears to be at least moderate to severe mitral regurgitation.   Impella is positioned with inflow cannula 3.8 cm from aortic valve annulus.    [facility] 3.29.25:  Findings:  - Successful placement of Impella CP device from left femoral approach.  - There is severe multi-vessel coronary artery disease.  - Left Main has mild diffuse disease.  - Mid Left Anterior Descending has serial lesions, up to 70-80% in severity.   - Prox Left Circumflex is 100% occluded. Unable to cross lesion despite wire escalation and microcatheter support.  - Mid Right Coronary Artery is 100% occluded. The lesion was successfully treated with overlapping SYNERGY XD 4.0X20MM, SYNERGY XD 3.0X48MM,  and SYNERGY XD 2.70N39QI drug-eluting stents. Post-dilatation was performed using a SYNERGY XD 4.0X20MM angioplasty balloon inflated to 12 gretel. Following intervention there was 0% residual stenosis and PATTY grade 3 flow in the distal vessel.  - Intravascular Ultrasound (IVUS) was performed prior to intervention to further characterize the lesion, as well as post-stent deployment to ensure optimal stent apposition and expansion.  - Aorto-iliac angiogram revealed moderate disease of R ileofemoral system.  - At the conclusion of the case, contrast injection was performed through the Impella sheath side port, showing adequate antegrade flow down the ipsilateral common femoral artery/SFA/profunda artery.  - RHC with elevated R sided filling pressures. RA 17 mmHg, RV 45/15 mmHg, PA 42/29 mmHg (mean 34 mmHg), PCWP 25 mmHg.  - Transpulmonary gradient is 9 mmHg.  - Cardiac Output/Index at 6.6/3.0, as calculated by Gregory equation on Levophed infusion and Impella CP support.  - PVR is 1.4 Woods units  - SVR is 550 dyne-sec*cm^-5  - Pulmonary Artery Pulsatility Index (Rafa) is 0.8  - Cardiac Power Output () is 0.91   Assessment/Plan:  - Patient is a 79 y.o. male with a history of recent TURP, complicated by significant post-op bleeding, presents to OSH ED with dyspnea. Transferred to [facility] with worsening shock despite vasopressors. TTE showing EF 25%, severe MR, severe TR. Troponin elevated. Brought to cath lab. Underwent Impella placement/LHC/RHC as outlined above. RCA lesion treated. Unable to cross LCx lesion. 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 At this point, would continue Impella support and aggressive medical mgmt. Has non-culprit lesions that should be treated at a later date as well. Can consider CTS consult, especially given severe valvular disease.  - Patient was given a loading dose of clopidogrel 600 mg PO in the cath lab  - Continue DAPT (aspirin + clopidogrel) for a minimum of 12 months and aspirin  indefinitely thereafter  - High intensity statin  - Continue Aggrastat for 4 hours post-procedure  - Arterial sheath remains place; plan is to remove when activated clotting time (ACT) less than 180 seconds. Bedrest for 4 hours after hemostasis is achieved.  - Obtain transthoracic echocardiogram  - Continuous bedrest while Impella in place  - Therapeutic heparin administration per protocol  - Antibiotics per protocol  - Remaining mgmt per primary team/cardiology    ECHO 3.29.25:  Left Ventricle: The left ventricle is dilated. Normal wall thickness. Severe global hypokinesis present. There is severely reduced systolic function with a visually estimated ejection fraction of 15 - 20%. Grade I diastolic dysfunction.  Right Ventricle: Right ventricular enlargement. Systolic function is normal. TAPSE is 1.83 cm.  Left Atrium: dilated  Mitral Valve: Mildly thickened leaflets. There is moderate regurgitation.  Tricuspid Valve: There is moderate regurgitation.  Pulmonary Artery: There is moderate pulmonary hypertension.  IVC/SVC: Patient is ventilated, cannot use IVC diameter to estimate right atrial pressure.    Carotid US 11.4.24:  The study quality is average.   1-39% stenosis in the proximal right internal carotid artery based on Bluth Criteria.   Antegrade right vertebral artery flow.   Antegrade left vertebral artery flow    ECHO 1.27.22:  The study quality is average.   The left ventricle is normal in size. Global left ventricular systolic function is moderately decreased. The left ventricular ejection fraction is 40-45%. Mild concentric left ventricular hypertrophy is present.  The left ventricle diastolic function is impaired (Grade I) with normal left atrial pressure.  Mild (1+) mitral regurgitation.  The pulmonary artery appears normal.     [facility] 9.26.17:  LM: Normal  LAD: Prox 30% Stenosis  RI: Normal  LCX: Normal  RCA: Distal RCA 50%    Review of patient's allergies indicates:  No Known Allergies  No current  facility-administered medications on file prior to encounter.     No current outpatient medications on file prior to encounter.     Review of Systems   Unable to perform ROS: Intubated     Objective:     Vital Signs (Most Recent):  Temp: 99.5 °F (37.5 °C) (04/12/25 0400)  Pulse: 65 (04/12/25 1015)  Resp: 18 (04/12/25 1015)  BP: 130/67 (04/12/25 1015)  SpO2: 100 % (04/12/25 1035) Vital Signs (24h Range):  Temp:  [99.1 °F (37.3 °C)-99.5 °F (37.5 °C)] 99.5 °F (37.5 °C)  Pulse:  [55-99] 65  Resp:  [14-23] 18  SpO2:  [99 %-100 %] 100 %  BP: ()/(42-77) 130/67   Weight: 106.1 kg (233 lb 14.5 oz)  Body mass index is 32.64 kg/m².  SpO2: 100 %       Intake/Output Summary (Last 24 hours) at 4/12/2025 1144  Last data filed at 4/12/2025 1000  Gross per 24 hour   Intake 1907.94 ml   Output 2750 ml   Net -842.06 ml     Lines/Drains/Airways       Central Venous Catheter Line  Duration             Tunneled Central Line - Triple Lumen 03/29/25 0555 Internal Jugular Right 14 days              Drain  Duration                  Urethral Catheter -- days         NG/OG Tube 03/29/25 1200 18 Fr. Center mouth 13 days              Airway  Duration                  Airway - Non-Surgical 03/29/25 0541 14 days                  Significant Labs:   Chemistries:   Recent Labs   Lab 04/10/25  2043 04/11/25  0326 04/11/25  1234 04/11/25  2148 04/12/25  0825    140 140 139 140  140   K 4.1 4.5 4.8 4.7 4.3  4.3   * 115* 115* 112* 112*  111*   CO2 19* 20* 20* 22* 24  23   BUN 10.9 15.3 19.0 21.7 20.3  20.3   CREATININE 0.72 0.69* 0.69* 0.70* 0.64*  0.67*   CALCIUM 7.7* 7.4* 7.5* 7.6* 7.7*  7.7*   BILITOT 0.6 0.4 0.4 0.3 0.3  0.3   ALKPHOS 84 85 83 87 81  81   ALT 20 21 24 22 19  18   AST 26 37 49* 39 28  27   GLUCOSE 117* 120* 123* 129* 132*  134*   MG 2.10 2.10 2.10 2.00 2.00   PHOS 2.9 3.2 3.0 2.7 2.2*        CBC/Anemia Labs: Coags:    Recent Labs   Lab 04/10/25  2043 04/11/25  0326 04/12/25  0825   WBC 6.46 5.70 5.92   6.13   HGB 9.3* 9.2* 7.9*  8.0*   HCT 29.4* 30.1* 25.6*  25.3*    201 244  232   MCV 93.3 94.1* 90.8  90.4   RDW 18.2* 18.1* 17.4*  17.2*    Recent Labs   Lab 04/05/25 1959   APTT 114.7*          EKG:      Telemetry: SR    Physical Exam  Constitutional:       General: He is not in acute distress.     Appearance: Normal appearance. He is obese.      Comments: Vented/Sedated   HENT:      Head: Normocephalic.      Mouth/Throat:      Mouth: Mucous membranes are moist.   Cardiovascular:      Rate and Rhythm: Normal rate and regular rhythm.      Pulses: Normal pulses.      Heart sounds: Murmur heard.   Pulmonary:      Effort: Pulmonary effort is normal. No respiratory distress.      Comments: Ventilator Associated Breath Sounds  Vent Mode: A/C  Oxygen Concentration (%):  [40] 40  Resp Rate Total:  [15 br/min-22 br/min] 18 br/min  Vt Set:  [500 mL] 500 mL  PEEP/CPAP:  [5 cmH20] 5 cmH20  Mean Airway Pressure:  [8 ioP84-50 cmH20] 9 cmH20  Abdominal:      Palpations: Abdomen is soft.   Skin:     General: Skin is warm.      Comments: L Groin Soft/Flat, No Sign of Bleed/Infection. + Monophasic Dopplerable Pedal Pulses    Neurological:      Comments: Vented/Sedated       Home Medications:   Medications Ordered Prior to Encounter[1]  Current Schedule Inpatient Medications:   aspirin  81 mg Oral Daily    atorvastatin  40 mg Oral Daily    clopidogreL  75 mg Oral Daily    famotidine  20 mg Per NG tube BID    zinc oxide-cod liver oil   Topical (Top) BID     Continuous Infusions:   dexmedeTOMIDine (Precedex) infusion (titrating)  0-1.4 mcg/kg/hr Intravenous Continuous 10.61 mL/hr at 04/12/25 1000 0.4 mcg/kg/hr at 04/12/25 1000    lactated ringers   Intravenous Continuous   Stopped at 04/10/25 1020    NORepinephrine bitartrate-D5W  0-3 mcg/kg/min Intravenous Continuous 15.3 mL/hr at 04/12/25 1000 0.08 mcg/kg/min at 04/12/25 1000    propofoL  0-50 mcg/kg/min Intravenous Continuous   Stopped at 04/11/25 1126    sodium  bicarbonate 25 mEq in D5W 1,000 mL Purge Solution for Impella   Impella Device Continuous 13.5 mL/hr at 04/11/25 1000 Rate Verify at 04/11/25 1000    tirofiban-0.9% sodium chloride 12.5 mg/250ml    Continuous PRN   Stopped at 03/29/25 1845     Assessment:   Acute Hypoxemic Respiratory Failure requiring Intubation/Ventilation  Cardiogenic Shock requiring Mechanical and Chemical Support - Now just Chemical    - Impella/Pressors - Impella Explanted on 4.10.25 post High Risk LAD PCI/Impella Assisted  MVCAD    - s/p LHC (4.10.25) - PTCA/TAD of the Prox to Mid LAD and Impella Explant     - s/p [facility] (3.29.25) -  Left Main has mild diffuse disease. Mid Left Anterior Descending has serial lesions, up to 70-80% in severity. Prox Left Circumflex is 100% occluded. Unable to cross lesion despite wire escalation and microcatheter support. Mid Right Coronary Artery is 100% occluded. The lesion was successfully treated with overlapping SYNERGY XD 4.0X20MM, SYNERGY XD 3.0X48MM, and SYNERGY XD 2.32E05SD drug-eluting stents. Post-dilatation was performed using a SYNERGY XD 4.0X20MM angioplasty balloon inflated to 12 gretel. Following intervention there was 0% residual stenosis and PATTY grade 3 flow in the distal vessel.  NSTEMI Type I (Non-Anterior Wall MI)    - Trponin > 9  Cardiopulmonary Arrest    - Initial Rhythm PEA - Witnessed in ER Arrest/ROSC  ICMO/EF 15%     - ECHO (4.8.25) - LVEF 15%    - ECHO (3.29.25) - LVEF 15-20%, Grade I DD    ST - Now SR   Hypotension requiring Pressors    - Hx of HTN  BPH s/p TURP   Lactic Acidosis - Resolved   Leukocytosis - Resolved   Anemia requiring Transfusions - Stable   Recent BPH Diagnosis s/p TURP  CAD/Nonobstructive (2017)  JACY/R Sided   PAD/Nonobstructive   Transaminitis - Resolved   Obesity  GERD  Tobacco Use  No Hx of GIB     Plan:   Continue ASA, Statin and Plavix   Wean Pressors for MAP > 65mmHg  Vent per Primary Team   Will Continue to Follow  Labs and EKG in AM: CBC, CMP and Mg    Hasmukh J  NORM Sandoval  Cardiology  [facility]          [1]   No current facility-administered medications on file prior to encounter.     No current outpatient medications on file prior to encounter.

## 2025-04-13 NOTE — PROCEDURES
[facility]  Speech Language Pathology Department  Modified Barium Swallow (MBS) Study    Patient Name:  Jon Conley   MRN:  [id number]    Recommendations     General recommendations:  repeat Modified Barium Swallow Study    Repeat MBS study: 1-2 weeks  Solid texture recommendation:  Puree Diet - IDDSI Level 4  Liquid consistency recommendation: Moderately thick liquids - IDDSI Level 3   Medications: crushed in puree  Swallow strategies/precautions: small bites/sips and upright for PO intake  General precautions:      History     Jon Conley is a/n 79 y.o. male s/p extubation 24 hours ago.  Per RN, pt was intubated for 2 weeks.  Currently on 2L nasal cannula.     No past medical history on file.  Past Surgical History:   Procedure Laterality Date    CATHETERIZATION OF BOTH LEFT AND RIGHT HEART N/A 3/29/2025    Procedure: CATHETERIZATION, HEART, BOTH LEFT AND RIGHT;  Surgeon: Lorne Coon Jr., MD;  Location: [facility] CATH LAB;  Service: Cardiology;  Laterality: N/A;    LEFT HEART CATHETERIZATION N/A 4/10/2025    Procedure: Left heart cath;  Surgeon: Lorne Coon Jr., MD;  Location: [facility] CATH LAB;  Service: Cardiology;  Laterality: N/A;     A MBS Study was completed at the bedside to assess the efficiency of his swallow function, rule out aspiration and make recommendations regarding safe dietary consistencies, effective compensatory strategies, and safe eating environment.     Intubation hx: 2-week intubation        Imaging   Results for orders placed during the hospital encounter of 03/29/25    X-Ray Chest 1 View    Narrative  EXAMINATION:  Single view chest radiograph.    CLINICAL HISTORY:  Non-ST elevation (NSTEMI) myocardial infarctionanemia;    TECHNIQUE:  Single view of the chest.    COMPARISON:  Chest radiograph 04/04/2025.    FINDINGS:  The endotracheal tube, nasogastric tube and right IJ catheter have been removed.  The intra-aortic balloon pump has been  removed.  The bilateral effusions, pulmonary edema, and lower lobe atelectasis are unchanged.  There is no pneumothorax.  The heart is enlarged.  There is no acute osseous abnormality.    Impression  No significant change from prior exam.      Electronically signed by: Lorne Solorzano MD  Date:    04/12/2025  Time:    08:26    No results found for this or any previous visit.    No results found for this or any previous visit.    Subjective     Patient awake and cooperative.    Spiritual/Cultural/Orthodoxy Beliefs/Practices that affect care: no  Pain/Comfort: Pain Rating 1: 0/10    Respiratory Status:  2L via nasal cannula    Restraints/positioning devices: none    Fluoroscopic Findings     ORAL MUSCULATURE  Dentition: edentulous  Secretion Management: adequate  Mucosal Quality: good  Facial Movement: WFL  Buccal Strength & Mobility: WFL  Mandibular Strength & Mobility: WFL  Oral Labial Strength & Mobility: impaired seal  Lingual Strength & Mobility: WFL  Vocal Quality: adequate    Setup  Upright in bed  Unable to self feed due to deconditioning  Adequate head control    Visualization  Lateral view  Central line in place    Oral Phase:   Reduced lip closure  Prolonged/disorganized bolus formation  Reduced bolus cohesion    Pharyngeal Phase:   Reduced base of tongue retraction  Reduced hyolaryngeal excursion  Reduced airway protection  Laryngeal penetration during and after the swallow with thin/thick liquids  Consistency Fed by Laryngeal Penetration Aspiration Residue   Puree SLP None None Mild  Posterior pharyngeal wall  Cleared with liquid wash  Residue settled to pyriform sinuses   Mildly thick liquid by cup, very small bolus SLP None None None   Thin liquid by cup SLP After the swallow  To the vocal folds  Did NOT clear None None   Mildly thick liquid by straw SLP During the swallow  Did NOT clear None Mild  Valleculae   Thin liquid by straw SLP During the swallow  To the vocal folds  Most cleared with spontaneously  throat clear, some residue remained None None   Moderately thick liquid by cup SLP None None None   Moderately thick liquid by spoon SLP None None None       Cervical Esophageal Phase:   UES appeared to accommodate all bolus types without stasis or retrograde movement visualized    Compensatory Strategies:  Solid not trialed; no dentures        Assessment     Patient exhibited moderate oropharyngeal dysphagia characterized by the findings noted above.  Laryngeal penetration of thin and thick liquids, which partially cleared.  Both swallow safety and efficiency are impaired.     Patient appears to be at high risk for aspiration related pneumonia when considering severity of dysphagia and complicated medical status.  Prognosis for behavioral swallow rehabilitation is fair.    Outcome Measures     Functional Oral Intake Scale: 5 - Total oral diet with multiple consistencies, by requiring special preparation or compensations    Goals     Multidisciplinary Problems       SLP Goals          Problem: SLP    Goal Priority Disciplines Outcome   SLP Goal     SLP    Description: LTG:  Pt will tolerate least restrictive diet with no clinical s/sx of aspiration.    STGs:  1. Tolerate half meal of soft/bite-sized consistency with no overt s/sx of aspiration, should dentures be brought to hospital.  2.  Perform BOT and LE exercises.  3.  Perform effortful swallow (ice chips ok for this exercise).                     Education     Patient provided with verbal education regarding swallow function.  Understanding was verbalized.    Plan     SLP Follow-Up:  Yes    Patient to be seen:  5 x/week   Plan of Care expires:  04/27/25  Plan of Care reviewed with:        Time Tracking     SLP Treatment Date:   04/13/25  Speech Start Time:  1240  Speech Stop Time:  1310     Speech Total Time (min):  30 min    Billable minutes:   Motion Fluoroscopic Evaluation, Video Recording, 30 minutes     04/13/2025

## 2025-04-13 NOTE — PROGRESS NOTES
Pulmonary & Critical Care Medicine   Progress Note      Presenting History/HPI:    The patient is a 79-year-old male who has a history of coronary artery disease, hypertension, hyperlipidemia, peripheral arterial disease, and GE reflux disease.  He also has a history of recent TURP in [place], complicated by postoperative by gross hematuria requiring placement of a three-way irrigation bladder catheter last week. This was left in place at time of discharge.  He presented to [facility] ER early a.m. of 03/29/2025 with shortness of breath, noted tachycardia/hypotensive, and marked elevation of high sensitivity troponin at 13,821. He was transferred to [facility] ER, suffering PEA arrest requiring intubation and short CPR with ROSC.  LHC was performed, noting multivessel coronary artery disease with 100% occluded proximal left circumflex, unable to cross with wire.  Mid RCA also noted 100% occluded, treated with PTCA/TAD.  He was noted to have elevated right-sided filling pressures with PCW 25 mm Hg.  Impella catheter was placed, and was left in place postprocedure.  He was admitted to ICU for ongoing medical care.     Intubation/mechanical ventilation 03/29/2025  Impella placement 03/29/2025    Attempts  had done to transfer to [facility] for high-risk surgery was denied    Status post 2 units packed red blood cells on 4/6    Patient underwent high-risk surgery PCI on 04/10/2025 and had 4 stents in LAD.  Impella was removed    Interval History:  Patient was extubated yesterday.  Off of Levophed this morning..  Patient is severely deaf but able to follow commands.  He is able to move his arms and right leg but very minimal movement on left leg.      Scheduled Medications:    aspirin  81 mg Oral Daily    atorvastatin  40 mg Oral Daily    clopidogreL  75 mg Oral Daily    famotidine  20 mg Per NG tube BID    zinc oxide-cod liver oil   Topical (Top) BID       PRN Medications:     Current  Facility-Administered Medications:     0.9%  NaCl infusion (for blood administration), , Intravenous, Q24H PRN    0.9%  NaCl infusion (for blood administration), , Intravenous, Q24H PRN    0.9%  NaCl infusion (for blood administration), , Intravenous, Q24H PRN    0.9%  NaCl infusion (for blood administration), , Intravenous, Q24H PRN    0.9%  NaCl infusion (for blood administration), , Intravenous, Q24H PRN    acetaminophen, 650 mg, Oral, Q4H PRN    calcium gluconate IVPB, 1 g, Intravenous, PRN    calcium gluconate IVPB, 2 g, Intravenous, PRN    calcium gluconate IVPB, 3 g, Intravenous, PRN    dextrose 50%, 12.5 g, Intravenous, PRN    dextrose 50%, 25 g, Intravenous, PRN    [] fentaNYL, 50 mcg, Intravenous, Q15 Min PRN **FOLLOWED BY** fentaNYL, 50 mcg, Intravenous, Q1H PRN    glucagon (human recombinant), 1 mg, Intramuscular, PRN    glucose, 16 g, Oral, PRN    glucose, 24 g, Oral, PRN    hydrALAZINE, 10 mg, Intravenous, Q4H PRN    magnesium sulfate 2 g IVPB, 2 g, Intravenous, PRN    magnesium sulfate 2 g IVPB, 2 g, Intravenous, PRN    ondansetron, 8 mg, Oral, Q8H PRN    potassium chloride in water, 20 mEq, Intravenous, PRN **AND** potassium chloride in water, 40 mEq, Intravenous, PRN **AND** potassium chloride in water, 60 mEq, Intravenous, PRN    sodium phosphate 15 mmol in D5W 250 mL IVPB, 15 mmol, Intravenous, PRN    sodium phosphate 20.1 mmol in D5W 250 mL IVPB, 20.1 mmol, Intravenous, PRN    sodium phosphate 30 mmol in D5W 250 mL IVPB, 30 mmol, Intravenous, PRN    tirofiban-0.9% sodium chloride 12.5 mg/250ml, , , Continuous PRN      Infusions:     dexmedeTOMIDine (Precedex) infusion (titrating)  0-1.4 mcg/kg/hr Intravenous Continuous   Stopped at 25 1449    lactated ringers   Intravenous Continuous   Stopped at 04/10/25 1020    NORepinephrine bitartrate-D5W  0-3 mcg/kg/min Intravenous Continuous 3.8 mL/hr at 25 0359 0.02 mcg/kg/min at 25 0359    propofoL  0-50 mcg/kg/min Intravenous  Continuous   Stopped at 04/11/25 1126    sodium bicarbonate 25 mEq in D5W 1,000 mL Purge Solution for Impella   Impella Device Continuous 13.5 mL/hr at 04/11/25 1000 Rate Verify at 04/11/25 1000    tirofiban-0.9% sodium chloride 12.5 mg/250ml    Continuous PRN   Stopped at 03/29/25 1845         Fluid Balance:     Intake/Output Summary (Last 24 hours) at 4/13/2025 0654  Last data filed at 4/13/2025 0515  Gross per 24 hour   Intake 434.06 ml   Output 2100 ml   Net -1665.94 ml         Vital Signs:   Vitals:    04/13/25 0515   BP:    Pulse: 94   Resp: 18   Temp:          Physical Exam  Vitals and nursing note reviewed.   Constitutional:       General: He is not in acute distress.     Appearance: Normal appearance. He is not toxic-appearing.   HENT:      Head: Normocephalic and atraumatic.      Right Ear: External ear normal.      Left Ear: External ear normal.      Nose: Nose normal.      Mouth/Throat:      Pharynx: No posterior oropharyngeal erythema.   Eyes:      General: No scleral icterus.     Conjunctiva/sclera: Conjunctivae normal.      Pupils: Pupils are equal, round, and reactive to light.   Neck:      Vascular: No carotid bruit.   Cardiovascular:      Rate and Rhythm: Normal rate and regular rhythm.      Pulses: Normal pulses.      Heart sounds: Normal heart sounds. No murmur heard.     No friction rub. No gallop.   Pulmonary:      Effort: Pulmonary effort is normal. No respiratory distress.      Breath sounds: Rhonchi present. No wheezing or rales.   Abdominal:      General: Abdomen is flat. Bowel sounds are normal. There is no distension.      Palpations: Abdomen is soft.      Tenderness: There is no abdominal tenderness. There is no guarding or rebound.   Musculoskeletal:         General: No swelling or deformity.      Cervical back: Neck supple. No rigidity or tenderness.   Skin:     General: Skin is warm and dry.      Capillary Refill: Capillary refill takes less than 2 seconds.      Findings: No erythema  or rash.   Neurological:      Comments: Extubated, patient talks, understands.  Able to follow commands but is hard of hearing.  Able to move his arms bilaterally and moves his right leg but minimal movement on left leg.           Ventilator Settings  Vent Mode: CPAP / PSV (04/12/25 1128)  Ventilator Initiated: Yes (03/29/25 0603)  Set Rate: 14 BPM (04/12/25 1035)  Vt Set: 500 mL (04/12/25 1035)  Pressure Support: 10 cmH20 (04/12/25 1128)  PEEP/CPAP: 5 cmH20 (04/12/25 1128)  Oxygen Concentration (%): 40 (04/12/25 1200)  Peak Airway Pressure: 16 cmH20 (04/12/25 1128)  Total Ve: 10.6 L/m (04/12/25 1128)  F/VT Ratio<105 (RSBI): (!) 47.93 (04/11/25 1707)      Laboratory Studies:   Recent Labs   Lab 04/12/25  1215   PH 7.440   PCO2 37.0   PO2 109.0*   HCO3 25.1       Recent Labs   Lab 04/13/25  0252   WBC 5.45   RBC 2.59*   HGB 7.2*   HCT 23.8*      MCV 91.9   MCH 27.8   MCHC 30.3*       Recent Labs   Lab 04/13/25  0252   GLUCOSE 83      K 3.5   *   CO2 22*   BUN 17.0   CREATININE 0.62*   CALCIUM 7.6*   MG 2.00         Microbiology Data:   Microbiology Results (last 7 days)       ** No results found for the last 168 hours. **              Imaging:   X-Ray Chest 1 View  Narrative: EXAMINATION:  Single view chest radiograph.    CLINICAL HISTORY:  Non-ST elevation (NSTEMI) myocardial infarctionanemia;    TECHNIQUE:  Single view of the chest.    COMPARISON:  Chest radiograph 04/04/2025.    FINDINGS:  The endotracheal tube, nasogastric tube and right IJ catheter have been removed.  The intra-aortic balloon pump has been removed.  The bilateral effusions, pulmonary edema, and lower lobe atelectasis are unchanged.  There is no pneumothorax.  The heart is enlarged.  There is no acute osseous abnormality.  Impression: No significant change from prior exam.    Electronically signed by: Lorne Solorzano MD  Date:    04/12/2025  Time:    08:26        2D echo findings as below      Left Ventricle: The left ventricle is  dilated. Normal wall thickness. Severe global hypokinesis present. There is severely reduced systolic function with a visually estimated ejection fraction of 15 - 20%. Grade I diastolic dysfunction.    Right Ventricle: Right ventricular enlargement. Systolic function is normal. TAPSE is 1.83 cm.    Left Atrium: dilated    Mitral Valve: Mildly thickened leaflets. There is moderate regurgitation.    Tricuspid Valve: There is moderate regurgitation.    Pulmonary Artery: There is moderate pulmonary hypertension.    IVC/SVC: Patient is ventilated, cannot use IVC diameter to estimate right atrial pressure.        Assessment and Plan    Assessment:  NSTEMI with cardiogenic shock, ongoing  LHC with PTCA/TAD x3 RCA (03/29/2025), PCWP 25 mm Hg  EF 15-20% by TTE with associated right ventricular enlargement dilated left atrium, moderate mitral regurgitation moderate tricuspid regurgitation and moderate pulmonary hypertension  Status post high-risk PCI withPTCA/TAD X4 LAD on 4/10/25  Impella placement (03/29/2025),-removed on 04/10/2025  Ischemic cardiomyopathy with severe decreased LVSF  Acute respiratory failure secondary to above, initially with cardiogenic pulmonary edema, currently with good oxygenation and ventilation.  Severe wide anion gap metabolic acidosis of primary lactic acid etiology, resolved   Mild/mod thrombocytopenia, likely related to Impella  Anemia  Thrombocytopenia        Plan:  -supplement oxygen to maintain saturation 94-96%   Patient is off of sedation and Levophed this morning  -status post 2 units packed red blood cells on 04/06 with hemoglobin up to 9 with improvement in hemodynamics-transfuse to maintain hemoglobin 9-10  -thrombocytopenia resolved  -cardiology, CT surgery on board; appreciate their assistance; planning to initiate GDM T   -Repleting electrolytes a appropriately  -likely be able to downgrade today    DVT prophylaxis : scd  GI prophylaxis with Protonix   Keep head of bed elevated  greater than 30° if possible for VAP prophylaxis      The patient remains at high risk of decompensation and death and will remain in ICU level care    34 min of critical care time was spent reviewing the patient's chart including medications, radiographs, labs, pertinent cultures and pathology data, other consultant notes/recomendations as well as titration of vasopressors, adjustment of mechanical ventilatory or NIPPV support, as well as discussion of goals of care with nursing staff, respiratory therapy at the bedside and with family at the bedside/via phone.      Kristian Grimm DO  4/13/2025  Pulmonology/Critical Care

## 2025-04-13 NOTE — PROGRESS NOTES
[facility]    Cardiology  Progress Note    Patient Name: Jon Conley  MRN: [id number]  Admission Date: 3/29/2025  Hospital Length of Stay: 15 days  Code Status: DNR   Attending Provider: Davi Harp Jr., MD,*   Consulting Provider: NORM Sierra  Primary Care Physician: Patrick Reece Jr., MD  Principal Problem:<principal problem not specified>    Patient information was obtained from past medical records and ER records.     Subjective:     Chief Complaint/Reason for Consult: Cardiac Arrest     HPI: Mr. Conley is a 80 y/o male who is known to [facility], Dr. Wang. The patient presented to [facility] on 3.29.25 with c/o SOB. He initially presented to [facility] in [place] with SOB. He was found to have Tachycardia, Hypotension, Anemia (7.4/24.6), Troponin 9.443, .0, BUN/Crea 16.2/1.34, Lactic Acid Level 4.3, Na 133, WBC 14.03, H&H 7.3/23.0, WBC 14.03, , PT/INR 14.9/1.2. He was admitted to , however, he sustained a witnessed PEA Arrest in the ER and has ROSC per ER MD. [facility] was consulted for NSETMI and Arrest.     3.30.25: NAD. Impella L Groin. CVP 7, P7. Levophed 0.32mcg/kg/min, Heparin Drip per Protocol, Amiodarone 0.5mg/min  3.31.25: NAD. L Groin Impella P7, Amiodarone 0.5mg/min, Heparin Drip per Protocol, Levophed 0.18mcg/kg/min, H&H 8.8/25.8, AST/ALT 79/45  4.1.25: NAD noted. L groin Impella in place, remains at P7. Remains on levophed. LFTs continue to improve.  4.2.25: NAD. L Groin Impella Device P6. Heparin Infusion per Protocol. Remains on 0.06mcg/kg/min.   4.3.25: NAD. Vented/Sedated. L Groin Impella Device. P3, Heparin Infusion per Protocol. Off Pressors. Attempting Wean of Impella. H&H 8.2/26.2,   4.4.25: NAD. Vented/Sedated. Heparin Drip per Protocol. H&H 8.1/95.4,   4.5.25: NAD. Vented/Sedated. Heparin Drip per Protocol. Levophed 0.04mcg/kg/min. H&H 7.9/23.8,  Impella P6  4.6.25: NAD. Vented/Sedated. P3.  "Heparin Drip per Protocol, Off Pressors. CV Surgery to Reevaluate for CABG. H&H 7.4/22.7,   4.7.25: NAD. Vented/Sedated. H&H 9.2/27.7, , K 3.9, BUN/Crea 9.2/0.61, Lactic Acid 0.6. Impella L Groin P4  4.8.25:NAD noted. Remains vented/sedated. Consent obtained for LHC, will plan for Thursday.  4.9.25: NAD. Remains Vented/Sedated. Plans for LHC with PCI on Thursday. Impella P4. Remains Off Pressors.   4.10.25: NAD. Remains Vented/Sedated. NPO for Planned LHC with PCI/Impella Assisted. P4 Impella. Levophed 0.1mcg/kg/min.   4.11.25: NAD. Vented/Sedated. Levophed 0.12mcg/kg/min. S/P PCI of LAD and Impella Explant.   4.12.25: NAD. Vented/Sedated. Levophed 0.08mcg/kg/min.   4.13.25: NAD. Sitting in Bedside Chair. Denies CP, SOB and Palps. Off Pressors. "I am ok."     PMH: BPH/Urinary Obstruction, CAD/Nonobstructive, PAD, R JACY/Mild, HTN, Obesity, GERD, Tobacco Use   PSH: TURP, Angiogram, Bilateral Knee Replacements   Family History: Denies Family History of Heart Disease  Social History: Denies Illicit Drug, ETOH and Tobacco Use     Previous Cardiac Diagnostics:   [facility] (4.10.25):  Findings:  - Successful Impella-protected PCI of LAD.  - Prox to Mid Left Anterior Descending has severe diffuse disease, up to 80% in severity. The lesion was successfully treated with overlapping FRONTIER BERNADINE 3.41N66SA, FRONTIER BERNADINE 3.71O95EY, FRONTIER BERNADINE 2.05S73UX, and SYNERGY XD 2.5X28MM drug-eluting stents. Post-dilatation was performed using an NC EMERGE MR 3.5X20MM angioplasty balloon inflated to 20 gretel. Following intervention there was 0% residual stenosis and PATTY grade 3 flow in the distal vessel.  - Intravascular Ultrasound (IVUS) was performed prior to intervention to further characterize the lesion, as well as post-stent deployment to ensure optimal stent apposition and expansion.  - Ostial First Diagonal Branch has a 70% stenosis. At the conclusion of the case, there was PATTY grade 3 flow in the distal vessel.  - " Successful Impella CP removal and closure of L femoral arterial access site using a 14 [place] MANTA device.  Assessment/Plan:  - Patient is a 79 y.o. male with a history of CAD presents in cardiogenic shock. Successful PCI of RCA last week. Impella CP placed at that time. Now s/p successful LAD intervention and Impella CP removal.  - Continue DAPT (aspirin + clopidogrel) for a minimum of 12 months and aspirin indefinitely thereafter  - High intensity statin  - Arterial sheath remains place; plan is to remove when activated clotting time (ACT) less than 180 seconds. Bedrest for 4 hours after hemostasis is achieved.    ECHO Limited 4.8.25:  Impella check  Aortic annulus to tip distance is 4.3 cm.  LV systolic function is 15%.    ECHO 4.3.25:  Limited study to check impella placement.  Left Ventricle: There is severely reduced systolic function with a visually estimated ejection fraction of 15 - 20%.  Impella distance from aortic valve annulus appears appropriate.    ECHO 4.1.25:  Limited study to check impella placement.  Left Ventricle: There is severely reduced systolic function with a visually estimated ejection fraction of 15 - 20%.  Impella distance from aortic valve annulus appears appropriate.    ECHO 3.30.25:  Left Ventricle: There is severely reduced systolic function with a visually estimated ejection fraction of less than 30%.  There appears to be at least moderate to severe mitral regurgitation.   Impella is positioned with inflow cannula 3.8 cm from aortic valve annulus.    [facility] 3.29.25:  Findings:  - Successful placement of Impella CP device from left femoral approach.  - There is severe multi-vessel coronary artery disease.  - Left Main has mild diffuse disease.  - Mid Left Anterior Descending has serial lesions, up to 70-80% in severity.   - Prox Left Circumflex is 100% occluded. Unable to cross lesion despite wire escalation and microcatheter support.  - Mid Right Coronary Artery is 100% occluded.  The lesion was successfully treated with overlapping SYNERGY XD 4.0X20MM, SYNERGY XD 3.0X48MM, and SYNERGY XD 2.79K29FQ drug-eluting stents. Post-dilatation was performed using a SYNERGY XD 4.0X20MM angioplasty balloon inflated to 12 gretel. Following intervention there was 0% residual stenosis and PATTY grade 3 flow in the distal vessel.  - Intravascular Ultrasound (IVUS) was performed prior to intervention to further characterize the lesion, as well as post-stent deployment to ensure optimal stent apposition and expansion.  - Aorto-iliac angiogram revealed moderate disease of R ileofemoral system.  - At the conclusion of the case, contrast injection was performed through the Impella sheath side port, showing adequate antegrade flow down the ipsilateral common femoral artery/SFA/profunda artery.  - RHC with elevated R sided filling pressures. RA 17 mmHg, RV 45/15 mmHg, PA 42/29 mmHg (mean 34 mmHg), PCWP 25 mmHg.  - Transpulmonary gradient is 9 mmHg.  - Cardiac Output/Index at 6.6/3.0, as calculated by Gregory equation on Levophed infusion and Impella CP support.  - PVR is 1.4 Woods units  - SVR is 550 dyne-sec*cm^-5  - Pulmonary Artery Pulsatility Index (Rafa) is 0.8  - Cardiac Power Output () is 0.91   Assessment/Plan:  - Patient is a 79 y.o. male with a history of recent TURP, complicated by significant post-op bleeding, presents to OSH ED with dyspnea. Transferred to [facility] with worsening shock despite vasopressors. TTE showing EF 25%, severe MR, severe TR. Troponin elevated. Brought to cath lab. Underwent Impella placement/LHC/RHC as outlined above. RCA lesion treated. Unable to cross LCx lesion. 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 At this point, would continue Impella support and aggressive medical mgmt. Has non-culprit lesions that should be treated at a later date as well. Can consider CTS consult, especially given severe valvular disease.  - Patient was given a loading dose of clopidogrel 600 mg PO in the  cath lab  - Continue DAPT (aspirin + clopidogrel) for a minimum of 12 months and aspirin indefinitely thereafter  - High intensity statin  - Continue Aggrastat for 4 hours post-procedure  - Arterial sheath remains place; plan is to remove when activated clotting time (ACT) less than 180 seconds. Bedrest for 4 hours after hemostasis is achieved.  - Obtain transthoracic echocardiogram  - Continuous bedrest while Impella in place  - Therapeutic heparin administration per protocol  - Antibiotics per protocol  - Remaining mgmt per primary team/cardiology    ECHO 3.29.25:  Left Ventricle: The left ventricle is dilated. Normal wall thickness. Severe global hypokinesis present. There is severely reduced systolic function with a visually estimated ejection fraction of 15 - 20%. Grade I diastolic dysfunction.  Right Ventricle: Right ventricular enlargement. Systolic function is normal. TAPSE is 1.83 cm.  Left Atrium: dilated  Mitral Valve: Mildly thickened leaflets. There is moderate regurgitation.  Tricuspid Valve: There is moderate regurgitation.  Pulmonary Artery: There is moderate pulmonary hypertension.  IVC/SVC: Patient is ventilated, cannot use IVC diameter to estimate right atrial pressure.    Carotid US 11.4.24:  The study quality is average.   1-39% stenosis in the proximal right internal carotid artery based on Bluth Criteria.   Antegrade right vertebral artery flow.   Antegrade left vertebral artery flow    ECHO 1.27.22:  The study quality is average.   The left ventricle is normal in size. Global left ventricular systolic function is moderately decreased. The left ventricular ejection fraction is 40-45%. Mild concentric left ventricular hypertrophy is present.  The left ventricle diastolic function is impaired (Grade I) with normal left atrial pressure.  Mild (1+) mitral regurgitation.  The pulmonary artery appears normal.     [facility] 9.26.17:  LM: Normal  LAD: Prox 30% Stenosis  RI: Normal  LCX: Normal  RCA: Distal  RCA 50%    Review of patient's allergies indicates:  No Known Allergies  No current facility-administered medications on file prior to encounter.     No current outpatient medications on file prior to encounter.     Review of Systems   Constitutional:  Positive for fatigue.   Respiratory:  Negative for cough and shortness of breath.    Cardiovascular:  Negative for chest pain, palpitations and leg swelling.   All other systems reviewed and are negative.    Objective:     Vital Signs (Most Recent):  Temp: 98.2 °F (36.8 °C) (04/13/25 1200)  Pulse: 102 (04/13/25 1300)  Resp: 19 (04/13/25 1300)  BP: 121/66 (04/13/25 1300)  SpO2: 95 % (04/13/25 1300) Vital Signs (24h Range):  Temp:  [98.2 °F (36.8 °C)-98.7 °F (37.1 °C)] 98.2 °F (36.8 °C)  Pulse:  [] 102  Resp:  [13-27] 19  SpO2:  [93 %-100 %] 95 %  BP: (101-138)/(46-84) 121/66   Weight: 106.1 kg (233 lb 14.5 oz)  Body mass index is 32.64 kg/m².  SpO2: 95 %       Intake/Output Summary (Last 24 hours) at 4/13/2025 1401  Last data filed at 4/13/2025 1300  Gross per 24 hour   Intake 158.07 ml   Output 2900 ml   Net -2741.93 ml     Lines/Drains/Airways       Central Venous Catheter Line  Duration             Tunneled Central Line - Triple Lumen 03/29/25 0555 Internal Jugular Right 15 days              Drain  Duration                  Urethral Catheter -- days                  Significant Labs:   Chemistries:   Recent Labs   Lab 04/12/25  0825 04/12/25  1215 04/12/25  2118 04/13/25  0252 04/13/25  1220     140 140 141 140 139   K 4.3  4.3 4.4 4.5 3.5 3.5   *  111* 112* 114* 112* 111*   CO2 24  23 23 20* 22* 19*   BUN 20.3  20.3 20.3 19.9 17.0 15.6   CREATININE 0.64*  0.67* 0.63* 0.64* 0.62* 0.67*   CALCIUM 7.7*  7.7* 7.8* 7.5* 7.6* 7.7*   BILITOT 0.3  0.3 0.3 0.3 0.3 0.4   ALKPHOS 81  81 86 76 71 82   ALT 19  18 20 19 17 20   AST 28  27 26 34 22 26   GLUCOSE 132*  134* 128* 100 83 98   MG 2.00 2.00 2.00 2.00 2.00   PHOS 2.2* 2.1* 1.6* 1.4* 2.6     "    CBC/Anemia Labs: Coags:    Recent Labs   Lab 04/11/25  0326 04/12/25  0825 04/13/25  0252   WBC 5.70 5.92  6.13 5.45   HGB 9.2* 7.9*  8.0* 7.2*   HCT 30.1* 25.6*  25.3* 23.8*    244  232 256   MCV 94.1* 90.8  90.4 91.9   RDW 18.1* 17.4*  17.2* 17.1*    No results for input(s): "PT", "INR", "APTT" in the last 168 hours.         EKG:      Telemetry: SR    Physical Exam  Constitutional:       General: He is not in acute distress.     Appearance: Normal appearance. He is obese.   HENT:      Head: Normocephalic.      Mouth/Throat:      Mouth: Mucous membranes are moist.   Cardiovascular:      Rate and Rhythm: Normal rate and regular rhythm.      Pulses: Normal pulses.      Heart sounds: Murmur heard.   Pulmonary:      Effort: Pulmonary effort is normal. No respiratory distress.      Comments: NC O2  Abdominal:      Palpations: Abdomen is soft.   Skin:     General: Skin is warm.      Comments: L Groin Soft/Flat, No Sign of Bleed/Infection. + Monophasic Dopplerable Pedal Pulses    Neurological:      General: No focal deficit present.      Mental Status: He is alert and oriented to person, place, and time.   Psychiatric:         Mood and Affect: Mood normal.         Behavior: Behavior normal.         Judgment: Judgment normal.       Home Medications:   Medications Ordered Prior to Encounter[1]  Current Schedule Inpatient Medications:   aspirin  81 mg Oral Daily    atorvastatin  40 mg Oral Daily    clopidogreL  75 mg Oral Daily    [START ON 4/14/2025] enoxaparin  40 mg Subcutaneous Daily    famotidine  20 mg Per NG tube BID    zinc oxide-cod liver oil   Topical (Top) BID     Continuous Infusions:   dexmedeTOMIDine (Precedex) infusion (titrating)  0-1.4 mcg/kg/hr Intravenous Continuous   Stopped at 04/12/25 1449    lactated ringers   Intravenous Continuous   Stopped at 04/10/25 1020    NORepinephrine bitartrate-D5W  0-3 mcg/kg/min Intravenous Continuous 3.8 mL/hr at 04/13/25 0359 0.02 mcg/kg/min at 04/13/25 " 0359    propofoL  0-50 mcg/kg/min Intravenous Continuous   Stopped at 04/11/25 1126    sodium bicarbonate 25 mEq in D5W 1,000 mL Purge Solution for Impella   Impella Device Continuous 13.5 mL/hr at 04/11/25 1000 Rate Verify at 04/11/25 1000    tirofiban-0.9% sodium chloride 12.5 mg/250ml    Continuous PRN   Stopped at 03/29/25 1845     Assessment:   Acute Hypoxemic Respiratory Failure requiring Intubation/Ventilation - Resolved on NC O2  Cardiogenic Shock requiring Mechanical and Chemical Support - Resolved     - Impella/Pressors - Impella Explanted on 4.10.25 post High Risk LAD PCI/Impella Assisted  MVCAD    - s/p LHC (4.10.25) - PTCA/TAD of the Prox to Mid LAD and Impella Explant     - s/p C (3.29.25) -  Left Main has mild diffuse disease. Mid Left Anterior Descending has serial lesions, up to 70-80% in severity. Prox Left Circumflex is 100% occluded. Unable to cross lesion despite wire escalation and microcatheter support. Mid Right Coronary Artery is 100% occluded. The lesion was successfully treated with overlapping SYNERGY XD 4.0X20MM, SYNERGY XD 3.0X48MM, and SYNERGY XD 2.72G83DF drug-eluting stents. Post-dilatation was performed using a SYNERGY XD 4.0X20MM angioplasty balloon inflated to 12 gretel. Following intervention there was 0% residual stenosis and PATTY grade 3 flow in the distal vessel.  NSTEMI Type I (Non-Anterior Wall MI)    - Trponin > 9  Cardiopulmonary Arrest    - Initial Rhythm PEA - Witnessed in ER Arrest/ROSC  ICMO/EF 15%     - ECHO (4.8.25) - LVEF 15%    - ECHO (3.29.25) - LVEF 15-20%, Grade I DD    ST - Now SR   Hypotension requiring Pressors - Resolved    - Hx of HTN  BPH s/p TURP   Lactic Acidosis - Resolved   Leukocytosis - Resolved   Anemia requiring Transfusions - Stable   Recent BPH Diagnosis s/p TURP  CAD/Nonobstructive (2017)  JACY/R Sided   PAD/Nonobstructive   Transaminitis - Resolved   Obesity  GERD  Tobacco Use  No Hx of GIB     Plan:   Continue ASA, Statin and Plavix   Groin  Precautions   Will Continue to Follow  Labs and EKG in AM: CBC, CMP and Mg    NORM Sierra  Cardiology  [facility]          [1]   No current facility-administered medications on file prior to encounter.     No current outpatient medications on file prior to encounter.

## 2025-04-14 NOTE — PLAN OF CARE
Problem: Infection  Goal: Absence of Infection Signs and Symptoms  Outcome: Progressing     Problem: Adult Inpatient Plan of Care  Goal: Plan of Care Review  Outcome: Progressing  Goal: Patient-Specific Goal (Individualized)  Outcome: Progressing  Goal: Absence of Hospital-Acquired Illness or Injury  Outcome: Progressing  Goal: Optimal Comfort and Wellbeing  Outcome: Progressing  Goal: Readiness for Transition of Care  Outcome: Progressing     Problem: Delirium  Goal: Optimal Coping  Outcome: Progressing  Goal: Improved Behavioral Control  Outcome: Progressing  Goal: Improved Attention and Thought Clarity  Outcome: Progressing  Goal: Improved Sleep  Outcome: Progressing     Problem: Skin Injury Risk Increased  Goal: Skin Health and Integrity  Outcome: Progressing     Problem: Fall Injury Risk  Goal: Absence of Fall and Fall-Related Injury  Outcome: Progressing     Problem: Wound  Goal: Optimal Coping  Outcome: Progressing  Goal: Optimal Functional Ability  Outcome: Progressing  Goal: Absence of Infection Signs and Symptoms  Outcome: Progressing  Goal: Improved Oral Intake  Outcome: Progressing  Goal: Optimal Pain Control and Function  Outcome: Progressing  Goal: Skin Health and Integrity  Outcome: Progressing  Goal: Optimal Wound Healing  Outcome: Progressing

## 2025-04-14 NOTE — PROGRESS NOTES
Pulmonary & Critical Care Medicine   Progress Note      Presenting History/HPI:    The patient is a 79-year-old male who has a history of coronary artery disease, hypertension, hyperlipidemia, peripheral arterial disease, and GE reflux disease.  He also has a history of recent TURP in [place], complicated by postoperative by gross hematuria requiring placement of a three-way irrigation bladder catheter last week. This was left in place at time of discharge.  He presented to [facility] ER early a.m. of 03/29/2025 with shortness of breath, noted tachycardia/hypotensive, and marked elevation of high sensitivity troponin at 13,821. He was transferred to [facility] ER, suffering PEA arrest requiring intubation and short CPR with ROSC.  LHC was performed, noting multivessel coronary artery disease with 100% occluded proximal left circumflex, unable to cross with wire.  Mid RCA also noted 100% occluded, treated with PTCA/TAD.  He was noted to have elevated right-sided filling pressures with PCW 25 mm Hg.  Impella catheter was placed, and was left in place postprocedure.  He was admitted to ICU for ongoing medical care.     Intubation/mechanical ventilation 03/29/2025  Impella placement 03/29/2025    Attempts  had done to transfer to [facility] for high-risk surgery was denied    Status post 2 units packed red blood cells on 4/6    Patient underwent high-risk surgery PCI on 04/10/2025 and had 4 stents in LAD.  Impella was removed    Patient got extubated on 04/13/2025    Interval History:  Doing well status post intubation.  On nasal cannula..  Speech evaluated the patient yesterday and recommended pureed diet.  Received 1 mg of Bumex yesterday with a urine output of 2700.  Will likely be able to downgrade today.    Scheduled Medications:    aspirin  81 mg Oral Daily    atorvastatin  40 mg Oral Daily    clopidogreL  75 mg Oral Daily    enoxaparin  40 mg Subcutaneous Daily    famotidine  20 mg Per  NG tube BID    zinc oxide-cod liver oil   Topical (Top) BID       PRN Medications:     Current Facility-Administered Medications:     acetaminophen, 650 mg, Oral, Q4H PRN    calcium gluconate IVPB, 1 g, Intravenous, PRN    calcium gluconate IVPB, 2 g, Intravenous, PRN    calcium gluconate IVPB, 3 g, Intravenous, PRN    dextrose 50%, 12.5 g, Intravenous, PRN    dextrose 50%, 25 g, Intravenous, PRN    [] fentaNYL, 50 mcg, Intravenous, Q15 Min PRN **FOLLOWED BY** fentaNYL, 50 mcg, Intravenous, Q1H PRN    glucagon (human recombinant), 1 mg, Intramuscular, PRN    glucose, 16 g, Oral, PRN    glucose, 24 g, Oral, PRN    hydrALAZINE, 10 mg, Intravenous, Q4H PRN    magnesium sulfate 2 g IVPB, 2 g, Intravenous, PRN    magnesium sulfate 2 g IVPB, 2 g, Intravenous, PRN    ondansetron, 8 mg, Oral, Q8H PRN    potassium chloride in water, 20 mEq, Intravenous, PRN **AND** potassium chloride in water, 40 mEq, Intravenous, PRN **AND** potassium chloride in water, 60 mEq, Intravenous, PRN    sodium phosphate 15 mmol in D5W 250 mL IVPB, 15 mmol, Intravenous, PRN    sodium phosphate 20.1 mmol in D5W 250 mL IVPB, 20.1 mmol, Intravenous, PRN    sodium phosphate 30 mmol in D5W 250 mL IVPB, 30 mmol, Intravenous, PRN    tirofiban-0.9% sodium chloride 12.5 mg/250ml, , , Continuous PRN      Infusions:     lactated ringers   Intravenous Continuous   Stopped at 04/10/25 1020    NORepinephrine bitartrate-D5W  0-3 mcg/kg/min Intravenous Continuous 3.8 mL/hr at 25 0359 0.02 mcg/kg/min at 25 0359    tirofiban-0.9% sodium chloride 12.5 mg/250ml    Continuous PRN   Stopped at 25 1845         Fluid Balance:     Intake/Output Summary (Last 24 hours) at 2025 0603  Last data filed at 2025 0552  Gross per 24 hour   Intake --   Output 2700 ml   Net -2700 ml         Vital Signs:   Vitals:    25 0600   BP: 124/78   Pulse: 105   Resp: 18   Temp:          Physical Exam  Vitals and nursing note reviewed.   Constitutional:        General: He is not in acute distress.     Appearance: Normal appearance. He is not toxic-appearing.   HENT:      Head: Normocephalic and atraumatic.      Right Ear: External ear normal.      Left Ear: External ear normal.      Nose: Nose normal.      Mouth/Throat:      Pharynx: No posterior oropharyngeal erythema.   Eyes:      General: No scleral icterus.     Conjunctiva/sclera: Conjunctivae normal.      Pupils: Pupils are equal, round, and reactive to light.   Neck:      Vascular: No carotid bruit.   Cardiovascular:      Rate and Rhythm: Normal rate and regular rhythm.      Pulses: Normal pulses.      Heart sounds: Normal heart sounds. No murmur heard.     No friction rub. No gallop.   Pulmonary:      Effort: Pulmonary effort is normal. No respiratory distress.      Breath sounds: Rhonchi present. No wheezing or rales.   Abdominal:      General: Abdomen is flat. Bowel sounds are normal. There is no distension.      Palpations: Abdomen is soft.      Tenderness: There is no abdominal tenderness. There is no guarding or rebound.   Musculoskeletal:         General: No swelling or deformity.      Cervical back: Neck supple. No rigidity or tenderness.   Skin:     General: Skin is warm and dry.      Capillary Refill: Capillary refill takes less than 2 seconds.      Findings: No erythema or rash.   Neurological:      Comments: Extubated, patient talks, understands.  Able to follow commands but is hard of hearing.  Able to move his arms bilaterally and moves his right leg but minimal movement on left leg.           Ventilator Settings  Vent Mode: CPAP / PSV (04/12/25 1128)  Ventilator Initiated: Yes (03/29/25 0603)  Set Rate: 14 BPM (04/12/25 1035)  Vt Set: 500 mL (04/12/25 1035)  Pressure Support: 10 cmH20 (04/12/25 1128)  PEEP/CPAP: 5 cmH20 (04/12/25 1128)  Oxygen Concentration (%): 40 (04/12/25 1200)  Peak Airway Pressure: 16 cmH20 (04/12/25 1128)  Total Ve: 10.6 L/m (04/12/25 1128)  F/VT Ratio<105 (RSBI): (!)  "47.93 (04/11/25 1707)      Laboratory Studies:   No results for input(s): "PH", "PCO2", "PO2", "HCO3", "POCSATURATED", "BE" in the last 24 hours.      Recent Labs   Lab 04/14/25  0431   WBC 5.31   RBC 2.89*   HGB 8.1*   HCT 26.5*      MCV 91.7   MCH 28.0   MCHC 30.6*       Recent Labs   Lab 04/14/25  0431   GLUCOSE 82      K 3.8   *   CO2 20*   BUN 12.9   CREATININE 0.63*   CALCIUM 7.7*   MG 2.20         Microbiology Data:   Microbiology Results (last 7 days)       ** No results found for the last 168 hours. **              Imaging:   Fl Modified Barium Swallow Speech  See procedure notes from Speech Pathologist.    This procedure was auto-finalized.        2D echo findings as below      Left Ventricle: The left ventricle is dilated. Normal wall thickness. Severe global hypokinesis present. There is severely reduced systolic function with a visually estimated ejection fraction of 15 - 20%. Grade I diastolic dysfunction.    Right Ventricle: Right ventricular enlargement. Systolic function is normal. TAPSE is 1.83 cm.    Left Atrium: dilated    Mitral Valve: Mildly thickened leaflets. There is moderate regurgitation.    Tricuspid Valve: There is moderate regurgitation.    Pulmonary Artery: There is moderate pulmonary hypertension.    IVC/SVC: Patient is ventilated, cannot use IVC diameter to estimate right atrial pressure.        Assessment and Plan    Assessment:  NSTEMI with cardiogenic shock, ongoing  LHC with PTCA/TAD x3 RCA (03/29/2025), PCWP 25 mm Hg  EF 15-20% by TTE with associated right ventricular enlargement dilated left atrium, moderate mitral regurgitation moderate tricuspid regurgitation and moderate pulmonary hypertension  Status post high-risk PCI withPTCA/TAD X4 LAD on 4/10/25  Impella placement (03/29/2025),-removed on 04/10/2025  Ischemic cardiomyopathy with severe decreased LVSF  Acute respiratory failure secondary to above, initially with cardiogenic pulmonary edema, currently " with good oxygenation and ventilation.  Severe wide anion gap metabolic acidosis of primary lactic acid etiology, resolved   Mild/mod thrombocytopenia, likely related to Impella  Anemia  Thrombocytopenia        Plan:  -supplement oxygen to maintain saturation 94-96%   -status post 2 units packed red blood cells on 04/06 with hemoglobin up to 9 with improvement in hemodynamics-transfuse to maintain hemoglobin 9-10  -thrombocytopenia resolved  -cardiology, CT surgery on board;   continue aspirin, Plavix and statin  -Repleting electrolytes a appropriately  -speech evaluated the patient and recommended.  Diet  -Received 1 mg of Bumex yesterday with a urine output of 2700  -likely be able to downgrade today    DVT prophylaxis :  Lovenox  GI prophylaxis with Protonix   Keep head of bed elevated greater than 30° if possible for VAP prophylaxis      The patient remains at high risk of decompensation and death and will remain in ICU level care    34 min of critical care time was spent reviewing the patient's chart including medications, radiographs, labs, pertinent cultures and pathology data, other consultant notes/recomendations as well as titration of vasopressors, adjustment of mechanical ventilatory or NIPPV support, as well as discussion of goals of care with nursing staff, respiratory therapy at the bedside and with family at the bedside/via phone.      Kristian Grimm,   4/14/2025  Pulmonology/Critical Care

## 2025-04-14 NOTE — PROGRESS NOTES
Inpatient Nutrition Assessment    Admit Date: 3/29/2025   Total duration of encounter: 16 days   Patient Age: 79 y.o.    Nutrition Recommendation/Prescription     Continue diet per SLP recs for consistency.  Add Ousmane (provides 90 kcal, 2.5 g protein per serving) BID and Boost Plus (provides 360 kcal, 14 g protein per serving) TID.  May also need to consider appetite stimulant per MD if po intake does not improve.    Communication of Recommendations: reviewed with nurse and reviewed with patient    Nutrition Assessment     Malnutrition Assessment/Nutrition-Focused Physical Exam       Malnutrition Level: other (see comments) (Does not meet criteria) (03/31/25 1259)  Energy Intake (Malnutrition): other (see comments) (Unable to assess) (03/31/25 1259)  Weight Loss (Malnutrition): other (see comments) (Unable to assess) (03/31/25 1259)                                                  A minimum of two characteristics is recommended for diagnosis of either severe or non-severe malnutrition.    Chart Review    Reason Seen: continuous nutrition monitoring and follow-up    Malnutrition Screening Tool Results   Have you recently lost weight without trying?: No  Have you been eating poorly because of a decreased appetite?: No   MST Score: 0   Diagnosis:  NSTEMI with cardiogenic shock, ongoing  Ischemic cardiomyopathy with severe decreased LVSF, EF 15%  Cardiogenic pulmonary edema with acute respiratory failure  Severe wide anion gap metabolic acidosis of primary lactic acid etiology, resolved   Elevated serum transaminases    Relevant Medical History: CAD, HTN, HLD, PAD, GERD    Scheduled Medications:  aspirin, 81 mg, Daily  atorvastatin, 40 mg, Daily  clopidogreL, 75 mg, Daily  enoxaparin, 40 mg, Daily  famotidine, 20 mg, BID  zinc oxide-cod liver oil, , BID    Continuous Infusions:  lactated ringers, Last Rate: Stopped (04/10/25 1020)  NORepinephrine bitartrate-D5W, Last Rate: 0.02 mcg/kg/min (04/13/25 1360)  tirofiban-0.9%  sodium chloride 12.5 mg/250ml, Last Rate: Stopped (03/29/25 1845)    PRN Medications:  acetaminophen, 650 mg, Q4H PRN  calcium gluconate IVPB, 1 g, PRN  calcium gluconate IVPB, 2 g, PRN  calcium gluconate IVPB, 3 g, PRN  dextrose 50%, 12.5 g, PRN  dextrose 50%, 25 g, PRN  fentaNYL, 50 mcg, Q1H PRN  glucagon (human recombinant), 1 mg, PRN  glucose, 16 g, PRN  glucose, 24 g, PRN  hydrALAZINE, 10 mg, Q4H PRN  magnesium sulfate 2 g IVPB, 2 g, PRN  magnesium sulfate 2 g IVPB, 2 g, PRN  ondansetron, 8 mg, Q8H PRN  potassium chloride in water, 20 mEq, PRN   And  potassium chloride in water, 40 mEq, PRN   And  potassium chloride in water, 60 mEq, PRN  sodium phosphate 15 mmol in D5W 250 mL IVPB, 15 mmol, PRN  sodium phosphate 20.1 mmol in D5W 250 mL IVPB, 20.1 mmol, PRN  sodium phosphate 30 mmol in D5W 250 mL IVPB, 30 mmol, PRN  tirofiban-0.9% sodium chloride 12.5 mg/250ml, , Continuous PRN    Calorie Containing IV Medications: no significant kcals from medications at this time    Recent Labs   Lab 04/10/25  0243 04/10/25  1235 04/10/25  2043 04/11/25  0326 04/11/25  1234 04/12/25  0825 04/12/25  1215 04/12/25  2118 04/13/25  0252 04/13/25  1220 04/13/25  2040 04/14/25  0431    135* 138 140   < > 140  140 140 141 140 139 142 140   K 3.9 3.8 4.1 4.5   < > 4.3  4.3 4.4 4.5 3.5 3.5 3.6 3.8   CALCIUM 8.4* 7.8* 7.7* 7.4*   < > 7.7*  7.7* 7.8* 7.5* 7.6* 7.7* 8.1* 7.7*   PHOS 2.3 2.6 2.9 3.2   < > 2.2* 2.1* 1.6* 1.4* 2.6 2.6 2.7   MG 2.00 1.90 2.10 2.10   < > 2.00 2.00 2.00 2.00 2.00 2.00 2.20   * 110* 112* 115*   < > 112*  111* 112* 114* 112* 111* 112* 112*   CO2 20* 20* 19* 20*   < > 24  23 23 20* 22* 19* 21* 20*   BUN 11.5 9.8 10.9 15.3   < > 20.3  20.3 20.3 19.9 17.0 15.6 14.4 12.9   CREATININE 0.67* 0.66* 0.72 0.69*   < > 0.64*  0.67* 0.63* 0.64* 0.62* 0.67* 0.64* 0.63*   EGFRNORACEVR >60 >60 >60 >60   < > >60  >60 >60 >60 >60 >60 >60 >60   GLUCOSE 129* 124* 117* 120*   < > 132*  134* 128* 100 83 98 85 82    BILITOT 0.5 0.6 0.6 0.4   < > 0.3  0.3 0.3 0.3 0.3 0.4 0.4 0.5   ALKPHOS 96 89 84 85   < > 81  81 86 76 71 82 84 83   ALT 19 18 20 21   < > 19  18 20 19 17 20 21 20   AST 19 22 26 37   < > 28  27 26 34 22 26 25 21   ALBUMIN 2.0* 1.7* 1.7* 1.8*   < > 1.9*  1.9* 2.0* 1.9* 1.9* 2.1* 2.3* 2.3*   WBC 19.02* 9.84 6.46 5.70  --  5.92  6.13  --   --  5.45  --   --  5.31   HGB 10.2* 9.4* 9.3* 9.2*  --  7.9*  8.0*  --   --  7.2*  --   --  8.1*   HCT 31.4* 30.0* 29.4* 30.1*  --  25.6*  25.3*  --   --  23.8*  --   --  26.5*    < > = values in this interval not displayed.     Nutrition Orders:  Diet Pureed (IDDSI Level 4) Heart Healthy; Moderately Thick Liquids (IDDSI Level 3)  Dietary nutrition supplements TID; Boost Plus Nutritional Drink - Any flavor, Ousmane - Any flavor    Appetite/Oral Intake: fair/25-50% of meals  Factors Affecting Nutritional Intake: decreased appetite  Social Needs Impacting Access to Food: unable to assess at this time; will attempt on follow-up  Food/[place]/Cultural Preferences: unable to obtain  Food Allergies: no known food allergies  Last Bowel Movement: 04/11/25  Wound(s):     Wound 04/10/25 1800 Shearing Right Buttocks-Tissue loss description: Partial thickness       Wound 04/10/25 1800 Skin Tear Right lower Arm-Tissue loss description: Full thickness      Comments    3/31/25: Discussed with RN. Will provide tube feeding recommendations for when appropriate to start tube feeding. Receiving kcal from meds.  Currently with Impella in place, HOB flat. May need to place in reverse Trendelenburg to start TF.     4/1/25: Possible plans for starting trickle feeds today. Still receiving kcal from meds.     4/2/25: No TF yet. Still receiving kcal from meds.     4/3/25: No TF plans at this time. Plans for CABG tomorrow with plans for extubation per protocol post-op.     4/7/25: No CABG done. Possible plans for removal of Impella today. TF to start post procedure per RN.     4/8/25: Plans for  "Impella removal tomorrow. TF to start today.     4/10/25: Plans for procedure today. TF on hold. Previously tolerated per RN.     4/14/25: Pt now extubated, on po diet. 25-50% po intake of meals per RN. Only taking small bites at this time. Attempted to verify subjective info with pt, pt [place] and not understanding questions. Was willing to have ONS sent. Also add Ousmane due to pressure ulcer.     Anthropometrics    Height: 5' 10.98" (180.3 cm), Height Method: Stated  Last Weight: 106.1 kg (233 lb 14.5 oz) (03/29/25 1738), Weight Method: Bed Scale  BMI (Calculated): 32.6  BMI Classification: obese grade I (BMI 30-34.9)        Ideal Body Weight (IBW), Male: 171.88 lb     % Ideal Body Weight, Male (lb): 130.81 %                          Usual Weight Provided By: unable to obtain usual weight    Wt Readings from Last 5 Encounters:   03/29/25 106.1 kg (233 lb 14.5 oz)   12/02/21 101 kg (222 lb 10.6 oz)     Weight Change(s) Since Admission:   Wt Readings from Last 1 Encounters:   03/29/25 1738 106.1 kg (233 lb 14.5 oz)   03/29/25 0404 102.1 kg (225 lb)   Admit Weight: 102.1 kg (225 lb) (03/29/25 0404), Weight Method: Stated    Estimated Needs    Weight Used For Calorie Calculations: 106.1 kg (233 lb 14.5 oz)  Energy Calorie Requirements (kcal): 1167-1485kcal (11-14kcal/kg IBW)  Energy Need Method: Kcal/kg  Weight Used For Protein Calculations: 78.2 kg (172 lb 5.7 oz) (IBW)  Protein Requirements: 156gm (2g/kg IBW)  Fluid Requirements (mL): 2122ml (20ml/kg)  CHO Requirement: 165gm (45% est kcal needs)     Enteral Nutrition     Patient not receiving enteral nutrition at this time.    Parenteral Nutrition     Patient not receiving parenteral nutrition support at this time.    Evaluation of Received Nutrient Intake    Calories: not meeting estimated needs  Protein: not meeting estimated needs    Patient Education     Not applicable.    Nutrition Diagnosis     PES: Inadequate oral intake related to acute illness as evidenced by " 25-50% po intake of meals since diet advanced. (active)     PES:            Nutrition Interventions     Intervention(s): general/healthful diet, commercial beverage, and collaboration with other providers  Intervention(s):      Goal: Meet greater than 80% of nutritional needs by follow-up. (goal progressing)  Goal: Tolerate enteral feeding at goal rate by follow-up. (goal discontinued)    Nutrition Goals & Monitoring     Dietitian will monitor: food and beverage intake and energy intake  Discharge planning:  cardiac diet with texture modifications per SLP  Nutrition Risk/Follow-Up: high (follow-up in 1-4 days)   Please consult if re-assessment needed sooner.

## 2025-04-14 NOTE — H&P
[facility] Hospital Medicine History & Physical Examination       Patient Name: Jon Conley  MRN: [id number]  Patient Class: IP- Inpatient   Admission Date: 04/14/2025   Admitting Service: Hospital Medicine   Length of Stay: 16  Attending Physician: Gold Gamble MD   Primary Care Provider: Patrick Reece Jr., MD  Face-to-Face encounter date: 04/14/2025  Code Status: DNR   Chief Complaint: transfer (Pt transfer from [facility] with report of SOB and NSTEMI.)      Screening for Social Drivers for health:  Patient screened for food insecurity, housing instability, transportation needs, utility difficulties, and interpersonal safety (select all that apply as identified as concern)  []Housing or Food  []Transportation Needs  []Utility Difficulties  []Interpersonal safety  [x]None    Patient information was obtained from patient, patient's family, past medical records and ER records.      HISTORY OF PRESENT ILLNESS:   Jon Conley is a 79 y.o. male with a past medical history of hypertension, hyperlipidemia, CAD, PAD, GERD, and BPH s/p TURP who presented to [facility] on 3/29/2025 transferred from [facility] for cardiology services.  Patient presented to outside facility for shortness of breath.  Patient was tachycardic and hypotensive with hemoglobin of 7.5, hematocrit 24.6, high sensitivity troponin 13,821, WBC 13.4, and CPK 7 O2.  Patient was given 500 mL normal saline bolus and heparin 5000 units and transferred to [facility] for higher level of care.  Patient has suffered PAE arrest requiring intubation and CPR with ROSC. Labs revealed WBC 14.03, hemoglobin 7.3, hematocrit 23, troponin 9.443, , BUN 16.2, creatinine 1.34, lactic acid 4.3, PT/INR 14.9/1.2. Patient underwent left heart catheterization with Impella placement on 03/29/2025.  C revealed multivessel coronary artery disease with 100% occluded proximal left circumflex, unable to cross  with wire. Mid RCA was also noted to be 100 % occluded, treated with PTCA/TAD.  Patient was started on Levophed, heparin drip per protocol, and amiodarone.  Echo revealed EF 15-20% with grade 1 diastolic dysfunction.  CV surgery was consulted.  Patient was deemed not a surgical candidate for CABG secondary to multiple comorbidities.  Patient received 2 units packed red blood cells on 04/06.  Patient underwent high-risk PCI on 04/10/2025 with 4 stents in LAD.  Impella was removed.  Patient was extubated on 04/13/2025.  Patient was cleared for downgrade out of ICU on 04/14/2025 and was admitted to hospital medicine service.     PAST MEDICAL HISTORY:   Hypertension  Hyperlipidemia   CAD   PAD   GERD   BPH s/p TURP    PAST SURGICAL HISTORY:     Past Surgical History:   Procedure Laterality Date    CATHETERIZATION OF BOTH LEFT AND RIGHT HEART N/A 3/29/2025    Procedure: CATHETERIZATION, HEART, BOTH LEFT AND RIGHT;  Surgeon: Lorne Coon Jr., MD;  Location: [facility] CATH LAB;  Service: Cardiology;  Laterality: N/A;    LEFT HEART CATHETERIZATION N/A 4/10/2025    Procedure: Left heart cath;  Surgeon: Lorne Coon Jr., MD;  Location: [facility] CATH LAB;  Service: Cardiology;  Laterality: N/A;       FAMILY HISTORY:   Reviewed and negative    SOCIAL HISTORY:   Denied alcohol, tobacco or illicit drug use.     ALLERGIES:   Patient has no known allergies.    HOME MEDICATIONS:     Prior to Admission medications    Not on File     ________________________________________________________________________  INPATIENT LIST OF MEDICATIONS   Current Medications[1]    Scheduled Meds:   aspirin  81 mg Oral Daily    atorvastatin  40 mg Oral Daily    clopidogreL  75 mg Oral Daily    enoxaparin  40 mg Subcutaneous Daily    famotidine  20 mg Per NG tube BID    zinc oxide-cod liver oil   Topical (Top) BID     Continuous Infusions:   lactated ringers   Intravenous Continuous   Stopped at 04/10/25 1020    NORepinephrine bitartrate-D5W  0-3  mcg/kg/min Intravenous Continuous 3.8 mL/hr at 25 0359 0.02 mcg/kg/min at 25 0359    tirofiban-0.9% sodium chloride 12.5 mg/250ml    Continuous PRN   Stopped at 25 1845     PRN Meds:.  Current Facility-Administered Medications:     acetaminophen, 650 mg, Oral, Q4H PRN    calcium gluconate IVPB, 1 g, Intravenous, PRN    calcium gluconate IVPB, 2 g, Intravenous, PRN    calcium gluconate IVPB, 3 g, Intravenous, PRN    dextrose 50%, 12.5 g, Intravenous, PRN    dextrose 50%, 25 g, Intravenous, PRN    [] fentaNYL, 50 mcg, Intravenous, Q15 Min PRN **FOLLOWED BY** fentaNYL, 50 mcg, Intravenous, Q1H PRN    glucagon (human recombinant), 1 mg, Intramuscular, PRN    glucose, 16 g, Oral, PRN    glucose, 24 g, Oral, PRN    hydrALAZINE, 10 mg, Intravenous, Q4H PRN    magnesium sulfate 2 g IVPB, 2 g, Intravenous, PRN    magnesium sulfate 2 g IVPB, 2 g, Intravenous, PRN    ondansetron, 8 mg, Oral, Q8H PRN    potassium chloride in water, 20 mEq, Intravenous, PRN **AND** potassium chloride in water, 40 mEq, Intravenous, PRN **AND** potassium chloride in water, 60 mEq, Intravenous, PRN    sodium phosphate 15 mmol in D5W 250 mL IVPB, 15 mmol, Intravenous, PRN    sodium phosphate 20.1 mmol in D5W 250 mL IVPB, 20.1 mmol, Intravenous, PRN    sodium phosphate 30 mmol in D5W 250 mL IVPB, 30 mmol, Intravenous, PRN    tirofiban-0.9% sodium chloride 12.5 mg/250ml, , , Continuous PRN      REVIEW OF SYSTEMS:   Except as documented, all other systems reviewed and negative.    PHYSICAL EXAM:     VITAL SIGNS: 24 HRS MIN & MAX LAST   Temp  Min: 98.4 °F (36.9 °C)  Max: 98.8 °F (37.1 °C) 98.5 °F (36.9 °C)   BP  Min: 98/78  Max: 138/75 126/77   Pulse  Min: 88  Max: 113  100   Resp  Min: 10  Max: 28 18   SpO2  Min: 93 %  Max: 100 % 99 %       General appearance: Elderly male in no apparent distress.  HENT: Atraumatic head. Hard of hearing   Eyes: Normal extraocular movements.   Neck: Supple. Right IJ   Lungs: Coarse breath  sounds. Supplemental oxygen in place via nasal cannula   Heart: Murmur   Abdomen: Soft, non-distended, non-tender.  Skin: No Rash.   Neuro: Awake, alert, and oriented. Intermittent confusion   Psych/mental status: Appropriate mood and affect.     LABS AND IMAGING:     Recent Labs   Lab 04/12/25  0825 04/13/25  0252 04/14/25  0431   WBC 5.92  6.13 5.45 5.31   RBC 2.82*  2.80* 2.59* 2.89*   HGB 7.9*  8.0* 7.2* 8.1*   HCT 25.6*  25.3* 23.8* 26.5*   MCV 90.8  90.4 91.9 91.7   MCH 28.0  28.6 27.8 28.0   MCHC 30.9*  31.6* 30.3* 30.6*   RDW 17.4*  17.2* 17.1* 17.0     232 256 299   MPV 10.7*  10.4 10.6* 10.0       Recent Labs   Lab 04/11/25  0548 04/11/25  1234 04/12/25  1125 04/12/25  1215 04/12/25  2118 04/13/25  2040 04/14/25  0431 04/14/25  1150   NA  --    < >  --  140   < > 142 140 139   K  --    < >  --  4.4   < > 3.6 3.8 3.6   CL  --    < >  --  112*   < > 112* 112* 107   CO2  --    < >  --  23   < > 21* 20* 22*   BUN  --    < >  --  20.3   < > 14.4 12.9 12.5   CREATININE  --    < >  --  0.63*   < > 0.64* 0.63* 0.66*   CALCIUM  --    < >  --  7.8*   < > 8.1* 7.7* 7.9*   PH 7.340*  --  7.430 7.440  --   --   --   --    MG  --    < >  --  2.00   < > 2.00 2.20 2.10   ALBUMIN  --    < >  --  2.0*   < > 2.3* 2.3* 2.4*   ALKPHOS  --    < >  --  86   < > 84 83 88   ALT  --    < >  --  20   < > 21 20 20   AST  --    < >  --  26   < > 25 21 21   BILITOT  --    < >  --  0.3   < > 0.4 0.5 0.5    < > = values in this interval not displayed.       Microbiology Results (last 7 days)       ** No results found for the last 168 hours. **             Fl Modified Barium Swallow Speech  See procedure notes from Speech Pathologist.    This procedure was auto-finalized.        ASSESSMENT & PLAN:   Assessment:   Acute hypoxemic respiratory failure requiring intubation, extubated 04/13/2024   Cardiogenic shock requiring mechanical and chemical support   Impella placement 03/29/2025, removed 04/10/2025  Multivessel  CAD  [facility] with PTCA/TAD x3 RCA (03/29/2025)   Status post high-risk PCI withPTCA/TAD X4 LAD on 4/10/25   Cardiopulmonary arrest   ICMO/EF 15%  Normocytic anemia, stable    History of hypertension, hyperlipidemia, CAD, PAD, GERD, and BPH s/p recent TURP       Plan:  Continue supplemental oxygen, wean as tolerated  Cardiac monitoring  Continue Aspirin and Plavix   Cardiology following, appreciate recommendations   Close H&H monitoring   Close electrolyte monitoring  SLP/PT/OT  Resume home medications as deemed appropriate once medication reconciliation is updated  Labs in AM    VTE Prophylaxis:  Lovenox    Discharge Planning and Disposition: TBD    I, Adriel Figueroa PA-C have reviewed and discussed the case with Gold Gamble MD   Please see the attending MD's addendum for further assessment and plan.    Adriel Figueroa PA-C  Department of Hospital Medicine   [facility]   04/14/2025    This note was created with the assistance of [employer] voice recognition software. There may be transcription errors as a result of using this technology, however minimal. Effort has been made to assure accuracy of transcription, but any obvious errors or omissions should be clarified with the author of the document.    _______________________________________________________________________________  MD Addendum:      I Dr. Reza Gamble performed substantive portion of the visit, personally performed a face to face evaluation of the patient and have reviewed and agree with NP/PA documentation, treatment and plan & MDM.     79-year-old gentleman with the above-mentioned medical history seen at an outside facility for complaints of shortness of breath , troponin elevation, ANDI, lactic acidosis.  He also had a PEA/cardiac arrest requiring urgent left heart catheterization that revealed multivessel coronary artery disease requiring multiple PCIs, Impella placement and later admitted to ICU after intubation  and sedation.  He needed vasopressors and amiodarone drip along with the heparin.  He was extubated on 2025 and currently being downgraded to hospital medicine service for continuity of care.  When seen at bedside he was alert, oriented, resting in the bed.  Brother was at bedside.  Patient was able to speak in full sentences but appeared tired.  He is tolerating p.o. but intake is minimal and reports poor appetite.  Lung exam has shallow breathing with nonlabored and rhonchi bilaterally.  Recent echo on 2025 showed EF of 15%.  Agree with the history, examined, plan mentioned above.  Stat x-ray and ABG for any signs of worsening shortness of breadth.  He is DNR.  Continue aspirin, Plavix and statin.  Beta blocker and GDM T optimization as tolerated.  Mobilize as tolerated from tomorrow.          2025          [1]   Current Facility-Administered Medications:     acetaminophen tablet 650 mg, 650 mg, Oral, Q4H PRN, Lorne Coon Jr., MD    aspirin EC tablet 81 mg, 81 mg, Oral, Daily, Hasmukh Sandoval ANP, 81 mg at 25 0819    atorvastatin tablet 40 mg, 40 mg, Oral, Daily, Hasmukh Sandoval ANP, 40 mg at 25 0819    calcium gluconate 1 g in NS IVPB (premixed), 1 g, Intravenous, PRN, Rey Almodovar MD, Stopped at 25 0709    calcium gluconate 1 g in NS IVPB (premixed), 2 g, Intravenous, PRN, Rey Almodovar MD    calcium gluconate 1 g in NS IVPB (premixed), 3 g, Intravenous, PRN, Rey Almodovar MD    clopidogreL tablet 75 mg, 75 mg, Oral, Daily, Hasmukh Sandoval ANP, 75 mg at 25 0819    dextrose 50% injection 12.5 g, 12.5 g, Intravenous, PRN, Álvaro Harrington DO    dextrose 50% injection 25 g, 25 g, Intravenous, PRN, Álvaro Harrington DO    enoxaparin injection 40 mg, 40 mg, Subcutaneous, Daily, Kristian Grimm DO, 40 mg at 25 0819    famotidine tablet 20 mg, 20 mg, Per NG tube, BID, Davi Harp Jr., MD, [facility], 20 mg at 25 0819    [] fentaNYL injection 50 mcg, 50 mcg,  Intravenous, Q15 Min PRN, 50 mcg at 04/09/25 1913 **FOLLOWED BY** fentaNYL injection 50 mcg, 50 mcg, Intravenous, Q1H PRN, John Donahue MD, 50 mcg at 04/11/25 0759    glucagon (human recombinant) injection 1 mg, 1 mg, Intramuscular, PRN, Álvaro Harrington, DO    glucose chewable tablet 16 g, 16 g, Oral, PRN, Álvaro Harrington,     glucose chewable tablet 24 g, 24 g, Oral, PRN, Álvaro Harrington, DO    hydrALAZINE injection 10 mg, 10 mg, Intravenous, Q4H PRN, Lorne Coon Jr., MD    lactated ringers infusion, , Intravenous, Continuous, Chelsey Russo NP, Stopped at 04/10/25 1020    magnesium sulfate 2g in water 50mL IVPB (premix), 2 g, Intravenous, PRN, Rey Almodovar MD    magnesium sulfate 2g in water 50mL IVPB (premix), 2 g, Intravenous, PRN, Rey Almodovar MD    NORepinephrine 8 mg in dextrose 5% 250 mL infusion, 0-3 mcg/kg/min, Intravenous, Continuous, Reji Carvalho DO, Last Rate: 3.8 mL/hr at 04/13/25 0359, 0.02 mcg/kg/min at 04/13/25 0359    ondansetron disintegrating tablet 8 mg, 8 mg, Oral, Q8H PRN, Lorne Coon Jr., MD    potassium chloride 20 mEq in 100 mL IVPB (FOR CENTRAL LINE ADMINISTRATION ONLY), 20 mEq, Intravenous, PRN, Last Rate: 50 mL/hr at 04/14/25 0621, 20 mEq at 04/14/25 0621 **AND** potassium chloride 20 mEq in 100 mL IVPB (FOR CENTRAL LINE ADMINISTRATION ONLY), 40 mEq, Intravenous, PRN **AND** potassium chloride 20 mEq in 100 mL IVPB (FOR CENTRAL LINE ADMINISTRATION ONLY), 60 mEq, Intravenous, PRN, Davi Harp Jr., MD, FCCP    sodium phosphate 15 mmol in D5W 250 mL IVPB, 15 mmol, Intravenous, PRN, Rey Almodovar MD, Stopped at 04/14/25 1122    sodium phosphate 20.1 mmol in D5W 250 mL IVPB, 20.1 mmol, Intravenous, PRN, Rey Almodovar MD    sodium phosphate 30 mmol in D5W 250 mL IVPB, 30 mmol, Intravenous, PRN, Rey Almodovar MD, Stopped at 04/13/25 0935    tirofiban 12.5 mg in sodium chloride 0.9% 250 mL infusion, , , Continuous PRN, Lorne Coon Jr., MD, Stopped at  03/29/25 1845    zinc oxide-cod liver oil 40 % paste, , Topical (Top), BID, Davi Harp Jr., MD, [facility], Given at 04/14/25 0863

## 2025-04-14 NOTE — PROGRESS NOTES
[facility]    Cardiology  Progress Note    Patient Name: Jon Conley  MRN: [id number]  Admission Date: 3/29/2025  Hospital Length of Stay: 16 days  Code Status: DNR   Attending Provider: Gold Gamble MD   Consulting Provider: NORM Sierra  Primary Care Physician: Patrick Reece Jr., MD  Principal Problem:<principal problem not specified>    Patient information was obtained from past medical records and ER records.     Subjective:     Chief Complaint/Reason for Consult: Cardiac Arrest     HPI: Mr. Conley is a 78 y/o male who is known to [facility], Dr. Wang. The patient presented to [facility] on 3.29.25 with c/o SOB. He initially presented to [facility] in [place] with SOB. He was found to have Tachycardia, Hypotension, Anemia (7.4/24.6), Troponin 9.443, .0, BUN/Crea 16.2/1.34, Lactic Acid Level 4.3, Na 133, WBC 14.03, H&H 7.3/23.0, WBC 14.03, , PT/INR 14.9/1.2. He was admitted to , however, he sustained a witnessed PEA Arrest in the ER and has ROSC per ER MD. [facility] was consulted for NSETMI and Arrest.     3.30.25: NAD. Impella L Groin. CVP 7, P7. Levophed 0.32mcg/kg/min, Heparin Drip per Protocol, Amiodarone 0.5mg/min  3.31.25: NAD. L Groin Impella P7, Amiodarone 0.5mg/min, Heparin Drip per Protocol, Levophed 0.18mcg/kg/min, H&H 8.8/25.8, AST/ALT 79/45  4.1.25: NAD noted. L groin Impella in place, remains at P7. Remains on levophed. LFTs continue to improve.  4.2.25: NAD. L Groin Impella Device P6. Heparin Infusion per Protocol. Remains on 0.06mcg/kg/min.   4.3.25: NAD. Vented/Sedated. L Groin Impella Device. P3, Heparin Infusion per Protocol. Off Pressors. Attempting Wean of Impella. H&H 8.2/26.2,   4.4.25: NAD. Vented/Sedated. Heparin Drip per Protocol. H&H 8.1/95.4,   4.5.25: NAD. Vented/Sedated. Heparin Drip per Protocol. Levophed 0.04mcg/kg/min. H&H 7.9/23.8,  Impella P6  4.6.25: NAD. Vented/Sedated. P3.  "Heparin Drip per Protocol, Off Pressors. CV Surgery to Reevaluate for CABG. H&H 7.4/22.7,   4.7.25: NAD. Vented/Sedated. H&H 9.2/27.7, , K 3.9, BUN/Crea 9.2/0.61, Lactic Acid 0.6. Impella L Groin P4  4.8.25:NAD noted. Remains vented/sedated. Consent obtained for LHC, will plan for Thursday.  4.9.25: NAD. Remains Vented/Sedated. Plans for LHC with PCI on Thursday. Impella P4. Remains Off Pressors.   4.10.25: NAD. Remains Vented/Sedated. NPO for Planned LHC with PCI/Impella Assisted. P4 Impella. Levophed 0.1mcg/kg/min.   4.11.25: NAD. Vented/Sedated. Levophed 0.12mcg/kg/min. S/P PCI of LAD and Impella Explant.   4.12.25: NAD. Vented/Sedated. Levophed 0.08mcg/kg/min.   4.13.25: NAD. Sitting in Bedside Chair. Denies CP, SOB and Palps. Off Pressors. "I am ok."   4.14.25: NAD. "I am fine." Denies CP, SOB and Palps. Remains Off Pressors.     PMH: BPH/Urinary Obstruction, CAD/Nonobstructive, PAD, R JACY/Mild, HTN, Obesity, GERD, Tobacco Use   PSH: TURP, Angiogram, Bilateral Knee Replacements   Family History: Denies Family History of Heart Disease  Social History: Denies Illicit Drug, ETOH and Tobacco Use     Previous Cardiac Diagnostics:   [facility] (4.10.25):  Findings:  - Successful Impella-protected PCI of LAD.  - Prox to Mid Left Anterior Descending has severe diffuse disease, up to 80% in severity. The lesion was successfully treated with overlapping FRONTIER BERNADINE 3.07X38OH, FRONTIER BERNADINE 3.32Z27TX, FRONTIER BERNADINE 2.89B61PB, and SYNERGY XD 2.5X28MM drug-eluting stents. Post-dilatation was performed using an NC EMERGE MR 3.5X20MM angioplasty balloon inflated to 20 gretel. Following intervention there was 0% residual stenosis and PATTY grade 3 flow in the distal vessel.  - Intravascular Ultrasound (IVUS) was performed prior to intervention to further characterize the lesion, as well as post-stent deployment to ensure optimal stent apposition and expansion.  - Ostial First Diagonal Branch has a 70% stenosis. At the " conclusion of the case, there was PATTY grade 3 flow in the distal vessel.  - Successful Impella CP removal and closure of L femoral arterial access site using a 14 Tamazight MANTA device.  Assessment/Plan:  - Patient is a 79 y.o. male with a history of CAD presents in cardiogenic shock. Successful PCI of RCA last week. Impella CP placed at that time. Now s/p successful LAD intervention and Impella CP removal.  - Continue DAPT (aspirin + clopidogrel) for a minimum of 12 months and aspirin indefinitely thereafter  - High intensity statin  - Arterial sheath remains place; plan is to remove when activated clotting time (ACT) less than 180 seconds. Bedrest for 4 hours after hemostasis is achieved.    ECHO Limited 4.8.25:  Impella check  Aortic annulus to tip distance is 4.3 cm.  LV systolic function is 15%.    ECHO 4.3.25:  Limited study to check impella placement.  Left Ventricle: There is severely reduced systolic function with a visually estimated ejection fraction of 15 - 20%.  Impella distance from aortic valve annulus appears appropriate.    ECHO 4.1.25:  Limited study to check impella placement.  Left Ventricle: There is severely reduced systolic function with a visually estimated ejection fraction of 15 - 20%.  Impella distance from aortic valve annulus appears appropriate.    ECHO 3.30.25:  Left Ventricle: There is severely reduced systolic function with a visually estimated ejection fraction of less than 30%.  There appears to be at least moderate to severe mitral regurgitation.   Impella is positioned with inflow cannula 3.8 cm from aortic valve annulus.    [facility] 3.29.25:  Findings:  - Successful placement of Impella CP device from left femoral approach.  - There is severe multi-vessel coronary artery disease.  - Left Main has mild diffuse disease.  - Mid Left Anterior Descending has serial lesions, up to 70-80% in severity.   - Prox Left Circumflex is 100% occluded. Unable to cross lesion despite wire  escalation and microcatheter support.  - Mid Right Coronary Artery is 100% occluded. The lesion was successfully treated with overlapping SYNERGY XD 4.0X20MM, SYNERGY XD 3.0X48MM, and SYNERGY XD 2.33Q20LG drug-eluting stents. Post-dilatation was performed using a SYNERGY XD 4.0X20MM angioplasty balloon inflated to 12 gretel. Following intervention there was 0% residual stenosis and PATTY grade 3 flow in the distal vessel.  - Intravascular Ultrasound (IVUS) was performed prior to intervention to further characterize the lesion, as well as post-stent deployment to ensure optimal stent apposition and expansion.  - Aorto-iliac angiogram revealed moderate disease of R ileofemoral system.  - At the conclusion of the case, contrast injection was performed through the Impella sheath side port, showing adequate antegrade flow down the ipsilateral common femoral artery/SFA/profunda artery.  - RHC with elevated R sided filling pressures. RA 17 mmHg, RV 45/15 mmHg, PA 42/29 mmHg (mean 34 mmHg), PCWP 25 mmHg.  - Transpulmonary gradient is 9 mmHg.  - Cardiac Output/Index at 6.6/3.0, as calculated by Gregory equation on Levophed infusion and Impella CP support.  - PVR is 1.4 Woods units  - SVR is 550 dyne-sec*cm^-5  - Pulmonary Artery Pulsatility Index (Rafa) is 0.8  - Cardiac Power Output () is 0.91   Assessment/Plan:  - Patient is a 79 y.o. male with a history of recent TURP, complicated by significant post-op bleeding, presents to [facility] ED with dyspnea. Transferred to [facility] with worsening shock despite vasopressors. TTE showing EF 25%, severe MR, severe TR. Troponin elevated. Brought to cath lab. Underwent Impella placement/LHC/RHC as outlined above. RCA lesion treated. Unable to cross LCx lesion. 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 At this point, would continue Impella support and aggressive medical mgmt. Has non-culprit lesions that should be treated at a later date as well. Can consider CTS consult, especially given severe  valvular disease.  - Patient was given a loading dose of clopidogrel 600 mg PO in the cath lab  - Continue DAPT (aspirin + clopidogrel) for a minimum of 12 months and aspirin indefinitely thereafter  - High intensity statin  - Continue Aggrastat for 4 hours post-procedure  - Arterial sheath remains place; plan is to remove when activated clotting time (ACT) less than 180 seconds. Bedrest for 4 hours after hemostasis is achieved.  - Obtain transthoracic echocardiogram  - Continuous bedrest while Impella in place  - Therapeutic heparin administration per protocol  - Antibiotics per protocol  - Remaining mgmt per primary team/cardiology    ECHO 3.29.25:  Left Ventricle: The left ventricle is dilated. Normal wall thickness. Severe global hypokinesis present. There is severely reduced systolic function with a visually estimated ejection fraction of 15 - 20%. Grade I diastolic dysfunction.  Right Ventricle: Right ventricular enlargement. Systolic function is normal. TAPSE is 1.83 cm.  Left Atrium: dilated  Mitral Valve: Mildly thickened leaflets. There is moderate regurgitation.  Tricuspid Valve: There is moderate regurgitation.  Pulmonary Artery: There is moderate pulmonary hypertension.  IVC/SVC: Patient is ventilated, cannot use IVC diameter to estimate right atrial pressure.    Carotid US 11.4.24:  The study quality is average.   1-39% stenosis in the proximal right internal carotid artery based on Bluth Criteria.   Antegrade right vertebral artery flow.   Antegrade left vertebral artery flow    ECHO 1.27.22:  The study quality is average.   The left ventricle is normal in size. Global left ventricular systolic function is moderately decreased. The left ventricular ejection fraction is 40-45%. Mild concentric left ventricular hypertrophy is present.  The left ventricle diastolic function is impaired (Grade I) with normal left atrial pressure.  Mild (1+) mitral regurgitation.  The pulmonary artery appears normal.      [facility] 9.26.17:  LM: Normal  LAD: Prox 30% Stenosis  RI: Normal  LCX: Normal  RCA: Distal RCA 50%    Review of patient's allergies indicates:  No Known Allergies  No current facility-administered medications on file prior to encounter.     No current outpatient medications on file prior to encounter.     Review of Systems   Constitutional:  Positive for fatigue.   Respiratory:  Negative for shortness of breath.    Cardiovascular:  Negative for chest pain, palpitations and leg swelling.   All other systems reviewed and are negative.    Objective:     Vital Signs (Most Recent):  Temp: 98.5 °F (36.9 °C) (04/14/25 1200)  Pulse: 100 (04/14/25 1315)  Resp: 18 (04/14/25 1315)  BP: 126/77 (04/14/25 1302)  SpO2: 99 % (04/14/25 1315) Vital Signs (24h Range):  Temp:  [98.4 °F (36.9 °C)-98.8 °F (37.1 °C)] 98.5 °F (36.9 °C)  Pulse:  [] 100  Resp:  [10-28] 18  SpO2:  [93 %-100 %] 99 %  BP: ()/() 126/77   Weight: 106.1 kg (233 lb 14.5 oz)  Body mass index is 32.64 kg/m².  SpO2: 99 %       Intake/Output Summary (Last 24 hours) at 4/14/2025 1433  Last data filed at 4/14/2025 1321  Gross per 24 hour   Intake 1 ml   Output 2950 ml   Net -2949 ml     Lines/Drains/Airways       Central Venous Catheter Line  Duration             Tunneled Central Line - Triple Lumen 03/29/25 0555 Internal Jugular Right 16 days              Drain  Duration                  Urethral Catheter -- days                  Significant Labs:   Chemistries:   Recent Labs   Lab 04/13/25  0252 04/13/25  1220 04/13/25  2040 04/14/25  0431 04/14/25  1150    139 142 140 139   K 3.5 3.5 3.6 3.8 3.6   * 111* 112* 112* 107   CO2 22* 19* 21* 20* 22*   BUN 17.0 15.6 14.4 12.9 12.5   CREATININE 0.62* 0.67* 0.64* 0.63* 0.66*   CALCIUM 7.6* 7.7* 8.1* 7.7* 7.9*   BILITOT 0.3 0.4 0.4 0.5 0.5   ALKPHOS 71 82 84 83 88   ALT 17 20 21 20 20   AST 22 26 25 21 21   GLUCOSE 83 98 85 82 110   MG 2.00 2.00 2.00 2.20 2.10   PHOS 1.4* 2.6 2.6 2.7 3.4     "    CBC/Anemia Labs: Coags:    Recent Labs   Lab 04/12/25  0825 04/13/25  0252 04/14/25  0431   WBC 5.92  6.13 5.45 5.31   HGB 7.9*  8.0* 7.2* 8.1*   HCT 25.6*  25.3* 23.8* 26.5*     232 256 299   MCV 90.8  90.4 91.9 91.7   RDW 17.4*  17.2* 17.1* 17.0    No results for input(s): "PT", "INR", "APTT" in the last 168 hours.         EKG:      Telemetry: SR    Physical Exam  Constitutional:       General: He is not in acute distress.     Appearance: Normal appearance.   HENT:      Head: Normocephalic.      Mouth/Throat:      Mouth: Mucous membranes are moist.   Cardiovascular:      Rate and Rhythm: Normal rate and regular rhythm.      Pulses: Normal pulses.      Heart sounds: Murmur heard.   Pulmonary:      Effort: Pulmonary effort is normal. No respiratory distress.      Comments: RA  Abdominal:      Palpations: Abdomen is soft.   Skin:     General: Skin is warm.      Comments: L Groin Soft/Flat, No Sign of Bleed/Infection. + Monophasic Dopplerable Pedal Pulses    Neurological:      General: No focal deficit present.      Mental Status: He is alert and oriented to person, place, and time.   Psychiatric:         Mood and Affect: Mood normal.         Behavior: Behavior normal.         Judgment: Judgment normal.       Home Medications:   Medications Ordered Prior to Encounter[1]  Current Schedule Inpatient Medications:   aspirin  81 mg Oral Daily    atorvastatin  40 mg Oral Daily    clopidogreL  75 mg Oral Daily    enoxaparin  40 mg Subcutaneous Daily    famotidine  20 mg Per NG tube BID    zinc oxide-cod liver oil   Topical (Top) BID     Continuous Infusions:   lactated ringers   Intravenous Continuous   Stopped at 04/10/25 1020    NORepinephrine bitartrate-D5W  0-3 mcg/kg/min Intravenous Continuous 3.8 mL/hr at 04/13/25 0359 0.02 mcg/kg/min at 04/13/25 0359    tirofiban-0.9% sodium chloride 12.5 mg/250ml    Continuous PRN   Stopped at 03/29/25 1847     Assessment:   Acute Hypoxemic Respiratory Failure " requiring Intubation/Ventilation - Resolved on NC O2  Cardiogenic Shock requiring Mechanical and Chemical Support - Resolved     - Impella/Pressors - Impella Explanted on 4.10.25 post High Risk LAD PCI/Impella Assisted  MVCAD    - s/p LHC (4.10.25) - PTCA/TAD of the Prox to Mid LAD and Impella Explant     - s/p LHC (3.29.25) -  Left Main has mild diffuse disease. Mid Left Anterior Descending has serial lesions, up to 70-80% in severity. Prox Left Circumflex is 100% occluded. Unable to cross lesion despite wire escalation and microcatheter support. Mid Right Coronary Artery is 100% occluded. The lesion was successfully treated with overlapping SYNERGY XD 4.0X20MM, SYNERGY XD 3.0X48MM, and SYNERGY XD 2.38J49LE drug-eluting stents. Post-dilatation was performed using a SYNERGY XD 4.0X20MM angioplasty balloon inflated to 12 gretel. Following intervention there was 0% residual stenosis and PATTY grade 3 flow in the distal vessel.  NSTEMI Type I (Non-Anterior Wall MI)    - Trponin > 9  Cardiopulmonary Arrest    - Initial Rhythm PEA - Witnessed in ER Arrest/ROSC  ICMO/EF 15%     - ECHO (4.8.25) - LVEF 15%    - ECHO (3.29.25) - LVEF 15-20%, Grade I DD    ST - Now SR   Hypotension requiring Pressors - Resolved    - Hx of HTN  BPH s/p TURP   Lactic Acidosis - Resolved   Leukocytosis - Resolved   Anemia requiring Transfusions - Stable   Recent BPH Diagnosis s/p TURP  CAD/Nonobstructive (2017)  JACY/R Sided   PAD/Nonobstructive   Transaminitis - Resolved   Obesity  GERD  Tobacco Use  No Hx of GIB     Plan:   Continue ASA, Statin and Plavix   Add BB when BP Improves   Groin Precautions   Will Continue to Follow  Labs and EKG in AM: CBC, CMP and Mg    NORM Sierra  Cardiology  [facility]          [1]   No current facility-administered medications on file prior to encounter.     No current outpatient medications on file prior to encounter.

## 2025-04-15 NOTE — PLAN OF CARE
Problem: Occupational Therapy  Goal: Occupational Therapy Goal  Description: LTG: Pt will perform basic ADLs and ADL transfers with Modified independence using LRAD by discharge.    STG: to be met by 5/15/25    Pt will complete grooming standing at sink with LRAD with SBA.  Pt will complete UB dressing with SBA.  Pt will complete LB dressing with SBA using LRAD and AE prn.  Pt will complete toileting with SBA using LRAD.  Pt will complete functional mobility to/from toilet and toilet transfer with SBA using LRAD.   Outcome: Progressing

## 2025-04-15 NOTE — PROGRESS NOTES
[facility]    Cardiology  Progress Note    Patient Name: Jon Conley  MRN: [id number]  Admission Date: 3/29/2025  Hospital Length of Stay: 17 days  Code Status: DNR   Attending Provider: Gold Gamble MD   Consulting Provider: NORM Sierra  Primary Care Physician: Patrick Reece Jr., MD  Principal Problem:<principal problem not specified>    Patient information was obtained from past medical records and ER records.     Subjective:     Chief Complaint/Reason for Consult: Cardiac Arrest     HPI: Mr. Conley is a 78 y/o male who is known to [facility], Dr. Wang. The patient presented to [facility] on 3.29.25 with c/o SOB. He initially presented to [facility] in [place] with SOB. He was found to have Tachycardia, Hypotension, Anemia (7.4/24.6), Troponin 9.443, .0, BUN/Crea 16.2/1.34, Lactic Acid Level 4.3, Na 133, WBC 14.03, H&H 7.3/23.0, WBC 14.03, , PT/INR 14.9/1.2. He was admitted to , however, he sustained a witnessed PEA Arrest in the ER and has ROSC per ER MD. [facility] was consulted for NSETMI and Arrest.     3.30.25: NAD. Impella L Groin. CVP 7, P7. Levophed 0.32mcg/kg/min, Heparin Drip per Protocol, Amiodarone 0.5mg/min  3.31.25: NAD. L Groin Impella P7, Amiodarone 0.5mg/min, Heparin Drip per Protocol, Levophed 0.18mcg/kg/min, H&H 8.8/25.8, AST/ALT 79/45  4.1.25: NAD noted. L groin Impella in place, remains at P7. Remains on levophed. LFTs continue to improve.  4.2.25: NAD. L Groin Impella Device P6. Heparin Infusion per Protocol. Remains on 0.06mcg/kg/min.   4.3.25: NAD. Vented/Sedated. L Groin Impella Device. P3, Heparin Infusion per Protocol. Off Pressors. Attempting Wean of Impella. H&H 8.2/26.2,   4.4.25: NAD. Vented/Sedated. Heparin Drip per Protocol. H&H 8.1/95.4,   4.5.25: NAD. Vented/Sedated. Heparin Drip per Protocol. Levophed 0.04mcg/kg/min. H&H 7.9/23.8,  Impella P6  4.6.25: NAD. Vented/Sedated. P3.  "Heparin Drip per Protocol, Off Pressors. CV Surgery to Reevaluate for CABG. H&H 7.4/22.7,   4.7.25: NAD. Vented/Sedated. H&H 9.2/27.7, , K 3.9, BUN/Crea 9.2/0.61, Lactic Acid 0.6. Impella L Groin P4  4.8.25:NAD noted. Remains vented/sedated. Consent obtained for LHC, will plan for Thursday.  4.9.25: NAD. Remains Vented/Sedated. Plans for LHC with PCI on Thursday. Impella P4. Remains Off Pressors.   4.10.25: NAD. Remains Vented/Sedated. NPO for Planned LHC with PCI/Impella Assisted. P4 Impella. Levophed 0.1mcg/kg/min.   4.11.25: NAD. Vented/Sedated. Levophed 0.12mcg/kg/min. S/P PCI of LAD and Impella Explant.   4.12.25: NAD. Vented/Sedated. Levophed 0.08mcg/kg/min.   4.13.25: NAD. Sitting in Bedside Chair. Denies CP, SOB and Palps. Off Pressors. "I am ok."   4.14.25: NAD. "I am fine." Denies CP, SOB and Palps. Remains Off Pressors.   4.15.25: NAD. "Hi." Denies CP, SOB and Palps.     PMH: BPH/Urinary Obstruction, CAD/Nonobstructive, PAD, R JACY/Mild, HTN, Obesity, GERD, Tobacco Use   PSH: TURP, Angiogram, Bilateral Knee Replacements   Family History: Denies Family History of Heart Disease  Social History: Denies Illicit Drug, ETOH and Tobacco Use     Previous Cardiac Diagnostics:   [facility] (4.10.25):  Findings:  - Successful Impella-protected PCI of LAD.  - Prox to Mid Left Anterior Descending has severe diffuse disease, up to 80% in severity. The lesion was successfully treated with overlapping FRONTIER BERNADINE 3.93M03UG, FRONTIER BERNADINE 3.21Y18MM, FRONTIER BERNADINE 2.43J87QA, and SYNERGY XD 2.5X28MM drug-eluting stents. Post-dilatation was performed using an NC EMERGE MR 3.5X20MM angioplasty balloon inflated to 20 gretel. Following intervention there was 0% residual stenosis and PATTY grade 3 flow in the distal vessel.  - Intravascular Ultrasound (IVUS) was performed prior to intervention to further characterize the lesion, as well as post-stent deployment to ensure optimal stent apposition and expansion.  - Ostial " First Diagonal Branch has a 70% stenosis. At the conclusion of the case, there was PATTY grade 3 flow in the distal vessel.  - Successful Impella CP removal and closure of L femoral arterial access site using a 14 Armenian MANTA device.  Assessment/Plan:  - Patient is a 79 y.o. male with a history of CAD presents in cardiogenic shock. Successful PCI of RCA last week. Impella CP placed at that time. Now s/p successful LAD intervention and Impella CP removal.  - Continue DAPT (aspirin + clopidogrel) for a minimum of 12 months and aspirin indefinitely thereafter  - High intensity statin  - Arterial sheath remains place; plan is to remove when activated clotting time (ACT) less than 180 seconds. Bedrest for 4 hours after hemostasis is achieved.    ECHO Limited 4.8.25:  Impella check  Aortic annulus to tip distance is 4.3 cm.  LV systolic function is 15%.    ECHO 4.3.25:  Limited study to check impella placement.  Left Ventricle: There is severely reduced systolic function with a visually estimated ejection fraction of 15 - 20%.  Impella distance from aortic valve annulus appears appropriate.    ECHO 4.1.25:  Limited study to check impella placement.  Left Ventricle: There is severely reduced systolic function with a visually estimated ejection fraction of 15 - 20%.  Impella distance from aortic valve annulus appears appropriate.    ECHO 3.30.25:  Left Ventricle: There is severely reduced systolic function with a visually estimated ejection fraction of less than 30%.  There appears to be at least moderate to severe mitral regurgitation.   Impella is positioned with inflow cannula 3.8 cm from aortic valve annulus.    [facility] 3.29.25:  Findings:  - Successful placement of Impella CP device from left femoral approach.  - There is severe multi-vessel coronary artery disease.  - Left Main has mild diffuse disease.  - Mid Left Anterior Descending has serial lesions, up to 70-80% in severity.   - Prox Left Circumflex is 100%  occluded. Unable to cross lesion despite wire escalation and microcatheter support.  - Mid Right Coronary Artery is 100% occluded. The lesion was successfully treated with overlapping SYNERGY XD 4.0X20MM, SYNERGY XD 3.0X48MM, and SYNERGY XD 2.97V77FE drug-eluting stents. Post-dilatation was performed using a SYNERGY XD 4.0X20MM angioplasty balloon inflated to 12 gretel. Following intervention there was 0% residual stenosis and PATTY grade 3 flow in the distal vessel.  - Intravascular Ultrasound (IVUS) was performed prior to intervention to further characterize the lesion, as well as post-stent deployment to ensure optimal stent apposition and expansion.  - Aorto-iliac angiogram revealed moderate disease of R ileofemoral system.  - At the conclusion of the case, contrast injection was performed through the Impella sheath side port, showing adequate antegrade flow down the ipsilateral common femoral artery/SFA/profunda artery.  - RHC with elevated R sided filling pressures. RA 17 mmHg, RV 45/15 mmHg, PA 42/29 mmHg (mean 34 mmHg), PCWP 25 mmHg.  - Transpulmonary gradient is 9 mmHg.  - Cardiac Output/Index at 6.6/3.0, as calculated by Gregory equation on Levophed infusion and Impella CP support.  - PVR is 1.4 Woods units  - SVR is 550 dyne-sec*cm^-5  - Pulmonary Artery Pulsatility Index (Rafa) is 0.8  - Cardiac Power Output () is 0.91   Assessment/Plan:  - Patient is a 79 y.o. male with a history of recent TURP, complicated by significant post-op bleeding, presents to [facility] ED with dyspnea. Transferred to [facility] with worsening shock despite vasopressors. TTE showing EF 25%, severe MR, severe TR. Troponin elevated. Brought to cath lab. Underwent Impella placement/LHC/RHC as outlined above. RCA lesion treated. Unable to cross LCx lesion. 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 At this point, would continue Impella support and aggressive medical mgmt. Has non-culprit lesions that should be treated at a later date as well. Can  consider CTS consult, especially given severe valvular disease.  - Patient was given a loading dose of clopidogrel 600 mg PO in the cath lab  - Continue DAPT (aspirin + clopidogrel) for a minimum of 12 months and aspirin indefinitely thereafter  - High intensity statin  - Continue Aggrastat for 4 hours post-procedure  - Arterial sheath remains place; plan is to remove when activated clotting time (ACT) less than 180 seconds. Bedrest for 4 hours after hemostasis is achieved.  - Obtain transthoracic echocardiogram  - Continuous bedrest while Impella in place  - Therapeutic heparin administration per protocol  - Antibiotics per protocol  - Remaining mgmt per primary team/cardiology    ECHO 3.29.25:  Left Ventricle: The left ventricle is dilated. Normal wall thickness. Severe global hypokinesis present. There is severely reduced systolic function with a visually estimated ejection fraction of 15 - 20%. Grade I diastolic dysfunction.  Right Ventricle: Right ventricular enlargement. Systolic function is normal. TAPSE is 1.83 cm.  Left Atrium: dilated  Mitral Valve: Mildly thickened leaflets. There is moderate regurgitation.  Tricuspid Valve: There is moderate regurgitation.  Pulmonary Artery: There is moderate pulmonary hypertension.  IVC/SVC: Patient is ventilated, cannot use IVC diameter to estimate right atrial pressure.    Carotid US 11.4.24:  The study quality is average.   1-39% stenosis in the proximal right internal carotid artery based on Bluth Criteria.   Antegrade right vertebral artery flow.   Antegrade left vertebral artery flow    ECHO 1.27.22:  The study quality is average.   The left ventricle is normal in size. Global left ventricular systolic function is moderately decreased. The left ventricular ejection fraction is 40-45%. Mild concentric left ventricular hypertrophy is present.  The left ventricle diastolic function is impaired (Grade I) with normal left atrial pressure.  Mild (1+) mitral  regurgitation.  The pulmonary artery appears normal.     [facility] 9.26.17:  LM: Normal  LAD: Prox 30% Stenosis  RI: Normal  LCX: Normal  RCA: Distal RCA 50%    Review of patient's allergies indicates:  No Known Allergies  No current facility-administered medications on file prior to encounter.     No current outpatient medications on file prior to encounter.     Review of Systems   Constitutional:  Positive for fatigue. Negative for fever.   Respiratory:  Negative for shortness of breath.    Cardiovascular:  Negative for chest pain, palpitations and leg swelling.   All other systems reviewed and are negative.    Objective:     Vital Signs (Most Recent):  Temp: 98.1 °F (36.7 °C) (04/15/25 0400)  Pulse: 94 (04/15/25 0700)  Resp: 18 (04/15/25 0700)  BP: 123/74 (04/15/25 0700)  SpO2: 100 % (04/15/25 0700) Vital Signs (24h Range):  Temp:  [97.7 °F (36.5 °C)-98.8 °F (37.1 °C)] 98.1 °F (36.7 °C)  Pulse:  [] 94  Resp:  [10-24] 18  SpO2:  [91 %-100 %] 100 %  BP: (105-145)/() 123/74   Weight: 106.1 kg (233 lb 14.5 oz)  Body mass index is 32.64 kg/m².  SpO2: 100 %       Intake/Output Summary (Last 24 hours) at 4/15/2025 0707  Last data filed at 4/15/2025 0515  Gross per 24 hour   Intake 861.12 ml   Output 3550 ml   Net -2688.88 ml     Lines/Drains/Airways       Drain  Duration                  Urethral Catheter -- days              Peripheral Intravenous Line  Duration                  Peripheral IV - Single Lumen 04/14/25 1600 20 G 2 1/4 in Yes Anterior;Distal;Right Upper Arm <1 day                  Significant Labs:   Chemistries:   Recent Labs   Lab 04/13/25  2040 04/14/25  0431 04/14/25  1150 04/14/25  2013 04/15/25  0331    140 139 141 138   K 3.6 3.8 3.6 3.6 3.7   * 112* 107 110* 111*   CO2 21* 20* 22* 21* 18*   BUN 14.4 12.9 12.5 12.8 12.8   CREATININE 0.64* 0.63* 0.66* 0.67* 0.67*   CALCIUM 8.1* 7.7* 7.9* 8.1* 8.0*   BILITOT 0.4 0.5 0.5 0.5 0.5   ALKPHOS 84 83 88 86 88   ALT 21 20 20 18 16   AST 25  "21 21 18 18   GLUCOSE 85 82 110 95 96   MG 2.00 2.20 2.10 2.10 2.20   PHOS 2.6 2.7 3.4 2.7 2.7        CBC/Anemia Labs: Coags:    Recent Labs   Lab 04/13/25  0252 04/14/25  0431 04/15/25  0331   WBC 5.45 5.31 6.63   HGB 7.2* 8.1* 8.9*   HCT 23.8* 26.5* 27.3*    299 327   MCV 91.9 91.7 88.9   RDW 17.1* 17.0 16.9    No results for input(s): "PT", "INR", "APTT" in the last 168 hours.         EKG:      Telemetry: SR    Physical Exam  Constitutional:       General: He is not in acute distress.     Appearance: Normal appearance.   HENT:      Head: Normocephalic.      Mouth/Throat:      Mouth: Mucous membranes are moist.   Cardiovascular:      Rate and Rhythm: Normal rate and regular rhythm.      Pulses: Normal pulses.      Heart sounds: Murmur heard.   Pulmonary:      Effort: Pulmonary effort is normal. No respiratory distress.      Comments: NC O2  Abdominal:      Palpations: Abdomen is soft.   Skin:     General: Skin is warm.   Neurological:      General: No focal deficit present.      Mental Status: He is alert and oriented to person, place, and time.   Psychiatric:         Mood and Affect: Mood normal.         Judgment: Judgment normal.       Home Medications:   Medications Ordered Prior to Encounter[1]  Current Schedule Inpatient Medications:   aspirin  81 mg Oral Daily    atorvastatin  40 mg Oral Daily    clopidogreL  75 mg Oral Daily    enoxaparin  40 mg Subcutaneous Daily    famotidine  20 mg Per NG tube BID    zinc oxide-cod liver oil   Topical (Top) BID     Continuous Infusions:   tirofiban-0.9% sodium chloride 12.5 mg/250ml    Continuous PRN   Stopped at 03/29/25 9915     Assessment:   Acute Hypoxemic Respiratory Failure requiring Intubation/Ventilation - Resolved on NC O2  Cardiogenic Shock requiring Mechanical and Chemical Support - Resolved     - Impella/Pressors - Impella Explanted on 4.10.25 post High Risk LAD PCI/Impella Assisted  MVCAD    - s/p C (4.10.25) - PTCA/TAD of the Prox to Mid LAD and " Impella Explant     - s/p LHC (3.29.25) -  Left Main has mild diffuse disease. Mid Left Anterior Descending has serial lesions, up to 70-80% in severity. Prox Left Circumflex is 100% occluded. Unable to cross lesion despite wire escalation and microcatheter support. Mid Right Coronary Artery is 100% occluded. The lesion was successfully treated with overlapping SYNERGY XD 4.0X20MM, SYNERGY XD 3.0X48MM, and SYNERGY XD 2.22N29QL drug-eluting stents. Post-dilatation was performed using a SYNERGY XD 4.0X20MM angioplasty balloon inflated to 12 gretel. Following intervention there was 0% residual stenosis and PATTY grade 3 flow in the distal vessel.  NSTEMI Type I (Non-Anterior Wall MI)    - Trponin > 9  Cardiopulmonary Arrest    - Initial Rhythm PEA - Witnessed in ER Arrest/ROSC  ICMO/EF 15%     - ECHO (4.8.25) - LVEF 15%    - ECHO (3.29.25) - LVEF 15-20%, Grade I DD    ST - Now SR   Hypotension requiring Pressors - Resolved    - Hx of HTN  BPH s/p TURP   Lactic Acidosis - Resolved   Leukocytosis - Resolved   Anemia requiring Transfusions - Stable   Recent BPH Diagnosis s/p TURP  CAD/Nonobstructive (2017)  JACY/R Sided   PAD/Nonobstructive   Transaminitis - Resolved   Obesity  GERD  Tobacco Use  No Hx of GIB     Plan:   Continue ASA, Statin and Plavix   Start Toprol XL 12.5mg PO Daily  Start Farxiga 10mg PO Qday   Add ARNi when BP Allows   Groin Precautions   Will Continue to Follow  Labs and EKG in AM: CBC, CMP and Mg    NORM Sierra  Cardiology  [facility]          [1]   No current facility-administered medications on file prior to encounter.     No current outpatient medications on file prior to encounter.

## 2025-04-15 NOTE — PT/OT/SLP PROGRESS
[facility]  Speech Language Pathology Department  Dysphagia Therapy Progress Note    Patient Name:  Jon Conley   MRN:  [id number]    Recommendations     General recommendations:  dysphagia therapy  Solid texture recommendation:  Puree Diet - IDDSI Level 4  Liquid consistency recommendation: Moderately thick liquids - IDDSI Level 3   Medications: crushed in puree  Aspiration precautions: small bites/sips, slow rate, and upright for PO intake    Discharge therapy intensity: Moderate Intensity Therapy   Barriers to safe discharge:  severity of impairment and level of skilled assistance needed    Subjective     Patient awake and alert.  Spiritual/Cultural/Jehovah's witness Beliefs/Practices that affect care: no    Pain/Comfort: Pain Rating 1: 0/10    Objective     Therapeutic Activities:  Pt completed laryngeal exercises x10 with maximum cues.    Therapeutic PO Trials:  Consistency Amount Fed By Oral Symptoms Pharyngeal Symptoms   Ice chips x5 SLP Prolonged bolus formation/mastication Throat clear after the swallow     Assessment     Pt continues to present with oropharyngeal dysphagia requiring diet modification to improve swallow safety and efficiency.    Outcome Measures     Functional Oral Intake Scale: 5 - Total oral diet with multiple consistencies, by requiring special preparation or compensations    Goals     Multidisciplinary Problems       SLP Goals          Problem: SLP    Goal Priority Disciplines Outcome   SLP Goal     SLP    Description: LTG:  Pt will tolerate least restrictive diet with no clinical s/sx of aspiration.    STGs:  1. Tolerate half meal of soft/bite-sized consistency with no overt s/sx of aspiration, should dentures be brought to hospital.  2.  Perform BOT and LE exercises.  3.  Perform effortful swallow (ice chips ok for this exercise).                     Patient Education     Patient provided with verbal education regarding ST POC.  Additional teaching is  warranted.    Plan     Will continue to follow and tx as appropriate.    SLP Follow-Up:  Yes   Patient to be seen:  5 x/week   Plan of Care expires:  04/27/25  Plan of Care reviewed with:  patient       Time Tracking     SLP Treatment Date:   04/15/25  Speech Start Time:  1000  Speech Stop Time:  1010     Speech Total Time (min):  10 min    Billable minutes:  Treatment of Swallow Dysfunction, 10 minutes       04/15/2025

## 2025-04-15 NOTE — PLAN OF CARE
Problem: Physical Therapy  Goal: Physical Therapy Goal  Description: Goals to be met by: 5/15/25     Patient will increase functional independence with mobility by performin. Supine to sit with MInimal Assistance  2. Sit to supine with MInimal Assistance  3. Sit to stand transfer with Moderate Assistance  4. Bed to chair transfer with Moderate Assistance using Rolling Walker  5. Sitting at edge of bed x20 minutes with Stand-by Assistance    Outcome: Progressing

## 2025-04-15 NOTE — PT/OT/SLP EVAL
Physical Therapy Evaluation    Patient Name:  Jon Conley   MRN:  [id number]    Recommendations:     Discharge therapy intensity: Moderate Intensity Therapy   Discharge Equipment Recommendations: to be determined by next level of care   Barriers to discharge:  medical dx, impaired mobility, decreased independence     Assessment:     Jon Conley is a 79 y.o. male admitted with a medical diagnosis of SOB, cardiac arrest, s/p LHC and impella on 3/29, s/p high risk PCI with 4 stents in LAD on 4/10; acute respiratory failure requiring intubation 3/29 with extubation 4/13.      He presents with the following impairments/functional limitations: weakness, decreased upper extremity function, impaired endurance, impaired balance, decreased lower extremity function, decreased ROM, impaired self care skills, impaired functional mobility.    Patient with fair tolerance to PT eval. Reports that prior to hospitalization he was independent and living alone. Pt presents significantly deconditioned at this time. Requires maxAx2 for bed mobility and sit<>stand; unable to take steps or ambulate yet. Will continue progressing as able, but recommending placement beyond this stay in order to maximize functional mobility and overall independence.     Rehab Prognosis: Good; patient would benefit from acute skilled PT services to address these deficits and reach maximum level of function.    Recent Surgery: Procedure(s) (LRB):  Left heart cath (N/A) 5 Days Post-Op    Plan:     During this hospitalization, patient would benefit from acute PT services 5 x/week to address the identified rehab impairments via therapeutic activities, therapeutic exercises and progress toward the following goals:    Plan of Care Expires:  05/15/25    Subjective     Chief Complaint: generalized weakness   Patient/Family Comments/goals: to get stronger   Pain/Comfort:  Pain Rating 1: 0/10    Patients cultural, spiritual, Scientology conflicts given  the current situation: no    Living Environment:  Pt lives alone in a SLH; 2 steps to enter/exit with B railings   Prior to admission, patients level of function was independent.    Equipment used at home: none.  DME owned (not currently used): shower chair.    Upon discharge, patient will have assistance from daughters?.    Objective:     Communicated with NSG prior to session.  Patient found HOB elevated with oxygen, pulse ox (continuous), telemetry, contreras catheter, peripheral IV  upon PT entry to room.    General Precautions: Standard, fall  Orthopedic Precautions:N/A   Braces: N/A  Respiratory Status: Nasal cannula, flow 2 L/min      Exams:  Cognitive Exam:  Patient is oriented to Person, Place, and Time (knew the correct year, but did not know the correct month)  BLE Strength: hip 3/5, knee 4-/5, DF/PF 4/5  Skin integrity: Visible skin intact      Functional Mobility:  Bed Mobility:     Supine to Sit: maximal assistance and of 2 persons  Sit to Supine: maximal assistance and of 2 persons  Transfers:     Sit to Stand:  maximal assistance and of 2 persons with rolling walker  Able to clear buttocks from bed, but unable to stand fully erect (posterior thighs remain resting on bed)  Balance: initially modA overall for trunk stability; towards end of session progresses to CGA      AM-PAC 6 CLICK MOBILITY  Total Score:10       Treatment & Education:  Patient provided with verbal education  regarding PT role/goals/POC, fall prevention, safety awareness, discharge/DME recommendations, and pressure ulcer prevention.  Understanding was verbalized.     Patient left HOB elevated with all lines intact, call button in reach, wedge under R side, and pressure relief boots. Pillows under UE.     GOALS:   Multidisciplinary Problems       Physical Therapy Goals          Problem: Physical Therapy    Goal Priority Disciplines Outcome Interventions   Physical Therapy Goal     PT, PT/OT Progressing    Description: Goals to be met  by: 5/15/25     Patient will increase functional independence with mobility by performin. Supine to sit with MInimal Assistance  2. Sit to supine with MInimal Assistance  3. Sit to stand transfer with Moderate Assistance  4. Bed to chair transfer with Moderate Assistance using Rolling Walker  5. Sitting at edge of bed x20 minutes with Stand-by Assistance                         History:     No past medical history on file.    Past Surgical History:   Procedure Laterality Date    CATHETERIZATION OF BOTH LEFT AND RIGHT HEART N/A 3/29/2025    Procedure: CATHETERIZATION, HEART, BOTH LEFT AND RIGHT;  Surgeon: Lorne Coon Jr., MD;  Location: [facility] CATH LAB;  Service: Cardiology;  Laterality: N/A;    LEFT HEART CATHETERIZATION N/A 4/10/2025    Procedure: Left heart cath;  Surgeon: Lorne Coon Jr., MD;  Location: [facility] CATH LAB;  Service: Cardiology;  Laterality: N/A;       Time Tracking:     PT Received On: 04/15/25  PT Start Time: 1036     PT Stop Time: 1102  PT Total Time (min): 26 min     Billable Minutes: Evaluation mod      04/15/2025

## 2025-04-15 NOTE — PROGRESS NOTES
[facility] Medicine Progress Note        Chief Complaint: Inpatient Follow-up for cardiac arrest    HPI:   Jon Cnoley is a 79 y.o. male with a past medical history of hypertension, hyperlipidemia, CAD, PAD, GERD, and BPH s/p TURP who presented to [facility] on 3/29/2025 transferred from [facility] for cardiology services.  Patient presented to outside facility for shortness of breath.  Patient was tachycardic and hypotensive with hemoglobin of 7.5, hematocrit 24.6, high sensitivity troponin 13,821, WBC 13.4, and CPK 7 O2.  Patient was given 500 mL normal saline bolus and heparin 5000 units and transferred to [facility] for higher level of care.  Patient has suffered PAE arrest requiring intubation and CPR with ROSC. Labs revealed WBC 14.03, hemoglobin 7.3, hematocrit 23, troponin 9.443, , BUN 16.2, creatinine 1.34, lactic acid 4.3, PT/INR 14.9/1.2. Patient underwent left heart catheterization with Impella placement on 03/29/2025.  LHC revealed multivessel coronary artery disease with 100% occluded proximal left circumflex, unable to cross with wire. Mid RCA was also noted to be 100 % occluded, treated with PTCA/TAD.  Patient was started on Levophed, heparin drip per protocol, and amiodarone.  Echo revealed EF 15-20% with grade 1 diastolic dysfunction.  CV surgery was consulted.  Patient was deemed not a surgical candidate for CABG secondary to multiple comorbidities.  Patient received 2 units packed red blood cells on 04/06.  Patient underwent high-risk PCI on 04/10/2025 with 4 stents in LAD.  Impella was removed.  Patient was extubated on 04/13/2025.  Patient was cleared for downgrade out of ICU on 04/14/2025 and was admitted to hospital medicine service.       Interval Hx:   HD S overnight.  Afebrile.  Doing well on 2 L. baseline mental status with intermittent confusion but answers questions appropriately.  No family members at bedside.  He is  tolerating p.o..  Hemoglobin 8.9, stable.  Mild acidosis noted, no major other electrolyte abnormalities.            Objective/physical exam:  Vitals:    04/15/25 0615 04/15/25 0630 04/15/25 0645 04/15/25 0700   BP:    123/74   Pulse: 99 101 100 94   Resp: 17 18 18 18   Temp:       TempSrc:       SpO2: 99% 99% 99% 100%   Weight:       Height:         General: In no acute distress, afebrile  Respiratory:  Nonlabored breathing, bilateral rhonchi  Cardiovascular: S1, S2, no appreciable murmur  Abdomen: Soft, nontender, BS +  Neurologic: Alert,, moving all extremities with good strength     Lab Results   Component Value Date     04/15/2025    K 3.7 04/15/2025     (H) 04/15/2025    CO2 18 (L) 04/15/2025    BUN 12.8 04/15/2025    CREATININE 0.67 (L) 04/15/2025    CALCIUM 8.0 (L) 04/15/2025    EGFRNONAA >60 10/16/2021      Lab Results   Component Value Date    ALT 16 04/15/2025    AST 18 04/15/2025    ALKPHOS 88 04/15/2025    BILITOT 0.5 04/15/2025      Lab Results   Component Value Date    WBC 6.63 04/15/2025    HGB 8.9 (L) 04/15/2025    HCT 27.3 (L) 04/15/2025    MCV 88.9 04/15/2025     04/15/2025           Medications:   aspirin  81 mg Oral Daily    atorvastatin  40 mg Oral Daily    clopidogreL  75 mg Oral Daily    dapagliflozin propanediol  10 mg Oral Daily    enoxaparin  40 mg Subcutaneous Daily    famotidine  20 mg Per NG tube BID    metoprolol succinate  12.5 mg Oral Daily    zinc oxide-cod liver oil   Topical (Top) BID        Current Facility-Administered Medications:     acetaminophen, 650 mg, Oral, Q4H PRN    calcium gluconate IVPB, 1 g, Intravenous, PRN    calcium gluconate IVPB, 2 g, Intravenous, PRN    calcium gluconate IVPB, 3 g, Intravenous, PRN    dextrose 50%, 12.5 g, Intravenous, PRN    dextrose 50%, 25 g, Intravenous, PRN    [] fentaNYL, 50 mcg, Intravenous, Q15 Min PRN **FOLLOWED BY** fentaNYL, 50 mcg, Intravenous, Q1H PRN    glucagon (human recombinant), 1 mg, Intramuscular,  PRN    glucose, 16 g, Oral, PRN    glucose, 24 g, Oral, PRN    hydrALAZINE, 10 mg, Intravenous, Q4H PRN    magnesium sulfate 2 g IVPB, 2 g, Intravenous, PRN    magnesium sulfate 2 g IVPB, 2 g, Intravenous, PRN    ondansetron, 8 mg, Oral, Q8H PRN    potassium chloride in water, 20 mEq, Intravenous, PRN **AND** potassium chloride in water, 40 mEq, Intravenous, PRN **AND** potassium chloride in water, 60 mEq, Intravenous, PRN    sodium phosphate 15 mmol in D5W 250 mL IVPB, 15 mmol, Intravenous, PRN    sodium phosphate 20.1 mmol in D5W 250 mL IVPB, 20.1 mmol, Intravenous, PRN    sodium phosphate 30 mmol in D5W 250 mL IVPB, 30 mmol, Intravenous, PRN    tirofiban-0.9% sodium chloride 12.5 mg/250ml, , , Continuous PRN     Assessment/Plan:    Acute hypoxemic respiratory failure requiring intubation, extubated 04/13/2024   Cardiogenic shock requiring mechanical and chemical support   Impella placement 03/29/2025, removed 04/10/2025  Multivessel CAD  [facility] with PTCA/TAD x3 RCA (03/29/2025)   Status post high-risk PCI withPTCA/TAD X4 LAD on 4/10/25   Cardiopulmonary arrest   ICMO/EF 15%  Normocytic anemia, stable     History of hypertension, hyperlipidemia, CAD, PAD, GERD, and BPH s/p recent TURP     Plan:   -doing well on 2 L.  Encourage pulmonary toileting.  Neb treatments if necessary.  Mobilize with PT as tolerated   -HGB stable.  Continue aspirin and Plavix with Farxiga, statin, metoprolol.  Cardiology following, optimizing GDM T.  Continue telemetry  -other home medications were reviewed and renewed    Lovenox      Gold Gamble MD

## 2025-04-15 NOTE — NURSING
[facility] [address]  Wound Care    Patient Name:  Jon Conley   MRN:  [id number]  Date: 4/15/2025  Diagnosis: <principal problem not specified>    History:     No past medical history on file.    Social History[1]    Precautions:     Allergies as of 03/29/2025    (No Known Allergies)       [facility] Assessment Details/Treatment      04/15/25 1200   Pain/Comfort/Sleep   Preferred Pain Scale number (Numeric Rating Pain Scale)   Comfort/Acceptable Pain Level 0        Wound 04/10/25 1800 Shearing Right Buttocks   Date First Assessed/Time First Assessed: 04/10/25 1800   Present on Original Admission: No  Primary Wound Type: Shearing  Side: Right  Location: Buttocks  Is this injury device related?: No   Wound Image   (pink and dry)   Dressing Appearance Open to air   Drainage Amount None   Appearance Pink;Dry  (scaly)   Care Cleansed with:;Wound cleanser;Applied:;Skin Barrier   Dressing Absorptive Pad   Safety   All Alarms alarm(s) activated and audible   Safety Precautions emergency equipment at bedside   Infection Prevention environmental surveillance performed   Safety Management   Safety Promotion/Fall Prevention assistive device/personal item within reach;side rails raised x 3;nonskid shoes/socks when out of bed;medications reviewed;Fall Risk reviewed with patient/family   Patient Rounds call light in patient/parent reach;clutter free environment maintained;ID band on;placement of personal items at bedside;visualized patient;toileting offered;bed in low position;bed wheels locked   Daily Care   Activity Management Arm raise - L1   Positioning   Body Position sitting up in bed   Head of Bed (HOB) Positioning HOB at 60-90 degrees   Positioning/Transfer Devices in use;pillows     Re-eval of buttock shearing.    78 y/o male in with cp arrest and NSTEMI.  Transferred out of ICU and now in regular room.     He is on a [employer] low air bed system.   To be turned q2hrs with wedge.   His  buttocks area resolving .  Now dry and pink and scaly.  No changes to care at this time.  Will check on him weekly while in hospital.      04/15/2025         [1]   Social History  Socioeconomic History    Marital status: Single     Social Drivers of Health     Financial Resource Strain: Patient Unable To Answer (3/30/2025)    Overall Financial Resource Strain (CARDIA)     Difficulty of Paying Living Expenses: Patient unable to answer   Food Insecurity: Patient Unable To Answer (3/30/2025)    Hunger Vital Sign     Worried About Running Out of Food in the Last Year: Patient unable to answer     Ran Out of Food in the Last Year: Patient unable to answer   Transportation Needs: No Transportation Needs (3/29/2025)    PRAPARE - Transportation     Lack of Transportation (Medical): No     Lack of Transportation (Non-Medical): No   Stress: Patient Unable To Answer (3/30/2025)    [employer] - Occupational Stress Questionnaire     Feeling of Stress : Patient unable to answer   Housing Stability: Patient Unable To Answer (3/30/2025)    Housing Stability Vital Sign     Unable to Pay for Housing in the Last Year: Patient unable to answer     Number of Times Moved in the Last Year: 0     Homeless in the Last Year: Patient unable to answer

## 2025-04-15 NOTE — PT/OT/SLP EVAL
Occupational Therapy  Evaluation    Name: Jon Conley  MRN: [id number]  Recent Surgery: Procedure(s) (LRB):  Left heart cath (N/A) 5 Days Post-Op    Recommendations:     Discharge therapy intensity: Moderate Intensity Therapy   Discharge Equipment Recommendations:  to be determined by next level of care  Barriers to discharge:  Other (Comment) (ongoing medical needs)    Assessment:     Jon Conley is a 79 y.o. male with a medical diagnosis of SOB, cardiac arrest, s/p LHC and impella on 3/29, s/p high risk PCI with 4 stents in LAD on 4/10; acute respiratory failure requiring intubation 3/29 with extubation 4/13. He tolerated OT evaluation fairly. He reports living alone and being independent with ADLs prior. He presents with the following performance deficits affecting function: weakness, impaired endurance, impaired self care skills, impaired functional mobility, gait instability, impaired balance, decreased safety awareness, decreased lower extremity function, decreased upper extremity function. He presents significantly deconditioned. He required max A x2 for bed mobility and functional t/fs using rolling walker. Recommend moderate intensity therapy upon d/c.      Rehab Prognosis: Good; patient would benefit from acute skilled OT services to address these deficits and reach maximum level of function.       Plan:     Patient to be seen 5 x/week to address the above listed problems via self-care/home management, therapeutic activities, therapeutic exercises  Plan of Care Expires: 05/15/25  Plan of Care Reviewed with: patient    Subjective     Chief Complaint: n/a  Patient/Family Comments/goals: to get better     Occupational Profile:  Living Environment: Pt lives alone in a single level home with x2 steps to enter and B HR. Pt has a tub/shower combo with a shower chair.   Previous level of function: Pt reports being independent with ADLs prior.   Equipment Used at Home: none  Assistance upon  Discharge: Pt will have assist from his daughters upon d/c.     Pain/Comfort:  Pain Rating 1: 0/10    Patients cultural, spiritual, Anglican conflicts given the current situation: no    Objective:     OT communicated with RN prior to session.      Patient was found HOB elevated with oxygen, pulse ox (continuous), telemetry, contreras catheter, peripheral IV upon OT entry to room.    General Precautions: Standard, aspiration  Orthopedic Precautions: N/A  Braces: N/A    Vital Signs: Supplemental 02: nasal cannula, flow 2 L/min     Bed Mobility:    Patient completed Scooting/Bridging with maximal assistance and x2 persons  Patient completed Supine to Sit with maximal assistance and x2 persons  Patient completed Sit to Supine with maximal assistance and x2 persons    Functional Mobility/Transfers:  Patient completed Sit <> Stand Transfer with maximal assistance and of x2 persons  with  rolling walker   Functional Mobility: pt required max A x2 for sit<>stand t/f using rolling walker; pt with glut clearance; however forward flexed posture. Will continue to progress mobility as able.     Activities of Daily Living:  Lower Body Dressing: maximal assistance to don socks.     AMPAC 6 Click ADL:  AMPAC Total Score: 11    Functional Cognition:  Orientation: oriented to Person, Place, and Time  Safety Awareness: Impaired.      Visual Perceptual Skills:  Intact    Upper Extremity Function:  Right Upper Extremity:   Significant weakness; shoulder flexion 2-/5, elbow flex/ext 2/5    Left Upper Extremity:  Significant weakness; shoulder flexion 2-/5, elbow flex/ext 2/5    Balance:   Initially mod A for static sitting balance, but able to progress towards CGA    Therapeutic Positioning  Risk for acquired pressure injuries is increased due to relative decrease in mobility d/t hospitalization  and impaired mobility.    OT interventions performed during the course of today's session:   Therapeutic positioning was provided at the  conclusion of session to offload all bony prominences for the prevention and/or reduction of pressure injuries    Skin assessment: all bony prominences were assessed    Findings:  Visible skin intact.     OT recommendations for therapeutic positioning throughout hospitalization:   Follow [facility] Pressure Injury Prevention Protocol    Patient Education:  Patient provided with verbal education education regarding OT role/goals/POC, fall prevention, safety awareness, Discharge/DME recommendations, and pressure ulcer prevention.  Understanding was verbalized, however additional teaching warranted.     Patient left HOB elevated with all lines intact, call button in reach, wedge under R side, pressure relief boots, and RN notified.    GOALS:   Multidisciplinary Problems       Occupational Therapy Goals          Problem: Occupational Therapy    Goal Priority Disciplines Outcome Interventions   Occupational Therapy Goal     OT, PT/OT Progressing    Description: LTG: Pt will perform basic ADLs and ADL transfers with Modified independence using LRAD by discharge.    STG: to be met by 5/15/25    Pt will complete grooming standing at sink with LRAD with SBA.  Pt will complete UB dressing with SBA.  Pt will complete LB dressing with SBA using LRAD and AE prn.  Pt will complete toileting with SBA using LRAD.  Pt will complete functional mobility to/from toilet and toilet transfer with SBA using LRAD.                        History:     No past medical history on file.      Past Surgical History:   Procedure Laterality Date    CATHETERIZATION OF BOTH LEFT AND RIGHT HEART N/A 3/29/2025    Procedure: CATHETERIZATION, HEART, BOTH LEFT AND RIGHT;  Surgeon: Lorne Coon Jr., MD;  Location: [facility] CATH LAB;  Service: Cardiology;  Laterality: N/A;    LEFT HEART CATHETERIZATION N/A 4/10/2025    Procedure: Left heart cath;  Surgeon: Lorne Coon Jr., MD;  Location: [facility] CATH LAB;  Service: Cardiology;  Laterality: N/A;       Time  Tracking:     OT Date of Treatment: 04/15/25  OT Start Time: 1036  OT Stop Time: 1101  OT Total Time (min): 25 min    Billable Minutes:Evaluation Moderate Complexity.     4/15/2025

## 2025-04-16 NOTE — PT/OT/SLP PROGRESS
Physical Therapy Treatment    Patient Name:  Jon Conley   MRN:  [id number]    Recommendations:     Discharge therapy intensity: Moderate Intensity Therapy   Discharge Equipment Recommendations: to be determined by next level of care  Barriers to discharge: Impaired mobility    Assessment:     Jon Conley is a 79 y.o. male admitted with a medical diagnosis of SOB, cardiac arrest, s/p LHC and impella on 3/29, s/p high risk PCI with 4 stents in LAD on 4/10; acute respiratory failure requiring intubation 3/29 with extubation 4/13.  He presents with the following impairments/functional limitations: weakness, gait instability, impaired balance, impaired endurance, decreased safety awareness, impaired functional mobility. The pt tolerated session well, but demonstrates significant global weakness. Pt is oriented to self, but thought he was at [facility]. The pt is able to mobilize to EOB with max A x2, and was able to scoot toward EOB with max A. The pt able to ttolerate sitting EOB for ~ 15 minutes, with min A, and intermittent SBA, with bouts of posterior lean. The pt attempted to stand, and was unable to clear bottom from bed. Will continue to progress as tolerated.    Rehab Prognosis: Good; patient would benefit from acute skilled PT services to address these deficits and reach maximum level of function.    Recent Surgery: Procedure(s) (LRB):  Left heart cath (N/A) 6 Days Post-Op    Plan:     During this hospitalization, patient would benefit from acute PT services 5 x/week to address the identified rehab impairments via gait training, therapeutic activities, therapeutic exercises and progress toward the following goals:    Plan of Care Expires:  05/15/25    Subjective     Chief Complaint: none  Patient/Family Comments/goals: return to PLOF  Pain/Comfort:         Objective:     Communicated with NSG prior to session.  Patient found supine with peripheral IV, telemetry, pulse ox (continuous),  oxygen, contreras catheter upon PT entry to room.     General Precautions: Standard, aspiration, fall  Orthopedic Precautions: N/A  Braces: N/A  Respiratory Status: Nasal cannula, flow 2 L/min  Blood Pressure: NA  Skin Integrity: Visible skin intact      Functional Mobility:  Bed Mobility:     Scooting: maximal assistance   Supine to Sit: maximal assistance x2  Sit to Supine: maximal assistance x2  Transfers:     Sit to Stand:  maximal assistance x2 with rolling walker, pt unable to full clear bottom from bed, with significant forward flexed posture of trunk  Balance: pt able to sit EOB x 15 minutes, with min A balance, and intermittent SBA, with bouts of posterior lean.    Education:  Patient provided with verbal education education regarding PT role/goals/POC, safety awareness, and discharge/DME recommendations.  Understanding was verbalized.     Patient left supine with all lines intact, call button in reach, and NSG notified    GOALS:   Multidisciplinary Problems       Physical Therapy Goals          Problem: Physical Therapy    Goal Priority Disciplines Outcome Interventions   Physical Therapy Goal     PT, PT/OT Progressing    Description: Goals to be met by: 5/15/25     Patient will increase functional independence with mobility by performin. Supine to sit with MInimal Assistance  2. Sit to supine with MInimal Assistance  3. Sit to stand transfer with Moderate Assistance  4. Bed to chair transfer with Moderate Assistance using Rolling Walker  5. Sitting at edge of bed x20 minutes with Stand-by Assistance                         Time Tracking:     PT Received On: 25  PT Start Time: 1306     PT Stop Time: 1335  PT Total Time (min): 29 min     Billable Minutes: Therapeutic Activity 29    Treatment Type: Treatment  PT/PTA: PT     Number of PTA visits since last PT visit: 2025

## 2025-04-16 NOTE — PT/OT/SLP PROGRESS
Occupational Therapy   Treatment    Name: Jon Conley  MRN: [id number]    Recommendations:     Recommended therapy intensity at discharge: Moderate Intensity Therapy   Discharge Equipment Recommendations:  to be determined by next level of care  Barriers to discharge:   (ongoing medical needs)    Assessment:     Jon Conley is a 79 y.o. male with a medical diagnosis of SOB, cardiac arrest, s/p LHC and impella on 3/29, s/p high risk PCI with 4 stents in LAD on 4/10; acute respiratory failure requiring intubation 3/29 with extubation 4/13.       Performance deficits affecting function are weakness, impaired endurance, impaired self care skills, impaired functional mobility, gait instability, impaired balance, decreased safety awareness, decreased lower extremity function, decreased upper extremity function.     Rehab Prognosis:  Good; patient would benefit from acute skilled OT services to address these deficits and reach maximum level of function.       Plan:     Patient to be seen 5 x/week to address the above listed problems via self-care/home management, therapeutic activities, therapeutic exercises  Plan of Care Expires: 05/15/25  Plan of Care Reviewed with: patient    Subjective     Pain/Comfort:  Pain Rating 1: 0/10    Objective:     Communicated with: nurse and PT prior to session.  Patient found HOB elevated with peripheral IV, telemetry, pulse ox (continuous), oxygen, contreras catheter upon OT entry to room.    General Precautions: Standard, aspiration    Orthopedic Precautions:N/A  Braces: N/A  Respiratory Status: Nasal cannula, flow 2 L/min     Occupational Performance:     Bed Mobility:    Patient completed Supine to Sit with maximal assistance and 2 persons  Patient completed Sit to Supine with maximal assistance and 2 persons     Functional Mobility/Transfers:  Patient completed Sit <> Stand Transfer with maximal assistance and of 2 persons  with  rolling walker   Functional Mobility:  unable to take steps    Activities of Daily Living:  Feeding:  total assistance patient with untouched lunch at OT arrival; required total A to open container and bring spoon to mouth due to severity of RUE weakness  Grooming: total assistance washing patient's face with warm cloth to improve skin integrity and overall emotional state  Toileting: dependence catheter in place  Patient required min assist to maintain balance at midline as patient had a R lateral  and posterior leaning  Patient favors L side when talking; He also had difficulty tracking OT's finger in horizontal plane    Therapeutic Positioning    OT interventions performed during the course of today's session in an effort to prevent and/or reduce acquired pressure injuries:   Education was provided on benefits of and recommendations for therapeutic positioning  Therapeutic positioning was provided at the conclusion of session to offload all bony prominences for the prevention and/or reduction of pressure injuries  Positioning recommendations were communicated to care team     Skin assessment: full body skin assessment was performed    Findings: known area of altered skin integrity at buttocks    Patient with pinkness around nostrils where nasal canula rests; nurse notified    [facility] 6 Click ADL: 12    Patient Education:  Patient provided with verbal education education regarding OT role/goals/POC, fall prevention, safety awareness, Discharge/DME recommendations, and pressure ulcer prevention.  Additional teaching is warranted.      Patient left right sidelying with all lines intact, call button in reach, wedge under L side, pressure relief boots, and nurse notified.    GOALS:   Multidisciplinary Problems       Occupational Therapy Goals          Problem: Occupational Therapy    Goal Priority Disciplines Outcome Interventions   Occupational Therapy Goal     OT, PT/OT Progressing    Description: LTG: Pt will perform basic ADLs and ADL transfers with  Modified independence using LRAD by discharge.    STG: to be met by 5/15/25    Pt will complete grooming standing at sink with LRAD with SBA.  Pt will complete UB dressing with SBA.  Pt will complete LB dressing with SBA using LRAD and AE prn.  Pt will complete toileting with SBA using LRAD.  Pt will complete functional mobility to/from toilet and toilet transfer with SBA using LRAD.                        Time Tracking:     OT Date of Treatment: 04/16/25  OT Start Time: 1306  OT Stop Time: 1336  OT Total Time (min): 30 min    Billable Minutes:Self Care/Home Management 30    OT/OLIVIA: OT     Number of OLIVIA visits since last OT visit: 1    4/16/2025

## 2025-04-16 NOTE — PLAN OF CARE
Mod intensity rec noted. I called pt daughter Kimmy to discuss FOC choices. I explained the pt/ot recs from 4-15 . I reassured her that pt will not dc to snf until he is medically stable. I have messaged the SNF list to her as requested and left the list in the pt room. All questions answered and advised to call my work cell with questions.

## 2025-04-16 NOTE — PROGRESS NOTES
[facility] Hospital Medicine Progress Note        Chief Complaint: Inpatient Follow-up for cardiac arrest    HPI:   Jon Conley is a 79 y.o. male with a past medical history of hypertension, hyperlipidemia, CAD, PAD, GERD, and BPH s/p TURP who presented to [facility] on 3/29/2025 transferred from [facility] for cardiology services.  Patient presented to outside facility for shortness of breath.  Patient was tachycardic and hypotensive with hemoglobin of 7.5, hematocrit 24.6, high sensitivity troponin 13,821, WBC 13.4, and CPK 7 O2.  Patient was given 500 mL normal saline bolus and heparin 5000 units and transferred to [facility] for higher level of care.  Patient has suffered PAE arrest requiring intubation and CPR with ROSC. Labs revealed WBC 14.03, hemoglobin 7.3, hematocrit 23, troponin 9.443, , BUN 16.2, creatinine 1.34, lactic acid 4.3, PT/INR 14.9/1.2. Patient underwent left heart catheterization with Impella placement on 03/29/2025.  LHC revealed multivessel coronary artery disease with 100% occluded proximal left circumflex, unable to cross with wire. Mid RCA was also noted to be 100 % occluded, treated with PTCA/TAD.  Patient was started on Levophed, heparin drip per protocol, and amiodarone.  Echo revealed EF 15-20% with grade 1 diastolic dysfunction.  CV surgery was consulted.  Patient was deemed not a surgical candidate for CABG secondary to multiple comorbidities.  Patient received 2 units packed red blood cells on 04/06.  Patient underwent high-risk PCI on 04/10/2025 with 4 stents in LAD.  Impella was removed.  Patient was extubated on 04/13/2025.  Patient was cleared for downgrade out of ICU on 04/14/2025 and was admitted to hospital medicine service.       Interval Hx:     Afebrile overnight.  Resting comfortably in the bed.  Doing well on 2 L. blood pressure stable.  No family members at bedside.  Bumex was added for diuresis purposes.  CBGS within  acceptable limits.  P.o. intake is inadequate.  HGB and platelets stable.  No new electrolyte abnormalities.        Objective/physical exam:  Vitals:    04/15/25 1915 04/15/25 2315 25 0318 25 0722   BP: 134/67 112/73 (!) 147/74 133/75   Pulse: 103 104 72 106   Resp: 18 18  20   Temp: 97.9 °F (36.6 °C) 98.4 °F (36.9 °C) 98.1 °F (36.7 °C) 98.2 °F (36.8 °C)   TempSrc: Oral Oral Oral Oral   SpO2: 98% 97% 95% 96%   Weight:       Height:         General: In no acute distress, afebrile  Respiratory:  Nonlabored breathing, bilateral rhonchi  Cardiovascular: S1, S2, no appreciable murmur  Abdomen: Soft, nontender, BS +  Neurologic: Alert,, moving all extremities with good strength     Lab Results   Component Value Date     04/15/2025    K 3.7 04/15/2025     (H) 04/15/2025    CO2 18 (L) 04/15/2025    BUN 12.8 04/15/2025    CREATININE 0.67 (L) 04/15/2025    CALCIUM 8.0 (L) 04/15/2025    EGFRNONAA >60 10/16/2021      Lab Results   Component Value Date    ALT 16 04/15/2025    AST 18 04/15/2025    ALKPHOS 88 04/15/2025    BILITOT 0.5 04/15/2025      Lab Results   Component Value Date    WBC 6.92 2025    HGB 8.6 (L) 2025    HCT 28.5 (L) 2025    MCV 93.1 2025     2025           Medications:   aspirin  81 mg Oral Daily    atorvastatin  40 mg Oral Daily    bumetanide  1 mg Oral Daily    clopidogreL  75 mg Oral Daily    dapagliflozin propanediol  10 mg Oral Daily    enoxaparin  40 mg Subcutaneous Daily    famotidine  20 mg Per NG tube BID    metoprolol succinate  12.5 mg Oral Daily    zinc oxide-cod liver oil   Topical (Top) BID        Current Facility-Administered Medications:     acetaminophen, 650 mg, Oral, Q4H PRN    calcium gluconate IVPB, 1 g, Intravenous, PRN    calcium gluconate IVPB, 2 g, Intravenous, PRN    calcium gluconate IVPB, 3 g, Intravenous, PRN    dextrose 50%, 12.5 g, Intravenous, PRN    dextrose 50%, 25 g, Intravenous, PRN    [] fentaNYL, 50 mcg,  Intravenous, Q15 Min PRN **FOLLOWED BY** fentaNYL, 50 mcg, Intravenous, Q1H PRN    glucagon (human recombinant), 1 mg, Intramuscular, PRN    glucose, 16 g, Oral, PRN    glucose, 24 g, Oral, PRN    hydrALAZINE, 10 mg, Intravenous, Q4H PRN    magnesium sulfate 2 g IVPB, 2 g, Intravenous, PRN    magnesium sulfate 2 g IVPB, 2 g, Intravenous, PRN    ondansetron, 8 mg, Oral, Q8H PRN    potassium chloride in water, 20 mEq, Intravenous, PRN **AND** potassium chloride in water, 40 mEq, Intravenous, PRN **AND** potassium chloride in water, 60 mEq, Intravenous, PRN    sodium phosphate 15 mmol in D5W 250 mL IVPB, 15 mmol, Intravenous, PRN    sodium phosphate 20.1 mmol in D5W 250 mL IVPB, 20.1 mmol, Intravenous, PRN    sodium phosphate 30 mmol in D5W 250 mL IVPB, 30 mmol, Intravenous, PRN    tirofiban-0.9% sodium chloride 12.5 mg/250ml, , , Continuous PRN     Assessment/Plan:    Acute hypoxemic respiratory failure requiring intubation, extubated 04/13/2024   Cardiogenic shock requiring mechanical and chemical support   Impella placement 03/29/2025, removed 04/10/2025  Multivessel CAD  [facility] with PTCA/TAD x3 RCA (03/29/2025)   Status post high-risk PCI withPTCA/TAD X4 LAD on 4/10/25   Cardiopulmonary arrest   ICMO/EF 15%  Normocytic anemia, stable     History of hypertension, hyperlipidemia, CAD, PAD, GERD, and BPH s/p recent TURP     Plan:   -doing well on 2 L.  Mobilize as tolerated.  Encourage IS use, pulmonary toileting   -cardiology following, adjusting GDM T.  Monitor I's and O's and electrolytes.  Continue telemetry.  Appreciate cardiology assistance  -PT OT.  He needs SNF placement   -other home medications were reviewed and renewed     Lovenox   DNR        Gold Gamble MD

## 2025-04-16 NOTE — PROGRESS NOTES
[facility]    Cardiology  Progress Note    Patient Name: Jon Conley  MRN: [id number]  Admission Date: 3/29/2025  Hospital Length of Stay: 18 days  Code Status: DNR   Attending Provider: Gold Gamble MD   Consulting Provider: Lorne Stewart NP  Primary Care Physician: Patrick Reece Jr., MD  Principal Problem:<principal problem not specified>    Patient information was obtained from past medical records and ER records.     Subjective:     Chief Complaint/Reason for Consult: Cardiac Arrest     HPI: Mr. Conley is a 80 y/o male who is known to [facility], Dr. Wang. The patient presented to [facility] on 3.29.25 with c/o SOB. He initially presented to [facility] in [place] with SOB. He was found to have Tachycardia, Hypotension, Anemia (7.4/24.6), Troponin 9.443, .0, BUN/Crea 16.2/1.34, Lactic Acid Level 4.3, Na 133, WBC 14.03, H&H 7.3/23.0, WBC 14.03, , PT/INR 14.9/1.2. He was admitted to , however, he sustained a witnessed PEA Arrest in the ER and has ROSC per ER MD. [facility] was consulted for NSETMI and Arrest.     3.30.25: NAD. Impella L Groin. CVP 7, P7. Levophed 0.32mcg/kg/min, Heparin Drip per Protocol, Amiodarone 0.5mg/min  3.31.25: NAD. L Groin Impella P7, Amiodarone 0.5mg/min, Heparin Drip per Protocol, Levophed 0.18mcg/kg/min, H&H 8.8/25.8, AST/ALT 79/45  4.1.25: NAD noted. L groin Impella in place, remains at P7. Remains on levophed. LFTs continue to improve.  4.2.25: NAD. L Groin Impella Device P6. Heparin Infusion per Protocol. Remains on 0.06mcg/kg/min.   4.3.25: NAD. Vented/Sedated. L Groin Impella Device. P3, Heparin Infusion per Protocol. Off Pressors. Attempting Wean of Impella. H&H 8.2/26.2,   4.4.25: NAD. Vented/Sedated. Heparin Drip per Protocol. H&H 8.1/95.4,   4.5.25: NAD. Vented/Sedated. Heparin Drip per Protocol. Levophed 0.04mcg/kg/min. H&H 7.9/23.8,  Impella P6  4.6.25: NAD. Vented/Sedated. P3.  "Heparin Drip per Protocol, Off Pressors. CV Surgery to Reevaluate for CABG. H&H 7.4/22.7,   4.7.25: NAD. Vented/Sedated. H&H 9.2/27.7, , K 3.9, BUN/Crea 9.2/0.61, Lactic Acid 0.6. Impella L Groin P4  4.8.25:NAD noted. Remains vented/sedated. Consent obtained for LHC, will plan for Thursday.  4.9.25: NAD. Remains Vented/Sedated. Plans for LHC with PCI on Thursday. Impella P4. Remains Off Pressors.   4.10.25: NAD. Remains Vented/Sedated. NPO for Planned LHC with PCI/Impella Assisted. P4 Impella. Levophed 0.1mcg/kg/min.   4.11.25: NAD. Vented/Sedated. Levophed 0.12mcg/kg/min. S/P PCI of LAD and Impella Explant.   4.12.25: NAD. Vented/Sedated. Levophed 0.08mcg/kg/min.   4.13.25: NAD. Sitting in Bedside Chair. Denies CP, SOB and Palps. Off Pressors. "I am ok."   4.14.25: NAD. "I am fine." Denies CP, SOB and Palps. Remains Off Pressors.   4.15.25: NAD. "Hi." Denies CP, SOB and Palps.   4.16.25: NAD. Feeling Ok. Denies CP, SOB, or palpitations.     PMH: BPH/Urinary Obstruction, CAD/Nonobstructive, PAD, R JACY/Mild, HTN, Obesity, GERD, Tobacco Use   PSH: TURP, Angiogram, Bilateral Knee Replacements   Family History: Denies Family History of Heart Disease  Social History: Denies Illicit Drug, ETOH and Tobacco Use     Previous Cardiac Diagnostics:   [facility] (4.10.25):  Findings:  - Successful Impella-protected PCI of LAD.  - Prox to Mid Left Anterior Descending has severe diffuse disease, up to 80% in severity. The lesion was successfully treated with overlapping FRONTIER BERNADINE 3.65E56YN, FRONTIER BERNADINE 3.76C70EW, FRONTIER BERNADINE 2.86K22TJ, and SYNERGY XD 2.5X28MM drug-eluting stents. Post-dilatation was performed using an ElasticDot MR 3.5X20MM angioplasty balloon inflated to 20 gretel. Following intervention there was 0% residual stenosis and PATTY grade 3 flow in the distal vessel.  - Intravascular Ultrasound (IVUS) was performed prior to intervention to further characterize the lesion, as well as post-stent deployment to " ensure optimal stent apposition and expansion.  - Ostial First Diagonal Branch has a 70% stenosis. At the conclusion of the case, there was PATTY grade 3 flow in the distal vessel.  - Successful Impella CP removal and closure of L femoral arterial access site using a 14 [place] MANTA device.  Assessment/Plan:  - Patient is a 79 y.o. male with a history of CAD presents in cardiogenic shock. Successful PCI of RCA last week. Impella CP placed at that time. Now s/p successful LAD intervention and Impella CP removal.  - Continue DAPT (aspirin + clopidogrel) for a minimum of 12 months and aspirin indefinitely thereafter  - High intensity statin  - Arterial sheath remains place; plan is to remove when activated clotting time (ACT) less than 180 seconds. Bedrest for 4 hours after hemostasis is achieved.    ECHO Limited 4.8.25:  Impella check  Aortic annulus to tip distance is 4.3 cm.  LV systolic function is 15%.    ECHO 4.3.25:  Limited study to check impella placement.  Left Ventricle: There is severely reduced systolic function with a visually estimated ejection fraction of 15 - 20%.  Impella distance from aortic valve annulus appears appropriate.    ECHO 4.1.25:  Limited study to check impella placement.  Left Ventricle: There is severely reduced systolic function with a visually estimated ejection fraction of 15 - 20%.  Impella distance from aortic valve annulus appears appropriate.    ECHO 3.30.25:  Left Ventricle: There is severely reduced systolic function with a visually estimated ejection fraction of less than 30%.  There appears to be at least moderate to severe mitral regurgitation.   Impella is positioned with inflow cannula 3.8 cm from aortic valve annulus.    [facility] 3.29.25:  Findings:  - Successful placement of Impella CP device from left femoral approach.  - There is severe multi-vessel coronary artery disease.  - Left Main has mild diffuse disease.  - Mid Left Anterior Descending has serial lesions, up to  70-80% in severity.   - Prox Left Circumflex is 100% occluded. Unable to cross lesion despite wire escalation and microcatheter support.  - Mid Right Coronary Artery is 100% occluded. The lesion was successfully treated with overlapping SYNERGY XD 4.0X20MM, SYNERGY XD 3.0X48MM, and SYNERGY XD 2.83J13EE drug-eluting stents. Post-dilatation was performed using a SYNERGY XD 4.0X20MM angioplasty balloon inflated to 12 gretel. Following intervention there was 0% residual stenosis and PATTY grade 3 flow in the distal vessel.  - Intravascular Ultrasound (IVUS) was performed prior to intervention to further characterize the lesion, as well as post-stent deployment to ensure optimal stent apposition and expansion.  - Aorto-iliac angiogram revealed moderate disease of R ileofemoral system.  - At the conclusion of the case, contrast injection was performed through the Impella sheath side port, showing adequate antegrade flow down the ipsilateral common femoral artery/SFA/profunda artery.  - RHC with elevated R sided filling pressures. RA 17 mmHg, RV 45/15 mmHg, PA 42/29 mmHg (mean 34 mmHg), PCWP 25 mmHg.  - Transpulmonary gradient is 9 mmHg.  - Cardiac Output/Index at 6.6/3.0, as calculated by Gregory equation on Levophed infusion and Impella CP support.  - PVR is 1.4 Woods units  - SVR is 550 dyne-sec*cm^-5  - Pulmonary Artery Pulsatility Index (Rafa) is 0.8  - Cardiac Power Output () is 0.91   Assessment/Plan:  - Patient is a 79 y.o. male with a history of recent TURP, complicated by significant post-op bleeding, presents to OSH ED with dyspnea. Transferred to [facility] with worsening shock despite vasopressors. TTE showing EF 25%, severe MR, severe TR. Troponin elevated. Brought to cath lab. Underwent Impella placement/LHC/RHC as outlined above. RCA lesion treated. Unable to cross LCx lesion. 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 At this point, would continue Impella support and aggressive medical mgmt. Has non-culprit lesions  that should be treated at a later date as well. Can consider CTS consult, especially given severe valvular disease.  - Patient was given a loading dose of clopidogrel 600 mg PO in the cath lab  - Continue DAPT (aspirin + clopidogrel) for a minimum of 12 months and aspirin indefinitely thereafter  - High intensity statin  - Continue Aggrastat for 4 hours post-procedure  - Arterial sheath remains place; plan is to remove when activated clotting time (ACT) less than 180 seconds. Bedrest for 4 hours after hemostasis is achieved.  - Obtain transthoracic echocardiogram  - Continuous bedrest while Impella in place  - Therapeutic heparin administration per protocol  - Antibiotics per protocol  - Remaining mgmt per primary team/cardiology    ECHO 3.29.25:  Left Ventricle: The left ventricle is dilated. Normal wall thickness. Severe global hypokinesis present. There is severely reduced systolic function with a visually estimated ejection fraction of 15 - 20%. Grade I diastolic dysfunction.  Right Ventricle: Right ventricular enlargement. Systolic function is normal. TAPSE is 1.83 cm.  Left Atrium: dilated  Mitral Valve: Mildly thickened leaflets. There is moderate regurgitation.  Tricuspid Valve: There is moderate regurgitation.  Pulmonary Artery: There is moderate pulmonary hypertension.  IVC/SVC: Patient is ventilated, cannot use IVC diameter to estimate right atrial pressure.    Carotid US 11.4.24:  The study quality is average.   1-39% stenosis in the proximal right internal carotid artery based on Bluth Criteria.   Antegrade right vertebral artery flow.   Antegrade left vertebral artery flow    ECHO 1.27.22:  The study quality is average.   The left ventricle is normal in size. Global left ventricular systolic function is moderately decreased. The left ventricular ejection fraction is 40-45%. Mild concentric left ventricular hypertrophy is present.  The left ventricle diastolic function is impaired (Grade I) with normal  left atrial pressure.  Mild (1+) mitral regurgitation.  The pulmonary artery appears normal.     [facility] 9.26.17:  LM: Normal  LAD: Prox 30% Stenosis  RI: Normal  LCX: Normal  RCA: Distal RCA 50%    Review of patient's allergies indicates:  No Known Allergies  No current facility-administered medications on file prior to encounter.     No current outpatient medications on file prior to encounter.     Review of Systems   Constitutional:  Positive for fatigue. Negative for fever.   Respiratory:  Negative for shortness of breath.    Cardiovascular:  Negative for chest pain, palpitations and leg swelling.   All other systems reviewed and are negative.    Objective:     Vital Signs (Most Recent):  Temp: 98.2 °F (36.8 °C) (04/16/25 0722)  Pulse: 106 (04/16/25 1011)  Resp: 20 (04/16/25 0722)  BP: 133/75 (04/16/25 1012)  SpO2: 96 % (04/16/25 0722) Vital Signs (24h Range):  Temp:  [97.9 °F (36.6 °C)-98.4 °F (36.9 °C)] 98.2 °F (36.8 °C)  Pulse:  [] 106  Resp:  [18-20] 20  SpO2:  [95 %-98 %] 96 %  BP: (112-150)/(67-83) 133/75   Weight: 106.1 kg (233 lb 14.5 oz)  Body mass index is 32.64 kg/m².  SpO2: 96 %       Intake/Output Summary (Last 24 hours) at 4/16/2025 1044  Last data filed at 4/15/2025 1814  Gross per 24 hour   Intake 750 ml   Output 1850 ml   Net -1100 ml     Lines/Drains/Airways       Drain  Duration                  Urethral Catheter -- days              Peripheral Intravenous Line  Duration                  Peripheral IV - Single Lumen 04/14/25 1600 20 G 2 1/4 in Yes Anterior;Distal;Right Upper Arm 1 day                  Significant Labs:   Chemistries:   Recent Labs   Lab 04/14/25  0431 04/14/25  1150 04/14/25  2013 04/15/25  0331 04/16/25  0642    139 141 138 142   K 3.8 3.6 3.6 3.7 4.3   * 107 110* 111* 114*   CO2 20* 22* 21* 18* 20*   BUN 12.9 12.5 12.8 12.8 14.4   CREATININE 0.63* 0.66* 0.67* 0.67* 0.72   CALCIUM 7.7* 7.9* 8.1* 8.0* 8.1*   BILITOT 0.5 0.5 0.5 0.5 0.6   ALKPHOS 83 88 86 88 87  "  ALT 20 20 18 16 13   AST 21 21 18 18 15   GLUCOSE 82 110 95 96 77*   MG 2.20 2.10 2.10 2.20 2.20   PHOS 2.7 3.4 2.7 2.7  --         CBC/Anemia Labs: Coags:    Recent Labs   Lab 04/14/25  0431 04/15/25  0331 04/16/25  0642   WBC 5.31 6.63 6.92   HGB 8.1* 8.9* 8.6*   HCT 26.5* 27.3* 28.5*    327 365   MCV 91.7 88.9 93.1   RDW 17.0 16.9 17.1*    No results for input(s): "PT", "INR", "APTT" in the last 168 hours.         EKG:      Telemetry: SR    Physical Exam  Constitutional:       General: He is not in acute distress.     Appearance: Normal appearance.   HENT:      Head: Normocephalic.      Mouth/Throat:      Mouth: Mucous membranes are moist.   Cardiovascular:      Rate and Rhythm: Normal rate and regular rhythm.      Pulses: Normal pulses.      Heart sounds: Murmur heard.   Pulmonary:      Effort: Pulmonary effort is normal. No respiratory distress.      Comments: NC O2  Abdominal:      Palpations: Abdomen is soft.   Skin:     General: Skin is warm.   Neurological:      General: No focal deficit present.      Mental Status: He is alert and oriented to person, place, and time.   Psychiatric:         Mood and Affect: Mood normal.         Judgment: Judgment normal.       Home Medications:   Medications Ordered Prior to Encounter[1]  Current Schedule Inpatient Medications:   aspirin  81 mg Oral Daily    atorvastatin  40 mg Oral Daily    bumetanide  1 mg Oral Daily    clopidogreL  75 mg Oral Daily    dapagliflozin propanediol  10 mg Oral Daily    enoxaparin  40 mg Subcutaneous Daily    famotidine  20 mg Per NG tube BID    metoprolol succinate  12.5 mg Oral Daily    zinc oxide-cod liver oil   Topical (Top) BID     Continuous Infusions:   tirofiban-0.9% sodium chloride 12.5 mg/250ml    Continuous PRN   Stopped at 03/29/25 1554     Assessment:   Acute Hypoxemic Respiratory Failure requiring Intubation/Ventilation - Resolved on NC O2  Cardiogenic Shock requiring Mechanical and Chemical Support - Resolved     - " Impella/Pressors - Impella Explanted on 4.10.25 post High Risk LAD PCI/Impella Assisted  MVCAD    - s/p LHC (4.10.25) - PTCA/TAD of the Prox to Mid LAD and Impella Explant     - s/p LHC (3.29.25) -  Left Main has mild diffuse disease. Mid Left Anterior Descending has serial lesions, up to 70-80% in severity. Prox Left Circumflex is 100% occluded. Unable to cross lesion despite wire escalation and microcatheter support. Mid Right Coronary Artery is 100% occluded. The lesion was successfully treated with overlapping SYNERGY XD 4.0X20MM, SYNERGY XD 3.0X48MM, and SYNERGY XD 2.41C02ZR drug-eluting stents. Post-dilatation was performed using a SYNERGY XD 4.0X20MM angioplasty balloon inflated to 12 gretel. Following intervention there was 0% residual stenosis and PATTY grade 3 flow in the distal vessel.  NSTEMI Type I (Non-Anterior Wall MI)    - Trponin > 9  Cardiopulmonary Arrest    - Initial Rhythm PEA - Witnessed in ER Arrest/ROSC  ICMO/EF 15%     - ECHO (4.8.25) - LVEF 15%    - ECHO (3.29.25) - LVEF 15-20%, Grade I DD    ST - Now SR   Hypotension requiring Pressors - Resolved    - Hx of HTN  BPH s/p TURP   Lactic Acidosis - Resolved   Leukocytosis - Resolved   Anemia requiring Transfusions - Stable   Recent BPH Diagnosis s/p TURP  CAD/Nonobstructive (2017)  JACY/R Sided   PAD/Nonobstructive   Transaminitis - Resolved   Obesity  GERD  Tobacco Use  No Hx of GIB     Plan:   Continue ASA, Statin and Plavix   Increase Toprol to XL 25mg PO Daily  Start Entresto 24/26mg BID.   Cont Farxiga 10mg PO Qday     Will Continue to Follow      Lorne Stewart NP  Cardiology  [facility]          [1]   No current facility-administered medications on file prior to encounter.     No current outpatient medications on file prior to encounter.

## 2025-04-16 NOTE — PT/OT/SLP PROGRESS
[facility]  Speech Language Pathology Department  Dysphagia Therapy Progress Note    Patient Name:  Jon Conley   MRN:  [id number]    Recommendations     General recommendations:  dysphagia therapy  Solid texture recommendation:  Puree Diet - IDDSI Level 4  Liquid consistency recommendation: Moderately thick liquids - IDDSI Level 3   Medications: crushed in puree  Aspiration precautions: small bites/sips, slow rate, and upright for PO intake    Discharge therapy intensity: Moderate Intensity Therapy   Barriers to safe discharge:  severity of impairment and level of skilled assistance needed    Subjective     Patient awake and alert.  Spiritual/Cultural/Yazidi Beliefs/Practices that affect care: no    Pain/Comfort: Pain Rating 1: 0/10    Objective     Therapeutic Activities:  Pt completed laryngeal exercises x15 with maximum cues.    Therapeutic PO Trials:  Consistency Amount Fed By Oral Symptoms Pharyngeal Symptoms   Ice chips x5 SLP Prolonged bolus formation/mastication Wet vocal quality after swallow     Assessment     Pt continues to present with oropharyngeal dysphagia requiring diet modification to improve swallow safety and efficiency.    Outcome Measures     Functional Oral Intake Scale: 5 - Total oral diet with multiple consistencies, by requiring special preparation or compensations    Goals     Multidisciplinary Problems       SLP Goals          Problem: SLP    Goal Priority Disciplines Outcome   SLP Goal     SLP    Description: LTG:  Pt will tolerate least restrictive diet with no clinical s/sx of aspiration.    STGs:  1. Tolerate half meal of soft/bite-sized consistency with no overt s/sx of aspiration, should dentures be brought to hospital.  2.  Perform BOT and LE exercises.  3.  Perform effortful swallow (ice chips ok for this exercise).                     Patient Education     Patient provided with verbal education regarding ST POC.  Additional teaching is  warranted.    Plan     Will continue to follow and tx as appropriate.    SLP Follow-Up:  Yes   Patient to be seen:  5 x/week   Plan of Care expires:  04/27/25  Plan of Care reviewed with:  patient       Time Tracking     SLP Treatment Date:   04/16/25  Speech Start Time:  1000  Speech Stop Time:  1010     Speech Total Time (min):  10 min    Billable minutes:  Treatment of Swallow Dysfunction, 10 minutes       04/16/2025

## 2025-04-17 NOTE — PROGRESS NOTES
[facility] Medicine Progress Note        Chief Complaint: Inpatient Follow-up     HPI:   Jon Conley is a 79 y.o. male with a past medical history of hypertension, hyperlipidemia, CAD, PAD, GERD, and BPH s/p TURP who presented to [facility] on 3/29/2025 transferred from [facility] for cardiology services.  Patient presented to outside facility for shortness of breath.  Patient was tachycardic and hypotensive with hemoglobin of 7.5, hematocrit 24.6, high sensitivity troponin 13,821, WBC 13.4, and CPK 7 O2.  Patient was given 500 mL normal saline bolus and heparin 5000 units and transferred to [facility] for higher level of care.  Patient has suffered PAE arrest requiring intubation and CPR with ROSC. Labs revealed WBC 14.03, hemoglobin 7.3, hematocrit 23, troponin 9.443, , BUN 16.2, creatinine 1.34, lactic acid 4.3, PT/INR 14.9/1.2. Patient underwent left heart catheterization with Impella placement on 03/29/2025.  LHC revealed multivessel coronary artery disease with 100% occluded proximal left circumflex, unable to cross with wire. Mid RCA was also noted to be 100 % occluded, treated with PTCA/TAD.  Patient was started on Levophed, heparin drip per protocol, and amiodarone.  Echo revealed EF 15-20% with grade 1 diastolic dysfunction.  CV surgery was consulted.  Patient was deemed not a surgical candidate for CABG secondary to multiple comorbidities.  Patient received 2 units packed red blood cells on 04/06.  Patient underwent high-risk PCI on 04/10/2025 with 4 stents in LAD.  Impella was removed.  Patient was extubated on 04/13/2025.  Patient was cleared for downgrade out of ICU on 04/14/2025 and was admitted to hospital medicine service.     4/17 Afebrile overnight. Resting comfortably in the bed. Doing well on 2 L. blood pressure stable. No family members at bedside. Bumex was added for diuresis purposes. CBGS within acceptable limits. P.o. intake is  inadequate. HGB and platelets stable. No new electrolyte abnormalities.     Interval Hx:     Had episode of Vtach on tele yesterday and again today, cardiology made aware. Patient denies any pain and asymptomatic. Worked with PT.     Case was discussed with patient's nurse and  on the floor.    Objective/physical exam:  General: In no acute distress, afebrile  Chest: Clear to auscultation bilaterally  Heart: RRR, +S1, S2, no appreciable murmur  Abdomen: Soft, nontender, BS +  MSK: Warm, no lower extremity edema, no clubbing or cyanosis  Neurologic: Alert and oriented x4, Cranial nerve II-XII intact    VITAL SIGNS: 24 HRS MIN & MAX LAST   Temp  Min: 97.4 °F (36.3 °C)  Max: 98.4 °F (36.9 °C) 98.4 °F (36.9 °C)   BP  Min: 116/64  Max: 149/79 132/68   Pulse  Min: 97  Max: 111  101   Resp  Min: 18  Max: 20 18   SpO2  Min: 94 %  Max: 97 % 95 %     I have reviewed the following labs:  Recent Labs   Lab 04/14/25  0431 04/15/25  0331 04/16/25  0642   WBC 5.31 6.63 6.92   RBC 2.89* 3.07* 3.06*   HGB 8.1* 8.9* 8.6*   HCT 26.5* 27.3* 28.5*   MCV 91.7 88.9 93.1   MCH 28.0 29.0 28.1   MCHC 30.6* 32.6* 30.2*   RDW 17.0 16.9 17.1*    327 365   MPV 10.0 9.8 10.0     Recent Labs   Lab 04/11/25  0548 04/11/25  1234 04/12/25  1125 04/12/25  1215 04/12/25  2118 04/15/25  0331 04/16/25  0642 04/17/25  0548   NA  --    < >  --  140   < > 138 142 140   K  --    < >  --  4.4   < > 3.7 4.3 4.1   CL  --    < >  --  112*   < > 111* 114* 114*   CO2  --    < >  --  23   < > 18* 20* 19*   BUN  --    < >  --  20.3   < > 12.8 14.4 15.4   CREATININE  --    < >  --  0.63*   < > 0.67* 0.72 0.73   CALCIUM  --    < >  --  7.8*   < > 8.0* 8.1* 7.8*   PH 7.340*  --  7.430 7.440  --   --   --   --    MG  --    < >  --  2.00   < > 2.20 2.20 2.20   ALBUMIN  --    < >  --  2.0*   < > 2.4* 2.4* 2.5*   ALKPHOS  --    < >  --  86   < > 88 87 85   ALT  --    < >  --  20   < > 16 13 14   AST  --    < >  --  26   < > 18 15 17   BILITOT  --    < >   --  0.3   < > 0.5 0.6 0.6    < > = values in this interval not displayed.     Microbiology Results (last 7 days)       ** No results found for the last 168 hours. **             See below for Radiology    Assessment/Plan:  Acute hypoxemic respiratory failure requiring intubation, extubated 04/13/2024   Cardiogenic shock requiring mechanical and chemical support   Impella placement 03/29/2025, removed 04/10/2025  Multivessel CAD  LHC with PTCA/TAD x3 RCA (03/29/2025)   Status post high-risk PCI withPTCA/TAD X4 LAD on 4/10/25   Cardiopulmonary arrest   ICMO/EF 15%  Normocytic anemia, stable     History of hypertension, hyperlipidemia, CAD, PAD, GERD, and BPH s/p recent TURP      Plan:   -doing well on 2 L.  Mobilize as tolerated.  Encourage IS use, pulmonary toileting   -cardiology following, adjusting GDMT.  Monitor I's and O's and electrolytes.  Continue telemetry.  Appreciate cardiology assistance. Will defer Life Vest at this time Re: DNR   -PT OT.  He needs SNF placement   -other home medications were reviewed and renewed        VTE prophylaxis: Lovenox     Patient condition:  Stable    Anticipated discharge and Disposition:         All diagnosis and differential diagnosis have been reviewed; assessment and plan has been documented; I have personally reviewed the labs and test results that are presently available; I have reviewed the patients medication list; I have reviewed the consulting providers response and recommendations. I have reviewed or attempted to review medical records based upon their availability    All of the patient's questions have been  addressed and answered. Patient's is agreeable to the above stated plan. I will continue to monitor closely and make adjustments to medical management as needed.    Portions of this note dictated using EMR integrated voice recognition software, and may be subject to voice recognition errors not corrected at proofreading. Please contact writer for clarification  if needed.   _____________________________________________________________________    Malnutrition Status:  Nutrition consulted. Most recent weight and BMI monitored-     Measurements:  Wt Readings from Last 1 Encounters:   25 106.6 kg (235 lb 0.2 oz)   Body mass index is 32.79 kg/m².    Patient has been screened and assessed by RD.    Malnutrition Type:  Context:    Level: other (see comments) (Does not meet criteria)    Malnutrition Characteristic Summary:  Weight Loss (Malnutrition): other (see comments) (Unable to assess)  Energy Intake (Malnutrition): other (see comments) (Unable to assess)    Interventions/Recommendations (treatment strategy):        Scheduled Med:   aspirin  81 mg Oral Daily    atorvastatin  40 mg Oral Daily    bumetanide  1 mg Oral Daily    clopidogreL  75 mg Oral Daily    dapagliflozin propanediol  10 mg Oral Daily    enoxaparin  40 mg Subcutaneous Daily    famotidine  20 mg Per NG tube BID    metoprolol succinate  25 mg Oral Once    [START ON 2025] metoprolol succinate  50 mg Oral Daily    sacubitriL-valsartan  1 tablet Oral BID    zinc oxide-cod liver oil   Topical (Top) BID      Continuous Infusions:   tirofiban-0.9% sodium chloride 12.5 mg/250ml    Continuous PRN   Stopped at 25 1845      PRN Meds:    Current Facility-Administered Medications:     acetaminophen, 650 mg, Oral, Q4H PRN    calcium gluconate IVPB, 1 g, Intravenous, PRN    calcium gluconate IVPB, 2 g, Intravenous, PRN    calcium gluconate IVPB, 3 g, Intravenous, PRN    dextrose 50%, 12.5 g, Intravenous, PRN    dextrose 50%, 25 g, Intravenous, PRN    [] fentaNYL, 50 mcg, Intravenous, Q15 Min PRN **FOLLOWED BY** fentaNYL, 50 mcg, Intravenous, Q1H PRN    glucagon (human recombinant), 1 mg, Intramuscular, PRN    glucose, 16 g, Oral, PRN    glucose, 24 g, Oral, PRN    hydrALAZINE, 10 mg, Intravenous, Q4H PRN    magnesium sulfate 2 g IVPB, 2 g, Intravenous, PRN    magnesium sulfate 2 g IVPB, 2 g,  Intravenous, PRN    ondansetron, 8 mg, Oral, Q8H PRN    potassium chloride in water, 20 mEq, Intravenous, PRN **AND** potassium chloride in water, 40 mEq, Intravenous, PRN **AND** potassium chloride in water, 60 mEq, Intravenous, PRN    sodium phosphate 15 mmol in D5W 250 mL IVPB, 15 mmol, Intravenous, PRN    sodium phosphate 20.1 mmol in D5W 250 mL IVPB, 20.1 mmol, Intravenous, PRN    sodium phosphate 30 mmol in D5W 250 mL IVPB, 30 mmol, Intravenous, PRN    tirofiban-0.9% sodium chloride 12.5 mg/250ml, , , Continuous PRN     Radiology:  I have personally reviewed the following imaging and agree with the radiologist.     Fl Modified Barium Swallow Speech  See procedure notes from Speech Pathologist.    This procedure was auto-finalized.      Elgin Stark MD  Department of Hospital Medicine   [facility]   04/17/2025

## 2025-04-17 NOTE — PLAN OF CARE
CM spoke with patient's daughter, Sarah.  FOC are as follows:  1.[facility] in [place]  2.[facility]  3.[facility]    Referrals sent via Epic.      Will continue to follow

## 2025-04-17 NOTE — PT/OT/SLP PROGRESS
{[facility] facility:28588}  Speech Language Pathology Department  Dysphagia Therapy Progress Note    Patient Name:  Jon Conley   MRN:  [id number]  Admitting Diagnosis: ***    Recommendations     General recommendations:  {SLP Recs:11655}  Solid texture recommendation:  Puree Diet - IDDSI Level 4  Liquid consistency recommendation: Moderately thick liquids - IDDSI Level 3   Medications: {medications:57727}  Aspiration precautions: {swallow strategies:00617}    Discharge therapy intensity: Moderate Intensity Therapy   Barriers to safe discharge:  {barriers:63313}    Subjective     Patient {affect:05344}.  Spiritual/Cultural/Mandaen Beliefs/Practices that affect care: no    Pain/Comfort: Pain Rating 1: 0/10    Respiratory Status:  {respiratory status:90182}    Objective     Oral Musculature  [facility]}    Therapeutic Activities:  Pt completed {dys ex:05130} exercises x*** with {cues:86372} cues.  Pt tolerated thermal stimulation to the anterior faucial pillars *** with *** swallow responses.  Laryngeal excursion ***.  Delay ***.    Therapeutic PO Trials:  Consistency Amount Fed By Oral Symptoms Pharyngeal Symptoms   {Consistency:19854} *** {Fed by:22684} {bse oral phase:70756} {bse pharyngeal symptoms:58153}   {Consistency:15934} *** {Fed by:90602} {bse oral phase:56807} {bse pharyngeal symptoms:85992}   {Consistency:44685} *** {Fed by:54687} {bse oral phase:75942} {bse pharyngeal symptoms:32498}   {Consistency:93176} *** {Fed by:14800} {bse oral phase:01393} {bse pharyngeal symptoms:11717}   {Consistency:62295} *** {Fed by:22193} {bse oral phase:30424} {bse pharyngeal symptoms:63977}   {Consistency:90995} *** {Fed by:35658} {bse oral phase:87415} {bse pharyngeal symptoms:87275}   {Consistency:53978} *** {Fed by:78887} {bse oral phase:50362} {bse pharyngeal symptoms:24088}     Assessment     Pt continues to present with {eldysphagia:08266} dysphagia {elporecs:15643}    Outcome Measures     Functional  Oral Intake Scale: {FunctionalOralIntakeScale:43231}    Goals     Multidisciplinary Problems       SLP Goals          Problem: SLP    Goal Priority Disciplines Outcome   SLP Goal     SLP    Description: LTG:  Pt will tolerate least restrictive diet with no clinical s/sx of aspiration.    STGs:  1. Tolerate half meal of soft/bite-sized consistency with no overt s/sx of aspiration, should dentures be brought to hospital.  2.  Perform BOT and LE exercises.  3.  Perform effortful swallow (ice chips ok for this exercise).                     Patient Education     {SLP pt education:74102}    Plan     Will continue to follow and tx as appropriate.    SLP Follow-Up:  Yes   Patient to be seen:  5 x/week   Plan of Care expires:  04/27/25  Plan of Care reviewed with:  patient       Time Tracking     SLP Treatment Date:   04/17/25  Speech Start Time:  1350  Speech Stop Time:  1400     Speech Total Time (min):  10 min    Billable minutes:  {olghslpminutes:89613}       04/17/2025

## 2025-04-17 NOTE — PT/OT/SLP PROGRESS
Physical Therapy Treatment    Patient Name:  Jon Conley   MRN:  [id number]    Recommendations:     Discharge therapy intensity: Moderate Intensity Therapy   Discharge Equipment Recommendations: to be determined by next level of care  Barriers to discharge: Impaired mobility and Ongoing medical needs    Assessment:     Jon Conley is a 79 y.o. male admitted with a medical diagnosis of SOB, cardiac arrest, s/p LHC and impella on 3/29, s/p high risk PCI with 4 stents in LAD on 4/10; acute respiratory failure requiring intubation 3/29 with extubation 4/13.  He presents with the following impairments/functional limitations: weakness, gait instability, impaired balance, impaired endurance, decreased safety awareness, impaired functional mobility .    Rehab Prognosis: Fair; patient would benefit from acute skilled PT services to address these deficits and reach maximum level of function.    Recent Surgery: Procedure(s) (LRB):  Left heart cath (N/A) 7 Days Post-Op    Plan:     During this hospitalization, patient would benefit from acute PT services 5 x/week to address the identified rehab impairments via gait training, therapeutic activities, therapeutic exercises and progress toward the following goals:    Plan of Care Expires:  05/15/25    Subjective     Chief Complaint: needing to call someone to  his horse  Patient/Family Comments/goals: to call Ernst about the horse  Pain/Comfort:  Pain Rating 1: 0/10      Objective:     Communicated with pts nurse prior to session.  Patient found HOB elevated with peripheral IV, telemetry, pulse ox (continuous), oxygen, contreras catheter upon PT entry to room.     General Precautions: Standard, aspiration, fall  Orthopedic Precautions: N/A  Braces: N/A  Respiratory Status: Nasal cannula, flow 2 L/min  Blood Pressure: 132/68  Skin Integrity: Visible skin intact      Functional Mobility:  Bed Mobility:     Rolling Left:  maximal assistance and of 2 persons  Rolling  Right: maximal assistance and of 2 persons  Scooting: maximal assistance and of 2 persons  Supine to Sit: maximal assistance and of 2 persons  Sit to Supine: total assistance and 2  Transfers:     Sit to Stand:  maximal assistance, of 2 persons, and 2 trials with hand-held assist and rolling walker  Balance: Poor sitting balance requiring Mod A  due to posterior lean, occasionally Min A but briefly.  Poor christiano of UE's to assist w/ balance.   Pt demonstrated poor stand posture and balance, only tolerating 2 trials and demonstrated R lean second trial w/ poor hip and knee extension.   Attempted sitting reaching but unable to focus on task, wanting to call son or friend to take of the horse.  Pt sat EOB ~10 mins        Education:  Patient provided with verbal education and demonstrations education regarding PT role/goals/POC, fall prevention, and safety awareness.  Additional teaching is warranted.     Patient left HOB elevated with all lines intact, call button in reach, bed alarm on, and nurse notified    GOALS:   Multidisciplinary Problems       Physical Therapy Goals          Problem: Physical Therapy    Goal Priority Disciplines Outcome Interventions   Physical Therapy Goal     PT, PT/OT Progressing    Description: Goals to be met by: 5/15/25     Patient will increase functional independence with mobility by performin. Supine to sit with MInimal Assistance  2. Sit to supine with MInimal Assistance  3. Sit to stand transfer with Moderate Assistance  4. Bed to chair transfer with Moderate Assistance using Rolling Walker  5. Sitting at edge of bed x20 minutes with Stand-by Assistance                         Time Tracking:     PT Received On: 25  PT Start Time: 1101     PT Stop Time: 1131  PT Total Time (min): 30 min     Billable Minutes: Therapeutic Activity 30 min    Treatment Type: Treatment  PT/PTA: PTA     Number of PTA visits since last PT visit: 2     2025

## 2025-04-17 NOTE — PLAN OF CARE
Referrals for SNF placement sent to     [facility], [facility], and [facility]. Awaiting response at this time.    [facility] is not in network with this patient's insurance provider.

## 2025-04-17 NOTE — PROGRESS NOTES
Inpatient Nutrition Assessment    Admit Date: 3/29/2025   Total duration of encounter: 19 days   Patient Age: 79 y.o.    Nutrition Recommendation/Prescription     Heart healthy diet with texture modifications per SLP, pureed solids and moderately thick liquids.  Encouragement and assistance with meals.  Boost Plus (provides 360 kcal, 14 g protein per serving) daily. Thicken to moderately thick consistency.  Magic Cup (provides 290 kcal, 9 g protein per serving) BID. Moderately thick appropriate.  Recommend daily stool softener, last documented BM 4/11?  Consider appetite stimulant per MD if po intake does not improve.    Communication of Recommendations: reviewed with nurse and reviewed with patient    Nutrition Assessment     Malnutrition Assessment/Nutrition-Focused Physical Exam       Malnutrition Level: other (see comments) (Does not meet criteria) (03/31/25 1259)  Energy Intake (Malnutrition): other (see comments) (Unable to assess) (03/31/25 1259)  Weight Loss (Malnutrition): other (see comments) (Unable to assess) (03/31/25 1259)                                                  A minimum of two characteristics is recommended for diagnosis of either severe or non-severe malnutrition.    Chart Review    Reason Seen: continuous nutrition monitoring and follow-up    Malnutrition Screening Tool Results   Have you recently lost weight without trying?: No  Have you been eating poorly because of a decreased appetite?: No   MST Score: 0   Diagnosis:  NSTEMI with cardiogenic shock, ongoing  Ischemic cardiomyopathy with severe decreased LVSF, EF 15%  Cardiogenic pulmonary edema with acute respiratory failure  Severe wide anion gap metabolic acidosis of primary lactic acid etiology, resolved   Elevated serum transaminases    Relevant Medical History: CAD, HTN, HLD, PAD, GERD    Scheduled Medications:  aspirin, 81 mg, Daily  atorvastatin, 40 mg, Daily  bumetanide, 1 mg, Daily  clopidogreL, 75 mg, Daily  dapagliflozin  propanediol, 10 mg, Daily  enoxaparin, 40 mg, Daily  famotidine, 20 mg, BID  [START ON 4/18/2025] metoprolol succinate, 50 mg, Daily  sacubitriL-valsartan, 1 tablet, BID  zinc oxide-cod liver oil, , BID    Continuous Infusions:  tirofiban-0.9% sodium chloride 12.5 mg/250ml, Last Rate: Stopped (03/29/25 1845)    PRN Medications:  acetaminophen, 650 mg, Q4H PRN  calcium gluconate IVPB, 1 g, PRN  calcium gluconate IVPB, 2 g, PRN  calcium gluconate IVPB, 3 g, PRN  dextrose 50%, 12.5 g, PRN  dextrose 50%, 25 g, PRN  fentaNYL, 50 mcg, Q1H PRN  glucagon (human recombinant), 1 mg, PRN  glucose, 16 g, PRN  glucose, 24 g, PRN  hydrALAZINE, 10 mg, Q4H PRN  magnesium sulfate 2 g IVPB, 2 g, PRN  magnesium sulfate 2 g IVPB, 2 g, PRN  ondansetron, 8 mg, Q8H PRN  potassium chloride in water, 20 mEq, PRN   And  potassium chloride in water, 40 mEq, PRN   And  potassium chloride in water, 60 mEq, PRN  sodium phosphate 15 mmol in D5W 250 mL IVPB, 15 mmol, PRN  sodium phosphate 20.1 mmol in D5W 250 mL IVPB, 20.1 mmol, PRN  sodium phosphate 30 mmol in D5W 250 mL IVPB, 30 mmol, PRN  tirofiban-0.9% sodium chloride 12.5 mg/250ml, , Continuous PRN    Calorie Containing IV Medications: no significant kcals from medications at this time    Recent Labs   Lab 04/10/25  2043 04/11/25  0326 04/11/25  1234 04/12/25  0825 04/12/25  1215 04/13/25  0252 04/13/25  1220 04/13/25  2040 04/14/25  0431 04/14/25  1150 04/14/25  2013 04/15/25  0331 04/16/25  0642 04/17/25  0548    140   < > 140  140   < > 140 139 142 140 139 141 138 142 140   K 4.1 4.5   < > 4.3  4.3   < > 3.5 3.5 3.6 3.8 3.6 3.6 3.7 4.3 4.1   CALCIUM 7.7* 7.4*   < > 7.7*  7.7*   < > 7.6* 7.7* 8.1* 7.7* 7.9* 8.1* 8.0* 8.1* 7.8*   PHOS 2.9 3.2   < > 2.2*   < > 1.4* 2.6 2.6 2.7 3.4 2.7 2.7  --   --    MG 2.10 2.10   < > 2.00   < > 2.00 2.00 2.00 2.20 2.10 2.10 2.20 2.20 2.20   * 115*   < > 112*  111*   < > 112* 111* 112* 112* 107 110* 111* 114* 114*   CO2 19* 20*   < > 24  23    < > 22* 19* 21* 20* 22* 21* 18* 20* 19*   BUN 10.9 15.3   < > 20.3  20.3   < > 17.0 15.6 14.4 12.9 12.5 12.8 12.8 14.4 15.4   CREATININE 0.72 0.69*   < > 0.64*  0.67*   < > 0.62* 0.67* 0.64* 0.63* 0.66* 0.67* 0.67* 0.72 0.73   EGFRNORACEVR >60 >60   < > >60  >60   < > >60 >60 >60 >60 >60 >60 >60 >60 >60   GLUCOSE 117* 120*   < > 132*  134*   < > 83 98 85 82 110 95 96 77* 78*   BILITOT 0.6 0.4   < > 0.3  0.3   < > 0.3 0.4 0.4 0.5 0.5 0.5 0.5 0.6 0.6   ALKPHOS 84 85   < > 81  81   < > 71 82 84 83 88 86 88 87 85   ALT 20 21   < > 19  18   < > 17 20 21 20 20 18 16 13 14   AST 26 37   < > 28  27   < > 22 26 25 21 21 18 18 15 17   ALBUMIN 1.7* 1.8*   < > 1.9*  1.9*   < > 1.9* 2.1* 2.3* 2.3* 2.4* 2.4* 2.4* 2.4* 2.5*   WBC 6.46 5.70  --  5.92  6.13  --  5.45  --   --  5.31  --   --  6.63 6.92  --    HGB 9.3* 9.2*  --  7.9*  8.0*  --  7.2*  --   --  8.1*  --   --  8.9* 8.6*  --    HCT 29.4* 30.1*  --  25.6*  25.3*  --  23.8*  --   --  26.5*  --   --  27.3* 28.5*  --     < > = values in this interval not displayed.     Nutrition Orders:  Diet Pureed (IDDSI Level 4) Heart Healthy; Moderately Thick Liquids (IDDSI Level 3)  Dietary nutrition supplements Daily; Boost Plus Nutritional Drink - Any flavor,Dietary nutrition supplements BID; Magic Cup - Any flavor    Appetite/Oral Intake: poor/0-25% of meals  Factors Affecting Nutritional Intake: decreased appetite  Social Needs Impacting Access to Food: unable to assess at this time; will attempt on follow-up  Food/Mormonism/Cultural Preferences: unable to obtain  Food Allergies: no known food allergies  Last Bowel Movement: 04/11/25  Wound(s):     Wound 04/10/25 1800 Shearing Right Buttocks-Tissue loss description: Full thickness       Wound 04/10/25 1800 Skin Tear Right lower Arm-Tissue loss description: Partial thickness      Comments    3/31/25: Discussed with RN. Will provide tube feeding recommendations for when appropriate to start tube feeding. Receiving kcal  "from meds.  Currently with Impella in place, HOB flat. May need to place in reverse Trendelenburg to start TF.     4/1/25: Possible plans for starting trickle feeds today. Still receiving kcal from meds.     4/2/25: No TF yet. Still receiving kcal from meds.     4/3/25: No TF plans at this time. Plans for CABG tomorrow with plans for extubation per protocol post-op.     4/7/25: No CABG done. Possible plans for removal of Impella today. TF to start post procedure per RN.     4/8/25: Plans for Impella removal tomorrow. TF to start today.     4/10/25: Plans for procedure today. TF on hold. Previously tolerated per RN.     4/14/25: Pt now extubated, on po diet. 25-50% po intake of meals per RN. Only taking small bites at this time. Attempted to verify subjective info with pt, pt [place] and not understanding questions. Was willing to have ONS sent. Also add Ousmane due to pressure ulcer.     4/17/25: Reports not being hungry. Ate 0% breakfast and lunch. Did drink almost 100% Boost this morning with medications. Will continue Boost Plus with breakfast, discussed thickening instructions with RN. Trial Magic Cup with lunch and dinner. Re-add Ousmane for wound healing once oral intake improves. Last BM 4/11.    Anthropometrics    Height: 5' 10.98" (180.3 cm), Height Method: Stated  Last Weight: 106.6 kg (235 lb 0.2 oz) (04/17/25 0617), Weight Method: Bed Scale  BMI (Calculated): 32.8  BMI Classification: obese grade I (BMI 30-34.9)        Ideal Body Weight (IBW), Male: 171.88 lb     % Ideal Body Weight, Male (lb): 130.81 %                          Usual Weight Provided By: unable to obtain usual weight    Wt Readings from Last 5 Encounters:   04/17/25 106.6 kg (235 lb 0.2 oz)   12/02/21 101 kg (222 lb 10.6 oz)     Weight Change(s) Since Admission:   Wt Readings from Last 1 Encounters:   04/17/25 0617 106.6 kg (235 lb 0.2 oz)   03/29/25 1738 106.1 kg (233 lb 14.5 oz)   03/29/25 0404 102.1 kg (225 lb)   Admit Weight: 102.1 kg (225 " lb) (03/29/25 0404), Weight Method: Stated    Estimated Needs    Weight Used For Calorie Calculations: 106.1 kg (233 lb 14.5 oz)  Energy Calorie Requirements (kcal): 2337kcal (1.3 stress factor)  Energy Need Method: Imperial-St Jeor  Weight Used For Protein Calculations: 106.1 kg (233 lb 14.5 oz)  Protein Requirements: 117-138gm (1.1-1.3g/kg)  Fluid Requirements (mL): 2122ml (20ml/kg)  CHO Requirement: 260gm (45% est kcal needs)     Enteral Nutrition     Patient not receiving enteral nutrition at this time.    Parenteral Nutrition     Patient not receiving parenteral nutrition support at this time.    Evaluation of Received Nutrient Intake    Calories: not meeting estimated needs  Protein: not meeting estimated needs    Patient Education     Not applicable.    Nutrition Diagnosis     PES: Inadequate oral intake related to acute illness as evidenced by 25-50% po intake of meals since diet advanced. (active)     PES:            Nutrition Interventions     Intervention(s): general/healthful diet, commercial beverage, commercial food, prescription medication, and collaboration with other providers  Intervention(s):      Goal: Meet greater than 80% of nutritional needs by follow-up. (goal not met)  Goal: Tolerate enteral feeding at goal rate by follow-up. (goal discontinued)    Nutrition Goals & Monitoring     Dietitian will monitor: food and beverage intake, energy intake, weight change, and gastrointestinal profile  Discharge planning:  cardiac diet with texture modifications per SLP  Nutrition Risk/Follow-Up: high (follow-up in 1-4 days)   Please consult if re-assessment needed sooner.      Dupixent Pregnancy And Lactation Text: This medication likely crosses the placenta but the risk for the fetus is uncertain. This medication is excreted in breast milk.

## 2025-04-17 NOTE — PROGRESS NOTES
[facility]    Cardiology  Progress Note    Patient Name: Jon Conley  MRN: [id number]  Admission Date: 3/29/2025  Hospital Length of Stay: 19 days  Code Status: DNR   Attending Provider: Elgin Stark MD   Consulting Provider: ANDREW Calle  Primary Care Physician: Patrick Reece Jr., MD  Principal Problem:<principal problem not specified>    Patient information was obtained from past medical records and ER records.     Subjective:   Chief Complaint/Reason for Consult: Cardiac Arrest     HPI: Mr. Conley is a 80 y/o male who is known to [facility], Dr. Wang. The patient presented to [facility] on 3.29.25 with c/o SOB. He initially presented to [facility] in [place] with SOB. He was found to have Tachycardia, Hypotension, Anemia (7.4/24.6), Troponin 9.443, .0, BUN/Crea 16.2/1.34, Lactic Acid Level 4.3, Na 133, WBC 14.03, H&H 7.3/23.0, WBC 14.03, , PT/INR 14.9/1.2. He was admitted to , however, he sustained a witnessed PEA Arrest in the ER and has ROSC per ER MD. [facility] was consulted for NSETMI and Arrest.     Hospital Course:  3.30.25: NAD. Impella L Groin. CVP 7, P7. Levophed 0.32mcg/kg/min, Heparin Drip per Protocol, Amiodarone 0.5mg/min  3.31.25: NAD. L Groin Impella P7, Amiodarone 0.5mg/min, Heparin Drip per Protocol, Levophed 0.18mcg/kg/min, H&H 8.8/25.8, AST/ALT 79/45  4.1.25: NAD noted. L groin Impella in place, remains at P7. Remains on levophed. LFTs continue to improve.  4.2.25: NAD. L Groin Impella Device P6. Heparin Infusion per Protocol. Remains on 0.06mcg/kg/min.   4.3.25: NAD. Vented/Sedated. L Groin Impella Device. P3, Heparin Infusion per Protocol. Off Pressors. Attempting Wean of Impella. H&H 8.2/26.2,   4.4.25: NAD. Vented/Sedated. Heparin Drip per Protocol. H&H 8.1/95.4,   4.5.25: NAD. Vented/Sedated. Heparin Drip per Protocol. Levophed 0.04mcg/kg/min. H&H 7.9/23.8,  Impella P6  4.6.25: NAD.  "Vented/Sedated. P3. Heparin Drip per Protocol, Off Pressors. CV Surgery to Reevaluate for CABG. H&H 7.4/22.7,   4.7.25: NAD. Vented/Sedated. H&H 9.2/27.7, , K 3.9, BUN/Crea 9.2/0.61, Lactic Acid 0.6. Impella L Groin P4  4.8.25:NAD noted. Remains vented/sedated. Consent obtained for LHC, will plan for Thursday.  4.9.25: NAD. Remains Vented/Sedated. Plans for LHC with PCI on Thursday. Impella P4. Remains Off Pressors.   4.10.25: NAD. Remains Vented/Sedated. NPO for Planned LHC with PCI/Impella Assisted. P4 Impella. Levophed 0.1mcg/kg/min.   4.11.25: NAD. Vented/Sedated. Levophed 0.12mcg/kg/min. S/P PCI of LAD and Impella Explant.   4.12.25: NAD. Vented/Sedated. Levophed 0.08mcg/kg/min.   4.13.25: NAD. Sitting in Bedside Chair. Denies CP, SOB and Palps. Off Pressors. "I am ok."   4.14.25: NAD. "I am fine." Denies CP, SOB and Palps. Remains Off Pressors.   4.15.25: NAD. "Hi." Denies CP, SOB and Palps.   4.16.25: NAD. Feeling Ok. Denies CP, SOB, or palpitations.   4.17.25: NAD Noted. Peripheral Edema much improved. Remains deconditioned. Worked with PT This AM. Vitals Stable.     PMH: BPH/Urinary Obstruction, CAD/Nonobstructive, PAD, R JACY/Mild, HTN, Obesity, GERD, Tobacco Use   PSH: TURP, Angiogram, Bilateral Knee Replacements   Family History: Denies Family History of Heart Disease  Social History: Denies Illicit Drug, ETOH and Tobacco Use     Previous Cardiac Diagnostics:   [facility] (4.10.25):  Findings:  - Successful Impella-protected PCI of LAD.  - Prox to Mid Left Anterior Descending has severe diffuse disease, up to 80% in severity. The lesion was successfully treated with overlapping FRONTIER BERNADINE 3.99S27AN, FRONTIER BERNADINE 3.36Y43RB, FRONTIER BERNADINE 2.61E15KG, and SYNERGY XD 2.5X28MM drug-eluting stents. Post-dilatation was performed using an [employer] MR 3.5X20MM angioplasty balloon inflated to 20 gretel. Following intervention there was 0% residual stenosis and PATTY grade 3 flow in the distal vessel.  - " Intravascular Ultrasound (IVUS) was performed prior to intervention to further characterize the lesion, as well as post-stent deployment to ensure optimal stent apposition and expansion.  - Ostial First Diagonal Branch has a 70% stenosis. At the conclusion of the case, there was PATTY grade 3 flow in the distal vessel.  - Successful Impella CP removal and closure of L femoral arterial access site using a 14 Albanian MANTA device.  Assessment/Plan:  - Patient is a 79 y.o. male with a history of CAD presents in cardiogenic shock. Successful PCI of RCA last week. Impella CP placed at that time. Now s/p successful LAD intervention and Impella CP removal.  - Continue DAPT (aspirin + clopidogrel) for a minimum of 12 months and aspirin indefinitely thereafter  - High intensity statin  - Arterial sheath remains place; plan is to remove when activated clotting time (ACT) less than 180 seconds. Bedrest for 4 hours after hemostasis is achieved.    ECHO Limited 4.8.25:  Impella check  Aortic annulus to tip distance is 4.3 cm.  LV systolic function is 15%.    ECHO 4.3.25:  Limited study to check impella placement.  Left Ventricle: There is severely reduced systolic function with a visually estimated ejection fraction of 15 - 20%.  Impella distance from aortic valve annulus appears appropriate.    ECHO 4.1.25:  Limited study to check impella placement.  Left Ventricle: There is severely reduced systolic function with a visually estimated ejection fraction of 15 - 20%.  Impella distance from aortic valve annulus appears appropriate.    ECHO 3.30.25:  Left Ventricle: There is severely reduced systolic function with a visually estimated ejection fraction of less than 30%.  There appears to be at least moderate to severe mitral regurgitation.   Impella is positioned with inflow cannula 3.8 cm from aortic valve annulus.    [facility] 3.29.25:  Findings:  - Successful placement of Impella CP device from left femoral approach.  - There is  severe multi-vessel coronary artery disease.  - Left Main has mild diffuse disease.  - Mid Left Anterior Descending has serial lesions, up to 70-80% in severity.   - Prox Left Circumflex is 100% occluded. Unable to cross lesion despite wire escalation and microcatheter support.  - Mid Right Coronary Artery is 100% occluded. The lesion was successfully treated with overlapping SYNERGY XD 4.0X20MM, SYNERGY XD 3.0X48MM, and SYNERGY XD 2.83N55FR drug-eluting stents. Post-dilatation was performed using a SYNERGY XD 4.0X20MM angioplasty balloon inflated to 12 gretel. Following intervention there was 0% residual stenosis and PATTY grade 3 flow in the distal vessel.  - Intravascular Ultrasound (IVUS) was performed prior to intervention to further characterize the lesion, as well as post-stent deployment to ensure optimal stent apposition and expansion.  - Aorto-iliac angiogram revealed moderate disease of R ileofemoral system.  - At the conclusion of the case, contrast injection was performed through the Impella sheath side port, showing adequate antegrade flow down the ipsilateral common femoral artery/SFA/profunda artery.  - RHC with elevated R sided filling pressures. RA 17 mmHg, RV 45/15 mmHg, PA 42/29 mmHg (mean 34 mmHg), PCWP 25 mmHg.  - Transpulmonary gradient is 9 mmHg.  - Cardiac Output/Index at 6.6/3.0, as calculated by Gregory equation on Levophed infusion and Impella CP support.  - PVR is 1.4 Woods units  - SVR is 550 dyne-sec*cm^-5  - Pulmonary Artery Pulsatility Index (Rafa) is 0.8  - Cardiac Power Output () is 0.91   Assessment/Plan:  - Patient is a 79 y.o. male with a history of recent TURP, complicated by significant post-op bleeding, presents to OSH ED with dyspnea. Transferred to [facility] with worsening shock despite vasopressors. TTE showing EF 25%, severe MR, severe TR. Troponin elevated. Brought to cath lab. Underwent Impella placement/LHC/RHC as outlined above. RCA lesion treated. Unable to cross LCx lesion.  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 At this point, would continue Impella support and aggressive medical mgmt. Has non-culprit lesions that should be treated at a later date as well. Can consider CTS consult, especially given severe valvular disease.  - Patient was given a loading dose of clopidogrel 600 mg PO in the cath lab  - Continue DAPT (aspirin + clopidogrel) for a minimum of 12 months and aspirin indefinitely thereafter  - High intensity statin  - Continue Aggrastat for 4 hours post-procedure  - Arterial sheath remains place; plan is to remove when activated clotting time (ACT) less than 180 seconds. Bedrest for 4 hours after hemostasis is achieved.  - Obtain transthoracic echocardiogram  - Continuous bedrest while Impella in place  - Therapeutic heparin administration per protocol  - Antibiotics per protocol  - Remaining mgmt per primary team/cardiology    ECHO 3.29.25:  Left Ventricle: The left ventricle is dilated. Normal wall thickness. Severe global hypokinesis present. There is severely reduced systolic function with a visually estimated ejection fraction of 15 - 20%. Grade I diastolic dysfunction.  Right Ventricle: Right ventricular enlargement. Systolic function is normal. TAPSE is 1.83 cm.  Left Atrium: dilated  Mitral Valve: Mildly thickened leaflets. There is moderate regurgitation.  Tricuspid Valve: There is moderate regurgitation.  Pulmonary Artery: There is moderate pulmonary hypertension.  IVC/SVC: Patient is ventilated, cannot use IVC diameter to estimate right atrial pressure.    Carotid US 11.4.24:  The study quality is average.   1-39% stenosis in the proximal right internal carotid artery based on Bluth Criteria.   Antegrade right vertebral artery flow.   Antegrade left vertebral artery flow    ECHO 1.27.22:  The study quality is average.   The left ventricle is normal in size. Global left ventricular systolic function is moderately decreased. The left ventricular ejection fraction  is 40-45%. Mild concentric left ventricular hypertrophy is present.  The left ventricle diastolic function is impaired (Grade I) with normal left atrial pressure.  Mild (1+) mitral regurgitation.  The pulmonary artery appears normal.     [facility] 9.26.17:  LM: Normal  LAD: Prox 30% Stenosis  RI: Normal  LCX: Normal  RCA: Distal RCA 50%    Review of patient's allergies indicates:  No Known Allergies  No current facility-administered medications on file prior to encounter.     No current outpatient medications on file prior to encounter.     Review of Systems   Respiratory:  Negative for chest tightness and shortness of breath.    Cardiovascular:  Negative for chest pain.   All other systems reviewed and are negative.    Objective:     Vital Signs (Most Recent):  Temp: 97.8 °F (36.6 °C) (04/17/25 1157)  Pulse: 83 (04/17/25 1157)  Resp: 18 (04/16/25 2022)  BP: 123/65 (04/17/25 1157)  SpO2: 97 % (04/17/25 1157) Vital Signs (24h Range):  Temp:  [97.4 °F (36.3 °C)-98.4 °F (36.9 °C)] 97.8 °F (36.6 °C)  Pulse:  [] 83  Resp:  [18-20] 18  SpO2:  [94 %-97 %] 97 %  BP: (116-149)/(64-79) 123/65   Weight: 106.6 kg (235 lb 0.2 oz)  Body mass index is 32.79 kg/m².  SpO2: 97 %       Intake/Output Summary (Last 24 hours) at 4/17/2025 1225  Last data filed at 4/17/2025 0617  Gross per 24 hour   Intake 760 ml   Output 1950 ml   Net -1190 ml     Lines/Drains/Airways       Drain  Duration                  Urethral Catheter -- days              Peripheral Intravenous Line  Duration                  Peripheral IV - Single Lumen 04/14/25 1600 20 G 2 1/4 in Yes Anterior;Distal;Right Upper Arm 2 days                  Significant Labs:   Chemistries:   Recent Labs   Lab 04/14/25  1150 04/14/25  2013 04/15/25  0331 04/16/25  0642 04/17/25  0548    141 138 142 140   K 3.6 3.6 3.7 4.3 4.1    110* 111* 114* 114*   CO2 22* 21* 18* 20* 19*   BUN 12.5 12.8 12.8 14.4 15.4   CREATININE 0.66* 0.67* 0.67* 0.72 0.73   CALCIUM 7.9* 8.1* 8.0*  "8.1* 7.8*   BILITOT 0.5 0.5 0.5 0.6 0.6   ALKPHOS 88 86 88 87 85   ALT 20 18 16 13 14   AST 21 18 18 15 17   GLUCOSE 110 95 96 77* 78*   MG 2.10 2.10 2.20 2.20 2.20   PHOS 3.4 2.7 2.7  --   --         CBC/Anemia Labs: Coags:    Recent Labs   Lab 04/14/25  0431 04/15/25  0331 04/16/25  0642   WBC 5.31 6.63 6.92   HGB 8.1* 8.9* 8.6*   HCT 26.5* 27.3* 28.5*    327 365   MCV 91.7 88.9 93.1   RDW 17.0 16.9 17.1*    No results for input(s): "PT", "INR", "APTT" in the last 168 hours.         Telemetry: Sinus Rhythm     Physical Exam  Vitals and nursing note reviewed.   Constitutional:       General: He is not in acute distress.  HENT:      Head: Normocephalic.      Mouth/Throat:      Mouth: Mucous membranes are moist.      Pharynx: Oropharynx is clear.   Cardiovascular:      Rate and Rhythm: Normal rate and regular rhythm.   Pulmonary:      Effort: Pulmonary effort is normal. No respiratory distress.   Abdominal:      Palpations: Abdomen is soft.   Genitourinary:     Comments: Ortiz Catheter  Musculoskeletal:      Right lower leg: No edema.      Left lower leg: No edema.      Comments: Bilateral Lower Extremities Warm    Skin:     General: Skin is warm and dry.   Neurological:      Mental Status: He is alert. Mental status is at baseline.   Psychiatric:         Behavior: Behavior normal.       Home Medications:   Medications Ordered Prior to Encounter[1]  Current Schedule Inpatient Medications:   aspirin  81 mg Oral Daily    atorvastatin  40 mg Oral Daily    bumetanide  1 mg Oral Daily    clopidogreL  75 mg Oral Daily    dapagliflozin propanediol  10 mg Oral Daily    enoxaparin  40 mg Subcutaneous Daily    famotidine  20 mg Per NG tube BID    metoprolol succinate  25 mg Oral Once    [START ON 4/18/2025] metoprolol succinate  50 mg Oral Daily    sacubitriL-valsartan  1 tablet Oral BID    zinc oxide-cod liver oil   Topical (Top) BID     Continuous Infusions:   tirofiban-0.9% sodium chloride 12.5 mg/250ml    Continuous " PRN   Stopped at 03/29/25 1845     Assessment:   Cardiogenic Shock requiring Mechanical and Chemical Support - Resolved     - Impella/Pressors - Impella Explanted on 4.10.25 post High Risk LAD PCI/Impella Assisted    - Hypotension requiring Pressors - Resolved    - History of HTN    - Lactic Acidosis - Resolved   CAD (Multivessel)    - s/p LHC (4.10.25) - PTCA/TAD of the Prox to Mid LAD and Impella Explant     - s/p LHC (3.29.25) -  Left Main has mild diffuse disease. Mid Left Anterior Descending has serial lesions, up to 70-80% in severity. Prox Left Circumflex is 100% occluded. Unable to cross lesion despite wire escalation and microcatheter support. Mid Right Coronary Artery is 100% occluded. The lesion was successfully treated with overlapping SYNERGY XD 4.0X20MM, SYNERGY XD 3.0X48MM, and SYNERGY XD 2.87Q10NB drug-eluting stents. Post-dilatation was performed using a SYNERGY XD 4.0X20MM angioplasty balloon inflated to 12 gretel. Following intervention there was 0% residual stenosis and PATTY grade 3 flow in the distal vessel.  Cardiopulmonary Arrest    - Initial Rhythm PEA - Witnessed in ER Arrest/ROSC  NSTEMI Type I (Non-Anterior Wall MI)    - Trponin > 9  Acute Hypoxemic Respiratory Failure requiring Intubation/Ventilation - Resolved on NC O2  ICMO/EF 15%     - ECHO (4.8.25) - LVEF 15%    - ECHO (3.29.25) - LVEF 15-20%, Grade I DD    JACY/Right Sided   PAD/Nonobstructive   BPH s/p TURP   Leukocytosis - Resolved   Anemia requiring Transfusions - Stable   Recent BPH Diagnosis s/p TURP  Transaminitis - Resolved   Obesity  GERD  Nicotine Dependence/Chronic Tobacco Use  No History of GI Bleed      Plan:   Continue DAPT (Aspirin 81 Mg Daily & Plavix 75 Mg Daily)  Continue Atorvastatin 40 Mg PO Daily  Continue HF Medications: Advance Toprol XL to 50 Mg PO Daily, Entresto 24/26 Mg PO BID, Continue Farxiga 10 Mg PO Daily  Continue Mobilization with PT  Will defer Life Vest at this time Re: DNR  Will likely require Rehab  versus SNF Placement  Follow up with [facility]  Will be available. Call if needed.  Refer for ICR Re: CAD/PCI/NSTEMI    ANDREW Calle  Cardiology  [facility]        [1]   No current facility-administered medications on file prior to encounter.     No current outpatient medications on file prior to encounter.

## 2025-04-17 NOTE — PLAN OF CARE
Outbound call to patient's daughter, Kimmy, to follow up on FOC.  Daughter states she and her sister have not made a decision.  States they are both working and they have several questions prior to making a decision.  Kimmy states they will plan on visiting the hospital on Monday and will talk with MD and CM for future plans.  Will continue to follow

## 2025-04-18 NOTE — PROGRESS NOTES
[facility]        Chief Complaint: Inpatient Follow-up     HPI:   Jon Conley is a 79 y.o. male with a past medical history of hypertension, hyperlipidemia, CAD, PAD, GERD, and BPH s/p TURP who presented to [facility] on 3/29/2025 transferred from [facility] for cardiology services.  Patient presented to outside facility for shortness of breath.  Patient was tachycardic and hypotensive with hemoglobin of 7.5, hematocrit 24.6, high sensitivity troponin 13,821, WBC 13.4, and CPK 7 O2.  Patient was given 500 mL normal saline bolus and heparin 5000 units and transferred to [facility] for higher level of care.  Patient has suffered PAE arrest requiring intubation and CPR with ROSC. Labs revealed WBC 14.03, hemoglobin 7.3, hematocrit 23, troponin 9.443, , BUN 16.2, creatinine 1.34, lactic acid 4.3, PT/INR 14.9/1.2. Patient underwent left heart catheterization with Impella placement on 03/29/2025.  LHC revealed multivessel coronary artery disease with 100% occluded proximal left circumflex, unable to cross with wire. Mid RCA was also noted to be 100 % occluded, treated with PTCA/TAD.  Patient was started on Levophed, heparin drip per protocol, and amiodarone.  Echo revealed EF 15-20% with grade 1 diastolic dysfunction.  CV surgery was consulted.  Patient was deemed not a surgical candidate for CABG secondary to multiple comorbidities.  Patient received 2 units packed red blood cells on 04/06.  Patient underwent high-risk PCI on 04/10/2025 with 4 stents in LAD.  Impella was removed.  Patient was extubated on 04/13/2025.  Patient was cleared for downgrade out of ICU on 04/14/2025 and was admitted to hospital medicine service.     4/16 Afebrile overnight. Resting comfortably in the bed. Doing well on 2 L. blood pressure stable. No family members at bedside. Bumex was added for diuresis purposes. CBGS within acceptable limits. P.o. intake is  inadequate. HGB and platelets stable. No new electrolyte abnormalities.   4/17 Had episode of Vtach on tele yesterday and again today, cardiology made aware. Patient denies any pain and asymptomatic. Worked with PT.     Interval Hx:     AF. FRANCESEON. Discussed with family at bedside.   BM regimen adjusted    Case was discussed with patient's nurse and  on the floor.    Objective/physical exam:  General: In no acute distress, afebrile  Chest: Clear to auscultation bilaterally  Heart: RRR, +S1, S2, no appreciable murmur  Abdomen: Soft, nontender, BS +  MSK: Warm, no lower extremity edema, no clubbing or cyanosis  Neurologic: Alert and oriented x4, Cranial nerve II-XII intact    VITAL SIGNS: 24 HRS MIN & MAX LAST   Temp  Min: 97.8 °F (36.6 °C)  Max: 99 °F (37.2 °C) 98.2 °F (36.8 °C)   BP  Min: 101/63  Max: 127/63 112/70   Pulse  Min: 79  Max: 98  94   Resp  Min: 18  Max: 24 18   SpO2  Min: 95 %  Max: 97 % 97 %     I have reviewed the following labs:  Recent Labs   Lab 04/14/25  0431 04/15/25  0331 04/16/25  0642   WBC 5.31 6.63 6.92   RBC 2.89* 3.07* 3.06*   HGB 8.1* 8.9* 8.6*   HCT 26.5* 27.3* 28.5*   MCV 91.7 88.9 93.1   MCH 28.0 29.0 28.1   MCHC 30.6* 32.6* 30.2*   RDW 17.0 16.9 17.1*    327 365   MPV 10.0 9.8 10.0     Recent Labs   Lab 04/12/25  1125 04/12/25  1215 04/12/25  2118 04/15/25  0331 04/16/25  0642 04/17/25  0548 04/18/25  0428   NA  --  140   < > 138 142 140 142   K  --  4.4   < > 3.7 4.3 4.1 4.1   CL  --  112*   < > 111* 114* 114* 114*   CO2  --  23   < > 18* 20* 19* 22*   BUN  --  20.3   < > 12.8 14.4 15.4 18.7   CREATININE  --  0.63*   < > 0.67* 0.72 0.73 0.68*   CALCIUM  --  7.8*   < > 8.0* 8.1* 7.8* 8.2*   PH 7.430 7.440  --   --   --   --   --    MG  --  2.00   < > 2.20 2.20 2.20 2.30   ALBUMIN  --  2.0*   < > 2.4* 2.4* 2.5*  --    ALKPHOS  --  86   < > 88 87 85  --    ALT  --  20   < > 16 13 14  --    AST  --  26   < > 18 15 17  --    BILITOT  --  0.3   < > 0.5 0.6 0.6  --     < >  = values in this interval not displayed.     Microbiology Results (last 7 days)       ** No results found for the last 168 hours. **             See below for Radiology    Assessment/Plan:  Acute hypoxemic respiratory failure requiring intubation, extubated 04/13/2024   Cardiogenic shock requiring mechanical and chemical support   Impella placement 03/29/2025, removed 04/10/2025  Multivessel CAD  [facility] with PTCA/TAD x3 RCA (03/29/2025)   Status post high-risk PCI withPTCA/TAD X4 LAD on 4/10/25   Cardiopulmonary arrest   ICMO/EF 15%  Normocytic anemia, stable     History of hypertension, hyperlipidemia, CAD, PAD, GERD, and BPH s/p recent TURP      Plan:   -doing well on 2 L.  Mobilize as tolerated.  Encourage IS use, pulmonary toileting   -cardiology following, adjusting GDMT.  Monitor I's and O's and electrolytes.  Continue telemetry.  Appreciate cardiology assistance. Will defer Life Vest at this time Re: DNR   -PT OT.  He needs SNF placement   -other home medications were reviewed and renewed        VTE prophylaxis: Lovenox     Patient condition:  Stable    Anticipated discharge and Disposition:         All diagnosis and differential diagnosis have been reviewed; assessment and plan has been documented; I have personally reviewed the labs and test results that are presently available; I have reviewed the patients medication list; I have reviewed the consulting providers response and recommendations. I have reviewed or attempted to review medical records based upon their availability    All of the patient's questions have been  addressed and answered. Patient's is agreeable to the above stated plan. I will continue to monitor closely and make adjustments to medical management as needed.    Portions of this note dictated using EMR integrated voice recognition software, and may be subject to voice recognition errors not corrected at proofreading. Please contact writer for clarification if needed.    _____________________________________________________________________    Malnutrition Status:  Nutrition consulted. Most recent weight and BMI monitored-     Measurements:  Wt Readings from Last 1 Encounters:   25 106.6 kg (235 lb 0.2 oz)   Body mass index is 32.79 kg/m².    Patient has been screened and assessed by RD.    Malnutrition Type:  Context:    Level: other (see comments) (Does not meet criteria)    Malnutrition Characteristic Summary:  Weight Loss (Malnutrition): other (see comments) (Unable to assess)  Energy Intake (Malnutrition): other (see comments) (Unable to assess)    Interventions/Recommendations (treatment strategy):        Scheduled Med:   aspirin  81 mg Oral Daily    atorvastatin  40 mg Oral Daily    bumetanide  1 mg Oral Daily    clopidogreL  75 mg Oral Daily    dapagliflozin propanediol  10 mg Oral Daily    enoxaparin  40 mg Subcutaneous Daily    famotidine  20 mg Per NG tube BID    metoprolol succinate  50 mg Oral Daily    sacubitriL-valsartan  1 tablet Oral BID    senna-docusate  1 tablet Oral Daily    zinc oxide-cod liver oil   Topical (Top) BID      Continuous Infusions:   tirofiban-0.9% sodium chloride 12.5 mg/250ml    Continuous PRN   Stopped at 25 1845      PRN Meds:    Current Facility-Administered Medications:     acetaminophen, 650 mg, Oral, Q4H PRN    calcium gluconate IVPB, 1 g, Intravenous, PRN    calcium gluconate IVPB, 2 g, Intravenous, PRN    calcium gluconate IVPB, 3 g, Intravenous, PRN    dextrose 50%, 12.5 g, Intravenous, PRN    dextrose 50%, 25 g, Intravenous, PRN    [] fentaNYL, 50 mcg, Intravenous, Q15 Min PRN **FOLLOWED BY** fentaNYL, 50 mcg, Intravenous, Q1H PRN    glucagon (human recombinant), 1 mg, Intramuscular, PRN    glucose, 16 g, Oral, PRN    glucose, 24 g, Oral, PRN    hydrALAZINE, 10 mg, Intravenous, Q4H PRN    magnesium sulfate 2 g IVPB, 2 g, Intravenous, PRN    magnesium sulfate 2 g IVPB, 2 g, Intravenous, PRN    ondansetron, 8 mg, Oral,  Q8H PRN    polyethylene glycol, 17 g, Oral, BID PRN    potassium chloride in water, 20 mEq, Intravenous, PRN **AND** potassium chloride in water, 40 mEq, Intravenous, PRN **AND** potassium chloride in water, 60 mEq, Intravenous, PRN    sodium phosphate 15 mmol in D5W 250 mL IVPB, 15 mmol, Intravenous, PRN    sodium phosphate 20.1 mmol in D5W 250 mL IVPB, 20.1 mmol, Intravenous, PRN    sodium phosphate 30 mmol in D5W 250 mL IVPB, 30 mmol, Intravenous, PRN    tirofiban-0.9% sodium chloride 12.5 mg/250ml, , , Continuous PRN     Radiology:  I have personally reviewed the following imaging and agree with the radiologist.     Fl Modified Barium Swallow Speech  See procedure notes from Speech Pathologist.    This procedure was auto-finalized.      Elgin Stark MD  Department of Hospital Medicine   [facility]   04/18/2025

## 2025-04-19 NOTE — PT/OT/SLP PROGRESS
Physical Therapy Treatment    Patient Name:  Jon Conley   MRN:  [id number]    Recommendations:     Discharge therapy intensity: Moderate Intensity Therapy   Discharge Equipment Recommendations: to be determined by next level of care  Barriers to discharge: Impaired mobility    Assessment:     Jon Conley is a 79 y.o. male admitted with a medical diagnosis of SOB, cardiac arrest, s/p LHC and impella on 3/29, s/p high risk PCI with 4 stents in LAD on 4/10; acute respiratory failure requiring intubation 3/29 with extubation 4/13 .  He presents with the following impairments/functional limitations: weakness, gait instability, impaired balance, impaired endurance, decreased safety awareness, impaired functional mobility .    Pt able to stand from EOB with RW modAx2. Unable to take steps at this time. Able to T/F to chair via squat pivot maxAx2.     Rehab Prognosis: Good; patient would benefit from acute skilled PT services to address these deficits and reach maximum level of function.    Recent Surgery: Procedure(s) (LRB):  Left heart cath (N/A) 9 Days Post-Op    Plan:     During this hospitalization, patient would benefit from acute PT services 5 x/week to address the identified rehab impairments via gait training, therapeutic activities, therapeutic exercises and progress toward the following goals:    Plan of Care Expires:  05/15/25    Subjective     Chief Complaint: I want my Dr ledesma   Patient/Family Comments/goals:   Pain/Comfort:  Pain Rating 1: 0/10      Objective:     Communicated with NSG prior to session.  Patient found HOB elevated with pulse ox (continuous), telemetry, oxygen, contreras catheter upon PT entry to room.     General Precautions: Standard, fall  Orthopedic Precautions: N/A  Braces: N/A  Respiratory Status: Nasal cannula, flow 1 L/min  Blood Pressure:   Skin Integrity: Visible skin intact      Functional Mobility:  Bed Mobility:     Scooting: moderate assistance  Supine to Sit:  moderate assistance and of 2 persons  Transfers:     Sit to Stand:  moderate assistance and of 2 persons with rolling walker and 1 trial from EOB and 1 trial from bedside chair   Bed to Chair: maximal assistance and of 2 persons with  no AD  using  Squat Pivot  Stat sitting: pt sat EOB for roughly 6min SBA prior to chair T/F to drink his Dr callie  .     Patient left up in chair with all lines intact, call button in reach, and deep pad in place    GOALS:   Multidisciplinary Problems       Physical Therapy Goals          Problem: Physical Therapy    Goal Priority Disciplines Outcome Interventions   Physical Therapy Goal     PT, PT/OT Progressing    Description: Goals to be met by: 5/15/25     Patient will increase functional independence with mobility by performin. Supine to sit with MInimal Assistance  2. Sit to supine with MInimal Assistance  3. Sit to stand transfer with Moderate Assistance  4. Bed to chair transfer with Moderate Assistance using Rolling Walker  5. Sitting at edge of bed x20 minutes with Stand-by Assistance                         Time Tracking:     PT Received On: 25  PT Start Time: 909     PT Stop Time: 932  PT Total Time (min): 23 min     Billable Minutes: Therapeutic Activity 23    Treatment Type: Treatment  PT/PTA: PTA     Number of PTA visits since last PT visit: 3     2025

## 2025-04-19 NOTE — PROGRESS NOTES
[facility]        Chief Complaint: Inpatient Follow-up     HPI:   Jon Conley is a 79 y.o. male with a past medical history of hypertension, hyperlipidemia, CAD, PAD, GERD, and BPH s/p TURP who presented to [facility] on 3/29/2025 transferred from [facility] for cardiology services.  Patient presented to outside facility for shortness of breath.  Patient was tachycardic and hypotensive with hemoglobin of 7.5, hematocrit 24.6, high sensitivity troponin 13,821, WBC 13.4, and CPK 7 O2.  Patient was given 500 mL normal saline bolus and heparin 5000 units and transferred to [facility] for higher level of care.  Patient has suffered PAE arrest requiring intubation and CPR with ROSC. Labs revealed WBC 14.03, hemoglobin 7.3, hematocrit 23, troponin 9.443, , BUN 16.2, creatinine 1.34, lactic acid 4.3, PT/INR 14.9/1.2. Patient underwent left heart catheterization with Impella placement on 03/29/2025.  LHC revealed multivessel coronary artery disease with 100% occluded proximal left circumflex, unable to cross with wire. Mid RCA was also noted to be 100 % occluded, treated with PTCA/TAD.  Patient was started on Levophed, heparin drip per protocol, and amiodarone.  Echo revealed EF 15-20% with grade 1 diastolic dysfunction.  CV surgery was consulted.  Patient was deemed not a surgical candidate for CABG secondary to multiple comorbidities.  Patient received 2 units packed red blood cells on 04/06.  Patient underwent high-risk PCI on 04/10/2025 with 4 stents in LAD.  Impella was removed.  Patient was extubated on 04/13/2025.  Patient was cleared for downgrade out of ICU on 04/14/2025 and was admitted to hospital medicine service.     4/16 Afebrile overnight. Resting comfortably in the bed. Doing well on 2 L. blood pressure stable. No family members at bedside. Bumex was added for diuresis purposes. CBGS within acceptable limits. P.o. intake is  inadequate. HGB and platelets stable. No new electrolyte abnormalities.   4/17 Had episode of Vtach on tele yesterday and again today, cardiology made aware. Patient denies any pain and asymptomatic. Worked with PT.     Interval Hx:     AF. FRANCESEON. BM regimen adjusted    Case was discussed with patient's nurse and  on the floor.    Objective/physical exam:  General: In no acute distress, afebrile  Chest: Clear to auscultation bilaterally  Heart: RRR, +S1, S2, no appreciable murmur  Abdomen: Soft, nontender, BS +  MSK: Warm, no lower extremity edema, no clubbing or cyanosis  Neurologic: Alert and oriented x4, Cranial nerve II-XII intact    VITAL SIGNS: 24 HRS MIN & MAX LAST   Temp  Min: 97.6 °F (36.4 °C)  Max: 98.4 °F (36.9 °C) 98.4 °F (36.9 °C)   BP  Min: 109/68  Max: 129/70 109/68   Pulse  Min: 85  Max: 91  91   Resp  Min: 18  Max: 22 18   SpO2  Min: 93 %  Max: 99 % 97 %     I have reviewed the following labs:  Recent Labs   Lab 04/14/25  0431 04/15/25  0331 04/16/25  0642   WBC 5.31 6.63 6.92   RBC 2.89* 3.07* 3.06*   HGB 8.1* 8.9* 8.6*   HCT 26.5* 27.3* 28.5*   MCV 91.7 88.9 93.1   MCH 28.0 29.0 28.1   MCHC 30.6* 32.6* 30.2*   RDW 17.0 16.9 17.1*    327 365   MPV 10.0 9.8 10.0     Recent Labs   Lab 04/15/25  0331 04/16/25  0642 04/17/25  0548 04/18/25  0428    142 140 142   K 3.7 4.3 4.1 4.1   * 114* 114* 114*   CO2 18* 20* 19* 22*   BUN 12.8 14.4 15.4 18.7   CREATININE 0.67* 0.72 0.73 0.68*   CALCIUM 8.0* 8.1* 7.8* 8.2*   MG 2.20 2.20 2.20 2.30   ALBUMIN 2.4* 2.4* 2.5*  --    ALKPHOS 88 87 85  --    ALT 16 13 14  --    AST 18 15 17  --    BILITOT 0.5 0.6 0.6  --      Microbiology Results (last 7 days)       ** No results found for the last 168 hours. **             See below for Radiology    Assessment/Plan:  Acute hypoxemic respiratory failure requiring intubation, extubated 04/13/2024   Cardiogenic shock requiring mechanical and chemical support   Impella placement 03/29/2025,  removed 04/10/2025  Multivessel CAD  [facility] with PTCA/TAD x3 RCA (03/29/2025)   Status post high-risk PCI withPTCA/TAD X4 LAD on 4/10/25   Cardiopulmonary arrest   ICMO/EF 15%  Normocytic anemia, stable     History of hypertension, hyperlipidemia, CAD, PAD, GERD, and BPH s/p recent TURP      Plan:   -doing well on 2 L.  Mobilize as tolerated.  Encourage IS use, pulmonary toileting   -cardiology following, adjusting GDMT.  Monitor I's and O's and electrolytes.  Continue telemetry.  Appreciate cardiology assistance. Will defer Life Vest at this time Re: DNR   -PT OT.  He needs SNF placement   -other home medications were reviewed and renewed        VTE prophylaxis: Lovenox     Patient condition:  Stable    Anticipated discharge and Disposition:         All diagnosis and differential diagnosis have been reviewed; assessment and plan has been documented; I have personally reviewed the labs and test results that are presently available; I have reviewed the patients medication list; I have reviewed the consulting providers response and recommendations. I have reviewed or attempted to review medical records based upon their availability    All of the patient's questions have been  addressed and answered. Patient's is agreeable to the above stated plan. I will continue to monitor closely and make adjustments to medical management as needed.    Portions of this note dictated using EMR integrated voice recognition software, and may be subject to voice recognition errors not corrected at proofreading. Please contact writer for clarification if needed.   _____________________________________________________________________    Malnutrition Status:  Nutrition consulted. Most recent weight and BMI monitored-     Measurements:  Wt Readings from Last 1 Encounters:   04/17/25 106.6 kg (235 lb 0.2 oz)   Body mass index is 32.79 kg/m².    Patient has been screened and assessed by RD.    Malnutrition Type:  Context:    Level: other (see  comments) (Does not meet criteria)    Malnutrition Characteristic Summary:  Weight Loss (Malnutrition): other (see comments) (Unable to assess)  Energy Intake (Malnutrition): other (see comments) (Unable to assess)    Interventions/Recommendations (treatment strategy):        Scheduled Med:   aspirin  81 mg Oral Daily    atorvastatin  40 mg Oral Daily    bumetanide  1 mg Oral Daily    clopidogreL  75 mg Oral Daily    dapagliflozin propanediol  10 mg Oral Daily    enoxaparin  40 mg Subcutaneous Daily    famotidine  20 mg Per NG tube BID    metoprolol succinate  50 mg Oral Daily    sacubitriL-valsartan  1 tablet Oral BID    senna-docusate  1 tablet Oral Daily    zinc oxide-cod liver oil   Topical (Top) BID      Continuous Infusions:   tirofiban-0.9% sodium chloride 12.5 mg/250ml    Continuous PRN   Stopped at 25 1845      PRN Meds:    Current Facility-Administered Medications:     acetaminophen, 650 mg, Oral, Q4H PRN    calcium gluconate IVPB, 1 g, Intravenous, PRN    calcium gluconate IVPB, 2 g, Intravenous, PRN    calcium gluconate IVPB, 3 g, Intravenous, PRN    dextrose 50%, 12.5 g, Intravenous, PRN    dextrose 50%, 25 g, Intravenous, PRN    [] fentaNYL, 50 mcg, Intravenous, Q15 Min PRN **FOLLOWED BY** fentaNYL, 50 mcg, Intravenous, Q1H PRN    glucagon (human recombinant), 1 mg, Intramuscular, PRN    glucose, 16 g, Oral, PRN    glucose, 24 g, Oral, PRN    hydrALAZINE, 10 mg, Intravenous, Q4H PRN    magnesium sulfate 2 g IVPB, 2 g, Intravenous, PRN    magnesium sulfate 2 g IVPB, 2 g, Intravenous, PRN    ondansetron, 8 mg, Oral, Q8H PRN    polyethylene glycol, 17 g, Oral, BID PRN    potassium chloride in water, 20 mEq, Intravenous, PRN **AND** potassium chloride in water, 40 mEq, Intravenous, PRN **AND** potassium chloride in water, 60 mEq, Intravenous, PRN    sodium phosphate 15 mmol in D5W 250 mL IVPB, 15 mmol, Intravenous, PRN    sodium phosphate 20.1 mmol in D5W 250 mL IVPB, 20.1 mmol, Intravenous,  PRN    sodium phosphate 30 mmol in D5W 250 mL IVPB, 30 mmol, Intravenous, PRN    tirofiban-0.9% sodium chloride 12.5 mg/250ml, , , Continuous PRN     Radiology:  I have personally reviewed the following imaging and agree with the radiologist.     Fl Modified Barium Swallow Speech  See procedure notes from Speech Pathologist.    This procedure was auto-finalized.      Elgin Satrk MD  Department of Hospital Medicine   [facility]   04/19/2025

## 2025-04-19 NOTE — PLAN OF CARE
Problem: Infection  Goal: Absence of Infection Signs and Symptoms  Outcome: Progressing     Problem: Adult Inpatient Plan of Care  Goal: Plan of Care Review  Outcome: Progressing  Goal: Patient-Specific Goal (Individualized)  Outcome: Progressing  Goal: Absence of Hospital-Acquired Illness or Injury  Outcome: Progressing  Goal: Optimal Comfort and Wellbeing  Outcome: Progressing  Goal: Readiness for Transition of Care  Outcome: Progressing     Problem: Artificial Airway  Goal: Effective Communication  Outcome: Progressing  Goal: Optimal Device Function  Outcome: Progressing  Goal: Absence of Device-Related Skin or Tissue Injury  Outcome: Progressing     Problem: Delirium  Goal: Optimal Coping  Outcome: Progressing  Goal: Improved Behavioral Control  Outcome: Progressing  Goal: Improved Attention and Thought Clarity  Outcome: Progressing  Goal: Improved Sleep  Outcome: Progressing     Problem: Skin Injury Risk Increased  Goal: Skin Health and Integrity  Outcome: Progressing     Problem: Fall Injury Risk  Goal: Absence of Fall and Fall-Related Injury  Outcome: Progressing     Problem: Wound  Goal: Optimal Coping  Outcome: Progressing  Goal: Optimal Functional Ability  Outcome: Progressing  Goal: Absence of Infection Signs and Symptoms  Outcome: Progressing  Goal: Improved Oral Intake  Outcome: Progressing  Goal: Optimal Pain Control and Function  Outcome: Progressing  Goal: Skin Health and Integrity  Outcome: Progressing  Goal: Optimal Wound Healing  Outcome: Progressing

## 2025-04-20 PROBLEM — I21.4 NSTEMI (NON-ST ELEVATED MYOCARDIAL INFARCTION): Status: ACTIVE | Noted: 2025-01-01

## 2025-04-20 NOTE — PROGRESS NOTES
[facility] Medicine Progress Note        Chief Complaint: Inpatient Follow-up     HPI:   Jon Conley is a 79 y.o. male with a past medical history of hypertension, hyperlipidemia, CAD, PAD, GERD, and BPH s/p TURP who presented to [facility] on 3/29/2025 transferred from [facility] for cardiology services.  Patient presented to outside facility for shortness of breath.  Patient was tachycardic and hypotensive with hemoglobin of 7.5, hematocrit 24.6, high sensitivity troponin 13,821, WBC 13.4, and CPK 7 O2.  Patient was given 500 mL normal saline bolus and heparin 5000 units and transferred to [facility] for higher level of care.  Patient has suffered PAE arrest requiring intubation and CPR with ROSC. Labs revealed WBC 14.03, hemoglobin 7.3, hematocrit 23, troponin 9.443, , BUN 16.2, creatinine 1.34, lactic acid 4.3, PT/INR 14.9/1.2. Patient underwent left heart catheterization with Impella placement on 03/29/2025.  LHC revealed multivessel coronary artery disease with 100% occluded proximal left circumflex, unable to cross with wire. Mid RCA was also noted to be 100 % occluded, treated with PTCA/TAD.  Patient was started on Levophed, heparin drip per protocol, and amiodarone.  Echo revealed EF 15-20% with grade 1 diastolic dysfunction.  CV surgery was consulted.  Patient was deemed not a surgical candidate for CABG secondary to multiple comorbidities.  Patient received 2 units packed red blood cells on 04/06.  Patient underwent high-risk PCI on 04/10/2025 with 4 stents in LAD.  Impella was removed.  Patient was extubated on 04/13/2025.  Patient was cleared for downgrade out of ICU on 04/14/2025 and was admitted to hospital medicine service.     4/16 Afebrile overnight. Resting comfortably in the bed. Doing well on 2 L. blood pressure stable. No family members at bedside. Bumex was added for diuresis purposes. CBGS within acceptable limits. P.o. intake is  inadequate. HGB and platelets stable. No new electrolyte abnormalities.   4/17 Had episode of Vtach on tele yesterday and again today, cardiology made aware. Patient denies any pain and asymptomatic. Worked with PT.     Interval Hx:     AF. NAEON. BM yesterday, sleeping in am.    Case was discussed with patient's nurse and  on the floor.    Objective/physical exam:  General: In no acute distress, afebrile  Chest: Clear to auscultation bilaterally  Heart: RRR, +S1, S2, no appreciable murmur  Abdomen: Soft, nontender, BS +  MSK: Warm, no lower extremity edema, no clubbing or cyanosis  Neurologic: Alert and oriented x4, Cranial nerve II-XII intact    VITAL SIGNS: 24 HRS MIN & MAX LAST   Temp  Min: 97.6 °F (36.4 °C)  Max: 98.5 °F (36.9 °C) 98.5 °F (36.9 °C)   BP  Min: 105/60  Max: 127/66 127/66   Pulse  Min: 74  Max: 91  82   Resp  Min: 18  Max: 20 20   SpO2  Min: 95 %  Max: 97 % 96 %     I have reviewed the following labs:  Recent Labs   Lab 04/14/25  0431 04/15/25  0331 04/16/25  0642   WBC 5.31 6.63 6.92   RBC 2.89* 3.07* 3.06*   HGB 8.1* 8.9* 8.6*   HCT 26.5* 27.3* 28.5*   MCV 91.7 88.9 93.1   MCH 28.0 29.0 28.1   MCHC 30.6* 32.6* 30.2*   RDW 17.0 16.9 17.1*    327 365   MPV 10.0 9.8 10.0     Recent Labs   Lab 04/15/25  0331 04/16/25  0642 04/17/25  0548 04/18/25  0428    142 140 142   K 3.7 4.3 4.1 4.1   * 114* 114* 114*   CO2 18* 20* 19* 22*   BUN 12.8 14.4 15.4 18.7   CREATININE 0.67* 0.72 0.73 0.68*   CALCIUM 8.0* 8.1* 7.8* 8.2*   MG 2.20 2.20 2.20 2.30   ALBUMIN 2.4* 2.4* 2.5*  --    ALKPHOS 88 87 85  --    ALT 16 13 14  --    AST 18 15 17  --    BILITOT 0.5 0.6 0.6  --      Microbiology Results (last 7 days)       ** No results found for the last 168 hours. **             See below for Radiology    Assessment/Plan:  Acute hypoxemic respiratory failure requiring intubation, extubated 04/13/2024   Cardiogenic shock requiring mechanical and chemical support   Impella placement  03/29/2025, removed 04/10/2025  Multivessel CAD  [facility] with PTCA/TAD x3 RCA (03/29/2025)   Status post high-risk PCI withPTCA/TAD X4 LAD on 4/10/25   Cardiopulmonary arrest   ICMO/EF 15%  Normocytic anemia, stable     History of hypertension, hyperlipidemia, CAD, PAD, GERD, and BPH s/p recent TURP      Plan:   -doing well on 2 L.  Mobilize as tolerated.  Encourage IS use, pulmonary toileting   -cardiology following, adjusting GDMT.  Monitor I's and O's and electrolytes.  Continue telemetry.  Appreciate cardiology assistance. Will defer Life Vest at this time Re: DNR   -PT OT.  He needs SNF placement   -other home medications were reviewed and renewed        VTE prophylaxis: Lovenox     Patient condition:  Stable    Anticipated discharge and Disposition:         All diagnosis and differential diagnosis have been reviewed; assessment and plan has been documented; I have personally reviewed the labs and test results that are presently available; I have reviewed the patients medication list; I have reviewed the consulting providers response and recommendations. I have reviewed or attempted to review medical records based upon their availability    All of the patient's questions have been  addressed and answered. Patient's is agreeable to the above stated plan. I will continue to monitor closely and make adjustments to medical management as needed.    Portions of this note dictated using EMR integrated voice recognition software, and may be subject to voice recognition errors not corrected at proofreading. Please contact writer for clarification if needed.   _____________________________________________________________________    Malnutrition Status:  Nutrition consulted. Most recent weight and BMI monitored-     Measurements:  Wt Readings from Last 1 Encounters:   04/20/25 48.4 kg (106 lb 9.6 oz)   Body mass index is 14.87 kg/m².    Patient has been screened and assessed by RD.    Malnutrition Type:  Context:    Level:  other (see comments) (Does not meet criteria)    Malnutrition Characteristic Summary:  Weight Loss (Malnutrition): other (see comments) (Unable to assess)  Energy Intake (Malnutrition): other (see comments) (Unable to assess)    Interventions/Recommendations (treatment strategy):        Scheduled Med:   aspirin  81 mg Oral Daily    atorvastatin  40 mg Oral Daily    bumetanide  1 mg Oral Daily    clopidogreL  75 mg Oral Daily    dapagliflozin propanediol  10 mg Oral Daily    enoxaparin  40 mg Subcutaneous Daily    famotidine  20 mg Per NG tube BID    LIDOcaine  1 patch Transdermal Daily    metoprolol succinate  50 mg Oral Daily    sacubitriL-valsartan  1 tablet Oral BID    senna-docusate  1 tablet Oral Daily    zinc oxide-cod liver oil   Topical (Top) BID      Continuous Infusions:   tirofiban-0.9% sodium chloride 12.5 mg/250ml    Continuous PRN   Stopped at 03/29/25 1845      PRN Meds:    Current Facility-Administered Medications:     acetaminophen, 650 mg, Oral, Q4H PRN    bisacodyL, 10 mg, Rectal, Daily PRN    calcium gluconate IVPB, 1 g, Intravenous, PRN    calcium gluconate IVPB, 2 g, Intravenous, PRN    calcium gluconate IVPB, 3 g, Intravenous, PRN    dextrose 50%, 12.5 g, Intravenous, PRN    dextrose 50%, 25 g, Intravenous, PRN    glucagon (human recombinant), 1 mg, Intramuscular, PRN    glucose, 16 g, Oral, PRN    glucose, 24 g, Oral, PRN    hydrALAZINE, 10 mg, Intravenous, Q4H PRN    magnesium sulfate 2 g IVPB, 2 g, Intravenous, PRN    magnesium sulfate 2 g IVPB, 2 g, Intravenous, PRN    ondansetron, 8 mg, Oral, Q8H PRN    polyethylene glycol, 17 g, Oral, BID PRN    potassium chloride in water, 20 mEq, Intravenous, PRN **AND** potassium chloride in water, 40 mEq, Intravenous, PRN **AND** potassium chloride in water, 60 mEq, Intravenous, PRN    sodium phosphate 15 mmol in D5W 250 mL IVPB, 15 mmol, Intravenous, PRN    sodium phosphate 20.1 mmol in D5W 250 mL IVPB, 20.1 mmol, Intravenous, PRN    sodium  phosphate 30 mmol in D5W 250 mL IVPB, 30 mmol, Intravenous, PRN    tirofiban-0.9% sodium chloride 12.5 mg/250ml, , , Continuous PRN     Radiology:  I have personally reviewed the following imaging and agree with the radiologist.     Fl Modified Barium Swallow Speech  See procedure notes from Speech Pathologist.    This procedure was auto-finalized.      Elgin Stark MD  Department of Hospital Medicine   [facility]   04/20/2025

## 2025-04-21 NOTE — PROGRESS NOTES
[facility]        Chief Complaint: Inpatient Follow-up     HPI:   Jon Conley is a 79 y.o. male with a past medical history of hypertension, hyperlipidemia, CAD, PAD, GERD, and BPH s/p TURP who presented to [facility] on 3/29/2025 transferred from [facility] for cardiology services.  Patient presented to outside facility for shortness of breath.  Patient was tachycardic and hypotensive with hemoglobin of 7.5, hematocrit 24.6, high sensitivity troponin 13,821, WBC 13.4, and CPK 7 O2.  Patient was given 500 mL normal saline bolus and heparin 5000 units and transferred to [facility] for higher level of care.  Patient has suffered PAE arrest requiring intubation and CPR with ROSC. Labs revealed WBC 14.03, hemoglobin 7.3, hematocrit 23, troponin 9.443, , BUN 16.2, creatinine 1.34, lactic acid 4.3, PT/INR 14.9/1.2. Patient underwent left heart catheterization with Impella placement on 03/29/2025.  LHC revealed multivessel coronary artery disease with 100% occluded proximal left circumflex, unable to cross with wire. Mid RCA was also noted to be 100 % occluded, treated with PTCA/TAD.  Patient was started on Levophed, heparin drip per protocol, and amiodarone.  Echo revealed EF 15-20% with grade 1 diastolic dysfunction.  CV surgery was consulted.  Patient was deemed not a surgical candidate for CABG secondary to multiple comorbidities.  Patient received 2 units packed red blood cells on 04/06.  Patient underwent high-risk PCI on 04/10/2025 with 4 stents in LAD.  Impella was removed.  Patient was extubated on 04/13/2025.  Patient was cleared for downgrade out of ICU on 04/14/2025 and was admitted to hospital medicine service.     4/16 Afebrile overnight. Resting comfortably in the bed. Doing well on 2 L. blood pressure stable. No family members at bedside. Bumex was added for diuresis purposes. CBGS within acceptable limits. P.o. intake is  inadequate. HGB and platelets stable. No new electrolyte abnormalities.   4/17 Had episode of Vtach on tele yesterday and again today, cardiology made aware. Patient denies any pain and asymptomatic. Worked with PT.     Interval Hx:     AF. Noted to have gross hematuria on contreras, urology eval , recommending to hold aspirin and plavix is able, will get cardiology re-eval  Later in the afternoon, RN noted to have black stool, occult blood ordered, positive and GI consulted.  Protonix BID added.   Monitor HH    Case was discussed with patient's nurse and  on the floor.    Objective/physical exam:  General: In no acute distress, afebrile  Chest: Clear to auscultation bilaterally  Heart: RRR, +S1, S2, no appreciable murmur  Abdomen: Soft, nontender, BS +  MSK: Warm, no lower extremity edema, no clubbing or cyanosis  Neurologic: Alert and oriented x4, Cranial nerve II-XII intact    VITAL SIGNS: 24 HRS MIN & MAX LAST   Temp  Min: 97.8 °F (36.6 °C)  Max: 98.3 °F (36.8 °C) 98.1 °F (36.7 °C)   BP  Min: 101/64  Max: 118/72 118/72   Pulse  Min: 81  Max: 92  90   Resp  Min: 17  Max: 17 17   SpO2  Min: 96 %  Max: 99 % 97 %     I have reviewed the following labs:  Recent Labs   Lab 04/16/25  0642 04/20/25  1840 04/21/25  1223   WBC 6.92 8.28 11.29   RBC 3.06* 3.38* 3.57*   HGB 8.6* 9.8* 10.2*   HCT 28.5* 31.6* 33.2*   MCV 93.1 93.5 93.0   MCH 28.1 29.0 28.6   MCHC 30.2* 31.0* 30.7*   RDW 17.1* 17.4* 17.5*    339 359   MPV 10.0 10.1 10.1     Recent Labs   Lab 04/15/25  0331 04/16/25  0642 04/17/25  0548 04/18/25  0428 04/20/25  1840 04/21/25  1223    142 140 142 143 144   K 3.7 4.3 4.1 4.1 3.7 4.0   * 114* 114* 114* 113* 115*   CO2 18* 20* 19* 22* 20* 22*   BUN 12.8 14.4 15.4 18.7 20.4 24.3   CREATININE 0.67* 0.72 0.73 0.68* 0.80 0.88   CALCIUM 8.0* 8.1* 7.8* 8.2* 8.2* 8.7*   MG 2.20 2.20 2.20 2.30 2.20  --    ALBUMIN 2.4* 2.4* 2.5*  --   --   --    ALKPHOS 88 87 85  --   --   --    ALT 16 13 14  --    --   --    AST 18 15 17  --   --   --    BILITOT 0.5 0.6 0.6  --   --   --      Microbiology Results (last 7 days)       ** No results found for the last 168 hours. **             See below for Radiology    Assessment/Plan:    Acute hypoxemic respiratory failure requiring intubation, extubated 04/13/2024   Cardiogenic shock requiring mechanical and chemical support   Impella placement 03/29/2025, removed 04/10/2025  Multivessel CAD  [facility] with PTCA/TAD x3 RCA (03/29/2025)   Status post high-risk PCI withPTCA/TAD X4 LAD on 4/10/25   Cardiopulmonary arrest   ICMO/EF 15%  Concern for GI bleed  Hematuria   BPH s/p recent TURP in [place] followed by clot evacuation and fulguration, around 3/2025  Normocytic anemia, chronic    History of hypertension, hyperlipidemia, CAD, PAD, GERD, and      Plan:   Noted to have gross hematuria on contreras, unclear if patient pulled on contreras, however recent urologic procedures 3/2025  Urology eval, recommending to hold aspirin and plavix is able, will get cardiology re-eval given recent stents  black stool note by RN 4/21, occult blood positive and GI consulted.  Protonix BID added  Monitor HH    Episode of V tach transient on 4/20, trops have been down trending, f/u cardiology eval  Cardiology, adjusted GDMT.  Monitor I's and O's and electrolytes.  Continue telemetry.  Appreciate cardiology assistance. Will defer Life Vest at this time Re: DNR     on 2 L.  Mobilize as tolerated.  Encourage IS use, pulmonary toileting   PT OT.  He needs SNF placement   other home medications were reviewed and renewed      Critical care note:  Critical care diagnosis: GI bleed needing protonix IV   Critical care interventions: Hands-on evaluation, review of labs/radiographs/records and discussion with patient and family if present  Critical care time spent: 35 minutes      VTE prophylaxis: Lovenox     Patient condition:  Stable    Anticipated discharge and Disposition:         All diagnosis and differential  diagnosis have been reviewed; assessment and plan has been documented; I have personally reviewed the labs and test results that are presently available; I have reviewed the patients medication list; I have reviewed the consulting providers response and recommendations. I have reviewed or attempted to review medical records based upon their availability    All of the patient's questions have been  addressed and answered. Patient's is agreeable to the above stated plan. I will continue to monitor closely and make adjustments to medical management as needed.    Portions of this note dictated using EMR integrated voice recognition software, and may be subject to voice recognition errors not corrected at proofreading. Please contact writer for clarification if needed.   _____________________________________________________________________    Malnutrition Status:  Nutrition consulted. Most recent weight and BMI monitored-     Measurements:  Wt Readings from Last 1 Encounters:   04/20/25 48.4 kg (106 lb 9.6 oz)   Body mass index is 14.87 kg/m².    Patient has been screened and assessed by RD.    Malnutrition Type:  Context:    Level: other (see comments) (Does not meet criteria)    Malnutrition Characteristic Summary:  Weight Loss (Malnutrition): other (see comments) (Unable to assess)  Energy Intake (Malnutrition): other (see comments) (Unable to assess)    Interventions/Recommendations (treatment strategy):        Scheduled Med:   aspirin  81 mg Oral Daily    atorvastatin  40 mg Oral Daily    bumetanide  1 mg Oral Daily    clopidogreL  75 mg Oral Daily    dapagliflozin propanediol  10 mg Oral Daily    enoxaparin  30 mg Subcutaneous Daily    famotidine  20 mg Per NG tube BID    LIDOcaine  1 patch Transdermal Daily    metoprolol succinate  50 mg Oral Daily    sacubitriL-valsartan  1 tablet Oral BID    senna-docusate  1 tablet Oral Daily    zinc oxide-cod liver oil   Topical (Top) BID      Continuous Infusions:    tirofiban-0.9% sodium chloride 12.5 mg/250ml    Continuous PRN   Stopped at 03/29/25 1845      PRN Meds:    Current Facility-Administered Medications:     acetaminophen, 650 mg, Oral, Q4H PRN    bisacodyL, 10 mg, Rectal, Daily PRN    dextrose 50%, 12.5 g, Intravenous, PRN    dextrose 50%, 25 g, Intravenous, PRN    glucagon (human recombinant), 1 mg, Intramuscular, PRN    glucose, 16 g, Oral, PRN    glucose, 24 g, Oral, PRN    hydrALAZINE, 10 mg, Intravenous, Q4H PRN    ondansetron, 8 mg, Oral, Q8H PRN    polyethylene glycol, 17 g, Oral, BID PRN    tirofiban-0.9% sodium chloride 12.5 mg/250ml, , , Continuous PRN     Radiology:  I have personally reviewed the following imaging and agree with the radiologist.     Fl Modified Barium Swallow Speech  See procedure notes from Speech Pathologist.    This procedure was auto-finalized.      Elgin Stark MD  Department of Hospital Medicine   [facility]   04/21/2025

## 2025-04-21 NOTE — PROGRESS NOTES
Pharmacist Renal Dose Adjustment Note    Jon Conley is a 79 y.o. male being treated with the medication LOVENOX    Patient Data:    Vital Signs (Most Recent):  Temp: 98.3 °F (36.8 °C) (04/21/25 0746)  Pulse: (P) 92 (04/21/25 1057)  Resp: 17 (04/21/25 0306)  BP: (P) 101/64 (04/21/25 1057)  SpO2: 99 % (04/21/25 0746) Vital Signs (72h Range):  Temp:  [97.6 °F (36.4 °C)-98.9 °F (37.2 °C)]   Pulse:  [74-94]   Resp:  [17-22]   BP: (101-129)/(60-75)   SpO2:  [93 %-99 %]      Recent Labs   Lab 04/17/25  0548 04/18/25  0428 04/20/25  1840   CREATININE 0.73 0.68* 0.80     Serum creatinine: 0.8 mg/dL 04/20/25 1840  Estimated creatinine clearance: 51.3 mL/min    Dose was changed from 40 mg daily to 30 mg daily due to patient weight <50 kg.     Pharmacist's Name: Fernandez Toney  Pharmacist's Extension: [phone]     8

## 2025-04-21 NOTE — CONSULTS
"Name: Jon Conley   : 1945   MRN: [id number]   Consulting Provider: Elizabeth Eden   Today"s date: 2025     HPI:  Reason for consultation:.  Gross hematuria.  Urologic consultation was requested on this elderly gentleman with a history of TURP over 6 weeks ago.  This was done by Dr. Grace López for urinary retention.  He required cystoscopy with clot evacuation and fulguration a few weeks later, proximally 3 weeks ago.      He was promptly admitted following that procedure and discharge to this institution for a MI. he began experiencing gross hematuria for which I was consulted.      History was given by the daughter at the bedside.      Patient has no complaints at this time.          Past medical History:  Past history is extensive including coronary artery disease,   Hypertension, GERD, history of knee replacement.  Prior tobacco use.    Past Surgical Hx:   Past Surgical History:   Procedure Laterality Date    CATHETERIZATION OF BOTH LEFT AND RIGHT HEART N/A 3/29/2025    Procedure: CATHETERIZATION, HEART, BOTH LEFT AND RIGHT;  Surgeon: Lorne Coon Jr., MD;  Location: [facility] CATH LAB;  Service: Cardiology;  Laterality: N/A;    LEFT HEART CATHETERIZATION N/A 4/10/2025    Procedure: Left heart cath;  Surgeon: Lorne Coon Jr., MD;  Location: [facility] CATH LAB;  Service: Cardiology;  Laterality: N/A;      Previous TURP 2025.    Medication: Current Medications[1]       Allergy: Review of patient's allergies indicates:  No Known Allergies       History: No family history on file.   Social Drivers of Health     Tobacco Use: Medium Risk (2023)    Received from [facility] and Its Subsidiaries and Affiliates    Patient History     Smoking Tobacco Use: Former     Smokeless Tobacco Use: Never     Passive Exposure: Not on file   Alcohol Use: Not on file   Financial Resource Strain: Patient Unable To Answer (3/30/2025)    Overall Financial " Resource Strain (CARDIA)     Difficulty of Paying Living Expenses: Patient unable to answer   Food Insecurity: Patient Unable To Answer (3/30/2025)    Hunger Vital Sign     Worried About Running Out of Food in the Last Year: Patient unable to answer     Ran Out of Food in the Last Year: Patient unable to answer   Transportation Needs: No Transportation Needs (3/29/2025)    PRAPARE - Transportation     Lack of Transportation (Medical): No     Lack of Transportation (Non-Medical): No   Physical Activity: Not on file   Stress: Patient Unable To Answer (3/30/2025)    [place] [employer] - Occupational Stress Questionnaire     Feeling of Stress : Patient unable to answer   Housing Stability: Patient Unable To Answer (3/30/2025)    Housing Stability Vital Sign     Unable to Pay for Housing in the Last Year: Patient unable to answer     Number of Times Moved in the Last Year: 0     Homeless in the Last Year: Patient unable to answer   Depression: Not at risk (2/16/2023)    Received from [facility] Its Subsidiaries and Affiliates    PHQ-2     PHQ-2 Score: 1   Utilities: Patient Unable To Answer (3/30/2025)    [facility] Utilities     Threatened with loss of utilities: Patient unable to answer   Health Literacy: Patient Unable To Answer (3/30/2025)     Health Literacy     Frequency of need for help with medical instructions: Patient unable to respond   Social Isolation: Not on file          Review of Systems: Review of Systems       Physical Exam: Physical Exam  Constitutional:       Appearance: Normal appearance.   Pulmonary:      Effort: Pulmonary effort is normal.   Abdominal:      General: Abdomen is flat.      Palpations: Abdomen is soft.   Genitourinary:     Penis: Normal.       Comments: Three-way Ortiz catheter was then placed with the 3rd port clamped.  Dark wine-colored urine draining into the bag.    No suprapubic tenderness or fullness.  Skin:      General: Skin is warm and dry.   Neurological:      Mental Status: He is alert.            Recent Labs   Lab 04/16/25  0642 04/20/25  1840 04/21/25  1223   WBC 6.92 8.28 11.29   HGB 8.6* 9.8* 10.2*   HCT 28.5* 31.6* 33.2*    339 359          Imaging:     Fl Modified Barium Swallow Speech  Result Date: 4/13/2025  See procedure notes from Speech Pathologist. This procedure was auto-finalized.    X-Ray Chest 1 View  Result Date: 4/12/2025  EXAMINATION: Single view chest radiograph. CLINICAL HISTORY: Non-ST elevation (NSTEMI) myocardial infarctionanemia; TECHNIQUE: Single view of the chest. COMPARISON: Chest radiograph 04/04/2025. FINDINGS: The endotracheal tube, nasogastric tube and right IJ catheter have been removed.  The intra-aortic balloon pump has been removed.  The bilateral effusions, pulmonary edema, and lower lobe atelectasis are unchanged.  There is no pneumothorax.  The heart is enlarged.  There is no acute osseous abnormality.     No significant change from prior exam. Electronically signed by: Lorne Solorzano MD Date:    04/12/2025 Time:    08:26    CV Ultrasound doppler arterial legs bilat  Result Date: 4/10/2025  Limited study due to Impella and sheath placements and leg braces. Only the common femoral, mid superficial femoral, posterior tibial and dorsalis pedis arteries are evaluated. The right lower extremity arterial system is patent with no evidence of focal stenosis or occlusion. The left lower extremity arterial system is patent with no evidence of focal stenosis or occlusion; however dampened waveforms and severely low velocity is  demonstrated in the dorsalis pedis artery.     Cardiac catheterization  Result Date: 4/10/2025    Successful Impella-protected PCI of LAD as outline below. The procedure log was documented by No documenter listed and verified by Lorne Coon Jr, MD. Date: 4/10/2025  Time: 5:46 PM Procedure: Left heart catheterization with coronary angiography Impella-protected  percutaneous coronary intervention Successful Impella CP removal  Preoperative diagnosis: Cardiomyopathy Postoperative diagnosis: Successful PCI  Access: Right common femoral artery Estimated blood loss: 10 cc Complications: None  Summary: Consent obtained. Risks and benefits discussed with the patient. The patient was brought to the cath lab in a fasting state. The patient was prepped and draped in usual sterile fashion. A preprocedure time-out was performed. The existing 6 Eritrean R femoral sheath was exchanged for a new 6 Eritrean sheath for the intervention.  A 6 Eritrean EBU 3.5 catheter was used for the intervention.  The LAD was wired with a runthrough wire. The large first diagonal was wired with a Prowater wire. Balloon angioplasty was performed on the LAD using an EMERGE MR 15 X 2.50MM device inflated to nominal pressure. IVUS was performed to assess the vessel for PCI. Using this measurement, the mid LAD lesion was treated with a SYNERGY XD 2.5X28MM drug-eluting stent. The proximal vessel was then treated with an NC EMERGE MR 3.5X20MM balloon inflated to nominal pressure. A FRONTIER BERNADINE 2.03C21NN drug-eluting stent was used to treat the mid LAD, just proximal to the previously placed stent, in an overlapping fashion. IVUS was again performed to assess stent expansion and measure the proximal vessel for PCI. Using these measurements, the proximal LAD was treated using overlapping FRONTIER BERNADINE 3.59C06MP and FRONTIER BERNADINE 3.17K51YT drug-eluting stents. The Prowater wire was removed from D1 without any issue. IVUS was again performed to assess stent expansion. Using these measurements, post-dilatation was performed using the following balloons: NC EMERGE MR 2.5X20MM, NC EMERGE MR 3X30MM, and NC EMERGE MR 3.5X20MM; each inflated to high pressure. Following this intervention, there was 0% residual stenosis, PATTY grade 3 flow in the distal vessel, and PATTY grade 3 flow in the first diagonal branch. At the end the  procedure, all wires and catheters were removed. The Impella was removed under fluoroscopic guidance. The vessel was closed using a MANTA 14 Malawian device, with successful hemostasis. A 6 Malawian Pigtail was placed in the abdominal aorta.  Findings: - Successful Impella-protected PCI of LAD. - Prox to Mid Left Anterior Descending has severe diffuse disease, up to 80% in severity. The lesion was successfully treated with overlapping FRONTIER BERNADINE 3.66I95XI, FRONTIER BERNADINE 3.72J97TO, FRONTIER BERNADINE 2.24R07CR, and SYNERGY XD 2.5X28MM drug-eluting stents. Post-dilatation was performed using an NC EMERGE MR 3.5X20MM angioplasty balloon inflated to 20 gretel. Following intervention there was 0% residual stenosis and PATTY grade 3 flow in the distal vessel. - Intravascular Ultrasound (IVUS) was performed prior to intervention to further characterize the lesion, as well as post-stent deployment to ensure optimal stent apposition and expansion. - Ostial First Diagonal Branch has a 70% stenosis. At the conclusion of the case, there was PATTY grade 3 flow in the distal vessel. - Successful Impella CP removal and closure of L femoral arterial access site using a 14 Malawian MANTA device.  Assessment/Plan: - Patient is a 79 y.o. male with a history of CAD presents in cardiogenic shock. Successful PCI of RCA last week. Impella CP placed at that time. Now s/p successful LAD intervention and Impella CP removal. - Continue DAPT (aspirin + clopidogrel) for a minimum of 12 months and aspirin indefinitely thereafter - High intensity statin - Arterial sheath remains place; plan is to remove when activated clotting time (ACT) less than 180 seconds. Bedrest for 4 hours after hemostasis is achieved. Lorne Coon MD Interventional Cardiology/Structural Heart Disease [facility]    Echo Saline Bubble? No; Ultrasound enhancing contrast? No  Result Date: 4/9/2025    Impella check   Aortic annulus to tip distance is 4.3 cm.    LV systolic function is 15%.     X-Ray Chest 1 View  Result Date: 4/4/2025  EXAMINATION: XR CHEST 1 VIEW CLINICAL HISTORY: respiratory failure; TECHNIQUE: Frontal view(s) of the chest. COMPARISON: Radiography 04/03/2025 FINDINGS: Similar positioning of the endotracheal tube, right IJ catheter, pulmonary arterial catheter and LVAD.  Enteric tube extends below the diaphragm.  Small bilateral pleural effusions with bilateral lower lung atelectasis.  Possible mild pulmonary edema.  No pneumothorax appreciated.  Stable cardiac silhouette.     Small pleural effusions with possible mild pulmonary edema. Electronically signed by: Martín Gan Date:    04/04/2025 Time:    06:02    Echo  Result Date: 4/3/2025    A limited echo was performed using limited 2D   Left Ventricle: There is severely reduced systolic function with a visually estimated ejection fraction of 15%.   Impella catheter and appears to be in appropriate position.     X-Ray Chest 1 View  Result Date: 4/3/2025  EXAMINATION XR CHEST 1 VIEW CLINICAL HISTORY respiratory failure; TECHNIQUE A total of 1 frontal image(s) of the chest. COMPARISON 2 April 2025 FINDINGS Lines/tubes/devices: Grossly unchanged positioning when allowing for differences in technique and patient rotation. The cardiac silhouette and central vascular structures are unchanged.  The trachea is midline. No new or worsening consolidation is identified. Small bilateral pleural effusions are similar.  No development of pneumothorax. Regional osseous structures and extrathoracic soft tissues are similar. IMPRESSION No significant interval change. Electronically signed by: Ventura López Date:    04/03/2025 Time:    06:11    X-Ray Chest 1 View  Result Date: 4/2/2025  EXAMINATION: XR CHEST 1 VIEW CPT 55027 CLINICAL HISTORY: respiratory failure; COMPARISON: April 1, 2025 FINDINGS: Examination reveals cardiomediastinal silhouette and pleuroparenchymal changes to be essentially unchanged perhaps with  some improvement in the pulmonary vascular congestion and cardiac decompensation changes. Support catheters remain in place     Some improvement of the pulmonary vascular congestion and cardiac decompensation. Support catheters remain in place no other interval change Electronically signed by: Mt Beal Date:    04/02/2025 Time:    04:54    Echo  Result Date: 4/1/2025    Limited study to check impella placement.   Left Ventricle: There is severely reduced systolic function with a visually estimated ejection fraction of 15 - 20%.   Impella distance from aortic valve annulus appears appropriate.     X-Ray Chest 1 View  Result Date: 4/1/2025  EXAMINATION: XR CHEST 1 VIEW CLINICAL HISTORY: Cardiac arrest.Respiratory failure; COMPARISON: Yesterday FINDINGS: Frontal view of the chest was obtained. Support structures are in similar position.  Heart and mediastinum unchanged.  Similar mild bibasilar opacities.  No pneumothorax.     Little interval change. Electronically signed by: Hakeem Cabezas Date:    04/01/2025 Time:    07:58    Echo  Result Date: 3/31/2025    Left Ventricle: There is severely reduced systolic function with a visually estimated ejection fraction of less than 30%.   There appears to be at least moderate to severe mitral regurgitation. Impella is positioned with inflow cannula 3.8 cm from aortic valve annulus.     X-Ray Chest 1 View  Result Date: 3/31/2025  EXAMINATION: XR CHEST 1 VIEW CPT 84129 CLINICAL HISTORY: Acute respiratory failure, cardiogenic shock; COMPARISON: March 30, 2025 FINDINGS: Examination reveals cardiomediastinal silhouette and pleuroparenchymal changes to be essentially unchanged as compared with the previous exam. Support catheters remain in place     No significant change as compared with the previous exam Electronically signed by: Mt Beal Date:    03/31/2025 Time:    07:38    X-Ray Chest 1 View  Result Date: 3/30/2025  EXAMINATION: XR CHEST 1 VIEW CLINICAL HISTORY:  respiratory failure; TECHNIQUE: One view COMPARISON: March 29, 2025.. FINDINGS: Cardiopericardial silhouette appearance is similar.  Supporting tubes and lines in similar location.  There is small right pleural effusion which due to layering result in hazy appearance of lower chest.  No overt edema, dense consolidation or pneumothorax.     No significant interval change. Electronically signed by: Melecio Cerna Date:    03/30/2025 Time:    08:08    ED US Guided Misc Procedure  Result Date: 3/30/2025  Lizz Stubbs MD     3/31/2025 11:42 AM ED US Guided Misc Procedure Date/Time: 3/30/2025 6:24 AM Performed by: Lizz Stubbs MD Authorized by: Lizz Stubbs MD  Procedure:  Ultrasound-guided peripheral venous cannulation Indication:  Failed or difficult IV access  Right  Antecubital Procedure:  Dynamic ultrasound guidance used, Candidate vein examined with linear probe - confirmed collapsibility, lack of pulsatility, and proper anatomic location. and Using aseptic technique, IV catheter inserted with flash of blood noted, flow of venous blood confirmed. Catheter gauge:  18  Flushes easily and without pain. Patient tolerated procedure well. Complications:  None Charge?:  Yes    CV Ultrasound doppler arterial leg left  Result Date: 3/29/2025  The left lower extremity arterial system is patent with no evidence of focal stenosis or occlusion but very weak dampened waveforms noted throughout suggesting poor arterial flow to the extremity. Severely dampened velocities in the tibial vessels. Limited visibility of the left common femoral artery secondary to impella placement.     Echo  Result Date: 3/29/2025    Limited echo for Impella placement; Seated well at 3.6cm from AoV annulus. Severe LV systolic dysfunction with an EF estimated at 15%. TAPSE mildly depressed at 1.4cm. No effusion at this time.     Echo  Result Date: 3/29/2025    Left Ventricle: The left ventricle is dilated. Normal wall thickness. Severe  global hypokinesis present. There is severely reduced systolic function with a visually estimated ejection fraction of 15 - 20%. Grade I diastolic dysfunction.   Right Ventricle: Right ventricular enlargement. Systolic function is normal. TAPSE is 1.83 cm.   Left Atrium: dilated   Mitral Valve: Mildly thickened leaflets. There is moderate regurgitation.   Tricuspid Valve: There is moderate regurgitation.   Pulmonary Artery: There is moderate pulmonary hypertension.   IVC/SVC: Patient is ventilated, cannot use IVC diameter to estimate right atrial pressure.     XR Gastric tube check, non-radiologist performed  Result Date: 3/29/2025  EXAMINATION: XR GASTRIC TUBE CHECK, NON-RADIOLOGIST PERFORMED CLINICAL HISTORY: OGT placement; TECHNIQUE: One COMPARISON: October 13, 2021 FINDINGS: Nasogastric tube traverses the GE junction and tip of tube is about the proximal gastric body.  Side port of the tube is about the GE junction.  Please further advance the tube by 5 cm is recommended.     Please further advance the nasogastric tube. Electronically signed by: Melecio Cerna Date:    03/29/2025 Time:    14:08    X-Ray Chest 1 View  Result Date: 3/29/2025  EXAMINATION: XR CHEST 1 VIEW CLINICAL HISTORY: Grants placement; TECHNIQUE: One view COMPARISON: March 29, 2025.. FINDINGS: Grants-Simeon catheter tip projects about the main pulmonary artery.  Otherwise, no significant interval change.     As above Electronically signed by: Melecio Cerna Date:    03/29/2025 Time:    14:07    Cardiac catheterization  Result Date: 3/29/2025  The procedure log was documented by No documenter listed and verified by Lorne Coon Jr, MD. Date: 3/29/2025  Time: 12:06 PM Procedure: Left heart catheterization with coronary angiography Right heart catheterization Percutaneous coronary intervention Impella-protected percutaneous coronary intervention Abdominal angiogram with bilateral lower extremity runoff utilizing DSA  Preoperative diagnosis: Cardiogenic  "shock Postoperative diagnosis: Multivessel coronary artery disease Successful PCI  Access: Right common femoral artery Left common femoral artery Right common femoral vein Estimated blood loss: 10 cc Complications: None  Summary: Procedure done emergently. The patient was brought to the cath lab in a fasting state., intubated The patient was prepped and draped in usual sterile fashion. A preprocedure time-out was performed. Ultrasound-guided right common femoral arterial access via modified Seldinger technique using a micropuncture kit. A 6 Iranian sheath was inserted into the right common femoral artery. Heparin was given at a dose of 80 units/kilogram to achieve an ACT of greater than 250 seconds. A 5 Iranian JR4 catheter was used to cannulate the right coronary artery; angiography was performed, and multiple fluoroscopic views were obtained. A 5 Iranian JL5 catheter was used to cannulate the left main coronary artery; angiography was performed, and multiple fluoroscopic views were obtained. A 5 Iranian Pigtail catheter was placed within the distal aorta and digital subtraction angiography was performed of the iliofemoral system.  Right iliac system with moderate diffuse disease. Left femoral access was obtained for Impella CP placement. Ultrasound-guided left common femoral arterial access via modified Seldinger technique using a micropuncture kit. A 6 Iranian sheath was inserted into the left common femoral artery. The 6 Iranian sheath was exchanged for the Impella introducer sheath over a stiff 0.035" wire. The stiff wire was exchanged for a standard 0.035" J-wire.  A 5 Iranian Pigtail catheter was then advanced into the left ventricle over the J-wire wire. A J-shaped curve was made on the Impella delivery wire. The 0.035"J-wire was then exchanged for the Impella delivery wire, and positioned in the left ventricular apex. Multiple angiographic views were obtained, so as to ensure the wire was not behind a papillary " muscle.  The Impella CP catheter was introduced through the introducer sheath and advanced under fluoroscopic guidance into the descending aorta and then into the left ventricle. Hemodynamic monitoring was performed to ensure proper placement and functioning of the Impella CP device. The device was activated and adjusted to achieve the desired level of circulatory support. Proper positioning of the device was confirmed by fluoroscopy.  Attempted RCA intervention first. Unable to cross lesion with Runthrough wire or Fielder XT, even with Caravel microcatheter support. A 6 Bahamian EBU 4.0 catheter was used for the planned left circumflex intervention.  We attempted to cross the lesion using a Runthrough wire, which was not successful.  We then used a Caravel microcatheter for further support, but were still unsuccessful in crossing the left circumflex lesion.  Wire escalation was then pursued, including a Fielder XT, a Saravanan Black, and Mongo wire, all of which were not successful in crossing the left circumflex lesion, even with microcatheter support. Accordingly, we pursued RCA intervention.  This time, we were able to successfully cross the RCA lesion using a Mongo wire with Caravel microcatheter support. The lesion was treated with a SAPPHIRE II PRO SC 1X15MM balloon inflated to high pressure. The lesion was then treated with an EMERGE MR 20 X 2.50MM device inflated to nominal pressure. We then exchanged the Mongo wire for a Runthrough workhorse wire over the Caravel microcatheter. IVUS was then performed to further assess the lesion and distal vessel. The vessel was diffusely diseased throughout. The Mid and Distal RCA was treated with overlapping SYNERGY XD 2.22V64IS and SYNERGY XD 3.0X48MM drug-eluting stents. IVUS was then performed once again for assessment of stent expansion and apposition. Post-dilatation was performed using NC EMERGE MR 3.5X30MM and NC EMERGE MR 4X20MM devices inflated to high pressure.  "Finally, the proximal vessel was treated with a SYNERGY XD 4.0X20MM drug-eluting stent. Following this intervention there was 0% residual stenosis and PATTY grade 3 flow in the distal vessel. Ultrasound-guided right common femoral venous access via modified Seldinger technique using a micropuncture kit. A 7 Bulgarian sheath was inserted into the right common femoral vein. A right heart catheterization was being performed using a [place]-Simeon catheter. The peel-away Impella introducer sheath was removed, and the Impella sheath was advanced into the arteriotomy site, with adequate hemostasis achieved.  The stylet was removed from the adjustment side port, and an 0.035"J-wire was advanced to the level of the aortic root.  Using this wire as a marker, the Impella device was pulled back such that the radiopaque circular marker was at the level of the aortic valve.  Once the device was found to be at the appropriate level, this J-wire was removed.  Contrast injection was performed through the side port, showing adequate antegrade flow down the ipsilateral common femoral artery/SFA/popliteal artery.  The patient tolerated the procedure well, and there were no immediate complications.  The patient was transferred to the intensive care unit for further monitoring and management.  Findings: - Successful placement of Impella CP device from left femoral approach. - There is severe multi-vessel coronary artery disease. - Left Main has mild diffuse disease. - Mid Left Anterior Descending has serial lesions, up to 70-80% in severity. - Prox Left Circumflex is 100% occluded. Unable to cross lesion despite wire escalation and microcatheter support. - Mid Right Coronary Artery is 100% occluded. The lesion was successfully treated with overlapping SYNERGY XD 4.0X20MM, SYNERGY XD 3.0X48MM, and SYNERGY XD 2.24R34OC drug-eluting stents. Post-dilatation was performed using a SYNERGY XD 4.0X20MM angioplasty balloon inflated to 12 gretel. Following " intervention there was 0% residual stenosis and PATTY grade 3 flow in the distal vessel. - Intravascular Ultrasound (IVUS) was performed prior to intervention to further characterize the lesion, as well as post-stent deployment to ensure optimal stent apposition and expansion. - Aorto-iliac angiogram revealed moderate disease of R ileofemoral system. - At the conclusion of the case, contrast injection was performed through the Impella sheath side port, showing adequate antegrade flow down the ipsilateral common femoral artery/SFA/profunda artery. - RHC with elevated R sided filling pressures. RA 17 mmHg, RV 45/15 mmHg, PA 42/29 mmHg (mean 34 mmHg), PCWP 25 mmHg. - Transpulmonary gradient is 9 mmHg. - Cardiac Output/Index at 6.6/3.0, as calculated by Gregory equation on Levophed infusion and Impella CP support. - PVR is 1.4 Woods units - SVR is 550 dyne-sec*cm^-5 - Pulmonary Artery Pulsatility Index (Rafa) is 0.8 - Cardiac Power Output () is 0.91  Assessment/Plan: - Patient is a 79 y.o. male with a history of recent TURP, complicated by significant post-op bleeding, presents to OSH ED with dyspnea. Transferred to [facility] with worsening shock despite vasopressors. TTE showing EF 25%, severe MR, severe TR. Troponin elevated. Brought to cath lab. Underwent Impella placement/LHC/RHC as outlined above. RCA lesion treated. Unable to cross LCx lesion. 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 At this point, would continue Impella support and aggressive medical mgmt. Has non-culprit lesions that should be treated at a later date as well. Can consider CTS consult, especially given severe valvular disease. - Patient was given a loading dose of clopidogrel 600 mg PO in the cath lab - Continue DAPT (aspirin + clopidogrel) for a minimum of 12 months and aspirin indefinitely thereafter - High intensity statin - Continue Aggrastat for 4 hours post-procedure - Arterial sheath remains place; plan is to remove when activated clotting time  (ACT) less than 180 seconds. Bedrest for 4 hours after hemostasis is achieved. - Obtain transthoracic echocardiogram - Continuous bedrest while Impella in place - Therapeutic heparin administration per protocol - Antibiotics per protocol - Remaining mgmt per primary team/cardiology Lorne Coon MD Interventional Cardiology/Structural Heart Disease Cardiovascular [facility]     CTA Chest Non-Coronary (PE Studies)  Result Date: 3/29/2025  EXAMINATION: CTA CHEST NON CORONARY (PE STUDIES) CLINICAL HISTORY: Pulmonary embolism (PE) suspected, unknown D-dimer;   shortness of breath, chest pain TECHNIQUE: Helically acquired images with axial, sagittal and coronal reformations were obtained from the thoracic inlet to the lung bases followingthe IV administration of contrast.  CTA timed for evaluation of the pulmonary arteries.  MIP images were performed. Automated tube current modulation, weight-based exposure dosing, and/or iterative reconstruction technique utilized to reach lowest reasonably achievable exposure rate. DLP: 708 mGy*cm COMPARISON: Chest radiograph 09/28/2021 FINDINGS: BASE OF NECK: No significant abnormality. AORTA: Aortic atherosclerosis. PULMONARY VASCULATURE: Pulmonary arteries enhance normally.  No evidence of pulmonary embolus. HEART: Left ventricle appears dilated.  There are coronary artery calcifications. ISIDRO/MEDIASTINUM: No enlarged lymph nodes by size criteria. AIRWAYS: Expiratory phase imaging with buckling of the posterior wall of the trachea and mainstem bronchi.  Bibasilar bronchial wall thickening. LUNGS/PLEURA: Bilateral septal thickening and ground-glass opacities.  Trace pleural fluid. UPPER ABDOMEN: No abnormality of the partially imaged upper abdomen. THORACIC SOFT TISSUES: Unremarkable. BONES: No acute fracture. No suspicious lytic or sclerotic lesions.     1. Cardiomegaly with septal thickening, bronchial wall thickening and ground-glass densities suggesting  interstitial edema 2. No evidence of pulmonary embolus 3. The preliminary and final reports are concordant. Electronically signed by: Juany Thompson Date:    03/29/2025 Time:    08:27    X-Ray Chest AP Portable  Result Date: 3/29/2025  EXAMINATION: XR CHEST AP PORTABLE CLINICAL HISTORY: Cardiac arrest, cause unspecified TECHNIQUE: One view COMPARISON: September 28, 2021. FINDINGS: Cardiopericardial silhouette enlarged appearance is similar.  Lungs are remarkable for mild to moderate vascular congestive changes.  Left basilar atelectasis.  No consolidation or pneumothorax.  Optimal intubation and the nasogastric tube traverses into the stomach.  Right IJ central line terminates about the cavoatrial junction.     Lungs vascular congestive changes. Electronically signed by: Melecio Cerna Date:    03/29/2025 Time:    08:20    ED US Echo  Result Date: 3/29/2025  Lizz Stubbs MD     3/31/2025 11:42 AM ED US Echo Date/Time: 3/29/2025 5:35 AM Performed by: Lizz Stubbs MD Authorized by: Lizz Stubbs MD  Indication:  Chest pain, Hypotension, Dyspnea and Shock Identified Structures:   The pericardial sac, myocardium, and 4 chambers were identified with the following echocardiographic windows:  Subxiphoid and IVC Findings:   Pericardial Effusion:  Absent   Left Ventricle Ejection Fraction:  Severely reduced (<35%) IVC:   Diameter:  > 2cm   Collapsibility:  < 50%   Impression:  Diminished LV function   Charge?:  Yes         Assesment & Plan:    Patient is relatively early postop from TURP with need for clot evacuation and fulguration a few weeks later and now with postop MI.  He has been downgraded from the ICU but has persistent hematuria.  He is on Plavix and aspirin.     Recommend stop antiplatelet therapy if deemed safe by the primary team.  Start continuous bladder irrigation with as needed bladder irrigation with normal saline.  If this fails to improve, he might need repeat cystoscopy.  We will follow.        [1]   Current Facility-Administered Medications:     acetaminophen tablet 650 mg, 650 mg, Oral, Q4H PRN, Lorne Coon Jr., MD, 650 mg at 04/20/25 1350    aspirin EC tablet 81 mg, 81 mg, Oral, Daily, Hasmukh Sandoval ANP, 81 mg at 04/21/25 1057    atorvastatin tablet 40 mg, 40 mg, Oral, Daily, Hasmukh Sandoval ANP, 40 mg at 04/21/25 1057    bisacodyL suppository 10 mg, 10 mg, Rectal, Daily PRN, Elgin Stark MD    bumetanide tablet 1 mg, 1 mg, Oral, Daily, Harvinder Gallardo MD, 1 mg at 04/21/25 1057    clopidogreL tablet 75 mg, 75 mg, Oral, Daily, Hasmukh Sandoval ANP, 75 mg at 04/21/25 1057    dapagliflozin propanediol (Farxiga) tablet 10 mg, 10 mg, Oral, Daily, Hasmukh Sandoval ANP, 10 mg at 04/21/25 1057    dextrose 50% injection 12.5 g, 12.5 g, Intravenous, PRN, Álvaro Harrington DO    dextrose 50% injection 25 g, 25 g, Intravenous, PRN, Álvaro Harrington DO    enoxaparin injection 30 mg, 30 mg, Subcutaneous, Daily, Elgin Stark MD    famotidine tablet 20 mg, 20 mg, Per NG tube, BID, Davi Harp Jr., MD, FCCP, 20 mg at 04/21/25 1057    glucagon (human recombinant) injection 1 mg, 1 mg, Intramuscular, PRN, Álvaro Harrington DO    glucose chewable tablet 16 g, 16 g, Oral, PRNJuany William, DO    glucose chewable tablet 24 g, 24 g, Oral, PRN, Álvaro Harrington DO    hydrALAZINE injection 10 mg, 10 mg, Intravenous, Q4H PRN, Lorne Coon Jr., MD    LIDOcaine 5 % patch 1 patch, 1 patch, Transdermal, Daily, Elgin Stark MD, 1 patch at 04/21/25 1056    metoprolol succinate (TOPROL-XL) 24 hr tablet 50 mg, 50 mg, Oral, Daily, Julio Butterfield, FNP, 50 mg at 04/21/25 1057    ondansetron disintegrating tablet 8 mg, 8 mg, Oral, Q8H PRN, Lorne Coon Jr., MD    polyethylene glycol packet 17 g, 17 g, Oral, BID PRN, Elgin Stark MD    sacubitriL-valsartan 24-26 mg per tablet 1 tablet, 1 tablet, Oral, BID, Kajal Stewart, NP, 1 tablet at 04/21/25 1057    senna-docusate 8.6-50 mg per tablet 1  tablet, 1 tablet, Oral, Daily, Elgin Stark MD, 1 tablet at 04/21/25 1057    tirofiban 12.5 mg in sodium chloride 0.9% 250 mL infusion, , , Continuous PRN, Lorne Coon Jr., MD, Stopped at 03/29/25 1845    zinc oxide-cod liver oil 40 % paste, , Topical (Top), BID, Davi Harp Jr., MD, FCCP, Given at 04/21/25 1058

## 2025-04-21 NOTE — PT/OT/SLP PROGRESS
Physical Therapy Treatment    Patient Name:  Jon Conley   MRN:  [id number]    Recommendations:     Discharge therapy intensity: Moderate Intensity Therapy   Discharge Equipment Recommendations: to be determined by next level of care  Barriers to discharge: Impaired mobility    Assessment:     Jon Conley is a 79 y.o. male admitted with a medical diagnosis of SOB, cardiac arrest, s/p LHC and impella on 3/29, s/p high risk PCI with 4 stents in LAD on 4/10; acute respiratory failure requiring intubation 3/29 with extubation 4/13 .  He presents with the following impairments/functional limitations: weakness, gait instability, impaired balance, impaired endurance, decreased safety awareness, impaired functional mobility.    Rehab Prognosis: Good; patient would benefit from acute skilled PT services to address these deficits and reach maximum level of function.    Recent Surgery: Procedure(s) (LRB):  Left heart cath (N/A) 11 Days Post-Op    Plan:     During this hospitalization, patient would benefit from acute PT services 5 x/week to address the identified rehab impairments via gait training, therapeutic activities, therapeutic exercises and progress toward the following goals:    Plan of Care Expires:  05/15/25    Subjective     Chief Complaint: 'You're not leaving me in that chair', dizziness/weak  Patient/Family Comments/goals: none stated  Pain/Comfort:  Pain Rating 1:  (not rated)  Location - Orientation 1: lower  Location 1: back  Pain Addressed 1: Distraction, Reposition, Nurse notified (per nurse pain patch was placed prior to therapy)      Objective:     Communicated with NSG prior to session.  Patient found HOB elevated with telemetry, pulse ox (continuous), peripheral IV, contreras catheter upon PT entry to room.     General Precautions: Standard, fall  Orthopedic Precautions: N/A  Braces: N/A  Respiratory Status: Room air SpO2: 97%  Blood Pressure:    Seated EOB: 83/52   Post thera ex:  98/61   After ~5 mins: 87/56   After ~8 mins: 79/56   HOB elevated: 87/51   Semi supine: 96/57, nurse aware  Skin Integrity: Visible skin intact, of noted pt with bloody urine - nurse okay'd to cont. With therapy    Functional Mobility:  Bed Mobility:     Supine to Sit: moderate assistance and of 2 persons  Sit to Supine: maximal assistance and of 2 persons  Balance: Static Sitting: CGA-ModA     Therapeutic Activities/Exercises:  Ankle pumps 2 x20  Knee ext 2 x10    Once sitting EOB pt with complaints of dizziness, BP assessed. Pt performed exercises in attempt to elevate BP. Initially successful however pt with decreased command following and stopped performing exercises. BP on downward tend. Pt assisted back to bed.      Patient left  semi-supine  with all lines intact, call button in reach, nurse notified, and MD leung and  present.    GOALS:   Multidisciplinary Problems       Physical Therapy Goals          Problem: Physical Therapy    Goal Priority Disciplines Outcome Interventions   Physical Therapy Goal     PT, PT/OT Progressing    Description: Goals to be met by: 5/15/25     Patient will increase functional independence with mobility by performin. Supine to sit with MInimal Assistance  2. Sit to supine with MInimal Assistance  3. Sit to stand transfer with Moderate Assistance  4. Bed to chair transfer with Moderate Assistance using Rolling Walker  5. Sitting at edge of bed x20 minutes with Stand-by Assistance                         Time Tracking:     PT Received On: 25  PT Start Time: 1159     PT Stop Time: 1223  PT Total Time (min): 24 min     Billable Minutes: Therapeutic Activity 1 unit and Therapeutic Exercise 1 unit    Treatment Type: Treatment  PT/PTA: PTA     Number of PTA visits since last PT visit: 4     2025

## 2025-04-21 NOTE — PT/OT/SLP PROGRESS
[facility]  Speech Language Pathology Department  Dysphagia Therapy Progress Note    Patient Name:  Jon Conley   MRN:  [id number]    Recommendations     General recommendations:  dysphagia therapy  Solid texture recommendation:  Puree Diet - IDDSI Level 4  Liquid consistency recommendation: Moderately thick liquids - IDDSI Level 3   Medications: crushed in puree  Aspiration precautions: small bites/sips, slow rate, and upright for PO intake    Discharge therapy intensity: Moderate Intensity Therapy   Barriers to safe discharge:  severity of impairment and level of skilled assistance needed    Subjective     Patient awake, alert, and confused.  Spiritual/Cultural/Shinto Beliefs/Practices that affect care: no    Pain/Comfort: Pain Rating 1: 0/10    Objective     Therapeutic Activities:  Pt completed CTAR (chin tuck against resistance) exercises x10 with maximum cues.    Therapeutic PO Trials:  Consistency Amount Fed By Oral Symptoms Pharyngeal Symptoms   Thin liquid by spoon x5 SLP Prolonged bolus formation/mastication Wet vocal quality after swallow  Coughing after swallow     Assessment     Pt continues to present with oropharyngeal dysphagia requiring diet modification to improve swallow safety and efficiency.    Outcome Measures     Functional Oral Intake Scale: 5 - Total oral diet with multiple consistencies, by requiring special preparation or compensations    Goals     Multidisciplinary Problems       SLP Goals          Problem: SLP    Goal Priority Disciplines Outcome   SLP Goal     SLP    Description: LTG:  Pt will tolerate least restrictive diet with no clinical s/sx of aspiration.    STGs:  1. Tolerate half meal of soft/bite-sized consistency with no overt s/sx of aspiration, should dentures be brought to hospital.  2.  Perform BOT and LE exercises.  3.  Perform effortful swallow (ice chips ok for this exercise).                     Patient Education     Patient  provided with verbal education regarding ST POC.  Additional teaching is warranted.    Plan     Will continue to follow and tx as appropriate.    SLP Follow-Up:  Yes   Patient to be seen:  5 x/week   Plan of Care expires:  04/27/25  Plan of Care reviewed with:  patient       Time Tracking     SLP Treatment Date:   04/21/25  Speech Start Time:  1400  Speech Stop Time:  1410     Speech Total Time (min):  10 min    Billable minutes:  Treatment of Swallow Dysfunction, 10 minutes       04/21/2025

## 2025-04-21 NOTE — PLAN OF CARE
Clinical updates sent to [facility] and [facility]. Will follow up with with the facilities after their morning meetings.    Reached out to Serene at [facility] and Caro at [facility]. Awaiting calls back from both at this time.     Spoke to Serene at [facility]. She has not reviewed this referral yet but she stated she would review today and come tomorrow 4/22 morning to assess this patient.    Tried again to reach Caro at [facility]. No answer at this time. Received call back from Caro, they are not in network with the patient's insurance provider.

## 2025-04-21 NOTE — PROGRESS NOTES
Inpatient Nutrition Assessment    Admit Date: 3/29/2025   Total duration of encounter: 23 days   Patient Age: 79 y.o.    Nutrition Recommendation/Prescription     Heart healthy diet with texture modifications per SLP, pureed solids and moderately thick liquids.  Encouragement and assistance with meals.  Boost Plus (provides 360 kcal, 14 g protein per serving) TID. Thicken to moderately thick consistency.  Bowel regimen as feasible.   Inadequate energy intake x1 week, consider appetite stimulant per MD vs cyclic enteral nutrition. Consult RD for recommendations.    Communication of Recommendations: reviewed with nurse and reviewed with patient    Nutrition Assessment     Malnutrition Assessment/Nutrition-Focused Physical Exam       Malnutrition Level: other (see comments) (Does not meet criteria) (03/31/25 1259)  Energy Intake (Malnutrition): other (see comments) (Unable to assess) (03/31/25 1259)  Weight Loss (Malnutrition): other (see comments) (Unable to assess) (03/31/25 1259)                                                  A minimum of two characteristics is recommended for diagnosis of either severe or non-severe malnutrition.    Chart Review    Reason Seen: continuous nutrition monitoring and follow-up    Malnutrition Screening Tool Results   Have you recently lost weight without trying?: No  Have you been eating poorly because of a decreased appetite?: No   MST Score: 0   Diagnosis:  NSTEMI with cardiogenic shock, ongoing  Ischemic cardiomyopathy with severe decreased LVSF, EF 15%  Cardiogenic pulmonary edema with acute respiratory failure  Severe wide anion gap metabolic acidosis of primary lactic acid etiology, resolved   Elevated serum transaminases    Relevant Medical History: CAD, HTN, HLD, PAD, GERD    Scheduled Medications:  aspirin, 81 mg, Daily  atorvastatin, 40 mg, Daily  bumetanide, 1 mg, Daily  clopidogreL, 75 mg, Daily  dapagliflozin propanediol, 10 mg, Daily  enoxaparin, 40 mg,  Daily  famotidine, 20 mg, BID  LIDOcaine, 1 patch, Daily  metoprolol succinate, 50 mg, Daily  sacubitriL-valsartan, 1 tablet, BID  senna-docusate, 1 tablet, Daily  zinc oxide-cod liver oil, , BID    Continuous Infusions:  tirofiban-0.9% sodium chloride 12.5 mg/250ml, Last Rate: Stopped (03/29/25 1845)    PRN Medications:  acetaminophen, 650 mg, Q4H PRN  bisacodyL, 10 mg, Daily PRN  dextrose 50%, 12.5 g, PRN  dextrose 50%, 25 g, PRN  glucagon (human recombinant), 1 mg, PRN  glucose, 16 g, PRN  glucose, 24 g, PRN  hydrALAZINE, 10 mg, Q4H PRN  ondansetron, 8 mg, Q8H PRN  polyethylene glycol, 17 g, BID PRN  tirofiban-0.9% sodium chloride 12.5 mg/250ml, , Continuous PRN    Calorie Containing IV Medications: no significant kcals from medications at this time    Recent Labs   Lab 04/14/25  1150 04/14/25  2013 04/15/25  0331 04/16/25  0642 04/17/25  0548 04/18/25  0428 04/20/25  1840    141 138 142 140 142 143   K 3.6 3.6 3.7 4.3 4.1 4.1 3.7   CALCIUM 7.9* 8.1* 8.0* 8.1* 7.8* 8.2* 8.2*   PHOS 3.4 2.7 2.7  --   --   --   --    MG 2.10 2.10 2.20 2.20 2.20 2.30 2.20    110* 111* 114* 114* 114* 113*   CO2 22* 21* 18* 20* 19* 22* 20*   BUN 12.5 12.8 12.8 14.4 15.4 18.7 20.4   CREATININE 0.66* 0.67* 0.67* 0.72 0.73 0.68* 0.80   EGFRNORACEVR >60 >60 >60 >60 >60 >60 >60   GLUCOSE 110 95 96 77* 78* 73* 102   BILITOT 0.5 0.5 0.5 0.6 0.6  --   --    ALKPHOS 88 86 88 87 85  --   --    ALT 20 18 16 13 14  --   --    AST 21 18 18 15 17  --   --    ALBUMIN 2.4* 2.4* 2.4* 2.4* 2.5*  --   --    WBC  --   --  6.63 6.92  --   --  8.28   HGB  --   --  8.9* 8.6*  --   --  9.8*   HCT  --   --  27.3* 28.5*  --   --  31.6*     Nutrition Orders:  Diet Pureed (IDDSI Level 4) Heart Healthy; Moderately Thick Liquids (IDDSI Level 3)  Dietary nutrition supplements Daily; Boost Plus Nutritional Drink - Any flavor    Appetite/Oral Intake: poor/25-50% of meals  Factors Affecting Nutritional Intake: decreased appetite and food  textures/preferences  Social Needs Impacting Access to Food: unable to assess at this time; will attempt on follow-up  Food/Mu-ism/Cultural Preferences: unable to obtain  Food Allergies: no known food allergies  Last Bowel Movement: 04/19/25  Wound(s):     Wound 04/10/25 1800 Shearing Right Buttocks-Tissue loss description: Partial thickness       Wound 04/10/25 1800 Skin Tear Right lower Arm-Tissue loss description: Partial thickness      Comments    3/31/25: Discussed with RN. Will provide tube feeding recommendations for when appropriate to start tube feeding. Receiving kcal from meds.  Currently with Impella in place, HOB flat. May need to place in reverse Trendelenburg to start TF.     4/1/25: Possible plans for starting trickle feeds today. Still receiving kcal from meds.     4/2/25: No TF yet. Still receiving kcal from meds.     4/3/25: No TF plans at this time. Plans for CABG tomorrow with plans for extubation per protocol post-op.     4/7/25: No CABG done. Possible plans for removal of Impella today. TF to start post procedure per RN.     4/8/25: Plans for Impella removal tomorrow. TF to start today.     4/10/25: Plans for procedure today. TF on hold. Previously tolerated per RN.     4/14/25: Pt now extubated, on po diet. 25-50% po intake of meals per RN. Only taking small bites at this time. Attempted to verify subjective info with pt, pt [place] and not understanding questions. Was willing to have ONS sent. Also add Ousmane due to pressure ulcer.     4/17/25: Reports not being hungry. Ate 0% breakfast and lunch. Did drink almost 100% Boost this morning with medications. Will continue Boost Plus with breakfast, discussed thickening instructions with RN. Trial Magic Cup with lunch and dinner. Re-add Ousmane for wound healing once oral intake improves. Last BM 4/11.    4/21/25: Intake of meals remains inadequate. States po intake is dependent on meal. Does likes Boost, drinks when encouraged. Noted Magic Cup  "discontinued over the weekend by RN, maybe pt did not like? Will increase Boost back to every meal.     Anthropometrics    Height: 5' 10.98" (180.3 cm), Height Method: Stated  Last Weight: 48.4 kg (106 lb 9.6 oz) (04/20/25 0557), Weight Method: Bed Scale  BMI (Calculated): 14.9  BMI Classification: obese grade I (BMI 30-34.9)        Ideal Body Weight (IBW), Male: 171.88 lb     % Ideal Body Weight, Male (lb): 130.81 %                          Usual Weight Provided By: unable to obtain usual weight    Wt Readings from Last 5 Encounters:   04/20/25 48.4 kg (106 lb 9.6 oz)   12/02/21 101 kg (222 lb 10.6 oz)     Weight Change(s) Since Admission:   4/20/25 48.4kg, inaccurate weight, possibly 106kg?  Wt Readings from Last 1 Encounters:   04/20/25 0557 48.4 kg (106 lb 9.6 oz)   04/17/25 0617 106.6 kg (235 lb 0.2 oz)   03/29/25 1738 106.1 kg (233 lb 14.5 oz)   03/29/25 0404 102.1 kg (225 lb)   Admit Weight: 102.1 kg (225 lb) (03/29/25 0404), Weight Method: Stated    Estimated Needs    Weight Used For Calorie Calculations: 106.1 kg (233 lb 14.5 oz)  Energy Calorie Requirements (kcal): 2337kcal (1.3 stress factor)  Energy Need Method: [place]- Jeor  Weight Used For Protein Calculations: 106.1 kg (233 lb 14.5 oz)  Protein Requirements: 117-138gm (1.1-1.3g/kg)  Fluid Requirements (mL): 2122ml (20ml/kg)  CHO Requirement: 260gm (45% est kcal needs)     Enteral Nutrition     Patient not receiving enteral nutrition at this time.    Parenteral Nutrition     Patient not receiving parenteral nutrition support at this time.    Evaluation of Received Nutrient Intake    Calories: not meeting estimated needs  Protein: not meeting estimated needs    Patient Education     Not applicable.    Nutrition Diagnosis     PES: Inadequate oral intake related to acute illness as evidenced by 25-50% po intake of meals since diet advanced. (active)     PES:            Nutrition Interventions     Intervention(s): modified composition of meals/snacks, " commercial beverage, commercial food, prescription medication, and collaboration with other providers  Intervention(s):      Goal: Meet greater than 80% of nutritional needs by follow-up. (goal not met)  Goal: Tolerate enteral feeding at goal rate by follow-up. (goal discontinued)    Nutrition Goals & Monitoring     Dietitian will monitor: food and beverage intake, energy intake, weight change, beliefs/attitudes, and gastrointestinal profile  Discharge planning:  cardiac diet with texture modifications per SLP  Nutrition Risk/Follow-Up: high (follow-up in 1-4 days)   Please consult if re-assessment needed sooner.

## 2025-04-21 NOTE — PT/OT/SLP PROGRESS
Occupational Therapy   Treatment    Name: Jon Conley  MRN: [id number]  Admitting Diagnosis:  NSTEMI (non-ST elevated myocardial infarction)  11 Days Post-Op    Recommendations:     Recommended therapy intensity at discharge: Moderate Intensity Therapy   Discharge Equipment Recommendations:  to be determined by next level of care  Barriers to discharge:  ongoing medical needs    Assessment:     Jon Conley is a 79 y.o. male with a medical diagnosis of SOB, cardiac arrest, s/p LHC and impella on 3/29, s/p high risk PCI with 4 stents in LAD on 4/10; acute respiratory failure requiring intubation 3/29 with extubation 4/13.  He presents with significant hematuria.  He presents with confused speech, follows commands when redirected, calling out for daughter that was not present in room at the time.  Daughter present reports he has been more confused since he has been sick.  RN aware of overall status and performance.    Performance deficits affecting function are weakness, impaired endurance, impaired self care skills, impaired functional mobility, impaired balance, impaired cognition.     Rehab Prognosis:  Good; patient would benefit from acute skilled OT services to address these deficits and reach maximum level of function.       Plan:     Patient to be seen 5 x/week to address the above listed problems via self-care/home management, therapeutic activities  Plan of Care Expires: 05/15/25  Plan of Care Reviewed with: patient, daughter    Subjective     Pain/Comfort:  Pain Rating 1: 0/10    Objective:     Communicated with: RN prior to session.  Patient found left sidelying with  (telemetry, pulse ox, IV, contreras catheter, wedge, SCD, heel floats) upon OT entry to room.    General Precautions: Standard, fall    Orthopedic Precautions:N/A  Braces: N/A  Respiratory Status: Room air  Vital Signs: 91/65 HOB elevated, 104/79 supine after prolonged period of BP unsuccessfully reading     Occupational  Performance:     Bed Mobility:    Patient completed Supine to Sit with maxx2  Patient completed Sit to Supine with maxx2     Functional Mobility/Transfers:  Functional Mobility: does not occur as pt reporting dizziness while seated EOB and BP unable to read despite 2 attempts.  Returned to supine, unable to read again.  Transitioned to forearm BP read 104/79 HR 92.  HR 90s throughout.  Pt clammy and pale towards end of session.  Unable to tolerate transfer OOB today.  RN notified.    [facility] 6 Click ADL: 12    Patient Education:  Patient provided with verbal education education regarding OT role/goals/POC, post op precautions, fall prevention, safety awareness, Discharge/DME recommendations, and pressure ulcer prevention.  Understanding was verbalized, however additional teaching warranted.      Patient left left sidelying with all lines intact, call button in reach, and pressure relief boots.  RN aware of status.    GOALS:   Multidisciplinary Problems       Occupational Therapy Goals          Problem: Occupational Therapy    Goal Priority Disciplines Outcome Interventions   Occupational Therapy Goal     OT, PT/OT Progressing    Description: LTG: Pt will perform basic ADLs and ADL transfers with Modified independence using LRAD by discharge.    STG: to be met by 5/15/25    Pt will complete grooming standing at sink with LRAD with SBA.  Pt will complete UB dressing with SBA.  Pt will complete LB dressing with SBA using LRAD and AE prn.  Pt will complete toileting with SBA using LRAD.  Pt will complete functional mobility to/from toilet and toilet transfer with SBA using LRAD.                        Time Tracking:     OT Date of Treatment:    OT Start Time: 1313  OT Stop Time: 1339  OT Total Time (min): 26 min    Billable Minutes:TherAct 2          Number of OLIVIA visits since last OT visit: 2    4/21/2025

## 2025-04-22 NOTE — PT/OT/SLP PROGRESS
Occupational Therapy      Patient Name:  Jon Conley   MRN:  [id number]    Patient not seen secondary to family at bedside requesting therapy hold for today as they are awaiting a GI consult. Nurse also reports palliative consult has been ordered  . Will follow-up as able.    4/22/2025

## 2025-04-22 NOTE — PROGRESS NOTES
.UROLOGY  PROGRESS  NOTE    Jon Conley 1945  [id number]  4/22/2025    Nurse reports he clotted off earlier requiring hand irrigation and increase rate in CBI. Staff also reports him pulling at the Ortiz. Labs reviewed.     Exam:    NAD  Card RRR  Resp unlabored  Abd soft, NTND   dried blood along the Ortiz with old blood tinged urine draining to  bag, moderate CBI  Extremity no C/C/E      Recent Results (from the past 24 hours)   CBC Without Differential    Collection Time: 04/21/25 12:23 PM   Result Value Ref Range    WBC 11.29 4.50 - 11.50 x10(3)/mcL    RBC 3.57 (L) 4.70 - 6.10 x10(6)/mcL    Hgb 10.2 (L) 14.0 - 18.0 g/dL    Hct 33.2 (L) 42.0 - 52.0 %    MCV 93.0 80.0 - 94.0 fL    MCH 28.6 27.0 - 31.0 pg    MCHC 30.7 (L) 33.0 - 36.0 g/dL    RDW 17.5 (H) 11.5 - 17.0 %    Platelet 359 130 - 400 x10(3)/mcL    MPV 10.1 7.4 - 10.4 fL    NRBC% 0.0 %   Basic Metabolic Panel    Collection Time: 04/21/25 12:23 PM   Result Value Ref Range    Sodium 144 136 - 145 mmol/L    Potassium 4.0 3.5 - 5.1 mmol/L    Chloride 115 (H) 98 - 107 mmol/L    CO2 22 (L) 23 - 31 mmol/L    Glucose 118 (H) 82 - 115 mg/dL    Blood Urea Nitrogen 24.3 8.4 - 25.7 mg/dL    Creatinine 0.88 0.72 - 1.25 mg/dL    BUN/Creatinine Ratio 28     Calcium 8.7 (L) 8.8 - 10.0 mg/dL    Anion Gap 7.0 mEq/L    eGFR >60 mL/min/1.73/m2   Occult Blood, Stool 1st Specimen    Collection Time: 04/21/25  4:27 PM   Result Value Ref Range    Stool Color 1 Black     Stool Consistancy 1 soft     Occult Blood Stool 1 Positive (A) Negative   Hemoglobin and Hematocrit    Collection Time: 04/21/25  8:27 PM   Result Value Ref Range    Hgb 9.4 (L) 14.0 - 18.0 g/dL    Hct 30.6 (L) 42.0 - 52.0 %   Occult Blood, Stool 2nd Specimen    Collection Time: 04/22/25  1:04 AM   Result Value Ref Range    Stool Color 2 Black     Stool Consistancy 2 soft     Occult Blood Stool 2 Positive (A) Negative, N/A   Occult Blood, Stool 3rd Specimen    Collection Time: 04/22/25  2:14 AM    Result Value Ref Range    Stool Color 3 Black     Stool Consistancy 3 soft     Occult Blood Stool 3 Positive (A) Negative, N/A   CBC Without Differential    Collection Time: 04/22/25  5:03 AM   Result Value Ref Range    WBC 9.07 4.50 - 11.50 x10(3)/mcL    RBC 3.23 (L) 4.70 - 6.10 x10(6)/mcL    Hgb 9.3 (L) 14.0 - 18.0 g/dL    Hct 31.1 (L) 42.0 - 52.0 %    MCV 96.3 (H) 80.0 - 94.0 fL    MCH 28.8 27.0 - 31.0 pg    MCHC 29.9 (L) 33.0 - 36.0 g/dL    RDW 17.5 (H) 11.5 - 17.0 %    Platelet 290 130 - 400 x10(3)/mcL    MPV 10.2 7.4 - 10.4 fL    NRBC% 0.0 %   Basic Metabolic Panel    Collection Time: 04/22/25  5:03 AM   Result Value Ref Range    Sodium 143 136 - 145 mmol/L    Potassium 3.7 3.5 - 5.1 mmol/L    Chloride 112 (H) 98 - 107 mmol/L    CO2 20 (L) 23 - 31 mmol/L    Glucose 125 (H) 82 - 115 mg/dL    Blood Urea Nitrogen 32.1 (H) 8.4 - 25.7 mg/dL    Creatinine 1.06 0.72 - 1.25 mg/dL    BUN/Creatinine Ratio 30     Calcium 8.6 (L) 8.8 - 10.0 mg/dL    Anion Gap 11.0 mEq/L    eGFR >60 mL/min/1.73/m2         Assessment:  Gross hematuria  -s/p TURP 6 weeks ago, cytso clot evac and fulguration 3 weeks ago  -H&H 9.3 & 31.1, occult blood in stool positive x 3      Plan:  Patient now has mittens because he continues to pull on the Ortiz. I hand irrigated some old clot. Continue to titrate CBI and hand irrigate as needed. If urine remains relatively clear, we would like to remove his Ortiz in the morning.     Estelle Crisostomo, ROSAP

## 2025-04-22 NOTE — NURSING
[facility] [address]  Wound Care    Patient Name:  Jon Conley   MRN:  [id number]  Date: 4/22/2025  Diagnosis: NSTEMI (non-ST elevated myocardial infarction)    History:     No past medical history on file.    Social History[1]    Precautions:     Allergies as of 03/29/2025    (No Known Allergies)       WOC Assessment Details/Treatment      04/22/25 1000        Wound 04/10/25 1800 Shearing Right Buttocks   Date First Assessed/Time First Assessed: 04/10/25 1800   Present on Original Admission: No  Primary Wound Type: Shearing  Side: Right  Location: Buttocks  Is this injury device related?: No   Wound Image    Dressing Appearance Open to air   Drainage Amount None   Appearance Pink;Dry   Tissue loss description Not applicable   Care Cleansed with:;Wound cleanser;Applied:;Skin Barrier   Dressing Absorptive Pad   Positioning   Body Position position changed independently   Head of Bed (HOB) Positioning HOB elevated   Positioning/Transfer Devices pillows     Wocn follow-up right buttock shearing .   See new photo.    Pt awake alert oriented but needs help with turning.    Right buttock area now dry and shearing effects have flatten out and now pink and dry.    He will continue to be turned q2hrs with wedge and offloading boots per staffing per shift.   No changes to care at this time.       04/22/2025         [1]   Social History  Socioeconomic History    Marital status: Single     Social Drivers of Health     Financial Resource Strain: Patient Unable To Answer (3/30/2025)    Overall Financial Resource Strain (CARDIA)     Difficulty of Paying Living Expenses: Patient unable to answer   Food Insecurity: Patient Unable To Answer (3/30/2025)    Hunger Vital Sign     Worried About Running Out of Food in the Last Year: Patient unable to answer     Ran Out of Food in the Last Year: Patient unable to answer   Transportation Needs: No Transportation Needs (3/29/2025)    PRAPARE -  Transportation     Lack of Transportation (Medical): No     Lack of Transportation (Non-Medical): No   Stress: Patient Unable To Answer (3/30/2025)    [employer] - Occupational Stress Questionnaire     Feeling of Stress : Patient unable to answer   Housing Stability: Patient Unable To Answer (3/30/2025)    Housing Stability Vital Sign     Unable to Pay for Housing in the Last Year: Patient unable to answer     Number of Times Moved in the Last Year: 0     Homeless in the Last Year: Patient unable to answer

## 2025-04-22 NOTE — PROGRESS NOTES
[facility]    Cardiology  Progress Note    Patient Name: Jon Conley  MRN: [id number]  Admission Date: 3/29/2025  Hospital Length of Stay: 24 days  Code Status: DNR   Attending Provider: Elgin Stark MD   Consulting Provider: Lorne Stewart NP  Primary Care Physician: Patrick Reece Jr., MD  Principal Problem:NSTEMI (non-ST elevated myocardial infarction)    Patient information was obtained from past medical records and ER records.     Subjective:   Chief Complaint/Reason for Consult: Cardiac Arrest     HPI: Mr. Conley is a 80 y/o male who is known to [facility], Dr. Wang. The patient presented to [facility] on 3.29.25 with c/o SOB. He initially presented to [facility] in [place] with SOB. He was found to have Tachycardia, Hypotension, Anemia (7.4/24.6), Troponin 9.443, .0, BUN/Crea 16.2/1.34, Lactic Acid Level 4.3, Na 133, WBC 14.03, H&H 7.3/23.0, WBC 14.03, , PT/INR 14.9/1.2. He was admitted to , however, he sustained a witnessed PEA Arrest in the ER and has ROSC per ER MD. [facility] was consulted for NSETMI and Arrest.     Hospital Course:  3.30.25: NAD. Impella L Groin. CVP 7, P7. Levophed 0.32mcg/kg/min, Heparin Drip per Protocol, Amiodarone 0.5mg/min  3.31.25: NAD. L Groin Impella P7, Amiodarone 0.5mg/min, Heparin Drip per Protocol, Levophed 0.18mcg/kg/min, H&H 8.8/25.8, AST/ALT 79/45  4.1.25: NAD noted. L groin Impella in place, remains at P7. Remains on levophed. LFTs continue to improve.  4.2.25: NAD. L Groin Impella Device P6. Heparin Infusion per Protocol. Remains on 0.06mcg/kg/min.   4.3.25: NAD. Vented/Sedated. L Groin Impella Device. P3, Heparin Infusion per Protocol. Off Pressors. Attempting Wean of Impella. H&H 8.2/26.2,   4.4.25: NAD. Vented/Sedated. Heparin Drip per Protocol. H&H 8.1/95.4,   4.5.25: NAD. Vented/Sedated. Heparin Drip per Protocol. Levophed 0.04mcg/kg/min. H&H 7.9/23.8,  Impella P6  4.6.25: NAD.  "Vented/Sedated. P3. Heparin Drip per Protocol, Off Pressors. CV Surgery to Reevaluate for CABG. H&H 7.4/22.7,   4.7.25: NAD. Vented/Sedated. H&H 9.2/27.7, , K 3.9, BUN/Crea 9.2/0.61, Lactic Acid 0.6. Impella L Groin P4  4.8.25:NAD noted. Remains vented/sedated. Consent obtained for LHC, will plan for Thursday.  4.9.25: NAD. Remains Vented/Sedated. Plans for LHC with PCI on Thursday. Impella P4. Remains Off Pressors.   4.10.25: NAD. Remains Vented/Sedated. NPO for Planned LHC with PCI/Impella Assisted. P4 Impella. Levophed 0.1mcg/kg/min.   4.11.25: NAD. Vented/Sedated. Levophed 0.12mcg/kg/min. S/P PCI of LAD and Impella Explant.   4.12.25: NAD. Vented/Sedated. Levophed 0.08mcg/kg/min.   4.13.25: NAD. Sitting in Bedside Chair. Denies CP, SOB and Palps. Off Pressors. "I am ok."   4.14.25: NAD. "I am fine." Denies CP, SOB and Palps. Remains Off Pressors.   4.15.25: NAD. "Hi." Denies CP, SOB and Palps.   4.16.25: NAD. Feeling Ok. Denies CP, SOB, or palpitations.   4.17.25: NAD Noted. Peripheral Edema much improved. Remains deconditioned. Worked with PT This AM. Vitals Stable.   4.222.25: Reconsulted due to hematuria while on DAPT.      PMH: BPH/Urinary Obstruction, CAD/Nonobstructive, PAD, R JACY/Mild, HTN, Obesity, GERD, Tobacco Use   PSH: TURP, Angiogram, Bilateral Knee Replacements   Family History: Denies Family History of Heart Disease  Social History: Denies Illicit Drug, ETOH and Tobacco Use     Previous Cardiac Diagnostics:   [facility] (4.10.25):  Findings:  - Successful Impella-protected PCI of LAD.  - Prox to Mid Left Anterior Descending has severe diffuse disease, up to 80% in severity. The lesion was successfully treated with overlapping FRONTIER BERNADINE 3.04W58MI, FRONTIER BERNADINE 3.31R35JI, FRONTIER BERNADINE 2.83F04WR, and SYNERGY XD 2.5X28MM drug-eluting stents. Post-dilatation was performed using an NC EMERGE MR 3.5X20MM angioplasty balloon inflated to 20 gretel. Following intervention there was 0% residual " stenosis and PATTY grade 3 flow in the distal vessel.  - Intravascular Ultrasound (IVUS) was performed prior to intervention to further characterize the lesion, as well as post-stent deployment to ensure optimal stent apposition and expansion.  - Ostial First Diagonal Branch has a 70% stenosis. At the conclusion of the case, there was PATTY grade 3 flow in the distal vessel.  - Successful Impella CP removal and closure of L femoral arterial access site using a 14 [place]ian MANTA device.  Assessment/Plan:  - Patient is a 79 y.o. male with a history of CAD presents in cardiogenic shock. Successful PCI of RCA last week. Impella CP placed at that time. Now s/p successful LAD intervention and Impella CP removal.  - Continue DAPT (aspirin + clopidogrel) for a minimum of 12 months and aspirin indefinitely thereafter  - High intensity statin  - Arterial sheath remains place; plan is to remove when activated clotting time (ACT) less than 180 seconds. Bedrest for 4 hours after hemostasis is achieved.    ECHO Limited 4.8.25:  Impella check  Aortic annulus to tip distance is 4.3 cm.  LV systolic function is 15%.    ECHO 4.3.25:  Limited study to check impella placement.  Left Ventricle: There is severely reduced systolic function with a visually estimated ejection fraction of 15 - 20%.  Impella distance from aortic valve annulus appears appropriate.    ECHO 4.1.25:  Limited study to check impella placement.  Left Ventricle: There is severely reduced systolic function with a visually estimated ejection fraction of 15 - 20%.  Impella distance from aortic valve annulus appears appropriate.    ECHO 3.30.25:  Left Ventricle: There is severely reduced systolic function with a visually estimated ejection fraction of less than 30%.  There appears to be at least moderate to severe mitral regurgitation.   Impella is positioned with inflow cannula 3.8 cm from aortic valve annulus.    [facility] 3.29.25:  Findings:  - Successful placement of  Impella CP device from left femoral approach.  - There is severe multi-vessel coronary artery disease.  - Left Main has mild diffuse disease.  - Mid Left Anterior Descending has serial lesions, up to 70-80% in severity.   - Prox Left Circumflex is 100% occluded. Unable to cross lesion despite wire escalation and microcatheter support.  - Mid Right Coronary Artery is 100% occluded. The lesion was successfully treated with overlapping SYNERGY XD 4.0X20MM, SYNERGY XD 3.0X48MM, and SYNERGY XD 2.13M67YF drug-eluting stents. Post-dilatation was performed using a SYNERGY XD 4.0X20MM angioplasty balloon inflated to 12 gretel. Following intervention there was 0% residual stenosis and PATTY grade 3 flow in the distal vessel.  - Intravascular Ultrasound (IVUS) was performed prior to intervention to further characterize the lesion, as well as post-stent deployment to ensure optimal stent apposition and expansion.  - Aorto-iliac angiogram revealed moderate disease of R ileofemoral system.  - At the conclusion of the case, contrast injection was performed through the Impella sheath side port, showing adequate antegrade flow down the ipsilateral common femoral artery/SFA/profunda artery.  - RHC with elevated R sided filling pressures. RA 17 mmHg, RV 45/15 mmHg, PA 42/29 mmHg (mean 34 mmHg), PCWP 25 mmHg.  - Transpulmonary gradient is 9 mmHg.  - Cardiac Output/Index at 6.6/3.0, as calculated by Gregory equation on Levophed infusion and Impella CP support.  - PVR is 1.4 Woods units  - SVR is 550 dyne-sec*cm^-5  - Pulmonary Artery Pulsatility Index (Rafa) is 0.8  - Cardiac Power Output () is 0.91   Assessment/Plan:  - Patient is a 79 y.o. male with a history of recent TURP, complicated by significant post-op bleeding, presents to OSH ED with dyspnea. Transferred to [facility] with worsening shock despite vasopressors. TTE showing EF 25%, severe MR, severe TR. Troponin elevated. Brought to cath lab. Underwent Impella placement/LHC/RHC as  outlined above. RCA lesion treated. Unable to cross LCx lesion. 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 At this point, would continue Impella support and aggressive medical mgmt. Has non-culprit lesions that should be treated at a later date as well. Can consider CTS consult, especially given severe valvular disease.  - Patient was given a loading dose of clopidogrel 600 mg PO in the cath lab  - Continue DAPT (aspirin + clopidogrel) for a minimum of 12 months and aspirin indefinitely thereafter  - High intensity statin  - Continue Aggrastat for 4 hours post-procedure  - Arterial sheath remains place; plan is to remove when activated clotting time (ACT) less than 180 seconds. Bedrest for 4 hours after hemostasis is achieved.  - Obtain transthoracic echocardiogram  - Continuous bedrest while Impella in place  - Therapeutic heparin administration per protocol  - Antibiotics per protocol  - Remaining mgmt per primary team/cardiology    ECHO 3.29.25:  Left Ventricle: The left ventricle is dilated. Normal wall thickness. Severe global hypokinesis present. There is severely reduced systolic function with a visually estimated ejection fraction of 15 - 20%. Grade I diastolic dysfunction.  Right Ventricle: Right ventricular enlargement. Systolic function is normal. TAPSE is 1.83 cm.  Left Atrium: dilated  Mitral Valve: Mildly thickened leaflets. There is moderate regurgitation.  Tricuspid Valve: There is moderate regurgitation.  Pulmonary Artery: There is moderate pulmonary hypertension.  IVC/SVC: Patient is ventilated, cannot use IVC diameter to estimate right atrial pressure.    Carotid US 11.4.24:  The study quality is average.   1-39% stenosis in the proximal right internal carotid artery based on Bluth Criteria.   Antegrade right vertebral artery flow.   Antegrade left vertebral artery flow    ECHO 1.27.22:  The study quality is average.   The left ventricle is normal in size. Global left ventricular systolic function  is moderately decreased. The left ventricular ejection fraction is 40-45%. Mild concentric left ventricular hypertrophy is present.  The left ventricle diastolic function is impaired (Grade I) with normal left atrial pressure.  Mild (1+) mitral regurgitation.  The pulmonary artery appears normal.     [facility] 9.26.17:  LM: Normal  LAD: Prox 30% Stenosis  RI: Normal  LCX: Normal  RCA: Distal RCA 50%    Review of patient's allergies indicates:  No Known Allergies  No current facility-administered medications on file prior to encounter.     No current outpatient medications on file prior to encounter.     Review of Systems   Respiratory:  Negative for chest tightness and shortness of breath.    Cardiovascular:  Negative for chest pain.   All other systems reviewed and are negative.    Objective:     Vital Signs (Most Recent):  Temp: 98.1 °F (36.7 °C) (04/22/25 0458)  Pulse: 96 (04/22/25 0458)  Resp: 18 (04/21/25 2100)  BP: (!) 92/58 (04/22/25 0458)  SpO2: 95 % (04/21/25 2335) Vital Signs (24h Range):  Temp:  [97.9 °F (36.6 °C)-99 °F (37.2 °C)] 98.1 °F (36.7 °C)  Pulse:  [] 96  Resp:  [18] 18  SpO2:  [95 %-99 %] 95 %  BP: ()/(54-72) 92/58   Weight: 48.4 kg (106 lb 9.6 oz)  Body mass index is 14.87 kg/m².  SpO2: 95 %       Intake/Output Summary (Last 24 hours) at 4/22/2025 0708  Last data filed at 4/22/2025 0625  Gross per 24 hour   Intake 477 ml   Output 35749 ml   Net -31413 ml     Lines/Drains/Airways       Drain  Duration                  Urethral Catheter -- days              Peripheral Intravenous Line  Duration                  Peripheral IV - Single Lumen 04/14/25 1600 20 G 2 1/4 in Yes Anterior;Distal;Right Upper Arm 7 days                  Significant Labs:   Chemistries:   Recent Labs   Lab 04/16/25  0642 04/17/25  0548 04/18/25  0428 04/20/25  1840 04/21/25  0540 04/21/25  1223 04/22/25  0503    140 142 143  --  144 143   K 4.3 4.1 4.1 3.7  --  4.0 3.7   * 114* 114* 113*  --  115* 112*  "  CO2 20* 19* 22* 20*  --  22* 20*   BUN 14.4 15.4 18.7 20.4  --  24.3 32.1*   CREATININE 0.72 0.73 0.68* 0.80  --  0.88 1.06   CALCIUM 8.1* 7.8* 8.2* 8.2*  --  8.7* 8.6*   BILITOT 0.6 0.6  --   --   --   --   --    ALKPHOS 87 85  --   --   --   --   --    ALT 13 14  --   --   --   --   --    AST 15 17  --   --   --   --   --    GLUCOSE 77* 78* 73* 102  --  118* 125*   MG 2.20 2.20 2.30 2.20  --   --   --    TROPONINI  --   --   --  0.165* 0.140*  --   --         CBC/Anemia Labs: Coags:    Recent Labs   Lab 04/20/25  1840 04/21/25  1223 04/21/25 2027 04/22/25  0503   WBC 8.28 11.29  --  9.07   HGB 9.8* 10.2* 9.4* 9.3*   HCT 31.6* 33.2* 30.6* 31.1*    359  --  290   MCV 93.5 93.0  --  96.3*   RDW 17.4* 17.5*  --  17.5*    No results for input(s): "PT", "INR", "APTT" in the last 168 hours.         Telemetry: Sinus Rhythm     Physical Exam  Vitals and nursing note reviewed.   Constitutional:       General: He is not in acute distress.  HENT:      Head: Normocephalic.      Mouth/Throat:      Mouth: Mucous membranes are moist.      Pharynx: Oropharynx is clear.   Cardiovascular:      Rate and Rhythm: Normal rate and regular rhythm.   Pulmonary:      Effort: Pulmonary effort is normal. No respiratory distress.   Abdominal:      Palpations: Abdomen is soft.   Genitourinary:     Comments: Ortiz Catheter  Musculoskeletal:      Right lower leg: No edema.      Left lower leg: No edema.      Comments: Bilateral Lower Extremities Warm    Skin:     General: Skin is warm and dry.   Neurological:      Mental Status: He is alert. Mental status is at baseline.   Psychiatric:         Behavior: Behavior normal.       Home Medications:   Medications Ordered Prior to Encounter[1]  Current Schedule Inpatient Medications:   atorvastatin  40 mg Oral Daily    bumetanide  1 mg Oral Daily    dapagliflozin propanediol  10 mg Oral Daily    enoxaparin  30 mg Subcutaneous Daily    LIDOcaine  1 patch Transdermal Daily    metoprolol " succinate  50 mg Oral Daily    pantoprazole  40 mg Intravenous BID    sacubitriL-valsartan  1 tablet Oral BID    senna-docusate  1 tablet Oral Daily    zinc oxide-cod liver oil   Topical (Top) BID     Continuous Infusions:   tirofiban-0.9% sodium chloride 12.5 mg/250ml    Continuous PRN   Stopped at 03/29/25 5479     Assessment:   Cardiogenic Shock requiring Mechanical and Chemical Support - Resolved     - Impella/Pressors - Impella Explanted on 4.10.25 post High Risk LAD PCI/Impella Assisted    - Hypotension requiring Pressors - Resolved    - History of HTN    - Lactic Acidosis - Resolved   CAD (Multivessel)    - s/p LHC (4.10.25) - PTCA/TAD of the Prox to Mid LAD and Impella Explant     - s/p LHC (3.29.25) -  Left Main has mild diffuse disease. Mid Left Anterior Descending has serial lesions, up to 70-80% in severity. Prox Left Circumflex is 100% occluded. Unable to cross lesion despite wire escalation and microcatheter support. Mid Right Coronary Artery is 100% occluded. The lesion was successfully treated with overlapping SYNERGY XD 4.0X20MM, SYNERGY XD 3.0X48MM, and SYNERGY XD 2.09L57DT drug-eluting stents. Post-dilatation was performed using a SYNERGY XD 4.0X20MM angioplasty balloon inflated to 12 gretel. Following intervention there was 0% residual stenosis and PATTY grade 3 flow in the distal vessel.  Cardiopulmonary Arrest    - Initial Rhythm PEA - Witnessed in ER Arrest/ROSC  NSTEMI Type I (Non-Anterior Wall MI)    - Trponin > 9  Acute Hypoxemic Respiratory Failure requiring Intubation/Ventilation - Resolved on NC O2  ICMO/EF 15%     - ECHO (4.8.25) - LVEF 15%    - ECHO (3.29.25) - LVEF 15-20%, Grade I DD    JACY/Right Sided   PAD/Nonobstructive   BPH s/p TURP   Leukocytosis - Resolved   Anemia requiring Transfusions - Stable   Recent BPH Diagnosis s/p TURP  Transaminitis - Resolved   Obesity  GERD  Nicotine Dependence/Chronic Tobacco Use  No History of GI Bleed      Plan:   Restart DAPT (Aspirin 81 Mg Daily &  Plavix 75 Mg Daily)- cannot hold DAPT at this time due to recent high risk PCIs. High risk for thrombosis if stopped.   Continue Atorvastatin 40 Mg PO Daily  Continue HF Medications: Advance Toprol XL to 50 Mg PO Daily, Entresto 24/26 Mg PO BID, Continue Farxiga 10 Mg PO Daily  Continue Mobilization with PT      Lorne Stewart NP  Cardiology  [facility]              [1]   No current facility-administered medications on file prior to encounter.     No current outpatient medications on file prior to encounter.

## 2025-04-22 NOTE — PT/OT/SLP PROGRESS
Physical Therapy      Patient Name:  Jon Conley   MRN:  [id number]    Patient not seen today secondary to patient family requesting hold therapy as they are awaiting GI MD, pt not oriented at this time and with heidi trevizo. Per nurse palliative has been consulted. Will follow-up as schedule allows/appropriate.

## 2025-04-22 NOTE — PROGRESS NOTES
[facility] Medicine Progress Note        Chief Complaint: Inpatient Follow-up     HPI:   Jon Conley is a 79 y.o. male with a past medical history of hypertension, hyperlipidemia, CAD, PAD, GERD, and BPH s/p TURP who presented to [facility] on 3/29/2025 transferred from [facility] for cardiology services.  Patient presented to outside facility for shortness of breath.  Patient was tachycardic and hypotensive with hemoglobin of 7.5, hematocrit 24.6, high sensitivity troponin 13,821, WBC 13.4, and CPK 7 O2.  Patient was given 500 mL normal saline bolus and heparin 5000 units and transferred to [facility] for higher level of care.  Patient has suffered PAE arrest requiring intubation and CPR with ROSC. Labs revealed WBC 14.03, hemoglobin 7.3, hematocrit 23, troponin 9.443, , BUN 16.2, creatinine 1.34, lactic acid 4.3, PT/INR 14.9/1.2. Patient underwent left heart catheterization with Impella placement on 03/29/2025.  LHC revealed multivessel coronary artery disease with 100% occluded proximal left circumflex, unable to cross with wire. Mid RCA was also noted to be 100 % occluded, treated with PTCA/TAD.  Patient was started on Levophed, heparin drip per protocol, and amiodarone.  Echo revealed EF 15-20% with grade 1 diastolic dysfunction.  CV surgery was consulted.  Patient was deemed not a surgical candidate for CABG secondary to multiple comorbidities.  Patient received 2 units packed red blood cells on 04/06.  Patient underwent high-risk PCI on 04/10/2025 with 4 stents in LAD.  Impella was removed.  Patient was extubated on 04/13/2025.  Patient was cleared for downgrade out of ICU on 04/14/2025 and was admitted to hospital medicine service.     4/16 Afebrile overnight. Resting comfortably in the bed. Doing well on 2 L. blood pressure stable. No family members at bedside. Bumex was added for diuresis purposes. CBGS within acceptable limits. P.o. intake is  inadequate. HGB and platelets stable. No new electrolyte abnormalities.   4/17 Had episode of Vtach on tele yesterday and again today, cardiology made aware. Patient denies any pain and asymptomatic. Worked with PT.   4/21 Noted to have gross hematuria on contreras, urology eval , recommending to hold aspirin and plavix is able, will get cardiology re-eval  Later in the afternoon, RN noted to have black stool, occult blood ordered, positive and GI consulted.  Protonix BID added.     Interval Hx:     AF. Urology irrigated contreras, now on mittens as patient more alterd and pulling on contreras. GI eval pending for GI bleed, occult positive. Discussed with daughter at bedside.     Case was discussed with patient's nurse and  on the floor.    Objective/physical exam:  General: In no acute distress, afebrile  Chest: Clear to auscultation bilaterally  Heart: RRR, +S1, S2, no appreciable murmur  Abdomen: Soft, nontender, BS +  MSK: Warm, no lower extremity edema, no clubbing or cyanosis  Neurologic: Alert, Cranial nerve II-XII intact    VITAL SIGNS: 24 HRS MIN & MAX LAST   Temp  Min: 97.6 °F (36.4 °C)  Max: 98.9 °F (37.2 °C) 97.9 °F (36.6 °C)   BP  Min: 92/58  Max: 122/63 (!) 102/57   Pulse  Min: 84  Max: 123  91   Resp  Min: 18  Max: 22 (!) 22   SpO2  Min: 95 %  Max: 97 % 97 %     I have reviewed the following labs:  Recent Labs   Lab 04/20/25  1840 04/21/25  1223 04/21/25 2027 04/22/25  0503 04/22/25  1514   WBC 8.28 11.29  --  9.07  --    RBC 3.38* 3.57*  --  3.23*  --    HGB 9.8* 10.2* 9.4* 9.3* 9.0*   HCT 31.6* 33.2* 30.6* 31.1* 29.3*   MCV 93.5 93.0  --  96.3*  --    MCH 29.0 28.6  --  28.8  --    MCHC 31.0* 30.7*  --  29.9*  --    RDW 17.4* 17.5*  --  17.5*  --     359  --  290  --    MPV 10.1 10.1  --  10.2  --      Recent Labs   Lab 04/16/25  0642 04/17/25  0548 04/18/25  0428 04/20/25  1840 04/21/25  1223 04/22/25  0503    140 142 143 144 143   K 4.3 4.1 4.1 3.7 4.0 3.7   * 114* 114* 113*  115* 112*   CO2 20* 19* 22* 20* 22* 20*   BUN 14.4 15.4 18.7 20.4 24.3 32.1*   CREATININE 0.72 0.73 0.68* 0.80 0.88 1.06   CALCIUM 8.1* 7.8* 8.2* 8.2* 8.7* 8.6*   MG 2.20 2.20 2.30 2.20  --   --    ALBUMIN 2.4* 2.5*  --   --   --   --    ALKPHOS 87 85  --   --   --   --    ALT 13 14  --   --   --   --    AST 15 17  --   --   --   --    BILITOT 0.6 0.6  --   --   --   --      Microbiology Results (last 7 days)       ** No results found for the last 168 hours. **             See below for Radiology    Assessment/Plan:    Acute hypoxemic respiratory failure requiring intubation, extubated 04/13/2024   Cardiogenic shock requiring mechanical and chemical support   Impella placement 03/29/2025, removed 04/10/2025  Multivessel CAD  [facility] with PTCA/TAD x3 RCA (03/29/2025)   Status post high-risk PCI withPTCA/TAD X4 LAD on 4/10/25   Cardiopulmonary arrest   ICMO/EF 15%  Concern for GI bleed  Hematuria - recent urologic procedure, now on DAPT  BPH s/p recent TURP in [place] followed by clot evacuation and fulguration, around 3/2025  Normocytic anemia, chronic    History of hypertension, hyperlipidemia, CAD, PAD, GERD, and      Plan:   Urology eval, recommending to hold aspirin and plavix, however unable to hold given recent high risk PCI  black stool note by RN 4/21, occult blood positive   Protonix BID ; GI added carafate QID x 1 month .  Monitor HH, transfuse if HB<8 given CAD    In the setting of hematuria and unable to obtain new urine sample with prior contreras in place, will start empiric ceftriaxone given hx of prior UTIs and mentation change  f/u CT head, neurochecks    Episode of V tach transient on 4/20, trops have been down trending, cardiology recommending not to hold aspirin and plavix given recent high risk stents  Cardiology, adjusted GDMT.  Monitor I's and O's and electrolytes.  Continue telemetry.   Will defer Life Vest at this time Re: DNR     Palliative care following  Mobilize as tolerated.  Encourage IS use,  pulmonary toileting   PT OT.  He needs SNF placement   other home medications were reviewed and renewed      Critical care note:  Critical care diagnosis: GI bleed needing protonix IV   Critical care interventions: Hands-on evaluation, review of labs/radiographs/records and discussion with patient and family if present  Critical care time spent: 35 minutes    Advanced Directives:   I spent 30 mins of face to face discussion regarding advanced directives and end of life planning which included the patient and patients family, who concluded, that they would like to remain DNR. The patient understands the seriousness of his/her condition and would like to make his/her end of life decision known.    VTE prophylaxis: Lovenox     Patient condition:  Stable    Anticipated discharge and Disposition:         All diagnosis and differential diagnosis have been reviewed; assessment and plan has been documented; I have personally reviewed the labs and test results that are presently available; I have reviewed the patients medication list; I have reviewed the consulting providers response and recommendations. I have reviewed or attempted to review medical records based upon their availability    All of the patient's questions have been  addressed and answered. Patient's is agreeable to the above stated plan. I will continue to monitor closely and make adjustments to medical management as needed.    Portions of this note dictated using EMR integrated voice recognition software, and may be subject to voice recognition errors not corrected at proofreading. Please contact writer for clarification if needed.   _____________________________________________________________________    Malnutrition Status:  Nutrition consulted. Most recent weight and BMI monitored-     Measurements:  Wt Readings from Last 1 Encounters:   04/20/25 48.4 kg (106 lb 9.6 oz)   Body mass index is 14.87 kg/m².    Patient has been screened and assessed by  RD.    Malnutrition Type:  Context:    Level: other (see comments) (Does not meet criteria)    Malnutrition Characteristic Summary:  Weight Loss (Malnutrition): other (see comments) (Unable to assess)  Energy Intake (Malnutrition): other (see comments) (Unable to assess)    Interventions/Recommendations (treatment strategy):        Scheduled Med:   aspirin  81 mg Oral Daily    atorvastatin  40 mg Oral Daily    bumetanide  1 mg Oral Daily    cefTRIAXone (Rocephin) IV (PEDS and ADULTS)  1 g Intravenous Q24H    clopidogreL  75 mg Oral Daily    dapagliflozin propanediol  10 mg Oral Daily    LIDOcaine  1 patch Transdermal Daily    metoprolol succinate  50 mg Oral Daily    pantoprazole  40 mg Intravenous BID    sacubitriL-valsartan  1 tablet Oral BID    senna-docusate  1 tablet Oral Daily    sucralfate  1 g Oral QID (AC & HS)    zinc oxide-cod liver oil   Topical (Top) BID      Continuous Infusions:   tirofiban-0.9% sodium chloride 12.5 mg/250ml    Continuous PRN   Stopped at 03/29/25 1845      PRN Meds:    Current Facility-Administered Medications:     acetaminophen, 650 mg, Oral, Q4H PRN    bisacodyL, 10 mg, Rectal, Daily PRN    dextrose 50%, 12.5 g, Intravenous, PRN    dextrose 50%, 25 g, Intravenous, PRN    glucagon (human recombinant), 1 mg, Intramuscular, PRN    glucose, 16 g, Oral, PRN    glucose, 24 g, Oral, PRN    hydrALAZINE, 10 mg, Intravenous, Q4H PRN    ondansetron, 8 mg, Oral, Q8H PRN    polyethylene glycol, 17 g, Oral, BID PRN    tirofiban-0.9% sodium chloride 12.5 mg/250ml, , , Continuous PRN     Radiology:  I have personally reviewed the following imaging and agree with the radiologist.     Fl Modified Barium Swallow Speech  See procedure notes from Speech Pathologist.    This procedure was auto-finalized.      Elgin Stark MD  Department of Hospital Medicine   [facility]   04/22/2025

## 2025-04-22 NOTE — CONSULTS
Consult Note    Reason for Consult:      We were consulted by Dr. Stark to evaluate this patient for melena, + FOBT.     HPI:     79-year-old male unknown to our group with a past medical history of BPH, CAD, PID, right JACY, HTN, obesity, GERD, tobacco use who presented to the ER 03/29/2025 complaining of shortness of breath.  He sustained a witnessed PEA arrest in the ER and ROSC achieved.  He ultimately had coronary angiogram with intervention to the right coronary artery and placement of an Impella.  Echo revealed EF 15-20%.  CV surgery was consulted but ultimately deemed patient not appropriate for CABG given multiple comorbidities.  He underwent high-risk PCI 04/10/2025 with 4 stents to LAD and removal of Impella.  He was extubated on 4/13/25 and downgraded from ICU on 4/14/25.    Patient has been on aspirin and plavix but held beginning 4/21 due to reports of hematuria, melena, and positive FOBT.  GI has been consulted for further evaluation.    Today, Hgb stable at 9.3 (was 7.3 on admission).  He last received 2 units of PRBCs on 04/06/2025 for hemoglobin of 7.4.  The highest his hemoglobin has been this admission is 10.2.  He had an episode of vtach on 4/20.  Cardiology has also been called back to re-evaluate the patient for safety to hold aspirin and plavix.  They have recommended not holding DAPT given recent stents.      Previous records reviewed. Collateral information obtained from family member present at bedside.  On exam, patiently is slightly confused.  His daughter and granddaughter are at the bedside and they are unsure of his prior history.  Unknown if he has ever had a colonoscopy or upper endoscopy.  No hx of GI bleed to their knowledge.      PCP:  Patrick Reece Jr., MD    Review of patient's allergies indicates:  No Known Allergies     Current Medications[1]  Prescriptions Prior to Admission[2]    Past Medical History:  No past medical history on file.   Past Surgical History:  Past  Surgical History:   Procedure Laterality Date    CATHETERIZATION OF BOTH LEFT AND RIGHT HEART N/A 3/29/2025    Procedure: CATHETERIZATION, HEART, BOTH LEFT AND RIGHT;  Surgeon: Lorne Coon Jr., MD;  Location: [facility] CATH LAB;  Service: Cardiology;  Laterality: N/A;    LEFT HEART CATHETERIZATION N/A 4/10/2025    Procedure: Left heart cath;  Surgeon: Lorne Coon Jr., MD;  Location: [facility] CATH LAB;  Service: Cardiology;  Laterality: N/A;      Family History:  No family history on file.  Social History:  Social History     Tobacco Use    Smoking status: Not on file    Smokeless tobacco: Not on file   Substance Use Topics    Alcohol use: Not on file       Review of Systems:     Review of Systems   Unable to perform ROS: Acuity of condition       Objective:     VITAL SIGNS: 24 HR MIN & MAX LAST    Temp  Min: 97.6 °F (36.4 °C)  Max: 99 °F (37.2 °C)  97.6 °F (36.4 °C)        BP  Min: 92/58  Max: 118/72  101/60     Pulse  Min: 84  Max: 123  96     Resp  Min: 18  Max: 18  18    SpO2  Min: 95 %  Max: 97 %  96 %        Intake/Output Summary (Last 24 hours) at 4/22/2025 1046  Last data filed at 4/22/2025 0625  Gross per 24 hour   Intake 357 ml   Output 01853 ml   Net -29623 ml       Physical Exam  Constitutional:       General: He is not in acute distress.     Appearance: He is ill-appearing. He is not toxic-appearing.      Comments: Deconditioned    HENT:      Head: Normocephalic and atraumatic.      Mouth/Throat:      Mouth: Mucous membranes are dry.      Pharynx: Oropharynx is clear.   Cardiovascular:      Rate and Rhythm: Normal rate and regular rhythm.      Pulses: Normal pulses.      Heart sounds: Normal heart sounds.   Pulmonary:      Effort: Pulmonary effort is normal.      Breath sounds: Normal breath sounds.   Abdominal:      General: Bowel sounds are normal. There is no distension.      Palpations: Abdomen is soft.      Tenderness: There is no abdominal tenderness. There is no guarding.   Skin:     General:  Skin is warm and dry.   Neurological:      Mental Status: He is alert. He is confused.           Recent Results (from the past 48 hours)   EKG 12-lead    Collection Time: 04/20/25  6:02 PM   Result Value Ref Range    QRS Duration 138 ms    [facility]HS QTC Calculation 476 ms   Basic Metabolic Panel    Collection Time: 04/20/25  6:40 PM   Result Value Ref Range    Sodium 143 136 - 145 mmol/L    Potassium 3.7 3.5 - 5.1 mmol/L    Chloride 113 (H) 98 - 107 mmol/L    CO2 20 (L) 23 - 31 mmol/L    Glucose 102 82 - 115 mg/dL    Blood Urea Nitrogen 20.4 8.4 - 25.7 mg/dL    Creatinine 0.80 0.72 - 1.25 mg/dL    BUN/Creatinine Ratio 26     Calcium 8.2 (L) 8.8 - 10.0 mg/dL    Anion Gap 10.0 mEq/L    eGFR >60 mL/min/1.73/m2   Magnesium    Collection Time: 04/20/25  6:40 PM   Result Value Ref Range    Magnesium Level 2.20 1.60 - 2.60 mg/dL   Troponin I    Collection Time: 04/20/25  6:40 PM   Result Value Ref Range    Troponin-I 0.165 (H) 0.000 - 0.045 ng/mL   CBC with Differential    Collection Time: 04/20/25  6:40 PM   Result Value Ref Range    WBC 8.28 4.50 - 11.50 x10(3)/mcL    RBC 3.38 (L) 4.70 - 6.10 x10(6)/mcL    Hgb 9.8 (L) 14.0 - 18.0 g/dL    Hct 31.6 (L) 42.0 - 52.0 %    MCV 93.5 80.0 - 94.0 fL    MCH 29.0 27.0 - 31.0 pg    MCHC 31.0 (L) 33.0 - 36.0 g/dL    RDW 17.4 (H) 11.5 - 17.0 %    Platelet 339 130 - 400 x10(3)/mcL    MPV 10.1 7.4 - 10.4 fL    Neut % 63.5 %    Lymph % 22.1 %    Mono % 9.3 %    Eos % 4.1 %    Basophil % 0.6 %    Imm Grans % 0.4 %    Neut # 5.26 2.1 - 9.2 x10(3)/mcL    Lymph # 1.83 0.6 - 4.6 x10(3)/mcL    Mono # 0.77 0.1 - 1.3 x10(3)/mcL    Eos # 0.34 0 - 0.9 x10(3)/mcL    Baso # 0.05 <=0.2 x10(3)/mcL    Imm Gran # 0.03 0.00 - 0.04 x10(3)/mcL    NRBC% 0.0 %   Troponin I    Collection Time: 04/21/25  5:40 AM   Result Value Ref Range    Troponin-I 0.140 (H) 0.000 - 0.045 ng/mL   CBC Without Differential    Collection Time: 04/21/25 12:23 PM   Result Value Ref Range    WBC 11.29 4.50 - 11.50 x10(3)/mcL    RBC 3.57  (L) 4.70 - 6.10 x10(6)/mcL    Hgb 10.2 (L) 14.0 - 18.0 g/dL    Hct 33.2 (L) 42.0 - 52.0 %    MCV 93.0 80.0 - 94.0 fL    MCH 28.6 27.0 - 31.0 pg    MCHC 30.7 (L) 33.0 - 36.0 g/dL    RDW 17.5 (H) 11.5 - 17.0 %    Platelet 359 130 - 400 x10(3)/mcL    MPV 10.1 7.4 - 10.4 fL    NRBC% 0.0 %   Basic Metabolic Panel    Collection Time: 04/21/25 12:23 PM   Result Value Ref Range    Sodium 144 136 - 145 mmol/L    Potassium 4.0 3.5 - 5.1 mmol/L    Chloride 115 (H) 98 - 107 mmol/L    CO2 22 (L) 23 - 31 mmol/L    Glucose 118 (H) 82 - 115 mg/dL    Blood Urea Nitrogen 24.3 8.4 - 25.7 mg/dL    Creatinine 0.88 0.72 - 1.25 mg/dL    BUN/Creatinine Ratio 28     Calcium 8.7 (L) 8.8 - 10.0 mg/dL    Anion Gap 7.0 mEq/L    eGFR >60 mL/min/1.73/m2   Occult Blood, Stool 1st Specimen    Collection Time: 04/21/25  4:27 PM   Result Value Ref Range    Stool Color 1 Black     Stool Consistancy 1 soft     Occult Blood Stool 1 Positive (A) Negative   Hemoglobin and Hematocrit    Collection Time: 04/21/25  8:27 PM   Result Value Ref Range    Hgb 9.4 (L) 14.0 - 18.0 g/dL    Hct 30.6 (L) 42.0 - 52.0 %   Occult Blood, Stool 2nd Specimen    Collection Time: 04/22/25  1:04 AM   Result Value Ref Range    Stool Color 2 Black     Stool Consistancy 2 soft     Occult Blood Stool 2 Positive (A) Negative, N/A   Occult Blood, Stool 3rd Specimen    Collection Time: 04/22/25  2:14 AM   Result Value Ref Range    Stool Color 3 Black     Stool Consistancy 3 soft     Occult Blood Stool 3 Positive (A) Negative, N/A   CBC Without Differential    Collection Time: 04/22/25  5:03 AM   Result Value Ref Range    WBC 9.07 4.50 - 11.50 x10(3)/mcL    RBC 3.23 (L) 4.70 - 6.10 x10(6)/mcL    Hgb 9.3 (L) 14.0 - 18.0 g/dL    Hct 31.1 (L) 42.0 - 52.0 %    MCV 96.3 (H) 80.0 - 94.0 fL    MCH 28.8 27.0 - 31.0 pg    MCHC 29.9 (L) 33.0 - 36.0 g/dL    RDW 17.5 (H) 11.5 - 17.0 %    Platelet 290 130 - 400 x10(3)/mcL    MPV 10.2 7.4 - 10.4 fL    NRBC% 0.0 %   Basic Metabolic Panel     Collection Time: 04/22/25  5:03 AM   Result Value Ref Range    Sodium 143 136 - 145 mmol/L    Potassium 3.7 3.5 - 5.1 mmol/L    Chloride 112 (H) 98 - 107 mmol/L    CO2 20 (L) 23 - 31 mmol/L    Glucose 125 (H) 82 - 115 mg/dL    Blood Urea Nitrogen 32.1 (H) 8.4 - 25.7 mg/dL    Creatinine 1.06 0.72 - 1.25 mg/dL    BUN/Creatinine Ratio 30     Calcium 8.6 (L) 8.8 - 10.0 mg/dL    Anion Gap 11.0 mEq/L    eGFR >60 mL/min/1.73/m2       Fl Modified Barium Swallow Speech  Result Date: 4/13/2025  See procedure notes from Speech Pathologist. This procedure was auto-finalized.    X-Ray Chest 1 View  Result Date: 4/12/2025  EXAMINATION: Single view chest radiograph. CLINICAL HISTORY: Non-ST elevation (NSTEMI) myocardial infarctionanemia; TECHNIQUE: Single view of the chest. COMPARISON: Chest radiograph 04/04/2025. FINDINGS: The endotracheal tube, nasogastric tube and right IJ catheter have been removed.  The intra-aortic balloon pump has been removed.  The bilateral effusions, pulmonary edema, and lower lobe atelectasis are unchanged.  There is no pneumothorax.  The heart is enlarged.  There is no acute osseous abnormality.     No significant change from prior exam. Electronically signed by: Lorne Solorzano MD Date:    04/12/2025 Time:    08:26    CV Ultrasound doppler arterial legs bilat  Result Date: 4/10/2025  Limited study due to Impella and sheath placements and leg braces. Only the common femoral, mid superficial femoral, posterior tibial and dorsalis pedis arteries are evaluated. The right lower extremity arterial system is patent with no evidence of focal stenosis or occlusion. The left lower extremity arterial system is patent with no evidence of focal stenosis or occlusion; however dampened waveforms and severely low velocity is  demonstrated in the dorsalis pedis artery.     Cardiac catheterization  Result Date: 4/10/2025    Successful Impella-protected PCI of LAD as outline below. The procedure log was documented by Lucretia  documenter listed and verified by Lorne Coon Jr, MD. Date: 4/10/2025  Time: 5:46 PM Procedure: Left heart catheterization with coronary angiography Impella-protected percutaneous coronary intervention Successful Impella CP removal  Preoperative diagnosis: Cardiomyopathy Postoperative diagnosis: Successful PCI  Access: Right common femoral artery Estimated blood loss: 10 cc Complications: None  Summary: Consent obtained. Risks and benefits discussed with the patient. The patient was brought to the cath lab in a fasting state. The patient was prepped and draped in usual sterile fashion. A preprocedure time-out was performed. The existing 6 Spanish R femoral sheath was exchanged for a new 6 Spanish sheath for the intervention.  A 6 Spanish EBU 3.5 catheter was used for the intervention.  The LAD was wired with a runthrough wire. The large first diagonal was wired with a Prowater wire. Balloon angioplasty was performed on the LAD using an EMERGE MR 15 X 2.50MM device inflated to nominal pressure. IVUS was performed to assess the vessel for PCI. Using this measurement, the mid LAD lesion was treated with a SYNERGY XD 2.5X28MM drug-eluting stent. The proximal vessel was then treated with an NC EMERGE MR 3.5X20MM balloon inflated to nominal pressure. A FRONTIER BERNADINE 2.50L46SQ drug-eluting stent was used to treat the mid LAD, just proximal to the previously placed stent, in an overlapping fashion. IVUS was again performed to assess stent expansion and measure the proximal vessel for PCI. Using these measurements, the proximal LAD was treated using overlapping FRONTIER BERNADINE 3.66X35VX and FRONTIER BERNADINE 3.93A34DJ drug-eluting stents. The Prowater wire was removed from D1 without any issue. IVUS was again performed to assess stent expansion. Using these measurements, post-dilatation was performed using the following balloons: NC EMERGE MR 2.5X20MM, NC EMERGE MR 3X30MM, and NC EMERGE MR 3.5X20MM; each inflated to high  pressure. Following this intervention, there was 0% residual stenosis, PATTY grade 3 flow in the distal vessel, and PATTY grade 3 flow in the first diagonal branch. At the end the procedure, all wires and catheters were removed. The Impella was removed under fluoroscopic guidance. The vessel was closed using a MANTA 14 Turkish device, with successful hemostasis. A 6 Turkish Pigtail was placed in the abdominal aorta.  Findings: - Successful Impella-protected PCI of LAD. - Prox to Mid Left Anterior Descending has severe diffuse disease, up to 80% in severity. The lesion was successfully treated with overlapping FRONTIER BERNADINE 3.58E30LV, FRONTIER BERNADINE 3.42P99PE, FRONTIER BERNADINE 2.97J95VZ, and SYNERGY XD 2.5X28MM drug-eluting stents. Post-dilatation was performed using an NC EMERGE MR 3.5X20MM angioplasty balloon inflated to 20 gretel. Following intervention there was 0% residual stenosis and PATTY grade 3 flow in the distal vessel. - Intravascular Ultrasound (IVUS) was performed prior to intervention to further characterize the lesion, as well as post-stent deployment to ensure optimal stent apposition and expansion. - Ostial First Diagonal Branch has a 70% stenosis. At the conclusion of the case, there was PATTY grade 3 flow in the distal vessel. - Successful Impella CP removal and closure of L femoral arterial access site using a 14 Turkish MANTA device.  Assessment/Plan: - Patient is a 79 y.o. male with a history of CAD presents in cardiogenic shock. Successful PCI of RCA last week. Impella CP placed at that time. Now s/p successful LAD intervention and Impella CP removal. - Continue DAPT (aspirin + clopidogrel) for a minimum of 12 months and aspirin indefinitely thereafter - High intensity statin - Arterial sheath remains place; plan is to remove when activated clotting time (ACT) less than 180 seconds. Bedrest for 4 hours after hemostasis is achieved. Lorne Coon MD Interventional Cardiology/Structural Heart Disease  [facility] [place] [facility]    Echo Saline Bubble? No; Ultrasound enhancing contrast? No  Result Date: 4/9/2025    Impella check   Aortic annulus to tip distance is 4.3 cm.   LV systolic function is 15%.     X-Ray Chest 1 View  Result Date: 4/4/2025  EXAMINATION: XR CHEST 1 VIEW CLINICAL HISTORY: respiratory failure; TECHNIQUE: Frontal view(s) of the chest. COMPARISON: Radiography 04/03/2025 FINDINGS: Similar positioning of the endotracheal tube, right IJ catheter, pulmonary arterial catheter and LVAD.  Enteric tube extends below the diaphragm.  Small bilateral pleural effusions with bilateral lower lung atelectasis.  Possible mild pulmonary edema.  No pneumothorax appreciated.  Stable cardiac silhouette.     Small pleural effusions with possible mild pulmonary edema. Electronically signed by: Martín Gan Date:    04/04/2025 Time:    06:02    Echo  Result Date: 4/3/2025    A limited echo was performed using limited 2D   Left Ventricle: There is severely reduced systolic function with a visually estimated ejection fraction of 15%.   Impella catheter and appears to be in appropriate position.     X-Ray Chest 1 View  Result Date: 4/3/2025  EXAMINATION XR CHEST 1 VIEW CLINICAL HISTORY respiratory failure; TECHNIQUE A total of 1 frontal image(s) of the chest. COMPARISON 2 April 2025 FINDINGS Lines/tubes/devices: Grossly unchanged positioning when allowing for differences in technique and patient rotation. The cardiac silhouette and central vascular structures are unchanged.  The trachea is midline. No new or worsening consolidation is identified. Small bilateral pleural effusions are similar.  No development of pneumothorax. Regional osseous structures and extrathoracic soft tissues are similar. IMPRESSION No significant interval change. Electronically signed by: Ventura López Date:    04/03/2025 Time:    06:11    X-Ray Chest 1 View  Result Date: 4/2/2025  EXAMINATION: XR CHEST 1 VIEW CPT 92317 CLINICAL  HISTORY: respiratory failure; COMPARISON: April 1, 2025 FINDINGS: Examination reveals cardiomediastinal silhouette and pleuroparenchymal changes to be essentially unchanged perhaps with some improvement in the pulmonary vascular congestion and cardiac decompensation changes. Support catheters remain in place     Some improvement of the pulmonary vascular congestion and cardiac decompensation. Support catheters remain in place no other interval change Electronically signed by: Mt Beal Date:    04/02/2025 Time:    04:54    Echo  Result Date: 4/1/2025    Limited study to check impella placement.   Left Ventricle: There is severely reduced systolic function with a visually estimated ejection fraction of 15 - 20%.   Impella distance from aortic valve annulus appears appropriate.     X-Ray Chest 1 View  Result Date: 4/1/2025  EXAMINATION: XR CHEST 1 VIEW CLINICAL HISTORY: Cardiac arrest.Respiratory failure; COMPARISON: Yesterday FINDINGS: Frontal view of the chest was obtained. Support structures are in similar position.  Heart and mediastinum unchanged.  Similar mild bibasilar opacities.  No pneumothorax.     Little interval change. Electronically signed by: Hakeem Cabezas Date:    04/01/2025 Time:    07:58    Echo  Result Date: 3/31/2025    Left Ventricle: There is severely reduced systolic function with a visually estimated ejection fraction of less than 30%.   There appears to be at least moderate to severe mitral regurgitation. Impella is positioned with inflow cannula 3.8 cm from aortic valve annulus.     X-Ray Chest 1 View  Result Date: 3/31/2025  EXAMINATION: XR CHEST 1 VIEW CPT 35863 CLINICAL HISTORY: Acute respiratory failure, cardiogenic shock; COMPARISON: March 30, 2025 FINDINGS: Examination reveals cardiomediastinal silhouette and pleuroparenchymal changes to be essentially unchanged as compared with the previous exam. Support catheters remain in place     No significant change as compared with the  previous exam Electronically signed by: Mt Beal Date:    03/31/2025 Time:    07:38    X-Ray Chest 1 View  Result Date: 3/30/2025  EXAMINATION: XR CHEST 1 VIEW CLINICAL HISTORY: respiratory failure; TECHNIQUE: One view COMPARISON: March 29, 2025.. FINDINGS: Cardiopericardial silhouette appearance is similar.  Supporting tubes and lines in similar location.  There is small right pleural effusion which due to layering result in hazy appearance of lower chest.  No overt edema, dense consolidation or pneumothorax.     No significant interval change. Electronically signed by: Melecio Cerna Date:    03/30/2025 Time:    08:08    ED US Guided Misc Procedure  Result Date: 3/30/2025  Lizz Stubbs MD     3/31/2025 11:42 AM ED US Guided Misc Procedure Date/Time: 3/30/2025 6:24 AM Performed by: Lizz Stubbs MD Authorized by: Lizz Stubbs MD  Procedure:  Ultrasound-guided peripheral venous cannulation Indication:  Failed or difficult IV access  Right  Antecubital Procedure:  Dynamic ultrasound guidance used, Candidate vein examined with linear probe - confirmed collapsibility, lack of pulsatility, and proper anatomic location. and Using aseptic technique, IV catheter inserted with flash of blood noted, flow of venous blood confirmed. Catheter gauge:  18  Flushes easily and without pain. Patient tolerated procedure well. Complications:  None Charge?:  Yes    CV Ultrasound doppler arterial leg left  Result Date: 3/29/2025  The left lower extremity arterial system is patent with no evidence of focal stenosis or occlusion but very weak dampened waveforms noted throughout suggesting poor arterial flow to the extremity. Severely dampened velocities in the tibial vessels. Limited visibility of the left common femoral artery secondary to impella placement.     Echo  Result Date: 3/29/2025    Limited echo for Impella placement; Seated well at 3.6cm from AoV annulus. Severe LV systolic dysfunction with an EF  estimated at 15%. TAPSE mildly depressed at 1.4cm. No effusion at this time.     Echo  Result Date: 3/29/2025    Left Ventricle: The left ventricle is dilated. Normal wall thickness. Severe global hypokinesis present. There is severely reduced systolic function with a visually estimated ejection fraction of 15 - 20%. Grade I diastolic dysfunction.   Right Ventricle: Right ventricular enlargement. Systolic function is normal. TAPSE is 1.83 cm.   Left Atrium: dilated   Mitral Valve: Mildly thickened leaflets. There is moderate regurgitation.   Tricuspid Valve: There is moderate regurgitation.   Pulmonary Artery: There is moderate pulmonary hypertension.   IVC/SVC: Patient is ventilated, cannot use IVC diameter to estimate right atrial pressure.     XR Gastric tube check, non-radiologist performed  Result Date: 3/29/2025  EXAMINATION: XR GASTRIC TUBE CHECK, NON-RADIOLOGIST PERFORMED CLINICAL HISTORY: OGT placement; TECHNIQUE: One COMPARISON: October 13, 2021 FINDINGS: Nasogastric tube traverses the GE junction and tip of tube is about the proximal gastric body.  Side port of the tube is about the GE junction.  Please further advance the tube by 5 cm is recommended.     Please further advance the nasogastric tube. Electronically signed by: Melecio Cerna Date:    03/29/2025 Time:    14:08    X-Ray Chest 1 View  Result Date: 3/29/2025  EXAMINATION: XR CHEST 1 VIEW CLINICAL HISTORY: [place] placement; TECHNIQUE: One view COMPARISON: March 29, 2025.. FINDINGS: [place] catheter tip projects about the main pulmonary artery.  Otherwise, no significant interval change.     As above Electronically signed by: Melecio Cerna Date:    03/29/2025 Time:    14:07    Cardiac catheterization  Result Date: 3/29/2025  The procedure log was documented by No documenter listed and verified by Lorne Coon Jr, MD. Date: 3/29/2025  Time: 12:06 PM Procedure: Left heart catheterization with coronary angiography Right heart catheterization  "Percutaneous coronary intervention Impella-protected percutaneous coronary intervention Abdominal angiogram with bilateral lower extremity runoff utilizing DSA  Preoperative diagnosis: Cardiogenic shock Postoperative diagnosis: Multivessel coronary artery disease Successful PCI  Access: Right common femoral artery Left common femoral artery Right common femoral vein Estimated blood loss: 10 cc Complications: None  Summary: Procedure done emergently. The patient was brought to the cath lab in a fasting state., intubated The patient was prepped and draped in usual sterile fashion. A preprocedure time-out was performed. Ultrasound-guided right common femoral arterial access via modified Seldinger technique using a micropuncture kit. A 6 Moldovan sheath was inserted into the right common femoral artery. Heparin was given at a dose of 80 units/kilogram to achieve an ACT of greater than 250 seconds. A 5 Moldovan JR4 catheter was used to cannulate the right coronary artery; angiography was performed, and multiple fluoroscopic views were obtained. A 5 Moldovan JL5 catheter was used to cannulate the left main coronary artery; angiography was performed, and multiple fluoroscopic views were obtained. A 5 Moldovan Pigtail catheter was placed within the distal aorta and digital subtraction angiography was performed of the iliofemoral system.  Right iliac system with moderate diffuse disease. Left femoral access was obtained for Impella CP placement. Ultrasound-guided left common femoral arterial access via modified Seldinger technique using a micropuncture kit. A 6 Moldovan sheath was inserted into the left common femoral artery. The 6 Moldovan sheath was exchanged for the Impella introducer sheath over a stiff 0.035" wire. The stiff wire was exchanged for a standard 0.035" J-wire.  A 5 Moldovan Pigtail catheter was then advanced into the left ventricle over the J-wire wire. A J-shaped curve was made on the Impella delivery wire. The " "0.035"J-wire was then exchanged for the Impella delivery wire, and positioned in the left ventricular apex. Multiple angiographic views were obtained, so as to ensure the wire was not behind a papillary muscle.  The Impella CP catheter was introduced through the introducer sheath and advanced under fluoroscopic guidance into the descending aorta and then into the left ventricle. Hemodynamic monitoring was performed to ensure proper placement and functioning of the Impella CP device. The device was activated and adjusted to achieve the desired level of circulatory support. Proper positioning of the device was confirmed by fluoroscopy.  Attempted RCA intervention first. Unable to cross lesion with Runthrough wire or Fielder XT, even with Caravel microcatheter support. A 6 [place] EBU 4.0 catheter was used for the planned left circumflex intervention.  We attempted to cross the lesion using a Runthrough wire, which was not successful.  We then used a Caravel microcatheter for further support, but were still unsuccessful in crossing the left circumflex lesion.  Wire escalation was then pursued, including a Fielder XT, a Saravanan Black, and Mongo wire, all of which were not successful in crossing the left circumflex lesion, even with microcatheter support. Accordingly, we pursued RCA intervention.  This time, we were able to successfully cross the RCA lesion using a Mongo wire with Caravel microcatheter support. The lesion was treated with a SAPPHIRE II PRO SC 1X15MM balloon inflated to high pressure. The lesion was then treated with an EMERGE MR 20 X 2.50MM device inflated to nominal pressure. We then exchanged the Mongo wire for a Runthrough workhorse wire over the Caravel microcatheter. IVUS was then performed to further assess the lesion and distal vessel. The vessel was diffusely diseased throughout. The Mid and Distal RCA was treated with overlapping SYNERGY XD 2.73P32TR and SYNERGY XD 3.0X48MM drug-eluting stents. IVUS " "was then performed once again for assessment of stent expansion and apposition. Post-dilatation was performed using NC EMERGE MR 3.5X30MM and NC EMERGE MR 4X20MM devices inflated to high pressure. Finally, the proximal vessel was treated with a SYNERGY XD 4.0X20MM drug-eluting stent. Following this intervention there was 0% residual stenosis and PATTY grade 3 flow in the distal vessel. Ultrasound-guided right common femoral venous access via modified Seldinger technique using a micropuncture kit. A 7 [place] sheath was inserted into the right common femoral vein. A right heart catheterization was being performed using a [place] catheter. The peel-away Impella introducer sheath was removed, and the Impella sheath was advanced into the arteriotomy site, with adequate hemostasis achieved.  The stylet was removed from the adjustment side port, and an 0.035"J-wire was advanced to the level of the aortic root.  Using this wire as a marker, the Impella device was pulled back such that the radiopaque circular marker was at the level of the aortic valve.  Once the device was found to be at the appropriate level, this J-wire was removed.  Contrast injection was performed through the side port, showing adequate antegrade flow down the ipsilateral common femoral artery/SFA/popliteal artery.  The patient tolerated the procedure well, and there were no immediate complications.  The patient was transferred to the intensive care unit for further monitoring and management.  Findings: - Successful placement of Impella CP device from left femoral approach. - There is severe multi-vessel coronary artery disease. - Left Main has mild diffuse disease. - Mid Left Anterior Descending has serial lesions, up to 70-80% in severity. - Prox Left Circumflex is 100% occluded. Unable to cross lesion despite wire escalation and microcatheter support. - Mid Right Coronary Artery is 100% occluded. The lesion was successfully treated with overlapping " SYNERGY XD 4.0X20MM, SYNERGY XD 3.0X48MM, and SYNERGY XD 2.30I31VQ drug-eluting stents. Post-dilatation was performed using a SYNERGY XD 4.0X20MM angioplasty balloon inflated to 12 gretel. Following intervention there was 0% residual stenosis and PATTY grade 3 flow in the distal vessel. - Intravascular Ultrasound (IVUS) was performed prior to intervention to further characterize the lesion, as well as post-stent deployment to ensure optimal stent apposition and expansion. - Aorto-iliac angiogram revealed moderate disease of R ileofemoral system. - At the conclusion of the case, contrast injection was performed through the Impella sheath side port, showing adequate antegrade flow down the ipsilateral common femoral artery/SFA/profunda artery. - RHC with elevated R sided filling pressures. RA 17 mmHg, RV 45/15 mmHg, PA 42/29 mmHg (mean 34 mmHg), PCWP 25 mmHg. - Transpulmonary gradient is 9 mmHg. - Cardiac Output/Index at 6.6/3.0, as calculated by Gregory equation on Levophed infusion and Impella CP support. - PVR is 1.4 Woods units - SVR is 550 dyne-sec*cm^-5 - Pulmonary Artery Pulsatility Index (Rafa) is 0.8 - Cardiac Power Output () is 0.91  Assessment/Plan: - Patient is a 79 y.o. male with a history of recent TURP, complicated by significant post-op bleeding, presents to OSH ED with dyspnea. Transferred to [facility] with worsening shock despite vasopressors. TTE showing EF 25%, severe MR, severe TR. Troponin elevated. Brought to cath lab. Underwent Impella placement/LHC/RHC as outlined above. RCA lesion treated. Unable to cross LCx lesion. 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 At this point, would continue Impella support and aggressive medical mgmt. Has non-culprit lesions that should be treated at a later date as well. Can consider CTS consult, especially given severe valvular disease. - Patient was given a loading dose of clopidogrel 600 mg PO in the cath lab - Continue DAPT (aspirin + clopidogrel) for a minimum of  12 months and aspirin indefinitely thereafter - High intensity statin - Continue Aggrastat for 4 hours post-procedure - Arterial sheath remains place; plan is to remove when activated clotting time (ACT) less than 180 seconds. Bedrest for 4 hours after hemostasis is achieved. - Obtain transthoracic echocardiogram - Continuous bedrest while Impella in place - Therapeutic heparin administration per protocol - Antibiotics per protocol - Remaining mgmt per primary team/cardiology Lorne Coon MD Interventional Cardiology/Structural Heart Disease Cardiovascular [facility]     CTA Chest Non-Coronary (PE Studies)  Result Date: 3/29/2025  EXAMINATION: CTA CHEST NON CORONARY (PE STUDIES) CLINICAL HISTORY: Pulmonary embolism (PE) suspected, unknown D-dimer;   shortness of breath, chest pain TECHNIQUE: Helically acquired images with axial, sagittal and coronal reformations were obtained from the thoracic inlet to the lung bases followingthe IV administration of contrast.  CTA timed for evaluation of the pulmonary arteries.  MIP images were performed. Automated tube current modulation, weight-based exposure dosing, and/or iterative reconstruction technique utilized to reach lowest reasonably achievable exposure rate. DLP: 708 mGy*cm COMPARISON: Chest radiograph 09/28/2021 FINDINGS: BASE OF NECK: No significant abnormality. AORTA: Aortic atherosclerosis. PULMONARY VASCULATURE: Pulmonary arteries enhance normally.  No evidence of pulmonary embolus. HEART: Left ventricle appears dilated.  There are coronary artery calcifications. ISIDRO/MEDIASTINUM: No enlarged lymph nodes by size criteria. AIRWAYS: Expiratory phase imaging with buckling of the posterior wall of the trachea and mainstem bronchi.  Bibasilar bronchial wall thickening. LUNGS/PLEURA: Bilateral septal thickening and ground-glass opacities.  Trace pleural fluid. UPPER ABDOMEN: No abnormality of the partially imaged upper abdomen. THORACIC SOFT TISSUES:  Unremarkable. BONES: No acute fracture. No suspicious lytic or sclerotic lesions.     1. Cardiomegaly with septal thickening, bronchial wall thickening and ground-glass densities suggesting interstitial edema 2. No evidence of pulmonary embolus 3. The preliminary and final reports are concordant. Electronically signed by: Juany Thompson Date:    03/29/2025 Time:    08:27    X-Ray Chest AP Portable  Result Date: 3/29/2025  EXAMINATION: XR CHEST AP PORTABLE CLINICAL HISTORY: Cardiac arrest, cause unspecified TECHNIQUE: One view COMPARISON: September 28, 2021. FINDINGS: Cardiopericardial silhouette enlarged appearance is similar.  Lungs are remarkable for mild to moderate vascular congestive changes.  Left basilar atelectasis.  No consolidation or pneumothorax.  Optimal intubation and the nasogastric tube traverses into the stomach.  Right IJ central line terminates about the cavoatrial junction.     Lungs vascular congestive changes. Electronically signed by: Melecio Cerna Date:    03/29/2025 Time:    08:20    ED US Echo  Result Date: 3/29/2025  Lizz Stubbs MD     3/31/2025 11:42 AM ED US Echo Date/Time: 3/29/2025 5:35 AM Performed by: Lizz Stubbs MD Authorized by: Lizz Stubbs MD  Indication:  Chest pain, Hypotension, Dyspnea and Shock Identified Structures:   The pericardial sac, myocardium, and 4 chambers were identified with the following echocardiographic windows:  Subxiphoid and IVC Findings:   Pericardial Effusion:  Absent   Left Ventricle Ejection Fraction:  Severely reduced (<35%) IVC:   Diameter:  > 2cm   Collapsibility:  < 50%   Impression:  Diminished LV function   Charge?:  Yes      Imaging personally reviewed by myself and SP.    Assessment / Plan:     79-year-old male unknown to our group with a past medical history of BPH, CAD, PID, right JACY, HTN, obesity, GERD, tobacco use who presented to the ER 03/29/2025 complaining of shortness of breath.  He sustained a witnessed PEA arrest in  the ER and ROSC achieved.  He ultimately had coronary angiogram with intervention to the right coronary artery and placement of an Impella.  Echo revealed EF 15-20%.  CV surgery was consulted but ultimately deemed patient not appropriate for CABG given multiple comorbidities.  He underwent high-risk PCI 04/10/2025 with 4 stents to LAD and removal of Impella.  He was extubated on 4/13/25 and downgraded from ICU on 4/14/25.    Patient has been on aspirin and plavix but held beginning 4/21 due to reports of hematuria, melena, and positive FOBT.  GI has been consulted for further evaluation.    Macrocytic anemia  Melena  + FOBT  Recent cardiac arrest and cardiogenic shock requiring Impella  Severe multivessel CAD  -s/p PCI, now on asa and plavix  6.   Hematuria   -Urology following     Given comorbidities and worsening clinical condition, risk > benefit for endoscopy at this time.  Hgb relatively stable at 9.3.  Continue DAPT per cardiology  Continue ppi bid, will add carafate QID x 1 month  Monitor H/H and transfuse as needed to Hgb 7  Monitor stools for bleeding  Ok for liquid diet from GI perspective   Agree with palliative care consult for goals of care discussion     Thank you for allowing us to participate in this patient's care.   Case and plan discussed with Dr. Willi Shi, ROSAP-C         [1]   Current Facility-Administered Medications   Medication Dose Route Frequency Provider Last Rate Last Admin    acetaminophen tablet 650 mg  650 mg Oral Q4H PRN Lorne Coon Jr., MD   650 mg at 04/20/25 1350    atorvastatin tablet 40 mg  40 mg Oral Daily Hasmukh Sandoval ANP   40 mg at 04/21/25 1057    bisacodyL suppository 10 mg  10 mg Rectal Daily PRN Elgin Stark MD        bumetanide tablet 1 mg  1 mg Oral Daily Harvinder Gallardo MD   1 mg at 04/21/25 1057    dapagliflozin propanediol (Farxiga) tablet 10 mg  10 mg Oral Daily Hasmukh Sandoval ANP   10 mg at 04/21/25 1057    dextrose 50% injection 12.5 g   12.5 g Intravenous PRN Álvaro Harrington, DO        dextrose 50% injection 25 g  25 g Intravenous PRN Juany Álvaro, DO        enoxaparin injection 30 mg  30 mg Subcutaneous Daily Elgin Stark MD   30 mg at 04/21/25 1538    glucagon (human recombinant) injection 1 mg  1 mg Intramuscular PRN Juany Álvaro, DO        glucose chewable tablet 16 g  16 g Oral PRN Álvaro Harrington, DO        glucose chewable tablet 24 g  24 g Oral PRN Juany, Álvaro, DO        hydrALAZINE injection 10 mg  10 mg Intravenous Q4H PRN Lorne Coon Jr., MD        LIDOcaine 5 % patch 1 patch  1 patch Transdermal Daily Elgin Stark MD   1 patch at 04/21/25 1056    metoprolol succinate (TOPROL-XL) 24 hr tablet 50 mg  50 mg Oral Daily Julio Butterfield FNDOMENICO   50 mg at 04/21/25 1057    ondansetron disintegrating tablet 8 mg  8 mg Oral Q8H PRN Lorne Coon Jr., MD        pantoprazole injection 40 mg  40 mg Intravenous BID Elgin Stark MD   40 mg at 04/21/25 1946    polyethylene glycol packet 17 g  17 g Oral BID PRN Elgin Stark MD   17 g at 04/21/25 1613    sacubitriL-valsartan 24-26 mg per tablet 1 tablet  1 tablet Oral BID Kajal Stewart NP   1 tablet at 04/21/25 1057    senna-docusate 8.6-50 mg per tablet 1 tablet  1 tablet Oral Daily Elgin Stark MD   1 tablet at 04/21/25 1057    tirofiban 12.5 mg in sodium chloride 0.9% 250 mL infusion    Continuous PRN Lorne Coon Jr., MD   Stopped at 03/29/25 1845    zinc oxide-cod liver oil 40 % paste   Topical (Top) BID Davi Harp Jr., [facility], [facility]   Given at 04/21/25 2040   [2]   No medications prior to admission.

## 2025-04-22 NOTE — PLAN OF CARE
Clinical updates sent to [facility]. Serene with [facility] coming to assess this patient this morning. Expecting a decision following assessment.     1030: Spoke to Serene at [facility]. They are unable to meet this patient's needs.     [id number] downloaded from assessment pro. Pasrr and 142 uploaded into .    1142: Spoke to patient's daughter Kimmy to let her know that their first 3 facilities choice were unable to accept this patient. She stated that she would get back with me on more choices.

## 2025-04-22 NOTE — CONSULTS
Inpatient consult to Palliative Care  Consult performed by: Vianey Gan NP  Consult ordered by: Elgin Stark MD      Patient Name: Jon Conley   MRN: [id number]   Admission Date: 3/29/2025   Hospital Length of Stay: 24   Attending Provider: Elgin Stark MD   Consulting Provider: ANDREW Burch  Reason for Consult: Goals of Care  Primary Care Physician: Patrick Reece Jr., MD     Principal Problem: NSTEMI (non-ST elevated myocardial infarction)     Patient information was obtained from patient, relative(s), and ER records.      Final diagnoses:  [R57.0] Cardiogenic shock  [I46.9] Cardiac arrest  [I46.9] Cardiac arrest - line placement  [I46.9] Cardiopulmonary arrest  [I48.91] Atrial fibrillation with rapid ventricular response  [I21.9] Acute myocardial infarction, unspecified MI type, unspecified artery     Assessment/Plan:     I reviewed the patient and family's understanding of the seriousness of the illness and its expected prognosis. We discussed the patient's goals of care and treatment preferences. I clarified current code status which is Do Not Resuscitate. I identified the surrogate decision maker or health care POA to be the patient's two daughters, Kimmy and Sarah. I answered all questions and we formulated a plan including recommendations for symptom management and how to best achieve goals of care.       Patient is resting comfortably in bed on RA. CBI noted.  He is awake and alert.  Does not respond to all questions appropriately.  Appears to be hard of hearing.  Mittens in place due to patient pulling at Ortiz catheter.  Met with daughter Kimmy at bedside.  Introduced Service.  Reviewed patient's current condition and hospital course.  She explains that prior to hospitalization he was independent and living alone.  However, during this hospitalization they have prepared more than once for decompensation and decline.  She explained that consideration was made to transition to  comfort measures if he did not remain stable after extubation.  Discussed that given current situation he is at high-risk for decompensation and decline despite medical treatment.  She verbalizes understanding that there is risks and benefits with antiplatelet therapy and active bleeding.  She is awaiting GI evaluation and recommendations.  Discussed options to optimize medically versus attempt invasive procedures.  She understands he is a high-risk for decompensation and decline either way.  She is hopeful to continue treatment.  Encouraged her to consider patient's quality of life and medical wishes moving forward as they make decisions.  Discussed options including transition to comfort measures if they feel the patient is suffering at any point.  Additionally discussed options regarding hospice in-home, inpatient, or in nursing home.  She verbalizes understanding.  She confirms do not resuscitate status.  She plans to discuss with her sister after GI evaluation.  Offered support.  Encouraged to call with questions or concerns.  Palliative Medicine will continue to follow.    Advance Care Planning     Date: 04/22/2025    [facility]  I engaged the patient and family in a voluntary conversation about advance care planning and we specifically addressed what the goals of care would be moving forward, in light of the patient's change in clinical status, specifically current condition.  We did specifically address the patient's likely prognosis, which is poor.  We explored the patient's values and preferences for future care.  The patient and family endorses that what is most important right now is to focus on improvement in condition but with limits to invasive therapies and comfort and QOL     Accordingly, we have decided that the best plan to meet the patient's goals includes continuing with treatment       History of Present Illness:     79-year-old male with PMH of hypertension, hyperlipidemia, CAD, PAD, BPH status post  GLEN  Presented to [facility] on 03/29/2025 as transfer from [facility].  He presented to outlying facility with complaints of shortness of breath.  High sensitivity troponin and WBCs elevated. He received NS bolus and heparin, then transferred to [facility] for higher level of care. He underwent left heart catheterization with Impella placement on 3/29/25, which revealed MVCAD with 100% occluded proximal left circumflex, unable to cross with wire. MidRCA  also noted to be 100% occluded, treated with PTCA/TAD. Initiated on levophed, heparin drip, and amiodarone. Echo revealed EF 15/20% with grade I DD. CV surgery consulted and he was deemed not a surgical candidate s/t multiple comorbidities. He received 2 units PRBCs and underwent high risk PCI on 4/10/25 with 4 stents to the LAD. Impella subsequently removed. Extubated on 4/13/25. Downgraded out of ICU on 4/14/25.  Has had episodes of VT on telemetry. On 4/21 he developed gross hematuria in contreras. Urology recommending to hold aspirin and plavix when able. Therefore, cardiology has been reconsulted. Additionally patient was noted to have dark stool, FOBT positive, GI consulted. Protonix has been initiated BID. Palliative Medicine consulted for goals of care discussions.      Active Ambulatory Problems     Diagnosis Date Noted    No Active Ambulatory Problems     Resolved Ambulatory Problems     Diagnosis Date Noted    No Resolved Ambulatory Problems     No Additional Past Medical History        Past Surgical History:   Procedure Laterality Date    CATHETERIZATION OF BOTH LEFT AND RIGHT HEART N/A 3/29/2025    Procedure: CATHETERIZATION, HEART, BOTH LEFT AND RIGHT;  Surgeon: Lorne Coon Jr., MD;  Location: [facility] CATH LAB;  Service: Cardiology;  Laterality: N/A;    LEFT HEART CATHETERIZATION N/A 4/10/2025    Procedure: Left heart cath;  Surgeon: Lorne Coon Jr., MD;  Location: [facility] CATH LAB;  Service: Cardiology;  Laterality:  "N/A;        Review of patient's allergies indicates:  No Known Allergies     Current Medications[1]       Current Facility-Administered Medications:     acetaminophen, 650 mg, Oral, Q4H PRN    bisacodyL, 10 mg, Rectal, Daily PRN    dextrose 50%, 12.5 g, Intravenous, PRN    dextrose 50%, 25 g, Intravenous, PRN    glucagon (human recombinant), 1 mg, Intramuscular, PRN    glucose, 16 g, Oral, PRN    glucose, 24 g, Oral, PRN    hydrALAZINE, 10 mg, Intravenous, Q4H PRN    ondansetron, 8 mg, Oral, Q8H PRN    polyethylene glycol, 17 g, Oral, BID PRN    tirofiban-0.9% sodium chloride 12.5 mg/250ml, , , Continuous PRN     No family history on file.     Review of Systems   Unable to perform ROS: Mental status change            Objective:   /63   Pulse 97   Temp 98.6 °F (37 °C) (Oral)   Resp 20   Ht 5' 10.98" (1.803 m)   Wt 48.4 kg (106 lb 9.6 oz)   SpO2 96%   BMI 14.87 kg/m²      Physical Exam  Constitutional:       General: He is not in acute distress.     Appearance: He is ill-appearing.   HENT:      Mouth/Throat:      Mouth: Mucous membranes are dry.   Eyes:      Extraocular Movements: Extraocular movements intact.   Cardiovascular:      Rate and Rhythm: Normal rate.   Pulmonary:      Effort: Pulmonary effort is normal. No respiratory distress.   Abdominal:      General: There is no distension.   Musculoskeletal:      Cervical back: Normal range of motion.      Comments: Generalized deconditioning   Skin:     General: Skin is warm and dry.   Neurological:      General: No focal deficit present.      Mental Status: He is alert.      Comments: Awake and alert  Hard of hearing  Does not consistently respond appropriately            Review of Symptoms  Review of Symptoms      Symptom Assessment (ESAS 0-10 Scale)  Pain:  0  Dyspnea:  0  Anxiety:  0  Nausea:  0  Depression:  0  Anorexia:  0  Fatigue:  0  Insomnia:  0  Restlessness:  0  Agitation:  0         Bowel Management Plan (BMP):  Yes      Living Arrangements: "  Lives in home    Psychosocial/Cultural:   See Palliative Psychosocial Note: Yes  Lives alone in apartment. . Has 2 adult daughters. Was independent prior to hospitalization.  **Primary  to Follow**  Palliative Care  Consult: No    Spiritual:  F - Cheryle and Belief:  Yazdanism  I - Importance:  Yes  C - Community:  Yes  A - Address in Care:  Yes      Advance Care Planning   Advance Directives:     Decision Making:  Family answered questions  Goals of Care: The family endorses that what is most important right now is to focus on improvement in condition but with limits to invasive therapies and comfort and QOL     Accordingly, we have decided that the best plan to meet the patient's goals includes continuing with treatment          PAINAD: NA    Caregiver burden formerly assessed: Yes      > 50% of 55 min of encounter was spent in chart review, face to face discussion of goals of care, symptom assessment, coordination of care and emotional support.    Vianey Gan, [facility]  Palliative Medicine  [facility]         [1]   Current Facility-Administered Medications:     acetaminophen tablet 650 mg, 650 mg, Oral, Q4H PRN, Lorne Coon Jr., MD, 650 mg at 04/20/25 1350    aspirin EC tablet 81 mg, 81 mg, Oral, Daily, Elgin Stark MD    atorvastatin tablet 40 mg, 40 mg, Oral, Daily, Hasmukh Sandoval ANP, 40 mg at 04/21/25 1057    bisacodyL suppository 10 mg, 10 mg, Rectal, Daily PRN, Elgin Stark MD    bumetanide tablet 1 mg, 1 mg, Oral, Daily, Harvinder Gallardo MD, 1 mg at 04/21/25 1057    cefTRIAXone injection 1 g, 1 g, Intravenous, Q24H, Elgin Stark MD    clopidogreL tablet 75 mg, 75 mg, Oral, Daily, Elgin Stark MD    dapagliflozin propanediol (Farxiga) tablet 10 mg, 10 mg, Oral, Daily, Hasmukh Sandoval ANP, 10 mg at 04/21/25 1057    dextrose 50% injection 12.5 g, 12.5 g, Intravenous, PRN, Álvaro Harrington DO    dextrose 50% injection 25 g, 25 g,  Intravenous, PRN, Álvaro Harrington, DO    glucagon (human recombinant) injection 1 mg, 1 mg, Intramuscular, PRN, Álvaro Harrington,     glucose chewable tablet 16 g, 16 g, Oral, PRN, Álvaro Harrington, DO    glucose chewable tablet 24 g, 24 g, Oral, PRN, Álvaro Harrington, DO    hydrALAZINE injection 10 mg, 10 mg, Intravenous, Q4H PRN, Lorne Coon Jr., MD    LIDOcaine 5 % patch 1 patch, 1 patch, Transdermal, Daily, Elgin Stark MD, 1 patch at 04/21/25 1056    metoprolol succinate (TOPROL-XL) 24 hr tablet 50 mg, 50 mg, Oral, Daily, Julio Butterfield, FNP, 50 mg at 04/21/25 1057    ondansetron disintegrating tablet 8 mg, 8 mg, Oral, Q8H PRN, Lorne Coon Jr., MD    pantoprazole injection 40 mg, 40 mg, Intravenous, BID, Elgin Stark MD, 40 mg at 04/21/25 1946    polyethylene glycol packet 17 g, 17 g, Oral, BID PRN, Elgin Stark MD, 17 g at 04/21/25 1613    sacubitriL-valsartan 24-26 mg per tablet 1 tablet, 1 tablet, Oral, BID, Kajal Stewart NP, 1 tablet at 04/21/25 1057    senna-docusate 8.6-50 mg per tablet 1 tablet, 1 tablet, Oral, Daily, Elgin Stark MD, 1 tablet at 04/21/25 1057    tirofiban 12.5 mg in sodium chloride 0.9% 250 mL infusion, , , Continuous PRN, Lorne Coon Jr., MD, Stopped at 03/29/25 1845    zinc oxide-cod liver oil 40 % paste, , Topical (Top), BID, Davi Harp Jr., MD, FCCP, Given at 04/21/25 2040

## 2025-04-23 NOTE — PROGRESS NOTES
Pt seen and examined.   No family at bedside    Urine clear yellow/garcia  CBI stopped.     S/p TURP with need for cysto and clot evacuation 3 weeks ago.     Urine color and character improving.     Could consider void trial prior to DC  depending on his functional status vs DC with contreras to f/u with his regular  (Dr Grace López)

## 2025-04-23 NOTE — PLAN OF CARE
Problem: Infection  Goal: Absence of Infection Signs and Symptoms  Outcome: Progressing     Problem: Adult Inpatient Plan of Care  Goal: Plan of Care Review  Outcome: Progressing  Goal: Patient-Specific Goal (Individualized)  Outcome: Progressing  Goal: Absence of Hospital-Acquired Illness or Injury  Outcome: Progressing  Goal: Optimal Comfort and Wellbeing  Outcome: Progressing  Goal: Readiness for Transition of Care  Outcome: Progressing     Problem: Artificial Airway  Goal: Effective Communication  Outcome: Progressing  Goal: Optimal Device Function  Outcome: Progressing  Goal: Absence of Device-Related Skin or Tissue Injury  Outcome: Progressing     Problem: Delirium  Goal: Optimal Coping  Outcome: Progressing  Goal: Improved Behavioral Control  Outcome: Progressing  Goal: Improved Attention and Thought Clarity  Outcome: Progressing  Goal: Improved Sleep  Outcome: Progressing     Problem: Skin Injury Risk Increased  Goal: Skin Health and Integrity  Outcome: Progressing     Problem: Fall Injury Risk  Goal: Absence of Fall and Fall-Related Injury  Outcome: Progressing     Problem: Wound  Goal: Optimal Coping  Outcome: Progressing  Goal: Optimal Functional Ability  Outcome: Progressing  Goal: Absence of Infection Signs and Symptoms  Outcome: Progressing  Goal: Improved Oral Intake  Outcome: Progressing  Goal: Optimal Pain Control and Function  Outcome: Progressing  Goal: Skin Health and Integrity  Outcome: Progressing  Goal: Optimal Wound Healing  Outcome: Progressing     Problem: Coping Ineffective  Goal: Effective Coping  Outcome: Progressing

## 2025-04-23 NOTE — PROGRESS NOTES
Patient Name: Jon Conley   MRN: [id number]   Admission Date: 3/29/2025   Hospital Length of Stay: 25   Attending Provider: Elgin Stark MD   Consulting Provider: ANDREW Burch  Reason for Consult: Goals of Care  Primary Care Physician:  Patrick Reece Jr., MD     Principal Problem: NSTEMI (non-ST elevated myocardial infarction)     Patient information was obtained from patient, relative(s), and ER records.     Final diagnoses:  [R57.0] Cardiogenic shock  [I46.9] Cardiac arrest  [I46.9] Cardiac arrest - line placement  [I46.9] Cardiopulmonary arrest  [I48.91] Atrial fibrillation with rapid ventricular response  [I21.9] Acute myocardial infarction, unspecified MI type, unspecified artery      Assessment/Plan:     I reviewed the patient and family's understanding of the seriousness of the illness and its expected prognosis. We discussed the patient's goals of care and treatment preferences. We discussed the difference between palliative and curative medicine. I clarified current code status to be DNR. I identified the surrogate decision makers to be his two daughters, Kimmy and Sarah. I answered all questions and we formulated a plan including recommendations for symptom management and how to best achieve goals of care.       Patient is resting comfortably in bed.  Denies complaints at this time.  No family at bedside.  Returned to bedside to meet with patient's 2 daughters.  Had long discussion regarding patient's current condition and goals of care.  Discussed that bleeding is likely due to DAPT, however risks outweigh benefits of stopping DAPT per cardiology notes. They verbalize understanding. They are hopeful that GI bleeding will continue to improve/resolve with medication management, but would like to continue to monitor status over the net 24 hours. They understand that he has some improvement in cardiac status with stenting, but remains with poor cardiac function. Discussed options at  "discharge, including attempting SNF versus transitioning to comfort measures and seeking hospice services. They are in favor of attempting to pursue SNF for therapy services, given patient's baseline functional status prior to hospitalization. They would want to escalate care to ICU if required, "unless he is having another heart attack. We know that cannot be fixed with his heart function." Given how much his status has fluctuated day to day, they are hopeful he will continue to improve, but remain cautiously optimistic. They have multiple questions regarding CT abdomen/pelvis, if this was done, and what benefit it may provide. Deferred to attending. If he would suffer significant decline, they would consider transitioning to comfort measures. At present they want to continue treatment. Offered support. Encouraged to call with questions or concerns. Palliative Medicine will continue to follow. Discussed with nursing and attending.    Advance Care Planning     Date: 04/23/2025    [facility]  I engaged the patient and family in a voluntary conversation about advance care planning and we specifically addressed what the goals of care would be moving forward, in light of the patient's change in clinical status, specifically current condition.  We did specifically address the patient's likely prognosis, which is fair .  We explored the patient's values and preferences for future care.  The patient and family endorses that what is most important right now is to focus on remaining as independent as possible, symptom/pain control, improvement in condition but with limits to invasive therapies, and comfort and QOL     Accordingly, we have decided that the best plan to meet the patient's goals includes continuing with treatment       Interval History:     No acute events overnight.  GI recommended monitoring and continuation of medical management due to hemoglobin being stable for several days.      Active Ambulatory Problems     " "Diagnosis Date Noted    No Active Ambulatory Problems     Resolved Ambulatory Problems     Diagnosis Date Noted    No Resolved Ambulatory Problems     No Additional Past Medical History        Past Surgical History:   Procedure Laterality Date    CATHETERIZATION OF BOTH LEFT AND RIGHT HEART N/A 3/29/2025    Procedure: CATHETERIZATION, HEART, BOTH LEFT AND RIGHT;  Surgeon: Lorne Coon Jr., MD;  Location: [facility] CATH LAB;  Service: Cardiology;  Laterality: N/A;    LEFT HEART CATHETERIZATION N/A 4/10/2025    Procedure: Left heart cath;  Surgeon: Lorne Coon Jr., MD;  Location: [facility] CATH LAB;  Service: Cardiology;  Laterality: N/A;        Review of patient's allergies indicates:  No Known Allergies     Current Medications[1]       Current Facility-Administered Medications:     acetaminophen, 650 mg, Oral, Q4H PRN    bisacodyL, 10 mg, Rectal, Daily PRN    dextrose 50%, 12.5 g, Intravenous, PRN    dextrose 50%, 25 g, Intravenous, PRN    glucagon (human recombinant), 1 mg, Intramuscular, PRN    glucose, 16 g, Oral, PRN    glucose, 24 g, Oral, PRN    hydrALAZINE, 10 mg, Intravenous, Q4H PRN    ondansetron, 8 mg, Oral, Q8H PRN    polyethylene glycol, 17 g, Oral, BID PRN    tirofiban-0.9% sodium chloride 12.5 mg/250ml, , , Continuous PRN     No family history on file.       Review of Systems   Constitutional:  Positive for fatigue.   HENT: Negative.     Respiratory: Negative.     Cardiovascular: Negative.    Neurological:  Positive for weakness.            Objective:   /63   Pulse 93   Temp 98.4 °F (36.9 °C)   Resp 20   Ht 5' 10.98" (1.803 m)   Wt 48.4 kg (106 lb 9.6 oz)   SpO2 97%   BMI 14.87 kg/m²      Physical Exam   Constitutional: No distress. He appears ill.   HENT:   Mouth/Throat: Mucous membranes are moist.   Cardiovascular: Normal rate. Pulmonary:      Effort: Pulmonary effort is normal. No respiratory distress.     Abdominal: He exhibits no distension.   Musculoskeletal:      Cervical back: " Normal range of motion.      Comments: Generalized deconditioning   Neurological: He is alert.   Skin: Skin is warm and dry.   Psychiatric: His behavior is normal.          Review of Symptoms  Review of Symptoms      Symptom Assessment (ESAS 0-10 Scale)  Pain:  0  Dyspnea:  0  Anxiety:  0  Nausea:  0  Depression:  0  Anorexia:  0  Fatigue:  0  Insomnia:  0  Restlessness:  0  Agitation:  0         Psychosocial/Cultural:   See Palliative Psychosocial Note: Yes  **Primary  to Follow**  Palliative Care  Consult: No      Advance Care Planning   Advance Directives:     Decision Making:  Family answered questions  Goals of Care: What is most important right now is to focus on improvement in condition but with limits to invasive therapies, comfort and QOL . Accordingly, we have decided that the best plan to meet the patient's goals includes continuing with treatment.          PAINAD: NA    Caregiver burden formerly assessed: Yes      > 50% of 60 min of encounter was spent in chart review, face to face discussion of goals of care, symptom assessment, coordination of care and emotional support.    Vianey Gan, [facility]  Palliative Medicine  [facility]         [1]   Current Facility-Administered Medications:     acetaminophen tablet 650 mg, 650 mg, Oral, Q4H PRN, Lorne Coon Jr., MD, 650 mg at 04/20/25 1350    aspirin EC tablet 81 mg, 81 mg, Oral, Daily, Elgin Stark MD, 81 mg at 04/22/25 1831    atorvastatin tablet 40 mg, 40 mg, Oral, Daily, Hasmukh Sandoval ANP, 40 mg at 04/21/25 1057    bisacodyL suppository 10 mg, 10 mg, Rectal, Daily PRN, Elgin Stark MD    cefTRIAXone injection 1 g, 1 g, Intravenous, Q24H, Elgin Stark MD, 1 g at 04/22/25 1516    clopidogreL tablet 75 mg, 75 mg, Oral, Daily, Elgin Stark MD, 75 mg at 04/22/25 1831    dapagliflozin propanediol (Farxiga) tablet 10 mg, 10 mg, Oral, Daily, Hasmukh Sandoval ANP, 10 mg at 04/21/25 1057    dextrose  50% injection 12.5 g, 12.5 g, Intravenous, PRN, Álvaro Harrington, DO    dextrose 50% injection 25 g, 25 g, Intravenous, PRN, Álvaro Harrington, DO    glucagon (human recombinant) injection 1 mg, 1 mg, Intramuscular, PRN, Álvaro Harrington, DO    glucose chewable tablet 16 g, 16 g, Oral, PRN, Juany, Álvaro, DO    glucose chewable tablet 24 g, 24 g, Oral, PRN, Juany, Álvaro, DO    hydrALAZINE injection 10 mg, 10 mg, Intravenous, Q4H PRN, Lorne Coon Jr., MD    LIDOcaine 5 % patch 1 patch, 1 patch, Transdermal, Daily, Elgin Stark MD, 1 patch at 04/21/25 1056    metoprolol succinate (TOPROL-XL) 24 hr tablet 50 mg, 50 mg, Oral, Daily, Elgin Stark MD, 50 mg at 04/21/25 1057    ondansetron disintegrating tablet 8 mg, 8 mg, Oral, Q8H PRN, Lorne Coon Jr., MD    pantoprazole injection 40 mg, 40 mg, Intravenous, BID, Elgin Stark MD, 40 mg at 04/22/25 1733    polyethylene glycol packet 17 g, 17 g, Oral, BID PRN, Elgin Stark MD, 17 g at 04/21/25 1613    senna-docusate 8.6-50 mg per tablet 1 tablet, 1 tablet, Oral, Daily, Elgin Stark MD, 1 tablet at 04/21/25 1057    sucralfate tablet 1 g, 1 g, Oral, QID (AC & HS), Selena Shi, FNP, 1 g at 04/23/25 0542    tirofiban 12.5 mg in sodium chloride 0.9% 250 mL infusion, , , Continuous PRN, Lorne Coon Jr., MD, Stopped at 03/29/25 1845    zinc oxide-cod liver oil 40 % paste, , Topical (Top), BID, Davi Harp Jr., MD, FCCP, Given at 04/22/25 2040

## 2025-04-23 NOTE — PROGRESS NOTES
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  Remains afebrile and HDS  Hgb 8.6  No further overt GI bleeding    ROS:  Review of Systems   Unable to perform ROS: Acuity of condition       Vital Signs:  /63   Pulse 93   Temp 98.4 °F (36.9 °C)   Resp 20   Ht 5' 10.98" (1.803 m)   Wt 48.4 kg (106 lb 9.6 oz)   SpO2 97%   BMI 14.87 kg/m²   Body mass index is 14.87 kg/m².    Physical Exam:  Constitutional:       General: He is not in acute distress.     Appearance: He is ill-appearing. He is not toxic-appearing.      Comments: Deconditioned    HENT:      Head: Normocephalic and atraumatic.      Mouth/Throat:      Mouth: Mucous membranes are dry.      Pharynx: Oropharynx is clear.   Cardiovascular:      Rate and Rhythm: Normal rate and regular rhythm.      Pulses: Normal pulses.      Heart sounds: Normal heart sounds.   Pulmonary:      Effort: Pulmonary effort is normal.      Breath sounds: Normal breath sounds.   Abdominal:      General: Bowel sounds are normal. There is no distension.      Palpations: Abdomen is soft.      Tenderness: There is no abdominal tenderness. There is no guarding.   Skin:     General: Skin is warm and dry.   Neurological:      Mental Status: He is alert. He is confused.     Labs:  Recent Results (from the past 48 hours)   CBC Without Differential    Collection Time: 04/21/25 12:23 PM   Result Value Ref Range    WBC 11.29 4.50 - 11.50 x10(3)/mcL    RBC 3.57 (L) 4.70 - 6.10 x10(6)/mcL    Hgb 10.2 (L) 14.0 - 18.0 g/dL    Hct 33.2 (L) 42.0 - 52.0 %    MCV 93.0 80.0 - 94.0 fL    MCH 28.6 27.0 - 31.0 pg    MCHC 30.7 (L) 33.0 - 36.0 g/dL    RDW 17.5 (H) 11.5 - 17.0 %    Platelet 359 130 - 400 x10(3)/mcL    MPV 10.1 7.4 - 10.4 fL    NRBC% 0.0 %   Basic Metabolic Panel    Collection Time: 04/21/25 12:23 " PM   Result Value Ref Range    Sodium 144 136 - 145 mmol/L    Potassium 4.0 3.5 - 5.1 mmol/L    Chloride 115 (H) 98 - 107 mmol/L    CO2 22 (L) 23 - 31 mmol/L    Glucose 118 (H) 82 - 115 mg/dL    Blood Urea Nitrogen 24.3 8.4 - 25.7 mg/dL    Creatinine 0.88 0.72 - 1.25 mg/dL    BUN/Creatinine Ratio 28     Calcium 8.7 (L) 8.8 - 10.0 mg/dL    Anion Gap 7.0 mEq/L    eGFR >60 mL/min/1.73/m2   Occult Blood, Stool 1st Specimen    Collection Time: 04/21/25  4:27 PM   Result Value Ref Range    Stool Color 1 Black     Stool Consistancy 1 soft     Occult Blood Stool 1 Positive (A) Negative   Hemoglobin and Hematocrit    Collection Time: 04/21/25  8:27 PM   Result Value Ref Range    Hgb 9.4 (L) 14.0 - 18.0 g/dL    Hct 30.6 (L) 42.0 - 52.0 %   Occult Blood, Stool 2nd Specimen    Collection Time: 04/22/25  1:04 AM   Result Value Ref Range    Stool Color 2 Black     Stool Consistancy 2 soft     Occult Blood Stool 2 Positive (A) Negative, N/A   Occult Blood, Stool 3rd Specimen    Collection Time: 04/22/25  2:14 AM   Result Value Ref Range    Stool Color 3 Black     Stool Consistancy 3 soft     Occult Blood Stool 3 Positive (A) Negative, N/A   CBC Without Differential    Collection Time: 04/22/25  5:03 AM   Result Value Ref Range    WBC 9.07 4.50 - 11.50 x10(3)/mcL    RBC 3.23 (L) 4.70 - 6.10 x10(6)/mcL    Hgb 9.3 (L) 14.0 - 18.0 g/dL    Hct 31.1 (L) 42.0 - 52.0 %    MCV 96.3 (H) 80.0 - 94.0 fL    MCH 28.8 27.0 - 31.0 pg    MCHC 29.9 (L) 33.0 - 36.0 g/dL    RDW 17.5 (H) 11.5 - 17.0 %    Platelet 290 130 - 400 x10(3)/mcL    MPV 10.2 7.4 - 10.4 fL    NRBC% 0.0 %   Basic Metabolic Panel    Collection Time: 04/22/25  5:03 AM   Result Value Ref Range    Sodium 143 136 - 145 mmol/L    Potassium 3.7 3.5 - 5.1 mmol/L    Chloride 112 (H) 98 - 107 mmol/L    CO2 20 (L) 23 - 31 mmol/L    Glucose 125 (H) 82 - 115 mg/dL    Blood Urea Nitrogen 32.1 (H) 8.4 - 25.7 mg/dL    Creatinine 1.06 0.72 - 1.25 mg/dL    BUN/Creatinine Ratio 30     Calcium 8.6  (L) 8.8 - 10.0 mg/dL    Anion Gap 11.0 mEq/L    eGFR >60 mL/min/1.73/m2   Hemoglobin and Hematocrit    Collection Time: 04/22/25  3:14 PM   Result Value Ref Range    Hgb 9.0 (L) 14.0 - 18.0 g/dL    Hct 29.3 (L) 42.0 - 52.0 %   CBC Without Differential    Collection Time: 04/23/25  6:11 AM   Result Value Ref Range    WBC 8.90 4.50 - 11.50 x10(3)/mcL    RBC 3.02 (L) 4.70 - 6.10 x10(6)/mcL    Hgb 8.6 (L) 14.0 - 18.0 g/dL    Hct 28.7 (L) 42.0 - 52.0 %    MCV 95.0 (H) 80.0 - 94.0 fL    MCH 28.5 27.0 - 31.0 pg    MCHC 30.0 (L) 33.0 - 36.0 g/dL    RDW 17.9 (H) 11.5 - 17.0 %    Platelet 274 130 - 400 x10(3)/mcL    MPV 10.6 (H) 7.4 - 10.4 fL    NRBC% 0.0 %   Comprehensive Metabolic Panel    Collection Time: 04/23/25  6:11 AM   Result Value Ref Range    Sodium 143 136 - 145 mmol/L    Potassium 4.1 3.5 - 5.1 mmol/L    Chloride 114 (H) 98 - 107 mmol/L    CO2 21 (L) 23 - 31 mmol/L    Glucose 94 82 - 115 mg/dL    Blood Urea Nitrogen 32.0 (H) 8.4 - 25.7 mg/dL    Creatinine 0.83 0.72 - 1.25 mg/dL    Calcium 8.6 (L) 8.8 - 10.0 mg/dL    Protein Total 6.1 5.8 - 7.6 gm/dL    Albumin 2.8 (L) 3.4 - 4.8 g/dL    Globulin 3.3 2.4 - 3.5 gm/dL    Albumin/Globulin Ratio 0.8 (L) 1.1 - 2.0 ratio    Bilirubin Total 0.5 <=1.5 mg/dL    ALP 89 40 - 150 unit/L    ALT 8 0 - 55 unit/L    AST 11 11 - 45 unit/L    eGFR >60 mL/min/1.73/m2    Anion Gap 8.0 mEq/L    BUN/Creatinine Ratio 39        Imaging:  CT Head Without Contrast  Result Date: 4/22/2025  EXAMINATION: CT HEAD WITHOUT CONTRAST CLINICAL HISTORY: Mental status change, unknown cause; TECHNIQUE: Sequential axial images were performed of the brain without contrast. Dose product length of 1119 mGycm. Automated exposure control was utilized to minimize radiation dose. COMPARISON: None available. FINDINGS: There is no intracranial mass effect, midline shift, hydrocephalus or hemorrhage. There is no sulcal effacement or low attenuation changes to suggest recent large vessel territory infarction.   There is left basal ganglia old lacunar infarct.  Chronic microvascular ischemic changes are mild. The ventricular system and sulcal markings prominence is consistent with atrophy. There is no acute extra axial fluid collection. Visualized paranasal sinuses are clear without mucosal thickening, polypoidal abnormality or air-fluid levels. Mastoid air cells aeration is optimal.     1.  No acute intracranial findings identified. 2.  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 Electronically signed by: Melecio Cerna Date:    04/22/2025 Time:    19:37    Fl Modified Barium Swallow Speech  Result Date: 4/13/2025  See procedure notes from Speech Pathologist. This procedure was auto-finalized.    X-Ray Chest 1 View  Result Date: 4/12/2025  EXAMINATION: Single view chest radiograph. CLINICAL HISTORY: Non-ST elevation (NSTEMI) myocardial infarctionanemia; TECHNIQUE: Single view of the chest. COMPARISON: Chest radiograph 04/04/2025. FINDINGS: The endotracheal tube, nasogastric tube and right IJ catheter have been removed.  The intra-aortic balloon pump has been removed.  The bilateral effusions, pulmonary edema, and lower lobe atelectasis are unchanged.  There is no pneumothorax.  The heart is enlarged.  There is no acute osseous abnormality.     No significant change from prior exam. Electronically signed by: Lorne Solorzano MD Date:    04/12/2025 Time:    08:26    CV Ultrasound doppler arterial legs bilat  Result Date: 4/10/2025  Limited study due to Impella and sheath placements and leg braces. Only the common femoral, mid superficial femoral, posterior tibial and dorsalis pedis arteries are evaluated. The right lower extremity arterial system is patent with no evidence of focal stenosis or occlusion. The left lower extremity arterial system is patent with no evidence of focal stenosis or occlusion; however dampened waveforms and severely low velocity is  demonstrated in the dorsalis pedis  artery.     Cardiac catheterization  Result Date: 4/10/2025    Successful Impella-protected PCI of LAD as outline below. The procedure log was documented by No documenter listed and verified by Lorne Coon Jr, MD. Date: 4/10/2025  Time: 5:46 PM Procedure: Left heart catheterization with coronary angiography Impella-protected percutaneous coronary intervention Successful Impella CP removal  Preoperative diagnosis: Cardiomyopathy Postoperative diagnosis: Successful PCI  Access: Right common femoral artery Estimated blood loss: 10 cc Complications: None  Summary: Consent obtained. Risks and benefits discussed with the patient. The patient was brought to the cath lab in a fasting state. The patient was prepped and draped in usual sterile fashion. A preprocedure time-out was performed. The existing 6 Mauritian R femoral sheath was exchanged for a new 6 Mauritian sheath for the intervention.  A 6 Mauritian EBU 3.5 catheter was used for the intervention.  The LAD was wired with a runthrough wire. The large first diagonal was wired with a Prowater wire. Balloon angioplasty was performed on the LAD using an EMERGE MR 15 X 2.50MM device inflated to nominal pressure. IVUS was performed to assess the vessel for PCI. Using this measurement, the mid LAD lesion was treated with a SYNERGY XD 2.5X28MM drug-eluting stent. The proximal vessel was then treated with an NC EMERGE MR 3.5X20MM balloon inflated to nominal pressure. A FRONTIER BERNADINE 2.14Z76ZN drug-eluting stent was used to treat the mid LAD, just proximal to the previously placed stent, in an overlapping fashion. IVUS was again performed to assess stent expansion and measure the proximal vessel for PCI. Using these measurements, the proximal LAD was treated using overlapping FRONTIER BERNADINE 3.30D50XM and FRONTIER BERNADINE 3.23F36QZ drug-eluting stents. The Prowater wire was removed from D1 without any issue. IVUS was again performed to assess stent expansion. Using these measurements,  post-dilatation was performed using the following balloons: NC EMERGE MR 2.5X20MM, NC EMERGE MR 3X30MM, and NC EMERGE MR 3.5X20MM; each inflated to high pressure. Following this intervention, there was 0% residual stenosis, PATTY grade 3 flow in the distal vessel, and PATTY grade 3 flow in the first diagonal branch. At the end the procedure, all wires and catheters were removed. The Impella was removed under fluoroscopic guidance. The vessel was closed using a MANTA 14 Bulgarian device, with successful hemostasis. A 6 Bulgarian Pigtail was placed in the abdominal aorta.  Findings: - Successful Impella-protected PCI of LAD. - Prox to Mid Left Anterior Descending has severe diffuse disease, up to 80% in severity. The lesion was successfully treated with overlapping FRONTIER BERNADINE 3.63P74GA, FRONTIER BERNADINE 3.65E04JB, FRONTIER BERNADINE 2.30O66OG, and SYNERGY XD 2.5X28MM drug-eluting stents. Post-dilatation was performed using an NC EMERGE MR 3.5X20MM angioplasty balloon inflated to 20 gretel. Following intervention there was 0% residual stenosis and PATTY grade 3 flow in the distal vessel. - Intravascular Ultrasound (IVUS) was performed prior to intervention to further characterize the lesion, as well as post-stent deployment to ensure optimal stent apposition and expansion. - Ostial First Diagonal Branch has a 70% stenosis. At the conclusion of the case, there was PATTY grade 3 flow in the distal vessel. - Successful Impella CP removal and closure of L femoral arterial access site using a 14 Bulgarian MANTA device.  Assessment/Plan: - Patient is a 79 y.o. male with a history of CAD presents in cardiogenic shock. Successful PCI of RCA last week. Impella CP placed at that time. Now s/p successful LAD intervention and Impella CP removal. - Continue DAPT (aspirin + clopidogrel) for a minimum of 12 months and aspirin indefinitely thereafter - High intensity statin - Arterial sheath remains place; plan is to remove when activated clotting time  (ACT) less than 180 seconds. Bedrest for 4 hours after hemostasis is achieved. Lorne Coon MD Interventional Cardiology/Structural Heart Disease Cardiovascular [place] [facility]    Echo Saline Bubble? No; Ultrasound enhancing contrast? No  Result Date: 4/9/2025    Impella check   Aortic annulus to tip distance is 4.3 cm.   LV systolic function is 15%.     X-Ray Chest 1 View  Result Date: 4/4/2025  EXAMINATION: XR CHEST 1 VIEW CLINICAL HISTORY: respiratory failure; TECHNIQUE: Frontal view(s) of the chest. COMPARISON: Radiography 04/03/2025 FINDINGS: Similar positioning of the endotracheal tube, right IJ catheter, pulmonary arterial catheter and LVAD.  Enteric tube extends below the diaphragm.  Small bilateral pleural effusions with bilateral lower lung atelectasis.  Possible mild pulmonary edema.  No pneumothorax appreciated.  Stable cardiac silhouette.     Small pleural effusions with possible mild pulmonary edema. Electronically signed by: Martín Gan Date:    04/04/2025 Time:    06:02    Echo  Result Date: 4/3/2025    A limited echo was performed using limited 2D   Left Ventricle: There is severely reduced systolic function with a visually estimated ejection fraction of 15%.   Impella catheter and appears to be in appropriate position.     X-Ray Chest 1 View  Result Date: 4/3/2025  EXAMINATION XR CHEST 1 VIEW CLINICAL HISTORY respiratory failure; TECHNIQUE A total of 1 frontal image(s) of the chest. COMPARISON 2 April 2025 FINDINGS Lines/tubes/devices: Grossly unchanged positioning when allowing for differences in technique and patient rotation. The cardiac silhouette and central vascular structures are unchanged.  The trachea is midline. No new or worsening consolidation is identified. Small bilateral pleural effusions are similar.  No development of pneumothorax. Regional osseous structures and extrathoracic soft tissues are similar. IMPRESSION No significant interval change. Electronically signed  by: Ventura López Date:    04/03/2025 Time:    06:11    X-Ray Chest 1 View  Result Date: 4/2/2025  EXAMINATION: XR CHEST 1 VIEW CPT 09807 CLINICAL HISTORY: respiratory failure; COMPARISON: April 1, 2025 FINDINGS: Examination reveals cardiomediastinal silhouette and pleuroparenchymal changes to be essentially unchanged perhaps with some improvement in the pulmonary vascular congestion and cardiac decompensation changes. Support catheters remain in place     Some improvement of the pulmonary vascular congestion and cardiac decompensation. Support catheters remain in place no other interval change Electronically signed by: Mt Beal Date:    04/02/2025 Time:    04:54    Echo  Result Date: 4/1/2025    Limited study to check impella placement.   Left Ventricle: There is severely reduced systolic function with a visually estimated ejection fraction of 15 - 20%.   Impella distance from aortic valve annulus appears appropriate.     X-Ray Chest 1 View  Result Date: 4/1/2025  EXAMINATION: XR CHEST 1 VIEW CLINICAL HISTORY: Cardiac arrest.Respiratory failure; COMPARISON: Yesterday FINDINGS: Frontal view of the chest was obtained. Support structures are in similar position.  Heart and mediastinum unchanged.  Similar mild bibasilar opacities.  No pneumothorax.     Little interval change. Electronically signed by: Hakeem Cabezas Date:    04/01/2025 Time:    07:58    Echo  Result Date: 3/31/2025    Left Ventricle: There is severely reduced systolic function with a visually estimated ejection fraction of less than 30%.   There appears to be at least moderate to severe mitral regurgitation. Impella is positioned with inflow cannula 3.8 cm from aortic valve annulus.     X-Ray Chest 1 View  Result Date: 3/31/2025  EXAMINATION: XR CHEST 1 VIEW CPT 56098 CLINICAL HISTORY: Acute respiratory failure, cardiogenic shock; COMPARISON: March 30, 2025 FINDINGS: Examination reveals cardiomediastinal silhouette and pleuroparenchymal changes  to be essentially unchanged as compared with the previous exam. Support catheters remain in place     No significant change as compared with the previous exam Electronically signed by: Mt Beal Date:    03/31/2025 Time:    07:38    X-Ray Chest 1 View  Result Date: 3/30/2025  EXAMINATION: XR CHEST 1 VIEW CLINICAL HISTORY: respiratory failure; TECHNIQUE: One view COMPARISON: March 29, 2025.. FINDINGS: Cardiopericardial silhouette appearance is similar.  Supporting tubes and lines in similar location.  There is small right pleural effusion which due to layering result in hazy appearance of lower chest.  No overt edema, dense consolidation or pneumothorax.     No significant interval change. Electronically signed by: Melecio Cerna Date:    03/30/2025 Time:    08:08    ED US Guided Misc Procedure  Result Date: 3/30/2025  Lizz Stubsb MD     3/31/2025 11:42 AM ED US Guided Misc Procedure Date/Time: 3/30/2025 6:24 AM Performed by: Lizz Stubbs MD Authorized by: Lizz Stubbs MD  Procedure:  Ultrasound-guided peripheral venous cannulation Indication:  Failed or difficult IV access  Right  Antecubital Procedure:  Dynamic ultrasound guidance used, Candidate vein examined with linear probe - confirmed collapsibility, lack of pulsatility, and proper anatomic location. and Using aseptic technique, IV catheter inserted with flash of blood noted, flow of venous blood confirmed. Catheter gauge:  18  Flushes easily and without pain. Patient tolerated procedure well. Complications:  None Charge?:  Yes    CV Ultrasound doppler arterial leg left  Result Date: 3/29/2025  The left lower extremity arterial system is patent with no evidence of focal stenosis or occlusion but very weak dampened waveforms noted throughout suggesting poor arterial flow to the extremity. Severely dampened velocities in the tibial vessels. Limited visibility of the left common femoral artery secondary to impella placement.     Echo  Result  Date: 3/29/2025    Limited echo for Impella placement; Seated well at 3.6cm from AoV annulus. Severe LV systolic dysfunction with an EF estimated at 15%. TAPSE mildly depressed at 1.4cm. No effusion at this time.     Echo  Result Date: 3/29/2025    Left Ventricle: The left ventricle is dilated. Normal wall thickness. Severe global hypokinesis present. There is severely reduced systolic function with a visually estimated ejection fraction of 15 - 20%. Grade I diastolic dysfunction.   Right Ventricle: Right ventricular enlargement. Systolic function is normal. TAPSE is 1.83 cm.   Left Atrium: dilated   Mitral Valve: Mildly thickened leaflets. There is moderate regurgitation.   Tricuspid Valve: There is moderate regurgitation.   Pulmonary Artery: There is moderate pulmonary hypertension.   IVC/SVC: Patient is ventilated, cannot use IVC diameter to estimate right atrial pressure.     XR Gastric tube check, non-radiologist performed  Result Date: 3/29/2025  EXAMINATION: XR GASTRIC TUBE CHECK, NON-RADIOLOGIST PERFORMED CLINICAL HISTORY: OGT placement; TECHNIQUE: One COMPARISON: October 13, 2021 FINDINGS: Nasogastric tube traverses the GE junction and tip of tube is about the proximal gastric body.  Side port of the tube is about the GE junction.  Please further advance the tube by 5 cm is recommended.     Please further advance the nasogastric tube. Electronically signed by: Melecio Cerna Date:    03/29/2025 Time:    14:08    X-Ray Chest 1 View  Result Date: 3/29/2025  EXAMINATION: XR CHEST 1 VIEW CLINICAL HISTORY: [place] placement; TECHNIQUE: One view COMPARISON: March 29, 2025.. FINDINGS: [place] catheter tip projects about the main pulmonary artery.  Otherwise, no significant interval change.     As above Electronically signed by: Melecio Cerna Date:    03/29/2025 Time:    14:07    Cardiac catheterization  Result Date: 3/29/2025  The procedure log was documented by No documenter listed and verified by Lorne Coon Jr  "MD. Date: 3/29/2025  Time: 12:06 PM Procedure: Left heart catheterization with coronary angiography Right heart catheterization Percutaneous coronary intervention Impella-protected percutaneous coronary intervention Abdominal angiogram with bilateral lower extremity runoff utilizing DSA  Preoperative diagnosis: Cardiogenic shock Postoperative diagnosis: Multivessel coronary artery disease Successful PCI  Access: Right common femoral artery Left common femoral artery Right common femoral vein Estimated blood loss: 10 cc Complications: None  Summary: Procedure done emergently. The patient was brought to the cath lab in a fasting state., intubated The patient was prepped and draped in usual sterile fashion. A preprocedure time-out was performed. Ultrasound-guided right common femoral arterial access via modified Seldinger technique using a micropuncture kit. A 6 Liberian sheath was inserted into the right common femoral artery. Heparin was given at a dose of 80 units/kilogram to achieve an ACT of greater than 250 seconds. A 5 Liberian JR4 catheter was used to cannulate the right coronary artery; angiography was performed, and multiple fluoroscopic views were obtained. A 5 Liberian JL5 catheter was used to cannulate the left main coronary artery; angiography was performed, and multiple fluoroscopic views were obtained. A 5 Liberian Pigtail catheter was placed within the distal aorta and digital subtraction angiography was performed of the iliofemoral system.  Right iliac system with moderate diffuse disease. Left femoral access was obtained for Impella CP placement. Ultrasound-guided left common femoral arterial access via modified Seldinger technique using a micropuncture kit. A 6 Liberian sheath was inserted into the left common femoral artery. The 6 Liberian sheath was exchanged for the Impella introducer sheath over a stiff 0.035" wire. The stiff wire was exchanged for a standard 0.035" J-wire.  A 5 Liberian Pigtail catheter was " "then advanced into the left ventricle over the J-wire wire. A J-shaped curve was made on the Impella delivery wire. The 0.035"J-wire was then exchanged for the Impella delivery wire, and positioned in the left ventricular apex. Multiple angiographic views were obtained, so as to ensure the wire was not behind a papillary muscle.  The Impella CP catheter was introduced through the introducer sheath and advanced under fluoroscopic guidance into the descending aorta and then into the left ventricle. Hemodynamic monitoring was performed to ensure proper placement and functioning of the Impella CP device. The device was activated and adjusted to achieve the desired level of circulatory support. Proper positioning of the device was confirmed by fluoroscopy.  Attempted RCA intervention first. Unable to cross lesion with Runthrough wire or Fielder XT, even with Caravel microcatheter support. A 6 Maldivian EBU 4.0 catheter was used for the planned left circumflex intervention.  We attempted to cross the lesion using a Runthrough wire, which was not successful.  We then used a Caravel microcatheter for further support, but were still unsuccessful in crossing the left circumflex lesion.  Wire escalation was then pursued, including a Fielder XT, a Saravanan Black, and Mongo wire, all of which were not successful in crossing the left circumflex lesion, even with microcatheter support. Accordingly, we pursued RCA intervention.  This time, we were able to successfully cross the RCA lesion using a Mongo wire with Caravel microcatheter support. The lesion was treated with a SAPPHIRE II PRO SC 1X15MM balloon inflated to high pressure. The lesion was then treated with an EMERGE MR 20 X 2.50MM device inflated to nominal pressure. We then exchanged the Mongo wire for a Runthrough workhorse wire over the Caravel microcatheter. IVUS was then performed to further assess the lesion and distal vessel. The vessel was diffusely diseased throughout. " "The Mid and Distal RCA was treated with overlapping SYNERGY XD 2.52H43KZ and SYNERGY XD 3.0X48MM drug-eluting stents. IVUS was then performed once again for assessment of stent expansion and apposition. Post-dilatation was performed using NC EMERGE MR 3.5X30MM and NC EMERGE MR 4X20MM devices inflated to high pressure. Finally, the proximal vessel was treated with a SYNERGY XD 4.0X20MM drug-eluting stent. Following this intervention there was 0% residual stenosis and PATTY grade 3 flow in the distal vessel. Ultrasound-guided right common femoral venous access via modified Seldinger technique using a micropuncture kit. A 7 Nigerian sheath was inserted into the right common femoral vein. A right heart catheterization was being performed using a Cazadero-Simeon catheter. The peel-away Impella introducer sheath was removed, and the Impella sheath was advanced into the arteriotomy site, with adequate hemostasis achieved.  The stylet was removed from the adjustment side port, and an 0.035"J-wire was advanced to the level of the aortic root.  Using this wire as a marker, the Impella device was pulled back such that the radiopaque circular marker was at the level of the aortic valve.  Once the device was found to be at the appropriate level, this J-wire was removed.  Contrast injection was performed through the side port, showing adequate antegrade flow down the ipsilateral common femoral artery/SFA/popliteal artery.  The patient tolerated the procedure well, and there were no immediate complications.  The patient was transferred to the intensive care unit for further monitoring and management.  Findings: - Successful placement of Impella CP device from left femoral approach. - There is severe multi-vessel coronary artery disease. - Left Main has mild diffuse disease. - Mid Left Anterior Descending has serial lesions, up to 70-80% in severity. - Prox Left Circumflex is 100% occluded. Unable to cross lesion despite wire escalation and " microcatheter support. - Mid Right Coronary Artery is 100% occluded. The lesion was successfully treated with overlapping SYNERGY XD 4.0X20MM, SYNERGY XD 3.0X48MM, and SYNERGY XD 2.10B37IS drug-eluting stents. Post-dilatation was performed using a SYNERGY XD 4.0X20MM angioplasty balloon inflated to 12 gretel. Following intervention there was 0% residual stenosis and PATTY grade 3 flow in the distal vessel. - Intravascular Ultrasound (IVUS) was performed prior to intervention to further characterize the lesion, as well as post-stent deployment to ensure optimal stent apposition and expansion. - Aorto-iliac angiogram revealed moderate disease of R ileofemoral system. - At the conclusion of the case, contrast injection was performed through the Impella sheath side port, showing adequate antegrade flow down the ipsilateral common femoral artery/SFA/profunda artery. - RHC with elevated R sided filling pressures. RA 17 mmHg, RV 45/15 mmHg, PA 42/29 mmHg (mean 34 mmHg), PCWP 25 mmHg. - Transpulmonary gradient is 9 mmHg. - Cardiac Output/Index at 6.6/3.0, as calculated by Gregory equation on Levophed infusion and Impella CP support. - PVR is 1.4 Woods units - SVR is 550 dyne-sec*cm^-5 - Pulmonary Artery Pulsatility Index (Rafa) is 0.8 - Cardiac Power Output () is 0.91  Assessment/Plan: - Patient is a 79 y.o. male with a history of recent TURP, complicated by significant post-op bleeding, presents to OSH ED with dyspnea. Transferred to [facility] with worsening shock despite vasopressors. TTE showing EF 25%, severe MR, severe TR. Troponin elevated. Brought to cath lab. Underwent Impella placement/LHC/RHC as outlined above. RCA lesion treated. Unable to cross LCx lesion. 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 At this point, would continue Impella support and aggressive medical mgmt. Has non-culprit lesions that should be treated at a later date as well. Can consider CTS consult, especially given severe valvular disease. - Patient  was given a loading dose of clopidogrel 600 mg PO in the cath lab - Continue DAPT (aspirin + clopidogrel) for a minimum of 12 months and aspirin indefinitely thereafter - High intensity statin - Continue Aggrastat for 4 hours post-procedure - Arterial sheath remains place; plan is to remove when activated clotting time (ACT) less than 180 seconds. Bedrest for 4 hours after hemostasis is achieved. - Obtain transthoracic echocardiogram - Continuous bedrest while Impella in place - Therapeutic heparin administration per protocol - Antibiotics per protocol - Remaining mgmt per primary team/cardiology Lorne Coon MD Interventional Cardiology/Structural Heart Disease [facility]     CTA Chest Non-Coronary (PE Studies)  Result Date: 3/29/2025  EXAMINATION: CTA CHEST NON CORONARY (PE STUDIES) CLINICAL HISTORY: Pulmonary embolism (PE) suspected, unknown D-dimer;   shortness of breath, chest pain TECHNIQUE: Helically acquired images with axial, sagittal and coronal reformations were obtained from the thoracic inlet to the lung bases followingthe IV administration of contrast.  CTA timed for evaluation of the pulmonary arteries.  MIP images were performed. Automated tube current modulation, weight-based exposure dosing, and/or iterative reconstruction technique utilized to reach lowest reasonably achievable exposure rate. DLP: 708 mGy*cm COMPARISON: Chest radiograph 09/28/2021 FINDINGS: BASE OF NECK: No significant abnormality. AORTA: Aortic atherosclerosis. PULMONARY VASCULATURE: Pulmonary arteries enhance normally.  No evidence of pulmonary embolus. HEART: Left ventricle appears dilated.  There are coronary artery calcifications. ISIDRO/MEDIASTINUM: No enlarged lymph nodes by size criteria. AIRWAYS: Expiratory phase imaging with buckling of the posterior wall of the trachea and mainstem bronchi.  Bibasilar bronchial wall thickening. LUNGS/PLEURA: Bilateral septal thickening and ground-glass  opacities.  Trace pleural fluid. UPPER ABDOMEN: No abnormality of the partially imaged upper abdomen. THORACIC SOFT TISSUES: Unremarkable. BONES: No acute fracture. No suspicious lytic or sclerotic lesions.     1. Cardiomegaly with septal thickening, bronchial wall thickening and ground-glass densities suggesting interstitial edema 2. No evidence of pulmonary embolus 3. The preliminary and final reports are concordant. Electronically signed by: Juany Thompson Date:    03/29/2025 Time:    08:27    X-Ray Chest AP Portable  Result Date: 3/29/2025  EXAMINATION: XR CHEST AP PORTABLE CLINICAL HISTORY: Cardiac arrest, cause unspecified TECHNIQUE: One view COMPARISON: September 28, 2021. FINDINGS: Cardiopericardial silhouette enlarged appearance is similar.  Lungs are remarkable for mild to moderate vascular congestive changes.  Left basilar atelectasis.  No consolidation or pneumothorax.  Optimal intubation and the nasogastric tube traverses into the stomach.  Right IJ central line terminates about the cavoatrial junction.     Lungs vascular congestive changes. Electronically signed by: Melecio Cerna Date:    03/29/2025 Time:    08:20    ED US Echo  Result Date: 3/29/2025  Lizz Stubbs MD     3/31/2025 11:42 AM ED US Echo Date/Time: 3/29/2025 5:35 AM Performed by: Lizz Stubbs MD Authorized by: Lizz Sutbbs MD  Indication:  Chest pain, Hypotension, Dyspnea and Shock Identified Structures:   The pericardial sac, myocardium, and 4 chambers were identified with the following echocardiographic windows:  Subxiphoid and IVC Findings:   Pericardial Effusion:  Absent   Left Ventricle Ejection Fraction:  Severely reduced (<35%) IVC:   Diameter:  > 2cm   Collapsibility:  < 50%   Impression:  Diminished LV function   Charge?:  Yes         Assessment/Plan:    79-year-old male unknown to our group with a past medical history of BPH, CAD, PID, right JACY, HTN, obesity, GERD, tobacco use who presented to the ER 03/29/2025  complaining of shortness of breath.  He sustained a witnessed PEA arrest in the ER and ROSC achieved.  He ultimately had coronary angiogram with intervention to the right coronary artery and placement of an Impella.  Echo revealed EF 15-20%.  CV surgery was consulted but ultimately deemed patient not appropriate for CABG given multiple comorbidities.  He underwent high-risk PCI 04/10/2025 with 4 stents to LAD and removal of Impella.  He was extubated on 4/13/25 and downgraded from ICU on 4/14/25.     Patient has been on aspirin and plavix but held beginning 4/21 due to reports of hematuria, melena, and positive FOBT.  GI has been consulted for further evaluation.     Macrocytic anemia  Melena  + FOBT  Recent cardiac arrest and cardiogenic shock requiring Impella  Severe multivessel CAD  -s/p PCI, now on asa and plavix  6.   Hematuria   -Urology following      Given comorbidities and worsening clinical condition, risk > benefit for endoscopy at this time.  Hgb relatively stable.   Continue DAPT per cardiology  Continue ppi bid, will add carafate QID x 1 month  Monitor H/H and transfuse as needed to Hgb 8 in the setting of CAD   Monitor stools for bleeding  Ok for liquid diet from GI perspective      Case and plan discussed with Dr. Willi Shi, ROSAP-C

## 2025-04-23 NOTE — PROGRESS NOTES
[facility] Medicine Progress Note        Chief Complaint: Inpatient Follow-up     HPI:   Jon Conley is a 79 y.o. male with a past medical history of hypertension, hyperlipidemia, CAD, PAD, GERD, and BPH s/p TURP who presented to [facility] on 3/29/2025 transferred from [facility] for cardiology services.  Patient presented to outside facility for shortness of breath.  Patient was tachycardic and hypotensive with hemoglobin of 7.5, hematocrit 24.6, high sensitivity troponin 13,821, WBC 13.4, and CPK 7 O2.  Patient was given 500 mL normal saline bolus and heparin 5000 units and transferred to [facility] for higher level of care.  Patient has suffered PAE arrest requiring intubation and CPR with ROSC. Labs revealed WBC 14.03, hemoglobin 7.3, hematocrit 23, troponin 9.443, , BUN 16.2, creatinine 1.34, lactic acid 4.3, PT/INR 14.9/1.2. Patient underwent left heart catheterization with Impella placement on 03/29/2025.  LHC revealed multivessel coronary artery disease with 100% occluded proximal left circumflex, unable to cross with wire. Mid RCA was also noted to be 100 % occluded, treated with PTCA/TAD.  Patient was started on Levophed, heparin drip per protocol, and amiodarone.  Echo revealed EF 15-20% with grade 1 diastolic dysfunction.  CV surgery was consulted.  Patient was deemed not a surgical candidate for CABG secondary to multiple comorbidities.  Patient received 2 units packed red blood cells on 04/06.  Patient underwent high-risk PCI on 04/10/2025 with 4 stents in LAD.  Impella was removed.  Patient was extubated on 04/13/2025.  Patient was cleared for downgrade out of ICU on 04/14/2025 and was admitted to hospital medicine service.     4/16 Afebrile overnight. Resting comfortably in the bed. Doing well on 2 L. blood pressure stable. No family members at bedside. Bumex was added for diuresis purposes. CBGS within acceptable limits. P.o. intake is  inadequate. HGB and platelets stable. No new electrolyte abnormalities.   4/17 Had episode of Vtach on tele yesterday and again today, cardiology made aware. Patient denies any pain and asymptomatic. Worked with PT.   4/21 Noted to have gross hematuria on contreras, urology eval, recommending to hold aspirin and plavix is able, will get cardiology re-eval. Later in the afternoon, RN noted to have black stool, occult blood ordered, positive and GI consulted. Protonix BID added.   4/22 Urology irrigated contreras, now on mittens as patient more alterd and pulling on contreras. GI not planning any scopes at this time given risks, monitor HH    Interval Hx:     AF. Contreras in place and urine clearing. Discussed with two daughters at bedside. Hb has a slow downtrend, unclear if it will stabilize or continue to downtrend. Plan for CT AP with contrast for eval if hb continues to decline. Currently DNR, palliative on board, holding off on hospice at this time.     Case was discussed with patient's nurse and  on the floor.    Objective/physical exam:  General: In no acute distress, afebrile  Chest: Clear to auscultation bilaterally  Heart: RRR, +S1, S2, no appreciable murmur  Abdomen: Soft, nontender, BS +  MSK: Warm, no lower extremity edema, no clubbing or cyanosis  Neurologic: Alert, Cranial nerve II-XII intact    VITAL SIGNS: 24 HRS MIN & MAX LAST   Temp  Min: 97.5 °F (36.4 °C)  Max: 99.2 °F (37.3 °C) 98.1 °F (36.7 °C)   BP  Min: 79/50  Max: 124/67 (!) 96/55   Pulse  Min: 84  Max: 97  91   Resp  Min: 16  Max: 22 16   SpO2  Min: 96 %  Max: 97 % 96 %     I have reviewed the following labs:  Recent Labs   Lab 04/21/25  1223 04/21/25 2027 04/22/25  0503 04/22/25  1514 04/23/25  0611   WBC 11.29  --  9.07  --  8.90   RBC 3.57*  --  3.23*  --  3.02*   HGB 10.2*   < > 9.3* 9.0* 8.6*   HCT 33.2*   < > 31.1* 29.3* 28.7*   MCV 93.0  --  96.3*  --  95.0*   MCH 28.6  --  28.8  --  28.5   MCHC 30.7*  --  29.9*  --  30.0*   RDW 17.5*   --  17.5*  --  17.9*     --  290  --  274   MPV 10.1  --  10.2  --  10.6*    < > = values in this interval not displayed.     Recent Labs   Lab 04/17/25  0548 04/18/25  0428 04/20/25  1840 04/21/25  1223 04/22/25  0503 04/23/25  0611    142 143 144 143 143   K 4.1 4.1 3.7 4.0 3.7 4.1   * 114* 113* 115* 112* 114*   CO2 19* 22* 20* 22* 20* 21*   BUN 15.4 18.7 20.4 24.3 32.1* 32.0*   CREATININE 0.73 0.68* 0.80 0.88 1.06 0.83   CALCIUM 7.8* 8.2* 8.2* 8.7* 8.6* 8.6*   MG 2.20 2.30 2.20  --   --   --    ALBUMIN 2.5*  --   --   --   --  2.8*   ALKPHOS 85  --   --   --   --  89   ALT 14  --   --   --   --  8   AST 17  --   --   --   --  11   BILITOT 0.6  --   --   --   --  0.5     Microbiology Results (last 7 days)       ** No results found for the last 168 hours. **             See below for Radiology    Assessment/Plan:    Acute hypoxemic respiratory failure requiring intubation, extubated 04/13/2024   Cardiogenic shock requiring mechanical and chemical support   Impella placement 03/29/2025, removed 04/10/2025  Multivessel CAD  [facility] with PTCA/TAD x3 RCA (03/29/2025)   Status post high-risk PCI withPTCA/TAD X4 LAD on 4/10/25   Cardiopulmonary arrest   ICMO/EF 15%  Concern for GI bleed  Hematuria - recent urologic procedure, now on DAPT  BPH s/p recent TURP in [place] followed by clot evacuation and fulguration, around 3/2025  Normocytic anemia, chronic    History of hypertension, hyperlipidemia, CAD, PAD, GERD, and      Plan:   Urology eval, recommending to hold aspirin and plavix if possible, however unable to hold given recent high risk PCI.  Ortiz in place and urine clearing s/p irrigation 4/22  black stool note by RN 4/21, occult blood positive, on Protonix BID; GI added carafate QID x 1 month .  Hb has a slow downtrend, unclear if it will stabilize or continue to downtrend. Can plan for CT AP with contrast for eval if hb continues to decline.   Monitor HH, transfuse if HB<8 given CAD  Currently  DNR, palliative on board, holding off on hospice at this time.     In the setting of hematuria and mentation change and unable to obtain new urine sample with prior contreras in place, will start empiric ceftriaxone given hx of prior UTIs and mentation change, plan 3-5 days.  CT head 4/22 No acute intracranial findings identified. 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  Monitor I's and O's and electrolytes.  Continue telemetry.   Will defer Life Vest at this time Re: DNR     Palliative care following  Mobilize as tolerated.  Encourage IS use, pulmonary toileting   PT OT.  He needs SNF placement   other home medications were reviewed and renewed      Critical care note:  Critical care diagnosis: GI bleed needing protonix IV   Critical care interventions: Hands-on evaluation, review of labs/radiographs/records and discussion with patient and family if present  Critical care time spent: 35 minutes    Advanced Directives:   I spent 30 mins of face to face discussion regarding advanced directives and end of life planning which included the patient and patients family, who concluded, that they would like to remain DNR. The patient understands the seriousness of his/her condition and would like to make his/her end of life decision known.    VTE prophylaxis: Lovenox     Patient condition:  Guarded    Anticipated discharge and Disposition:         All diagnosis and differential diagnosis have been reviewed; assessment and plan has been documented; I have personally reviewed the labs and test results that are presently available; I have reviewed the patients medication list; I have reviewed the consulting providers response and recommendations. I have reviewed or attempted to review  medical records based upon their availability    All of the patient's questions have been  addressed and answered. Patient's is agreeable to the above stated plan. I will continue to monitor closely and make adjustments to medical management as needed.    Portions of this note dictated using EMR integrated voice recognition software, and may be subject to voice recognition errors not corrected at proofreading. Please contact writer for clarification if needed.   _____________________________________________________________________    Malnutrition Status:  Nutrition consulted. Most recent weight and BMI monitored-     Measurements:  Wt Readings from Last 1 Encounters:   04/20/25 48.4 kg (106 lb 9.6 oz)   Body mass index is 14.87 kg/m².    Patient has been screened and assessed by RD.    Malnutrition Type:  Context:    Level: other (see comments) (Does not meet criteria)    Malnutrition Characteristic Summary:  Weight Loss (Malnutrition): other (see comments) (Unable to assess)  Energy Intake (Malnutrition): other (see comments) (Unable to assess)    Interventions/Recommendations (treatment strategy):        Scheduled Med:   aspirin  81 mg Oral Daily    atorvastatin  40 mg Oral Daily    cefTRIAXone (Rocephin) IV (PEDS and ADULTS)  1 g Intravenous Q24H    clopidogreL  75 mg Oral Daily    dapagliflozin propanediol  10 mg Oral Daily    LIDOcaine  1 patch Transdermal Daily    metoprolol succinate  50 mg Oral Daily    pantoprazole  40 mg Intravenous BID    senna-docusate  1 tablet Oral Daily    sucralfate  1 g Oral QID (AC & HS)    zinc oxide-cod liver oil   Topical (Top) BID      Continuous Infusions:   tirofiban-0.9% sodium chloride 12.5 mg/250ml    Continuous PRN   Stopped at 03/29/25 1845      PRN Meds:    Current Facility-Administered Medications:     acetaminophen, 650 mg, Oral, Q4H PRN    bisacodyL, 10 mg, Rectal, Daily PRN    dextrose 50%, 12.5 g, Intravenous, PRN    dextrose 50%, 25 g, Intravenous, PRN    glucagon  (human recombinant), 1 mg, Intramuscular, PRN    glucose, 16 g, Oral, PRN    glucose, 24 g, Oral, PRN    hydrALAZINE, 10 mg, Intravenous, Q4H PRN    ondansetron, 8 mg, Oral, Q8H PRN    polyethylene glycol, 17 g, Oral, BID PRN    tirofiban-0.9% sodium chloride 12.5 mg/250ml, , , Continuous PRN     Radiology:  I have personally reviewed the following imaging and agree with the radiologist.     CT Head Without Contrast  Narrative: EXAMINATION:  CT HEAD WITHOUT CONTRAST    CLINICAL HISTORY:  Mental status change, unknown cause;    TECHNIQUE:  Sequential axial images were performed of the brain without contrast.    Dose product length of 1119 mGycm. Automated exposure control was utilized to minimize radiation dose.    COMPARISON:  None available.    FINDINGS:  There is no intracranial mass effect, midline shift, hydrocephalus or hemorrhage. There is no sulcal effacement or low attenuation changes to suggest recent large vessel territory infarction.  There is left basal ganglia old lacunar infarct.  Chronic microvascular ischemic changes are mild. The ventricular system and sulcal markings prominence is consistent with atrophy. There is no acute extra axial fluid collection. Visualized paranasal sinuses are clear without mucosal thickening, polypoidal abnormality or air-fluid levels. Mastoid air cells aeration is optimal.  Impression: 1.  No acute intracranial findings identified.    2.  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    Electronically signed by: Melecio Cerna  Date:    04/22/2025  Time:    19:37      Elgin Stark MD  Department of Hospital Medicine   [facility]   04/23/2025

## 2025-04-23 NOTE — CARE UPDATE
[id number] Palliative team me with pt's sister, Kimmy to discuss dc plan. Kimmy informed the team she is awaiting GI eval and recs before making a decision re hospice. Her plans is to discuss with her sister after GI eval. CM to follow    Reviewed GI note with hospitalist. GI is not recommending any further tx. Message Vianey with palliative and informed her that GI has no recs for pt. She is planning to meet with Kimmy, pt's dgtr or maybe call her to discuss goals for pt

## 2025-04-23 NOTE — PT/OT/SLP PROGRESS
Physical Therapy Treatment    Patient Name:  Jon Conley   MRN:  [id number]    Recommendations:     Discharge therapy intensity: Moderate Intensity Therapy   Discharge Equipment Recommendations: to be determined by next level of care  Barriers to discharge: Impaired mobility    Assessment:     Jon Conley is a 79 y.o. male admitted with a medical diagnosis of SOB, cardiac arrest, s/p LHC and impella on 3/29, s/p high risk PCI with 4 stents in LAD on 4/10; acute respiratory failure requiring intubation 3/29 with extubation 4/13 .  He presents with the following impairments/functional limitations: weakness, gait instability, impaired balance, impaired endurance, decreased safety awareness, impaired functional mobility.    Pt know to have soft BP. Attempted to find BP machine prior to session however none found. Pt reports dizziness throughout session however presented with on other symptoms.     Rehab Prognosis: Good; patient would benefit from acute skilled PT services to address these deficits and reach maximum level of function.    Recent Surgery: Procedure(s) (LRB):  Left heart cath (N/A) 13 Days Post-Op    Plan:     During this hospitalization, patient would benefit from acute PT services 5 x/week to address the identified rehab impairments via gait training, therapeutic activities, therapeutic exercises and progress toward the following goals:    Plan of Care Expires:  05/15/25    Subjective     Chief Complaint: dizziness  Patient/Family Comments/goals: none stated  Pain/Comfort:         Objective:     Communicated with NSG prior to session.  Patient found right sidelying with pulse ox (continuous), telemetry, contreras catheter, SCD, pressure relief boots, bed alarm, peripheral IV upon PT entry to room.     General Precautions: Standard, fall  Orthopedic Precautions: N/A  Braces: N/A  Respiratory Status: Room air SpO2: 97%  Blood Pressure: unable to assess  Skin Integrity: Visible skin  intact      Functional Mobility:  Bed Mobility:     Supine to Sit: maximal assistance and of 2 persons  Sit to Supine: maximal assistance and of 2 persons  Transfers:     Sit to Stand:  maximal assistance and of 2 persons with rolling walker  Balance: Static Sitting: CGA-ModA     Therapeutic Activities/Exercises:  Ankle pumps 2 x 10 reps  Knee ext 2 x 10 reps    Pt sitting EOB for ~8-10 mins performing exercises, grooming task with mostly CGA for sitting balance. Pt performed x1 sit to stand.       Patient left  semi-supine  with all lines intact, call button in reach, nurse notified, and MD x2 and  present.    GOALS:   Multidisciplinary Problems       Physical Therapy Goals          Problem: Physical Therapy    Goal Priority Disciplines Outcome Interventions   Physical Therapy Goal     PT, PT/OT Progressing    Description: Goals to be met by: 5/15/25     Patient will increase functional independence with mobility by performin. Supine to sit with MInimal Assistance  2. Sit to supine with MInimal Assistance  3. Sit to stand transfer with Moderate Assistance  4. Bed to chair transfer with Moderate Assistance using Rolling Walker  5. Sitting at edge of bed x20 minutes with Stand-by Assistance                         Time Tracking:     PT Received On: 25  PT Start Time: 1524     PT Stop Time: 1550  PT Total Time (min): 26 min     Billable Minutes: Therapeutic Activity 2 units    Treatment Type: Treatment  PT/PTA: PTA     Number of PTA visits since last PT visit: 2025

## 2025-04-23 NOTE — PT/OT/SLP PROGRESS
Occupational Therapy   Treatment    Name: Jon Conley  MRN: [id number]  Admitting Diagnosis:  NSTEMI (non-ST elevated myocardial infarction)  13 Days Post-Op    Recommendations:     Recommended therapy intensity at discharge: Moderate Intensity Therapy   Discharge Equipment Recommendations:  to be determined by next level of care  Barriers to discharge:   (ongoing medical needs)    Assessment:     Jon Conley is a 79 y.o. male with a medical diagnosis of SOB, cardiac arrest, s/p LHC and impella on 3/29, s/p high risk PCI with 4 stents in LAD on 4/10; acute respiratory failure requiring intubation 3/29 with extubation 4/13.    Performance deficits affecting function are weakness, impaired endurance, impaired self care skills, impaired functional mobility, impaired balance, impaired cognition.     **BP machine could not be located on unit during this patient's session due to CNAs rounding during time of session**    Patient required max A x2 throughout session for all mobility and total A for toileting and min A for grooming. Patient did experience dizziness with positional changes but did not report light-headedness or visual deficits. Patient remained oriented throughout session.      Rehab Prognosis:  Good; patient would benefit from acute skilled OT services to address these deficits and reach maximum level of function.       Plan:     Patient to be seen 5 x/week to address the above listed problems via self-care/home management, therapeutic activities  Plan of Care Expires: 05/15/25  Plan of Care Reviewed with: patient, daughter    Subjective     Pain/Comfort:  Pain Rating 1: 0/10    Objective:     Communicated with: nurse prior to session.  Patient found HOB elevated with peripheral IV, telemetry, pulse ox (continuous), oxygen, contreras catheter upon OT entry to room.    General Precautions: Standard, fall    Orthopedic Precautions:N/A  Braces: N/A  Respiratory Status: Room air     Occupational  Performance:     Bed Mobility:    Patient completed Rolling/Turning to Left with  maximal assistance  Patient completed Rolling/Turning to Right with maximal assistance  Patient completed Supine to Sit with maximal assistance and 2 persons  Patient completed Sit to Supine with maximal assistance and 2 persons     Functional Mobility/Transfers:  Patient completed Sit <> Stand Transfer with maximal assistance and of 2 persons  with  rolling walker   Functional Mobility: unable to fully clear bed    Activities of Daily Living:  Grooming: minimum assistance oral care with sponge  Lower Body Dressing: total assistance carlito/adjust socks  Toileting: total assistance ongoing BM at end of session; hygiene performed in sidelying    [facility] 6 Click ADL: 12    Patient Education:  Patient and daughter/s were provided with verbal education education regarding OT role/goals/POC, fall prevention, safety awareness, and Discharge/DME recommendations.  Understanding was verbalized, however additional teaching warranted.      Patient left HOB elevated with all lines intact, call button in reach, bed alarm on, nurse notified, and daughters and  present.    GOALS:   Multidisciplinary Problems       Occupational Therapy Goals          Problem: Occupational Therapy    Goal Priority Disciplines Outcome Interventions   Occupational Therapy Goal     OT, PT/OT Progressing    Description: LTG: Pt will perform basic ADLs and ADL transfers with Modified independence using LRAD by discharge.    STG: to be met by 5/15/25    Pt will complete grooming standing at sink with LRAD with SBA.  Pt will complete UB dressing with SBA.  Pt will complete LB dressing with SBA using LRAD and AE prn.  Pt will complete toileting with SBA using LRAD.  Pt will complete functional mobility to/from toilet and toilet transfer with SBA using LRAD.                        Time Tracking:     OT Date of Treatment: 04/23/25  OT Start Time: 1528  OT Stop Time: 1551  OT  Total Time (min): 23 min    Billable Minutes:Self Care/Home Management 23    OT/OLIVIA: OT     Number of OLIVIA visits since last OT visit: 3    4/23/2025

## 2025-04-24 NOTE — PLAN OF CARE
Problem: Infection  Goal: Absence of Infection Signs and Symptoms  Outcome: Progressing     Problem: Adult Inpatient Plan of Care  Goal: Plan of Care Review  Outcome: Progressing  Goal: Patient-Specific Goal (Individualized)  Outcome: Progressing  Goal: Absence of Hospital-Acquired Illness or Injury  Outcome: Progressing  Goal: Optimal Comfort and Wellbeing  Outcome: Progressing  Goal: Readiness for Transition of Care  Outcome: Progressing     Problem: Artificial Airway  Goal: Effective Communication  Outcome: Progressing  Goal: Optimal Device Function  Outcome: Progressing  Goal: Absence of Device-Related Skin or Tissue Injury  Outcome: Progressing

## 2025-04-24 NOTE — PT/OT/SLP PROGRESS
[facility]  Speech Language Pathology Department  Dysphagia Therapy Progress Note    Patient Name:  Jon Conley   MRN:  [id number]    Recommendations     General recommendations:  dysphagia therapy  Solid texture recommendation:  Puree Diet - IDDSI Level 4  Liquid consistency recommendation: Moderately thick liquids - IDDSI Level 3   Medications: crushed in puree  Aspiration precautions: small bites/sips, slow rate, and upright for PO intake    Discharge therapy intensity: Moderate Intensity Therapy   Barriers to safe discharge:  severity of impairment and level of skilled assistance needed    Subjective     Patient awake, alert, and cooperative.  Spiritual/Cultural/Orthodox Beliefs/Practices that affect care: no    Pain/Comfort: Pain Rating 1: 0/10    Objective     RN reports difficulty with meds crushed in puree. Requested ST visit patient for re-assessment of swallowing.    Therapeutic PO Trials:  Consistency Amount Fed By Oral Symptoms Pharyngeal Symptoms   Moderately thick liquid by straw Multiple trials SLP None None   Puree Multiple trials SLP None None     Assessment     Continue current PO diet per previous ST recommendations. ST to continue to follow for dysphagia tx.    Outcome Measures     Functional Oral Intake Scale: 5 - Total oral diet with multiple consistencies, by requiring special preparation or compensations    Goals     Multidisciplinary Problems       SLP Goals          Problem: SLP    Goal Priority Disciplines Outcome   SLP Goal     SLP    Description: LTG:  Pt will tolerate least restrictive diet with no clinical s/sx of aspiration.    STGs:  1. Tolerate half meal of soft/bite-sized consistency with no overt s/sx of aspiration, should dentures be brought to hospital.  2.  Perform BOT and LE exercises.  3.  Perform effortful swallow (ice chips ok for this exercise).                     Patient Education     Patient and family were provided with verbal  education regarding ST POC.  Understanding was verbalized, however additional teaching warranted.    Plan     Will continue to follow and tx as appropriate.    SLP Follow-Up:  Yes   Patient to be seen:  5 x/week   Plan of Care expires:  04/27/25  Plan of Care reviewed with:  patient, family       Time Tracking     SLP Treatment Date:   04/24/25  Speech Start Time:  1620  Speech Stop Time:  1630     Speech Total Time (min):  10 min    Billable minutes:  Treatment of Swallow Dysfunction, 10 minutes       04/24/2025

## 2025-04-24 NOTE — PLAN OF CARE
TEJ sent referrals to:    Mikayla Norris at the [facility]  [place]    No preference at this time.

## 2025-04-24 NOTE — PROGRESS NOTES
.UROLOGY  PROGRESS  NOTE    Jon Conley 1945  [id number]  4/24/2025    Patient sleeping during rounds this morning, no family at bedside. Labs reviewed. 450 ml UO overnight.       Exam:    NAD  Card RRR  Resp unlabored  Abd soft, NTND   light georgina urine draining to  bag without CBI  Extremity no C/C/E      Recent Results (from the past 24 hours)   CBC Without Differential    Collection Time: 04/24/25  7:12 AM   Result Value Ref Range    WBC 6.71 4.50 - 11.50 x10(3)/mcL    RBC 3.41 (L) 4.70 - 6.10 x10(6)/mcL    Hgb 9.8 (L) 14.0 - 18.0 g/dL    Hct 32.6 (L) 42.0 - 52.0 %    MCV 95.6 (H) 80.0 - 94.0 fL    MCH 28.7 27.0 - 31.0 pg    MCHC 30.1 (L) 33.0 - 36.0 g/dL    RDW 17.8 (H) 11.5 - 17.0 %    Platelet 274 130 - 400 x10(3)/mcL    MPV 10.8 (H) 7.4 - 10.4 fL    NRBC% 0.0 %   Comprehensive Metabolic Panel    Collection Time: 04/24/25  7:12 AM   Result Value Ref Range    Sodium 142 136 - 145 mmol/L    Potassium 3.6 3.5 - 5.1 mmol/L    Chloride 111 (H) 98 - 107 mmol/L    CO2 23 23 - 31 mmol/L    Glucose 78 (L) 82 - 115 mg/dL    Blood Urea Nitrogen 31.9 (H) 8.4 - 25.7 mg/dL    Creatinine 0.82 0.72 - 1.25 mg/dL    Calcium 8.8 8.8 - 10.0 mg/dL    Protein Total 7.3 5.8 - 7.6 gm/dL    Albumin 3.0 (L) 3.4 - 4.8 g/dL    Globulin 4.3 (H) 2.4 - 3.5 gm/dL    Albumin/Globulin Ratio 0.7 (L) 1.1 - 2.0 ratio    Bilirubin Total 0.5 <=1.5 mg/dL    ALP 96 40 - 150 unit/L    ALT 7 0 - 55 unit/L    AST 11 11 - 45 unit/L    eGFR >60 mL/min/1.73/m2    Anion Gap 8.0 mEq/L    BUN/Creatinine Ratio 39    Magnesium    Collection Time: 04/24/25  7:12 AM   Result Value Ref Range    Magnesium Level 2.40 1.60 - 2.60 mg/dL         Assessment:  Gross hematuria  -resolved  -s/p TURP 6 weeks ago, cytso clot evac and fulguration 3 weeks ago  -H&H 9.8 & 32.6      Plan:  Ok to clamp irrigation port. Can be discharged from a  standpoint with Ortiz in place. He needs to follow up with his primary Urologist Dr. López with in the next few weeks.      Estelle Crisostomo, ROSAP

## 2025-04-24 NOTE — CARE UPDATE
[id number] Rec call from Michelle with [facility] re a referral. She asked I fax the referral to her at [phone]. Referral faxed to Michelle at [phone]    Called Michelle with [facility] who informs me they will not be able to accept pt due to his insurance. She did instruct pt's dgtr, Sarah of other NH in the area.     Rec message from Vianey Gan NP who reported the dgtr would like referrals be sent to SNF in [facility], and [place]. Joseph BURNHAM will assist in sending the referrals.

## 2025-04-24 NOTE — PT/OT/SLP RE-EVAL
Physical Therapy Re-Evaluation    Patient Name:  Jon Conley   MRN:  [id number]    Recommendations:     Discharge therapy intensity: Moderate Intensity Therapy   Discharge Equipment Recommendations: to be determined by next level of care   Barriers to discharge: Impaired mobility and Ongoing medical needs    Assessment:     Jon Conley is a 79 y.o. male admitted with a medical diagnosis of SOB, cardiac arrest, s/p LHC and impella on 3/29, s/p high risk PCI with 4 stents in LAD on 4/10; acute respiratory failure requiring intubation 3/29 with extubation 4/13.  He presents with the following impairments/functional limitations: weakness, gait instability, impaired balance, impaired endurance, decreased safety awareness, impaired functional mobility.    Pt's functional mobility continues to be limited c/o lightheadedness and hypotension. Pt completed supine to sit with Mod A, sit to stand transfers with mod Ax2 with RW, and pre-gait activities with mod A x2 with RW.  Pt performed standing marches and 3 lateral side steps at EOB and then c/o of lightheaded & fatigue requesting to lay back down. BP assessed throughout session - see objective section. Continuing to recommend moderate intensity therapy upon discharge to improve functional mobility and activity tolerance since patient was independent prior to admit.     Rehab Prognosis: Good; patient would benefit from acute skilled PT services to address these deficits and reach maximum level of function.    Recent Surgery: Procedure(s) (LRB):  Left heart cath (N/A) 14 Days Post-Op    Plan:     During this hospitalization, patient would benefit from acute PT services 5 x/week to address the identified rehab impairments via gait training, therapeutic activities, therapeutic exercises and progress toward the following goals:    Plan of Care Expires:  05/24/25    PT/PTA conference to discuss PT POC and patient's progression towards goals held with Dior  MERON Gleason.     Subjective     Chief Complaint: lightheadedness   Patient/Family Comments/goals: to walk  Pain/Comfort:  Pain Rating 1: 0/10    Patients cultural, spiritual, Lutheran conflicts given the current situation: no    Objective:     Communicated with nsg prior to session.  Patient found HOB elevated with pulse ox (continuous), pressure relief boots, contreras catheter, telemetry, peripheral IV (CBI)  upon PT entry to room.    General Precautions: Standard, fall  Orthopedic Precautions:N/A   Braces: N/A  Respiratory Status: Room air  Blood Pressure:   101/55 prior to mobility   90/49 sitting EOB   113/54 after performing seated LE exercises   97/57 at end of session (after complaints of lightheadedness)      Functional Mobility:  Bed Mobility:     Supine to Sit: moderate assistance   Sit to Supine: moderate assistance and of 2 persons  Transfers:     Sit to Stand: MOD A x2  with RW  Pre-Gait:   Pt completed standing alternating marches 5 reps each leg and then took 3 lateral steps towards HOB; Mod A x2 with RW.   Pt then with c/o of lightheadedness and requesting to lay back down; attempted to take BP while standing but unable to do so due to increased symptoms. Pt returned back to bed and BP assessed.       AM-PAC 6 CLICK MOBILITY  Total Score:12       Treatment & Education:      Patient provided with verbal education education regarding PT role/goals/POC, fall prevention, and safety awareness.  Understanding was verbalized, however additional teaching warranted.     Patient left HOB elevated with all lines intact, call button in reach, pressure relief boots, and nsg notified.    GOALS:   Multidisciplinary Problems       Physical Therapy Goals          Problem: Physical Therapy    Goal Priority Disciplines Outcome Interventions   Physical Therapy Goal     PT, PT/OT Progressing    Description: Goals to be met by: 5/15/25     Patient will increase functional independence with mobility by performin.  Supine to sit with MInimal Assistance  2. Sit to supine with MInimal Assistance  3. Sit to stand transfer with Moderate Assistance  4. Bed to chair transfer with Moderate Assistance using Rolling Walker  5. Sitting at edge of bed x20 minutes with Stand-by Assistance                         History:     No past medical history on file.    Past Surgical History:   Procedure Laterality Date    CATHETERIZATION OF BOTH LEFT AND RIGHT HEART N/A 3/29/2025    Procedure: CATHETERIZATION, HEART, BOTH LEFT AND RIGHT;  Surgeon: Lorne Coon Jr., MD;  Location: [facility] CATH LAB;  Service: Cardiology;  Laterality: N/A;    LEFT HEART CATHETERIZATION N/A 4/10/2025    Procedure: Left heart cath;  Surgeon: Lorne Coon Jr., MD;  Location: [facility] CATH LAB;  Service: Cardiology;  Laterality: N/A;       Time Tracking:     PT Received On: 04/24/25  PT Start Time: 1445     PT Stop Time: 1511  PT Total Time (min): 26 min     Billable Minutes: Re-eval 10 and Therapeutic Activity 16      04/24/2025

## 2025-04-24 NOTE — PROGRESS NOTES
Inpatient Nutrition Assessment    Admit Date: 3/29/2025   Total duration of encounter: 26 days   Patient Age: 79 y.o.    Nutrition Recommendation/Prescription     Diet advancement per MD, currently on clear liquid diet with moderately thick liquids.   -Texture modifications per SLP   Encouragement and assistance with meals.  Boost Very High Calorie (provides 530 kcal, 22 g protein per serving) TID.   -Add 1 packet of moderately thick thickener   Bowel regimen as feasible.   Consider appetite stimulant s/t poor oral intake.   If aggressive nutrition support warranted, consider enteral access device placement for supplemental nutrition. Consult RD for TF recommendations.      Communication of Recommendations: reviewed with nurse and reviewed with patient    Nutrition Assessment     Malnutrition Assessment/Nutrition-Focused Physical Exam  4/24/25 updated assessment; need updated weight     Malnutrition Level: other (see comments) (Unable to assess) (04/24/25 1101)  Energy Intake (Malnutrition): less than or equal to 50% for greater than or equal to 5 days (04/24/25 1101)  Weight Loss (Malnutrition): other (see comments) (Unable to assess) (04/24/25 1101)                                         Fluid Accumulation (Malnutrition): mild (04/24/25 1101)     Hand  Strength, Right (Malnutrition): Unable to assess (04/24/25 1101)  A minimum of two characteristics is recommended for diagnosis of either severe or non-severe malnutrition.    Chart Review    Reason Seen: continuous nutrition monitoring and follow-up    Malnutrition Screening Tool Results   Have you recently lost weight without trying?: No  Have you been eating poorly because of a decreased appetite?: No   MST Score: 0   Diagnosis:  Acute hypoxemic respiratory failure requiring intubation, extubated 04/13/2024   Cardiogenic shock requiring mechanical and chemical support   Impella placement 03/29/2025, removed 04/10/2025  Multivessel CAD  LHC with PTCA/TAD x3  RCA (03/29/2025)   Status post high-risk PCI withPTCA/TAD X4 LAD on 4/10/25   Cardiopulmonary arrest   ICMO/EF 15%  Concern for GI bleed  Hematuria - recent urologic procedure, now on DAPT  BPH s/p recent TURP in [place] followed by clot evacuation and fulguration, around 3/2025  Normocytic anemia, chronic    Relevant Medical History: CAD, HTN, HLD, PAD, GERD    Scheduled Medications:  aspirin, 81 mg, Daily  atorvastatin, 40 mg, Daily  cefTRIAXone (Rocephin) IV (PEDS and ADULTS), 1 g, Q24H  clopidogreL, 75 mg, Daily  dapagliflozin propanediol, 10 mg, Daily  LIDOcaine, 1 patch, Daily  metoprolol succinate, 50 mg, Daily  pantoprazole, 40 mg, BID  senna-docusate, 1 tablet, Daily  sucralfate, 1 g, QID (AC & HS)  zinc oxide-cod liver oil, , BID    Continuous Infusions:  tirofiban-0.9% sodium chloride 12.5 mg/250ml, Last Rate: Stopped (03/29/25 1845)    PRN Medications:  acetaminophen, 650 mg, Q4H PRN  bisacodyL, 10 mg, Daily PRN  dextrose 50%, 12.5 g, PRN  dextrose 50%, 25 g, PRN  glucagon (human recombinant), 1 mg, PRN  glucose, 16 g, PRN  glucose, 24 g, PRN  hydrALAZINE, 10 mg, Q4H PRN  ondansetron, 8 mg, Q8H PRN  polyethylene glycol, 17 g, BID PRN  tirofiban-0.9% sodium chloride 12.5 mg/250ml, , Continuous PRN    Calorie Containing IV Medications: no significant kcals from medications at this time    Recent Labs   Lab 04/18/25  0428 04/20/25  1840 04/21/25  1223 04/21/25  2027 04/22/25  0503 04/22/25  1514 04/23/25  0611 04/23/25  1300 04/24/25  0712    143 144  --  143  --  143  --  142   K 4.1 3.7 4.0  --  3.7  --  4.1  --  3.6   CALCIUM 8.2* 8.2* 8.7*  --  8.6*  --  8.6*  --  8.8   MG 2.30 2.20  --   --   --   --   --   --  2.40   * 113* 115*  --  112*  --  114*  --  111*   CO2 22* 20* 22*  --  20*  --  21*  --  23   BUN 18.7 20.4 24.3  --  32.1*  --  32.0*  --  31.9*   CREATININE 0.68* 0.80 0.88  --  1.06  --  0.83  --  0.82   EGFRNORACEVR >60 >60 >60  --  >60  --  >60  --  >60   GLUCOSE 73* 102 118*   --  125*  --  94  --  78*   BILITOT  --   --   --   --   --   --  0.5  --  0.5   ALKPHOS  --   --   --   --   --   --  89  --  96   ALT  --   --   --   --   --   --  8  --  7   AST  --   --   --   --   --   --  11  --  11   ALBUMIN  --   --   --   --   --   --  2.8*  --  3.0*   AMMONIA  --   --   --   --   --   --   --  28.1  --    WBC  --  8.28 11.29  --  9.07  --  8.90  --  6.71   HGB  --  9.8* 10.2* 9.4* 9.3* 9.0* 8.6*  --  9.8*   HCT  --  31.6* 33.2* 30.6* 31.1* 29.3* 28.7*  --  32.6*     Nutrition Orders:  Diet Clear Liquid Moderately Thick Liquids (IDDSI Level 3); Standard Tray  Dietary nutrition supplements TID; Boost Very High Calorie Nutritional Drink - Vanilla    Appetite/Oral Intake: poor/0-25% of meals  Factors Affecting Nutritional Intake: decreased appetite and food textures/preferences  Social Needs Impacting Access to Food: unable to assess at this time; will attempt on follow-up  Food/Buddhism/Cultural Preferences: unable to obtain  Food Allergies: no known food allergies  Last Bowel Movement: 04/23/25  Wound(s):     Wound 04/10/25 1800 Shearing Right Buttocks-Tissue loss description: Not applicable       Wound 04/10/25 1800 Skin Tear Right lower Arm-Tissue loss description: Partial thickness      Comments    3/31/25: Discussed with RN. Will provide tube feeding recommendations for when appropriate to start tube feeding. Receiving kcal from meds.  Currently with Impella in place, HOB flat. May need to place in reverse Trendelenburg to start TF.     4/1/25: Possible plans for starting trickle feeds today. Still receiving kcal from meds.     4/2/25: No TF yet. Still receiving kcal from meds.     4/3/25: No TF plans at this time. Plans for CABG tomorrow with plans for extubation per protocol post-op.     4/7/25: No CABG done. Possible plans for removal of Impella today. TF to start post procedure per RN.     4/8/25: Plans for Impella removal tomorrow. TF to start today.     4/10/25: Plans for  "procedure today. TF on hold. Previously tolerated per RN.     4/14/25: Pt now extubated, on po diet. 25-50% po intake of meals per RN. Only taking small bites at this time. Attempted to verify subjective info with pt, pt [place] and not understanding questions. Was willing to have ONS sent. Also add Ousmane due to pressure ulcer.     4/17/25: Reports not being hungry. Ate 0% breakfast and lunch. Did drink almost 100% Boost this morning with medications. Will continue Boost Plus with breakfast, discussed thickening instructions with RN. Trial Magic Cup with lunch and dinner. Re-add Ousmane for wound healing once oral intake improves. Last BM 4/11.    4/21/25: Intake of meals remains inadequate. States po intake is dependent on meal. Does likes Boost, drinks when encouraged. Noted Magic Cup discontinued over the weekend by RN, maybe pt did not like? Will increase Boost back to every meal.     4/24/25: Poor intake of meals, mainly drinking Boost and water. Will change supplement to Boost VHC, discussed thickening instructions with RN. No GI complaints.     Anthropometrics    Height: 5' 10.98" (180.3 cm), Height Method: Stated  Last Weight: 48.4 kg (106 lb 9.6 oz) (04/20/25 0557), Weight Method: Bed Scale  BMI (Calculated): 14.9  BMI Classification: obese grade I (BMI 30-34.9)        Ideal Body Weight (IBW), Male: 171.88 lb     % Ideal Body Weight, Male (lb): 130.81 %                          Usual Weight Provided By: unable to obtain usual weight    Wt Readings from Last 5 Encounters:   04/20/25 48.4 kg (106 lb 9.6 oz)   12/02/21 101 kg (222 lb 10.6 oz)     Weight Change(s) Since Admission:   4/24/25 no updated weights   4/20/25 48.4kg, inaccurate weight, possibly 106kg?  Wt Readings from Last 1 Encounters:   04/20/25 0557 48.4 kg (106 lb 9.6 oz)   04/17/25 0617 106.6 kg (235 lb 0.2 oz)   03/29/25 1738 106.1 kg (233 lb 14.5 oz)   03/29/25 0404 102.1 kg (225 lb)   Admit Weight: 102.1 kg (225 lb) (03/29/25 7754), Weight Method: " Stated    Estimated Needs    Weight Used For Calorie Calculations: 106.1 kg (233 lb 14.5 oz)  Energy Calorie Requirements (kcal): 2337kcal (1.3 stress factor)  Energy Need Method: Prosser-St Jeor  Weight Used For Protein Calculations: 106.1 kg (233 lb 14.5 oz)  Protein Requirements: 117-138gm (1.1-1.3g/kg)  Fluid Requirements (mL): 2122ml (20ml/kg)  CHO Requirement: 260gm (45% est kcal needs)     Enteral Nutrition     Patient not receiving enteral nutrition at this time.    Parenteral Nutrition     Patient not receiving parenteral nutrition support at this time.    Evaluation of Received Nutrient Intake    Calories: not meeting estimated needs  Protein: not meeting estimated needs    Patient Education     Not applicable.    Nutrition Diagnosis     PES: Inadequate oral intake related to acute illness as evidenced by 25-50% po intake of meals since diet advanced. (active)     PES:            Nutrition Interventions     Intervention(s): modified composition of meals/snacks, commercial beverage, prescription medication, and collaboration with other providers  Intervention(s): Oral diet/nutrient modifications;Oral nutritional supplement;Feeding assistance/management    Goal: Meet greater than 80% of nutritional needs by follow-up. (goal not met)  Goal: Tolerate enteral feeding at goal rate by follow-up. (goal discontinued)    Nutrition Goals & Monitoring     Dietitian will monitor: food and beverage intake, energy intake, weight change, beliefs/attitudes, and gastrointestinal profile  Discharge planning:  cardiac diet with texture modifications per SLP  Nutrition Risk/Follow-Up: high (follow-up in 1-4 days)   Please consult if re-assessment needed sooner.

## 2025-04-24 NOTE — PROGRESS NOTES
Patient Name: Jon Conley   MRN: [id number]   Admission Date: 3/29/2025   Hospital Length of Stay: 26   Attending Provider: Elgin Stark MD   Consulting Provider: ANDREW Burch  Reason for Consult: Goals of Care  Primary Care Physician:  Patrick Reece Jr., MD     Principal Problem: NSTEMI (non-ST elevated myocardial infarction)     Patient information was obtained from patient, relative(s), and ER records.     Final diagnoses:  [R57.0] Cardiogenic shock  [I46.9] Cardiac arrest  [I46.9] Cardiac arrest - line placement  [I46.9] Cardiopulmonary arrest  [I48.91] Atrial fibrillation with rapid ventricular response  [I21.9] Acute myocardial infarction, unspecified MI type, unspecified artery      Assessment/Plan:     I reviewed the patient and family's understanding of the seriousness of the illness and its expected prognosis. We discussed the patient's goals of care and treatment preferences. I identified the surrogate decision maker or health care POA to be his two daughters, Kimmy and Sarah. I answered all questions and we formulated a plan including recommendations for symptom management and how to best achieve goals of care.       Patient is resting comfortably in bed on RA. He denies complaints at present. Attempted to call daughter Kimmy, with no answer. Spoke with daughter Sarah via telephone. Provided update on clinical condition and hemoglobin this morning. She confirms that their current goal is to seek rehab services with SNF placement and improve functional status to new baseline. States they want to continue treatment at present, but understand he remains at high risk for decompensation or decline given multiple comorbidities. They would consider invasive procedures if needed, based on risk/benefit and patient's condition at that time. She asks about nursing home/rehab placements given patient's insurance. States he has been denied by some choices and asks if case management can assist in a  list of facilities that accept his insurance within the [facility] area. Informed her that I would ask case management. Offered support. Encouraged to call with questions or concerns. Palliative Medicine will continue to follow. Discussed with primary nurse and case management.    Advance Care Planning     Date: 04/24/2025    [facility]  I engaged the patient and family in a voluntary conversation about advance care planning and we specifically addressed what the goals of care would be moving forward, in light of the patient's change in clinical status, specifically current condition.  We did specifically address the patient's likely prognosis, which is fair .  We explored the patient's values and preferences for future care.  The patient and family endorses that what is most important right now is to focus on remaining as independent as possible, symptom/pain control, and improvement in condition but with limits to invasive therapies    Accordingly, we have decided that the best plan to meet the patient's goals includes continuing with treatment       Interval History:     Developed hypotension yesterday afternoon.  Per hospital medicine note plans for CT abdomen pelvis if hemoglobin continues to downtrend.      Active Ambulatory Problems     Diagnosis Date Noted    No Active Ambulatory Problems     Resolved Ambulatory Problems     Diagnosis Date Noted    No Resolved Ambulatory Problems     No Additional Past Medical History        Past Surgical History:   Procedure Laterality Date    CATHETERIZATION OF BOTH LEFT AND RIGHT HEART N/A 3/29/2025    Procedure: CATHETERIZATION, HEART, BOTH LEFT AND RIGHT;  Surgeon: Lorne Coon Jr., MD;  Location: [facility] CATH LAB;  Service: Cardiology;  Laterality: N/A;    LEFT HEART CATHETERIZATION N/A 4/10/2025    Procedure: Left heart cath;  Surgeon: Lorne Coon Jr., MD;  Location: [facility] CATH LAB;  Service: Cardiology;  Laterality: N/A;        Review of  "patient's allergies indicates:  No Known Allergies     Current Medications[1]       Current Facility-Administered Medications:     acetaminophen, 650 mg, Oral, Q4H PRN    bisacodyL, 10 mg, Rectal, Daily PRN    dextrose 50%, 12.5 g, Intravenous, PRN    dextrose 50%, 25 g, Intravenous, PRN    glucagon (human recombinant), 1 mg, Intramuscular, PRN    glucose, 16 g, Oral, PRN    glucose, 24 g, Oral, PRN    hydrALAZINE, 10 mg, Intravenous, Q4H PRN    ondansetron, 8 mg, Oral, Q8H PRN    polyethylene glycol, 17 g, Oral, BID PRN    tirofiban-0.9% sodium chloride 12.5 mg/250ml, , , Continuous PRN     No family history on file.       Review of Systems   Constitutional: Negative.    Respiratory: Negative.     Cardiovascular: Negative.    Gastrointestinal: Negative.    Genitourinary: Negative.    Musculoskeletal: Negative.    Neurological:  Positive for weakness.            Objective:   /68   Pulse 90   Temp 97.3 °F (36.3 °C) (Oral)   Resp 15   Ht 5' 10.98" (1.803 m)   Wt 48.4 kg (106 lb 9.6 oz)   SpO2 97%   BMI 14.87 kg/m²      Physical Exam   Constitutional: No distress. He appears ill.   HENT:   Mouth/Throat: Mucous membranes are moist.   Cardiovascular: Normal rate. Pulmonary:      Effort: Pulmonary effort is normal. No respiratory distress.     Abdominal: He exhibits no distension.   Musculoskeletal:      Cervical back: Normal range of motion.      Right lower leg: No edema.      Left lower leg: No edema.   Neurological: He is alert.   Oriented to self   Skin: Skin is warm and dry.          Review of Symptoms  Review of Symptoms      Symptom Assessment (ESAS 0-10 Scale)  Pain:  0  Dyspnea:  0  Anxiety:  0  Nausea:  0  Depression:  0  Anorexia:  0  Fatigue:  0  Insomnia:  0  Restlessness:  0  Agitation:  0         Psychosocial/Cultural:   See Palliative Psychosocial Note: Yes  **Primary  to Follow**  Palliative Care  Consult: No      Advance Care Planning   Advance Directives: "     Decision Making:  Family answered questions  Goals of Care: What is most important right now is to focus on remaining as independent as possible, symptom/pain control, improvement in condition but with limits to invasive therapies, comfort and QOL . Accordingly, we have decided that the best plan to meet the patient's goals includes continuing with treatment.          PAINAD: NA    Caregiver burden formerly assessed: Yes    > 50% of 40 min of encounter was spent in chart review, face to face discussion of goals of care, symptom assessment, coordination of care and emotional support.    Vianey Gan, [facility]-BC  Palliative Medicine  [facility]         [1]   Current Facility-Administered Medications:     acetaminophen tablet 650 mg, 650 mg, Oral, Q4H PRN, Lorne Coon Jr., MD, 650 mg at 04/20/25 1350    aspirin EC tablet 81 mg, 81 mg, Oral, Daily, Elgin Stark MD, 81 mg at 04/23/25 1009    atorvastatin tablet 40 mg, 40 mg, Oral, Daily, Hasmukh Sandoval ANP, 40 mg at 04/23/25 1009    bisacodyL suppository 10 mg, 10 mg, Rectal, Daily PRN, Elgin Stark MD    cefTRIAXone injection 1 g, 1 g, Intravenous, Q24H, Elgin Stark MD, 1 g at 04/23/25 1404    clopidogreL tablet 75 mg, 75 mg, Oral, Daily, Elgin Stark MD, 75 mg at 04/23/25 1009    dapagliflozin propanediol (Farxiga) tablet 10 mg, 10 mg, Oral, Daily, Hasmukh Sandoval ANP, 10 mg at 04/23/25 1009    dextrose 50% injection 12.5 g, 12.5 g, Intravenous, PRN, Álvaro Harrington DO    dextrose 50% injection 25 g, 25 g, Intravenous, PRN, Álvaro Harrington DO    glucagon (human recombinant) injection 1 mg, 1 mg, Intramuscular, PRN, Álvaro Harrington DO    glucose chewable tablet 16 g, 16 g, Oral, PRN, Álvaro Harrington, DO    glucose chewable tablet 24 g, 24 g, Oral, PRN, Álvaro Harrington, DO    hydrALAZINE injection 10 mg, 10 mg, Intravenous, Q4H PRN, Lorne Coon Jr., MD    LIDOcaine 5 % patch 1 patch, 1 patch, Transdermal, Daily, Elgin Stark MD,  1 patch at 04/21/25 1056    metoprolol succinate (TOPROL-XL) 24 hr tablet 50 mg, 50 mg, Oral, Daily, Elgin Stark MD, 50 mg at 04/23/25 1009    ondansetron disintegrating tablet 8 mg, 8 mg, Oral, Q8H PRN, Lorne Coon Jr., MD    pantoprazole injection 40 mg, 40 mg, Intravenous, BID, Elgin Stark MD, 40 mg at 04/23/25 2053    polyethylene glycol packet 17 g, 17 g, Oral, BID PRN, Elgin Stark MD, 17 g at 04/21/25 1613    senna-docusate 8.6-50 mg per tablet 1 tablet, 1 tablet, Oral, Daily, Elgin Stark MD, 1 tablet at 04/23/25 1009    sucralfate tablet 1 g, 1 g, Oral, QID (AC & HS), Selena Shi, FNP, 1 g at 04/24/25 0609    tirofiban 12.5 mg in sodium chloride 0.9% 250 mL infusion, , , Continuous PRN, Lorne Coon Jr., MD, Stopped at 03/29/25 1845    zinc oxide-cod liver oil 40 % paste, , Topical (Top), BID, Davi Harp Jr., MD, FCCP, Given at 04/23/25 2100

## 2025-04-25 NOTE — PROGRESS NOTES
Advance Care Planning     Date: 04/25/2025    Notified patient's daughter, Kimmy, via telephone. Provided up date per providers notes. Introduced [place], including purpose and potential elections. States she would be here on Monday and expressed interest in completing the document. Palliative care team to follow up on and attempt to complete prior to discharge. Family/Patient reports goals of care continues to include SNF placement upon discharge.       [facility]  I engaged the patient and family in a voluntary conversation about advance care planning and we specifically addressed what the goals of care would be moving forward, in light of the patient's change in clinical status, specifically [place]. We explored the patient's values and preferences for future care.  The patient and family endorses that what is most important right now is to focus on remaining as independent as possible, improvement in condition but with limits to invasive therapies, and comfort and QOL     Accordingly, we have decided that the best plan to meet the patient's goals includes continuing with treatment. This discussion occurred on a fully voluntary basis with the verbal consent of the patient and/or family.

## 2025-04-25 NOTE — CARE UPDATE
[id number] Estelle BURNHAM reports Ade Delacruz is willing to submit to insurance for auth. Spoke with pt's dgtr, Sarah via phone and she is in agreement with [employer]. Both her sister and her self spoke with Steffen with [employer] this morning. FOC Obtained. Estelle BURNHAM will send the referral to [employer] if ok with Dr Rob.

## 2025-04-25 NOTE — PROGRESS NOTES
"Hospital Medicine  Progress Note    Patient Name: Jon Conley  MRN: [id number]  Status: IP- Inpatient   Admission Date: 3/29/2025  Length of Stay: 27  Date of Service: 04/25/2025       CC: hospital follow-up for Cardiogenic shock       SUBJECTIVE   "79 y.o. male with a past medical history of hypertension, hyperlipidemia, CAD, PAD, GERD, and BPH s/p TURP who presented to [facility] on 3/29/2025 transferred from [facility] for cardiology services.  Patient presented to outside facility for shortness of breath.  Patient was tachycardic and hypotensive with hemoglobin of 7.5, hematocrit 24.6, high sensitivity troponin 13,821, WBC 13.4, and CPK 7 O2.  Patient was given 500 mL normal saline bolus and heparin 5000 units and transferred to [facility] for higher level of care.  Patient has suffered PAE arrest requiring intubation and CPR with ROSC. Labs revealed WBC 14.03, hemoglobin 7.3, hematocrit 23, troponin 9.443, , BUN 16.2, creatinine 1.34, lactic acid 4.3, PT/INR 14.9/1.2. Patient underwent left heart catheterization with Impella placement on 03/29/2025.  LHC revealed multivessel coronary artery disease with 100% occluded proximal left circumflex, unable to cross with wire. Mid RCA was also noted to be 100 % occluded, treated with PTCA/TAD.  Patient was started on Levophed, heparin drip per protocol, and amiodarone.  Echo revealed EF 15-20% with grade 1 diastolic dysfunction.  CV surgery was consulted.  Patient was deemed not a surgical candidate for CABG secondary to multiple comorbidities.  Patient received 2 units packed red blood cells on 04/06.  Patient underwent high-risk PCI on 04/10/2025 with 4 stents in LAD.  Impella was removed.  Patient was extubated on 04/13/2025.  Patient was cleared for downgrade out of ICU on 04/14/2025 and was admitted to hospital medicine service.      4/16 Afebrile overnight. Resting comfortably in the bed. Doing well on 2 L. blood pressure stable. No family " "members at bedside. Bumex was added for diuresis purposes. CBGS within acceptable limits. P.o. intake is inadequate. HGB and platelets stable. No new electrolyte abnormalities.   4/17 Had episode of Vtach on tele yesterday and again today, cardiology made aware. Patient denies any pain and asymptomatic. Worked with PT.   4/21 Noted to have gross hematuria on contreras, urology eval, recommending to hold aspirin and plavix is able, will get cardiology re-eval. Later in the afternoon, RN noted to have black stool, occult blood ordered, positive and GI consulted. Protonix BID added.   4/22 Urology irrigated contreras, now on mittens as patient more alterd and pulling on contreras. GI not planning any scopes at this time given risks, monitor HH"    Today: Patient seen and examined at bedside, and chart reviewed.  No complaints. H&H is stable today, urine is dark, but no evidence of hematuria.      MEDICATIONS   Scheduled   aspirin  81 mg Oral Daily    atorvastatin  40 mg Oral Daily    cefTRIAXone (Rocephin) IV (PEDS and ADULTS)  1 g Intravenous Q24H    clopidogreL  75 mg Oral Daily    dapagliflozin propanediol  10 mg Oral Daily    LIDOcaine  1 patch Transdermal Daily    metoprolol succinate  50 mg Oral Daily    pantoprazole  40 mg Intravenous BID    senna-docusate  1 tablet Oral Daily    sucralfate  1 g Oral QID (AC & HS)    zinc oxide-cod liver oil   Topical (Top) BID     Continuous Infusions   tirofiban-0.9% sodium chloride 12.5 mg/250ml    Continuous PRN   Stopped at 03/29/25 1845       PHYSICAL EXAM   VITALS: T 98.1 °F (36.7 °C)   BP (!) 96/55   P 79   RR 20   O2 98 %    GENERAL: awake and in no acute distress  LUNGS: Respirations non-labored  CVS: Normal rate  ABD: Soft, non-tender  EXTREMITIES: Radial pulse 2+  NEURO: AAOx3  PSYCHIATRIC: Cooperative      LABS   CBC  Recent Labs     04/23/25  0611 04/24/25  0712   WBC 8.90 6.71   RBC 3.02* 3.41*   HGB 8.6* 9.8*   HCT 28.7* 32.6*   MCV 95.0* 95.6*   MCH 28.5 28.7   MCHC 30.0* " 30.1*   RDW 17.9* 17.8*    274     CHEM  Recent Labs     04/23/25  0611 04/24/25  0712    142   K 4.1 3.6   CO2 21* 23   BUN 32.0* 31.9*   CREATININE 0.83 0.82   GLUCOSE 94 78*   CALCIUM 8.6* 8.8   MG  --  2.40   ALBUMIN 2.8* 3.0*   GLOBULIN 3.3 4.3*   ALKPHOS 89 96   ALT 8 7   AST 11 11   BILITOT 0.5 0.5         DIAGNOSTICS   CT Head Without Contrast  Narrative: EXAMINATION:  CT HEAD WITHOUT CONTRAST    CLINICAL HISTORY:  Mental status change, unknown cause;    TECHNIQUE:  Sequential axial images were performed of the brain without contrast.    Dose product length of 1119 mGycm. Automated exposure control was utilized to minimize radiation dose.    COMPARISON:  None available.    FINDINGS:  There is no intracranial mass effect, midline shift, hydrocephalus or hemorrhage. There is no sulcal effacement or low attenuation changes to suggest recent large vessel territory infarction.  There is left basal ganglia old lacunar infarct.  Chronic microvascular ischemic changes are mild. The ventricular system and sulcal markings prominence is consistent with atrophy. There is no acute extra axial fluid collection. Visualized paranasal sinuses are clear without mucosal thickening, polypoidal abnormality or air-fluid levels. Mastoid air cells aeration is optimal.  Impression: 1.  No acute intracranial findings identified.    2.  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    Electronically signed by: Melecio Cerna  Date:    04/22/2025  Time:    19:37        ASSESSMENT   Acute hypoxemic respiratory failure   Cardiogenic shock requiring mechanical and chemical support  Multivessel CAD s/p high risk PCI 4/10  Cardiopulmonary arrest   ICMO, EF 15%  Concern for GI bleed  Hematuria - recent urologic procedure, now on DAPT  BPH s/p recent TURP in [place] followed by clot evacuation and fulguration recently  Normocytic anemia, chronic    PLAN   Continue to monitor H&H, will repeat  tomorrow AM  Continue DAPT  Continue IV abx for now, following hematuria, will likely transition to oral if stable  Otherwise continue current management and monitoring in the interim       Prophylaxis: SCDs        Rosales Watkins MD  [facility]

## 2025-04-25 NOTE — PT/OT/SLP PROGRESS
Physical Therapy Treatment    Patient Name:  Jon Conley   MRN:  [id number]    Recommendations:     Discharge therapy intensity: Moderate Intensity Therapy   Discharge Equipment Recommendations: to be determined by next level of care  Barriers to discharge: Impaired mobility and Ongoing medical needs    Assessment:     Jon Conley is a 79 y.o. male admitted with a medical diagnosis of SOB, cardiac arrest, s/p LHC and impella on 3/29, s/p high risk PCI with 4 stents in LAD on 4/10; acute respiratory failure requiring intubation 3/29 with extubation 4/13.  He presents with the following impairments/functional limitations: weakness, impaired endurance, impaired functional mobility, gait instability, impaired balance, decreased lower extremity function, edema, impaired cardiopulmonary response to activity . PT donned sada hose prior to mobility this date, BP soft but stable. Able to t/f to chair with 2-person assist and no adverse signs. Remains appropriate for mod intensity therapy at D/C.    Rehab Prognosis: Good; patient would benefit from acute skilled PT services to address these deficits and reach maximum level of function.    Recent Surgery: Procedure(s) (LRB):  Left heart cath (N/A) 15 Days Post-Op    Plan:     During this hospitalization, patient would benefit from acute PT services 5 x/week to address the identified rehab impairments via gait training, therapeutic activities, therapeutic exercises, neuromuscular re-education and progress toward the following goals:    Plan of Care Expires:  05/24/25    Subjective     Chief Complaint: none  Patient/Family Comments/goals: PLOF  Pain/Comfort:  Pain Rating 1: 0/10      Objective:     Communicated with RN prior to session.  Patient found HOB elevated with pulse ox (continuous), telemetry, contreras catheter, peripheral IV (CBI) upon PT entry to room.     General Precautions: Standard, fall  Orthopedic Precautions: N/A  Braces: N/A  Respiratory Status:  Room air  Blood Pressure:   Supine 95/59  Sit 94/62  Stand 86/56  In chair 90/50  Skin Integrity: Visible skin intact      Functional Mobility:  Bed Mobility:     Supine to Sit: maximal assistance  Transfers:     Sit to Stand:  moderate assistance and of 2 persons with rolling walker  Bed to Chair: minimum assistance and of 2 persons with  rolling walker  using  Stand Pivot  Balance: Sitting balance = SBA    Therapeutic Activities/Exercises:  Seated ankle pumps EOB 3x10  Seated UE ADLs in chair after step t/f    Education:  Patient provided with verbal education education regarding PT role/goals/POC, fall prevention, safety awareness, and discharge/DME recommendations.  Understanding was verbalized.     Patient left up in chair with all lines intact, call button in reach, deep pad in place, and RN notified    GOALS:   Multidisciplinary Problems       Physical Therapy Goals          Problem: Physical Therapy    Goal Priority Disciplines Outcome Interventions   Physical Therapy Goal     PT, PT/OT Progressing    Description: Goals to be met by: 5/15/25     Patient will increase functional independence with mobility by performin. Supine to sit with MInimal Assistance  2. Sit to supine with MInimal Assistance  3. Sit to stand transfer with Moderate Assistance  4. Bed to chair transfer with Moderate Assistance using Rolling Walker  5. Sitting at edge of bed x20 minutes with Stand-by Assistance  6. Pt will ambulate 50ft with RW with Minimal Assistance.                         Time Tracking:     PT Received On: 25  PT Start Time: 951     PT Stop Time: 1015  PT Total Time (min): 24 min     Billable Minutes: Therapeutic Activity 24 min    Treatment Type: Treatment  PT/PTA: PT     Number of PTA visits since last PT visit: 2025

## 2025-04-25 NOTE — PLAN OF CARE
This patient has been accepted to [facility]. Made Steffen aware that this patient is expected to be ready for discharge early next week. Awaiting confirmation whether they have submitted for insurance auth at this time.

## 2025-04-25 NOTE — PROGRESS NOTES
"Hospital Medicine  Progress Note    Patient Name: Jon Conley  MRN: [id number]  Status: IP- Inpatient   Admission Date: 3/29/2025  Length of Stay: 26  Date of Service: 04/24/2025       CC: hospital follow-up for Cardiogenic shock       SUBJECTIVE   "79 y.o. male with a past medical history of hypertension, hyperlipidemia, CAD, PAD, GERD, and BPH s/p TURP who presented to [facility] on 3/29/2025 transferred from [facility] for cardiology services.  Patient presented to outside facility for shortness of breath.  Patient was tachycardic and hypotensive with hemoglobin of 7.5, hematocrit 24.6, high sensitivity troponin 13,821, WBC 13.4, and CPK 7 O2.  Patient was given 500 mL normal saline bolus and heparin 5000 units and transferred to [facility] for higher level of care.  Patient has suffered PAE arrest requiring intubation and CPR with ROSC. Labs revealed WBC 14.03, hemoglobin 7.3, hematocrit 23, troponin 9.443, , BUN 16.2, creatinine 1.34, lactic acid 4.3, PT/INR 14.9/1.2. Patient underwent left heart catheterization with Impella placement on 03/29/2025.  LHC revealed multivessel coronary artery disease with 100% occluded proximal left circumflex, unable to cross with wire. Mid RCA was also noted to be 100 % occluded, treated with PTCA/ATD.  Patient was started on Levophed, heparin drip per protocol, and amiodarone.  Echo revealed EF 15-20% with grade 1 diastolic dysfunction.  CV surgery was consulted.  Patient was deemed not a surgical candidate for CABG secondary to multiple comorbidities.  Patient received 2 units packed red blood cells on 04/06.  Patient underwent high-risk PCI on 04/10/2025 with 4 stents in LAD.  Impella was removed.  Patient was extubated on 04/13/2025.  Patient was cleared for downgrade out of ICU on 04/14/2025 and was admitted to hospital medicine service.      4/16 Afebrile overnight. Resting comfortably in the bed. Doing well on 2 L. blood pressure stable. No family " "members at bedside. Bumex was added for diuresis purposes. CBGS within acceptable limits. P.o. intake is inadequate. HGB and platelets stable. No new electrolyte abnormalities.   4/17 Had episode of Vtach on tele yesterday and again today, cardiology made aware. Patient denies any pain and asymptomatic. Worked with PT.   4/21 Noted to have gross hematuria on contreras, urology eval, recommending to hold aspirin and plavix is able, will get cardiology re-eval. Later in the afternoon, RN noted to have black stool, occult blood ordered, positive and GI consulted. Protonix BID added.   4/22 Urology irrigated contreras, now on mittens as patient more alterd and pulling on contreras. GI not planning any scopes at this time given risks, monitor HH"    Today: Patient seen and examined at bedside, and chart reviewed.  No complaints. H&H is stable today, urine is dark, but no evidence of hematuria.      MEDICATIONS   Scheduled   aspirin  81 mg Oral Daily    atorvastatin  40 mg Oral Daily    cefTRIAXone (Rocephin) IV (PEDS and ADULTS)  1 g Intravenous Q24H    clopidogreL  75 mg Oral Daily    dapagliflozin propanediol  10 mg Oral Daily    LIDOcaine  1 patch Transdermal Daily    metoprolol succinate  50 mg Oral Daily    pantoprazole  40 mg Intravenous BID    senna-docusate  1 tablet Oral Daily    sucralfate  1 g Oral QID (AC & HS)    zinc oxide-cod liver oil   Topical (Top) BID     Continuous Infusions   tirofiban-0.9% sodium chloride 12.5 mg/250ml    Continuous PRN   Stopped at 03/29/25 1845       PHYSICAL EXAM   VITALS: T 97.4 °F (36.3 °C)   BP (!) 94/59   P 76   RR 17   O2 98 %    GENERAL: awake and in no acute distress  LUNGS: Respirations non-labored  CVS: Normal rate  ABD: Soft, non-tender  EXTREMITIES: Radial pulse 2+  NEURO: AAOx3  PSYCHIATRIC: Cooperative      LABS   CBC  Recent Labs     04/23/25  0611 04/24/25  0712   WBC 8.90 6.71   RBC 3.02* 3.41*   HGB 8.6* 9.8*   HCT 28.7* 32.6*   MCV 95.0* 95.6*   MCH 28.5 28.7   MCHC 30.0* " 30.1*   RDW 17.9* 17.8*    274     CHEM  Recent Labs     04/23/25  0611 04/24/25  0712    142   K 4.1 3.6   CO2 21* 23   BUN 32.0* 31.9*   CREATININE 0.83 0.82   GLUCOSE 94 78*   CALCIUM 8.6* 8.8   MG  --  2.40   ALBUMIN 2.8* 3.0*   GLOBULIN 3.3 4.3*   ALKPHOS 89 96   ALT 8 7   AST 11 11   BILITOT 0.5 0.5         DIAGNOSTICS   CT Head Without Contrast  Narrative: EXAMINATION:  CT HEAD WITHOUT CONTRAST    CLINICAL HISTORY:  Mental status change, unknown cause;    TECHNIQUE:  Sequential axial images were performed of the brain without contrast.    Dose product length of 1119 mGycm. Automated exposure control was utilized to minimize radiation dose.    COMPARISON:  None available.    FINDINGS:  There is no intracranial mass effect, midline shift, hydrocephalus or hemorrhage. There is no sulcal effacement or low attenuation changes to suggest recent large vessel territory infarction.  There is left basal ganglia old lacunar infarct.  Chronic microvascular ischemic changes are mild. The ventricular system and sulcal markings prominence is consistent with atrophy. There is no acute extra axial fluid collection. Visualized paranasal sinuses are clear without mucosal thickening, polypoidal abnormality or air-fluid levels. Mastoid air cells aeration is optimal.  Impression: 1.  No acute intracranial findings identified.    2.  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    Electronically signed by: Melecio Cerna  Date:    04/22/2025  Time:    19:37        ASSESSMENT   Acute hypoxemic respiratory failure   Cardiogenic shock requiring mechanical and chemical support  Multivessel CAD s/p high risk PCI 4/10  Cardiopulmonary arrest   ICMO, EF 15%  Concern for GI bleed  Hematuria - recent urologic procedure, now on DAPT  BPH s/p recent TURP in [place] followed by clot evacuation and fulguration recently  Normocytic anemia, chronic    PLAN   H&H is stable today, will continue to  monitor  Continue DAPT  Continue IV abx for now, will follow hematuria and cultures  Otherwise continue current management and monitoring in the interim         Prophylaxis: SCDs        Rosales Watkins MD  [facility]

## 2025-04-25 NOTE — PLAN OF CARE
Problem: Physical Therapy  Goal: Physical Therapy Goal  Description: Goals to be met by: 5/15/25     Patient will increase functional independence with mobility by performin. Supine to sit with MInimal Assistance  2. Sit to supine with MInimal Assistance  3. Sit to stand transfer with Moderate Assistance  4. Bed to chair transfer with Moderate Assistance using Rolling Walker  5. Sitting at edge of bed x20 minutes with Stand-by Assistance  6. Pt will ambulate 50ft with RW with Minimal Assistance.    Outcome: Progressing

## 2025-04-25 NOTE — PT/OT/SLP PROGRESS
Occupational Therapy   Treatment    Name: Jon Conley  MRN: [id number]  Admitting Diagnosis:  NSTEMI (non-ST elevated myocardial infarction)  15 Days Post-Op    Recommendations:     Recommended therapy intensity at discharge: Moderate Intensity Therapy   Discharge Equipment Recommendations:  to be determined by next level of care  Barriers to discharge:       Assessment:     Jon Conley is a 79 y.o. male with a medical diagnosis of NSTEMI (non-ST elevated myocardial infarction).  He presents with improved balance and increased endurance, pt. Is a fall risk at this time recommending Mod intensity therapy pending progress. Performance deficits affecting function are weakness, impaired endurance, impaired self care skills, impaired functional mobility, impaired balance, impaired cognition.     Rehab Prognosis:  Good; patient would benefit from acute skilled OT services to address these deficits and reach maximum level of function.       Plan:     Patient to be seen 5 x/week to address the above listed problems via self-care/home management, therapeutic activities  Plan of Care Expires: 05/15/25  Plan of Care Reviewed with: patient    Subjective     Pain/Comfort:       Objective:     Communicated with: RN prior to session.  Patient found sitting edge of bed with   upon OT entry to room.    General Precautions: Standard, fall    Orthopedic Precautions:N/A  Braces: N/A  Respiratory Status: Room air  Vital Signs: Blood Pressure: 94/62, 86/56     Occupational Performance:   Pt. Sitting EOB with PT upon entry.  (Sitting balance EOB- SBA) for safety with balance.   Pt. Requiring total A at this time for LB dressing for donning socks, would benefit from AE in future sessions.  (Sit to stand- Mod A) from EOB  Min A required during stand step t/f from EOB to BS chair using RW for UE support with balance.  Pt. Performing grooming task seated in BS chair, combing hair and washing face using L UE.   Therapeutic  Positioning    OT interventions performed during the course of today's session in an effort to prevent and/or reduce acquired pressure injuries:   Therapeutic positioning was provided at the conclusion of session to offload all bony prominences for the prevention and/or reduction of pressure injuries      [facility] 6 Click ADL:      Patient Education:  Patient provided with verbal education education regarding fall prevention, safety awareness, and pressure ulcer prevention.  Additional teaching is warranted.      Patient left up in chair with all lines intact and call button in reach.    GOALS:   Multidisciplinary Problems       Occupational Therapy Goals          Problem: Occupational Therapy    Goal Priority Disciplines Outcome Interventions   Occupational Therapy Goal     OT, PT/OT Progressing    Description: LTG: Pt will perform basic ADLs and ADL transfers with Modified independence using LRAD by discharge.    STG: to be met by 5/15/25    Pt will complete grooming standing at sink with LRAD with SBA.  Pt will complete UB dressing with SBA.  Pt will complete LB dressing with SBA using LRAD and AE prn.  Pt will complete toileting with SBA using LRAD.  Pt will complete functional mobility to/from toilet and toilet transfer with SBA using LRAD.                        Time Tracking:     OT Date of Treatment: 04/25/25  OT Start Time: 1002  OT Stop Time: 1016  OT Total Time (min): 14 min    Billable Minutes:Self Care/Home Management 1    OT/OLIVIA: OLIVIA     Number of OLIVIA visits since last OT visit: 4    4/25/2025

## 2025-04-26 NOTE — PROGRESS NOTES
UROLOGY  Consult  NOTE    Jon Conley 1945  [id number]  4/26/2025      Hx of BPH status post TURP late Feb Dr. López. Clot evac 3 weeks ago.  He experienced PA arrest requiring intubation and CPR.  He underwent left heart catheterization with Impella placement on March 29th.  He underwent PCI on April 10th with 4 stents.  He has been on anticoagulation.    He has experienced intermittent hematuria.  He was evaluated last week.  Hematuria improved.     Recurred this morning prompting another consult.    Patient was seen and examined.  There was minimal urine in the bag with dark hematuria and tubing.  His daughter reports that the catheter has not been exchanged and a couple of weeks.  I attempted to irrigate the catheter without success.  I elected to exchange the catheter.    The indwelling catheter was removed after deflating 30 cc from the balloon.  The penis was prepped and draped in usual sterile fashion.  A 22 [place] soft three-way catheter was advanced into the bladder with the immediate discharge of bloody urine.  The balloon was inflated with 20 cc of sterile water and pulled toward the bladder neck.  I performed hand irrigation evacuating a proximally 250 cc of mature clot.  There was much improved coloration of urine after evacuation.  CBI was connected on a slow drip with a light pink yellow output.  Postprocedure bladder scan with estimated 60 cc        Exam:    NAD  Card RRR  Resp unlabored  Abd soft, NTND  Extremity no C/C/E      Recent Results (from the past 24 hours)   CBC Without Differential    Collection Time: 04/26/25  4:32 AM   Result Value Ref Range    WBC 6.09 4.50 - 11.50 x10(3)/mcL    RBC 3.27 (L) 4.70 - 6.10 x10(6)/mcL    Hgb 9.2 (L) 14.0 - 18.0 g/dL    Hct 30.4 (L) 42.0 - 52.0 %    MCV 93.0 80.0 - 94.0 fL    MCH 28.1 27.0 - 31.0 pg    MCHC 30.3 (L) 33.0 - 36.0 g/dL    RDW 17.3 (H) 11.5 - 17.0 %    Platelet 221 130 - 400 x10(3)/mcL    MPV 10.7 (H) 7.4 - 10.4 fL    NRBC% 0.0 %          Assessment:  Gross hematuria  -s/p TURP 6 weeks ago, cytso clot evac and fulguration 3 weeks ago  -catheter exchange today, evac 250cc mature clot.       Plan:  Titrate CBI until clamped.  He may experience hematuria given his dual anticoagulation.  Ok to clamp irrigation port once hematuria resolves.    Ucx ordered from new cath.   Consider bladder US if hematuria persist.     May consider void trial once hematuria resolves or he can be discharged with Ortiz catheter and he can follow up with his primary urologist.    AF

## 2025-04-26 NOTE — PROGRESS NOTES
[facility] - [facility] Medicine - Progress Note      Patient Name: Jon Conley  MRN: [id number]  Admission Date: 3/29/2025 - IP- Inpatient   Length of Stay: 28  Code Status: Full    Subjective   Chief Complaint:   Inpatient Follow-up for cardiogenic shock, hematuria    HPI and Hospital course:  This is a 79-year-old male with medical history of HTN, HLD, CAD, PAD, GERD, and BPH s/p TURP  2/24/2025 at [facility], subsequently developed hematuria around mid March and had few ED visits and diagnosed with UTI, eventually hospitalized on 03/27/2025 and underwent cystoscopy/clot evacuation and discharge the same day with indwelling Ortiz catheter.     Presented to [facility] ED on 03/29/2025 with complaint of chest pain and dyspnea found to be hypotensive with elevated troponin and BNP, diagnosed with NSTEMI and given IV heparin and transferred to [facility] ED for higher level of care.  At [facility] ED, suffered PEA arrest and achieved ROSC in approximately 6 minutes, started on vasopressors, amiodarone and heparin infusion, taken emergently to cath lab C/[facility] revealed severe multivessel CAD and 100% occluded mid RCA s/p PTCA/TAD and Impella support, subsequently admitted to ICU.  Cardiothoracic surgery consulted and was planned for CABG but his ICU course complicated with prolonged cardiogenic shock requiring continued Impella and vasopressor support after which he was deemed high-risk and not candidate for surgery for which he underwent high-risk PCI to LAD on 04/10/2025 with 4 drug-eluting stents placed, he subsequently continued to show improvement and eventually extubated on 04/13/2025 and downgraded out of ICU on 04/14/2025 under hospital medicine service.    Continued to show slow improvement and working with physical therapy and weaning off oxygen but unfortunately developed hematuria and possible melena on 04/21/2025, urology consulted and  started on CBI and ceftriaxone.  GI consulted no plan for endoscopy continue PPI b.i.d.. Hospital course also complicated by delirium requiring mittens.      ROS: Except as documented, all systems reviewed and negative.    Interval History:  This morning not hematuria worsened and had a clot, nurses attempted to irrigate and was unsuccessful, and required Urology re-consultation and attempted to irrigate the Ortiz catheter without success subsequently catheter removed and a new 22 [place] three-way catheter was placed and started on CBI with the urine color dark pink.    His lab show stable hemoglobin at 9.2.  His vitals also stable and currently requiring only 2 L nasal cannula.    Objective   Physical exam:  Vital Signs  Temp:  [97.5 °F (36.4 °C)-97.9 °F (36.6 °C)]   Pulse:  [73-91]   Resp:  [18]   BP: (109-123)/(67-75)   SpO2:  [94 %-97 %]    General: Appears comfortable  Chest: CTABL  CVS: Regular rhythm.  Plus one pedal edema  Abdomen:  Soft, nontender, 3 way Ortiz catheter and CBI ongoing at a slow rate with urine dark pink in Ortiz bag  MSK: No obvious deformity or joint swelling  Skin: Warm and dry  Neuro: AAOx3  Psych: Cooperative    Labs & diagnostics  Recent Labs     04/24/25  0712 04/26/25  0432   WBC 6.71 6.09   HGB 9.8* 9.2*   HCT 32.6* 30.4*    221      Recent Labs     04/24/25  0712      K 3.6   *   CO2 23   BUN 31.9*   CREATININE 0.82   EGFRNORACEVR >60   GLUCOSE 78*   CALCIUM 8.8   MG 2.40   ALBUMIN 3.0*   GLOBULIN 4.3*   ALKPHOS 96   ALT 7   AST 11   BILITOT 0.5            Inpatient Medications  Scheduled Med:   aspirin  81 mg Oral Daily    atorvastatin  40 mg Oral Daily    cefTRIAXone (Rocephin) IV (PEDS and ADULTS)  1 g Intravenous Q24H    clopidogreL  75 mg Oral Daily    dapagliflozin propanediol  10 mg Oral Daily    LIDOcaine  1 patch Transdermal Daily    metoprolol succinate  50 mg Oral Daily    pantoprazole  40 mg Intravenous BID    senna-docusate  1 tablet Oral Daily     sucralfate  1 g Oral QID (AC & HS)    zinc oxide-cod liver oil   Topical (Top) BID       Assessment & Plan:   Severe multivessel CAD/NSTEMI  Ischemic cardiomyopathy/Acute HFrEF 15%- compensated  In-hospital PEA cardiac arrest 03/29/2025 - ACLS with ROSC in 6 minutes  Cardiogenic shock:  Required Impella and vasopressors - resolved  [facility] 03/29/2025:  Culprit mid RCA s/p PCTA and DESx2  CT surgery- deemed not a surgical candidate for CABG  [facility] 04/10/2025:  High-risk PCI to LAD with Impella support, TAD x4  Continue aspirin, Plavix, statin, metoprolol, dapagliflozin  Add further GDMT:  Entresto once BP allows  DNR- no LifeVest    Acute hypoxemic respiratory failure  Secondary to above  Improving, continue to wean off nasal cannula for goal saturation above 92%.    Recurrent hematuria post TURP  BPH S/P TURP 02/24/2025  S/P cystoscopy and clot evacuation/fulguration 03/27/2025  Recurrent hematuria 4/21/2025 - started CBI  Catheter clotted, replaced 04/26/2025 and continued CBI  Ceftriaxone 1 g IV daily since 04/22/2025    GI bleed/melena  Continue IV PPI b.i.d. and Carafate q.i.d.  GI consulted, no plan for endoscopy unless overt bleed with decompensation    Normocytic anemia -  Secondary to recurrent intermittent blood loss/hematuria  Hemoglobin was 13 at baseline in February 2025  Check ferritin, iron profile, B12 and folate  Goal hemoglobin above 9    History of HTN, HLD, PAD, GERD, chronic tobacco smoker      VTE Prophylaxis:-SCDs, no chemical prophylaxis given high bleeding risk with hematuria and being on DAPT  Patient condition:-guarded/improving  Disposition: SNF likely early next week      Krystin Phillip MD  Internal Medicine

## 2025-04-28 NOTE — PROGRESS NOTES
[facility] - [facility] Medicine - Progress Note      Patient Name: Jon Conley  MRN: [id number]  Admission Date: 3/29/2025 - IP- Inpatient   Length of Stay: 30  Code Status: DNR    Subjective   Chief Complaint:   Inpatient Follow-up for cardiogenic shock, hematuria    HPI and Hospital course:  This is a 79-year-old male with medical history of HTN, HLD, CAD, PAD, GERD, and BPH s/p TURP  2/24/2025 at [facility], subsequently developed hematuria around mid March and had few ED visits and diagnosed with UTI, eventually hospitalized on 03/27/2025 and underwent cystoscopy/clot evacuation and discharge the same day with indwelling Ortiz catheter.     Presented to [facility] ED on 03/29/2025 with complaint of chest pain and dyspnea found to be hypotensive with elevated troponin and BNP, diagnosed with NSTEMI and given IV heparin and transferred to [facility] ED for higher level of care.  At [facility] ED, suffered PEA arrest and achieved ROSC in approximately 6 minutes, started on vasopressors, amiodarone and heparin infusion, taken emergently to cath lab C/[facility] revealed severe multivessel CAD and 100% occluded mid RCA s/p PTCA/TAD and Impella support, subsequently admitted to ICU.  Cardiothoracic surgery consulted and was planned for CABG but his ICU course complicated with prolonged cardiogenic shock requiring continued Impella and vasopressor support after which he was deemed high-risk and not candidate for surgery for which he underwent high-risk PCI to LAD on 04/10/2025 with 4 drug-eluting stents placed, he subsequently continued to show improvement and eventually extubated on 04/13/2025 and downgraded out of ICU on 04/14/2025 under hospital medicine service.    Continued to show slow improvement and working with physical therapy and weaning off oxygen but unfortunately developed hematuria and possible melena on 04/21/2025, urology consulted and  started on CBI and ceftriaxone.  GI consulted no plan for endoscopy continue PPI b.i.d..     Hematuria continued for few days and developed clot and not draining on 04/26/2025 morning, nurses attempted to irrigate and was unsuccessful, and required Urology re-consultation and attempted to irrigate the Ortiz catheter without success subsequently catheter removed and a new 22 [place]ese three-way catheter was placed and started on CBI and subsequently urine cleared, and irrigation port clamped on 04/27/2025.  Discontinued ceftriaxone and started Macrobid    ROS: Except as documented, all systems reviewed and negative.    Interval History:  No new events.  Hemoglobin today 8.1, good urine output with dark brownish yellow urine expected with Macrobid.  Spoke with daughter and concerned about the urine color and report given recurrent hematuria and bleeding patient did not have any imaging of his abdomen since his procedure clot evacuation on the 03/27/2025 and requesting some sort of imaging.      He has been accepted by [facility] today and anticipate transfer tomorrow and will keep Ortiz catheter in place until follow up with his primary urologist.    Objective   Physical exam:  Vital Signs  Temp:  [97.5 °F (36.4 °C)-97.9 °F (36.6 °C)]   Pulse:  [73-91]   Resp:  [18]   BP: (109-123)/(67-75)   SpO2:  [94 %-97 %]    General: Appears comfortable  Chest: CTABL  CVS: Regular rhythm.  Plus one pedal edema  Abdomen:  Soft, nontender, 3 way Ortiz catheter in place, CBI irrigation port clamped, good urine output with dark brownish yellow urine  Skin: Warm and dry  Neuro: AAOx3  Psych: Cooperative    Labs & diagnostics  Recent Labs     04/27/25  0445 04/28/25  0408   WBC 7.39 6.16   HGB 8.7* 8.1*   HCT 27.9* 25.9*    192      Recent Labs     04/27/25  0445      K 4.0   *   CO2 20*   BUN 18.7   CREATININE 0.82   EGFRNORACEVR >60   GLUCOSE 99   CALCIUM 8.3*   MG 2.24   PHOS 2.8   ALBUMIN 2.7*   GLOBULIN 3.3   ALKPHOS 83    ALT 6   AST 9*   BILITOT 0.4        Microbiology Results (last 7 days)       Procedure Component Value Units Date/Time    Urine culture [[id number]]  (Abnormal) Collected: 04/26/25 1735    Order Status: Completed Specimen: Urine Updated: 04/28/25 1058     Urine Culture Less than 10,000 colonies/ml Yeast    Urine Culture High Risk [[id number]] Collected: 04/27/25 0401    Order Status: Completed Specimen: Urine, Catheterized Updated: 04/28/25 0727     Urine Culture No Growth At 24 Hours           Echo    Limited study to evaluate LVEF.    Left Ventricle: Severe global hypokinesis with regional variation   present. There is severely reduced systolic function with a visually   estimated ejection fraction of 20 - 25%.    Mitral Valve: There is mild to moderate regurgitation.    Pericardium: There is a trivial effusion. No indication of cardiac   tamponade.  X-Ray Chest 1 View  Narrative: EXAMINATION:  XR CHEST 1 VIEW    CPT 33778    CLINICAL HISTORY:  Chest wall pain;    COMPARISON:  April 12, 2025    FINDINGS:  Examination reveals mediastinal silhouette to be within normal limits cardiac silhouette is not enlarged there is blunting of both costophrenic angles which may indicate the presence of small pleural effusions atelectatic changes identified in both bases.    No other focal consolidative changes.    Other support catheters had been removed  Impression: Minimal blunting of the costophrenic angles may indicate the presence of small pleural effusions.    Atelectatic changes at both bases.    Otherwise no active pulmonary disease    Electronically signed by: Mt Beal  Date:    04/28/2025  Time:    12:10      Inpatient Medications  Scheduled Med:   aspirin  81 mg Oral Daily    atorvastatin  40 mg Oral Daily    clopidogreL  75 mg Oral Daily    dapagliflozin propanediol  10 mg Oral Daily    ferric gluconate (Ferrlecit) IVPB  250 mg Intravenous Daily    LIDOcaine  1 patch Transdermal Daily    metoprolol  succinate  25 mg Oral Daily    nitrofurantoin (macrocrystal-monohydrate)  100 mg Oral QHS    pantoprazole  40 mg Oral BID    senna-docusate  1 tablet Oral Daily    sucralfate  1 g Oral QID (AC & HS)    zinc oxide-cod liver oil   Topical (Top) BID      Continuous Infusions:     PRN Meds:    Current Facility-Administered Medications:     0.9%  NaCl infusion (for blood administration), , Intravenous, Q24H PRN    acetaminophen, 650 mg, Oral, Q4H PRN    bisacodyL, 10 mg, Rectal, Daily PRN    dextrose 50%, 12.5 g, Intravenous, PRN    dextrose 50%, 25 g, Intravenous, PRN    glucagon (human recombinant), 1 mg, Intramuscular, PRN    glucose, 16 g, Oral, PRN    glucose, 24 g, Oral, PRN    hydrALAZINE, 10 mg, Intravenous, Q4H PRN    ondansetron, 8 mg, Oral, Q8H PRN    polyethylene glycol, 17 g, Oral, BID PRN @       Assessment & Plan:   Severe multivessel CAD/NSTEMI  Ischemic cardiomyopathy/Acute HFrEF- compensated  In-hospital PEA cardiac arrest 03/29/2025 - ACLS with ROSC in 6 minutes  Cardiogenic shock:  Required Impella and vasopressors - resolved  [facility] 03/29/2025:  Culprit mid RCA s/p PCTA and DESx2  CT surgery- deemed not a surgical candidate for CABG  [facility] 04/10/2025:  High-risk PCI to LAD with Impella support, TAD x4  Continue aspirin, Plavix, statin, metoprolol, dapagliflozin  Add further GDMT:  BP appear stable in the 120s today, will start Entresto 24/26 b.i.d.  DNR- no LifeVest  Repeat TTE 04/28/2025:  LVEF 20-25% mild-to-moderate MR    Acute hypoxemic respiratory failure - resolved  Secondary to above  Weaned off to room air  Continue incentive spirometry    Recurrent hematuria post TURP  BPH S/P TURP 02/24/2025  S/P cystoscopy and clot evacuation/fulguration 03/27/2025  Recurrent hematuria 4/21/2025 - started CBI  Catheter clotted, replaced 04/26/2025 and continued CBI  Urine cleared, Clamped irrigation port of  CBI on 04/27/2025, discontinued ceftriaxone and started on suppressive nitrofurantoin 100 mg nightly  until no further hematuria and Ortiz catheter is out.  No further hematuria, very dark brownish yellow urine is expected with Macrobid  Ordered retroperitoneal ultrasound and CT abdomen pelvis with and without contrast to rule out any pathology for recurrent bleeding although it is clearly related to TURP.    Questionable GI bleed/melena -resolved  Switch to PO PPI b.i.d. and Carafate q.i.d.  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      Krystin Phillip MD  Internal Medicine

## 2025-04-28 NOTE — PROGRESS NOTES
Inpatient Nutrition Assessment    Admit Date: 3/29/2025   Total duration of encounter: 30 days   Patient Age: 79 y.o.    Nutrition Recommendation/Prescription     Diet advancement per MD, currently on clear liquid diet with moderately thick liquids.   -Texture modifications per SLP   Encouragement and assistance with meals.  Boost Very High Calorie (provides 530 kcal, 22 g protein per serving) TID.   -Add 1 packet of moderately thick thickener   Bowel regimen as feasible.   Consider appetite stimulant s/t poor oral intake.   If aggressive nutrition support warranted, consider enteral access device placement for supplemental nutrition. Consult RD for TF recommendations.      Communication of Recommendations: reviewed with nurse and reviewed with patient    Nutrition Assessment     Malnutrition Assessment/Nutrition-Focused Physical Exam  4/24/25 updated assessment; need updated weight     Malnutrition Level: other (see comments) (Unable to assess) (04/24/25 1101)  Energy Intake (Malnutrition): less than or equal to 50% for greater than or equal to 5 days (04/24/25 1101)  Weight Loss (Malnutrition): other (see comments) (Unable to assess) (04/24/25 1101)                                         Fluid Accumulation (Malnutrition): mild (04/24/25 1101)     Hand  Strength, Right (Malnutrition): Unable to assess (04/24/25 1101)  A minimum of two characteristics is recommended for diagnosis of either severe or non-severe malnutrition.    Chart Review    Reason Seen: continuous nutrition monitoring and follow-up    Malnutrition Screening Tool Results   Have you recently lost weight without trying?: No  Have you been eating poorly because of a decreased appetite?: No   MST Score: 0   Diagnosis:  Acute hypoxemic respiratory failure requiring intubation, extubated 04/13/2024   Cardiogenic shock requiring mechanical and chemical support   Impella placement 03/29/2025, removed 04/10/2025  Multivessel CAD  LHC with PTCA/TAD x3  RCA (03/29/2025)   Status post high-risk PCI withPTCA/TAD X4 LAD on 4/10/25   Cardiopulmonary arrest   ICMO/EF 15%  Concern for GI bleed  Hematuria - recent urologic procedure, now on DAPT  BPH s/p recent TURP in [place] followed by clot evacuation and fulguration, around 3/2025  Normocytic anemia, chronic    Relevant Medical History: CAD, HTN, HLD, PAD, GERD    Scheduled Medications:  aspirin, 81 mg, Daily  atorvastatin, 40 mg, Daily  clopidogreL, 75 mg, Daily  dapagliflozin propanediol, 10 mg, Daily  ferric gluconate (Ferrlecit) IVPB, 250 mg, Daily  LIDOcaine, 1 patch, Daily  metoprolol succinate, 50 mg, Daily  nitrofurantoin (macrocrystal-monohydrate), 100 mg, QHS  pantoprazole, 40 mg, BID  sacubitriL-valsartan, 1 tablet, QHS  senna-docusate, 1 tablet, Daily  sucralfate, 1 g, QID (AC & HS)  zinc oxide-cod liver oil, , BID    Continuous Infusions:     PRN Medications:  acetaminophen, 650 mg, Q4H PRN  bisacodyL, 10 mg, Daily PRN  dextrose 50%, 12.5 g, PRN  dextrose 50%, 25 g, PRN  glucagon (human recombinant), 1 mg, PRN  glucose, 16 g, PRN  glucose, 24 g, PRN  hydrALAZINE, 10 mg, Q4H PRN  ondansetron, 8 mg, Q8H PRN  polyethylene glycol, 17 g, BID PRN    Calorie Containing IV Medications: no significant kcals from medications at this time    Recent Labs   Lab 04/21/25  1223 04/21/25 2027 04/22/25  0503 04/22/25  1514 04/23/25  0611 04/23/25  1300 04/24/25  0712 04/26/25  0432 04/27/25  0445 04/28/25  0408     --  143  --  143  --  142  --  141  --    K 4.0  --  3.7  --  4.1  --  3.6  --  4.0  --    CALCIUM 8.7*  --  8.6*  --  8.6*  --  8.8  --  8.3*  --    PHOS  --   --   --   --   --   --   --   --  2.8  --    MG  --   --   --   --   --   --  2.40  --  2.24  --    *  --  112*  --  114*  --  111*  --  111*  --    CO2 22*  --  20*  --  21*  --  23  --  20*  --    BUN 24.3  --  32.1*  --  32.0*  --  31.9*  --  18.7  --    CREATININE 0.88  --  1.06  --  0.83  --  0.82  --  0.82  --    EGFRNORACEVR >60   --  >60  --  >60  --  >60  --  >60  --    GLUCOSE 118*  --  125*  --  94  --  78*  --  99  --    BILITOT  --   --   --   --  0.5  --  0.5  --  0.4  --    ALKPHOS  --   --   --   --  89  --  96  --  83  --    ALT  --   --   --   --  8  --  7  --  6  --    AST  --   --   --   --  11  --  11  --  9*  --    ALBUMIN  --   --   --   --  2.8*  --  3.0*  --  2.7*  --    AMMONIA  --   --   --   --   --  28.1  --   --   --   --    WBC 11.29  --  9.07  --  8.90  --  6.71 6.09 7.39 6.16   HGB 10.2*   < > 9.3* 9.0* 8.6*  --  9.8* 9.2* 8.7* 8.1*   HCT 33.2*   < > 31.1* 29.3* 28.7*  --  32.6* 30.4* 27.9* 25.9*    < > = values in this interval not displayed.     Nutrition Orders:  Diet Pureed (IDDSI Level 4) Moderately Thick Liquids (IDDSI Level 3); Standard Tray  Dietary nutrition supplements TID; Boost Very High Calorie Nutritional Drink - Vanilla    Appetite/Oral Intake: poor/0-25% of meals  Factors Affecting Nutritional Intake: decreased appetite and food textures/preferences  Social Needs Impacting Access to Food: unable to assess at this time; will attempt on follow-up  Food/[place]/Cultural Preferences: unable to obtain  Food Allergies: no known food allergies  Last Bowel Movement: 04/24/25  Wound(s):     Wound 04/10/25 1800 Shearing Right Buttocks-Tissue loss description: Not applicable       Wound 04/10/25 1800 Skin Tear Right lower Arm-Tissue loss description: Partial thickness      Comments    3/31/25: Discussed with RN. Will provide tube feeding recommendations for when appropriate to start tube feeding. Receiving kcal from meds.  Currently with Impella in place, HOB flat. May need to place in reverse Trendelenburg to start TF.     4/1/25: Possible plans for starting trickle feeds today. Still receiving kcal from meds.     4/2/25: No TF yet. Still receiving kcal from meds.     4/3/25: No TF plans at this time. Plans for CABG tomorrow with plans for extubation per protocol post-op.     4/7/25: No CABG done. Possible  "plans for removal of Impella today. TF to start post procedure per RN.     4/8/25: Plans for Impella removal tomorrow. TF to start today.     4/10/25: Plans for procedure today. TF on hold. Previously tolerated per RN.     4/14/25: Pt now extubated, on po diet. 25-50% po intake of meals per RN. Only taking small bites at this time. Attempted to verify subjective info with pt, pt [place] and not understanding questions. Was willing to have ONS sent. Also add Ousmane due to pressure ulcer.     4/17/25: Reports not being hungry. Ate 0% breakfast and lunch. Did drink almost 100% Boost this morning with medications. Will continue Boost Plus with breakfast, discussed thickening instructions with RN. Trial Magic Cup with lunch and dinner. Re-add Ousmane for wound healing once oral intake improves. Last BM 4/11.    4/21/25: Intake of meals remains inadequate. States po intake is dependent on meal. Does likes Boost, drinks when encouraged. Noted Magic Cup discontinued over the weekend by RN, maybe pt did not like? Will increase Boost back to every meal.     4/24/25: Poor intake of meals, mainly drinking Boost and water. Will change supplement to Boost VHC, discussed thickening instructions with RN. No GI complaints.     4/28/25: Did not eat puree breakfast. Continues with poor intake of meals. Drinking some of the Boost VHC and thickened water, unsure amount of bottles daily. Denies any GI complaints. Discussed encouragement of Boost VHC 2-3x daily.     Anthropometrics    Height: 5' 10.98" (180.3 cm), Height Method: Stated  Last Weight: 48.4 kg (106 lb 9.6 oz) (04/20/25 0557), Weight Method: Bed Scale  BMI (Calculated): 14.9  BMI Classification: obese grade I (BMI 30-34.9)        Ideal Body Weight (IBW), Male: 171.88 lb     % Ideal Body Weight, Male (lb): 130.81 %                          Usual Weight Provided By: unable to obtain usual weight    Wt Readings from Last 5 Encounters:   04/20/25 48.4 kg (106 lb 9.6 oz)   12/02/21 101 " kg (222 lb 10.6 oz)     Weight Change(s) Since Admission:   4/28/25 no updated weights   4/24/25 no updated weights   4/20/25 48.4kg, inaccurate weight, possibly 106kg?  Wt Readings from Last 1 Encounters:   04/20/25 0557 48.4 kg (106 lb 9.6 oz)   04/17/25 0617 106.6 kg (235 lb 0.2 oz)   03/29/25 1738 106.1 kg (233 lb 14.5 oz)   03/29/25 0404 102.1 kg (225 lb)   Admit Weight: 102.1 kg (225 lb) (03/29/25 0404), Weight Method: Stated    Estimated Needs    Weight Used For Calorie Calculations: 106.1 kg (233 lb 14.5 oz)  Energy Calorie Requirements (kcal): 2337kcal (1.3 stress factor)  Energy Need Method: [place]-St Jeor  Weight Used For Protein Calculations: 106.1 kg (233 lb 14.5 oz)  Protein Requirements: 117-138gm (1.1-1.3g/kg)  Fluid Requirements (mL): 2122ml (20ml/kg)  CHO Requirement: 260gm (45% est kcal needs)     Enteral Nutrition     Patient not receiving enteral nutrition at this time.    Parenteral Nutrition     Patient not receiving parenteral nutrition support at this time.    Evaluation of Received Nutrient Intake    Calories: not meeting estimated needs  Protein: not meeting estimated needs    Patient Education     Not applicable.    Nutrition Diagnosis     PES: Inadequate oral intake related to acute illness as evidenced by 25-50% po intake of meals since diet advanced. (active)     PES:            Nutrition Interventions     Intervention(s): modified composition of meals/snacks, commercial beverage, prescription medication, and collaboration with other providers  Intervention(s): Oral diet/nutrient modifications;Oral nutritional supplement;Feeding assistance/management    Goal: Meet greater than 80% of nutritional needs by follow-up. (goal not met)  Goal: Tolerate enteral feeding at goal rate by follow-up. (goal discontinued)    Nutrition Goals & Monitoring     Dietitian will monitor: food and beverage intake, energy intake, weight change, beliefs/attitudes, and gastrointestinal profile  Discharge  planning:  cardiac diet with texture modifications per SLP  Nutrition Risk/Follow-Up: high (follow-up in 1-4 days)   Please consult if re-assessment needed sooner.

## 2025-04-28 NOTE — PROGRESS NOTES
UROLOGY  Progress  NOTE    Jon Conley 1945  [id number]  4/27/2025      Hx of BPH status post TURP late Feb Dr. López. Clot evac 3 weeks ago.  He experienced PA arrest requiring intubation and CPR.  He underwent left heart catheterization with Impella placement on March 29th.  He underwent PCI on April 10th with 4 stents.  He has been on anticoagulation.    He has experienced intermittent hematuria.  He was evaluated last week.  Hematuria improved.       04/26/2025 Patient was seen and examined.  There was minimal urine in the bag with dark hematuria and tubing.  His daughter reports that the catheter has not been exchanged and a couple of weeks.  I attempted to irrigate the catheter without success.  I elected to exchange the catheter.    The indwelling catheter was removed after deflating 30 cc from the balloon.  The penis was prepped and draped in usual sterile fashion.  A 22 [place] soft three-way catheter was advanced into the bladder with the immediate discharge of bloody urine.  The balloon was inflated with 20 cc of sterile water and pulled toward the bladder neck.  I performed hand irrigation evacuating a proximally 250 cc of mature clot.  There was much improved coloration of urine after evacuation.  CBI was connected on a slow drip with a light pink yellow output.  Postprocedure bladder scan with estimated 60 cc    04/27/2025.  He has no complaints today.  Catheter is draining garcia urine on minimal CBI        Exam:    NAD  Card RRR  Resp unlabored  Abd soft, NTND  Extremity no C/C/E      Recent Results (from the past 24 hours)   Folate    Collection Time: 04/27/25  4:44 AM   Result Value Ref Range    Folate Level 8.5 7.0 - 31.4 ng/mL   CBC Without Differential    Collection Time: 04/27/25  4:45 AM   Result Value Ref Range    WBC 7.39 4.50 - 11.50 x10(3)/mcL    RBC 2.98 (L) 4.70 - 6.10 x10(6)/mcL    Hgb 8.7 (L) 14.0 - 18.0 g/dL    Hct 27.9 (L) 42.0 - 52.0 %    MCV 93.6 80.0 - 94.0 fL    MCH  29.2 27.0 - 31.0 pg    MCHC 31.2 (L) 33.0 - 36.0 g/dL    RDW 17.3 (H) 11.5 - 17.0 %    Platelet 206 130 - 400 x10(3)/mcL    MPV 10.6 (H) 7.4 - 10.4 fL    NRBC% 0.0 %   Comprehensive Metabolic Panel    Collection Time: 04/27/25  4:45 AM   Result Value Ref Range    Sodium 141 136 - 145 mmol/L    Potassium 4.0 3.5 - 5.1 mmol/L    Chloride 111 (H) 98 - 107 mmol/L    CO2 20 (L) 23 - 31 mmol/L    Glucose 99 82 - 115 mg/dL    Blood Urea Nitrogen 18.7 8.4 - 25.7 mg/dL    Creatinine 0.82 0.72 - 1.25 mg/dL    Calcium 8.3 (L) 8.8 - 10.0 mg/dL    Protein Total 6.0 5.8 - 7.6 gm/dL    Albumin 2.7 (L) 3.4 - 4.8 g/dL    Globulin 3.3 2.4 - 3.5 gm/dL    Albumin/Globulin Ratio 0.8 (L) 1.1 - 2.0 ratio    Bilirubin Total 0.4 <=1.5 mg/dL    ALP 83 40 - 150 unit/L    ALT 6 0 - 55 unit/L    AST 9 (L) 11 - 45 unit/L    eGFR >60 mL/min/1.73/m2    Anion Gap 10.0 mEq/L    BUN/Creatinine Ratio 23    Iron and TIBC    Collection Time: 04/27/25  4:45 AM   Result Value Ref Range    Iron Binding Capacity Unsaturated 148 60 - 240 ug/dL    Iron Level 17 (L) 65 - 175 ug/dL    Transferrin 152 (L) 163 - 344 mg/dL    Iron Binding Capacity Total 165 (L) 250 - 450 ug/dL    Iron Saturation 10 (L) 20 - 50 %   Ferritin    Collection Time: 04/27/25  4:45 AM   Result Value Ref Range    Ferritin Level 124.88 21.81 - 274.66 ng/mL   Vitamin B12    Collection Time: 04/27/25  4:45 AM   Result Value Ref Range    Vitamin B12 1,256 (H) 213 - 816 pg/mL   Phosphorus    Collection Time: 04/27/25  4:45 AM   Result Value Ref Range    Phosphorus Level 2.8 2.3 - 4.7 mg/dL   Magnesium    Collection Time: 04/27/25  4:45 AM   Result Value Ref Range    Magnesium Level 2.24 1.60 - 2.60 mg/dL         Assessment:  Gross hematuria  -s/p TURP 6 weeks ago, cytso clot evac and fulguration 3 weeks ago  -catheter exchange 04/26/2025, evac 250cc mature clot.       Plan:  Titrate CBI until clamped.  He may experience hematuria given his dual anticoagulation.  Ok to clamp irrigation port once  hematuria resolves.    Ucx ordered from new cath.  Preliminary no growth  Consider bladder US if hematuria persist.     May consider void trial once hematuria resolves or he can be discharged with Ortiz catheter and he can follow up with his primary urologist.    AF

## 2025-04-28 NOTE — PROGRESS NOTES
Advance Care Planning     Date: 04/28/2025    LaPOST left at bedside with patient to review with daughters. Patient reports daughters plan to visit this afternoon, educated to notify palliative care team of arrival, and if available would come by and attempt to complete document. Verbalized understanding.

## 2025-04-28 NOTE — PT/OT/SLP PROGRESS
Physical Therapy Treatment    Patient Name:  Jon Conley   MRN:  [id number]    Recommendations:     Discharge therapy intensity: Moderate Intensity Therapy   Discharge Equipment Recommendations: to be determined by next level of care  Barriers to discharge: Impaired mobility and Ongoing medical needs    Assessment:     Jon Conley is a 79 y.o. male admitted with a medical diagnosis of SOB, cardiac arrest, s/p LHC and impella on 3/29, s/p high risk PCI with 4 stents in LAD on 4/10; acute respiratory failure requiring intubation 3/29 with extubation 4/13.  He presents with the following impairments/functional limitations: weakness, impaired endurance, impaired functional mobility, gait instability, impaired balance, decreased lower extremity function, edema, impaired cardiopulmonary response to activity. Pt limited in therapy 2/2 positive orthostatics however pt able to take 4-6 steps this date.     Rehab Prognosis: Good; patient would benefit from acute skilled PT services to address these deficits and reach maximum level of function.    Recent Surgery: Procedure(s) (LRB):  Left heart cath (N/A) 18 Days Post-Op    Plan:     During this hospitalization, patient would benefit from acute PT services 5 x/week to address the identified rehab impairments via gait training, therapeutic activities, therapeutic exercises, neuromuscular re-education and progress toward the following goals:    Plan of Care Expires:  05/24/25    Subjective     Chief Complaint: 'the belt is too tight', 'i'm weak', dizziness  Patient/Family Comments/goals: to get stronger  Pain/Comfort:  Pain Rating 1:  (none stated)      Objective:     Communicated with RN prior to session.  Patient found HOB elevated with pulse ox (continuous), telemetry, contreras catheter, peripheral IV (CBI) upon PT entry to room.     General Precautions: Standard, aspiration  Orthopedic Precautions: N/A  Braces: N/A  Respiratory Status: Room air  Blood Pressure:    HOB elevated, no sada hose: 118/71  Seated EOB: 91/61  Seating in chair post t/f legs down: 94/48  Semi reclined, legs elevated: 105/67  Skin Integrity: Visible skin intact      Functional Mobility:  Bed Mobility:     Scooting: moderate assistance  Supine to Sit: maximal assistance  Transfers:     Sit to Stand:  moderate assistance and of 2 persons with rolling walker  Bed to Chair: minimum assistance with  rolling walker  using  Step Transfer  Gait: 4-6 steps forward w/RW, Moraima  Balance: Sitting balance = SBA    Therapeutic Activities/Exercises:      Education:  Patient provided with verbal education education regarding PT role/goals/POC, fall prevention, and safety awareness.  Understanding was verbalized, however additional teaching warranted.     Patient left up in chair with all lines intact, call button in reach, deep pad in place, and RN notified    GOALS:   Multidisciplinary Problems       Physical Therapy Goals          Problem: Physical Therapy    Goal Priority Disciplines Outcome Interventions   Physical Therapy Goal     PT, PT/OT Progressing    Description: Goals to be met by: 5/15/25     Patient will increase functional independence with mobility by performin. Supine to sit with MInimal Assistance  2. Sit to supine with MInimal Assistance  3. Sit to stand transfer with Moderate Assistance  4. Bed to chair transfer with Moderate Assistance using Rolling Walker  5. Sitting at edge of bed x20 minutes with Stand-by Assistance  6. Pt will ambulate 50ft with RW with Minimal Assistance.                         Time Tracking:     PT Received On: 25  PT Start Time: 1428     PT Stop Time: 1500  PT Total Time (min): 32 min     Billable Minutes: Gait Training 1 unit and Therapeutic Activity 1 unit    Treatment Type: Treatment  PT/PTA: PTA     Number of PTA visits since last PT visit: 2     2025

## 2025-04-28 NOTE — PLAN OF CARE
Ade Dubois Has received Auth Approval on the pt and can admit when clinically stable .  clinical Update sent to Ade Dubois

## 2025-04-28 NOTE — PROGRESS NOTES
UROLOGY  PROGRESS  NOTE    Jon Conley 1945  [id number]  4/28/2025      Patient resting in bed  Denies contreras discomfort  Continues with significant weakness    Afebrile   WBC 6.16   H&H 8.1/25.9    Intake/Output:  No intake/output data recorded.  I/O last 3 completed shifts:  In: 240 [P.O.:240]  Out: -700      Exam:    NAD  Card: RRR  Resp: unlabored  Abd: soft, NTND  : georgina urine in  tubing, brownish urine in  bag without clots  Extremity: no C/C/E    Recent Results (from the past 24 hours)   CBC Without Differential    Collection Time: 04/28/25  4:08 AM   Result Value Ref Range    WBC 6.16 4.50 - 11.50 x10(3)/mcL    RBC 2.81 (L) 4.70 - 6.10 x10(6)/mcL    Hgb 8.1 (L) 14.0 - 18.0 g/dL    Hct 25.9 (L) 42.0 - 52.0 %    MCV 92.2 80.0 - 94.0 fL    MCH 28.8 27.0 - 31.0 pg    MCHC 31.3 (L) 33.0 - 36.0 g/dL    RDW 17.5 (H) 11.5 - 17.0 %    Platelet 192 130 - 400 x10(3)/mcL    MPV 10.8 (H) 7.4 - 10.4 fL    NRBC% 0.0 %       Assessment:  Gross hematuria  -s/p TURP 6 weeks ago, cytso clot evac and fulguration 3 weeks ago  -catheter exchange 04/26/2025, evac 250cc mature clot 4/27  -UC with no growth at 24 hrs    Plan:  -Continue indwelling contreras, OK to plug irrigation port  -Recommend d/c with indwelling contreras and f/u outpatient with Dr. López for void trial  -Discussed with patient  -Urology is signing off. Please call as needed with any issues.     Georgina Schwab NP

## 2025-04-28 NOTE — PT/OT/SLP PROGRESS
[facility]  Speech Language Pathology Department  Dysphagia Therapy Progress Note    Patient Name:  Jon Conley   MRN:  [id number]    Recommendations     General recommendations:  dysphagia therapy  Solid texture recommendation:  Easy to Chew Diet - IDDSI Level 7  Liquid consistency recommendation: Moderately thick liquids - IDDSI Level 3   Medications: crushed in puree  Aspiration precautions: small bites/sips, slow rate, and upright for PO intake    Discharge therapy intensity: Moderate Intensity Therapy   Barriers to safe discharge:  level of skilled assistance needed    Subjective     Patient awake, alert, and agitated.  Spiritual/Cultural/Denominational Beliefs/Practices that affect care: no    Pain/Comfort: Pain Rating 1: 0/10    Objective     Therapeutic PO Trials:  Consistency Amount Fed By Oral Symptoms Pharyngeal Symptoms   Regular solid Multiple trials SLP Prolonged bolus formation/mastication None   Thin liquid by cup Multiple trials SLP None None     PO trials limited due to patient refusal.     Assessment     Pt continues to present with oropharyngeal dysphagia requiring diet modification to improve swallow safety and efficiency. Upgrade to easy to chew solids at this time. May benefit from repeat MBS in near future in order to re-evaluate swallow safety with liquid consistencies.    Outcome Measures     Functional Oral Intake Scale: 5 - Total oral diet with multiple consistencies, by requiring special preparation or compensations    Goals     Multidisciplinary Problems       SLP Goals          Problem: SLP    Goal Priority Disciplines Outcome   SLP Goal     SLP    Description: LTG:  Pt will tolerate least restrictive diet with no clinical s/sx of aspiration.    STGs:  1. Tolerate half meal of soft/bite-sized consistency with no overt s/sx of aspiration, should dentures be brought to hospital.  2.  Perform BOT and LE exercises.  3.  Perform effortful swallow (ice chips  ok for this exercise).                     Patient Education     Patient provided with verbal education regarding diet modifications and SLP POC.  Additional teaching is warranted.    Plan     Will continue to follow and tx as appropriate.    SLP Follow-Up:  Yes   Patient to be seen:  5 x/week   Plan of Care expires:  04/27/25  Plan of Care reviewed with:  patient       Time Tracking     SLP Treatment Date:   04/28/25  Speech Start Time:  1420  Speech Stop Time:  1430     Speech Total Time (min):  10 min    Billable minutes:  Treatment of Swallow Dysfunction, 10 minutes       04/28/2025

## 2025-04-28 NOTE — PROGRESS NOTES
[facility] - [facility] Medicine - Progress Note      Patient Name: Jon Conley  MRN: [id number]  Admission Date: 3/29/2025 - IP- Inpatient   Length of Stay: 29  Code Status: DNR    Subjective   Chief Complaint:   Inpatient Follow-up for cardiogenic shock, hematuria    HPI and Hospital course:  This is a 79-year-old male with medical history of HTN, HLD, CAD, PAD, GERD, and BPH s/p TURP  2/24/2025 at [facility], subsequently developed hematuria around mid March and had few ED visits and diagnosed with UTI, eventually hospitalized on 03/27/2025 and underwent cystoscopy/clot evacuation and discharge the same day with indwelling Ortiz catheter.     Presented to [facility] ED on 03/29/2025 with complaint of chest pain and dyspnea found to be hypotensive with elevated troponin and BNP, diagnosed with NSTEMI and given IV heparin and transferred to [facility] ED for higher level of care.  At [facility] ED, suffered PEA arrest and achieved ROSC in approximately 6 minutes, started on vasopressors, amiodarone and heparin infusion, taken emergently to cath lab [facility] revealed severe multivessel CAD and 100% occluded mid RCA s/p PTCA/TAD and Impella support, subsequently admitted to ICU.  Cardiothoracic surgery consulted and was planned for CABG but his ICU course complicated with prolonged cardiogenic shock requiring continued Impella and vasopressor support after which he was deemed high-risk and not candidate for surgery for which he underwent high-risk PCI to LAD on 04/10/2025 with 4 drug-eluting stents placed, he subsequently continued to show improvement and eventually extubated on 04/13/2025 and downgraded out of ICU on 04/14/2025 under hospital medicine service.    Continued to show slow improvement and working with physical therapy and weaning off oxygen but unfortunately developed hematuria and possible melena on 04/21/2025, urology consulted and  started on CBI and ceftriaxone.  GI consulted no plan for endoscopy continue PPI b.i.d..     Hematuria continued for few days and developed clot and not draining on 04/26/2025 morning, nurses attempted to irrigate and was unsuccessful, and required Urology re-consultation and attempted to irrigate the Ortiz catheter without success subsequently catheter removed and a new 22 U[place]bek three-way catheter was placed and started on CBI with the urine color dark pink.    ROS: Except as documented, all systems reviewed and negative.    Interval History:  No new events.  feeling good and denying any complaints urine culture without growth. Labs this morning show stable hemoglobin at 8.7 slight drop since yesterday, iron profile suggestive of of anemia of chronic disease and relative iron-deficiency.  Urine georgina color on minimal slow CBI.  Evaluated by Urology and ok to clamp irrigation port of CBI and consider voiding trial prior to discharge versus discharged with a Ortiz and outpatient follow up with his primary urologist.    Objective   Physical exam:  Vital Signs  Temp:  [97.5 °F (36.4 °C)-97.9 °F (36.6 °C)]   Pulse:  [73-91]   Resp:  [18]   BP: (109-123)/(67-75)   SpO2:  [94 %-97 %]    General: Appears comfortable  Chest: CTABL  CVS: Regular rhythm.  Plus one pedal edema  Abdomen:  Soft, nontender, 3 way Ortiz catheter and CBI ongoing at a slow rate with urine georgina yellow colored  Skin: Warm and dry  Neuro: AAOx3  Psych: Cooperative    Labs & diagnostics  Recent Labs     04/26/25  0432 04/27/25  0445   WBC 6.09 7.39   HGB 9.2* 8.7*   HCT 30.4* 27.9*    206      Recent Labs     04/27/25  0445      K 4.0   *   CO2 20*   BUN 18.7   CREATININE 0.82   EGFRNORACEVR >60   GLUCOSE 99   CALCIUM 8.3*   MG 2.24   PHOS 2.8   ALBUMIN 2.7*   GLOBULIN 3.3   ALKPHOS 83   ALT 6   AST 9*   BILITOT 0.4        Microbiology Results (last 7 days)       Procedure Component Value Units Date/Time    Urine culture  [[id number]] Collected: 04/26/25 1735    Order Status: Completed Specimen: Urine Updated: 04/27/25 0924     Urine Culture No Growth At 24 Hours    Urine Culture High Risk [[id number]] Collected: 04/27/25 0401    Order Status: Sent Specimen: Urine, Catheterized Updated: 04/27/25 0404               Inpatient Medications  Scheduled Med:   aspirin  81 mg Oral Daily    atorvastatin  40 mg Oral Daily    clopidogreL  75 mg Oral Daily    dapagliflozin propanediol  10 mg Oral Daily    LIDOcaine  1 patch Transdermal Daily    metoprolol succinate  50 mg Oral Daily    nitrofurantoin (macrocrystal-monohydrate)  100 mg Oral QHS    pantoprazole  40 mg Oral BID    sacubitriL-valsartan  1 tablet Oral BID    senna-docusate  1 tablet Oral Daily    sucralfate  1 g Oral QID (AC & HS)    zinc oxide-cod liver oil   Topical (Top) BID      Continuous Infusions:     PRN Meds:    Current Facility-Administered Medications:     acetaminophen, 650 mg, Oral, Q4H PRN    bisacodyL, 10 mg, Rectal, Daily PRN    dextrose 50%, 12.5 g, Intravenous, PRN    dextrose 50%, 25 g, Intravenous, PRN    glucagon (human recombinant), 1 mg, Intramuscular, PRN    glucose, 16 g, Oral, PRN    glucose, 24 g, Oral, PRN    hydrALAZINE, 10 mg, Intravenous, Q4H PRN    ondansetron, 8 mg, Oral, Q8H PRN    polyethylene glycol, 17 g, Oral, BID PRN @        Assessment & Plan:   Severe multivessel CAD/NSTEMI  Ischemic cardiomyopathy/Acute HFrEF 15%- compensated  In-hospital PEA cardiac arrest 03/29/2025 - ACLS with ROSC in 6 minutes  Cardiogenic shock:  Required Impella and vasopressors - resolved  [facility] 03/29/2025:  Culprit mid RCA s/p PCTA and DESx2  CT surgery- deemed not a surgical candidate for CABG  [facility] 04/10/2025:  High-risk PCI to LAD with Impella support, TAD x4  Continue aspirin, Plavix, statin, metoprolol, dapagliflozin  Add further GDMT:  BP appear stable in the 120s today, will start Entresto 24/26 b.i.d.  DNR- no LifeVest  Will order repeat echo to reassess LV  function    Acute hypoxemic respiratory failure  Secondary to above  Improving, continue to wean off nasal cannula for goal saturation above 92%.    Recurrent hematuria post TURP  BPH S/P TURP 02/24/2025  S/P cystoscopy and clot evacuation/fulguration 03/27/2025  Recurrent hematuria 4/21/2025 - started CBI  Catheter clotted, replaced 04/26/2025 and continued CBI  Urine cleared, Clamp irrigation port of  CBI and monitor 04/27/2025, discontinue ceftriaxone and will start on suppressive nitrofurantoin 100 mg nightly until no further hematuria and Ortiz catheter is out.    GI bleed/melena  Switch to PO PPI b.i.d. and Carafate q.i.d.  GI consulted, no plan for endoscopy unless overt bleed with decompensation    Normocytic anemia -iron-deficiency due to blood loss  Secondary to recurrent intermittent blood loss/hematuria  Hemoglobin was 13 at baseline in February 2025  Ferrlecit 250 mg daily for 3 doses  Goal hemoglobin around 9    History of HTN, HLD, PAD, GERD, chronic tobacco smoker      VTE Prophylaxis:-SCDs, no chemical prophylaxis given high bleeding risk with hematuria and being on DAPT  Patient condition:-guarded/improving  Disposition:  Awaiting SNF - medically ready for transfer      Krystin Phillip MD  Internal Medicine

## 2025-04-29 PROBLEM — I50.22 CHRONIC HEART FAILURE WITH REDUCED EJECTION FRACTION (HFREF, <= 40%): Status: ACTIVE | Noted: 2025-01-01

## 2025-04-29 PROBLEM — I46.9 CARDIAC ARREST: Status: RESOLVED | Noted: 2025-01-01 | Resolved: 2025-01-01

## 2025-04-29 PROBLEM — I46.9 CARDIOPULMONARY ARREST: Status: ACTIVE | Noted: 2025-01-01

## 2025-04-29 PROBLEM — I21.4 NSTEMI (NON-ST ELEVATED MYOCARDIAL INFARCTION): Status: RESOLVED | Noted: 2025-01-01 | Resolved: 2025-01-01

## 2025-04-29 PROBLEM — I21.9 ACUTE MYOCARDIAL INFARCTION: Status: ACTIVE | Noted: 2025-01-01

## 2025-04-29 PROBLEM — I48.91 ATRIAL FIBRILLATION WITH RAPID VENTRICULAR RESPONSE: Status: ACTIVE | Noted: 2025-01-01

## 2025-04-29 PROBLEM — I25.5 ISCHEMIC CARDIOMYOPATHY: Status: ACTIVE | Noted: 2025-01-01

## 2025-04-29 PROBLEM — I46.9 CARDIAC ARREST: Status: ACTIVE | Noted: 2025-01-01

## 2025-04-29 PROBLEM — R57.0 CARDIOGENIC SHOCK: Status: RESOLVED | Noted: 2025-01-01 | Resolved: 2025-01-01

## 2025-04-29 PROBLEM — R57.0 CARDIOGENIC SHOCK: Status: ACTIVE | Noted: 2025-01-01

## 2025-04-29 NOTE — PT/OT/SLP PROGRESS
Physical Therapy      Patient Name:  Jon Conley   MRN:  [id number]    Patient not seen today secondary to RN hold. CT showed bilat PEs and abdominal aneurysm. Will follow-up as appropriate/schedule allows.

## 2025-04-29 NOTE — PROGRESS NOTES
[facility]    Cardiology  Progress Note    Patient Name: Jon Conley  MRN: [id number]  Admission Date: 3/29/2025  Hospital Length of Stay: 31 days  Code Status: DNR   Attending Provider: Krystin Phillip MD   Consulting Provider: ANDREW Ham  Primary Care Physician: Patrick Reece Jr., MD  Principal Problem:Ischemic cardiomyopathy    Patient information was obtained from past medical records and ER records.     Subjective:     Chief Complaint/Reason for Consult: anticoagulation recommendation    HPI: Mr. Conley is a 80 y/o male who is known to [facility], Dr. Wang. The patient presented to [facility] on 3.29.25 with c/o SOB. He initially presented to [facility] in [place] with SOB. He was found to have Tachycardia, Hypotension, Anemia (7.4/24.6), Troponin 9.443, .0, BUN/Crea 16.2/1.34, Lactic Acid Level 4.3, Na 133, WBC 14.03, H&H 7.3/23.0, WBC 14.03, , PT/INR 14.9/1.2. He was admitted to , however, he sustained a witnessed PEA Arrest in the ER and has ROSC per ER MD. [facility] was consulted for NSETMI and Arrest.     3.30.25: NAD. Impella L Groin. CVP 7, P7. Levophed 0.32mcg/kg/min, Heparin Drip per Protocol, Amiodarone 0.5mg/min  3.31.25: NAD. L Groin Impella P7, Amiodarone 0.5mg/min, Heparin Drip per Protocol, Levophed 0.18mcg/kg/min, H&H 8.8/25.8, AST/ALT 79/45  4.1.25: NAD noted. L groin Impella in place, remains at P7. Remains on levophed. LFTs continue to improve.  4.2.25: NAD. L Groin Impella Device P6. Heparin Infusion per Protocol. Remains on 0.06mcg/kg/min.   4.3.25: NAD. Vented/Sedated. L Groin Impella Device. P3, Heparin Infusion per Protocol. Off Pressors. Attempting Wean of Impella. H&H 8.2/26.2,   4.4.25: NAD. Vented/Sedated. Heparin Drip per Protocol. H&H 8.1/95.4,   4.5.25: NAD. Vented/Sedated. Heparin Drip per Protocol. Levophed 0.04mcg/kg/min. H&H 7.9/23.8,  Impella P6  4.6.25: NAD. Vented/Sedated. P3.  "Heparin Drip per Protocol, Off Pressors. CV Surgery to Reevaluate for CABG. H&H 7.4/22.7,   4.7.25: NAD. Vented/Sedated. H&H 9.2/27.7, , K 3.9, BUN/Crea 9.2/0.61, Lactic Acid 0.6. Impella L Groin P4  4.8.25:NAD noted. Remains vented/sedated. Consent obtained for LHC, will plan for Thursday.  4.9.25: NAD. Remains Vented/Sedated. Plans for LHC with PCI on Thursday. Impella P4. Remains Off Pressors.   4.10.25: NAD. Remains Vented/Sedated. NPO for Planned LHC with PCI/Impella Assisted. P4 Impella. Levophed 0.1mcg/kg/min.   4.11.25: NAD. Vented/Sedated. Levophed 0.12mcg/kg/min. S/P PCI of LAD and Impella Explant.   4.12.25: NAD. Vented/Sedated. Levophed 0.08mcg/kg/min.   4.13.25: NAD. Sitting in Bedside Chair. Denies CP, SOB and Palps. Off Pressors. "I am ok."   4.14.25: NAD. "I am fine." Denies CP, SOB and Palps. Remains Off Pressors.   4.15.25: NAD. "Hi." Denies CP, SOB and Palps.   4.29.25 Reconsult for hematuria.    PMH: BPH/Urinary Obstruction, CAD/Nonobstructive, PAD, R JACY/Mild, HTN, Obesity, GERD, Tobacco Use   PSH: TURP, Angiogram, Bilateral Knee Replacements   Family History: Denies Family History of Heart Disease  Social History: Denies Illicit Drug, ETOH and Tobacco Use     Previous Cardiac Diagnostics:   [facility] (4.10.25):  Findings:  - Successful Impella-protected PCI of LAD.  - Prox to Mid Left Anterior Descending has severe diffuse disease, up to 80% in severity. The lesion was successfully treated with overlapping FRONTIER BERNADINE 3.95S31MY, FRONTIER BERNADINE 3.61Q67BN, FRONTIER BERNADINE 2.62G71QQ, and SYNERGY XD 2.5X28MM drug-eluting stents. Post-dilatation was performed using an [employer] MR 3.5X20MM angioplasty balloon inflated to 20 gretel. Following intervention there was 0% residual stenosis and PATTY grade 3 flow in the distal vessel.  - Intravascular Ultrasound (IVUS) was performed prior to intervention to further characterize the lesion, as well as post-stent deployment to ensure optimal stent " apposition and expansion.  - Ostial First Diagonal Branch has a 70% stenosis. At the conclusion of the case, there was PATTY grade 3 flow in the distal vessel.  - Successful Impella CP removal and closure of L femoral arterial access site using a 14 Green[place]ic MANTA device.  Assessment/Plan:  - Patient is a 79 y.o. male with a history of CAD presents in cardiogenic shock. Successful PCI of RCA last week. Impella CP placed at that time. Now s/p successful LAD intervention and Impella CP removal.  - Continue DAPT (aspirin + clopidogrel) for a minimum of 12 months and aspirin indefinitely thereafter  - High intensity statin  - Arterial sheath remains place; plan is to remove when activated clotting time (ACT) less than 180 seconds. Bedrest for 4 hours after hemostasis is achieved.    ECHO Limited 4.8.25:  Impella check  Aortic annulus to tip distance is 4.3 cm.  LV systolic function is 15%.    ECHO 4.3.25:  Limited study to check impella placement.  Left Ventricle: There is severely reduced systolic function with a visually estimated ejection fraction of 15 - 20%.  Impella distance from aortic valve annulus appears appropriate.    ECHO 4.1.25:  Limited study to check impella placement.  Left Ventricle: There is severely reduced systolic function with a visually estimated ejection fraction of 15 - 20%.  Impella distance from aortic valve annulus appears appropriate.    ECHO 3.30.25:  Left Ventricle: There is severely reduced systolic function with a visually estimated ejection fraction of less than 30%.  There appears to be at least moderate to severe mitral regurgitation.   Impella is positioned with inflow cannula 3.8 cm from aortic valve annulus.    [facility] 3.29.25:  Findings:  - Successful placement of Impella CP device from left femoral approach.  - There is severe multi-vessel coronary artery disease.  - Left Main has mild diffuse disease.  - Mid Left Anterior Descending has serial lesions, up to 70-80% in severity.    - Prox Left Circumflex is 100% occluded. Unable to cross lesion despite wire escalation and microcatheter support.  - Mid Right Coronary Artery is 100% occluded. The lesion was successfully treated with overlapping SYNERGY XD 4.0X20MM, SYNERGY XD 3.0X48MM, and SYNERGY XD 2.05S83TQ drug-eluting stents. Post-dilatation was performed using a SYNERGY XD 4.0X20MM angioplasty balloon inflated to 12 gretel. Following intervention there was 0% residual stenosis and PATTY grade 3 flow in the distal vessel.  - Intravascular Ultrasound (IVUS) was performed prior to intervention to further characterize the lesion, as well as post-stent deployment to ensure optimal stent apposition and expansion.  - Aorto-iliac angiogram revealed moderate disease of R ileofemoral system.  - At the conclusion of the case, contrast injection was performed through the Impella sheath side port, showing adequate antegrade flow down the ipsilateral common femoral artery/SFA/profunda artery.  - RHC with elevated R sided filling pressures. RA 17 mmHg, RV 45/15 mmHg, PA 42/29 mmHg (mean 34 mmHg), PCWP 25 mmHg.  - Transpulmonary gradient is 9 mmHg.  - Cardiac Output/Index at 6.6/3.0, as calculated by Gregory equation on Levophed infusion and Impella CP support.  - PVR is 1.4 Woods units  - SVR is 550 dyne-sec*cm^-5  - Pulmonary Artery Pulsatility Index (Rafa) is 0.8  - Cardiac Power Output () is 0.91   Assessment/Plan:  - Patient is a 79 y.o. male with a history of recent TURP, complicated by significant post-op bleeding, presents to [facility] ED with dyspnea. Transferred to [facility] with worsening shock despite vasopressors. TTE showing EF 25%, severe MR, severe TR. Troponin elevated. Brought to cath lab. Underwent Impella placement/LHC/RHC as outlined above. RCA lesion treated. Unable to cross LCx lesion. 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 At this point, would continue Impella support and aggressive medical mgmt. Has non-culprit lesions that should be  treated at a later date as well. Can consider CTS consult, especially given severe valvular disease.  - Patient was given a loading dose of clopidogrel 600 mg PO in the cath lab  - Continue DAPT (aspirin + clopidogrel) for a minimum of 12 months and aspirin indefinitely thereafter  - High intensity statin  - Continue Aggrastat for 4 hours post-procedure  - Arterial sheath remains place; plan is to remove when activated clotting time (ACT) less than 180 seconds. Bedrest for 4 hours after hemostasis is achieved.  - Obtain transthoracic echocardiogram  - Continuous bedrest while Impella in place  - Therapeutic heparin administration per protocol  - Antibiotics per protocol  - Remaining mgmt per primary team/cardiology    ECHO 3.29.25:  Left Ventricle: The left ventricle is dilated. Normal wall thickness. Severe global hypokinesis present. There is severely reduced systolic function with a visually estimated ejection fraction of 15 - 20%. Grade I diastolic dysfunction.  Right Ventricle: Right ventricular enlargement. Systolic function is normal. TAPSE is 1.83 cm.  Left Atrium: dilated  Mitral Valve: Mildly thickened leaflets. There is moderate regurgitation.  Tricuspid Valve: There is moderate regurgitation.  Pulmonary Artery: There is moderate pulmonary hypertension.  IVC/SVC: Patient is ventilated, cannot use IVC diameter to estimate right atrial pressure.    Carotid US 11.4.24:  The study quality is average.   1-39% stenosis in the proximal right internal carotid artery based on Bluth Criteria.   Antegrade right vertebral artery flow.   Antegrade left vertebral artery flow    ECHO 1.27.22:  The study quality is average.   The left ventricle is normal in size. Global left ventricular systolic function is moderately decreased. The left ventricular ejection fraction is 40-45%. Mild concentric left ventricular hypertrophy is present.  The left ventricle diastolic function is impaired (Grade I) with normal left atrial  pressure.  Mild (1+) mitral regurgitation.  The pulmonary artery appears normal.     C 9.26.17:  LM: Normal  LAD: Prox 30% Stenosis  RI: Normal  LCX: Normal  RCA: Distal RCA 50%    Review of patient's allergies indicates:  No Known Allergies  No current facility-administered medications on file prior to encounter.     Current Outpatient Medications on File Prior to Encounter   Medication Sig    clopidogreL (PLAVIX) 75 mg tablet Take 75 mg by mouth once daily.    finasteride (PROSCAR) 5 mg tablet Take 5 mg by mouth once daily.    rosuvastatin (CRESTOR) 20 MG tablet Take 20 mg by mouth once daily.    silodosin (RAPAFLO) 8 mg Cap capsule Take 8 mg by mouth once daily.     Review of Systems   Constitutional:  Positive for fatigue. Negative for fever.   Respiratory:  Negative for shortness of breath.    Cardiovascular:  Negative for chest pain, palpitations and leg swelling.   All other systems reviewed and are negative.    Objective:     Vital Signs (Most Recent):  Temp: 97.8 °F (36.6 °C) (04/29/25 1130)  Pulse: 75 (04/29/25 1130)  Resp: 18 (04/29/25 1130)  BP: 102/62 (04/29/25 1130)  SpO2: 98 % (04/29/25 1130) Vital Signs (24h Range):  Temp:  [97.4 °F (36.3 °C)-98.7 °F (37.1 °C)] 97.8 °F (36.6 °C)  Pulse:  [65-84] 75  Resp:  [17-20] 18  SpO2:  [96 %-100 %] 98 %  BP: (100-111)/(56-68) 102/62   Weight: 48.4 kg (106 lb 9.6 oz)  Body mass index is 14.87 kg/m².  SpO2: 98 %       Intake/Output Summary (Last 24 hours) at 4/29/2025 1248  Last data filed at 4/29/2025 0611  Gross per 24 hour   Intake 498.75 ml   Output 1400 ml   Net -901.25 ml     Lines/Drains/Airways       Drain  Duration                  Urethral Catheter -- days              Peripheral Intravenous Line  Duration                  Peripheral IV - Single Lumen 04/14/25 1600 20 G 2 1/4 in Yes Anterior;Distal;Right Upper Arm 14 days                  Significant Labs:   Chemistries:   Recent Labs   Lab 04/23/25  0611 04/24/25  0712 04/27/25  0445 04/29/25  1141  "   142 141 136   K 4.1 3.6 4.0 3.6   * 111* 111* 108*   CO2 21* 23 20* 19*   BUN 32.0* 31.9* 18.7 16.6   CREATININE 0.83 0.82 0.82 0.75   CALCIUM 8.6* 8.8 8.3* 8.1*   PROT 6.1 7.3 6.0 5.9   BILITOT 0.5 0.5 0.4 0.7   ALKPHOS 89 96 83 80   ALT 8 7 6 <5   AST 11 11 9* 8*   MG  --  2.40 2.24  --    PHOS  --   --  2.8  --         CBC/Anemia Labs: Coags:    Recent Labs   Lab 04/27/25  0444 04/27/25  0445 04/28/25  0408 04/29/25  1141   WBC  --  7.39 6.16 5.52   HGB  --  8.7* 8.1* 9.0*   HCT  --  27.9* 25.9* 29.1*   PLT  --  206 192 185   MCV  --  93.6 92.2 92.7   RDW  --  17.3* 17.5* 17.6*   IRON  --  17*  --   --    FERRITIN  --  124.88  --   --    FOLATE 8.5  --   --   --    XRMCNXYR04  --  1,256*  --   --     No results for input(s): "PT", "INR", "APTT" in the last 168 hours.         EKG:      Telemetry: SR    Physical Exam  Constitutional:       General: He is not in acute distress.     Appearance: Normal appearance.   HENT:      Head: Normocephalic.      Mouth/Throat:      Mouth: Mucous membranes are moist.   Cardiovascular:      Rate and Rhythm: Normal rate and regular rhythm.      Pulses: Normal pulses.      Heart sounds: Murmur heard.   Pulmonary:      Effort: Pulmonary effort is normal. No respiratory distress.      Comments: NC O2  Abdominal:      Palpations: Abdomen is soft.   Skin:     General: Skin is warm.   Neurological:      General: No focal deficit present.      Mental Status: He is alert and oriented to person, place, and time.   Psychiatric:         Mood and Affect: Mood normal.         Judgment: Judgment normal.       Home Medications:   Medications Ordered Prior to Encounter[1]  Current Schedule Inpatient Medications:   aspirin  81 mg Oral Daily    atorvastatin  40 mg Oral Daily    clopidogreL  75 mg Oral Daily    dapagliflozin propanediol  10 mg Oral Daily    enoxparin  1 mg/kg Subcutaneous Q12H (treatment, non-standard time)    LIDOcaine  1 patch Transdermal Daily    metoprolol " succinate  25 mg Oral Daily    nitrofurantoin (macrocrystal-monohydrate)  100 mg Oral QHS    pantoprazole  40 mg Oral BID    senna-docusate  1 tablet Oral Daily    sucralfate  1 g Oral QID (AC & HS)    zinc oxide-cod liver oil   Topical (Top) BID     Continuous Infusions:      Assessment:   Segmental PE  Acute Hypoxemic Respiratory Failure requiring Intubation/Ventilation - Resolved on NC O2  Cardiogenic Shock requiring Mechanical and Chemical Support - Resolved     - Impella/Pressors - Impella Explanted on 4.10.25 post High Risk LAD PCI/Impella Assisted  MVCAD    - s/p LHC (4.10.25) - PTCA/TAD of the Prox to Mid LAD and Impella Explant     - s/p LHC (3.29.25) -  Left Main has mild diffuse disease. Mid Left Anterior Descending has serial lesions, up to 70-80% in severity. Prox Left Circumflex is 100% occluded. Unable to cross lesion despite wire escalation and microcatheter support. Mid Right Coronary Artery is 100% occluded. The lesion was successfully treated with overlapping SYNERGY XD 4.0X20MM, SYNERGY XD 3.0X48MM, and SYNERGY XD 2.14B76QJ drug-eluting stents. Post-dilatation was performed using a SYNERGY XD 4.0X20MM angioplasty balloon inflated to 12 gretel. Following intervention there was 0% residual stenosis and PATTY grade 3 flow in the distal vessel.  NSTEMI Type I (Non-Anterior Wall MI)    - Trponin > 9  Cardiopulmonary Arrest    - Initial Rhythm PEA - Witnessed in ER Arrest/ROSC  ICMO/EF 15%     - ECHO (4.8.25) - LVEF 15%    - ECHO (3.29.25) - LVEF 15-20%, Grade I DD    ST - Now SR   Hypotension requiring Pressors - Resolved    - Hx of HTN  BPH s/p TURP   Lactic Acidosis - Resolved   Leukocytosis - Resolved   Anemia requiring Transfusions - Stable   Recent BPH Diagnosis s/p TURP  CAD/Nonobstructive (2017)  JACY/R Sided   PAD/Nonobstructive   Transaminitis - Resolved   Obesity  GERD  Tobacco Use  No Hx of GIB     Plan:   Continue Plavix and eliquis  DC aspirin  Continue Toprol XL 12.5mg PO Daily  Continue  Farxiga 10mg PO Qday   Add ARNi when BP Allows   Groin Precautions   Follow up with Dr. Wang in 1-2 weeks    ANDREW Ham  Cardiology  [facility]       I agree with the findings of the complexity of problems addressed and take responsibility for the plan's risks and complications. I approved the plan documented by Gris Freitas NP.    Assessment:   Segmental PE  Acute Hypoxemic Respiratory Failure requiring Intubation/Ventilation - Resolved on NC O2  Cardiogenic Shock requiring Mechanical and Chemical Support - Resolved     - Impella/Pressors - Impella Explanted on 4.10.25 post High Risk LAD PCI/Impella Assisted  MVCAD    - s/p LHC (4.10.25) - PTCA/TAD of the Prox to Mid LAD and Impella Explant     - s/p LHC (3.29.25) -  Left Main has mild diffuse disease. Mid Left Anterior Descending has serial lesions, up to 70-80% in severity. Prox Left Circumflex is 100% occluded. Unable to cross lesion despite wire escalation and microcatheter support. Mid Right Coronary Artery is 100% occluded. The lesion was successfully treated with overlapping SYNERGY XD 4.0X20MM, SYNERGY XD 3.0X48MM, and SYNERGY XD 2.97V78PO drug-eluting stents. Post-dilatation was performed using a SYNERGY XD 4.0X20MM angioplasty balloon inflated to 12 gretel. Following intervention there was 0% residual stenosis and PATTY grade 3 flow in the distal vessel.  NSTEMI Type I (Non-Anterior Wall MI)    - Trponin > 9  Cardiopulmonary Arrest    - Initial Rhythm PEA - Witnessed in ER Arrest/ROSC  ICMO/EF 15%     - ECHO (4.8.25) - LVEF 15%    - ECHO (3.29.25) - LVEF 15-20%, Grade I DD    ST - Now SR   Hypotension requiring Pressors - Resolved    - Hx of HTN  BPH s/p TURP   Lactic Acidosis - Resolved   Leukocytosis - Resolved   Anemia requiring Transfusions - Stable   Recent BPH Diagnosis s/p TURP  CAD/Nonobstructive (2017)  JACY/R Sided   PAD/Nonobstructive   Transaminitis - Resolved   Obesity  GERD  Tobacco Use  No Hx of GIB     Plan:    Continue Plavix and eliquis  DC aspirin  Continue Toprol XL 12.5mg PO Daily  Continue Farxiga 10mg PO Qday   Add ARNi when BP Allows   Groin Precautions   Follow up with Dr. Wang in 1-2 weeks      Marc Orozco MD, FACC, FACP,  FAHA, [facility]  Interventional Cardiologist  [facility]           [1]   No current facility-administered medications on file prior to encounter.     Current Outpatient Medications on File Prior to Encounter   Medication Sig Dispense Refill    clopidogreL (PLAVIX) 75 mg tablet Take 75 mg by mouth once daily.      finasteride (PROSCAR) 5 mg tablet Take 5 mg by mouth once daily.      rosuvastatin (CRESTOR) 20 MG tablet Take 20 mg by mouth once daily.      silodosin (RAPAFLO) 8 mg Cap capsule Take 8 mg by mouth once daily.

## 2025-04-29 NOTE — PT/OT/SLP PROGRESS
Pt. Declining OT session this morning and leaving for CT scan, follow up as appropriate and schedule permits.

## 2025-04-29 NOTE — PROGRESS NOTES
Patient Name: Jon Conley   MRN: [id number]   Admission Date: 3/29/2025   Hospital Length of Stay: 31   Attending Provider: Krystin Phillip MD   Consulting Provider: ANDREW Burch  Reason for Consult: Goals of Care  Primary Care Physician:  Patrick Reece Jr., MD     Principal Problem: Ischemic cardiomyopathy     Patient information was obtained from patient, relative(s), and ER records.     Final diagnoses:  [R57.0] Cardiogenic shock  [I46.9] Cardiac arrest  [I46.9] Cardiac arrest - line placement  [I46.9] Cardiopulmonary arrest  [I48.91] Atrial fibrillation with rapid ventricular response  [I21.9] Acute myocardial infarction, unspecified MI type, unspecified artery      Assessment/Plan:     I reviewed the patient and family's understanding of the seriousness of the illness and its expected prognosis. We discussed the patient's goals of care and treatment preferences. I clarified current code status, which is Do Not Resuscitate. I identified the surrogate decision makers to be his two daughters, Sarah and Kimmy. I answered all questions and we formulated a plan including recommendations for symptom management and how to best achieve goals of care.       Patient is resting comfortably in bed on RA. He denies complaints at this time. Daughter Kimmy at bedside. Met with patient and daughter with palliative RN present. Reviewed current condition and that plan was transfer to SNF possibly today. Reviewed recent echo without significant improvement in EF at 20-25%. Reviewed CT AP. Daughter expresses concerns regarding aneurysm findings. Explained that a significant finding on CT scan is bilateral pulmonary thromboembolic disease. Explained that typically treatment for PE includes anticoagulation, which patient may not be able to tolerate with recent bleeding episodes. Will defer treatment to attending and encouraged family to discuss further with attending. Patient and daughter verbalize understanding.  Further goals of care discussions pending treatment plan.    Reviewed the role of LaPOST with patient and daughter. Patient and daughter confirm selections including A) DNR, B) selective treatment, and C) No artificial nutrition. These are verbal selections and no LaPOST has been completed at this time. Patient and daughter wish to speak with additional family prior to completing form. Offered and encouraged completion prior to discharge from hospital. They verbalize understanding. Offered support. Encouraged to call with questions or concerns. Palliative Medicine will continue to follow.    Advance Care Planning     Date: 04/29/2025    [facility]  I engaged the patient and family in a voluntary conversation about advance care planning and we specifically addressed what the goals of care would be moving forward, in light of the patient's change in clinical status, specifically current condition.  We did specifically address the patient's likely prognosis, which is fair .  We explored the patient's values and preferences for future care.  The patient and family endorses that what is most important right now is to focus on remaining as independent as possible, symptom/pain control, and improvement in condition but with limits to invasive therapies    Accordingly, we have decided that the best plan to meet the patient's goals includes continuing with treatment        Interval History:     No acute events overnight. Required 1 unit PRBC transfusion yesterday.      Active Ambulatory Problems     Diagnosis Date Noted    No Active Ambulatory Problems     Resolved Ambulatory Problems     Diagnosis Date Noted    No Resolved Ambulatory Problems     No Additional Past Medical History        Past Surgical History:   Procedure Laterality Date    CATHETERIZATION OF BOTH LEFT AND RIGHT HEART N/A 3/29/2025    Procedure: CATHETERIZATION, HEART, BOTH LEFT AND RIGHT;  Surgeon: Lorne Coon Jr., MD;  Location: [facility] CATH LAB;  Service:  "Cardiology;  Laterality: N/A;    LEFT HEART CATHETERIZATION N/A 4/10/2025    Procedure: Left heart cath;  Surgeon: Lorne Coon Jr., MD;  Location: [facility] CATH LAB;  Service: Cardiology;  Laterality: N/A;        Review of patient's allergies indicates:  No Known Allergies     Current Medications[1]       Current Facility-Administered Medications:     0.9%  NaCl infusion (for blood administration), , Intravenous, Q24H PRN    acetaminophen, 650 mg, Oral, Q4H PRN    bisacodyL, 10 mg, Rectal, Daily PRN    dextrose 50%, 12.5 g, Intravenous, PRN    dextrose 50%, 25 g, Intravenous, PRN    glucagon (human recombinant), 1 mg, Intramuscular, PRN    glucose, 16 g, Oral, PRN    glucose, 24 g, Oral, PRN    hydrALAZINE, 10 mg, Intravenous, Q4H PRN    ondansetron, 8 mg, Oral, Q8H PRN    polyethylene glycol, 17 g, Oral, BID PRN     No family history on file.       Review of Systems   Constitutional:  Positive for fatigue.   Respiratory: Negative.     Cardiovascular: Negative.    Gastrointestinal: Negative.    Genitourinary: Negative.    Musculoskeletal: Negative.    Neurological:  Positive for weakness.            Objective:   /60   Pulse 79   Temp 97.4 °F (36.3 °C) (Oral)   Resp 20   Ht 5' 10.98" (1.803 m)   Wt 48.4 kg (106 lb 9.6 oz)   SpO2 97%   BMI 14.87 kg/m²      Physical Exam   Constitutional: He is oriented to person, place, and time. No distress. He appears ill.   HENT:   Mouth/Throat: Mucous membranes are moist.   Cardiovascular: Normal rate.   No murmur heard.Pulmonary:      Effort: Pulmonary effort is normal. No respiratory distress.     Abdominal: He exhibits no distension.   Musculoskeletal:      Cervical back: Normal range of motion.      Comments: Generalized deconditioning   Neurological: He is alert and oriented to person, place, and time.   Skin: Skin is warm and dry.   Psychiatric: His behavior is normal.          Review of Symptoms  Review of Symptoms      Symptom Assessment (ESAS 0-10 " Scale)  Pain:  0  Dyspnea:  0  Anxiety:  0  Nausea:  0  Depression:  0  Anorexia:  0  Fatigue:  0  Insomnia:  0  Restlessness:  0  Agitation:  0         Psychosocial/Cultural:   See Palliative Psychosocial Note: Yes  **Primary  to Follow**  Palliative Care  Consult: No      Advance Care Planning   Advance Directives:     Decision Making:  Patient answered questions and Family answered questions  Goals of Care: What is most important right now is to focus on remaining as independent as possible, improvement in condition but with limits to invasive therapies, comfort and QOL . Accordingly, we have decided that the best plan to meet the patient's goals includes continuing with treatment.          PAINAD: NA    Caregiver burden formerly assessed: Yes    > 50% of 40 min of encounter was spent in chart review, face to face discussion of goals of care, symptom assessment, coordination of care and emotional support.    Vianey Gan, [facility]-BC  Palliative Medicine  [facility]         [1]   Current Facility-Administered Medications:     0.9%  NaCl infusion (for blood administration), , Intravenous, Q24H PRN, Krystin hPillip MD    acetaminophen tablet 650 mg, 650 mg, Oral, Q4H PRN, Lorne Coon Jr., MD, 650 mg at 04/29/25 0740    aspirin EC tablet 81 mg, 81 mg, Oral, Daily, Elgin Stark MD, 81 mg at 04/29/25 0740    atorvastatin tablet 40 mg, 40 mg, Oral, Daily, Hasmukh Sandoval ANP, 40 mg at 04/29/25 0740    bisacodyL suppository 10 mg, 10 mg, Rectal, Daily PRN, Elgin Stark MD    clopidogreL tablet 75 mg, 75 mg, Oral, Daily, Elgin Stark MD, 75 mg at 04/29/25 0740    dapagliflozin propanediol (Farxiga) tablet 10 mg, 10 mg, Oral, Daily, Hasmukh Sandoval ANP, 10 mg at 04/29/25 0741    dextrose 50% injection 12.5 g, 12.5 g, Intravenous, PRN, Álvaro Harrington DO    dextrose 50% injection 25 g, 25 g, Intravenous, PRN, Álvaro Harrington DO    glucagon (human recombinant) injection 1  mg, 1 mg, Intramuscular, PRN, Álvaro Harrington DO    glucose chewable tablet 16 g, 16 g, Oral, PRN, Álvaro Harrington,     glucose chewable tablet 24 g, 24 g, Oral, PRN, Álvaro Harrington DO    hydrALAZINE injection 10 mg, 10 mg, Intravenous, Q4H PRN, Lorne Coon Jr., MD    LIDOcaine 5 % patch 1 patch, 1 patch, Transdermal, Daily, Elgin Stark MD, 1 patch at 04/29/25 0740    metoprolol succinate (TOPROL-XL) 24 hr tablet 25 mg, 25 mg, Oral, Daily, Krystin Phillip MD, 25 mg at 04/29/25 0740    nitrofurantoin (macrocrystal-monohydrate) 100 MG capsule 100 mg, 100 mg, Oral, QHS, Krystin Phillip MD, 100 mg at 04/28/25 2121    ondansetron disintegrating tablet 8 mg, 8 mg, Oral, Q8H PRN, Lorne Coon Jr., MD    pantoprazole EC tablet 40 mg, 40 mg, Oral, BID, Krystin Phillip MD, 40 mg at 04/29/25 0741    polyethylene glycol packet 17 g, 17 g, Oral, BID PRN, Elgin Stark MD, 17 g at 04/21/25 1613    senna-docusate 8.6-50 mg per tablet 1 tablet, 1 tablet, Oral, Daily, Elgin Stark MD, 1 tablet at 04/29/25 0740    sucralfate tablet 1 g, 1 g, Oral, QID (AC & HS), Selena Shi, FNP, 1 g at 04/28/25 2121    zinc oxide-cod liver oil 40 % paste, , Topical (Top), BID, Davi Harp Jr., MD, FCCP, Given at 04/29/25 0600

## 2025-04-29 NOTE — PROGRESS NOTES
[facility] - [facility] Tele[facility] Hospital Medicine - Progress Note      Patient Name: Jon Conley  MRN: [id number]  Admission Date: 3/29/2025 - IP- Inpatient   Length of Stay: 31  Code Status: DNR    Subjective   Chief Complaint:   Inpatient Follow-up for cardiogenic shock, hematuria    HPI and Hospital course:  This is a 79-year-old male with medical history of HTN, HLD, CAD, PAD, GERD, and BPH s/p TURP  2/24/2025 at [facility], subsequently developed hematuria around mid March and had few ED visits and diagnosed with UTI, eventually hospitalized on 03/27/2025 and underwent cystoscopy/clot evacuation and discharge the same day with indwelling Ortiz catheter.     Presented to [facility] ED on 03/29/2025 with complaint of chest pain and dyspnea found to be hypotensive with elevated troponin and BNP, diagnosed with NSTEMI and given IV heparin and transferred to [facility] ED for higher level of care.  At [facility] ED, suffered PEA arrest and achieved ROSC in approximately 6 minutes, started on vasopressors, amiodarone and heparin infusion, taken emergently to cath lab [facility] revealed severe multivessel CAD and 100% occluded mid RCA s/p PTCA/TAD and Impella support, subsequently admitted to ICU.  Cardiothoracic surgery consulted and was planned for CABG but his ICU course complicated with prolonged cardiogenic shock requiring continued Impella and vasopressor support after which he was deemed high-risk and not candidate for surgery for which he underwent high-risk PCI to LAD on 04/10/2025 with 4 drug-eluting stents placed, he subsequently continued to show improvement and eventually extubated on 04/13/2025 and downgraded out of ICU on 04/14/2025 under hospital medicine service.    Continued to show slow improvement and working with physical therapy and weaning off oxygen but unfortunately developed hematuria and possible melena on 04/21/2025, urology consulted and  started on CBI and ceftriaxone.  GI consulted no plan for endoscopy continue PPI b.i.d..     Hematuria continued for few days and developed clot and not draining on 04/26/2025 morning, nurses attempted to irrigate and was unsuccessful, and required Urology re-consultation and attempted to irrigate the Ortiz catheter without success subsequently catheter removed and a new 22 Sinhala three-way catheter was placed and started on CBI and subsequently urine cleared, and irrigation port clamped on 04/27/2025.  Discontinued ceftriaxone and started Macrobid which led to expected urine dark brown-color.     Spoke with patient daughter on 04/28/2025 and concerned about the urine color and report given recurrent hematuria and bleeding patient did not have any imaging of his abdomen since his procedure clot evacuation on the 03/27/2025 and requesting some sort of imaging.      He has been accepted by [facility] 04/28/2025 and anticipate transfer tomorrow and will keep Ortiz catheter in place until follow up with his primary urologist.    ROS: Except as documented, all systems reviewed and negative.    Interval History:  No overnight event, remained hemodynamically stable saturating above 95% on room air.  Labs notable for hemoglobin 8.1 > 9.0 after 1 unit PRBC transfusion yesterday. CT abdomen and pelvis W and WO contrast done last night showed no significant abdominal pathology or active GI bleed, noted 3.2 cm AAA (was 2.8 cm in 2021) and incidental bilateral segmental pulmonary emboli, small right pleural effusion and bibasilar atelectasis or scarring.  Subsequently we obtain dedicated CTA chest and again revealed bilateral pulmonary thromboemboli and the segmental pulmonary arteries, no CT evidence of right heart strain.  BLE venous ultrasound show DVT involving right proximal femoral vein and left posterior tibial veins.  Started enoxaparin 1 mg/kg q.12h hours and and consulted Cardiology recommendation with the to pursue triple  therapy or stop aspirin and they agreed with Eliquis and Plavix only upon discharge.     Objective   Physical exam:  Vital Signs  Temp:  [97.5 °F (36.4 °C)-97.9 °F (36.6 °C)]   Pulse:  [73-91]   Resp:  [18]   BP: (109-123)/(67-75)   SpO2:  [94 %-97 %]    General: Appears comfortable  Chest: CTABL  CVS: Regular rhythm.  Plus one pedal edema  Abdomen:  Soft, nontender, 3 way Ortiz catheter in place, CBI irrigation port clamped, good urine output with dark brownish yellow urine  Skin: Warm and dry  Neuro: AAOx3  Psych: Cooperative      Labs & diagnostics  Recent Labs     04/28/25  0408 04/29/25  1141   WBC 6.16 5.52   HGB 8.1* 9.0*   HCT 25.9* 29.1*    185      Recent Labs     04/27/25  0445 04/29/25  1141    136   K 4.0 3.6   * 108*   CO2 20* 19*   BUN 18.7 16.6   CREATININE 0.82 0.75   EGFRNORACEVR >60 >60   CALCIUM 8.3* 8.1*   MG 2.24  --    PHOS 2.8  --    ALBUMIN 2.7* 2.4*   GLOBULIN 3.3 3.5   ALKPHOS 83 80   ALT 6 <5   AST 9* 8*   BILITOT 0.4 0.7             Inpatient Medications  Scheduled Med:   atorvastatin  40 mg Oral Daily    clopidogreL  75 mg Oral Daily    dapagliflozin propanediol  10 mg Oral Daily    enoxparin  1 mg/kg Subcutaneous Q12H (treatment, non-standard time)    LIDOcaine  1 patch Transdermal Daily    metoprolol succinate  25 mg Oral Daily    nitrofurantoin (macrocrystal-monohydrate)  100 mg Oral QHS    pantoprazole  40 mg Oral BID    senna-docusate  1 tablet Oral Daily    sucralfate  1 g Oral QID (AC & HS)    zinc oxide-cod liver oil   Topical (Top) BID      Continuous Infusions:     PRN Meds:    Current Facility-Administered Medications:     0.9%  NaCl infusion (for blood administration), , Intravenous, Q24H PRN    acetaminophen, 650 mg, Oral, Q4H PRN    bisacodyL, 10 mg, Rectal, Daily PRN    dextrose 50%, 12.5 g, Intravenous, PRN    dextrose 50%, 25 g, Intravenous, PRN    glucagon (human recombinant), 1 mg, Intramuscular, PRN    glucose, 16 g, Oral, PRN    glucose, 24 g,  Oral, PRN    hydrALAZINE, 10 mg, Intravenous, Q4H PRN    ondansetron, 8 mg, Oral, Q8H PRN    polyethylene glycol, 17 g, Oral, BID PRN @      Assessment & Plan:   Severe multivessel CAD/NSTEMI  Ischemic cardiomyopathy/Acute HFrEF- compensated  In-hospital PEA cardiac arrest 03/29/2025 - ACLS with ROSC in 6 minutes  Cardiogenic shock:  Required Impella and vasopressors - resolved  [facility] 03/29/2025:  Culprit mid RCA s/p PCTA and DESx2  CT surgery- deemed not a surgical candidate for CABG  [facility] 04/10/2025:  High-risk PCI to LAD with Impella support, TAD x4  Continue Plavix, statin, metoprolol, dapagliflozin  Add further GDMT:  BP appear stable in the 120s today, will start Entresto 24/26 b.i.d.  DNR- no LifeVest  Repeat TTE 04/28/2025:  LVEF 20-25% mild-to-moderate MR    Acute bilateral DVT (right proximal femoral and left posterior tibial) and bilateral segmental pulmonary thromboemboli   Provoked in the setting of prolonged hospitalization with critical illness/immobilization and holding prophylactic anticoagulation due to recurrent hematuria  Start enoxaparin 1 mg/kg subQ q.12h hours - for 2 days, then transition to Eliquis 10 mg b.i.d. for 7 days followed by 5 mg b.i.d. for 6 months, then stop and restart aspirin  Discontinuing aspirin to avoid triple therapy given high bleeding risk/recurrent hematuria    Acute hypoxemic respiratory failure - resolved  Secondary to above  Weaned off to room air  Continue incentive spirometry    Recurrent hematuria post TURP  BPH S/P TURP 02/24/2025  S/P cystoscopy and clot evacuation/fulguration 03/27/2025  Recurrent hematuria 4/21/2025 - started CBI  Catheter clotted, replaced 04/26/2025 and continued CBI  Urine cleared, Clamped irrigation port of  CBI on 04/27/2025, discontinued ceftriaxone and started on suppressive nitrofurantoin 100 mg nightly until no further hematuria and Ortiz catheter is out.  No further hematuria, very dark brownish yellow urine is expected with  Macrobid  Retroperitoneal ultrasound 04/28/2025 unremarkable    Questionable GI bleed/melena -resolved  PO PPI twice daily for 8 weeks then daily indefinitely. and Carafate q.i.d. - for 1 month  GI consulted, no plan for endoscopy unless overt bleed with decompensation    Normocytic anemia -iron-deficiency due to blood loss  Secondary to recurrent intermittent blood loss/hematuria  Hemoglobin was 13 at baseline in February 2025  Ferrlecit 250 mg daily for 3 doses  S/P 1 unit PRBC on 04/28/2025  Goal hemoglobin around 9    Abdominal aortic aneurysm 3.8 cm per CT abdomen pelvis 04/28/2025  Increased in size from 2.8 cm per CT in 2021    History of HTN, HLD, PAD, GERD, chronic tobacco smoker    Critical care time 35 minutes   Critical care diagnosis Acute DVT/PE      VTE Prophylaxis:-SCDs, no chemical prophylaxis given high bleeding risk with hematuria and being on DAPT  Patient condition:-guarded/improving  Disposition:  Transfer to SNF on Thursday 05/01/2025 (prudent to monitor him in the hospital setting with proximal DVT and PE, especially early ambulation in the hospital setting prior to transfer to rehab)    Krystin Phillip MD  Internal Medicine

## 2025-04-29 NOTE — PLAN OF CARE
Spoke to pt daughter Kimmy at . Discussed awaiting medical clearance for snf and that snf is approved with Ade Delacruz .  All questions answered.

## 2025-04-30 NOTE — INTERVAL H&P NOTE
Patient name: Jon Conley  MRN: [id number]  : 1945  Cath Lab Procedure H&P Update    Pre-Procedure Assessment:    I saw and examined the patient face to face. The patient has been re-evaluated and his condition is unchanged. The reason for admission, procedure and care is still present.  Based on the patients H&P, pre-procedure physical exam, relevant diagnostic studies, NPO status and information obtained from the patient, I determine the patient is an appropriate candidate for the proposed procedure and anesthesia planned. I further certify the anesthesia risks, benefits and options have been explained to the patient to which he agrees as documented on the procedural consent.

## 2025-04-30 NOTE — PROGRESS NOTES
UROLOGY  PROGRESS  NOTE    Jon Conley 1945  [id number]  4/30/2025      Patient diagnosed with DVT/PE and started on Lovenox  Developed gross hematuria early this morning  Three-way Ortiz was exchanged with large clot noted on old Ortiz, new three-way placed and he was initiated on CBI    Afebrile   BP stable   H&H 10.3/33.2   BUN and creatinine 16.8/0.92    Intake/Output:  I/O this shift:  In: -   Out: 2400 [Urine:2400]  I/O last 3 completed shifts:  In: 918.8 [P.O.:420; Blood:498.8]  Out: 1400 [Urine:1400]     Exam:    NAD  Card: RRR  Resp: unlabored  Abd: soft, NTND  :  Red urine draining to  bag with CBI wide open    Recent Results (from the past 24 hours)   CBC Without Differential    Collection Time: 04/29/25 11:41 AM   Result Value Ref Range    WBC 5.52 4.50 - 11.50 x10(3)/mcL    RBC 3.14 (L) 4.70 - 6.10 x10(6)/mcL    Hgb 9.0 (L) 14.0 - 18.0 g/dL    Hct 29.1 (L) 42.0 - 52.0 %    MCV 92.7 80.0 - 94.0 fL    MCH 28.7 27.0 - 31.0 pg    MCHC 30.9 (L) 33.0 - 36.0 g/dL    RDW 17.6 (H) 11.5 - 17.0 %    Platelet 185 130 - 400 x10(3)/mcL    MPV 11.0 (H) 7.4 - 10.4 fL    NRBC% 0.0 %   Comprehensive Metabolic Panel    Collection Time: 04/29/25 11:41 AM   Result Value Ref Range    Sodium 136 136 - 145 mmol/L    Potassium 3.6 3.5 - 5.1 mmol/L    Chloride 108 (H) 98 - 107 mmol/L    CO2 19 (L) 23 - 31 mmol/L    Glucose 79 (L) 82 - 115 mg/dL    Blood Urea Nitrogen 16.6 8.4 - 25.7 mg/dL    Creatinine 0.75 0.72 - 1.25 mg/dL    Calcium 8.1 (L) 8.8 - 10.0 mg/dL    Protein Total 5.9 5.8 - 7.6 gm/dL    Albumin 2.4 (L) 3.4 - 4.8 g/dL    Globulin 3.5 2.4 - 3.5 gm/dL    Albumin/Globulin Ratio 0.7 (L) 1.1 - 2.0 ratio    Bilirubin Total 0.7 <=1.5 mg/dL    ALP 80 40 - 150 unit/L    ALT <5 0 - 55 unit/L    AST 8 (L) 11 - 45 unit/L    eGFR >60 mL/min/1.73/m2    Anion Gap 9.0 mEq/L    BUN/Creatinine Ratio 22    CBC Without Differential    Collection Time: 04/30/25  3:57 AM   Result Value Ref Range    WBC 7.71 4.50 - 11.50  x10(3)/mcL    RBC 3.56 (L) 4.70 - 6.10 x10(6)/mcL    Hgb 10.3 (L) 14.0 - 18.0 g/dL    Hct 33.2 (L) 42.0 - 52.0 %    MCV 93.3 80.0 - 94.0 fL    MCH 28.9 27.0 - 31.0 pg    MCHC 31.0 (L) 33.0 - 36.0 g/dL    RDW 17.4 (H) 11.5 - 17.0 %    Platelet 243 130 - 400 x10(3)/mcL    MPV 10.7 (H) 7.4 - 10.4 fL    NRBC% 0.0 %   Basic Metabolic Panel    Collection Time: 04/30/25  3:57 AM   Result Value Ref Range    Sodium 138 136 - 145 mmol/L    Potassium 4.5 3.5 - 5.1 mmol/L    Chloride 109 (H) 98 - 107 mmol/L    CO2 17 (L) 23 - 31 mmol/L    Glucose 118 (H) 82 - 115 mg/dL    Blood Urea Nitrogen 16.8 8.4 - 25.7 mg/dL    Creatinine 0.92 0.72 - 1.25 mg/dL    BUN/Creatinine Ratio 18     Calcium 8.7 (L) 8.8 - 10.0 mg/dL    Anion Gap 12.0 mEq/L    eGFR >60 mL/min/1.73/m2   Magnesium    Collection Time: 04/30/25  3:57 AM   Result Value Ref Range    Magnesium Level 2.20 1.60 - 2.60 mg/dL   Phosphorus    Collection Time: 04/30/25  3:57 AM   Result Value Ref Range    Phosphorus Level 2.6 2.3 - 4.7 mg/dL   BNP    Collection Time: 04/30/25  3:57 AM   Result Value Ref Range    Natriuretic Peptide 379.5 (H) <=100.0 pg/mL     CT Abdomen Pelvis W Wo Contrast [[id number]] Resulted: 04/29/25 0843   Order Status: Completed Updated: 04/29/25 0845   Narrative:     EXAMINATION:  CT ABDOMEN PELVIS W WO CONTRAST    CLINICAL HISTORY:  recurrent gib/melena and recurrent hematuria;.    TECHNIQUE:  Helical acquisition through the abdomen and pelvis without and with IV contrast.  Three plane reconstructions were provided for review. DLP 1395 mGycm. Automatic exposure control, adjustment of mA/kV or iterative reconstruction technique was used to reduce radiation.    COMPARISON:  12 October 2021    FINDINGS:  There are bilateral pulmonary emboli in segmental pulmonary arteries.  Small right pleural effusion.  Bibasilar atelectasis or scarring.    There are gallstones.  No pericholecystic inflammation.  No acute findings liver, spleen, pancreas or adrenals.  No  hydronephrosis or suspicious renal lesion.    No bowel obstruction.  No significant inflammatory changes of the bowel.  Site of active GI bleeding is not seen.  There is enteric contrast in the colon which decreases sensitivity for GI bleed.    There is irregular bladder wall thickening.  Ortiz in the bladder.  Prostate is markedly enlarged.  No pelvic free fluid.  There is abdominal aortic aneurysm measuring up to 3.2 cm.  Dense atherosclerotic disease.  No retroperitoneal adenopathy.    Moderate degenerative change of the spine.   Impression:       1. Bilateral pulmonary thromboembolic disease.  2. Irregular bladder wall thickening which may relate to cystitis but recommend correlation with cystoscopy if not recently performed.  3. A site of active GI bleeding is not seen.  4. Findings discussed with 9th floor nurse Milena at 841 on 4/29/2025.      Electronically signed by: Hakeem Cabezas  Date: 04/29/2025  Time: 08:43       Assessment:  Gross hematuria  -s/p TURP about 7 weeks ago, cytso clot evac and fulguration about 4 weeks ago  -catheter exchanged 04/26/2025, evac 250cc mature clot 4/27  -recurrent gross hematuria this morning, three-way Ortiz exchanged in initiated on CBI    DVT/PE  -initiated on full-dose Lovenox yesterday    Postoperative PEA arrest/MI s/p stents  -on Plavix    Plan:  -hand irrigated Ortiz with about 100 cc clot obtained, urine remains light red on moderate-high CBI  -recommend holding Lovenox if possible, will defer to hospitalist/cards - discussed with nursing  -continue CBI, titrate to maintain relatively clear urine.  Hand irrigate as needed with a cath tip syringe for clots.  -ultrasound ordered to evaluate clot burden within bladder  -labs in am, monitor H&H      Mariela Schwab NP

## 2025-04-30 NOTE — PT/OT/SLP PROGRESS
Occupational Therapy      Patient Name:  Jon Conley   MRN:  [id number]    Messaged MD this morning, pt cleared to mobilize with therapy.     Patient given morphine this AM s/p multiple CBI, therapy on hold per RN request. F/u in the afternoon, and pt OOR for procedure. Will follow-up for therapy tomorrow/as appropriate.    4/30/2025

## 2025-04-30 NOTE — NURSING
[facility] [address]  Wound Care    Patient Name:  Jon Conley   MRN:  [id number]  Date: 4/30/2025  Diagnosis: Ischemic cardiomyopathy    History:     No past medical history on file.    Social History[1]    Precautions:     Allergies as of 03/29/2025    (No Known Allergies)       [facility] Assessment Details/Treatment      04/30/25 1145   ECG   Pulse 89        Wound 04/10/25 1800 Shearing Right Buttocks   Date First Assessed/Time First Assessed: 04/10/25 1800   Present on Original Admission: No  Primary Wound Type: Shearing  Side: Right  Location: Buttocks  Is this injury device related?: No   Wound Image   (bilat butt cheeks)   Dressing Appearance Open to air   Drainage Amount None   Appearance Pink;Red;Dry   Tissue loss description Not applicable   Care Cleansed with:;Wound cleanser;Applied:;Skin Barrier  (desitin top cream to both and covered with sm abd pad)   Dressing Absorptive Pad     Re eval of butt cheeks-see new photo.  He remains total care with total assist.    Continue current care with emphasis.     He will continued q2hrs turns with wedge and offloading boots per shift per staffing.    Wound care to continue to follow weekly while in hospital .       04/30/2025         [1]   Social History  Socioeconomic History    Marital status: Single     Social Drivers of Health     Financial Resource Strain: Patient Unable To Answer (3/30/2025)    Overall Financial Resource Strain (CARDIA)     Difficulty of Paying Living Expenses: Patient unable to answer   Food Insecurity: Patient Unable To Answer (3/30/2025)    Hunger Vital Sign     Worried About Running Out of Food in the Last Year: Patient unable to answer     Ran Out of Food in the Last Year: Patient unable to answer   Transportation Needs: No Transportation Needs (3/29/2025)    PRAPARE - Transportation     Lack of Transportation (Medical): No     Lack of Transportation (Non-Medical): No   Stress: Patient Unable To Answer  (3/30/2025)    [employer] - Occupational Stress Questionnaire     Feeling of Stress : Patient unable to answer   Housing Stability: Patient Unable To Answer (3/30/2025)    Housing Stability Vital Sign     Unable to Pay for Housing in the Last Year: Patient unable to answer     Number of Times Moved in the Last Year: 0     Homeless in the Last Year: Patient unable to answer

## 2025-04-30 NOTE — BRIEF OP NOTE
Brief Operative Note:    : Rafiq Corona MD     Referring Physician: Jean Marie Kim Jr.     All Operators: Surgeon(s):  Rafiq Corona MD     Preoperative Diagnosis: Acute deep vein thrombosis (DVT) of other vein of lower extremity, unspecified laterality [I82.409]     Postop Diagnosis: Acute deep vein thrombosis (DVT) of other vein of lower extremity, unspecified laterality [I82.409]    Treatments/Procedures: Procedure(s) (LRB):  Placement, Inferior Vena Cava Filter (Right)    Findings: S/p IVC filter placement. See report for full details.    Estimated Blood loss: <10 cc    Specimens removed: No    Rafiq Corona MD

## 2025-04-30 NOTE — PT/OT/SLP PROGRESS
Physical Therapy      Patient Name:  Jon Conley   MRN:  [id number]    Attempted to see patient twice this date, AM per nurse patient asleep after receiving morphine; PM pt off the floor for procedure. Will follow-up as appropriate/schedule allows.

## 2025-04-30 NOTE — PROGRESS NOTES
Advance Care Planning     Date: 04/30/2025    Obtained completed [place] and scanned in to patient's medical records. Reviewed elections with patient and pateint's daughter, Sarah, at bedside.     [place] elections:    A. DNR/Do Not Attempt Resuscitation (Allow Natural Death)    B. Selective Medical Interventions and Treatment with the primary goal of treating medical conditions but avoiding burdensome interventions.     C. No artificial nutrients by tube.     Copies provided to patient and patient's daughter at bedside and copy placed on nurse chart.

## 2025-04-30 NOTE — PROGRESS NOTES
[facility]    Cardiology  Progress Note    Patient Name: Jon Conley  MRN: [id number]  Admission Date: 3/29/2025  Hospital Length of Stay: 32 days  Code Status: DNR   Attending Provider: Krystin Phillip MD   Consulting Provider: ANDREW Guaman  Primary Care Physician: Patrick Reece Jr., MD  Principal Problem:Ischemic cardiomyopathy    Patient information was obtained from past medical records and ER records.     Subjective:     Chief Complaint/Reason for Consult: anticoagulation recommendation    HPI: Mr. Conley is a 78 y/o male who is known to [facility], Dr. Wang. The patient presented to [facility] on 3.29.25 with c/o SOB. He initially presented to [facility] in [place] with SOB. He was found to have Tachycardia, Hypotension, Anemia (7.4/24.6), Troponin 9.443, .0, BUN/Crea 16.2/1.34, Lactic Acid Level 4.3, Na 133, WBC 14.03, H&H 7.3/23.0, WBC 14.03, , PT/INR 14.9/1.2. He was admitted to , however, he sustained a witnessed PEA Arrest in the ER and has ROSC per ER MD. [facility] was consulted for NSETMI and Arrest.     3.30.25: NAD. Impella L Groin. CVP 7, P7. Levophed 0.32mcg/kg/min, Heparin Drip per Protocol, Amiodarone 0.5mg/min  3.31.25: NAD. L Groin Impella P7, Amiodarone 0.5mg/min, Heparin Drip per Protocol, Levophed 0.18mcg/kg/min, H&H 8.8/25.8, AST/ALT 79/45  4.1.25: NAD noted. L groin Impella in place, remains at P7. Remains on levophed. LFTs continue to improve.  4.2.25: NAD. L Groin Impella Device P6. Heparin Infusion per Protocol. Remains on 0.06mcg/kg/min.   4.3.25: NAD. Vented/Sedated. L Groin Impella Device. P3, Heparin Infusion per Protocol. Off Pressors. Attempting Wean of Impella. H&H 8.2/26.2,   4.4.25: NAD. Vented/Sedated. Heparin Drip per Protocol. H&H 8.1/95.4,   4.5.25: NAD. Vented/Sedated. Heparin Drip per Protocol. Levophed 0.04mcg/kg/min. H&H 7.9/23.8,  Impella P6  4.6.25: NAD. Vented/Sedated. P3.  As of 4/18/23, guarantor account indicates insurance paid anticipated benefit for hearing aids. Patient responsibility is only the tax on the devices, which was discussed with patient prior to fitting devices. Called and LVM with information.     Janneth Bernal.  Clinical Audiologist    "Heparin Drip per Protocol, Off Pressors. CV Surgery to Reevaluate for CABG. H&H 7.4/22.7,   4.7.25: NAD. Vented/Sedated. H&H 9.2/27.7, , K 3.9, BUN/Crea 9.2/0.61, Lactic Acid 0.6. Impella L Groin P4  4.8.25:NAD noted. Remains vented/sedated. Consent obtained for LHC, will plan for Thursday.  4.9.25: NAD. Remains Vented/Sedated. Plans for LHC with PCI on Thursday. Impella P4. Remains Off Pressors.   4.10.25: NAD. Remains Vented/Sedated. NPO for Planned LHC with PCI/Impella Assisted. P4 Impella. Levophed 0.1mcg/kg/min.   4.11.25: NAD. Vented/Sedated. Levophed 0.12mcg/kg/min. S/P PCI of LAD and Impella Explant.   4.12.25: NAD. Vented/Sedated. Levophed 0.08mcg/kg/min.   4.13.25: NAD. Sitting in Bedside Chair. Denies CP, SOB and Palps. Off Pressors. "I am ok."   4.14.25: NAD. "I am fine." Denies CP, SOB and Palps. Remains Off Pressors.   4.15.25: NAD. "Hi." Denies CP, SOB and Palps.   4.29.25 Reconsult for hematuria.  4.30.25: Patient developed significant hematuria. 3 way catheter placed and he     PMH: BPH/Urinary Obstruction, CAD/Nonobstructive, PAD, R JACY/Mild, HTN, Obesity, GERD, Tobacco Use   PSH: TURP, Angiogram, Bilateral Knee Replacements   Family History: Denies Family History of Heart Disease  Social History: Denies Illicit Drug, ETOH and Tobacco Use     Previous Cardiac Diagnostics:   C (4.10.25):  Findings:  - Successful Impella-protected PCI of LAD.  - Prox to Mid Left Anterior Descending has severe diffuse disease, up to 80% in severity. The lesion was successfully treated with overlapping FRONTIER BERNADINE 3.74D88SJ, FRONTIER BERNADINE 3.47A68QV, FRONTIER BERNADINE 2.77E66RU, and SYNERGY XD 2.5X28MM drug-eluting stents. Post-dilatation was performed using an NC EMERGE MR 3.5X20MM angioplasty balloon inflated to 20 gretel. Following intervention there was 0% residual stenosis and PATTY grade 3 flow in the distal vessel.  - Intravascular Ultrasound (IVUS) was performed prior to intervention to further " characterize the lesion, as well as post-stent deployment to ensure optimal stent apposition and expansion.  - Ostial First Diagonal Branch has a 70% stenosis. At the conclusion of the case, there was PATTY grade 3 flow in the distal vessel.  - Successful Impella CP removal and closure of L femoral arterial access site using a 14 [place] MANTA device.  Assessment/Plan:  - Patient is a 79 y.o. male with a history of CAD presents in cardiogenic shock. Successful PCI of RCA last week. Impella CP placed at that time. Now s/p successful LAD intervention and Impella CP removal.  - Continue DAPT (aspirin + clopidogrel) for a minimum of 12 months and aspirin indefinitely thereafter  - High intensity statin  - Arterial sheath remains place; plan is to remove when activated clotting time (ACT) less than 180 seconds. Bedrest for 4 hours after hemostasis is achieved.    ECHO Limited 4.8.25:  Impella check  Aortic annulus to tip distance is 4.3 cm.  LV systolic function is 15%.    ECHO 4.3.25:  Limited study to check impella placement.  Left Ventricle: There is severely reduced systolic function with a visually estimated ejection fraction of 15 - 20%.  Impella distance from aortic valve annulus appears appropriate.    ECHO 4.1.25:  Limited study to check impella placement.  Left Ventricle: There is severely reduced systolic function with a visually estimated ejection fraction of 15 - 20%.  Impella distance from aortic valve annulus appears appropriate.    ECHO 3.30.25:  Left Ventricle: There is severely reduced systolic function with a visually estimated ejection fraction of less than 30%.  There appears to be at least moderate to severe mitral regurgitation.   Impella is positioned with inflow cannula 3.8 cm from aortic valve annulus.    [facility] 3.29.25:  Findings:  - Successful placement of Impella CP device from left femoral approach.  - There is severe multi-vessel coronary artery disease.  - Left Main has mild diffuse  disease.  - Mid Left Anterior Descending has serial lesions, up to 70-80% in severity.   - Prox Left Circumflex is 100% occluded. Unable to cross lesion despite wire escalation and microcatheter support.  - Mid Right Coronary Artery is 100% occluded. The lesion was successfully treated with overlapping SYNERGY XD 4.0X20MM, SYNERGY XD 3.0X48MM, and SYNERGY XD 2.86U99VD drug-eluting stents. Post-dilatation was performed using a SYNERGY XD 4.0X20MM angioplasty balloon inflated to 12 gretel. Following intervention there was 0% residual stenosis and PATTY grade 3 flow in the distal vessel.  - Intravascular Ultrasound (IVUS) was performed prior to intervention to further characterize the lesion, as well as post-stent deployment to ensure optimal stent apposition and expansion.  - Aorto-iliac angiogram revealed moderate disease of R ileofemoral system.  - At the conclusion of the case, contrast injection was performed through the Impella sheath side port, showing adequate antegrade flow down the ipsilateral common femoral artery/SFA/profunda artery.  - RHC with elevated R sided filling pressures. RA 17 mmHg, RV 45/15 mmHg, PA 42/29 mmHg (mean 34 mmHg), PCWP 25 mmHg.  - Transpulmonary gradient is 9 mmHg.  - Cardiac Output/Index at 6.6/3.0, as calculated by Gregory equation on Levophed infusion and Impella CP support.  - PVR is 1.4 Woods units  - SVR is 550 dyne-sec*cm^-5  - Pulmonary Artery Pulsatility Index (Rafa) is 0.8  - Cardiac Power Output () is 0.91   Assessment/Plan:  - Patient is a 79 y.o. male with a history of recent TURP, complicated by significant post-op bleeding, presents to [facility] ED with dyspnea. Transferred to [facility] with worsening shock despite vasopressors. TTE showing EF 25%, severe MR, severe TR. Troponin elevated. Brought to cath lab. Underwent Impella placement/LHC/RHC as outlined above. RCA lesion treated. Unable to cross LCx lesion. 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 At this point, would continue  Impella support and aggressive medical mgmt. Has non-culprit lesions that should be treated at a later date as well. Can consider CTS consult, especially given severe valvular disease.  - Patient was given a loading dose of clopidogrel 600 mg PO in the cath lab  - Continue DAPT (aspirin + clopidogrel) for a minimum of 12 months and aspirin indefinitely thereafter  - High intensity statin  - Continue Aggrastat for 4 hours post-procedure  - Arterial sheath remains place; plan is to remove when activated clotting time (ACT) less than 180 seconds. Bedrest for 4 hours after hemostasis is achieved.  - Obtain transthoracic echocardiogram  - Continuous bedrest while Impella in place  - Therapeutic heparin administration per protocol  - Antibiotics per protocol  - Remaining mgmt per primary team/cardiology    ECHO 3.29.25:  Left Ventricle: The left ventricle is dilated. Normal wall thickness. Severe global hypokinesis present. There is severely reduced systolic function with a visually estimated ejection fraction of 15 - 20%. Grade I diastolic dysfunction.  Right Ventricle: Right ventricular enlargement. Systolic function is normal. TAPSE is 1.83 cm.  Left Atrium: dilated  Mitral Valve: Mildly thickened leaflets. There is moderate regurgitation.  Tricuspid Valve: There is moderate regurgitation.  Pulmonary Artery: There is moderate pulmonary hypertension.  IVC/SVC: Patient is ventilated, cannot use IVC diameter to estimate right atrial pressure.    Carotid US 11.4.24:  The study quality is average.   1-39% stenosis in the proximal right internal carotid artery based on Bluth Criteria.   Antegrade right vertebral artery flow.   Antegrade left vertebral artery flow    ECHO 1.27.22:  The study quality is average.   The left ventricle is normal in size. Global left ventricular systolic function is moderately decreased. The left ventricular ejection fraction is 40-45%. Mild concentric left ventricular hypertrophy is present.  The  left ventricle diastolic function is impaired (Grade I) with normal left atrial pressure.  Mild (1+) mitral regurgitation.  The pulmonary artery appears normal.     [facility] 9.26.17:  LM: Normal  LAD: Prox 30% Stenosis  RI: Normal  LCX: Normal  RCA: Distal RCA 50%    Review of patient's allergies indicates:  No Known Allergies  No current facility-administered medications on file prior to encounter.     Current Outpatient Medications on File Prior to Encounter   Medication Sig    clopidogreL (PLAVIX) 75 mg tablet Take 75 mg by mouth once daily.    finasteride (PROSCAR) 5 mg tablet Take 5 mg by mouth once daily.    rosuvastatin (CRESTOR) 20 MG tablet Take 20 mg by mouth once daily.    silodosin (RAPAFLO) 8 mg Cap capsule Take 8 mg by mouth once daily.     Review of Systems   Constitutional:  Positive for fatigue. Negative for fever.   Respiratory:  Negative for shortness of breath.    Cardiovascular:  Negative for chest pain, palpitations and leg swelling.   Genitourinary:  Positive for hematuria.   All other systems reviewed and are negative.    Objective:     Vital Signs (Most Recent):  Temp: 97.8 °F (36.6 °C) (04/30/25 1145)  Pulse: 89 (04/30/25 1145)  Resp: 18 (04/30/25 0939)  BP: 106/65 (04/30/25 1145)  SpO2: 99 % (04/30/25 1145) Vital Signs (24h Range):  Temp:  [97.3 °F (36.3 °C)-98 °F (36.7 °C)] 97.8 °F (36.6 °C)  Pulse:  [] 89  Resp:  [18-19] 18  SpO2:  [97 %-100 %] 99 %  BP: (101-133)/(56-78) 106/65   Weight: 48.4 kg (106 lb 9.6 oz)  Body mass index is 14.87 kg/m².  SpO2: 99 %       Intake/Output Summary (Last 24 hours) at 4/30/2025 1208  Last data filed at 4/30/2025 0944  Gross per 24 hour   Intake 420 ml   Output 3400 ml   Net -2980 ml     Lines/Drains/Airways       Drain  Duration                  Urethral Catheter -- days              Peripheral Intravenous Line  Duration                  Peripheral IV - Single Lumen 04/14/25 1600 20 G 2 1/4 in Yes Anterior;Distal;Right Upper Arm 15 days               "    Significant Labs:   Chemistries:   Recent Labs   Lab 04/24/25  0712 04/27/25  0445 04/29/25  1141 04/30/25  0357    141 136 138   K 3.6 4.0 3.6 4.5   * 111* 108* 109*   CO2 23 20* 19* 17*   BUN 31.9* 18.7 16.6 16.8   CREATININE 0.82 0.82 0.75 0.92   CALCIUM 8.8 8.3* 8.1* 8.7*   PROT 7.3 6.0 5.9  --    BILITOT 0.5 0.4 0.7  --    ALKPHOS 96 83 80  --    ALT 7 6 <5  --    AST 11 9* 8*  --    MG 2.40 2.24  --  2.20   PHOS  --  2.8  --  2.6        CBC/Anemia Labs: Coags:    Recent Labs   Lab 04/27/25  0444 04/27/25  0445 04/28/25  0408 04/29/25  1141 04/30/25  0357   WBC  --  7.39 6.16 5.52 7.71   HGB  --  8.7* 8.1* 9.0* 10.3*   HCT  --  27.9* 25.9* 29.1* 33.2*   PLT  --  206 192 185 243   MCV  --  93.6 92.2 92.7 93.3   RDW  --  17.3* 17.5* 17.6* 17.4*   IRON  --  17*  --   --   --    FERRITIN  --  124.88  --   --   --    FOLATE 8.5  --   --   --   --    INVMCAWZ50  --  1,256*  --   --   --     No results for input(s): "PT", "INR", "APTT" in the last 168 hours.           Telemetry: SR    Physical Exam  Constitutional:       General: He is not in acute distress.     Appearance: Normal appearance.   HENT:      Head: Normocephalic.      Mouth/Throat:      Mouth: Mucous membranes are moist.   Cardiovascular:      Rate and Rhythm: Normal rate and regular rhythm.      Pulses: Normal pulses.      Heart sounds: Murmur heard.   Pulmonary:      Effort: Pulmonary effort is normal. No respiratory distress.      Comments: NC O2  Abdominal:      Palpations: Abdomen is soft.   Skin:     General: Skin is warm.   Neurological:      General: No focal deficit present.      Mental Status: He is alert and oriented to person, place, and time.   Psychiatric:         Mood and Affect: Mood normal.         Judgment: Judgment normal.       Home Medications:   Medications Ordered Prior to Encounter[1]  Current Schedule Inpatient Medications:   atorvastatin  40 mg Oral Daily    clopidogreL  75 mg Oral Daily    dapagliflozin " propanediol  10 mg Oral Daily    enoxparin  1 mg/kg Subcutaneous Q12H (treatment, non-standard time)    ferrous sulfate  1 tablet Oral Daily    LIDOcaine  1 patch Transdermal Daily    metoprolol succinate  25 mg Oral Daily    nitrofurantoin (macrocrystal-monohydrate)  100 mg Oral QHS    pantoprazole  40 mg Oral BID    senna-docusate  1 tablet Oral Daily    sucralfate  1 g Oral QID (AC & HS)    zinc oxide-cod liver oil   Topical (Top) BID     Continuous Infusions:      Assessment:   Segmental PE  Acute Hypoxemic Respiratory Failure requiring Intubation/Ventilation - Resolved on NC O2  Cardiogenic Shock requiring Mechanical and Chemical Support - Resolved     - Impella/Pressors - Impella Explanted on 4.10.25 post High Risk LAD PCI/Impella Assisted  MVCAD    - s/p LHC (4.10.25) - PTCA/TAD of the Prox to Mid LAD and Impella Explant     - s/p LHC (3.29.25) -  Left Main has mild diffuse disease. Mid Left Anterior Descending has serial lesions, up to 70-80% in severity. Prox Left Circumflex is 100% occluded. Unable to cross lesion despite wire escalation and microcatheter support. Mid Right Coronary Artery is 100% occluded.  LAD PCI: The lesion was successfully treated with overlapping SYNERGY XD 4.0X20MM, SYNERGY XD 3.0X48MM, and SYNERGY XD 2.34O57EE drug-eluting stents. Post-dilatation was performed using a SYNERGY XD 4.0X20MM angioplasty balloon inflated to 12 gretel. Following intervention there was 0% residual stenosis and PATTY grade 3 flow in the distal vessel.  NSTEMI Type I (Non-Anterior Wall MI)    - Trponin > 9  Cardiopulmonary Arrest    - Initial Rhythm PEA - Witnessed in ER Arrest/ROSC  ICMO/EF 15%     - ECHO (4.8.25) - LVEF 15%    - ECHO (3.29.25) - LVEF 15-20%, Grade I DD    ST - Now SR   Hypotension requiring Pressors - Resolved    - Hx of HTN  BPH s/p TURP   Lactic Acidosis - Resolved   Leukocytosis - Resolved   Anemia requiring Transfusions - Stable   Recent BPH Diagnosis s/p TURP  CAD/Nonobstructive  (2017)  JACY/R Sided   PAD/Nonobstructive   Transaminitis - Resolved   Obesity  GERD  Tobacco Use  No Hx of GIB     Plan:   Continue Plavix   DC eliquis  Will plan for IVC filter today due to hematuria.R/B/A discussed with patient and he is amenable to proceed. Consent obtained and placed on chart  Continue Toprol XL 12.5mg PO Daily  Continue Farxiga 10mg PO Qday   Add ARNi when BP Allows          ROSA GuamanP  Cardiology  [facility]            [1]   No current facility-administered medications on file prior to encounter.     Current Outpatient Medications on File Prior to Encounter   Medication Sig Dispense Refill    clopidogreL (PLAVIX) 75 mg tablet Take 75 mg by mouth once daily.      finasteride (PROSCAR) 5 mg tablet Take 5 mg by mouth once daily.      rosuvastatin (CRESTOR) 20 MG tablet Take 20 mg by mouth once daily.      silodosin (RAPAFLO) 8 mg Cap capsule Take 8 mg by mouth once daily.

## 2025-04-30 NOTE — PROGRESS NOTES
[facility] - [facility] Tele[facility] Medicine - Progress Note      Patient Name: Jon Conley  MRN: [id number]  Admission Date: 3/29/2025 - IP- Inpatient   Length of Stay: 32  Code Status: DNR    Subjective   Chief Complaint:   Inpatient Follow-up for cardiogenic shock, NSTEMI,  hematuria, PE/DVT    HPI and Hospital course:  This is a 79-year-old male with medical history of HTN, HLD, CAD, PAD, GERD, and BPH s/p TURP  2/24/2025 at [facility], subsequently developed hematuria around mid March and had few ED visits and diagnosed with UTI, eventually hospitalized on 03/27/2025 and underwent cystoscopy/clot evacuation and discharge the same day with indwelling Ortiz catheter.     Presented to [facility] ED on 03/29/2025 with complaint of chest pain and dyspnea found to be hypotensive with elevated troponin and BNP, diagnosed with NSTEMI and given IV heparin and transferred to [facility] ED for higher level of care.  At [facility] ED, suffered PEA arrest and achieved ROSC in approximately 6 minutes, started on vasopressors, amiodarone and heparin infusion, taken emergently to cath lab [facility] revealed severe multivessel CAD and 100% occluded mid RCA s/p PTCA/TAD and Impella support, subsequently admitted to ICU.  Cardiothoracic surgery consulted and was planned for CABG but his ICU course complicated with prolonged cardiogenic shock requiring continued Impella and vasopressor support after which he was deemed high-risk and not candidate for surgery for which he underwent high-risk PCI to LAD on 04/10/2025 with 4 drug-eluting stents placed, he subsequently continued to show improvement and eventually extubated on 04/13/2025 and downgraded out of ICU on 04/14/2025 under hospital medicine service.    Continued to show slow improvement and working with physical therapy and weaning off oxygen but unfortunately developed hematuria and possible melena on 04/21/2025, urology  consulted and started on CBI and ceftriaxone.  GI consulted no plan for endoscopy continue PPI b.i.d..     Hematuria continued for few days and developed clot and not draining on 04/26/2025 morning, nurses attempted to irrigate and was unsuccessful, and required Urology re-consultation and attempted to irrigate the Ortiz catheter without success subsequently catheter removed and a new 22 [place]ian three-way catheter was placed and started on CBI and subsequently urine cleared, and irrigation port clamped on 04/27/2025.  Discontinued ceftriaxone and started Macrobid which led to expected urine dark brown-color.     Spoke with patient daughter on 04/28/2025 and concerned about the urine color and report given recurrent hematuria and bleeding patient did not have any imaging of his abdomen since his clot evacuation procedure on 03/27/2025 and requestied some sort of imaging.  We ordered CT abdomen and pelvis W and WO contrast done 04/28/2025 overnight showed no significant abdominal pathology or active GI bleed, noted 3.2 cm AAA (was 2.8 cm in 2021) and incidental bilateral segmental pulmonary emboli.  Subsequently a dedicated CTA chest in the morning of 04/29/2025  revealed bilateral pulmonary thromboemboli at the segmental pulmonary arteries level, no evidence of right heart strain.  BLE venous ultrasound show DVT involving right proximal femoral and left posterior tibial veins.  Started enoxaparin 1 mg/kg Q12H and and consulted Cardiology on recommendation regarding triple therapy and they recommended to stop aspirin and continue Plavix with Eliquis on discharge.    ROS: Except as documented, all systems reviewed and negative.    Interval History:  Eventful night with hematuria and clots with Ortiz obstruction requiring multiple irrigation, Urology consulted this morning and 3 way Ortiz was exchanged with large clot noted, new 3 way Ortiz placed and restarted on CBI.  We consulted Cardiology for IVC filter evaluation,  and patient was taken to cath lab this afternoon and underwent successful IVC filter placement.    Objective   Physical exam:  Vital Signs  Temp:  [97.5 °F (36.4 °C)-97.9 °F (36.6 °C)]   Pulse:  [73-91]   Resp:  [18]   BP: (109-123)/(67-75)   SpO2:  [94 %-97 %]    General: Appears comfortable  Chest: CTABL  CVS: Regular rhythm.  Plus one pedal edema  Abdomen:  Soft, nontender, 3 way Ortiz catheter in place, CBI irrigation port wide open, urine pink in color   Skin: Warm and dry  Neuro: AAOx3  Psych: Cooperative      Labs & diagnostics  Recent Labs     04/28/25  0408 04/29/25  1141   WBC 6.16 5.52   HGB 8.1* 9.0*   HCT 25.9* 29.1*    185      Recent Labs     04/27/25  0445 04/29/25  1141    136   K 4.0 3.6   * 108*   CO2 20* 19*   BUN 18.7 16.6   CREATININE 0.82 0.75   EGFRNORACEVR >60 >60   CALCIUM 8.3* 8.1*   MG 2.24  --    PHOS 2.8  --    ALBUMIN 2.7* 2.4*   GLOBULIN 3.3 3.5   ALKPHOS 83 80   ALT 6 <5   AST 9* 8*   BILITOT 0.4 0.7             Inpatient Medications  Scheduled Med:   atorvastatin  40 mg Oral Daily    clopidogreL  75 mg Oral Daily    dapagliflozin propanediol  10 mg Oral Daily    enoxparin  1 mg/kg Subcutaneous Q12H (treatment, non-standard time)    LIDOcaine  1 patch Transdermal Daily    metoprolol succinate  25 mg Oral Daily    nitrofurantoin (macrocrystal-monohydrate)  100 mg Oral QHS    pantoprazole  40 mg Oral BID    senna-docusate  1 tablet Oral Daily    sucralfate  1 g Oral QID (AC & HS)    zinc oxide-cod liver oil   Topical (Top) BID      Continuous Infusions:     PRN Meds:    Current Facility-Administered Medications:     0.9%  NaCl infusion (for blood administration), , Intravenous, Q24H PRN    acetaminophen, 650 mg, Oral, Q4H PRN    bisacodyL, 10 mg, Rectal, Daily PRN    dextrose 50%, 12.5 g, Intravenous, PRN    dextrose 50%, 25 g, Intravenous, PRN    glucagon (human recombinant), 1 mg, Intramuscular, PRN    glucose, 16 g, Oral, PRN    glucose, 24 g, Oral, PRN     hydrALAZINE, 10 mg, Intravenous, Q4H PRN    ondansetron, 8 mg, Oral, Q8H PRN    polyethylene glycol, 17 g, Oral, BID PRN @      Assessment & Plan:   Severe multivessel CAD/NSTEMI  Ischemic cardiomyopathy/Acute HFrEF- compensated  In-hospital PEA cardiac arrest 03/29/2025 - ACLS with ROSC in 6 minutes  Cardiogenic shock:  Required Impella and vasopressors - resolved  [facility] 03/29/2025:  Culprit mid RCA s/p PCTA and DESx2  CT surgery- deemed not a surgical candidate for CABG  [facility] 04/10/2025:  High-risk PCI to LAD with Impella support, TAD x4  Continue Plavix, statin, metoprolol, dapagliflozin,   Add further GDMT Entresto once BP allows  DNR- no LifeVest  Repeat TTE 04/28/2025:  LVEF 20-25% mild-to-moderate MR    Acute bilateral DVT (right proximal femoral and left posterior tibial) and bilateral segmental pulmonary thromboemboli - 04/28/2029  Provoked in the setting of prolonged hospitalization with critical illness/immobilization and holding prophylactic anticoagulation due to recurrent hematuria  Started enoxaparin 1 mg/kg subQ Q12H on 04/29/2029 and discontinued aspirin to avoid triple therapy given high bleeding risk/recurrent hematuria  Developed gross hematuria with clots 04/30/2025, necessitating IVC filter placement 04/30/2025, appreciate cardiology prompt response.  Continued on Plavix, need to re-discuss with Cardiology if aspirin needed, now off anticoagulation.    Recurrent hematuria post TURP  BPH S/P TURP 02/24/2025  S/P cystoscopy and clot evacuation/fulguration 03/27/2025  Recurrent hematuria 4/21/2025 - started CBI  Catheter clotted, replaced 04/26/2025 and continued CBI  Urine cleared, Clamped irrigation port of  CBI on 04/27/2025, discontinued ceftriaxone and started on suppressive nitrofurantoin 100 mg nightly until no further hematuria and Ortiz catheter is out.  No further hematuria, very dark brownish yellow urine is expected with Macrobid  Retroperitoneal ultrasound 04/28/2025  unremarkable  Recurrent gross hematuria 04/30/2029 after FD enoxaparin initiation, required manual irrigation with multiple clots subsequently three-way Ortiz exchanged and started on wide-open CBI, appreciate urology assistance.    Normocytic anemia -iron-deficiency due to blood loss  Secondary to recurrent intermittent blood loss/hematuria  Hemoglobin was 13 at baseline in February 2025  Ferrlecit 250 mg daily for 3 doses - completed  Goal hemoglobin around 9  S/P 1 unit PRBC for hemoglobin 8.1 on 04/28/2025    Questionable GI bleed/melena -resolved  PO PPI twice daily for 8 weeks then daily indefinitely. and Carafate q.i.d. - for 1 month  GI consulted, no plan for endoscopy unless overt bleed with decompensation    Acute hypoxemic respiratory failure - resolved  Weaned off to room air  Continue incentive spirometry    Abdominal aortic aneurysm 3.8 cm per CT abdomen pelvis 04/28/2025  Increased in size from 2.8 cm per CT in 2021    History of HTN, HLD, PAD, GERD, chronic tobacco smoker    Critical care time 35 minutes   Critical care diagnosis Acute DVT/PE, gross hematuria      VTE Prophylaxis:-SCDs, no anticoagulation at this time given hematuria  Disposition:  Accepted to SNF/[facility]  but transfer put on hold due to PE/DVT and hematuria complication.    Krystin Phillip MD  Internal Medicine

## 2025-05-01 NOTE — PLAN OF CARE
Problem: Infection  Goal: Absence of Infection Signs and Symptoms  Outcome: Progressing     Problem: Adult Inpatient Plan of Care  Goal: Plan of Care Review  Outcome: Progressing  Goal: Patient-Specific Goal (Individualized)  Outcome: Progressing  Goal: Absence of Hospital-Acquired Illness or Injury  Outcome: Progressing  Goal: Optimal Comfort and Wellbeing  Outcome: Progressing  Goal: Readiness for Transition of Care  Outcome: Progressing     Problem: Delirium  Goal: Optimal Coping  Outcome: Progressing

## 2025-05-01 NOTE — PROGRESS NOTES
"Patient Name: Jon Conley   MRN: [id number]   Admission Date: 3/29/2025   Hospital Length of Stay: 33   Attending Provider: Krystin Phillip MD   Consulting Provider: ANDREW Burch  Reason for Consult: Goals of Care  Primary Care Physician:  Patrick Reece Jr., MD     Principal Problem: Ischemic cardiomyopathy     Patient information was obtained from patient, relative(s), and ER records.     Final diagnoses:  [R57.0] Cardiogenic shock  [I46.9] Cardiac arrest  [I46.9] Cardiac arrest - line placement  [I46.9] Cardiopulmonary arrest  [I48.91] Atrial fibrillation with rapid ventricular response  [I21.9] Acute myocardial infarction, unspecified MI type, unspecified artery      Assessment/Plan:     I reviewed the patient and family's understanding of the seriousness of the illness and its expected prognosis. We discussed the patient's goals of care and treatment preferences. We discussed the difference between palliative and curative medicine. I explained the differences between home health, palliative and hospice care. I clarified current code status. I identified the surrogate decision makers to be his 2 daughters. I answered all questions and we formulated a plan including recommendations for symptom management and how to best achieve goals of care.       Patient is resting comfortably in bed.  He arouses easily.  Explained that he is feeling better after procedure yesterday in regards to catheter exchange and hematuria.  He appears to have a good understanding of IVC filter that was placed yesterday.  He denies complaints today. No family present. Returned to bedside, no family present. Discussed with nursing and case management.    Spoke with patient's daughter Sarah via telephone. Reviewed events over the last 48 hours with rebleeding in contreras requiring resuming CBI and IVC filter placement with anticoagulation/antiplatelet adjustments per cardiology. She asks about possible "cauterization" for bleeding, " "deferred to urology. She remains hopeful that patient will continue to improve, but does express concerns regarding continued fluctuating status with improvement and decline every few days. Discussed that prognosis remains guarded given fluctuation and recurrent bleeding in the setting of recent high risk PCI and pulmonary emboli. She is hopeful this will improve over the weekend and he will be able to discharge to SNF early next week. Discussed that given his fluctuating status, he would be at risk for readmission. States "we are hoping to avoid that, but I guess we will continue to take it day by day." She wishes to continue treatment at present with goal of improvement, seeking rehabilitation services at SNF, and attempting to improve to a new baseline functional status. She is unsure what plans would be after SNF, depending on his functional status at that time. Offered support. Encouraged to call with questions or concerns. Palliative Medicine will continue to follow.    Advance Care Planning     Date: 05/01/2025    [facility]  I engaged the patient and family in a voluntary conversation about advance care planning and we specifically addressed what the goals of care would be moving forward, in light of the patient's change in clinical status, specifically current condition.  We did specifically address the patient's likely prognosis, which is fair .  We explored the patient's values and preferences for future care.  The patient and family endorses that what is most important right now is to focus on remaining as independent as possible and improvement in condition but with limits to invasive therapies    Accordingly, we have decided that the best plan to meet the patient's goals includes continuing with treatment        Interval History:     Developed hematuria with clots and contreras obstruction. CBI restarted. S/p IVC filter placement by cardiology on 4/30.       Active Ambulatory Problems     Diagnosis Date Noted    " "No Active Ambulatory Problems     Resolved Ambulatory Problems     Diagnosis Date Noted    No Resolved Ambulatory Problems     No Additional Past Medical History        Past Surgical History:   Procedure Laterality Date    CATHETERIZATION OF BOTH LEFT AND RIGHT HEART N/A 3/29/2025    Procedure: CATHETERIZATION, HEART, BOTH LEFT AND RIGHT;  Surgeon: Lorne Coon Jr., MD;  Location: [facility] CATH LAB;  Service: Cardiology;  Laterality: N/A;    LEFT HEART CATHETERIZATION N/A 4/10/2025    Procedure: Left heart cath;  Surgeon: Lorne Coon Jr., MD;  Location: [facility] CATH LAB;  Service: Cardiology;  Laterality: N/A;        Review of patient's allergies indicates:  No Known Allergies     Current Medications[1]       Current Facility-Administered Medications:     acetaminophen, 650 mg, Oral, Q4H PRN    bisacodyL, 10 mg, Rectal, Daily PRN    dextrose 50%, 12.5 g, Intravenous, PRN    dextrose 50%, 25 g, Intravenous, PRN    glucagon (human recombinant), 1 mg, Intramuscular, PRN    glucose, 16 g, Oral, PRN    glucose, 24 g, Oral, PRN    hydrALAZINE, 10 mg, Intravenous, Q4H PRN    HYDROcodone-acetaminophen, 1 tablet, Oral, Q6H PRN    hyoscyamine, 0.25 mg, Sublingual, Q4H PRN    morphine, 2 mg, Intravenous, Q1H PRN    ondansetron, 8 mg, Oral, Q8H PRN    polyethylene glycol, 17 g, Oral, BID PRN     No family history on file.       Review of Systems   Constitutional:  Positive for fatigue.   Respiratory: Negative.     Cardiovascular: Negative.    Gastrointestinal: Negative.    Genitourinary:  Positive for hematuria.   Musculoskeletal: Negative.    Neurological:  Positive for weakness.   Psychiatric/Behavioral: Negative.              Objective:   BP (!) 101/54   Pulse 77   Temp 97.5 °F (36.4 °C)   Resp 18   Ht 5' 10.98" (1.803 m)   Wt 48.4 kg (106 lb 9.6 oz)   SpO2 95%   BMI 14.87 kg/m²      Physical Exam   Constitutional: He is oriented to person, place, and time. No distress. He appears ill.   HENT:   Mouth/Throat: " Mucous membranes are moist.   Cardiovascular: Normal rate. Pulmonary:      Effort: Pulmonary effort is normal. No respiratory distress.     Abdominal: He exhibits no distension.   Musculoskeletal:      Cervical back: Normal range of motion.      Right lower leg: No edema.      Left lower leg: No edema.      Comments: Generalized deconditioning   Neurological: He is alert and oriented to person, place, and time.   Skin: Skin is warm and dry.   Psychiatric: His behavior is normal.          Review of Symptoms  Review of Symptoms      Symptom Assessment (ESAS 0-10 Scale)  Pain:  0  Dyspnea:  0  Anxiety:  0  Nausea:  0  Depression:  0  Anorexia:  0  Fatigue:  0  Insomnia:  0  Restlessness:  0  Agitation:  0         Psychosocial/Cultural:   See Palliative Psychosocial Note: Yes  **Primary  to Follow**  Palliative Care  Consult: No      Advance Care Planning   Advance Directives:   [place]: Yes      Decision Making:  Patient answered questions  Goals of Care: What is most important right now is to focus on remaining as independent as possible, symptom/pain control, improvement in condition but with limits to invasive therapies. Accordingly, we have decided that the best plan to meet the patient's goals includes continuing with treatment.          PAINAD: NA    Caregiver burden formerly assessed: Yes    > 50% of 35 min of encounter was spent in chart review, face to face discussion of goals of care, symptom assessment, coordination of care and emotional support.    Vianey Gan, [facility]  Palliative Medicine  [facility]         [1]   Current Facility-Administered Medications:     0.9% NaCl infusion, , Bladder Instillation, Continuous, Krystin Phillip MD    acetaminophen tablet 650 mg, 650 mg, Oral, Q4H PRN, Lorne Coon Jr., MD, 650 mg at 04/29/25 1732    atorvastatin tablet 40 mg, 40 mg, Oral, Daily, Hasmukh Sandoval ANP, 40 mg at 04/30/25 0834    bisacodyL suppository 10 mg, 10 mg,  Rectal, Daily PRN, Elgin Stark MD    clopidogreL tablet 75 mg, 75 mg, Oral, Daily, Elgin Stark MD, 75 mg at 04/30/25 0834    dapagliflozin propanediol (Farxiga) tablet 10 mg, 10 mg, Oral, Daily, Hasmukh Sandoval, NORM, 10 mg at 04/30/25 0834    dextrose 50% injection 12.5 g, 12.5 g, Intravenous, PRN, Álvaro Harrington, DO    dextrose 50% injection 25 g, 25 g, Intravenous, PRN, Juany, Álvaro, DO    enoxaparin injection 50 mg, 1 mg/kg, Subcutaneous, Q12H (treatment, non-standard time), Krystin Phillip MD, 50 mg at 04/30/25 2041    ferrous sulfate tablet 1 each, 1 tablet, Oral, Daily, Krystin Phillip MD, 1 each at 04/30/25 0837    glucagon (human recombinant) injection 1 mg, 1 mg, Intramuscular, PRN, Álvaro Harrington, DO    glucose chewable tablet 16 g, 16 g, Oral, PRN, Juany, Álvaro, DO    glucose chewable tablet 24 g, 24 g, Oral, PRN, Juany, Álvaro, DO    hydrALAZINE injection 10 mg, 10 mg, Intravenous, Q4H PRN, Lorne Coon Jr., MD    HYDROcodone-acetaminophen 5-325 mg per tablet 1 tablet, 1 tablet, Oral, Q6H PRN, Krystin Phillip MD, 1 tablet at 04/30/25 2211    hyoscyamine SL tablet 0.25 mg, 0.25 mg, Sublingual, Q4H PRN, Krystin Phillip MD, 0.25 mg at 04/30/25 1236    LIDOcaine 5 % patch 1 patch, 1 patch, Transdermal, Daily, Elgin Stark MD, 1 patch at 04/30/25 0833    metoprolol succinate (TOPROL-XL) 24 hr tablet 25 mg, 25 mg, Oral, Daily, Krystin Phillip MD, 25 mg at 04/30/25 0833    morphine injection 2 mg, 2 mg, Intravenous, Q1H PRN, Rafiq Corona MD    nitrofurantoin (macrocrystal-monohydrate) 100 MG capsule 100 mg, 100 mg, Oral, QHS, Kenji Krystin WILSON MD, 100 mg at 04/30/25 2042    ondansetron disintegrating tablet 8 mg, 8 mg, Oral, Q8H PRN, Lorne Coon Jr., MD    pantoprazole EC tablet 40 mg, 40 mg, Oral, BID, Kenji, Krystin WILSON MD, 40 mg at 04/30/25 2041    polyethylene glycol packet 17 g, 17 g, Oral, BID PRN, Elgin Stark MD, 17 g at 04/21/25 1613    senna-docusate 8.6-50 mg per tablet 1 tablet, 1  tablet, Oral, Daily, Elgin Stark MD, 1 tablet at 04/30/25 0834    sucralfate tablet 1 g, 1 g, Oral, QID (AC & HS), Selena Shi, FNP, 1 g at 05/01/25 0549    zinc oxide-cod liver oil 40 % paste, , Topical (Top), BID, Davi Harp Jr., MD, FCCP, Given at 04/30/25 2045

## 2025-05-01 NOTE — PT/OT/SLP PROGRESS
Physical Therapy Treatment    Patient Name:  Jon Conley   MRN:  [id number]    Recommendations:     Discharge therapy intensity: Moderate Intensity Therapy   Discharge Equipment Recommendations: to be determined by next level of care  Barriers to discharge: Impaired mobility    Assessment:     Jon Conley is a 79 y.o. male admitted with a medical diagnosis of SOB, cardiac arrest, s/p LHC and impella on 3/29, s/p high risk PCI with 4 stents in LAD on 4/10; acute respiratory failure requiring intubation 3/29 with extubation 4/13 .  He presents with the following impairments/functional limitations: weakness, impaired endurance, impaired functional mobility, gait instability, impaired balance, decreased lower extremity function, edema, impaired cardiopulmonary response to activity.    Once sitting EOB pt with c/o dizziness that subsided after ~5 mins. While standing EOB attempted to assess BP x3 attempts however unable. Pt ambulated 12' then stated he was 'tired' and 'I feel like brandon pass out'. Pt assisted into supine position in wheelchair. BP assessed 70/47. Pt then laterally transferred to bed.     Rehab Prognosis: Good; patient would benefit from acute skilled PT services to address these deficits and reach maximum level of function.    Recent Surgery: Procedure(s) (LRB):  Placement, Inferior Vena Cava Filter (Right) 1 Day Post-Op    Plan:     During this hospitalization, patient would benefit from acute PT services 5 x/week to address the identified rehab impairments via gait training, therapeutic activities, therapeutic exercises, neuromuscular re-education and progress toward the following goals:    Plan of Care Expires:  05/24/25    Subjective     Chief Complaint: dizziness  Patient/Family Comments/goals: none stated  Pain/Comfort:  Pain Rating 1:  (none stated)      Objective:     Communicated with NSG prior to session.  Patient found HOB elevated with pulse ox (continuous), telemetry, diane  catheter, peripheral IV (CBI) upon PT entry to room.     General Precautions: Standard, aspiration  Orthopedic Precautions: N/A  Braces: N/A  Respiratory Status: Room air  Blood Pressure:    Supine: 104/58   Seated: 98/58   Seated after ~3-5 mins: 96/58  Attempted to take BP x3 in standing however unable   Supine in wheel chair: 70/47   Trended: 104/64  Skin Integrity: Visible skin intact      Functional Mobility:  Bed Mobility:     Supine to Sit: minimum assistance  Transfers:     Sit to Stand:  minimum assistance with rolling walker  Wheelchair to Bed: total assistance and of 4 persons with  no AD  using  lateral t/f  Gait: 12' w/RW, CGA  Balance: Static Sitting/Standing: CGA     Therapeutic Activities/Exercises:  Once sitting EOB pt with c/o dizziness that subsided after ~5 mins. While standing EOB attempted to assess BP x3 attempts however unable. Pt ambulated 12' then stated he was 'tired' and 'I feel like brandon pass out'. Pt assisted into supine position in wheelchair. BP assessed 70/47. Pt then laterally transferred to bed.       Patient left  semi-supine  with all lines intact, call button in reach, nurse notified, and pt nurse and 3 other nurses present.    GOALS:   Multidisciplinary Problems       Physical Therapy Goals          Problem: Physical Therapy    Goal Priority Disciplines Outcome Interventions   Physical Therapy Goal     PT, PT/OT Progressing    Description: Goals to be met by: 5/15/25     Patient will increase functional independence with mobility by performin. Supine to sit with MInimal Assistance  2. Sit to supine with MInimal Assistance  3. Sit to stand transfer with Moderate Assistance  4. Bed to chair transfer with Moderate Assistance using Rolling Walker  5. Sitting at edge of bed x20 minutes with Stand-by Assistance  6. Pt will ambulate 50ft with RW with Minimal Assistance.                         Time Tracking:     PT Received On: 25  PT Start Time: 1517     PT Stop Time:  1550  PT Total Time (min): 33 min     Billable Minutes: Gait Training 1 unit and Therapeutic Activity 1 unit    Treatment Type: Treatment  PT/PTA: PTA     Number of PTA visits since last PT visit: 3     05/01/2025

## 2025-05-01 NOTE — PROGRESS NOTES
[facility]  Hospital Medicine Progress Note        Chief Complaint: Inpatient Follow-up    HPI:   79-year-old  male with significant history of BPH with urinary obstruction status post TURP which was complicated by postoperative hematuria, nonobstructive CAD, PVD, right-sided carotid artery stenosis, HTN, obesity, GERD and chronic tobacco use presented to the ED with complaints of dyspnea.  Patient initially was in outlying facility where he was found to be in hypotensive shock with EF less than 20 %, severe MR/severe TR/NSTEMI.  Patient did suffer in hospital PEA cardiac arrest, Patient was transferred to our hospital for higher level of care, taken to cath lab were he underwent Impella placement and LHC revealed multivessel CAD.  Patient was admitted to ICU on dual antiplatelets, high-intensity statin,  patient on vasopressors and also intubated with mechanical ventilatory support. Cardiology closely following the patient.  Remained on heparin drip protocol.  CT surgery was consulted for CABG/valve repair evaluation.  Per CT surgery to high-risk for CABG and deemed not a candidate, therefore cardiology planned for high-risk PCI.  Patient underwent high-risk PCI on 4/10. heparin drip later discontinued, Impella removed, cardiology recommended dual antiplatelets and high-intensity statin.  Patient off vasopressors, successfully extubated, downgraded to hospital medicine services.  Therapy Services on board, cleared for oral diet, on GDM T per Cardiology, therapy Services recommended skilled nursing facility placement, case management consulted for the same.  While awaiting placement patient had V-tach, cardiology reconsulted.  Patient also unfortunately developed hematuria on dual antiplatelets and was found to have positive FOBT, GI and urology services consulted.  GI deferring endoscopic evaluation given high-risk, hemoglobin remained stable.  Neurology recommended Ortiz/CBI.   On IV PPI b.i.d./Carafate per GI.  Closely monitoring hemoglobin for need for transfusion.  Patient also developed clot which led to unsuccessful drainage through the Ortiz requiring replacement.  Patient additionally treated with antibiotics for suspected complicated cystitis.  Patient had CT abdomen/pelvis done on 04/28, ordered by Urology and this incidentally noted bilateral PE with no right heart strain.  Venous ultrasound confirmed bilateral DVT.  Cardiology consulted.  Started full-dose anticoagulation with Lovenox.  Cardiology recommended to stop aspirin to avoid triple therapy.  However patient had worsening hematuria with clots after being on Lovenox and therefore Lovenox was held.  Cardiology consulted for IVC filter evaluation and this was successfully done on 4/30.  Now remaining on Plavix, aspirin and anticoagulation held.  Urology closely following for persistent hematuria, patient remains on CBI     Interval Hx:   Patient seen at bedside, comfortably laying in bed, Ortiz with CBI running, urine is pink/very light red.  No acute events overnight, hemodynamics are stable, respiratory status stable    Objective/physical exam:  General: In no acute distress, afebrile  Chest: Clear to auscultation bilaterally  Heart: S1, S2, no appreciable murmur  Abdomen: Soft, nontender, BS +  MSK: Warm, no lower extremity edema, no clubbing or cyanosis  Neurologic: Alert and oriented x4,   VITAL SIGNS: 24 HRS MIN & MAX LAST   Temp  Min: 97.5 °F (36.4 °C)  Max: 98.8 °F (37.1 °C) 97.5 °F (36.4 °C)   BP  Min: 86/56  Max: 206/145 (!) 101/54   Pulse  Min: 45  Max: 92  77   Resp  Min: 16  Max: 20 18   SpO2  Min: 80 %  Max: 100 % 95 %       Recent Labs   Lab 05/01/25  0530   WBC 6.34   RBC 3.35*   HGB 9.6*   HCT 31.6*   MCV 94.3*   MCH 28.7   MCHC 30.4*   RDW 17.7*      MPV 10.6*         Recent Labs   Lab 05/01/25  0530      K 3.9      CO2 20*   BUN 16.8   CREATININE 0.81   GLU 82   CALCIUM 8.3*   MG 2.30    ALBUMIN 2.8*   PROT 6.1   ALKPHOS 83   ALT 5   AST 11   BILITOT 0.4          Microbiology Results (last 7 days)       Procedure Component Value Units Date/Time    Urine Culture High Risk [[id number]]  (Abnormal) Collected: 04/27/25 0401    Order Status: Completed Specimen: Urine, Catheterized Updated: 04/30/25 1130     Urine Culture Less than 10,000 colonies/ml Candida tropicalis    Urine culture [[id number]]  (Abnormal) Collected: 04/26/25 1735    Order Status: Completed Specimen: Urine Updated: 04/28/25 1058     Urine Culture Less than 10,000 colonies/ml Yeast             Scheduled Med:   atorvastatin  40 mg Oral Daily    clopidogreL  75 mg Oral Daily    dapagliflozin propanediol  10 mg Oral Daily    enoxparin  1 mg/kg Subcutaneous Q12H (treatment, non-standard time)    ferrous sulfate  1 tablet Oral Daily    LIDOcaine  1 patch Transdermal Daily    metoprolol succinate  25 mg Oral Daily    nitrofurantoin (macrocrystal-monohydrate)  100 mg Oral QHS    pantoprazole  40 mg Oral BID    senna-docusate  1 tablet Oral Daily    sucralfate  1 g Oral QID (AC & HS)    zinc oxide-cod liver oil   Topical (Top) BID          Assessment/Plan:    Severe multivessel CAD status post high-risk PCI to LAD 4/10, not a candidate for CABG  Severe ischemic cardiomyopathy with latest ejection fraction 25%, now compensated   Cardiogenic shock on admit requiring vasopressors/Impella, removed  Acute bilateral PE/DVT-intolerant to anticoagulation   Recurrent gross hematuria following TURP for BPH with urinary obstruction requiring indwelling Ortiz/CBI  Questionable GI bleed/melena-resolved  Acute on chronic microcytic anemia/acute blood loss anemia requiring PRBC transfusion-improved  Acute hypoxemic respiratory failure requiring intubation/mechanical ventilatory support on admit-improved, weaned to room air  AAA-3.8 cm  History of essential HTN-now low normal BP   History of HLD   History of PVD   History of GERD   Chronic tobacco use    Prophylaxis    Case discussed with Cardiology   Hematuria is improving   Patient is now only on Plavix  Plan is to restart aspirin and avoid anticoagulation given increased risk of bleeding   Patient has IVC filter placed 4/30   Hemodynamics relatively stable  Respiratory status stable on room air   Continue close monitoring  Urology following for hematuria which is in fact improving on CBI   Follow recs   Hemoglobin is more than 9, most recent transfusion was on 4/28  Continue goal-directed medical treatment   In addition to dual antiplatelets he remains on high-intensity statin, Farxiga, Toprol-XL  No Entresto given low normal BP  Plan to keep prophylactic Macrobid to prevent UTI, will have to continue until Ortiz is out   No overt GI bleeding  Remains on oral PPI and Carafate   Continue bowel regimen   Continue iron  DVT prophylaxis-bilateral SCDs, intolerant to anticoagulation        Lizabeth Kaminski MD   05/01/2025

## 2025-05-01 NOTE — PT/OT/SLP PROGRESS
"Occupational Therapy   Treatment    Name: Jon Conley  MRN: [id number]  Admitting Diagnosis:  Ischemic cardiomyopathy  1 Day Post-Op    Recommendations:     Recommended therapy intensity at discharge: Moderate Intensity Therapy   Discharge Equipment Recommendations:  to be determined by next level of care  Barriers to discharge:   (ongoing medical needs; placement)    Assessment:     Jon Conley is a 79 y.o. male with a medical diagnosis of SOB, cardiac arrest, s/p LHC and impella on 3/29, s/p high risk PCI with 4 stents in LAD on 4/10; acute respiratory failure requiring intubation 3/29 with extubation 4/13.      Performance deficits affecting function are weakness, impaired endurance, impaired self care skills, impaired functional mobility, impaired balance, impaired cognition.     Supine: 104/58  Sit: 98/58  Stand: attempted x3 unsuccessful > patient not symptomatic  After ambulation: 70/47  Supine with bed trended: 104/64    Rehab Prognosis:  Fair; patient would benefit from acute skilled OT services to address these deficits and reach maximum level of function.       Plan:     Patient to be seen 5 x/week to address the above listed problems via self-care/home management, therapeutic activities  Plan of Care Expires: 05/15/25  Plan of Care Reviewed with: patient    Subjective     "I'm gonna pass out."    Pain/Comfort:  Pain Rating 1: 0/10    Objective:     Communicated with: nurse and PTA prior to session.  Patient found HOB elevated with peripheral IV, telemetry, pulse ox (continuous), perineural catheter upon OT entry to room.    General Precautions: Standard, fall    Orthopedic Precautions:N/A  Braces: N/A  Respiratory Status: Room air  Vital Signs:   Supine: 104/58  Sit: 98/58  Stand: attempted x3 unsuccessful > patient not symptomatic  After ambulation: 70/47  Supine with bed trended: 104/64     Occupational Performance:     Bed Mobility:    Patient completed Supine to Sit with minimum " assistance    Functional Mobility/Transfers:  Patient completed Sit <> Stand Transfer with minimum assistance  with  rolling walker   Patient completed Bed <> Chair Transfer using lateral transfer technique with total assistance and of 4 persons with no assistive device  Patient ambulated 12ft for carryover for ADL tasks    Activities of Daily Living:  Grooming: minimum assistance wash face with cold towel sitting EOB  Toileting: dependence catheter in place    AMPAC 6 Click ADL: 14    Patient Education:  Patient provided with verbal education education regarding OT role/goals/POC, fall prevention, safety awareness, Discharge/DME recommendations, and pressure ulcer prevention.  Understanding was verbalized, however additional teaching warranted.      Patient left supine with all lines intact, call button in reach, and nurses present.    GOALS:   Multidisciplinary Problems       Occupational Therapy Goals          Problem: Occupational Therapy    Goal Priority Disciplines Outcome Interventions   Occupational Therapy Goal     OT, PT/OT Progressing    Description: LTG: Pt will perform basic ADLs and ADL transfers with Modified independence using LRAD by discharge.    STG: to be met by 5/15/25    Pt will complete grooming standing at sink with LRAD with SBA.  Pt will complete UB dressing with SBA.  Pt will complete LB dressing with SBA using LRAD and AE prn.  Pt will complete toileting with SBA using LRAD.  Pt will complete functional mobility to/from toilet and toilet transfer with SBA using LRAD.                        Time Tracking:     OT Date of Treatment: 05/01/25  OT Start Time: 1517  OT Stop Time: 1550  OT Total Time (min): 33 min    Billable Minutes:Self Care/Home Management 33    OT/OLIVIA: OT     Number of OLIVIA visits since last OT visit: 5    5/1/2025

## 2025-05-01 NOTE — PROGRESS NOTES
UROLOGY  PROGRESS  NOTE    Jon Conley 1945  [id number]  5/1/2025    S/p IVC filter yesterday    Hematuria improving  Requiring less CBI today  Did have to irrigate once overnight for clot    Afebrile   Soft Bps, no tachycardia  UOP inaccurate s/t CBI  WBC 6.34  H&H 9.6/31.6  BUN/Cr 16.8/0.81      Intake/Output:  No intake/output data recorded.  I/O last 3 completed shifts:  In: 360 [P.O.:360]  Out: -48680      Exam:    NAD  Card: RRR  Resp: unlabored  Abd: soft, NTND  : clear minimally pink tinged urine draining to  bag, hand irrigated with no clot obtained. CBI resumed at a slow rate with pink urine draining.    Recent Results (from the past 24 hours)   Comprehensive Metabolic Panel    Collection Time: 05/01/25  5:30 AM   Result Value Ref Range    Sodium 138 136 - 145 mmol/L    Potassium 3.9 3.5 - 5.1 mmol/L    Chloride 107 98 - 107 mmol/L    CO2 20 (L) 23 - 31 mmol/L    Glucose 82 82 - 115 mg/dL    Blood Urea Nitrogen 16.8 8.4 - 25.7 mg/dL    Creatinine 0.81 0.72 - 1.25 mg/dL    Calcium 8.3 (L) 8.8 - 10.0 mg/dL    Protein Total 6.1 5.8 - 7.6 gm/dL    Albumin 2.8 (L) 3.4 - 4.8 g/dL    Globulin 3.3 2.4 - 3.5 gm/dL    Albumin/Globulin Ratio 0.8 (L) 1.1 - 2.0 ratio    Bilirubin Total 0.4 <=1.5 mg/dL    ALP 83 40 - 150 unit/L    ALT 5 0 - 55 unit/L    AST 11 11 - 45 unit/L    eGFR >60 mL/min/1.73/m2    Anion Gap 11.0 mEq/L    BUN/Creatinine Ratio 21    Magnesium    Collection Time: 05/01/25  5:30 AM   Result Value Ref Range    Magnesium Level 2.30 1.60 - 2.60 mg/dL   Phosphorus    Collection Time: 05/01/25  5:30 AM   Result Value Ref Range    Phosphorus Level 2.6 2.3 - 4.7 mg/dL   CBC with Differential    Collection Time: 05/01/25  5:30 AM   Result Value Ref Range    WBC 6.34 4.50 - 11.50 x10(3)/mcL    RBC 3.35 (L) 4.70 - 6.10 x10(6)/mcL    Hgb 9.6 (L) 14.0 - 18.0 g/dL    Hct 31.6 (L) 42.0 - 52.0 %    MCV 94.3 (H) 80.0 - 94.0 fL    MCH 28.7 27.0 - 31.0 pg    MCHC 30.4 (L) 33.0 - 36.0 g/dL    RDW 17.7  (H) 11.5 - 17.0 %    Platelet 204 130 - 400 x10(3)/mcL    MPV 10.6 (H) 7.4 - 10.4 fL    Neut % 53.5 %    Lymph % 30.3 %    Mono % 8.0 %    Eos % 7.3 %    Basophil % 0.6 %    Imm Grans % 0.3 %    Neut # 3.39 2.1 - 9.2 x10(3)/mcL    Lymph # 1.92 0.6 - 4.6 x10(3)/mcL    Mono # 0.51 0.1 - 1.3 x10(3)/mcL    Eos # 0.46 0 - 0.9 x10(3)/mcL    Baso # 0.04 <=0.2 x10(3)/mcL    Imm Gran # 0.02 0.00 - 0.04 x10(3)/mcL    NRBC% 0.0 %     CT Abdomen Pelvis W Wo Contrast [[id number]] Resulted: 04/29/25 0843   Order Status: Completed Updated: 04/29/25 0845   Narrative:     EXAMINATION:  CT ABDOMEN PELVIS W WO CONTRAST    CLINICAL HISTORY:  recurrent gib/melena and recurrent hematuria;.    TECHNIQUE:  Helical acquisition through the abdomen and pelvis without and with IV contrast.  Three plane reconstructions were provided for review. DLP 1395 mGycm. Automatic exposure control, adjustment of mA/kV or iterative reconstruction technique was used to reduce radiation.    COMPARISON:  12 October 2021    FINDINGS:  There are bilateral pulmonary emboli in segmental pulmonary arteries.  Small right pleural effusion.  Bibasilar atelectasis or scarring.    There are gallstones.  No pericholecystic inflammation.  No acute findings liver, spleen, pancreas or adrenals.  No hydronephrosis or suspicious renal lesion.    No bowel obstruction.  No significant inflammatory changes of the bowel.  Site of active GI bleeding is not seen.  There is enteric contrast in the colon which decreases sensitivity for GI bleed.    There is irregular bladder wall thickening.  Ortiz in the bladder.  Prostate is markedly enlarged.  No pelvic free fluid.  There is abdominal aortic aneurysm measuring up to 3.2 cm.  Dense atherosclerotic disease.  No retroperitoneal adenopathy.    Moderate degenerative change of the spine.   Impression:       1. Bilateral pulmonary thromboembolic disease.  2. Irregular bladder wall thickening which may relate to cystitis but recommend  correlation with cystoscopy if not recently performed.  3. A site of active GI bleeding is not seen.  4. Findings discussed with 9th floor nurse Milena at 841 on 4/29/2025.      Electronically signed by: Hakeem Cabezas  Date: 04/29/2025  Time: 08:43       Assessment:  Gross hematuria  -s/p TURP about 7 weeks ago, cytso clot evac and fulguration about 4 weeks ago  -catheter exchanged 04/26/2025, evac 250cc mature clot 4/27  -recurrent gross hematuria yesterday morning after lovenox initiated, three-way Ortiz exchanged in initiated on CBI, hand irrigated with about 150cc clot obtained  -US yesterday with no significant clot in bladder  -blood counts stable    DVT/PE  -initiated on full-dose Lovenox, held s/t gross hematuria  -s/p IVC filter yesterday    Postoperative PEA arrest/MI s/p stents  -on Plavix    Plan:  -Continue CBI, titrate to maintain pink urine. Hand irrigate with cath tip syringe as needed for clots  -Levsin PRN spasms  -Hopefully urine will continue clearing up and can wean off of CBI over next day or so  -Discussed with patient, nursing and I updated his daughter Kimmy over the phone.   -Following      Mariela Schwab NP

## 2025-05-01 NOTE — PROGRESS NOTES
[facility]    Cardiology  Progress Note    Patient Name: Jon Conley  MRN: [id number]  Admission Date: 3/29/2025  Hospital Length of Stay: 33 days  Code Status: DNR   Attending Provider: Krystin Phillip MD   Consulting Provider: ANDREW Calle  Primary Care Physician: Patrick Reece Jr., MD  Principal Problem:Ischemic cardiomyopathy    Patient information was obtained from past medical records and ER records.     Subjective:   Chief Complaint/Reason for Consult: anticoagulation recommendation    HPI: Mr. Conley is a 78 y/o male who is known to [facility], Dr. Wang. The patient presented to [facility] on 3.29.25 with c/o SOB. He initially presented to [facility] in [place] with SOB. He was found to have Tachycardia, Hypotension, Anemia (7.4/24.6), Troponin 9.443, .0, BUN/Crea 16.2/1.34, Lactic Acid Level 4.3, Na 133, WBC 14.03, H&H 7.3/23.0, WBC 14.03, , PT/INR 14.9/1.2. He was admitted to , however, he sustained a witnessed PEA Arrest in the ER and has ROSC per ER MD. [facility] was consulted for NSETMI and Arrest.     Hospital Course:  3.30.25: NAD. Impella L Groin. CVP 7, P7. Levophed 0.32mcg/kg/min, Heparin Drip per Protocol, Amiodarone 0.5mg/min  3.31.25: NAD. L Groin Impella P7, Amiodarone 0.5mg/min, Heparin Drip per Protocol, Levophed 0.18mcg/kg/min, H&H 8.8/25.8, AST/ALT 79/45  4.1.25: NAD noted. L groin Impella in place, remains at P7. Remains on levophed. LFTs continue to improve.  4.2.25: NAD. L Groin Impella Device P6. Heparin Infusion per Protocol. Remains on 0.06mcg/kg/min.   4.3.25: NAD. Vented/Sedated. L Groin Impella Device. P3, Heparin Infusion per Protocol. Off Pressors. Attempting Wean of Impella. H&H 8.2/26.2,   4.4.25: NAD. Vented/Sedated. Heparin Drip per Protocol. H&H 8.1/95.4,   4.5.25: NAD. Vented/Sedated. Heparin Drip per Protocol. Levophed 0.04mcg/kg/min. H&H 7.9/23.8,  Impella P6  4.6.25: NAD.  "Vented/Sedated. P3. Heparin Drip per Protocol, Off Pressors. CV Surgery to Reevaluate for CABG. H&H 7.4/22.7,   4.7.25: NAD. Vented/Sedated. H&H 9.2/27.7, , K 3.9, BUN/Crea 9.2/0.61, Lactic Acid 0.6. Impella L Groin P4  4.8.25:NAD noted. Remains vented/sedated. Consent obtained for LHC, will plan for Thursday.  4.9.25: NAD. Remains Vented/Sedated. Plans for LHC with PCI on Thursday. Impella P4. Remains Off Pressors.   4.10.25: NAD. Remains Vented/Sedated. NPO for Planned LHC with PCI/Impella Assisted. P4 Impella. Levophed 0.1mcg/kg/min.   4.11.25: NAD. Vented/Sedated. Levophed 0.12mcg/kg/min. S/P PCI of LAD and Impella Explant.   4.12.25: NAD. Vented/Sedated. Levophed 0.08mcg/kg/min.   4.13.25: NAD. Sitting in Bedside Chair. Denies CP, SOB and Palps. Off Pressors. "I am ok."   4.14.25: NAD. "I am fine." Denies CP, SOB and Palps. Remains Off Pressors.   4.15.25: NAD. "Hi." Denies CP, SOB and Palps.   4.29.25 Reconsult for hematuria.  4.30.25: Patient developed significant hematuria. 3 way catheter placed.  5.1.25: NAD Noted. Denies CP/SOB. IVC Filter successfully placed yesterday. Right IJ Site stable.    PMH: BPH/Urinary Obstruction, CAD/Nonobstructive, PAD, R JACY/Mild, HTN, Obesity, GERD, Tobacco Use   PSH: TURP, Angiogram, Bilateral Knee Replacements   Family History: Denies Family History of Heart Disease  Social History: Denies Illicit Drug, ETOH and Tobacco Use     Previous Cardiac Diagnostics:   IVC Filter Placement (4.30.25):  Successful IVC filter placement.     LHC (4.10.25):  Findings:  - Successful Impella-protected PCI of LAD.  - Prox to Mid Left Anterior Descending has severe diffuse disease, up to 80% in severity. The lesion was successfully treated with overlapping FRONTIER BERNADINE 3.68M18AZ, FRONTIER BERNADINE 3.79H87BZ, FRONTIER BERNADINE 2.72E49LB, and SYNERGY XD 2.5X28MM drug-eluting stents. Post-dilatation was performed using an NC EMERGE MR 3.5X20MM angioplasty balloon inflated to 20 gretel. " Following intervention there was 0% residual stenosis and PATTY grade 3 flow in the distal vessel.  - Intravascular Ultrasound (IVUS) was performed prior to intervention to further characterize the lesion, as well as post-stent deployment to ensure optimal stent apposition and expansion.  - Ostial First Diagonal Branch has a 70% stenosis. At the conclusion of the case, there was PATTY grade 3 flow in the distal vessel.  - Successful Impella CP removal and closure of L femoral arterial access site using a 14 Amharic MANTA device.  Assessment/Plan:  - Patient is a 79 y.o. male with a history of CAD presents in cardiogenic shock. Successful PCI of RCA last week. Impella CP placed at that time. Now s/p successful LAD intervention and Impella CP removal.  - Continue DAPT (aspirin + clopidogrel) for a minimum of 12 months and aspirin indefinitely thereafter  - High intensity statin  - Arterial sheath remains place; plan is to remove when activated clotting time (ACT) less than 180 seconds. Bedrest for 4 hours after hemostasis is achieved.    ECHO Limited 4.8.25:  Impella check  Aortic annulus to tip distance is 4.3 cm.  LV systolic function is 15%.    ECHO 4.3.25:  Limited study to check impella placement.  Left Ventricle: There is severely reduced systolic function with a visually estimated ejection fraction of 15 - 20%.  Impella distance from aortic valve annulus appears appropriate.    ECHO 4.1.25:  Limited study to check impella placement.  Left Ventricle: There is severely reduced systolic function with a visually estimated ejection fraction of 15 - 20%.  Impella distance from aortic valve annulus appears appropriate.    ECHO 3.30.25:  Left Ventricle: There is severely reduced systolic function with a visually estimated ejection fraction of less than 30%.  There appears to be at least moderate to severe mitral regurgitation.   Impella is positioned with inflow cannula 3.8 cm from aortic valve annulus.    [facility]  3.29.25:  Findings:  - Successful placement of Impella CP device from left femoral approach.  - There is severe multi-vessel coronary artery disease.  - Left Main has mild diffuse disease.  - Mid Left Anterior Descending has serial lesions, up to 70-80% in severity.   - Prox Left Circumflex is 100% occluded. Unable to cross lesion despite wire escalation and microcatheter support.  - Mid Right Coronary Artery is 100% occluded. The lesion was successfully treated with overlapping SYNERGY XD 4.0X20MM, SYNERGY XD 3.0X48MM, and SYNERGY XD 2.04M15YI drug-eluting stents. Post-dilatation was performed using a SYNERGY XD 4.0X20MM angioplasty balloon inflated to 12 gretel. Following intervention there was 0% residual stenosis and PATTY grade 3 flow in the distal vessel.  - Intravascular Ultrasound (IVUS) was performed prior to intervention to further characterize the lesion, as well as post-stent deployment to ensure optimal stent apposition and expansion.  - Aorto-iliac angiogram revealed moderate disease of R ileofemoral system.  - At the conclusion of the case, contrast injection was performed through the Impella sheath side port, showing adequate antegrade flow down the ipsilateral common femoral artery/SFA/profunda artery.  - RHC with elevated R sided filling pressures. RA 17 mmHg, RV 45/15 mmHg, PA 42/29 mmHg (mean 34 mmHg), PCWP 25 mmHg.  - Transpulmonary gradient is 9 mmHg.  - Cardiac Output/Index at 6.6/3.0, as calculated by Gregory equation on Levophed infusion and Impella CP support.  - PVR is 1.4 Woods units  - SVR is 550 dyne-sec*cm^-5  - Pulmonary Artery Pulsatility Index (Rafa) is 0.8  - Cardiac Power Output () is 0.91   Assessment/Plan:  - Patient is a 79 y.o. male with a history of recent TURP, complicated by significant post-op bleeding, presents to OSH ED with dyspnea. Transferred to [facility] with worsening shock despite vasopressors. TTE showing EF 25%, severe MR, severe TR. Troponin elevated. Brought to cath  lab. Underwent Impella placement/LHC/RHC as outlined above. RCA lesion treated. Unable to cross LCx lesion. 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 At this point, would continue Impella support and aggressive medical mgmt. Has non-culprit lesions that should be treated at a later date as well. Can consider CTS consult, especially given severe valvular disease.  - Patient was given a loading dose of clopidogrel 600 mg PO in the cath lab  - Continue DAPT (aspirin + clopidogrel) for a minimum of 12 months and aspirin indefinitely thereafter  - High intensity statin  - Continue Aggrastat for 4 hours post-procedure  - Arterial sheath remains place; plan is to remove when activated clotting time (ACT) less than 180 seconds. Bedrest for 4 hours after hemostasis is achieved.  - Obtain transthoracic echocardiogram  - Continuous bedrest while Impella in place  - Therapeutic heparin administration per protocol  - Antibiotics per protocol  - Remaining mgmt per primary team/cardiology    ECHO 3.29.25:  Left Ventricle: The left ventricle is dilated. Normal wall thickness. Severe global hypokinesis present. There is severely reduced systolic function with a visually estimated ejection fraction of 15 - 20%. Grade I diastolic dysfunction.  Right Ventricle: Right ventricular enlargement. Systolic function is normal. TAPSE is 1.83 cm.  Left Atrium: dilated  Mitral Valve: Mildly thickened leaflets. There is moderate regurgitation.  Tricuspid Valve: There is moderate regurgitation.  Pulmonary Artery: There is moderate pulmonary hypertension.  IVC/SVC: Patient is ventilated, cannot use IVC diameter to estimate right atrial pressure.    Carotid US 11.4.24:  The study quality is average.   1-39% stenosis in the proximal right internal carotid artery based on Bluth Criteria.   Antegrade right vertebral artery flow.   Antegrade left vertebral artery flow    ECHO 1.27.22:  The study quality is average.   The left ventricle is normal in size.  Global left ventricular systolic function is moderately decreased. The left ventricular ejection fraction is 40-45%. Mild concentric left ventricular hypertrophy is present.  The left ventricle diastolic function is impaired (Grade I) with normal left atrial pressure.  Mild (1+) mitral regurgitation.  The pulmonary artery appears normal.     [facility] 9.26.17:  LM: Normal  LAD: Prox 30% Stenosis  RI: Normal  LCX: Normal  RCA: Distal RCA 50%    Review of patient's allergies indicates:  No Known Allergies  No current facility-administered medications on file prior to encounter.     Current Outpatient Medications on File Prior to Encounter   Medication Sig    clopidogreL (PLAVIX) 75 mg tablet Take 75 mg by mouth once daily.    finasteride (PROSCAR) 5 mg tablet Take 5 mg by mouth once daily.    rosuvastatin (CRESTOR) 20 MG tablet Take 20 mg by mouth once daily.    silodosin (RAPAFLO) 8 mg Cap capsule Take 8 mg by mouth once daily.     Review of Systems   Respiratory:  Negative for chest tightness and shortness of breath.    Cardiovascular:  Negative for chest pain.     Objective:     Vital Signs (Most Recent):  Temp: 98.3 °F (36.8 °C) (05/01/25 1128)  Pulse: 76 (05/01/25 1128)  Resp: 18 (05/01/25 1128)  BP: (!) 91/54 (05/01/25 1128)  SpO2: 100 % (05/01/25 1128) Vital Signs (24h Range):  Temp:  [97.5 °F (36.4 °C)-98.8 °F (37.1 °C)] 98.3 °F (36.8 °C)  Pulse:  [45-86] 76  Resp:  [16-20] 18  SpO2:  [80 %-100 %] 100 %  BP: ()/() 91/54   Weight: 48.4 kg (106 lb 9.6 oz)  Body mass index is 14.87 kg/m².  SpO2: 100 %       Intake/Output Summary (Last 24 hours) at 5/1/2025 1155  Last data filed at 5/1/2025 0646  Gross per 24 hour   Intake 240 ml   Output -40449 ml   Net 27881 ml     Lines/Drains/Airways       Drain  Duration                  Urethral Catheter -- days              Peripheral Intravenous Line  Duration                  Peripheral IV - Single Lumen 04/14/25 1600 20 G 2 1/4 in Yes Anterior;Distal;Right Upper  "Arm 16 days         Sheath 04/30/25 1413 Right <1 day                  Significant Labs:   Chemistries:   Recent Labs   Lab 04/27/25 0445 04/29/25  1141 04/30/25  0357 05/01/25  0530    136 138 138   K 4.0 3.6 4.5 3.9   * 108* 109* 107   CO2 20* 19* 17* 20*   BUN 18.7 16.6 16.8 16.8   CREATININE 0.82 0.75 0.92 0.81   CALCIUM 8.3* 8.1* 8.7* 8.3*   PROT 6.0 5.9  --  6.1   BILITOT 0.4 0.7  --  0.4   ALKPHOS 83 80  --  83   ALT 6 <5  --  5   AST 9* 8*  --  11   MG 2.24  --  2.20 2.30   PHOS 2.8  --  2.6 2.6        CBC/Anemia Labs: Coags:    Recent Labs   Lab 04/27/25 0444 04/27/25 0445 04/28/25  0408 04/29/25  1141 04/30/25 0357 05/01/25  0530   WBC  --  7.39   < > 5.52 7.71 6.34   HGB  --  8.7*   < > 9.0* 10.3* 9.6*   HCT  --  27.9*   < > 29.1* 33.2* 31.6*   PLT  --  206   < > 185 243 204   MCV  --  93.6   < > 92.7 93.3 94.3*   RDW  --  17.3*   < > 17.6* 17.4* 17.7*   IRON  --  17*  --   --   --   --    FERRITIN  --  124.88  --   --   --   --    FOLATE 8.5  --   --   --   --   --    [id number]  --  1,256*  --   --   --   --     < > = values in this interval not displayed.    No results for input(s): "PT", "INR", "APTT" in the last 168 hours.         Telemetry: SR    Physical Exam  Vitals and nursing note reviewed.   Constitutional:       Appearance: Normal appearance.   HENT:      Head: Normocephalic.   Neck:      Comments: Right IJ Site Clean/Dry/Open to air with no evidence of hematoma noted.  Cardiovascular:      Rate and Rhythm: Normal rate and regular rhythm.   Pulmonary:      Effort: Pulmonary effort is normal. No respiratory distress.   Genitourinary:     Comments: Ortiz with CBI- Clear output at current time  Skin:     General: Skin is warm and dry.   Neurological:      Mental Status: He is alert. Mental status is at baseline.       Home Medications:   Medications Ordered Prior to Encounter[1]  Current Schedule Inpatient Medications:   atorvastatin  40 mg Oral Daily    clopidogreL  75 mg Oral " Daily    dapagliflozin propanediol  10 mg Oral Daily    enoxparin  1 mg/kg Subcutaneous Q12H (treatment, non-standard time)    ferrous sulfate  1 tablet Oral Daily    LIDOcaine  1 patch Transdermal Daily    metoprolol succinate  25 mg Oral Daily    nitrofurantoin (macrocrystal-monohydrate)  100 mg Oral QHS    pantoprazole  40 mg Oral BID    senna-docusate  1 tablet Oral Daily    sucralfate  1 g Oral QID (AC & HS)    zinc oxide-cod liver oil   Topical (Top) BID     Continuous Infusions:   0.9% NaCl   Bladder Instillation Continuous           Assessment:   Segmental PE    - Status Post IVC Filter Placement (4.30.25)  Acute Hypoxemic Respiratory Failure requiring Intubation/Ventilation - Resolved on NC O2 (Stable)  Cardiogenic Shock requiring Mechanical and Chemical Support - Resolved     - Impella/Pressors - Impella Explanted on 4.10.25 post High Risk LAD PCI/Impella Assisted  CAD (Multivessel)    - s/p LHC (4.10.25) - PTCA/TAD of the Prox to Mid LAD and Impella Explant     - s/p C (3.29.25) -  Left Main has mild diffuse disease. Mid Left Anterior Descending has serial lesions, up to 70-80% in severity. Prox Left Circumflex is 100% occluded. Unable to cross lesion despite wire escalation and microcatheter support. Mid Right Coronary Artery is 100% occluded.  LAD PCI: The lesion was successfully treated with overlapping SYNERGY XD 4.0X20MM, SYNERGY XD 3.0X48MM, and SYNERGY XD 2.34C43RE drug-eluting stents. Post-dilatation was performed using a SYNERGY XD 4.0X20MM angioplasty balloon inflated to 12 gretel. Following intervention there was 0% residual stenosis and PATTY grade 3 flow in the distal vessel.  NSTEMI Type I (Non-Anterior Wall MI)    - Trponin > 9  Cardiopulmonary Arrest    - Initial Rhythm PEA - Witnessed in ER Arrest/ROSC  ICMO/EF 15%     - ECHO (4.8.25) - LVEF 15%    - ECHO (3.29.25) - LVEF 15-20%, Grade I DD    Acute DVT (Lower Extremities)    - Right Proximal Femoral Vein & Left Posterior Tibial  Veins  Hypotension requiring Pressors - Resolved- BP Low Normal     - Hx of HTN  BPH s/p TURP   Lactic Acidosis - Resolved   Leukocytosis - Resolved   Anemia requiring Transfusions - Stable   Recent BPH Diagnosis s/p TURP  JACY/R Sided   PAD/Nonobstructive   Transaminitis - Resolved   Obesity  GERD  Tobacco Use  No Hx of GIB   DNR Status    Plan:   Resume DAPT- Aspirin 81 Mg Daily & Plavix 75 Mg Daily Re: TAD Proximal to Mid LAD (4.10.25)   Eliquis discontinued due to Hematuria/Status Post IVC Filter Placement  Keep Right IJ Site Clean Dry Open to air  Recommend aggressive workup and treatment of Hematuria given need for uninterrupted DAPT Post Recent LAD Intervention  On FD Lovenox, may consider discontinuation post IVC Filter- will defer to primary team  Follow up with [facility]  Will be available. Call if needed.      Julio Butterfield, DOMENICO  Cardiology  [facility]   Physician addendum:  I have seen and examined this patient as a split-shared visit with the KAUSHIK d/t complicated medical management of above problems written in assessment and high acuity requiring physician expertise in medical decision-making. I performed the substantive portion of the history and exam. Above medical decision-making is also formulated by me.    Cardiovascular exam:  S1, S2  Lungs:  fine crackles at bases.  Extremities:  + edema bilaterally    Plan:  Segmental PE status post IVC filter.  Cardiogenic shock underwent circulatory support by Impella.  Stabilized.  Coronary artery disease status post PCI as above.  Continue  Medications as above.  Continue supportive therapy.     Sandro Gage MD  Cardiologist         [1]   No current facility-administered medications on file prior to encounter.     Current Outpatient Medications on File Prior to Encounter   Medication Sig Dispense Refill    clopidogreL (PLAVIX) 75 mg tablet Take 75 mg by mouth once daily.      finasteride (PROSCAR) 5 mg tablet Take 5 mg by mouth once daily.       rosuvastatin (CRESTOR) 20 MG tablet Take 20 mg by mouth once daily.      silodosin (RAPAFLO) 8 mg Cap capsule Take 8 mg by mouth once daily.

## 2025-05-02 NOTE — PROGRESS NOTES
[facility]  Hospital Medicine Progress Note        Chief Complaint: Inpatient Follow-up    HPI:   79-year-old  male with significant history of BPH with urinary obstruction status post TURP which was complicated by postoperative hematuria, nonobstructive CAD, PVD, right-sided carotid artery stenosis, HTN, obesity, GERD and chronic tobacco use presented to the ED with complaints of dyspnea.  Patient initially was in outlying facility where he was found to be in hypotensive shock with EF less than 20 %, severe MR/severe TR/NSTEMI.  Patient did suffer in hospital PEA cardiac arrest, Patient was transferred to our hospital for higher level of care, taken to cath lab were he underwent Impella placement and LHC revealed multivessel CAD.  Patient was admitted to ICU on dual antiplatelets, high-intensity statin,  patient on vasopressors and also intubated with mechanical ventilatory support. Cardiology closely following the patient.  Remained on heparin drip protocol.  CT surgery was consulted for CABG/valve repair evaluation.  Per CT surgery to high-risk for CABG and deemed not a candidate, therefore cardiology planned for high-risk PCI.  Patient underwent high-risk PCI on 4/10. heparin drip later discontinued, Impella removed, cardiology recommended dual antiplatelets and high-intensity statin.  Patient off vasopressors, successfully extubated, downgraded to hospital medicine services.  Therapy Services on board, cleared for oral diet, on GDM T per Cardiology, therapy Services recommended skilled nursing facility placement, case management consulted for the same.  While awaiting placement patient had V-tach, cardiology reconsulted.  Patient also unfortunately developed hematuria on dual antiplatelets and was found to have positive FOBT, GI and urology services consulted.  GI deferring endoscopic evaluation given high-risk, hemoglobin remained stable.  Neurology recommended Oritz/CBI.   On IV PPI b.i.d./Carafate per GI.  Closely monitoring hemoglobin for need for transfusion.  Patient also developed clot which led to unsuccessful drainage through the Ortiz requiring replacement.  Patient additionally treated with antibiotics for suspected complicated cystitis.  Patient had CT abdomen/pelvis done on 04/28, ordered by Urology and this incidentally noted bilateral PE with no right heart strain.  Venous ultrasound confirmed bilateral DVT.  Cardiology consulted.  Started full-dose anticoagulation with Lovenox.  Cardiology recommended to stop aspirin to avoid triple therapy.  However patient had worsening hematuria with clots after being on Lovenox and therefore Lovenox was held.  Cardiology consulted for IVC filter evaluation and this was successfully done on 4/30.  Now remaining on Plavix, aspirin and anticoagulation held.  Urology closely following for persistent hematuria, patient remains on CBI.  Hematuria improving as of 5/1, case discussed with Urology, decided to hold off on anticoagulation especially since he has IVC filter and decided to do dual antiplatelets given recent high-risk stenting     Interval Hx:     Patient seen at bedside, comfortably laying in bed, Ortiz again clotted overnight and therefore catheter had to be exchanged, on CBI with clear urine today morning, no other acute events overnight, hemodynamics are stable, no chest pain, he is awake, alert and oriented    Objective/physical exam:  General: In no acute distress, afebrile  Chest: Clear to auscultation bilaterally  Heart: S1, S2, no appreciable murmur  Abdomen: Soft, nontender, BS +  MSK: Warm, no lower extremity edema, no clubbing or cyanosis  Neurologic: Alert and oriented x4,   VITAL SIGNS: 24 HRS MIN & MAX LAST   Temp  Min: 97.4 °F (36.3 °C)  Max: 98.3 °F (36.8 °C) 98.1 °F (36.7 °C)   BP  Min: 84/52  Max: 112/63 (!) 95/58   Pulse  Min: 66  Max: 93  76   Resp  Min: 13  Max: 22 18   SpO2  Min: 95 %  Max: 100 % 96 %        Recent Labs   Lab 05/01/25  0530   WBC 6.34   RBC 3.35*   HGB 9.6*   HCT 31.6*   MCV 94.3*   MCH 28.7   MCHC 30.4*   RDW 17.7*      MPV 10.6*         Recent Labs   Lab 05/01/25  0530      K 3.9      CO2 20*   BUN 16.8   CREATININE 0.81   GLU 82   CALCIUM 8.3*   MG 2.30   ALBUMIN 2.8*   PROT 6.1   ALKPHOS 83   ALT 5   AST 11   BILITOT 0.4          Microbiology Results (last 7 days)       Procedure Component Value Units Date/Time    Urine Culture High Risk [[id number]]  (Abnormal)  (Susceptibility) Collected: 04/27/25 0401    Order Status: Completed Specimen: Urine, Catheterized Updated: 05/01/25 0928     Urine Culture Less than 10,000 colonies/ml Candida tropicalis    Urine culture [[id number]]  (Abnormal) Collected: 04/26/25 1735    Order Status: Completed Specimen: Urine Updated: 04/28/25 1058     Urine Culture Less than 10,000 colonies/ml Yeast             Scheduled Med:   aspirin  81 mg Oral Daily    atorvastatin  40 mg Oral Daily    clopidogreL  75 mg Oral Daily    dapagliflozin propanediol  10 mg Oral Daily    ferrous sulfate  1 tablet Oral Daily    LIDOcaine  1 patch Transdermal Daily    metoprolol succinate  25 mg Oral Daily    nitrofurantoin (macrocrystal-monohydrate)  100 mg Oral QHS    pantoprazole  40 mg Oral BID    senna-docusate  1 tablet Oral Daily    sucralfate  1 g Oral QID (AC & HS)    zinc oxide-cod liver oil   Topical (Top) BID          Assessment/Plan:    Severe multivessel CAD status post high-risk PCI to LAD 4/10, not a candidate for CABG  Severe ischemic cardiomyopathy with latest ejection fraction 25%, now compensated   Cardiogenic shock on admit requiring vasopressors/Impella, removed  Acute bilateral PE/DVT-intolerant to anticoagulation   Recurrent gross hematuria following TURP for BPH with urinary obstruction requiring indwelling Ortiz/CBI, multiple exchange of Ortiz secondary to clotting  Questionable GI bleed/melena-resolved  Acute on chronic microcytic  anemia/acute blood loss anemia requiring PRBC transfusion-improved  Acute hypoxemic respiratory failure requiring intubation/mechanical ventilatory support on admit-improved, weaned to room air  AAA-3.8 cm  History of essential HTN-now low normal BP   History of HLD   History of PVD   History of GERD   Chronic tobacco use   Prophylaxis      Case was discussed with Urology and Cardiology on 05/01 and we have decided to do dual antiplatelets given recent high-risk PCI   Avoiding anticoagulation given high-risk for bleeding/recurrent hematuria with clots  Patient has IVC filter   Hemodynamics and respiratory status stable, monitor closely  Ortiz exchange performed last night secondary to clotting  On CBI and urine is clear today morning  Hemoglobin is 8.6   Continue close monitoring  Continue goal-directed medical treatment   In addition to dual antiplatelets he remains on high-intensity statin, Farxiga, Toprol-XL  No Entresto given low normal BP  Plan to keep prophylactic Macrobid to prevent UTI, will have to continue until Ortiz is out   No overt GI bleeding  Remains on oral PPI and Carafate   Continue bowel regimen   Continue iron  DVT prophylaxis-bilateral SCDs, intolerant to anticoagulation        Lizabeth Kaminski MD   05/02/2025

## 2025-05-02 NOTE — NURSING
[facility] [address]  Wound Care    Patient Name:  Jon Conley   MRN:  [id number]  Date: 5/2/2025  Diagnosis: Ischemic cardiomyopathy    History:     No past medical history on file.    Social History[1]    Precautions:     Allergies as of 03/29/2025    (No Known Allergies)       [facility] Assessment Details/Treatment      05/01/25 1021   Pain/Comfort/Sleep   POSS (Pasero Opioid-Induced Sed Scale) 1 - Awake and alert   Pain Reassessment   Pain Rating Prior to Med Admin 10   RASS (Ha Agitation-Sedation Scale)   RASS (Ha Agitation-Sedation Scale) 0   Brief Confusion Assessment Method (bCAM)   Feature 3: Altered Level of Consciousness Negative        Wound 04/11/25 1000 Pressure Injury Left Calf   Date First Assessed/Time First Assessed: 04/11/25 1000   Present on Original Admission: No  Primary Wound Type: (c) Pressure Injury  Side: Left  Location: Calf   Wound Image   (resolved  skin tear or abrasion.)   Dressing Appearance Open to air   Drainage Amount None   Appearance Tan;Dry   Tissue loss description Not applicable   Care Cleansed with:;Wound cleanser   Genitourinary   Urine Characteristics light;pink   Bladder Irrigation   Rate Moderate     Return visit to check on the left posterior lower leg ulcer.   Resolved.   New photo.   Wound care to continue to follow status.   05/02/2025         [1]   Social History  Socioeconomic History    Marital status: Single     Social Drivers of Health     Financial Resource Strain: Patient Unable To Answer (3/30/2025)    Overall Financial Resource Strain (CARDIA)     Difficulty of Paying Living Expenses: Patient unable to answer   Food Insecurity: Patient Unable To Answer (3/30/2025)    Hunger Vital Sign     Worried About Running Out of Food in the Last Year: Patient unable to answer     Ran Out of Food in the Last Year: Patient unable to answer   Transportation Needs: No Transportation Needs (3/29/2025)    PRAPARE - Transportation      Lack of Transportation (Medical): No     Lack of Transportation (Non-Medical): No   Stress: Patient Unable To Answer (3/30/2025)    [employer] - Occupational Stress Questionnaire     Feeling of Stress : Patient unable to answer   Housing Stability: Patient Unable To Answer (3/30/2025)    Housing Stability Vital Sign     Unable to Pay for Housing in the Last Year: Patient unable to answer     Number of Times Moved in the Last Year: 0     Homeless in the Last Year: Patient unable to answer

## 2025-05-02 NOTE — PROGRESS NOTES
UROLOGY  PROGRESS  NOTE    Jon Conley 1945  [id number]  5/2/2025    S/p IVC filter 4/30    Ortiz clotted overnight, catheter was exchanged   Currently running on CBI wide open  Patient is resting in bed.  No complaints during rounds  He had some pain last night when catheter was clotted, resolved since catheter exchanged    Afebrile   Soft Bps unchanged, no tachycardia  UOP inaccurate s/t CBI  WBC 6.24  H&H 8.6/27.8  BUN/Cr 12.3/0.78    Intake/Output:  I/O this shift:  In: -   Out: 100 [Urine:100]  I/O last 3 completed shifts:  In: 520 [P.O.:520]  Out: -5474      Exam:    NAD  Card: RRR  Resp: unlabored  Abd: soft, NTND  :  Crystal clear urine draining to  bag with CBI wide open.  CBI slowed and urine was monitored, urine remains clear with a slight garcia tinged    Recent Results (from the past 24 hours)   Comprehensive Metabolic Panel    Collection Time: 05/02/25  9:30 AM   Result Value Ref Range    Sodium 138 136 - 145 mmol/L    Potassium 3.7 3.5 - 5.1 mmol/L    Chloride 111 (H) 98 - 107 mmol/L    CO2 19 (L) 23 - 31 mmol/L    Glucose 86 82 - 115 mg/dL    Blood Urea Nitrogen 12.3 8.4 - 25.7 mg/dL    Creatinine 0.78 0.72 - 1.25 mg/dL    Calcium 8.1 (L) 8.8 - 10.0 mg/dL    Protein Total 5.8 5.8 - 7.6 gm/dL    Albumin 2.6 (L) 3.4 - 4.8 g/dL    Globulin 3.2 2.4 - 3.5 gm/dL    Albumin/Globulin Ratio 0.8 (L) 1.1 - 2.0 ratio    Bilirubin Total 0.3 <=1.5 mg/dL    ALP 81 40 - 150 unit/L    ALT 8 0 - 55 unit/L    AST 15 11 - 45 unit/L    eGFR >60 mL/min/1.73/m2    Anion Gap 8.0 mEq/L    BUN/Creatinine Ratio 16    CBC with Differential    Collection Time: 05/02/25  9:30 AM   Result Value Ref Range    WBC 6.24 4.50 - 11.50 x10(3)/mcL    RBC 2.93 (L) 4.70 - 6.10 x10(6)/mcL    Hgb 8.6 (L) 14.0 - 18.0 g/dL    Hct 27.8 (L) 42.0 - 52.0 %    MCV 94.9 (H) 80.0 - 94.0 fL    MCH 29.4 27.0 - 31.0 pg    MCHC 30.9 (L) 33.0 - 36.0 g/dL    RDW 18.0 (H) 11.5 - 17.0 %    Platelet 196 130 - 400 x10(3)/mcL    MPV 10.2 7.4 -  10.4 fL    Neut % 61.1 %    Lymph % 24.0 %    Mono % 7.5 %    Eos % 6.4 %    Basophil % 0.5 %    Imm Grans % 0.5 %    Neut # 3.81 2.1 - 9.2 x10(3)/mcL    Lymph # 1.50 0.6 - 4.6 x10(3)/mcL    Mono # 0.47 0.1 - 1.3 x10(3)/mcL    Eos # 0.40 0 - 0.9 x10(3)/mcL    Baso # 0.03 <=0.2 x10(3)/mcL    Imm Gran # 0.03 0.00 - 0.04 x10(3)/mcL    NRBC% 0.0 %     CT Abdomen Pelvis W Wo Contrast [[id number]] Resulted: 04/29/25 0843   Order Status: Completed Updated: 04/29/25 0845   Narrative:     EXAMINATION:  CT ABDOMEN PELVIS W WO CONTRAST    CLINICAL HISTORY:  recurrent gib/melena and recurrent hematuria;.    TECHNIQUE:  Helical acquisition through the abdomen and pelvis without and with IV contrast.  Three plane reconstructions were provided for review. DLP 1395 mGycm. Automatic exposure control, adjustment of mA/kV or iterative reconstruction technique was used to reduce radiation.    COMPARISON:  12 October 2021    FINDINGS:  There are bilateral pulmonary emboli in segmental pulmonary arteries.  Small right pleural effusion.  Bibasilar atelectasis or scarring.    There are gallstones.  No pericholecystic inflammation.  No acute findings liver, spleen, pancreas or adrenals.  No hydronephrosis or suspicious renal lesion.    No bowel obstruction.  No significant inflammatory changes of the bowel.  Site of active GI bleeding is not seen.  There is enteric contrast in the colon which decreases sensitivity for GI bleed.    There is irregular bladder wall thickening.  Ortiz in the bladder.  Prostate is markedly enlarged.  No pelvic free fluid.  There is abdominal aortic aneurysm measuring up to 3.2 cm.  Dense atherosclerotic disease.  No retroperitoneal adenopathy.    Moderate degenerative change of the spine.   Impression:       1. Bilateral pulmonary thromboembolic disease.  2. Irregular bladder wall thickening which may relate to cystitis but recommend correlation with cystoscopy if not recently performed.  3. A site of active GI  bleeding is not seen.  4. Findings discussed with 9th floor nurse Milena at 841 on 4/29/2025.      Electronically signed by: Hakeem Cabezas  Date: 04/29/2025  Time: 08:43       Assessment:  Gross hematuria  -s/p TURP about 7 weeks ago, cytso clot evac and fulguration about 4 weeks ago  -catheter exchanged 04/26/2025, evac 250cc mature clot 4/27  -recurrent gross hematuria yesterday morning after lovenox initiated, three-way Ortiz exchanged in initiated on CBI, hand irrigated with about 150cc clot obtained  -US 4/30 with no clot in bladder  -blood counts stable    DVT/PE  -initiated on full-dose Lovenox, held s/t gross hematuria  -s/p IVC filter yesterday    Postoperative PEA arrest/MI s/p stents  -on Plavix    Plan:  -no indication for urologic intervention at this time  -wean CBI as tolerated.  Hand irrigate as needed for clots.  -Levsin PRN spasms  -Discussed with patient, nursing  -Following      Mariela Schwab NP

## 2025-05-02 NOTE — PT/OT/SLP RE-EVAL
"Occupational Therapy   Re-evaluation    Name: Jon Conley  MRN: [id number]    Recommendations:     Discharge therapy intensity: Moderate Intensity Therapy   Discharge Equipment Recommendations:  to be determined by next level of care  Barriers to discharge:   (ongoing medical needs)    Assessment:     Jon Conley is a 79 y.o. male with a medical diagnosis of SOB, cardiac arrest, s/p LHC and impella on 3/29, s/p high risk PCI with 4 stents in LAD on 4/10; acute respiratory failure requiring intubation 3/29 with extubation 4/13.      He presents with the following performance deficits affecting function: weakness, impaired endurance, impaired self care skills, impaired functional mobility, impaired balance, impaired cardiopulmonary response to activity.      Patient has made good progress with OT services since his last evaluation. He is requiring min A for mobility, setup for grooming and feeding tasks, and total A for LB dressing and toileting. Patient is limited by hypotension. He is motivated to participate to return to his PLOF.    Rehab Prognosis: Fair; patient would benefit from acute skilled OT services to address these deficits and reach maximum level of function.       Plan:     Patient to be seen 4 x/week to address the above listed problems via self-care/home management, therapeutic activities, therapeutic exercises  Plan of Care Expires: 06/02/25  Plan of Care Reviewed with: patient    Subjective     Chief Complaint: "my bladder hurts"  Patient/Family Comments/goals: get stronger    Patients cultural, spiritual, Spiritism conflicts given the current situation: no    Objective:     OT communicated with nurse and PTA prior to session.      Patient was found HOB elevated with peripheral IV, telemetry, pulse ox (continuous), perineural catheter upon OT entry to room.    General Precautions: Standard, fall  Orthopedic Precautions: N/A  Braces: N/A    Vital Signs:   Semi supine: 98/61  Seated: " 102/65  Seated after ~5 mins: 103/62    Bed Mobility:    Patient completed Supine to Sit with minimum assistance  Patient completed Sit to Supine with minimum assistance    Functional Mobility/Transfers:  Patient completed Sit <> Stand Transfer with minimum assistance  with  rolling walker   Functional Mobility: able to take side steps to HOB    Activities of Daily Living:  Feeding:  setup; OT opened all containers and cut meat; patient able to eat a few bites of food and bring cup to mouth  Grooming: setup; washing face and oral care with swab sitting EOB  Lower Body Dressing: total assistance carlito socks  Toileting: dependence catheter in place    [facility] Total Score: 13    Functional Cognition:  Affect: Cooperative    Visual Perceptual Skills:  Intact    Upper Extremity Function:  Right Upper Extremity:   Increased strength; shoulder flexion 2+/5, elbow flex/ext 3-/5     Left Upper Extremity:  Increased strength; shoulder flexion 2+/5, elbow flex/ext 3-/5    Balance:   Sitting EOB: CGA; standing min A    Therapeutic Positioning  Risk for acquired pressure injuries is increased due to impaired mobility and impaired sensation.    OT interventions performed during the course of today's session in an effort to prevent and/or reduce acquired pressure injuries:   Education was provided on benefits of and recommendations for therapeutic positioning    Skin assessment: full body skin assessment was performed    Findings: no redness or breakdown noted    OT recommendations for therapeutic positioning throughout hospitalization:   Follow [facility] Pressure Injury Prevention Protocol    Patient Education:  Patient provided with verbal education education regarding OT role/goals/POC, fall prevention, safety awareness, Discharge/DME recommendations, and pressure ulcer prevention.  Understanding was verbalized, however additional teaching warranted.     Patient left with bed in chair position with all lines intact, call button in  reach, and nurse notified    GOALS:   Multidisciplinary Problems       Occupational Therapy Goals          Problem: Occupational Therapy    Goal Priority Disciplines Outcome Interventions   Occupational Therapy Goal     OT, PT/OT Progressing    Description: Goals to be met by 6/2/25    Pt will complete grooming standing at sink with LRAD with min A.  Pt will complete UB dressing with min A.  Pt will complete LB dressing with max A using LRAD and AE prn.  Pt will complete toileting with max A using LRAD.  Pt will complete functional mobility to/from toilet and toilet transfer with SBA using LRAD.                        History:     No past medical history on file.      Past Surgical History:   Procedure Laterality Date    CATHETERIZATION OF BOTH LEFT AND RIGHT HEART N/A 3/29/2025    Procedure: CATHETERIZATION, HEART, BOTH LEFT AND RIGHT;  Surgeon: Lorne Coon Jr., MD;  Location: [facility] CATH LAB;  Service: Cardiology;  Laterality: N/A;    LEFT HEART CATHETERIZATION N/A 4/10/2025    Procedure: Left heart cath;  Surgeon: Lorne Coon Jr., MD;  Location: [facility] CATH LAB;  Service: Cardiology;  Laterality: N/A;    PLACEMENT, INFERIOR VENA CAVA FILTER Right 4/30/2025    Procedure: Placement, Inferior Vena Cava Filter;  Surgeon: Rafiq Corona MD;  Location: [facility] CATH LAB;  Service: Cardiology;  Laterality: Right;       Time Tracking:     OT Date of Treatment: 05/01/25  OT Start Time: 1120  OT Stop Time: 1143  OT Total Time (min): 23 min    Billable Minutes:Re-eval 15  Self Care/Home Management 8    5/2/2025

## 2025-05-02 NOTE — PT/OT/SLP PROGRESS
Physical Therapy Treatment    Patient Name:  Jon Conley   MRN:  [id number]    Recommendations:     Discharge therapy intensity: Moderate Intensity Therapy   Discharge Equipment Recommendations: to be determined by next level of care  Barriers to discharge: Impaired mobility and Ongoing medical needs    Assessment:     Jon Conley is a 79 y.o. male admitted with a medical diagnosis of SOB, cardiac arrest, s/p LHC and impella on 3/29, s/p high risk PCI with 4 stents in LAD on 4/10; acute respiratory failure requiring intubation 3/29 with extubation 4/13.  He presents with the following impairments/functional limitations: weakness, impaired endurance, impaired functional mobility, gait instability, impaired balance, decreased lower extremity function, edema, impaired cardiopulmonary response to activity.       Rehab Prognosis: Good; patient would benefit from acute skilled PT services to address these deficits and reach maximum level of function.    Recent Surgery: Procedure(s) (LRB):  Placement, Inferior Vena Cava Filter (Right) 2 Days Post-Op    Plan:     During this hospitalization, patient would benefit from acute PT services 5 x/week to address the identified rehab impairments via gait training, therapeutic activities, therapeutic exercises, neuromuscular re-education and progress toward the following goals:    Plan of Care Expires:  05/24/25    Subjective     Chief Complaint: 'i'm worn out right now'  Patient/Family Comments/goals: to get stronger  Pain/Comfort:  Pain Rating 1:  (not rated)  Location 1:  (bladder)  Pain Addressed 1: Distraction, Reposition      Objective:     Communicated with RN prior to session.  Patient found HOB elevated with contreras catheter, telemetry, pulse ox (continuous), peripheral IV upon PT entry to room.     General Precautions: Standard, fall, aspiration  Orthopedic Precautions: N/A  Braces: N/A  Respiratory Status: Room air  Blood Pressure:   Semi supine:  98/61  Seated: 102/65  Seated after ~5 mins: 103/62  Skin Integrity: Visible skin intact      Functional Mobility:  Bed Mobility:     Scooting: SBA  Supine to Sit: Moraima  Sit to supine: Moraima  Transfers:     Sit to Stand: Moraima w/RW  Gait: 4-6 steps laterally toward HOB w/RW, CGA  Balance: Static/Dynamic Sitting balance, SBA    Therapeutic Activities/Exercises:  Pt able to sit EOB, SBA for sitting balance while performed grooming activities. Minimal c/o dizziness that subsided after a few mins.     Education:  Patient provided with verbal education education regarding PT role/goals/POC, fall prevention, and safety awareness.  Understanding was verbalized, however additional teaching warranted.     Patient left with bed in chair position with all lines intact and call button in reach    GOALS:   Multidisciplinary Problems       Physical Therapy Goals          Problem: Physical Therapy    Goal Priority Disciplines Outcome Interventions   Physical Therapy Goal     PT, PT/OT Progressing    Description: Goals to be met by: 5/15/25     Patient will increase functional independence with mobility by performin. Supine to sit with MInimal Assistance  2. Sit to supine with MInimal Assistance  3. Sit to stand transfer with Moderate Assistance  4. Bed to chair transfer with Moderate Assistance using Rolling Walker  5. Sitting at edge of bed x20 minutes with Stand-by Assistance  6. Pt will ambulate 50ft with RW with Minimal Assistance.                         Time Tracking:     PT Received On: 25  PT Start Time: 1120     PT Stop Time: 1143  PT Total Time (min): 23 min     Billable Minutes: Therapeutic Activity 2 units    Treatment Type: Treatment  PT/PTA: PTA     Number of PTA visits since last PT visit: 4     2025

## 2025-05-02 NOTE — PROGRESS NOTES
05/02/25 1606   Missed Time Reason   SLP Attempted Eval Date 05/02/25   Missed Treatment Reason Other (Comment)  (PT)

## 2025-05-02 NOTE — PROGRESS NOTES
Inpatient Nutrition Assessment    Admit Date: 3/29/2025   Total duration of encounter: 34 days   Patient Age: 79 y.o.    Nutrition Recommendation/Prescription     Diet advancement per MD, currently on clear liquid diet with moderately thick liquids.   -Texture modifications per SLP   Encouragement and assistance with meals.  Boost Very High Calorie (provides 530 kcal, 22 g protein per serving) TID.   -Add 1 packet of moderately thick thickener   Bowel regimen as feasible.   Consider appetite stimulant s/t poor oral intake.   [place] patient and family wishes, does not want artificial nutrition.   Obtain new weight.     Communication of Recommendations: reviewed with nurse    Nutrition Assessment     Malnutrition Assessment/Nutrition-Focused Physical Exam  4/24/25 updated assessment; need updated weight     Malnutrition Level: other (see comments) (Unable to assess) (04/24/25 1101)  Energy Intake (Malnutrition): less than or equal to 50% for greater than or equal to 5 days (04/24/25 1101)  Weight Loss (Malnutrition): other (see comments) (Unable to assess) (04/24/25 1101)                                         Fluid Accumulation (Malnutrition): mild (04/24/25 1101)     Hand  Strength, Right (Malnutrition): Unable to assess (04/24/25 1101)  A minimum of two characteristics is recommended for diagnosis of either severe or non-severe malnutrition.    Chart Review    Reason Seen: continuous nutrition monitoring and follow-up    Malnutrition Screening Tool Results   Have you recently lost weight without trying?: No  Have you been eating poorly because of a decreased appetite?: No   MST Score: 0   Diagnosis:  Acute hypoxemic respiratory failure requiring intubation, extubated 04/13/2024   Cardiogenic shock requiring mechanical and chemical support   Impella placement 03/29/2025, removed 04/10/2025  Multivessel CAD  [facility] with PTCA/TAD x3 RCA (03/29/2025)   Status post high-risk PCI withPTCA/TAD X4 LAD on 4/10/25    Cardiopulmonary arrest   ICMO/EF 15%  Concern for GI bleed  Hematuria - recent urologic procedure, now on DAPT  BPH s/p recent TURP in [place] followed by clot evacuation and fulguration, around 3/2025  Normocytic anemia, chronic    Relevant Medical History: CAD, HTN, HLD, PAD, GERD    Scheduled Medications:  aspirin, 81 mg, Daily  atorvastatin, 40 mg, Daily  clopidogreL, 75 mg, Daily  dapagliflozin propanediol, 10 mg, Daily  ferrous sulfate, 1 tablet, Daily  LIDOcaine, 1 patch, Daily  metoprolol succinate, 25 mg, Daily  nitrofurantoin (macrocrystal-monohydrate), 100 mg, QHS  pantoprazole, 40 mg, BID  senna-docusate, 1 tablet, Daily  sucralfate, 1 g, QID (AC & HS)  zinc oxide-cod liver oil, , BID    Continuous Infusions:  0.9% NaCl      PRN Medications:  acetaminophen, 650 mg, Q4H PRN  bisacodyL, 10 mg, Daily PRN  dextrose 50%, 12.5 g, PRN  dextrose 50%, 25 g, PRN  glucagon (human recombinant), 1 mg, PRN  glucose, 16 g, PRN  glucose, 24 g, PRN  hydrALAZINE, 10 mg, Q4H PRN  HYDROcodone-acetaminophen, 1 tablet, Q6H PRN  hyoscyamine, 0.25 mg, Q4H PRN  morphine, 2 mg, Q1H PRN  ondansetron, 8 mg, Q8H PRN  polyethylene glycol, 17 g, BID PRN    Calorie Containing IV Medications: no significant kcals from medications at this time    Recent Labs   Lab 04/26/25  0432 04/27/25  0445 04/28/25  0408 04/29/25  1141 04/30/25  0357 05/01/25  0530 05/02/25  0930   NA  --  141  --  136 138 138 138   K  --  4.0  --  3.6 4.5 3.9 3.7   CALCIUM  --  8.3*  --  8.1* 8.7* 8.3* 8.1*   PHOS  --  2.8  --   --  2.6 2.6  --    MG  --  2.24  --   --  2.20 2.30  --    CL  --  111*  --  108* 109* 107 111*   CO2  --  20*  --  19* 17* 20* 19*   BUN  --  18.7  --  16.6 16.8 16.8 12.3   CREATININE  --  0.82  --  0.75 0.92 0.81 0.78   EGFRNORACEVR  --  >60  --  >60 >60 >60 >60   BILITOT  --  0.4  --  0.7  --  0.4 0.3   ALKPHOS  --  83  --  80  --  83 81   ALT  --  6  --  <5  --  5 8   AST  --  9*  --  8*  --  11 15   ALBUMIN  --  2.7*  --  2.4*  --   2.8* 2.6*   WBC 6.09 7.39 6.16 5.52 7.71 6.34 6.24   HGB 9.2* 8.7* 8.1* 9.0* 10.3* 9.6* 8.6*   HCT 30.4* 27.9* 25.9* 29.1* 33.2* 31.6* 27.8*     Nutrition Orders:  Diet Easy to Chew (IDDSI Level 7) Moderately Thick Liquids (IDDSI Level 3); Standard Tray  Dietary nutrition supplements TID; Boost Plus Nutritional Drink - Rich Chocolate    Appetite/Oral Intake: poor/0-25% of meals  Factors Affecting Nutritional Intake: decreased appetite and food textures/preferences  Social Needs Impacting Access to Food: unable to assess at this time; will attempt on follow-up  Food/Alevism/Cultural Preferences: unable to obtain  Food Allergies: no known food allergies  Last Bowel Movement: 05/01/25  Wound(s):     Wound 04/10/25 1800 Shearing Right Buttocks-Tissue loss description: Not applicable       Wound 04/10/25 1800 Skin Tear Right lower Arm-Tissue loss description: Partial thickness       Wound 04/11/25 1000 Pressure Injury Left Calf-Tissue loss description: Not applicable      Comments    3/31/25: Discussed with RN. Will provide tube feeding recommendations for when appropriate to start tube feeding. Receiving kcal from meds.  Currently with Impella in place, HOB flat. May need to place in reverse Trendelenburg to start TF.     4/1/25: Possible plans for starting trickle feeds today. Still receiving kcal from meds.     4/2/25: No TF yet. Still receiving kcal from meds.     4/3/25: No TF plans at this time. Plans for CABG tomorrow with plans for extubation per protocol post-op.     4/7/25: No CABG done. Possible plans for removal of Impella today. TF to start post procedure per RN.     4/8/25: Plans for Impella removal tomorrow. TF to start today.     4/10/25: Plans for procedure today. TF on hold. Previously tolerated per RN.     4/14/25: Pt now extubated, on po diet. 25-50% po intake of meals per RN. Only taking small bites at this time. Attempted to verify subjective info with pt, pt [place] and not understanding questions.  "Was willing to have ONS sent. Also add Ousmane due to pressure ulcer.     4/17/25: Reports not being hungry. Ate 0% breakfast and lunch. Did drink almost 100% Boost this morning with medications. Will continue Boost Plus with breakfast, discussed thickening instructions with RN. Trial Magic Cup with lunch and dinner. Re-add Ousmane for wound healing once oral intake improves. Last BM 4/11.    4/21/25: Intake of meals remains inadequate. States po intake is dependent on meal. Does likes Boost, drinks when encouraged. Noted Magic Cup discontinued over the weekend by RN, maybe pt did not like? Will increase Boost back to every meal.     4/24/25: Poor intake of meals, mainly drinking Boost and water. Will change supplement to Boost VHC, discussed thickening instructions with RN. No GI complaints.     4/28/25: Did not eat puree breakfast. Continues with poor intake of meals. Drinking some of the Boost VHC and thickened water, unsure amount of bottles daily. Denies any GI complaints. Discussed encouragement of Boost VHC 2-3x daily.     5/2/25: <25% breakfast or lunch consumed. Discussed thickening supplement with RN, was changed from Boost VHC to Boost Plus yesterday. Verbalized understanding.     Anthropometrics    Height: 5' 10.98" (180.3 cm), Height Method: Stated  Last Weight: 48.4 kg (106 lb 9.6 oz) (04/20/25 0557), Weight Method: Bed Scale  BMI (Calculated): 14.9  BMI Classification: obese grade I (BMI 30-34.9)        Ideal Body Weight (IBW), Male: 171.88 lb     % Ideal Body Weight, Male (lb): 130.81 %                          Usual Weight Provided By: unable to obtain usual weight    Wt Readings from Last 5 Encounters:   04/20/25 48.4 kg (106 lb 9.6 oz)   12/02/21 101 kg (222 lb 10.6 oz)     Weight Change(s) Since Admission:   4/28/25 no updated weights   4/24/25 no updated weights   4/20/25 48.4kg, inaccurate weight, possibly 106kg?  Wt Readings from Last 1 Encounters:   04/20/25 0557 48.4 kg (106 lb 9.6 oz) "   04/17/25 0617 106.6 kg (235 lb 0.2 oz)   03/29/25 1738 106.1 kg (233 lb 14.5 oz)   03/29/25 0404 102.1 kg (225 lb)   Admit Weight: 102.1 kg (225 lb) (03/29/25 0404), Weight Method: Stated    Estimated Needs    Weight Used For Calorie Calculations: 106.1 kg (233 lb 14.5 oz)  Energy Calorie Requirements (kcal): 2337kcal (1.3 stress factor)  Energy Need Method: [place]-St Jeor  Weight Used For Protein Calculations: 106.1 kg (233 lb 14.5 oz)  Protein Requirements: 117-138gm (1.1-1.3g/kg)  Fluid Requirements (mL): 2122ml (20ml/kg)  CHO Requirement: 260gm (45% est kcal needs)     Enteral Nutrition     Patient not receiving enteral nutrition at this time.    Parenteral Nutrition     Patient not receiving parenteral nutrition support at this time.    Evaluation of Received Nutrient Intake    Calories: not meeting estimated needs  Protein: not meeting estimated needs    Patient Education     Not applicable.    Nutrition Diagnosis     PES: Inadequate oral intake related to acute illness as evidenced by 25-50% po intake of meals since diet advanced. (active)     PES:            Nutrition Interventions     Intervention(s): modified composition of meals/snacks, commercial beverage, prescription medication, and collaboration with other providers  Intervention(s): Oral diet/nutrient modifications;Oral nutritional supplement;Feeding assistance/management    Goal: Meet greater than 80% of nutritional needs by follow-up. (goal not met)  Goal: Tolerate enteral feeding at goal rate by follow-up. (goal discontinued)    Nutrition Goals & Monitoring     Dietitian will monitor: food and beverage intake, energy intake, weight change, beliefs/attitudes, and gastrointestinal profile  Discharge planning: cardiac diet with texture modifications per SLP  Nutrition Risk/Follow-Up: high (follow-up in 1-4 days)   Please consult if re-assessment needed sooner.

## 2025-05-03 NOTE — PROGRESS NOTES
[facility]  Hospital Medicine Progress Note        Chief Complaint: Inpatient Follow-up    HPI:   79-year-old  male with significant history of BPH with urinary obstruction status post TURP which was complicated by postoperative hematuria, nonobstructive CAD, PVD, right-sided carotid artery stenosis, HTN, obesity, GERD and chronic tobacco use presented to the ED with complaints of dyspnea.  Patient initially was in outlying facility where he was found to be in hypotensive shock with EF less than 20 %, severe MR/severe TR/NSTEMI.  Patient did suffer in hospital PEA cardiac arrest, Patient was transferred to our hospital for higher level of care, taken to cath lab were he underwent Impella placement and LHC revealed multivessel CAD.  Patient was admitted to ICU on dual antiplatelets, high-intensity statin,  patient on vasopressors and also intubated with mechanical ventilatory support. Cardiology closely following the patient.  Remained on heparin drip protocol.  CT surgery was consulted for CABG/valve repair evaluation.  Per CT surgery to high-risk for CABG and deemed not a candidate, therefore cardiology planned for high-risk PCI.  Patient underwent high-risk PCI on 4/10. heparin drip later discontinued, Impella removed, cardiology recommended dual antiplatelets and high-intensity statin.  Patient off vasopressors, successfully extubated, downgraded to hospital medicine services.  Therapy Services on board, cleared for oral diet, on GDM T per Cardiology, therapy Services recommended skilled nursing facility placement, case management consulted for the same.  While awaiting placement patient had V-tach, cardiology reconsulted.  Patient also unfortunately developed hematuria on dual antiplatelets and was found to have positive FOBT, GI and urology services consulted.  GI deferring endoscopic evaluation given high-risk, hemoglobin remained stable.  Neurology recommended Ortiz/CBI.   On IV PPI b.i.d./Carafate per GI.  Closely monitoring hemoglobin for need for transfusion.  Patient also developed clot which led to unsuccessful drainage through the Ortiz requiring replacement.  Patient additionally treated with antibiotics for suspected complicated cystitis.  Patient had CT abdomen/pelvis done on 04/28, ordered by Urology and this incidentally noted bilateral PE with no right heart strain.  Venous ultrasound confirmed bilateral DVT.  Cardiology consulted.  Started full-dose anticoagulation with Lovenox.  Cardiology recommended to stop aspirin to avoid triple therapy.  However patient had worsening hematuria with clots after being on Lovenox and therefore Lovenox was held.  Cardiology consulted for IVC filter evaluation and this was successfully done on 4/30.  Now remaining on Plavix, aspirin and anticoagulation held.  Urology closely following for persistent hematuria, patient remains on CBI.  Hematuria improving as of 5/1, case discussed with Urology, decided to hold off on anticoagulation especially since he has IVC filter and decided to do dual antiplatelets given recent high-risk stenting.  Ortiz had to be exchanged overnight on 05/01 given clotting.     Interval Hx:     Patient seen at bedside, patient is still on CBI, rate reduced, urine almost clear, hemodynamics stable, no acute events overnight, daughter is at bedside today,     Objective/physical exam:  General: In no acute distress, afebrile  Chest: Clear to auscultation bilaterally  Heart: S1, S2, no appreciable murmur  Abdomen: Soft, nontender, BS +  MSK: Warm, no lower extremity edema, no clubbing or cyanosis  Neurologic: Alert and oriented x4,   VITAL SIGNS: 24 HRS MIN & MAX LAST   Temp  Min: 97.3 °F (36.3 °C)  Max: 98.1 °F (36.7 °C) 98.1 °F (36.7 °C)   BP  Min: 91/59  Max: 111/59 (!) 91/59   Pulse  Min: 72  Max: 83  78   Resp  Min: 14  Max: 18 18   SpO2  Min: 97 %  Max: 100 % 97 %       Recent Labs   Lab 05/03/25  0405   WBC 6.08    RBC 3.00*   HGB 8.5*   HCT 28.4*   MCV 94.7*   MCH 28.3   MCHC 29.9*   RDW 18.2*      MPV 10.2         Recent Labs   Lab 05/01/25  0530 05/02/25 0930 05/03/25 0405      < > 139   K 3.9   < > 4.1      < > 109*   CO2 20*   < > 20*   BUN 16.8   < > 11.7   CREATININE 0.81   < > 0.71*   GLU 82   < > 83   CALCIUM 8.3*   < > 8.1*   MG 2.30  --   --    ALBUMIN 2.8*   < > 2.6*   PROT 6.1   < > 5.7*   ALKPHOS 83   < > 80   ALT 5   < > 10   AST 11   < > 19   BILITOT 0.4   < > 0.3    < > = values in this interval not displayed.          Microbiology Results (last 7 days)       Procedure Component Value Units Date/Time    Urine Culture High Risk [[id number]]  (Abnormal)  (Susceptibility) Collected: 04/27/25 0401    Order Status: Completed Specimen: Urine, Catheterized Updated: 05/01/25 0928     Urine Culture Less than 10,000 colonies/ml Candida tropicalis    Urine culture [[id number]]  (Abnormal) Collected: 04/26/25 1735    Order Status: Completed Specimen: Urine Updated: 04/28/25 1058     Urine Culture Less than 10,000 colonies/ml Yeast             Scheduled Med:   aspirin  81 mg Oral Daily    atorvastatin  40 mg Oral Daily    clopidogreL  75 mg Oral Daily    dapagliflozin propanediol  10 mg Oral Daily    ferrous sulfate  1 tablet Oral Daily    LIDOcaine  1 patch Transdermal Daily    metoprolol succinate  25 mg Oral Daily    nitrofurantoin (macrocrystal-monohydrate)  100 mg Oral QHS    pantoprazole  40 mg Oral BID    senna-docusate  1 tablet Oral Daily    sucralfate  1 g Oral QID (AC & HS)    zinc oxide-cod liver oil   Topical (Top) BID          Assessment/Plan:    Severe multivessel CAD status post high-risk PCI to LAD 4/10, not a candidate for CABG  Severe ischemic cardiomyopathy with latest ejection fraction 25%, now compensated   Cardiogenic shock on admit requiring vasopressors/Impella, removed  Acute bilateral PE/DVT-intolerant to anticoagulation   Recurrent gross hematuria following TURP for BPH  with urinary obstruction requiring indwelling Ortiz/CBI, multiple exchange of Ortiz secondary to clotting  Questionable GI bleed/melena-resolved  Acute on chronic microcytic anemia/acute blood loss anemia requiring PRBC transfusion-improved  Acute hypoxemic respiratory failure requiring intubation/mechanical ventilatory support on admit-improved, weaned to room air  AAA-3.8 cm  History of essential HTN-now low normal BP   History of HLD   History of PVD   History of GERD   Chronic tobacco use   Prophylaxis      Case was discussed with Urology and Cardiology on 05/01 and we have decided to do dual antiplatelets given recent high-risk PCI   Avoiding anticoagulation given high-risk for bleeding/recurrent hematuria with clots  Patient has IVC filter   Hemodynamics and respiratory status stable, monitor closely  Ortiz exchange performed 5/1 secondary to clotting  Urine is clearing up on CBI and hemoglobin is 8.5 today  Continue close monitoring  Continue goal-directed medical treatment   In addition to dual antiplatelets he remains on high-intensity statin, Farxiga, Toprol-XL  No Entresto given low normal BP  Plan to keep prophylactic Macrobid to prevent UTI, will have to continue until Ortiz is out   No overt GI bleeding  Remains on oral PPI and Carafate   Continue bowel regimen   Continue iron  DVT prophylaxis-bilateral SCDs, intolerant to anticoagulation    Plan of care discussed in detail with daughter at bedside    Lizabeth Kaminski MD   05/03/2025

## 2025-05-03 NOTE — PROGRESS NOTES
UROLOGY  PROGRESS  NOTE    Jon Conley 1945  [id number]  5/3/2025    S/p IVC filter 4/30    Patient resting in bed   No acute events overnight   Daughter at bedside   Hematuria continues to gradually improve, no issues with clotting overnight    Soft BP unchanged   WBC 6.08   H&H 8.5/28.4   BUN and creatinine 11.7/0.71    Intake/Output:  No intake/output data recorded.  I/O last 3 completed shifts:  In: 280 [P.O.:280]  Out: 4200 [Urine:4200]     Exam:    NAD  Card: RRR  Resp: unlabored  Abd: soft, NTND  :  Crystal clear urine draining to  bag with CBI at a slow rate    Recent Results (from the past 24 hours)   Comprehensive Metabolic Panel    Collection Time: 05/03/25  4:05 AM   Result Value Ref Range    Sodium 139 136 - 145 mmol/L    Potassium 4.1 3.5 - 5.1 mmol/L    Chloride 109 (H) 98 - 107 mmol/L    CO2 20 (L) 23 - 31 mmol/L    Glucose 83 82 - 115 mg/dL    Blood Urea Nitrogen 11.7 8.4 - 25.7 mg/dL    Creatinine 0.71 (L) 0.72 - 1.25 mg/dL    Calcium 8.1 (L) 8.8 - 10.0 mg/dL    Protein Total 5.7 (L) 5.8 - 7.6 gm/dL    Albumin 2.6 (L) 3.4 - 4.8 g/dL    Globulin 3.1 2.4 - 3.5 gm/dL    Albumin/Globulin Ratio 0.8 (L) 1.1 - 2.0 ratio    Bilirubin Total 0.3 <=1.5 mg/dL    ALP 80 40 - 150 unit/L    ALT 10 0 - 55 unit/L    AST 19 11 - 45 unit/L    eGFR >60 mL/min/1.73/m2    Anion Gap 10.0 mEq/L    BUN/Creatinine Ratio 16    CBC with Differential    Collection Time: 05/03/25  4:05 AM   Result Value Ref Range    WBC 6.08 4.50 - 11.50 x10(3)/mcL    RBC 3.00 (L) 4.70 - 6.10 x10(6)/mcL    Hgb 8.5 (L) 14.0 - 18.0 g/dL    Hct 28.4 (L) 42.0 - 52.0 %    MCV 94.7 (H) 80.0 - 94.0 fL    MCH 28.3 27.0 - 31.0 pg    MCHC 29.9 (L) 33.0 - 36.0 g/dL    RDW 18.2 (H) 11.5 - 17.0 %    Platelet 203 130 - 400 x10(3)/mcL    MPV 10.2 7.4 - 10.4 fL    Neut % 56.4 %    Lymph % 26.6 %    Mono % 8.6 %    Eos % 7.6 %    Basophil % 0.5 %    Imm Grans % 0.3 %    Neut # 3.43 2.1 - 9.2 x10(3)/mcL    Lymph # 1.62 0.6 - 4.6 x10(3)/mcL     Mono # 0.52 0.1 - 1.3 x10(3)/mcL    Eos # 0.46 0 - 0.9 x10(3)/mcL    Baso # 0.03 <=0.2 x10(3)/mcL    Imm Gran # 0.02 0.00 - 0.04 x10(3)/mcL    NRBC% 0.0 %     CT Abdomen Pelvis W Wo Contrast [[id number]] Resulted: 04/29/25 0843   Order Status: Completed Updated: 04/29/25 0845   Narrative:     EXAMINATION:  CT ABDOMEN PELVIS W WO CONTRAST    CLINICAL HISTORY:  recurrent gib/melena and recurrent hematuria;.    TECHNIQUE:  Helical acquisition through the abdomen and pelvis without and with IV contrast.  Three plane reconstructions were provided for review. DLP 1395 mGycm. Automatic exposure control, adjustment of mA/kV or iterative reconstruction technique was used to reduce radiation.    COMPARISON:  12 October 2021    FINDINGS:  There are bilateral pulmonary emboli in segmental pulmonary arteries.  Small right pleural effusion.  Bibasilar atelectasis or scarring.    There are gallstones.  No pericholecystic inflammation.  No acute findings liver, spleen, pancreas or adrenals.  No hydronephrosis or suspicious renal lesion.    No bowel obstruction.  No significant inflammatory changes of the bowel.  Site of active GI bleeding is not seen.  There is enteric contrast in the colon which decreases sensitivity for GI bleed.    There is irregular bladder wall thickening.  Ortiz in the bladder.  Prostate is markedly enlarged.  No pelvic free fluid.  There is abdominal aortic aneurysm measuring up to 3.2 cm.  Dense atherosclerotic disease.  No retroperitoneal adenopathy.    Moderate degenerative change of the spine.   Impression:       1. Bilateral pulmonary thromboembolic disease.  2. Irregular bladder wall thickening which may relate to cystitis but recommend correlation with cystoscopy if not recently performed.  3. A site of active GI bleeding is not seen.  4. Findings discussed with 9th floor nurse Milena at 841 on 4/29/2025.      Electronically signed by: Hakeem Cabezas  Date: 04/29/2025  Time: 08:43        Assessment:  Gross hematuria  -s/p TURP about 7 weeks ago, cytso clot evac and fulguration about 4 weeks ago  -catheter exchanged 04/26/2025, evac 250cc mature clot 4/27  -recurrent gross hematuria yesterday morning after lovenox initiated, three-way Ortiz exchanged in initiated on CBI, hand irrigated with about 150cc clot obtained  -US 4/30 with no clot in bladder  -blood counts stable    DVT/PE  -initiated on full-dose Lovenox, held s/t gross hematuria  -s/p IVC filter 4/30    Postoperative PEA arrest/MI s/p stents  -on Plavix    Plan:  -no indication for urologic intervention at this time  -wean CBI as tolerated.  Hand irrigate as needed for clots.  -Levsin PRN spasms  -Discussed with patient, nursing  -Following      Mariela Schwab NP

## 2025-05-04 NOTE — PROGRESS NOTES
[facility]  Hospital Medicine Progress Note        Chief Complaint: Inpatient Follow-up    HPI:   79-year-old  male with significant history of BPH with urinary obstruction status post TURP which was complicated by postoperative hematuria, nonobstructive CAD, PVD, right-sided carotid artery stenosis, HTN, obesity, GERD and chronic tobacco use presented to the ED with complaints of dyspnea.  Patient initially was in outlying facility where he was found to be in hypotensive shock with EF less than 20 %, severe MR/severe TR/NSTEMI.  Patient did suffer in hospital PEA cardiac arrest, Patient was transferred to our hospital for higher level of care, taken to cath lab were he underwent Impella placement and LHC revealed multivessel CAD.  Patient was admitted to ICU on dual antiplatelets, high-intensity statin,  patient on vasopressors and also intubated with mechanical ventilatory support. Cardiology closely following the patient.  Remained on heparin drip protocol.  CT surgery was consulted for CABG/valve repair evaluation.  Per CT surgery to high-risk for CABG and deemed not a candidate, therefore cardiology planned for high-risk PCI.  Patient underwent high-risk PCI on 4/10. heparin drip later discontinued, Impella removed, cardiology recommended dual antiplatelets and high-intensity statin.  Patient off vasopressors, successfully extubated, downgraded to hospital medicine services.  Therapy Services on board, cleared for oral diet, on GDM T per Cardiology, therapy Services recommended skilled nursing facility placement, case management consulted for the same.  While awaiting placement patient had V-tach, cardiology reconsulted.  Patient also unfortunately developed hematuria on dual antiplatelets and was found to have positive FOBT, GI and urology services consulted.  GI deferring endoscopic evaluation given high-risk, hemoglobin remained stable.  Neurology recommended Ortiz/CBI.   On IV PPI b.i.d./Carafate per GI.  Closely monitoring hemoglobin for need for transfusion.  Patient also developed clot which led to unsuccessful drainage through the Ortiz requiring replacement.  Patient additionally treated with antibiotics for suspected complicated cystitis.  Patient had CT abdomen/pelvis done on 04/28, ordered by Urology and this incidentally noted bilateral PE with no right heart strain.  Venous ultrasound confirmed bilateral DVT.  Cardiology consulted.  Started full-dose anticoagulation with Lovenox.  Cardiology recommended to stop aspirin to avoid triple therapy.  However patient had worsening hematuria with clots after being on Lovenox and therefore Lovenox was held.  Cardiology consulted for IVC filter evaluation and this was successfully done on 4/30.  Now remaining on Plavix, aspirin and anticoagulation held.  Urology closely following for persistent hematuria, patient remains on CBI.  Hematuria improving as of 5/1, case discussed with Urology, decided to hold off on anticoagulation especially since he has IVC filter and decided to do dual antiplatelets given recent high-risk stenting.  Ortiz had to be exchanged overnight on 05/01 given clotting.  Hematuria improving since 05/02     Interval Hx:     Patient seen at bedside, off CBI, urine is dark brown, hemodynamics stable, no acute events overnight, no new complaints, patient has good fluid intake, but is not eat much per the CNA at bedside    Objective/physical exam:  General: In no acute distress, afebrile  Chest: Clear to auscultation bilaterally  Heart: S1, S2, no appreciable murmur  Abdomen: Soft, nontender, BS +  MSK: Warm, no lower extremity edema, no clubbing or cyanosis  Neurologic: Alert and oriented x4,   VITAL SIGNS: 24 HRS MIN & MAX LAST   Temp  Min: 97.5 °F (36.4 °C)  Max: 98.2 °F (36.8 °C) 97.5 °F (36.4 °C)   BP  Min: 91/56  Max: 105/61 (!) 102/59   Pulse  Min: 81  Max: 87  83   Resp  Min: 18  Max: 20 20   SpO2  Min: 92 %   Max: 99 % 98 %       Recent Labs   Lab 05/03/25  0405   WBC 6.08   RBC 3.00*   HGB 8.5*   HCT 28.4*   MCV 94.7*   MCH 28.3   MCHC 29.9*   RDW 18.2*      MPV 10.2         Recent Labs   Lab 05/01/25  0530 05/02/25  0930 05/03/25  0405      < > 139   K 3.9   < > 4.1      < > 109*   CO2 20*   < > 20*   BUN 16.8   < > 11.7   CREATININE 0.81   < > 0.71*   GLU 82   < > 83   CALCIUM 8.3*   < > 8.1*   MG 2.30  --   --    ALBUMIN 2.8*   < > 2.6*   PROT 6.1   < > 5.7*   ALKPHOS 83   < > 80   ALT 5   < > 10   AST 11   < > 19   BILITOT 0.4   < > 0.3    < > = values in this interval not displayed.          Microbiology Results (last 7 days)       Procedure Component Value Units Date/Time    Urine Culture High Risk [[id number]]  (Abnormal)  (Susceptibility) Collected: 04/27/25 0401    Order Status: Completed Specimen: Urine, Catheterized Updated: 05/01/25 0928     Urine Culture Less than 10,000 colonies/ml Candida tropicalis    Urine culture [[id number]]  (Abnormal) Collected: 04/26/25 1735    Order Status: Completed Specimen: Urine Updated: 04/28/25 1058     Urine Culture Less than 10,000 colonies/ml Yeast             Scheduled Med:   aspirin  81 mg Oral Daily    atorvastatin  40 mg Oral Daily    clopidogreL  75 mg Oral Daily    dapagliflozin propanediol  10 mg Oral Daily    ferrous sulfate  1 tablet Oral Daily    LIDOcaine  1 patch Transdermal Daily    metoprolol succinate  25 mg Oral Daily    nitrofurantoin (macrocrystal-monohydrate)  100 mg Oral QHS    pantoprazole  40 mg Oral BID    senna-docusate  1 tablet Oral Daily    sucralfate  1 g Oral QID (AC & HS)    zinc oxide-cod liver oil   Topical (Top) BID          Assessment/Plan:    Severe multivessel CAD status post high-risk PCI to LAD 4/10, not a candidate for CABG  Severe ischemic cardiomyopathy with latest ejection fraction 25%, now compensated   Cardiogenic shock on admit requiring vasopressors/Impella, removed  Acute bilateral PE/DVT-intolerant to  anticoagulation   Recurrent gross hematuria following TURP for BPH with urinary obstruction requiring indwelling Ortiz/CBI, multiple exchange of Ortiz secondary to clotting  Questionable GI bleed/melena-resolved  Acute on chronic microcytic anemia/acute blood loss anemia requiring PRBC transfusion-improved  Acute hypoxemic respiratory failure requiring intubation/mechanical ventilatory support on admit-improved, weaned to room air  AAA-3.8 cm  History of essential HTN-now low normal BP   History of HLD   History of PVD   History of GERD   Chronic tobacco use   Prophylaxis      Case was discussed with Urology and Cardiology on 05/01 and we have decided to do dual antiplatelets given recent high-risk PCI   Avoiding anticoagulation given high-risk for bleeding/recurrent hematuria with clots  Patient has IVC filter   Hemodynamics and respiratory status stable, monitor closely  Ortiz exchange performed 5/1 secondary to clotting  Urine Almost cleared up on CBI, off CBI today   Hemoglobin is stable above 8  Continue close monitoring  Continue goal-directed medical treatment   In addition to dual antiplatelets he remains on high-intensity statin, Farxiga, Toprol-XL  No Entresto given low normal BP  Plan to keep prophylactic Macrobid to prevent UTI, will have to continue until Ortiz is out   No overt GI bleeding  Remains on oral PPI and Carafate   Continue bowel regimen   Continue iron  DVT prophylaxis-bilateral SCDs, intolerant to anticoagulation      Plan is for skilled nursing facility upon DC  If continues with no more hematuria in the next 24-48 hours he can possibly go to skilled nursing facility early this week    Lizabeth Kaminski MD   05/04/2025

## 2025-05-04 NOTE — PROGRESS NOTES
progress note     Follow-up regarding hematuria and urinary retention with multiple medical issues.      Patient sleeping when I went in the room     Vital signs:  Patient afebrile otherwise normal with mild hypotension    Physical exam:  Urine georgina color and drainage bag patient in no distress    Laboratory:  CBC shows moderate anemia without leukocytosis and normal platelet count.  Serum electrolytes reveal some acidosis with CO2 of 18 and he is hyperchloremic.  Otherwise normal and renal function normal.    Microbiology: Urine with low colony count of yeast    Radiologic studies:  None new    Impression:  Urologically stable but catheter dependent post Transurethral resection of the prostate and several failed voiding trials.  Medically fragile patient.    Recommendation:  No acute urologic intervention necessary currently.  We will ultimately receive another voiding trial and would consider need for cystoscopy depending on void trial in resolution of hematuria.    Joe Bradford MD

## 2025-05-05 NOTE — PROGRESS NOTES
UROLOGY  PROGRESS  NOTE    Jon Conley 1945  [id number]  5/5/2025    S/p IVC filter 4/30    Patient resting in bed   No acute events overnight   Hematuria remains resolved    Soft BP unchanged   600 mL urine output overnight  WBC 4.89   H&H 9.0/29.4   BUN and creatinine 9.1/0.71    Intake/Output:  No intake/output data recorded.  I/O last 3 completed shifts:  In: 1030 [P.O.:1030]  Out: 1150 [Urine:1150]     Exam:    NAD  Card: RRR  Resp: unlabored  Abd: soft, NTND  :  Mariela urine draining to  bag    Recent Results (from the past 24 hours)   Comprehensive Metabolic Panel    Collection Time: 05/05/25  3:41 AM   Result Value Ref Range    Sodium 139 136 - 145 mmol/L    Potassium 3.6 3.5 - 5.1 mmol/L    Chloride 109 (H) 98 - 107 mmol/L    CO2 19 (L) 23 - 31 mmol/L    Glucose 89 82 - 115 mg/dL    Blood Urea Nitrogen 9.1 8.4 - 25.7 mg/dL    Creatinine 0.71 (L) 0.72 - 1.25 mg/dL    Calcium 8.2 (L) 8.8 - 10.0 mg/dL    Protein Total 5.8 5.8 - 7.6 gm/dL    Albumin 2.6 (L) 3.4 - 4.8 g/dL    Globulin 3.2 2.4 - 3.5 gm/dL    Albumin/Globulin Ratio 0.8 (L) 1.1 - 2.0 ratio    Bilirubin Total 0.4 <=1.5 mg/dL    ALP 83 40 - 150 unit/L    ALT 10 0 - 55 unit/L    AST 13 11 - 45 unit/L    eGFR >60 mL/min/1.73/m2    Anion Gap 11.0 mEq/L    BUN/Creatinine Ratio 13    CBC with Differential    Collection Time: 05/05/25  3:41 AM   Result Value Ref Range    WBC 4.89 4.50 - 11.50 x10(3)/mcL    RBC 3.14 (L) 4.70 - 6.10 x10(6)/mcL    Hgb 9.0 (L) 14.0 - 18.0 g/dL    Hct 29.4 (L) 42.0 - 52.0 %    MCV 93.6 80.0 - 94.0 fL    MCH 28.7 27.0 - 31.0 pg    MCHC 30.6 (L) 33.0 - 36.0 g/dL    RDW 18.8 (H) 11.5 - 17.0 %    Platelet 216 130 - 400 x10(3)/mcL    MPV 10.2 7.4 - 10.4 fL    Neut % 57.7 %    Lymph % 26.8 %    Mono % 8.4 %    Eos % 5.9 %    Basophil % 0.6 %    Imm Grans % 0.6 %    Neut # 2.82 2.1 - 9.2 x10(3)/mcL    Lymph # 1.31 0.6 - 4.6 x10(3)/mcL    Mono # 0.41 0.1 - 1.3 x10(3)/mcL    Eos # 0.29 0 - 0.9 x10(3)/mcL    Baso # 0.03  <=0.2 x10(3)/mcL    Imm Gran # 0.03 0.00 - 0.04 x10(3)/mcL    NRBC% 0.0 %     CT Abdomen Pelvis W Wo Contrast [[id number]] Resulted: 04/29/25 0843   Order Status: Completed Updated: 04/29/25 0845   Narrative:     EXAMINATION:  CT ABDOMEN PELVIS W WO CONTRAST    CLINICAL HISTORY:  recurrent gib/melena and recurrent hematuria;.    TECHNIQUE:  Helical acquisition through the abdomen and pelvis without and with IV contrast.  Three plane reconstructions were provided for review. DLP 1395 mGycm. Automatic exposure control, adjustment of mA/kV or iterative reconstruction technique was used to reduce radiation.    COMPARISON:  12 October 2021    FINDINGS:  There are bilateral pulmonary emboli in segmental pulmonary arteries.  Small right pleural effusion.  Bibasilar atelectasis or scarring.    There are gallstones.  No pericholecystic inflammation.  No acute findings liver, spleen, pancreas or adrenals.  No hydronephrosis or suspicious renal lesion.    No bowel obstruction.  No significant inflammatory changes of the bowel.  Site of active GI bleeding is not seen.  There is enteric contrast in the colon which decreases sensitivity for GI bleed.    There is irregular bladder wall thickening.  Ortiz in the bladder.  Prostate is markedly enlarged.  No pelvic free fluid.  There is abdominal aortic aneurysm measuring up to 3.2 cm.  Dense atherosclerotic disease.  No retroperitoneal adenopathy.    Moderate degenerative change of the spine.   Impression:       1. Bilateral pulmonary thromboembolic disease.  2. Irregular bladder wall thickening which may relate to cystitis but recommend correlation with cystoscopy if not recently performed.  3. A site of active GI bleeding is not seen.  4. Findings discussed with 9th floor nurse Milena at 841 on 4/29/2025.      Electronically signed by: Hakeem Cabezas  Date: 04/29/2025  Time: 08:43       Assessment:  Gross hematuria  -s/p TURP about 7 weeks ago, cytso clot evac and fulguration about  4 weeks ago  -catheter exchanged 04/26/2025, evac 250cc mature clot 4/27  -recurrent gross hematuria morning after lovenox initiated for DVT/PE, three-way Ortiz exchanged and  initiated on CBI, hand irrigated with about 150cc clot obtained  -US 4/30 with no clot in bladder  -blood counts stable  -hematuria resolved with CBI/holding Lovenox; remains off of CBI currently    DVT/PE  -initiated on full-dose Lovenox, held s/t gross hematuria  -s/p IVC filter 4/30    Postoperative PEA arrest/MI s/p stents  -on Plavix    Plan:  -no indication for urologic intervention at this time  -continue indwelling Ortiz.    -Plan for void trial once he is mobilizing better       Mariela Schwab, ILSA

## 2025-05-05 NOTE — PROGRESS NOTES
[facility]  Hospital Medicine Progress Note        Chief Complaint: Inpatient Follow-up    HPI:   79-year-old  male with significant history of BPH with urinary obstruction status post TURP which was complicated by postoperative hematuria, nonobstructive CAD, PVD, right-sided carotid artery stenosis, HTN, obesity, GERD and chronic tobacco use presented to the ED with complaints of dyspnea.  Patient initially was in outlying facility where he was found to be in hypotensive shock with EF less than 20 %, severe MR/severe TR/NSTEMI.  Patient did suffer in hospital PEA cardiac arrest, Patient was transferred to our hospital for higher level of care, taken to cath lab were he underwent Impella placement and LHC revealed multivessel CAD.  Patient was admitted to ICU on dual antiplatelets, high-intensity statin,  patient on vasopressors and also intubated with mechanical ventilatory support. Cardiology closely following the patient.  Remained on heparin drip protocol.  CT surgery was consulted for CABG/valve repair evaluation.  Per CT surgery to high-risk for CABG and deemed not a candidate, therefore cardiology planned for high-risk PCI.  Patient underwent high-risk PCI on 4/10. heparin drip later discontinued, Impella removed, cardiology recommended dual antiplatelets and high-intensity statin.  Patient off vasopressors, successfully extubated, downgraded to hospital medicine services.  Therapy Services on board, cleared for oral diet, on GDM T per Cardiology, therapy Services recommended skilled nursing facility placement, case management consulted for the same.  While awaiting placement patient had V-tach, cardiology reconsulted.  Patient also unfortunately developed hematuria on dual antiplatelets and was found to have positive FOBT, GI and urology services consulted.  GI deferring endoscopic evaluation given high-risk, hemoglobin remained stable.  Neurology recommended Ortiz/CBI.   On IV PPI b.i.d./Carafate per GI.  Closely monitoring hemoglobin for need for transfusion.  Patient also developed clot which led to unsuccessful drainage through the Ortiz requiring replacement.  Patient additionally treated with antibiotics for suspected complicated cystitis.  Patient had CT abdomen/pelvis done on 04/28, ordered by Urology and this incidentally noted bilateral PE with no right heart strain.  Venous ultrasound confirmed bilateral DVT.  Cardiology consulted.  Started full-dose anticoagulation with Lovenox.  Cardiology recommended to stop aspirin to avoid triple therapy.  However patient had worsening hematuria with clots after being on Lovenox and therefore Lovenox was held.  Cardiology consulted for IVC filter evaluation and this was successfully done on 4/30.  Now remaining on Plavix, aspirin and anticoagulation held.  Urology closely following for persistent hematuria, patient remains on CBI.  Hematuria improving as of 5/1, case discussed with Urology, decided to hold off on anticoagulation especially since he has IVC filter and decided to do dual antiplatelets given recent high-risk stenting.  Ortiz had to be exchanged overnight on 05/01 given clotting.  Hematuria improving since 05/02, hemoglobin also remaining stable, tolerating dual antiplatelets     Interval Hx:     Patient seen at bedside, no more hematuria off CBI, hemodynamics stable, no acute events overnight, no active complaints  Objective/physical exam:  General: In no acute distress, afebrile  Chest: Clear to auscultation bilaterally  Heart: S1, S2, no appreciable murmur  Abdomen: Soft, nontender, BS +  MSK: Warm, no lower extremity edema, no clubbing or cyanosis  Neurologic: Alert and oriented x4,   VITAL SIGNS: 24 HRS MIN & MAX LAST   Temp  Min: 97.5 °F (36.4 °C)  Max: 98.5 °F (36.9 °C) 98.5 °F (36.9 °C)   BP  Min: 83/38  Max: 104/46 100/61   Pulse  Min: 70  Max: 91  87   Resp  Min: 18  Max: 20 18   SpO2  Min: 98 %  Max: 99 % 98 %        Recent Labs   Lab 05/05/25  0341   WBC 4.89   RBC 3.14*   HGB 9.0*   HCT 29.4*   MCV 93.6   MCH 28.7   MCHC 30.6*   RDW 18.8*      MPV 10.2         Recent Labs   Lab 05/01/25  0530 05/02/25  0930 05/05/25  0341      < > 139   K 3.9   < > 3.6      < > 109*   CO2 20*   < > 19*   BUN 16.8   < > 9.1   CREATININE 0.81   < > 0.71*   GLU 82   < > 89   CALCIUM 8.3*   < > 8.2*   MG 2.30  --   --    ALBUMIN 2.8*   < > 2.6*   PROT 6.1   < > 5.8   ALKPHOS 83   < > 83   ALT 5   < > 10   AST 11   < > 13   BILITOT 0.4   < > 0.4    < > = values in this interval not displayed.          Microbiology Results (last 7 days)       Procedure Component Value Units Date/Time    Urine Culture High Risk [[id number]]  (Abnormal)  (Susceptibility) Collected: 04/27/25 0401    Order Status: Completed Specimen: Urine, Catheterized Updated: 05/01/25 0928     Urine Culture Less than 10,000 colonies/ml Candida tropicalis    Urine culture [[id number]]  (Abnormal) Collected: 04/26/25 1735    Order Status: Completed Specimen: Urine Updated: 04/28/25 1058     Urine Culture Less than 10,000 colonies/ml Yeast             Scheduled Med:   aspirin  81 mg Oral Daily    atorvastatin  40 mg Oral Daily    clopidogreL  75 mg Oral Daily    dapagliflozin propanediol  10 mg Oral Daily    ferrous sulfate  1 tablet Oral Daily    LIDOcaine  1 patch Transdermal Daily    metoprolol succinate  25 mg Oral Daily    nitrofurantoin (macrocrystal-monohydrate)  100 mg Oral QHS    pantoprazole  40 mg Oral BID    senna-docusate  1 tablet Oral Daily    sodium bicarbonate  1,300 mg Oral BID    sucralfate  1 g Oral QID (AC & HS)    zinc oxide-cod liver oil   Topical (Top) BID          Assessment/Plan:    Severe multivessel CAD status post high-risk PCI to LAD 4/10, not a candidate for CABG  Severe ischemic cardiomyopathy with latest ejection fraction 25%, now compensated   Cardiogenic shock on admit requiring vasopressors/Impella, removed  Acute bilateral  PE/DVT-intolerant to anticoagulation , status post IVC filter  Recurrent gross hematuria following TURP for BPH with urinary obstruction requiring indwelling Ortiz/CBI, multiple exchange of Ortiz secondary to clotting-improved/resolved  Questionable GI bleed/melena-resolved  Acute on chronic microcytic anemia/acute blood loss anemia requiring PRBC transfusion-improved/stable  Acute hypoxemic respiratory failure requiring intubation/mechanical ventilatory support on admit-improved, weaned to room air  AAA-3.8 cm  History of essential HTN-now low normal BP   History of HLD   History of PVD   History of GERD   Chronic tobacco use   Prophylaxis      Case was discussed with Urology and Cardiology on 05/01 and we have decided to do dual antiplatelets given recent high-risk PCI   Avoiding anticoagulation given high-risk for bleeding/recurrent hematuria with clots  Patient has IVC filter   Hemodynamics and respiratory status stable, monitor closely  Ortiz exchange performed 5/1 secondary to clotting  Urine is now clear off CBI for past 2 days  Hemoglobin 9  Continue goal-directed medical treatment   In addition to dual antiplatelets he remains on high-intensity statin, Farxiga, Toprol-XL  No Entresto given low normal BP  Plan to keep prophylactic Macrobid to prevent UTI, will have to continue until Ortiz is out   No overt GI bleeding  Remains on oral PPI and Carafate   Continue bowel regimen   Continue iron  DVT prophylaxis-bilateral SCDs, intolerant to anticoagulation      Plan for skilled nursing facility placement   Discussed with case management   If continues with no hematuria patient should be able to go in next 24-48 hours if accepted    Lizabeth Kaminski MD   05/05/2025

## 2025-05-05 NOTE — PT/OT/SLP PROGRESS
Physical Therapy Treatment    Patient Name:  Jon Conley   MRN:  [id number]    Recommendations:     Discharge therapy intensity: Moderate Intensity Therapy   Discharge Equipment Recommendations: to be determined by next level of care  Barriers to discharge: Decreased caregiver support, Impaired mobility, and Ongoing medical needs    Assessment:     Jon Conley is a 79 y.o. male admitted with a medical diagnosis of SOB, cardiac arrest, s/p LHC and impella on 3/29, s/p high risk PCI with 4 stents in LAD on 4/10; acute respiratory failure requiring intubation 3/29 with extubation 4/13.  He presents with the following impairments/functional limitations: weakness, impaired endurance, impaired functional mobility, gait instability, impaired balance, decreased lower extremity function, edema, impaired cardiopulmonary response to activity.     Rehab Prognosis: Good; patient would benefit from acute skilled PT services to address these deficits and reach maximum level of function.    Recent Surgery: Procedure(s) (LRB):  Placement, Inferior Vena Cava Filter (Right) 5 Days Post-Op    Plan:     During this hospitalization, patient would benefit from acute PT services 5 x/week to address the identified rehab impairments via gait training, therapeutic activities, therapeutic exercises, neuromuscular re-education and progress toward the following goals:    Plan of Care Expires:  05/24/25    Subjective     Chief Complaint: dizziness after standing    Objective:     Communicated with nurse prior to session.  Patient found supine with contreras catheter, telemetry, pulse ox (continuous), peripheral IV upon PT entry to room.     General Precautions: Standard, fall, aspiration  Orthopedic Precautions: N/A  Braces: N/A  Respiratory Status: Room air  Blood Pressure: see below  Skin Integrity: Visible skin intact      Functional Mobility:  Min assist to get EOB  BP supine 103/71              Sitting 106/50                Standing- I attempted twice but it would not register              Supine 101/61    Therapeutic Activities/Exercises:  Pt performed LE PRE's to increase strenth, ROM, and endurance to improve overall independence.     Patient left supine with all lines intact and call button in reach    GOALS:   Multidisciplinary Problems       Physical Therapy Goals          Problem: Physical Therapy    Goal Priority Disciplines Outcome Interventions   Physical Therapy Goal     PT, PT/OT Progressing    Description: Goals to be met by: 5/15/25     Patient will increase functional independence with mobility by performin. Supine to sit with MInimal Assistance  2. Sit to supine with MInimal Assistance  3. Sit to stand transfer with Moderate Assistance  4. Bed to chair transfer with Moderate Assistance using Rolling Walker  5. Sitting at edge of bed x20 minutes with Stand-by Assistance  6. Pt will ambulate 50ft with RW with Minimal Assistance.                         Time Tracking:     Billable Minutes: Therapeutic Activity 12 and Therapeutic Exercise 12    Treatment Type: Treatment  PT/PTA: PTA     Number of PTA visits since last PT visit: 2025

## 2025-05-05 NOTE — PLAN OF CARE
Dr Kaminski informed me that pt will be ready for dc to [facility].   Message to Ade Dubois who originally agreed to accept pt.  Awaiting response.     Steffen with [facility] informed me he is calling pt insurance to see if he will need auth again . My cell number provided

## 2025-05-05 NOTE — PT/OT/SLP PROGRESS
"Occupational Therapy   Treatment    Name: Jon Conley  MRN: [id number]  Admitting Diagnosis:  Ischemic cardiomyopathy  5 Days Post-Op    Recommendations:     Recommended therapy intensity at discharge: Moderate Intensity Therapy   Discharge Equipment Recommendations:  to be determined by next level of care  Barriers to discharge:       Assessment:     Jon Conley is a 79 y.o. male with a medical diagnosis of SOB, cardiac arrest, s/p LHC and impella on 3/29, s/p high risk PCI with 4 stents in LAD on 4/10; acute respiratory failure requiring intubation 3/29 with extubation 4/13.      Performance deficits affecting function are weakness, impaired endurance, impaired self care skills, impaired functional mobility, impaired balance, impaired cardiopulmonary response to activity.     Rehab Prognosis:  Fair; patient would benefit from acute skilled OT services to address these deficits and reach maximum level of function.       Plan:     Patient to be seen 4 x/week to address the above listed problems via self-care/home management, therapeutic activities, therapeutic exercises  Plan of Care Expires: 06/02/25  Plan of Care Reviewed with: patient    Subjective     "I'm gonna pass out."    Pain/Comfort:  Pain Rating 1: 0/10    Objective:     Communicated with: nurse and PTA prior to session.  Patient found HOB elevated with perineural catheter, telemetry, pulse ox (continuous), peripheral IV upon OT entry to room.    General Precautions: Standard, fall    Orthopedic Precautions:N/A  Braces: N/A  Respiratory Status: Room air  Vital Signs:   Supine: 96/54  Sit: 83/54; s/p upper/lower body exercise: 86/54  Supine: 85/55  RN informed.      Occupational Performance:     Bed Mobility:    Patient completed Supine <> Sit with minimum assistance    Functional Mobility/Transfers:  Unable to participate in mobility beyond sitting EOB 2/2 low BP.     Activities of Daily Living:  Toileting: dependence catheter in " place    [facility] 6 Click ADL: 13    Patient Education:  Patient provided with verbal education education regarding OT role/goals/POC, fall prevention, safety awareness, Discharge/DME recommendations, and pressure ulcer prevention.  Understanding was verbalized, however additional teaching warranted.    Patient left HOB elevated with all lines intact, call button in reach, and RN notified.    GOALS:   Multidisciplinary Problems       Occupational Therapy Goals          Problem: Occupational Therapy    Goal Priority Disciplines Outcome Interventions   Occupational Therapy Goal     OT, PT/OT Progressing    Description: Goals to be met by 6/2/25    Pt will complete grooming standing at sink with LRAD with min A.  Pt will complete UB dressing with min A.  Pt will complete LB dressing with max A using LRAD and AE prn.  Pt will complete toileting with max A using LRAD.  Pt will complete functional mobility to/from toilet and toilet transfer with SBA using LRAD.                        Time Tracking:     OT Date of Treatment: 05/05/25  OT Start Time: 1341  OT Stop Time: 1357  OT Total Time (min): 16 min    Billable Minutes:Therapeutic Activity 1    OT/OLIVIA: OT     Number of OLIVIA visits since last OT visit: 1    5/5/2025

## 2025-05-05 NOTE — PROGRESS NOTES
05/05/25 1059   Missed Time Reason   SLP Attempted Eval Date 05/05/25   SLP Attempted Eval Time 1059   Missed Treatment Reason Patient fatigue;Patient unwilling to participate     SLP attempting to see pt for therapy however pt reporting he has already done therapy and does not want any more today despite education about speech therapy vs PT/OT. SLP to continue to follow and treat as appropriate.

## 2025-05-06 PROBLEM — Z51.5 COMFORT MEASURES ONLY STATUS: Status: ACTIVE | Noted: 2025-01-01

## 2025-05-06 NOTE — DISCHARGE SUMMARY
[facility] Transfer and Discharge Note    Admitting Phys[facility] Dav[facility]p Jr., [facility]  Attending Physician: Lizabeth Kaminski MD  Resident: Sury  Date of Admit: 3/29/2025  Date of Discharge: 2025    Condition:   Outcome:   DISPOSITION:  in Medical Facility      Discharge Diagnoses     Problem List[1]    Principal Problem:  Ischemic cardiomyopathy    Consultants and Procedures     Consultants:  IP CONSULT TO CARDIOLOGY  IP CONSULT TO SPIRITUAL CARE  IP CONSULT TO REGISTERED DIETITIAN/NUTRITIONIST  WOUND CARE CONSULT  IP CONSULT TO UROLOGY  IP CONSULT TO GI  IP CONSULT TO PALLIATIVE CARE  IP CONSULT TO UROLOGY  IP CONSULT TO CARDIOLOGY  IP CONSULT TO CARDIOLOGY    Procedures:   Procedure(s) (LRB):  Placement, Inferior Vena Cava Filter (Right)     Brief Admission History      79-year-old  male with significant history of BPH with urinary obstruction status post TURP which was complicated by postoperative hematuria, nonobstructive CAD, PVD, right-sided carotid artery stenosis, HTN, obesity, GERD and chronic tobacco use presented to the ED with complaints of dyspnea.  Patient initially was in outlying facility where he was found to be in hypotensive shock with EF less than 20 %, severe MR/severe TR/NSTEMI.  Patient did suffer in hospital PEA cardiac arrest, Patient was transferred to our hospital for higher level of care, taken to cath lab were he underwent Impella placement and LHC revealed multivessel CAD.  Patient was admitted to ICU on dual antiplatelets, high-intensity statin,  patient on vasopressors and also intubated with mechanical ventilatory support. Cardiology closely following the patient.  Remained on heparin drip protocol.  CT surgery was consulted for CABG/valve repair evaluation.  Per CT surgery to high-risk for CABG and deemed not a candidate, therefore cardiology planned for high-risk PCI.  Patient underwent high-risk PCI on 4/10. heparin drip  later discontinued, Impella removed, cardiology recommended dual antiplatelets and high-intensity statin.  Patient off vasopressors, successfully extubated, downgraded to hospital medicine services.  Therapy Services on board, cleared for oral diet, on GDM T per Cardiology, therapy Services recommended skilled nursing facility placement, case management consulted for the same.  While awaiting placement patient had V-tach, cardiology reconsulted.  Patient also unfortunately developed hematuria on dual antiplatelets and was found to have positive FOBT, GI and urology services consulted.  GI deferring endoscopic evaluation given high-risk, hemoglobin remained stable.  Neurology recommended Ortiz/CBI.  On IV PPI b.i.d./Carafate per GI.  Closely monitoring hemoglobin for need for transfusion.  Patient also developed clot which led to unsuccessful drainage through the Ortiz requiring replacement.  Patient additionally treated with antibiotics for suspected complicated cystitis.  Patient had CT abdomen/pelvis done on 04/28, ordered by Urology and this incidentally noted bilateral PE with no right heart strain.  Venous ultrasound confirmed bilateral DVT.  Cardiology consulted.  Started full-dose anticoagulation with Lovenox.  Cardiology recommended to stop aspirin to avoid triple therapy.  However patient had worsening hematuria with clots after being on Lovenox and therefore Lovenox was held.  Cardiology consulted for IVC filter evaluation and this was successfully done on 4/30.  Now remaining on Plavix, aspirin and anticoagulation held.  Urology closely following for persistent hematuria, patient remains on CBI.  Hematuria improving as of 5/1, case discussed with Urology, decided to hold off on anticoagulation especially since he has IVC filter and decided to do dual antiplatelets given recent high-risk stenting.  Ortiz had to be exchanged overnight on 05/01 given clotting.  Hematuria improving since 05/02, hemoglobin  also remaining stable, tolerating dual antiplatelets. Patient was reportedly awaiting discharge on palliative care as he was not a candidate for invasive cardiac procedures.     ICU residents overnight called around 0545 on 5/6/25 due to concerns of shock, last systolic blood pressure was 60s which was immeasurable at bedside. Patient confirmed DNR/DNI by his family who was en route at the time of ICU upgrade for vasopressor support at 0552. STAT labs pending upon ICU upgrade. BP did not respond to vasopressors and patient was progressively bradycardic following upgrade around 0556. Family presented to ICU waiting room around 0625 and decision was made for comfort measures only. Order to transfer patient from floor to ICU placed at 0627. Patient's family was at bedside upon patient's passing at 0633.        Discharge physical exam:    Called by nursing staff at 0631 to see the patient. Upon my arrival, Jon Conley was noted to be apneic and unresponsive to verbal, tactile, or painful stimuli. ACLS was not initiated due to DNR status. he was not moving their extremities spontaneously. Heart sounds and breath sounds are absent to auscultation. Bilateral carotid, radial, and femoral pulses were absent to palpation. Pupils fixed and dilated, corneal reflex absent bilaterally. he was pronounced dead at 0633 on 05/06/2025. Family was at bedside. Attending was notified.    Time of Death: 0633    Cause of Death: Cardiac Arrest       Jaciel Ga MD  [facility]            [1]   Patient Active Problem List  Diagnosis    Ischemic cardiomyopathy    Chronic heart failure with reduced ejection fraction (HFrEF, <= 40%)    Comfort measures only status

## 2025-05-06 NOTE — CARE UPDATE
Received a call overnight of nurses being unable to obtain a blood pressure today.  He received 2 boluses of 250 cc prior to my arrival.  On my arrival still unable to obtain a blood pressure, mottled bilateral lower extremities which is new per the patient's nurse at bedside and with an appearance of impending doom.  Shortly after my arrival dark red blood noticed in the Ortiz catheter.  Nurse stated that a large clot was irrigated prior to hypotensive episode.  Suspect large clot was tamponading and now patient having active bleeding through his Ortiz catheter.  Approximately 1 L of dark red was emptied during my evaluation.  2 units of emergency blood ordered for hemorrhagic shock.  I discussed the patient's critical condition with both of his sisters who were actively driving in.  They remain that he is DNR/DNI but are okay with vasopressors at this time.  Case discussed with ICU residents who were also at bedside with me.  Patient was transferred to the ICU for hemorrhagic shock.

## 2025-05-10 LAB — RBCS: NORMAL

## (undated) DEVICE — CATH NC EMERGE MR 3.5X20MM

## (undated) DEVICE — GUIDEWIRE SION BLK STR 190CM

## (undated) DEVICE — CATH EMERGE MR 20 X 2.50

## (undated) DEVICE — CATH NC EMERGE MR 3.5X30MM

## (undated) DEVICE — CATH MACH1 GUID JR4 SH 6F .070

## (undated) DEVICE — EVEROLIMUS-ELUTING PLATINUM CHROMIUM CORONARY STENT SYSTEM
Type: IMPLANTABLE DEVICE | Site: HEART | Status: NON-FUNCTIONAL
Brand: SYNERGY™ XD

## (undated) DEVICE — SET ANGIO ACIST CVI ANGIOTOUCH

## (undated) DEVICE — Device

## (undated) DEVICE — PAD HEARTSTART DEFIB ADULT

## (undated) DEVICE — KIT MINI STICK MAX 5F 21GX10CM

## (undated) DEVICE — GUIDE LAUNCHER 6FR EBU 3.5

## (undated) DEVICE — CATH CARAVEL MICRO 135CM

## (undated) DEVICE — SUT ETHBND XTRA 1 OS-8 30IN

## (undated) DEVICE — GUIDEWIRE RUNTHROUGH EF 180CM

## (undated) DEVICE — CATH IMPULSE FL5 5FR

## (undated) DEVICE — CATH IMPULSE MP 5FR 145CM

## (undated) DEVICE — GUIDEWIRE PLCMNT .018INX260CM

## (undated) DEVICE — CATH NC EMERGE MR 4X20MM

## (undated) DEVICE — LOCATOR MANTA DEPTH 14FR

## (undated) DEVICE — SHEATH INTRODUCER 7FR 11CM

## (undated) DEVICE — CATH GUID EBU4 LAUNCH 6FRX100

## (undated) DEVICE — SHEATH PINNACLE 8FR

## (undated) DEVICE — GUIDEWIRE INQWIRE SE 3MM JTIP

## (undated) DEVICE — GUIDEWIRE PROWATER .014X180CM

## (undated) DEVICE — CATH EMPULSE ANGLED 5FR PIGTAI

## (undated) DEVICE — DEVICE TRAPPER EXCHANGE

## (undated) DEVICE — PAD DEFIB CADENCE ADULT R2

## (undated) DEVICE — CATH EMERGE MR 15 X 2.50

## (undated) DEVICE — CATH SAPPHIRE II PRO SC 1X15MM

## (undated) DEVICE — CATH IMPELLA CP 14F 4.7X65MM

## (undated) DEVICE — CATH EAGLE EYE PLATINUM

## (undated) DEVICE — DEVICE BASIXCOMPAK INFL 20ML

## (undated) DEVICE — CATH GUIDE LINER  V3 6F

## (undated) DEVICE — STOPCOCK 3-WAY

## (undated) DEVICE — DEVICE INDEFLATOR BASIX

## (undated) DEVICE — VALVE CONTROL COPILOT

## (undated) DEVICE — SHEATH INTRODUCER 6FR 11CM

## (undated) DEVICE — CATH SWAN GANZ STND 7FR

## (undated) DEVICE — GUIDEWIRE FIELDER XT.014X190CM

## (undated) DEVICE — CATH NC EMERGE MR 3X30MM

## (undated) DEVICE — SET MICROPUNCTUREECHO STIFF

## (undated) DEVICE — GUIDEWIRE GLADIUS STR 190CM

## (undated) DEVICE — CATH NC EMERGE MR 2.5X20MM

## (undated) DEVICE — CATH NC EMERGE MR 4X30MM